# Patient Record
Sex: FEMALE | Race: WHITE | NOT HISPANIC OR LATINO | Employment: OTHER | URBAN - METROPOLITAN AREA
[De-identification: names, ages, dates, MRNs, and addresses within clinical notes are randomized per-mention and may not be internally consistent; named-entity substitution may affect disease eponyms.]

---

## 2017-05-18 ENCOUNTER — CONVERSION ENCOUNTER (OUTPATIENT)
Dept: MAMMOGRAPHY | Facility: CLINIC | Age: 72
End: 2017-05-18

## 2017-10-13 ENCOUNTER — TRANSCRIBE ORDERS (OUTPATIENT)
Dept: ADMINISTRATIVE | Facility: HOSPITAL | Age: 72
End: 2017-10-13

## 2017-10-13 ENCOUNTER — ALLSCRIPTS OFFICE VISIT (OUTPATIENT)
Dept: OTHER | Facility: OTHER | Age: 72
End: 2017-10-13

## 2017-10-13 DIAGNOSIS — N30.20 OTHER CHRONIC CYSTITIS WITHOUT HEMATURIA: ICD-10-CM

## 2017-10-13 LAB
BILIRUB UR QL STRIP: NORMAL
CLARITY UR: NORMAL
COLOR UR: YELLOW
GLUCOSE (HISTORICAL): NORMAL
HGB UR QL STRIP.AUTO: NORMAL
KETONES UR STRIP-MCNC: NORMAL MG/DL
LEUKOCYTE ESTERASE UR QL STRIP: NORMAL
NITRITE UR QL STRIP: NORMAL
PH UR STRIP.AUTO: 5.5 [PH]
PROT UR STRIP-MCNC: NORMAL MG/DL
SP GR UR STRIP.AUTO: 1.02
UROBILINOGEN UR QL STRIP.AUTO: 0.2

## 2017-10-25 ENCOUNTER — HOSPITAL ENCOUNTER (OUTPATIENT)
Dept: CT IMAGING | Facility: HOSPITAL | Age: 72
Discharge: HOME | End: 2017-10-25
Attending: UROLOGY
Payer: MEDICARE

## 2017-10-25 DIAGNOSIS — N30.20 OTHER CHRONIC CYSTITIS WITHOUT HEMATURIA: ICD-10-CM

## 2017-10-25 PROCEDURE — 74176 CT ABD & PELVIS W/O CONTRAST: CPT

## 2017-12-05 ENCOUNTER — ALLSCRIPTS OFFICE VISIT (OUTPATIENT)
Dept: OTHER | Facility: OTHER | Age: 72
End: 2017-12-05

## 2017-12-05 LAB
BILIRUB UR QL STRIP: NEGATIVE
CLARITY UR: NORMAL
COLOR UR: YELLOW
GLUCOSE (HISTORICAL): NEGATIVE
HGB UR QL STRIP.AUTO: NORMAL
KETONES UR STRIP-MCNC: NEGATIVE MG/DL
LEUKOCYTE ESTERASE UR QL STRIP: NORMAL
NITRITE UR QL STRIP: NEGATIVE
PH UR STRIP.AUTO: 5 [PH]
PROT UR STRIP-MCNC: NEGATIVE MG/DL
SP GR UR STRIP.AUTO: 1.02
UROBILINOGEN UR QL STRIP.AUTO: 0.2

## 2018-01-14 VITALS
SYSTOLIC BLOOD PRESSURE: 104 MMHG | BODY MASS INDEX: 41.95 KG/M2 | HEIGHT: 66 IN | WEIGHT: 261 LBS | DIASTOLIC BLOOD PRESSURE: 68 MMHG

## 2018-05-22 LAB
ABSOL LYMPHOCYTES (HISTORICAL): 1.2 K/UL (ref 0.5–4)
ALBUMIN SERPL BCP-MCNC: 3.7 G/DL (ref 3–5.2)
ALP SERPL-CCNC: 96 U/L (ref 43–122)
ALT SERPL W P-5'-P-CCNC: 37 U/L (ref 9–52)
AST SERPL W P-5'-P-CCNC: 38 U/L (ref 14–36)
BASOPHILS # BLD AUTO: 0 % (ref 0–1)
BASOPHILS # BLD AUTO: 0 K/UL (ref 0–0.1)
BILIRUB SERPL-MCNC: 0.3 MG/DL
BILIRUBIN DIRECT (HISTORICAL): 0.2 MG/DL
DEPRECATED RDW RBC AUTO: 13.8 %
EOSINOPHIL # BLD AUTO: 0.1 K/UL (ref 0–0.4)
EOSINOPHIL NFR BLD AUTO: 2 % (ref 0–6)
HCT VFR BLD AUTO: 38.7 % (ref 36–46)
HGB BLD-MCNC: 12.7 G/DL (ref 12–16)
LYMPHOCYTES NFR BLD AUTO: 14 % (ref 25–45)
MCH RBC QN AUTO: 29.3 PG (ref 26–34)
MCHC RBC AUTO-ENTMCNC: 32.7 % (ref 31–36)
MCV RBC AUTO: 90 FL (ref 80–100)
MONOCYTES # BLD AUTO: 0.5 K/UL (ref 0.2–0.9)
MONOCYTES NFR BLD AUTO: 6 % (ref 1–10)
NEUTROPHILS ABS COUNT (HISTORICAL): 6.4 K/UL (ref 1.8–7.8)
NEUTS SEG NFR BLD AUTO: 78 % (ref 45–65)
PLATELET # BLD AUTO: 180 K/MCL (ref 150–450)
RBC # BLD AUTO: 4.32 M/MCL (ref 4–5.2)
TOTAL PROTEIN (HISTORICAL): 6.2 G/DL (ref 5.9–8.4)
WBC # BLD AUTO: 8.2 K/MCL (ref 4.5–11)

## 2018-06-04 LAB
ABSOL LYMPHOCYTES (HISTORICAL): 1 K/UL (ref 0.5–4)
ALBUMIN SERPL BCP-MCNC: 3.5 G/DL (ref 3–5.2)
ALP SERPL-CCNC: 119 U/L (ref 43–122)
ALT SERPL W P-5'-P-CCNC: 40 U/L (ref 9–52)
ANION GAP SERPL CALCULATED.3IONS-SCNC: 8 MMOL/L (ref 5–14)
AST SERPL W P-5'-P-CCNC: 36 U/L (ref 14–36)
BASOPHILS # BLD AUTO: 0 % (ref 0–1)
BASOPHILS # BLD AUTO: 0 K/UL (ref 0–0.1)
BILIRUB SERPL-MCNC: 0.6 MG/DL
BUN SERPL-MCNC: 17 MG/DL (ref 5–25)
CALCIUM SERPL-MCNC: 8.9 MG/DL (ref 8.4–10.2)
CHLORIDE SERPL-SCNC: 105 MEQ/L (ref 97–108)
CHOLEST SERPL-MCNC: 136 MG/DL
CHOLEST/HDLC SERPL: 3.4 {RATIO}
CO2 SERPL-SCNC: 28 MMOL/L (ref 22–30)
CREATINE, SERUM (HISTORICAL): 1.01 MG/DL (ref 0.6–1.2)
CREATININE, RANDOM URINE (HISTORICAL): 107.1 MG/DL (ref 50–200)
DEPRECATED RDW RBC AUTO: 13.8 %
EGFR (HISTORICAL): 54 ML/MIN/1.73 M2
EOSINOPHIL # BLD AUTO: 0.1 K/UL (ref 0–0.4)
EOSINOPHIL NFR BLD AUTO: 2 % (ref 0–6)
EST. AVERAGE GLUCOSE BLD GHB EST-MCNC: 217 MG/DL
GLUCOSE SERPL-MCNC: 125 MG/DL (ref 70–99)
HBA1C MFR BLD HPLC: 9.2 %
HCT VFR BLD AUTO: 39.6 % (ref 36–46)
HDLC SERPL-MCNC: 40 MG/DL
HGB BLD-MCNC: 13.1 G/DL (ref 12–16)
LDL/HDL RATIO (HISTORICAL): 1.6
LDLC SERPL CALC-MCNC: 62 MG/DL
LYMPHOCYTES NFR BLD AUTO: 16 % (ref 25–45)
MCH RBC QN AUTO: 29.3 PG (ref 26–34)
MCHC RBC AUTO-ENTMCNC: 33 % (ref 31–36)
MCV RBC AUTO: 89 FL (ref 80–100)
MICROALBUM.,U,RANDOM (HISTORICAL): 2 MG/DL
MICROALBUMIN/CREATININE RATIO (HISTORICAL): 18.7
MONOCYTES # BLD AUTO: 0.4 K/UL (ref 0.2–0.9)
MONOCYTES NFR BLD AUTO: 6 % (ref 1–10)
NEUTROPHILS ABS COUNT (HISTORICAL): 4.7 K/UL (ref 1.8–7.8)
NEUTS SEG NFR BLD AUTO: 76 % (ref 45–65)
PLATELET # BLD AUTO: 152 K/MCL (ref 150–450)
POTASSIUM SERPL-SCNC: 4.2 MEQ/L (ref 3.6–5)
RBC # BLD AUTO: 4.46 M/MCL (ref 4–5.2)
SODIUM SERPL-SCNC: 141 MEQ/L (ref 137–147)
TOTAL PROTEIN (HISTORICAL): 6.2 G/DL (ref 5.9–8.4)
TRIGL SERPL-MCNC: 170 MG/DL
TSH SERPL DL<=0.05 MIU/L-ACNC: 2.9 UIU/ML (ref 0.47–4.68)
VLDLC SERPL CALC-MCNC: 34 MG/DL (ref 0–40)
WBC # BLD AUTO: 6.2 K/MCL (ref 4.5–11)

## 2018-06-04 RX ORDER — TOPIRAMATE 25 MG/1
CAPSULE, COATED PELLETS ORAL
COMMUNITY
End: 2018-07-19 | Stop reason: SDUPTHER

## 2018-06-04 RX ORDER — LORATADINE 10 MG/1
TABLET ORAL
COMMUNITY

## 2018-06-04 RX ORDER — ERGOCALCIFEROL (VITAMIN D2) 10 MCG
TABLET ORAL
COMMUNITY

## 2018-06-04 RX ORDER — SIMVASTATIN 20 MG
TABLET ORAL
COMMUNITY
End: 2020-06-29 | Stop reason: SDUPTHER

## 2018-06-04 RX ORDER — PNV NO.95/FERROUS FUM/FOLIC AC 28MG-0.8MG
TABLET ORAL
COMMUNITY

## 2018-06-04 RX ORDER — FENOFIBRATE 160 MG/1
TABLET ORAL
COMMUNITY
End: 2018-07-17 | Stop reason: SDUPTHER

## 2018-06-04 RX ORDER — LISINOPRIL 10 MG/1
TABLET ORAL
COMMUNITY
End: 2019-05-11 | Stop reason: SDUPTHER

## 2018-06-04 RX ORDER — ACETAMINOPHEN 325 MG/1
TABLET ORAL AS NEEDED
COMMUNITY

## 2018-06-04 RX ORDER — AMLODIPINE BESYLATE 5 MG/1
TABLET ORAL
COMMUNITY
End: 2019-05-11 | Stop reason: SDUPTHER

## 2018-06-04 RX ORDER — LANOLIN ALCOHOL/MO/W.PET/CERES
CREAM (GRAM) TOPICAL
COMMUNITY
End: 2021-04-26

## 2018-06-04 RX ORDER — PANTOPRAZOLE SODIUM 40 MG/1
TABLET, DELAYED RELEASE ORAL
COMMUNITY
End: 2019-05-11 | Stop reason: SDUPTHER

## 2018-06-04 RX ORDER — DICYCLOMINE HCL 20 MG
TABLET ORAL
COMMUNITY
End: 2019-05-11 | Stop reason: SDUPTHER

## 2018-06-04 RX ORDER — CLOTRIMAZOLE AND BETAMETHASONE DIPROPIONATE 10; .64 MG/G; MG/G
CREAM TOPICAL
COMMUNITY
End: 2018-11-26 | Stop reason: SDUPTHER

## 2018-06-04 RX ORDER — PREGABALIN 75 MG/1
CAPSULE ORAL
COMMUNITY
End: 2018-07-19 | Stop reason: SDUPTHER

## 2018-06-04 RX ORDER — B-COMPLEX WITH VITAMIN C
CAPSULE ORAL
COMMUNITY
End: 2021-08-12 | Stop reason: ALTCHOICE

## 2018-06-04 RX ORDER — OXYBUTYNIN CHLORIDE 5 MG/1
1 TABLET, EXTENDED RELEASE ORAL DAILY
COMMUNITY
Start: 2017-10-13 | End: 2018-06-05 | Stop reason: SDUPTHER

## 2018-06-05 ENCOUNTER — OFFICE VISIT (OUTPATIENT)
Dept: UROLOGY | Facility: MEDICAL CENTER | Age: 73
End: 2018-06-05
Payer: MEDICARE

## 2018-06-05 VITALS
WEIGHT: 264 LBS | SYSTOLIC BLOOD PRESSURE: 140 MMHG | DIASTOLIC BLOOD PRESSURE: 78 MMHG | HEIGHT: 66 IN | BODY MASS INDEX: 42.43 KG/M2

## 2018-06-05 DIAGNOSIS — N30.20 CHRONIC CYSTITIS: Primary | ICD-10-CM

## 2018-06-05 DIAGNOSIS — N39.41 URGE INCONTINENCE: ICD-10-CM

## 2018-06-05 LAB
SL AMB  POCT GLUCOSE, UA: NEGATIVE
SL AMB LEUKOCYTE ESTERASE,UA: ABNORMAL
SL AMB POCT BILIRUBIN,UA: NEGATIVE
SL AMB POCT BLOOD,UA: NEGATIVE
SL AMB POCT CLARITY,UA: CLEAR
SL AMB POCT COLOR,UA: YELLOW
SL AMB POCT KETONES,UA: NEGATIVE
SL AMB POCT NITRITE,UA: POSITIVE
SL AMB POCT PH,UA: 5.5
SL AMB POCT SPECIFIC GRAVITY,UA: 1.02
SL AMB POCT URINE PROTEIN: NEGATIVE
SL AMB POCT UROBILINOGEN: 0.2

## 2018-06-05 PROCEDURE — 99213 OFFICE O/P EST LOW 20 MIN: CPT | Performed by: UROLOGY

## 2018-06-05 PROCEDURE — 81003 URINALYSIS AUTO W/O SCOPE: CPT | Performed by: UROLOGY

## 2018-06-05 RX ORDER — OXYBUTYNIN CHLORIDE 5 MG/1
5 TABLET, EXTENDED RELEASE ORAL DAILY
Qty: 90 TABLET | Refills: 3 | Status: SHIPPED | OUTPATIENT
Start: 2018-06-05 | End: 2018-07-19 | Stop reason: SDUPTHER

## 2018-06-05 NOTE — LETTER
2018     [facility], [address] [place] [address]    Patient: Jn Martini   YOB: 1945   Date of Visit: 2018       Dear Dr Leda Alvarado: Thank you for referring Mingo Hubbard to me for evaluation  Below are my notes for this consultation  If you have questions, please do not hesitate to call me  I look forward to following your patient along with you  Sincerely,        Enrique Jefferson MD        CC: MD Enrique Jackman MD  2018  2:53 PM  Sign at close encounter  [address] [facility]  [address], [address] [place] [address]  [phone]  www  [facility]  org      NAME: Jn Martini  AGE: 67 y o  SEX: female  : 1945   MRN: [id number]    DATE: 2018  TIME: 2:42 PM    Assessment and Plan:  Urge incontinence-controlled with oxybutynin and timed voiding  Chronic cystitis:  Has had negative workup with cystoscopy and CT scan, had 1 minor infection in the last 6 months  Plan:  Stay on oxybutynin, treat infections as they arise  Follow-up 1 year  Chief Complaint     Chief Complaint   Patient presents with    chronic cystitis     Follow up       History of Present Illness     CC/HPI: I have chronic cystitis  Referred for recurrent UTI- currently on Levaquin  U/A clear today  Has CKD3   S/P KEVIN , still has ovaries actuallshe y only has 1/year, but this one is "lasting too long"- was on keflex, then zithromax, and now on levaquin  y be having them more frequently  Sx of UTI-dysuria, some chills  No fever  "I usually don't know if I have an infection until it's really bad  Had AVI years ago  Cysto last OV 6 months ago showed cobblestoning of trigone, no other abnormalities    CT scan shows a normal urinary tract  No urinary tract infections since I last saw her   She is having a flare-up of her irritable bowel syndrome and 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- is to see Gastroenterology soon  She believes this is the cause of her infections  Had one UTI treated by Dr Moises Isidro  Nothing since- doing well with this  CC/HPI: I leak when I have the urge to urinate  has to wear a diaper all of the time, for a year  Dry at night  She does leak with cough and sneeze as well  Sleeps 3-4hrs, then gets up to go to [place]- is dry because her bathroom is right there  Was started on oxybutynin last office visit- This has helped a great deal  It took her some getting used to it  She continues with this  The following portions of the patient's history were reviewed and updated as appropriate: allergies, current medications, past family history, past medical history, past social history, past surgical history and problem list     Review of Systems   Review of Systems   Genitourinary: Negative  Active Problem List   There is no problem list on file for this patient  Objective   /78   Ht 5' 6" (1 676 m)   Wt 120 kg (264 lb)   BMI 42 61 kg/m²      Physical Exam   Constitutional: She is oriented to person, place, and time  She appears well-developed and well-nourished  HENT:   Head: Normocephalic and atraumatic  Eyes: EOM are normal    Pulmonary/Chest: Effort normal    Musculoskeletal:   Uses walker   Neurological: She is alert and oriented to person, place, and time  Skin: Skin is warm and dry  Psychiatric: She has a normal mood and affect   Her behavior is normal  Judgment and thought content normal            Current Medications     Current Outpatient Prescriptions:     acetaminophen (TYLENOL) 325 mg tablet, Take by mouth, Disp: , Rfl:     amLODIPine (NORVASC) 5 mg tablet, Take by mouth, Disp: , Rfl:     B Complex-C CAPS, Take by mouth, Disp: , Rfl:     clotrimazole-betamethasone (LOTRISONE) 1-0 05 % cream, Apply topically, Disp: , Rfl:     dicyclomine (BENTYL) 20 mg tablet, Take by mouth, Disp: , Rfl:     fenofibrate (TRIGLIDE) 160 MG tablet, Take by mouth, Disp: , Rfl:     Ferrous Sulfate (IRON) 325 (65 Fe) MG TABS, Take by mouth, Disp: , Rfl:     insulin glargine (LANTUS SOLOSTAR) 100 units/mL injection pen, Inject under the skin, Disp: , Rfl:     insuln lispro (HUMALOG KWIKPEN) 200 units/mL CONCENTRATED U-200 injection pen, inject by subcutaneous route per prescriber&#39;s instructions  Insulin dosing requires individualization  , Disp: , Rfl:     lisinopril (ZESTRIL) 10 mg tablet, Take by mouth, Disp: , Rfl:     loratadine (CLARITIN) 10 mg tablet, Take by mouth, Disp: , Rfl:     magnesium Oxide (MAG-OX) 400 mg TABS, Take by mouth, Disp: , Rfl:     Multiple Vitamin (DAILY VALUE MULTIVITAMIN) TABS, Take by mouth, Disp: , Rfl:     oxybutynin (DITROPAN-XL) 5 mg 24 hr tablet, Take 1 tablet by mouth daily, Disp: , Rfl:     pantoprazole (PROTONIX) 40 mg tablet, Take by mouth, Disp: , Rfl:     pregabalin (LYRICA) 75 mg capsule, Take by mouth, Disp: , Rfl:     simvastatin (ZOCOR) 20 mg tablet, Take by mouth, Disp: , Rfl:     topiramate (TOPAMAX) 25 mg sprinkle capsule, Take by mouth, Disp: , Rfl:         Shiela Schroeder MD

## 2018-06-05 NOTE — PROGRESS NOTES
[address] [facility]  [facility]  [address] [facility] Dejesus Christopher  [place], [address] [facility]  [phone]  www  [facility]  org      NAME: Odalys Tran  AGE: 67 y o  SEX: female  : 1945   MRN: [id number]    DATE: 2018  TIME: 2:42 PM    Assessment and Plan:  Urge incontinence-controlled with oxybutynin and timed voiding  Chronic cystitis:  Has had negative workup with cystoscopy and CT scan, had 1 minor infection in the last 6 months  Plan:  Stay on oxybutynin, treat infections as they arise  Follow-up 1 year  Chief Complaint     Chief Complaint   Patient presents with    chronic cystitis     Follow up       History of Present Illness     CC/HPI: I have chronic cystitis  Referred for recurrent UTI- currently on Levaquin  U/A clear today  Has CKD3   S/P KEVIN , still has ovaries actuallshe y only has 1/year, but this one is "lasting too long"- was on keflex, then zithromax, and now on levaquin  y be having them more frequently  Sx of UTI-dysuria, some chills  No fever  "I usually don't know if I have an infection until it's really bad  Had AVI years ago  Cysto last OV 6 months ago showed cobblestoning of trigone, no other abnormalities    CT scan shows a normal urinary tract  No urinary tract infections since I last saw her  She is having a flare-up of her irritable bowel syndrome and 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- is to see Gastroenterology soon  She believes this is the cause of her infections  Had one UTI treated by Dr Adriana Infante  Nothing since- doing well with this  CC/HPI: I leak when I have the urge to urinate  has to wear a diaper all of the time, for a year  Dry at night  She does leak with cough and sneeze as well  Sleeps 3-4hrs, then gets up to go to [place]- is dry because her bathroom is right there  Was started on oxybutynin last office visit- This has helped a great deal  It took her some getting used to it  She continues with this       The following portions of the patient's history were reviewed and updated as appropriate: allergies, current medications, past family history, past medical history, past social history, past surgical history and problem list     Review of Systems   Review of Systems   Genitourinary: Negative  Active Problem List   There is no problem list on file for this patient  Objective   /78   Ht 5' 6" (1 676 m)   Wt 120 kg (264 lb)   BMI 42 61 kg/m²     Physical Exam   Constitutional: She is oriented to person, place, and time  She appears well-developed and well-nourished  HENT:   Head: Normocephalic and atraumatic  Eyes: EOM are normal    Pulmonary/Chest: Effort normal    Musculoskeletal:   Uses walker   Neurological: She is alert and oriented to person, place, and time  Skin: Skin is warm and dry  Psychiatric: She has a normal mood and affect  Her behavior is normal  Judgment and thought content normal            Current Medications     Current Outpatient Prescriptions:     acetaminophen (TYLENOL) 325 mg tablet, Take by mouth, Disp: , Rfl:     amLODIPine (NORVASC) 5 mg tablet, Take by mouth, Disp: , Rfl:     B Complex-C CAPS, Take by mouth, Disp: , Rfl:     clotrimazole-betamethasone (LOTRISONE) 1-0 05 % cream, Apply topically, Disp: , Rfl:     dicyclomine (BENTYL) 20 mg tablet, Take by mouth, Disp: , Rfl:     fenofibrate (TRIGLIDE) 160 MG tablet, Take by mouth, Disp: , Rfl:     Ferrous Sulfate (IRON) 325 (65 Fe) MG TABS, Take by mouth, Disp: , Rfl:     insulin glargine (LANTUS SOLOSTAR) 100 units/mL injection pen, Inject under the skin, Disp: , Rfl:     insuln lispro (HUMALOG KWIKPEN) 200 units/mL CONCENTRATED U-200 injection pen, inject by subcutaneous route per prescriber&#39;s instructions  Insulin dosing requires individualization  , Disp: , Rfl:     lisinopril (ZESTRIL) 10 mg tablet, Take by mouth, Disp: , Rfl:     loratadine (CLARITIN) 10 mg tablet, Take by mouth, Disp: , Rfl:     magnesium Oxide (MAG-OX) 400 mg TABS, Take by mouth, Disp: , Rfl:     Multiple Vitamin (DAILY VALUE MULTIVITAMIN) TABS, Take by mouth, Disp: , Rfl:     oxybutynin (DITROPAN-XL) 5 mg 24 hr tablet, Take 1 tablet by mouth daily, Disp: , Rfl:     pantoprazole (PROTONIX) 40 mg tablet, Take by mouth, Disp: , Rfl:     pregabalin (LYRICA) 75 mg capsule, Take by mouth, Disp: , Rfl:     simvastatin (ZOCOR) 20 mg tablet, Take by mouth, Disp: , Rfl:     topiramate (TOPAMAX) 25 mg sprinkle capsule, Take by mouth, Disp: , Rfl:         Aditya Bowser MD

## 2018-06-14 ENCOUNTER — TRANSCRIBE ORDERS (OUTPATIENT)
Dept: ADMINISTRATIVE | Facility: HOSPITAL | Age: 73
End: 2018-06-14

## 2018-06-14 DIAGNOSIS — I65.23 BILATERAL CAROTID ARTERY OCCLUSION: Primary | ICD-10-CM

## 2018-06-14 DIAGNOSIS — M25.561 RIGHT KNEE PAIN, UNSPECIFIED CHRONICITY: ICD-10-CM

## 2018-06-14 DIAGNOSIS — Z12.39 SCREENING BREAST EXAMINATION: ICD-10-CM

## 2018-06-14 DIAGNOSIS — R01.1 UNDIAGNOSED CARDIAC MURMURS: ICD-10-CM

## 2018-06-20 ENCOUNTER — TRANSCRIBE ORDERS (OUTPATIENT)
Dept: ADMINISTRATIVE | Facility: HOSPITAL | Age: 73
End: 2018-06-20

## 2018-06-20 DIAGNOSIS — K76.0 FATTY METAMORPHOSIS OF LIVER: ICD-10-CM

## 2018-06-20 DIAGNOSIS — K76.0 FATTY LIVER: Primary | ICD-10-CM

## 2018-06-25 ENCOUNTER — HOSPITAL ENCOUNTER (OUTPATIENT)
Dept: ULTRASOUND IMAGING | Facility: HOSPITAL | Age: 73
Discharge: HOME | End: 2018-06-25
Payer: MEDICARE

## 2018-06-25 DIAGNOSIS — K76.0 FATTY LIVER: ICD-10-CM

## 2018-06-25 PROCEDURE — 76705 ECHO EXAM OF ABDOMEN: CPT

## 2018-06-27 PROBLEM — K30 INDIGESTION: Status: ACTIVE | Noted: 2017-09-21

## 2018-06-27 PROBLEM — E83.42 HYPOMAGNESEMIA: Status: ACTIVE | Noted: 2017-09-21

## 2018-06-27 PROBLEM — Z79.4 LONG TERM CURRENT USE OF INSULIN (HCC): Status: ACTIVE | Noted: 2017-05-11

## 2018-06-27 PROBLEM — K76.0 NONALCOHOLIC FATTY LIVER DISEASE: Status: ACTIVE | Noted: 2018-03-08

## 2018-06-28 ENCOUNTER — OFFICE VISIT (OUTPATIENT)
Dept: FAMILY MEDICINE CLINIC | Facility: CLINIC | Age: 73
End: 2018-06-28
Payer: MEDICARE

## 2018-06-28 VITALS
TEMPERATURE: 99.4 F | SYSTOLIC BLOOD PRESSURE: 100 MMHG | WEIGHT: 260 LBS | DIASTOLIC BLOOD PRESSURE: 60 MMHG | HEIGHT: 64 IN | HEART RATE: 83 BPM | BODY MASS INDEX: 44.39 KG/M2

## 2018-06-28 DIAGNOSIS — M85.88 OSTEOPENIA OF SPINE: ICD-10-CM

## 2018-06-28 DIAGNOSIS — Z00.00 MEDICARE ANNUAL WELLNESS VISIT, SUBSEQUENT: Primary | ICD-10-CM

## 2018-06-28 DIAGNOSIS — Z13.820 SCREENING FOR OSTEOPOROSIS: ICD-10-CM

## 2018-06-28 DIAGNOSIS — Z23 NEED FOR SHINGLES VACCINE: ICD-10-CM

## 2018-06-28 DIAGNOSIS — Z11.59 NEED FOR HEPATITIS C SCREENING TEST: ICD-10-CM

## 2018-06-28 DIAGNOSIS — E11.8 TYPE 2 DIABETES MELLITUS WITH COMPLICATION, WITH LONG-TERM CURRENT USE OF INSULIN (HCC): ICD-10-CM

## 2018-06-28 DIAGNOSIS — Z79.4 TYPE 2 DIABETES MELLITUS WITH COMPLICATION, WITH LONG-TERM CURRENT USE OF INSULIN (HCC): ICD-10-CM

## 2018-06-28 DIAGNOSIS — Z11.4 ENCOUNTER FOR SCREENING FOR HIV: ICD-10-CM

## 2018-06-28 PROCEDURE — G0439 PPPS, SUBSEQ VISIT: HCPCS | Performed by: FAMILY MEDICINE

## 2018-06-28 RX ORDER — PREGABALIN 75 MG/1
CAPSULE ORAL 3 TIMES DAILY
COMMUNITY
Start: 2018-01-31 | End: 2019-01-25 | Stop reason: ALTCHOICE

## 2018-06-28 RX ORDER — CLOTRIMAZOLE AND BETAMETHASONE DIPROPIONATE 10; .64 MG/G; MG/G
CREAM TOPICAL
COMMUNITY
Start: 2018-05-29 | End: 2018-07-19 | Stop reason: SDUPTHER

## 2018-06-28 RX ORDER — CHOLESTYRAMINE 4 G/9G
POWDER, FOR SUSPENSION ORAL
Refills: 0 | COMMUNITY
Start: 2018-06-06 | End: 2022-06-08 | Stop reason: ALTCHOICE

## 2018-06-28 RX ORDER — SIMVASTATIN 20 MG
TABLET ORAL
COMMUNITY
Start: 2018-01-31 | End: 2018-07-19 | Stop reason: SDUPTHER

## 2018-06-28 RX ORDER — CHOLESTYRAMINE 4 G/9G
POWDER, FOR SUSPENSION ORAL
COMMUNITY
End: 2018-07-19 | Stop reason: SDUPTHER

## 2018-06-28 RX ORDER — INSULIN DEGLUDEC INJECTION 100 U/ML
INJECTION, SOLUTION SUBCUTANEOUS
COMMUNITY
Start: 2018-06-15 | End: 2018-07-19 | Stop reason: SDUPTHER

## 2018-06-28 RX ORDER — ACETAMINOPHEN 160 MG
TABLET,DISINTEGRATING ORAL
COMMUNITY
Start: 2013-05-06

## 2018-06-28 RX ORDER — OXYBUTYNIN CHLORIDE 5 MG/1
TABLET ORAL 3 TIMES DAILY
COMMUNITY
End: 2019-04-11 | Stop reason: DRUGHIGH

## 2018-06-28 RX ORDER — DICYCLOMINE HCL 20 MG
TABLET ORAL
COMMUNITY
Start: 2017-05-11 | End: 2018-07-19 | Stop reason: SDUPTHER

## 2018-06-28 RX ORDER — PANTOPRAZOLE SODIUM 40 MG/1
TABLET, DELAYED RELEASE ORAL EVERY 24 HOURS
COMMUNITY
Start: 2018-01-31 | End: 2018-07-19 | Stop reason: SDUPTHER

## 2018-06-28 RX ORDER — LANCETS 32 GAUGE
EACH MISCELLANEOUS
COMMUNITY
Start: 2017-04-04 | End: 2020-02-10 | Stop reason: SDUPTHER

## 2018-06-28 RX ORDER — HYDROCODONE BITARTRATE AND ACETAMINOPHEN 5; 300 MG/1; MG/1
TABLET ORAL EVERY 4 HOURS
COMMUNITY
End: 2018-10-25 | Stop reason: DRUGHIGH

## 2018-06-28 RX ORDER — FLUTICASONE PROPIONATE 50 MCG
SPRAY, SUSPENSION (ML) NASAL
COMMUNITY
Start: 2017-01-19 | End: 2019-01-25 | Stop reason: ALTCHOICE

## 2018-06-28 NOTE — PATIENT INSTRUCTIONS
Weight Management   WHAT YOU NEED TO KNOW:   Being overweight increases your risk of health conditions such as heart disease, high blood pressure, type 2 diabetes, and certain types of cancer  It can also increase your risk for osteoarthritis, sleep apnea, and other respiratory problems  Aim for a slow, steady weight loss  Even a small amount of weight loss can lower your risk of health problems  DISCHARGE INSTRUCTIONS:   How to lose weight safely:  A safe and healthy way to lose weight is to eat fewer calories and get regular exercise  You can lose up about 1 pound a week by decreasing the number of calories you eat by 500 calories each day  You can decrease calories by eating smaller portion sizes or by cutting out high-calorie foods  Read labels to find out how many calories are in the foods you eat  You can also burn calories with exercise such as walking, swimming, or biking  You will be more likely to keep weight off if you make these changes part of your lifestyle  Healthy meal plan for weight management:  A healthy meal plan includes a variety of foods, contains fewer calories, and helps you stay healthy  A healthy meal plan includes the following:  · Eat whole-grain foods more often  A healthy meal plan should contain fiber  Fiber is the part of grains, fruits, and vegetables that is not broken down by your body  Whole-grain foods are healthy and provide extra fiber in your diet  Some examples of whole-grain foods are whole-wheat breads and pastas, oatmeal, brown rice, and bulgur  · Eat a variety of vegetables every day  Include dark, leafy greens such as spinach, kale, selam greens, and mustard greens  Eat yellow and orange vegetables such as carrots, sweet potatoes, and winter squash  · Eat a variety of fruits every day  Choose fresh or canned fruit (canned in its own juice or light syrup) instead of juice  Fruit juice has very little or no fiber  · Eat low-fat dairy foods    Drink fat-free (skim) milk or 1% milk  Eat fat-free yogurt and low-fat cottage cheese  Try low-fat cheeses such as mozzarella and other reduced-fat cheeses  · Choose meat and other protein foods that are low in fat  Choose beans or other legumes such as split peas or lentils  Choose fish, skinless poultry (chicken or turkey), or lean cuts of red meat (beef or pork)  Before you cook meat or poultry, cut off any visible fat  · Use less fat and oil  Try baking foods instead of frying them  Add less fat, such as margarine, sour cream, regular salad dressing, and mayonnaise to foods  Eat fewer high-fat foods  Some examples of high-fat foods include french fries, doughnuts, ice cream, and cakes  · Eat fewer sweets  Limit foods and drinks that are high in sugar  This includes candy, cookies, regular soda, and sweetened drinks  Ways to decrease calories:   · Eat smaller portions  ¨ Use a small plate with smaller servings  ¨ Do not eat second helpings  ¨ When you eat at a restaurant, ask for a box and place half of your meal in the box before you eat  ¨ Share an entrée with someone else  · Replace high-calorie snacks with healthy, low-calorie snacks  ¨ Choose fresh fruit, vegetables, fat-free rice cakes, or air-popped popcorn instead of potato chips, nuts, or chocolate  ¨ Choose water or calorie-free drinks instead of soda or sweetened drinks  · Eat regular meals  Skipping meals can lead to overeating later in the day  Eat a healthy snack in place of a meal if you do not have time to eat a regular meal      · Do not shop for groceries when you are hungry  You may be more likely to make unhealthy food choices  Take a grocery list of healthy foods and shop after you have eaten  Exercise:  Exercise at least 30 minutes per day on most days of the week  Some examples of exercise include walking, biking, dancing, and swimming   You can also fit in more physical activity by taking the stairs instead of the elevator or parking farther away from stores  Ask your healthcare provider about the best exercise plan for you  Other things to consider as you try to lose weight:   · Be aware of situations that may give you the urge to overeat, such as eating while watching television  Find ways to avoid these situations  For example, read a book, go for a walk, or do crafts  · Meet with a weight loss support group or friends who are also trying to lose weight  This may help you stay motivated to continue working on your weight loss goals  © 2017 [facility] Information is for End User's use only and may not be sold, redistributed or otherwise used for commercial purposes  All illustrations and images included in CareNotes® are the copyrighted property of [employer]  or [address]  The above information is an  only  It is not intended as medical advice for individual conditions or treatments  Talk to your doctor, nurse or pharmacist before following any medical regimen to see if it is safe and effective for you

## 2018-06-29 ENCOUNTER — HOSPITAL ENCOUNTER (OUTPATIENT)
Dept: NON INVASIVE DIAGNOSTICS | Facility: HOSPITAL | Age: 73
Discharge: HOME | End: 2018-06-29
Payer: MEDICARE

## 2018-06-29 DIAGNOSIS — I65.23 BILATERAL CAROTID ARTERY OCCLUSION: ICD-10-CM

## 2018-06-29 DIAGNOSIS — R01.1 UNDIAGNOSED CARDIAC MURMURS: ICD-10-CM

## 2018-06-29 PROBLEM — M85.88 OSTEOPENIA OF SPINE: Status: ACTIVE | Noted: 2018-06-29

## 2018-06-29 PROCEDURE — 93880 EXTRACRANIAL BILAT STUDY: CPT | Performed by: SURGERY

## 2018-06-29 PROCEDURE — 93880 EXTRACRANIAL BILAT STUDY: CPT

## 2018-06-29 PROCEDURE — 93306 TTE W/DOPPLER COMPLETE: CPT

## 2018-06-29 NOTE — PROGRESS NOTES
Assessment and Plan:    Problem List Items Addressed This Visit     Type 2 diabetes mellitus with complication, with long-term current use of insulin (HCC)    Relevant Medications    TRESIBA FLEXTOUCH 100 units/mL injection pen    insuln lispro (HUMALOG KWIKPEN) 200 units/mL CONCENTRATED U-200 injection pen    Other Relevant Orders    Diabetic foot exam    Osteopenia of spine    Relevant Orders    DXA bone density spine hip and pelvis      Other Visit Diagnoses     Medicare annual wellness visit, subsequent    -  Primary    Need for shingles vaccine        Relevant Medications    Zoster Vac Recomb Adjuvanted ([address]) 50 MCG SUSR    Need for hepatitis C screening test        Relevant Orders    Hepatitis C antibody    Encounter for screening for HIV        Relevant Orders    HIV-1 RNA, quantitative, PCR    Screening for osteoporosis        Relevant Orders    DXA bone density spine hip and pelvis    BMI 40 0-44 9, adult [facility])            Health Maintenance Due   Topic Date Due    MAMMOGRAM  1945    PAP SMEAR  1945    DXA SCAN  1945    CRC Screening: Colonoscopy  1945    Diabetic Foot Exam  07/08/1954    OPHTHALMOLOGY EXAM  07/08/1954    GLAUCOMA SCREENING 67+ YR  07/08/2011         HPI:  Jovana Ewing is a 67 y o  female here for her Subsequent Wellness Visit      Patient Active Problem List   Diagnosis    Carotid artery disease ([address] )    Chronic renal insufficiency, stage III (moderate)    Essential hypertension    Headache    Hyperlipidemia    Hypomagnesemia    Indigestion    Irritable bowel syndrome    Long term current use of insulin (HCC)    Metabolic syndrome    Morbid obesity ([address] )    Nonalcoholic fatty liver disease    Obstructive sleep apnea syndrome    Peripheral vascular disease (HCC)    Type 2 diabetes mellitus with complication, with long-term current use of insulin (HCC)    Urge incontinence    Vitamin D deficiency    Osteopenia of spine     Past Medical History:   Diagnosis Date    Anemia     Fracture of wrist     RIGHT    History of non-insulin dependent diabetes mellitus     Hyperlipidemia     Hypertension     Hypertension     IBS (irritable bowel syndrome)     Metabolic syndrome     Obesity     RLS (restless legs syndrome)     Sleep apnea     Sleep apnea     ON CPAP    Urge incontinence     Urinary incontinence      Past Surgical History:   Procedure Laterality Date    CHOLECYSTECTOMY  2013    COLONOSCOPY  2010    CYSTOSCOPY      HERNIA REPAIR  2008    HYSTERECTOMY      PARTIAL (STILL HAS OVARIES)    KNEE SURGERY Left      Family History   Problem Relation Age of Onset    Suicidality Father     Breast cancer Mother     Melanoma Sister     Bipolar disorder Sister     Heart disease Brother     Rheum arthritis Daughter      History   Smoking Status    Never Smoker   Smokeless Tobacco    Never Used     History   Alcohol Use No     Comment: no caffeine use      History   Drug Use No       Current Outpatient Prescriptions   Medication Sig Dispense Refill    albuterol (PROAIR HFA) 90 mcg/act inhaler inhale 2 puff by inhalation route  every 4 - 6 hours as needed      Cholecalciferol (VITAMIN D3) 2000 units capsule one tab po daily      cholestyramine (QUESTRAN) 4 g packet take 1 packet by mouth every other day  0    cholestyramine (QUESTRAN) 4 g packet take 1 packet by oral route 3 times a week dissolved in 2 to 6 ounces of water or noncarbonated beverage before meals      clotrimazole-betamethasone (LOTRISONE) 1-0 05 % cream APPLY TO AFFECTED AREA(S)  TWO TIMES DAILY AS NEEDED      dicyclomine (BENTYL) 20 mg tablet Take 1 tablet by mouth  twice a day      EASY TOUCH LANCETS 32G/TWIST MISC sub dermal      fluticasone (FLONASE) 50 mcg/act nasal spray inhale 2 spray by intranasal route  every day in each nostril      HYDROcodone-acetaminophen (VICODIN) 5-300 MG per tablet every 4 (four) hours      Insulin Pen Needle 32G X 4 MM MISC use once daily      insuln lispro (HUMALOG KWIKPEN) 200 units/mL CONCENTRATED U-200 injection pen inject by subcutaneous route per prescriber&#39;s instructions  Insulin dosing requires individualization   oxybutynin (DITROPAN) 5 mg tablet 3 (three) times a day      pantoprazole (PROTONIX) 40 mg tablet every 24 hours      pregabalin (LYRICA) 75 mg capsule 3 (three) times a day      simvastatin (ZOCOR) 20 mg tablet TAKE 1 TABLET BY MOUTH  EVERY EVENING      TRESIBA FLEXTOUCH 100 units/mL injection pen       acetaminophen (TYLENOL) 325 mg tablet Take by mouth      amLODIPine (NORVASC) 5 mg tablet Take by mouth      B Complex-C CAPS Take by mouth      clotrimazole-betamethasone (LOTRISONE) 1-0 05 % cream Apply topically      dicyclomine (BENTYL) 20 mg tablet Take by mouth      fenofibrate (TRIGLIDE) 160 MG tablet Take by mouth      Ferrous Sulfate (IRON) 325 (65 Fe) MG TABS Take by mouth      insulin glargine (LANTUS SOLOSTAR) 100 units/mL injection pen Inject under the skin      insuln lispro (HUMALOG KWIKPEN) 200 units/mL CONCENTRATED U-200 injection pen inject by subcutaneous route per prescriber&#39;s instructions  Insulin dosing requires individualization   lisinopril (ZESTRIL) 10 mg tablet Take by mouth      loratadine (CLARITIN) 10 mg tablet Take by mouth      magnesium Oxide (MAG-OX) 400 mg TABS Take by mouth      Multiple Vitamin (DAILY VALUE MULTIVITAMIN) TABS Take by mouth      oxybutynin (DITROPAN-XL) 5 mg 24 hr tablet Take 1 tablet (5 mg total) by mouth daily 90 tablet 3    pantoprazole (PROTONIX) 40 mg tablet Take by mouth      pregabalin (LYRICA) 75 mg capsule Take by mouth      simvastatin (ZOCOR) 20 mg tablet Take by mouth      topiramate (TOPAMAX) 25 mg sprinkle capsule Take by mouth      Zoster Vac Recomb Adjuvanted (SHINGRIX) 50 MCG SUSR Inject 1 ampule into a muscle daily 1 each 0     No current facility-administered medications for this visit        Allergies Allergen Reactions    Actos [Pioglitazone]      "Feels like she is dying"    Ibuprofen     Levofloxacin Other (See Comments)    Nsaids      Immunization History   Administered Date(s) Administered    Influenza 09/29/2010, 12/03/2015, 10/13/2016, 09/21/2017    Influenza Split 10/17/2013    Influenza TIV (IM) 09/22/2011, 10/25/2012    Pneumococcal Conjugate 13-Valent 04/28/2015    Pneumococcal Polysaccharide PPV23 05/05/2016    Tdap 09/05/2017    Zoster 05/23/2017, 05/31/2017       Patient Care Team:  Gumaro Duval MD as PCP - Janes Herbert MD      Medicare Screening Tests and Risk Assessments:  AWV Clinical     ISAR:       Once in a Lifetime Medicare Screening:       Medicare Screening Tests and Risk Assessment:   AAA Risk Assessment    Osteoporosis Risk Assessment    HIV Risk Assessment        Drug and Alcohol Use:   Tobacco use    Cigarettes:  never smoker    Tobacco use duration    Tobacco Cessation Readiness    Alcohol use    Alcohol use:  never    Alcohol Treatment Readiness   Illicit Drug Use    Drug use:  never        Diet & Exercise:   Diet   What is your diet?:  Low Saturated Fat, No Added Salt, Frequent junk food   How many servings a day of the following:   Fruits and Vegetables:  1-2, 3-4 Meat:  1-2   Whole Grains:  1 Simple Carbs:  2   Dairy:  1 Soda:  1   Coffee:  1 Tea:  0   Exercise    Do you currently exercise?:  yes    Frequency:  rarely    Type of exercise:  walking       Cognitive Impairment Screening:   Depression screening preformed:  Yes Depression screen score:  0    PHQ-9 Depression scale score:  0   Depression screening results:  negative for symptoms   Cognitive Impairment Screening    Do you have difficulty learning or retaining new information?:  No Do you have difficulty handling new tasks?:  No   Do you have difficulty with reasoning?:  No Do you have difficulty with spatial ability and orientation?:  No   Do you have difficulty with language?:  No Do you have difficulty with behavior?:  No       Functional Ability/Level of Safety:   Hearing    Hearing difficulties:  No Bilateral:  normal   Left:  normal Right:  normal   Hearing Impairment Assessment    Current Activities    Help needed with the folllowing:    ADL    Fall Risk   Have you fallen in the last 12 months?:  No Are you unsteady on your feet?:  No    Are you taking any medications that may cause fatigue or dizziness?:  No    Do you rush to the bathroom potentially risking a fall?:  No   Injury History       Home Safety:   Home Safety Risk Factors   Unfamilar with surroundings:  No Uneven floors:  No   Stairs or handrail saftey risk:  Yes Loose rugs:  No   Household clutter:  No Poor household lighting:  No   No grab bars in bathroom:  Yes Further evaluation needed:  No       Advanced Directives:   Advanced Directives    Living Will:  No Durable POA for healthcare:   Yes   Advanced directive:  No    Patient's End of Life Decisions    Reviewed with patient:  Yes        Urinary Incontinence:   Do you have urinary incontinence?:  Yes Do you have incomplete emptying?:  No   Do you urinate frequently?:  Yes Do you have urinary urgency?:  No   Do you have urinary hesitancy?:  No Do you have dysuria (painful and/or difficult urination)?:  No   Do you have nocturia (waking up to urinate)?:  No Do you strain when urinating (have to push to urinate)?:  No   Do you have a weak stream when urinating?:  No Do you have intermittent streaming when urinating?:  No   Do you dribble urine after finishing?:  No    Do you have vaginal pressure?:  No        Glaucoma:            Provider Screening     Preventative Screening/Counseling:   Cardiovascular Screening/Counseling:   (Labs Q5 years, EKG optional one-time)    Counseling:  Healthy Diet, Improve Cholesterol          Diabetes Screening/Counseling:   (2 tests/year if Pre-Diabetes or 1 test/year if no Diabetes)   General:  Screening Current Counseling:  Healthy Diet, Healthy Weight Colorectal Cancer Screening/Counseling:   (FOBT Q1 yr; Flex Sig Q4 yrs or Q10 yrs after Screening Colonoscopy; Screening Colonoscpy Q2 yrs High Risk or Q10 yrs Low Risk; Barium Enema Q2 yrs High Risk or Q4 yrs Low Risk)   General:  Screening Current           Prostate Cancer Screening/Counseling:   (Annual)          Breast Cancer Screening/Counseling:   (Baseline Age 28 - 43; Annual Age 36+)   General:  Risks and Benefits Discussed, Screening Current          Cervical Cancer Screening/Counseling:   (Annual for High Risk or Childbearing Age with Abnormal Pap in Last 3 yrs; Every 2 all others)   General:  Screening Not Indicated           Osteoporosis Screening/Counseling:   (Every 2 Yrs if at risk or more if medically necessary)    Counseling:  Calcium and Vitamin D Intake Due for: Studies:  DXA Axial         AAA Screening/Counseling:   (Once per Lifetime with risk factors)    General:  Screening Current           Glaucoma Screening/Counseling:   (Annual)   General:  Screening Current          HIV Screening/Counseling:   (Voluntary; Once annually for high risk OR 3 times for Pregnancy at diagnosis of IUP; 3rd trimester; and at Labor   General:  Risks and Benefits Discussed  Due for: Labs:  Rapid Test         Hepatitis C Screening:    Hepatitis C Screening Accepted: Yes              Immunizations:   Influenza (annual): Influenza UTD This Year   Hepatitis B Series (low risk patients):  Prevention Counseling   Hepatitis B Series (medium to high risk patients scheduled series):  Vaccine Status Unknown   Zostavax (Medicare D Coverage, Pt >66 yo):  Zostavax Vaccine UTD   Tdap (Non-Medicare Wellness Visit required):   Tdap Vaccine UTD       Other Preventative Couseling (Non-Medicare Wellness Visit Required):   nutrition counseling performed, fall prevention education provided, weight reduction was discussed       Referrals (Non-Medicare Wellness Visit Required):       Medical Equipment/Suppliers: Patient's shoes and socks removed  Right Foot/Ankle   Right Foot Inspection  Skin Exam: skin not intact, no warmth and no pre-ulcer                          Toe Exam: no swelling and erythema  Sensory       Monofilament testing: intact  Vascular  Capillary refills: < 3 seconds  The right DP pulse is 2+  Left Foot/Ankle  Left Foot Inspection  Skin Exam: skin not intact, no warmth and no pre-ulcer                         Toe Exam: no swelling and no erythema                   Sensory       Monofilament: intact  Vascular  Capillary refills: < 3 seconds  The left DP pulse is 2+  Assign Risk Category:  No deformity present;  No loss of protective sensation;        Risk: 0

## 2018-06-29 NOTE — PROGRESS NOTES
Assessment/Plan:    No problem-specific Assessment & Plan notes found for this encounter  Diagnoses and all orders for this visit:    Medicare annual wellness visit, subsequent    Need for shingles vaccine  -     Discontinue: Zoster Vac Recomb Adjuvanted ([address]) 50 MCG SUSR; Inject 1 ampule into a muscle daily  -     Zoster Vac Recomb Adjuvanted (SHINGRIX) 50 MCG SUSR; Inject 1 ampule into a muscle daily    Need for hepatitis C screening test  -     Hepatitis C antibody; Future    Encounter for screening for HIV  -     HIV-1 RNA, quantitative, PCR; Future    Screening for osteoporosis  -     DXA bone density spine hip and pelvis; Future    Osteopenia of spine  -     DXA bone density spine hip and pelvis; Future    BMI 40 0-44 9, adult (HCC)    Other orders  -     cholestyramine (QUESTRAN) 4 g packet; take 1 packet by mouth every other day  -     EASY TOUCH LANCETS 32G/TWIST MISC; sub dermal  -     fluticasone (FLONASE) 50 mcg/act nasal spray; inhale 2 spray by intranasal route  every day in each nostril  -     TRESIBA FLEXTOUCH 100 units/mL injection pen;   -     Insulin Pen Needle 32G X 4 MM MISC; use once daily  -     albuterol (PROAIR HFA) 90 mcg/act inhaler; inhale 2 puff by inhalation route  every 4 - 6 hours as needed  -     cholestyramine (QUESTRAN) 4 g packet; take 1 packet by oral route 3 times a week dissolved in 2 to 6 ounces of water or noncarbonated beverage before meals  -     HYDROcodone-acetaminophen (VICODIN) 5-300 MG per tablet; every 4 (four) hours  -     Cholecalciferol (VITAMIN D3) 2000 units capsule; one tab po daily  -     dicyclomine (BENTYL) 20 mg tablet; Take 1 tablet by mouth  twice a day  -     insuln lispro (HUMALOG KWIKPEN) 200 units/mL CONCENTRATED U-200 injection pen; inject by subcutaneous route per prescriber&#39;s instructions  Insulin dosing requires individualization    -     clotrimazole-betamethasone (LOTRISONE) 1-0 05 % cream; APPLY TO AFFECTED AREA(S)  TWO TIMES DAILY AS NEEDED  -     pregabalin (LYRICA) 75 mg capsule; 3 (three) times a day  -     oxybutynin (DITROPAN) 5 mg tablet; 3 (three) times a day  -     pantoprazole (PROTONIX) 40 mg tablet; every 24 hours  -     simvastatin (ZOCOR) 20 mg tablet; TAKE 1 TABLET BY MOUTH  EVERY EVENING          Subjective:      Patient ID: Claudette Wolf is a 67 y o  female  HPI       The following portions of the patient's history were reviewed and updated as appropriate: allergies, current medications, past family history, past medical history, past social history, past surgical history and problem list     Review of Systems   Constitutional: Negative for chills, fatigue and fever  HENT: Negative for ear pain and sore throat  Respiratory: Negative for cough and shortness of breath  Cardiovascular: Negative for chest pain, palpitations and leg swelling  Gastrointestinal: Negative for abdominal pain, constipation, diarrhea, nausea and vomiting  Genitourinary: Negative for dysuria, frequency and hematuria  Musculoskeletal: Negative for back pain, gait problem and joint swelling  Neurological: Negative for dizziness  Objective:      /60 (BP Location: Left arm, Patient Position: Sitting, Cuff Size: Large)   Pulse 83   Temp 99 4 °F (37 4 °C) (Oral)   Ht 5' 4" (1 626 m)   Wt 118 kg (260 lb)   BMI 44 63 kg/m²          Physical Exam   Constitutional: She is oriented to person, place, and time  She appears well-developed and well-nourished  No distress  HENT:   Head: Normocephalic and atraumatic  Eyes: Right eye exhibits no discharge  Left eye exhibits discharge  Neck: No JVD present  Cardiovascular: Normal rate, regular rhythm and normal heart sounds  Exam reveals no gallop and no friction rub  No murmur heard  Pulmonary/Chest: Effort normal and breath sounds normal  No respiratory distress  She has no wheezes  She has no rales  Abdominal: Soft  Bowel sounds are normal  There is no tenderness  Musculoskeletal: She exhibits no edema  Neurological: She is oriented to person, place, and time

## 2018-07-02 ENCOUNTER — HOSPITAL ENCOUNTER (OUTPATIENT)
Dept: RADIOLOGY | Facility: IMAGING CENTER | Age: 73
Discharge: HOME | End: 2018-07-02
Payer: MEDICARE

## 2018-07-02 DIAGNOSIS — M25.561 RIGHT KNEE PAIN, UNSPECIFIED CHRONICITY: ICD-10-CM

## 2018-07-02 PROCEDURE — 73721 MRI JNT OF LWR EXTRE W/O DYE: CPT

## 2018-07-03 PROCEDURE — 93306 TTE W/DOPPLER COMPLETE: CPT | Performed by: INTERNAL MEDICINE

## 2018-07-05 ENCOUNTER — HOSPITAL ENCOUNTER (OUTPATIENT)
Dept: RADIOLOGY | Facility: IMAGING CENTER | Age: 73
Discharge: HOME | End: 2018-07-05
Payer: MEDICARE

## 2018-07-05 ENCOUNTER — TRANSCRIBE ORDERS (OUTPATIENT)
Dept: ADMINISTRATIVE | Facility: HOSPITAL | Age: 73
End: 2018-07-05

## 2018-07-05 ENCOUNTER — TELEPHONE (OUTPATIENT)
Dept: FAMILY MEDICINE CLINIC | Facility: CLINIC | Age: 73
End: 2018-07-05

## 2018-07-05 ENCOUNTER — APPOINTMENT (OUTPATIENT)
Dept: LAB | Facility: IMAGING CENTER | Age: 73
End: 2018-07-05
Payer: MEDICARE

## 2018-07-05 DIAGNOSIS — S83.241A TEAR OF MEDIAL MENISCUS OF RIGHT KNEE, UNSPECIFIED TEAR TYPE, UNSPECIFIED WHETHER OLD OR CURRENT TEAR, INITIAL ENCOUNTER: Primary | ICD-10-CM

## 2018-07-05 DIAGNOSIS — Z11.59 NEED FOR HEPATITIS C SCREENING TEST: ICD-10-CM

## 2018-07-05 DIAGNOSIS — Z13.820 SCREENING FOR OSTEOPOROSIS: ICD-10-CM

## 2018-07-05 DIAGNOSIS — M85.88 OSTEOPENIA OF SPINE: ICD-10-CM

## 2018-07-05 DIAGNOSIS — Z11.4 ENCOUNTER FOR SCREENING FOR HIV: ICD-10-CM

## 2018-07-05 PROCEDURE — 36415 COLL VENOUS BLD VENIPUNCTURE: CPT

## 2018-07-05 PROCEDURE — 87536 HIV-1 QUANT&REVRSE TRNSCRPJ: CPT

## 2018-07-05 PROCEDURE — 86803 HEPATITIS C AB TEST: CPT

## 2018-07-05 PROCEDURE — 77080 DXA BONE DENSITY AXIAL: CPT

## 2018-07-05 NOTE — TELEPHONE ENCOUNTER
----- Message from Analilia Thomas MD sent at 7/3/2018  7:57 AM EDT -----  MRI of the right knee shows she had a large tear in the medial meniscus and osteoarthritis refer the patient to orthopedic doctor

## 2018-07-06 LAB — HCV AB SER QL: NORMAL

## 2018-07-09 LAB
HIV1 RNA # SERPL NAA+PROBE: <20 COPIES/ML
HIV1 RNA SERPL NAA+PROBE-LOG#: NORMAL LOG10COPY/ML

## 2018-07-10 DIAGNOSIS — E11.9 TYPE 2 DIABETES MELLITUS WITHOUT COMPLICATION, WITH LONG-TERM CURRENT USE OF INSULIN (HCC): Primary | ICD-10-CM

## 2018-07-10 DIAGNOSIS — Z79.4 TYPE 2 DIABETES MELLITUS WITHOUT COMPLICATION, WITH LONG-TERM CURRENT USE OF INSULIN (HCC): Primary | ICD-10-CM

## 2018-07-12 ENCOUNTER — HOSPITAL ENCOUNTER (OUTPATIENT)
Dept: MAMMOGRAPHY | Facility: CLINIC | Age: 73
Discharge: HOME | End: 2018-07-12
Payer: MEDICARE

## 2018-07-12 DIAGNOSIS — Z12.39 SCREENING BREAST EXAMINATION: ICD-10-CM

## 2018-07-12 PROCEDURE — 77067 SCR MAMMO BI INCL CAD: CPT

## 2018-07-17 DIAGNOSIS — E78.1 HYPERTRIGLYCERIDEMIA, ESSENTIAL: ICD-10-CM

## 2018-07-17 DIAGNOSIS — R21 RASH: ICD-10-CM

## 2018-07-17 DIAGNOSIS — G89.29 CHRONIC LOW BACK PAIN WITH SCIATICA, SCIATICA LATERALITY UNSPECIFIED, UNSPECIFIED BACK PAIN LATERALITY: Primary | ICD-10-CM

## 2018-07-17 DIAGNOSIS — M54.40 CHRONIC LOW BACK PAIN WITH SCIATICA, SCIATICA LATERALITY UNSPECIFIED, UNSPECIFIED BACK PAIN LATERALITY: Primary | ICD-10-CM

## 2018-07-17 DIAGNOSIS — G43.009 MIGRAINE WITHOUT AURA AND WITHOUT STATUS MIGRAINOSUS, NOT INTRACTABLE: Primary | ICD-10-CM

## 2018-07-17 RX ORDER — FENOFIBRATE 160 MG/1
TABLET ORAL
Qty: 90 TABLET | Refills: 1 | Status: SHIPPED | OUTPATIENT
Start: 2018-07-17 | End: 2018-12-24 | Stop reason: SDUPTHER

## 2018-07-17 RX ORDER — TOPIRAMATE 25 MG/1
TABLET ORAL
Qty: 90 TABLET | Refills: 1 | Status: SHIPPED | OUTPATIENT
Start: 2018-07-17 | End: 2018-12-24 | Stop reason: SDUPTHER

## 2018-07-17 RX ORDER — CLOTRIMAZOLE AND BETAMETHASONE DIPROPIONATE 10; .64 MG/G; MG/G
CREAM TOPICAL
Qty: 90 G | Refills: 1 | Status: SHIPPED | OUTPATIENT
Start: 2018-07-17 | End: 2018-07-19 | Stop reason: SDUPTHER

## 2018-07-19 ENCOUNTER — OFFICE VISIT (OUTPATIENT)
Dept: FAMILY MEDICINE CLINIC | Facility: CLINIC | Age: 73
End: 2018-07-19
Payer: MEDICARE

## 2018-07-19 VITALS
HEART RATE: 78 BPM | WEIGHT: 263 LBS | TEMPERATURE: 96.8 F | RESPIRATION RATE: 16 BRPM | DIASTOLIC BLOOD PRESSURE: 70 MMHG | HEIGHT: 64 IN | BODY MASS INDEX: 44.9 KG/M2 | SYSTOLIC BLOOD PRESSURE: 116 MMHG | OXYGEN SATURATION: 98 %

## 2018-07-19 DIAGNOSIS — Z79.4 LONG TERM CURRENT USE OF INSULIN (HCC): ICD-10-CM

## 2018-07-19 DIAGNOSIS — I10 ESSENTIAL HYPERTENSION: ICD-10-CM

## 2018-07-19 DIAGNOSIS — E78.2 MIXED HYPERLIPIDEMIA: ICD-10-CM

## 2018-07-19 DIAGNOSIS — Z79.4 TYPE 2 DIABETES MELLITUS WITH COMPLICATION, WITH LONG-TERM CURRENT USE OF INSULIN (HCC): ICD-10-CM

## 2018-07-19 DIAGNOSIS — N18.30 CHRONIC RENAL INSUFFICIENCY, STAGE III (MODERATE) (HCC): ICD-10-CM

## 2018-07-19 DIAGNOSIS — E11.8 TYPE 2 DIABETES MELLITUS WITH COMPLICATION, WITH LONG-TERM CURRENT USE OF INSULIN (HCC): ICD-10-CM

## 2018-07-19 DIAGNOSIS — E55.9 VITAMIN D DEFICIENCY: Primary | ICD-10-CM

## 2018-07-19 PROCEDURE — 99214 OFFICE O/P EST MOD 30 MIN: CPT | Performed by: FAMILY MEDICINE

## 2018-07-19 RX ORDER — INSULIN DEGLUDEC INJECTION 100 U/ML
80 INJECTION, SOLUTION SUBCUTANEOUS
Qty: 5 PEN | Refills: 3 | Status: SHIPPED | OUTPATIENT
Start: 2018-07-19 | End: 2018-10-25 | Stop reason: SDUPTHER

## 2018-07-19 NOTE — PATIENT INSTRUCTIONS
10% - bad control"> 10% - bad control,Hemoglobin A1c (HbA1c) greater than 10% indicating poor diabetic control,Haemoglobin A1c greater than 10% indicating poor diabetic control">   Diabetes Mellitus Type 2 in Adults, Ambulatory Care   GENERAL INFORMATION:   Diabetes mellitus type 2  is a disease that affects how your body uses glucose (sugar)  Insulin helps move sugar out of the blood so it can be used for energy  Normally, when the blood sugar level increases, the pancreas makes more insulin  Type 2 diabetes develops because either the body cannot make enough insulin, or it cannot use the insulin correctly  After many years, your pancreas may stop making insulin  Common symptoms include the following:   · More hunger or thirst than usual     · Frequent urination     · Weight loss without trying     · Blurred vision  Seek immediate care for the following symptoms:   · Severe abdominal pain, or pain that spreads to your back  You may also be vomiting  · Trouble staying awake or focusing    · Shaking or sweating    · Blurred or double vision    · Breath has a fruity, sweet smell    · Breathing is deep and labored, or rapid and shallow    · Heartbeat is fast and weak  Treatment for diabetes mellitus type 2  includes keeping your blood sugar at a normal level  You must eat the right foods, and exercise regularly  You may also need medicine if you cannot control your blood sugar level with nutrition and exercise  Manage diabetes mellitus type 2:   · Check your blood sugar level  You will be taught how to check a small drop of blood in a glucose monitor  Ask your healthcare provider when and how often to check during the day  Ask your healthcare provider what your blood sugar levels should be when you check them  · Keep track of carbohydrates (sugar and starchy foods)  Your blood sugar level can get too high if you eat too many carbohydrates   Your dietitian will help you plan meals and snacks that have the right amount of carbohydrates  · Eat low-fat foods  Some examples are skinless chicken and low-fat milk  · Eat less sodium (salt)  Some examples of high-sodium foods to limit are soy sauce, potato chips, and soup  Do not add salt to food you cook  Limit your use of table salt  · Eat high-fiber foods  Foods that are a good source of fiber include vegetables, whole grain bread, and beans  · Limit alcohol  Alcohol affects your blood sugar level and can make it harder to manage your diabetes  Women should limit alcohol to 1 drink a day  Men should limit alcohol to 2 drinks a day  A drink of alcohol is 12 ounces of beer, 5 ounces of wine, or 1½ ounces of liquor  · Get regular exercise  Exercise can help keep your blood sugar level steady, decrease your risk of heart disease, and help you lose weight  Exercise for at least 30 minutes, 5 days a week  Include muscle strengthening activities 2 days each week  Work with your healthcare provider to create an exercise plan  · Check your feet each day  for injuries or open sores  Ask your healthcare provider for activities you can do if you have an open sore  · Quit smoking  If you smoke, it is never too late to quit  Smoking can worsen the problems that may occur with diabetes  Ask your healthcare provider for information about how to stop smoking if you are having trouble quitting  · Ask about your weight:  Ask healthcare providers if you need to lose weight, and how much to lose  Ask them to help you with a weight loss program  Even a 10 to 15 pound weight loss can help you manage your blood sugar level  · Carry medical alert identification  Wear medical alert jewelry or carry a card that says you have diabetes  Ask your healthcare provider where to get these items  · Ask about vaccines  Diabetes puts you at risk of serious illness if you get the flu, pneumonia, or hepatitis   Ask your healthcare provider if you should get a flu, pneumonia, or hepatitis B vaccine, and when to get the vaccine  Follow up with your healthcare provider as directed:  Write down your questions so you remember to ask them during your visits  CARE AGREEMENT:   You have the right to help plan your care  Learn about your health condition and how it may be treated  Discuss treatment options with your caregivers to decide what care you want to receive  You always have the right to refuse treatment  The above information is an  only  It is not intended as medical advice for individual conditions or treatments  Talk to your doctor, nurse or pharmacist before following any medical regimen to see if it is safe and effective for you  © 2014 [address] is for End User's use only and may not be sold, redistributed or otherwise used for commercial purposes  All illustrations and images included in CareNotes® are the copyrighted property of [employer]  or [employer]

## 2018-07-19 NOTE — PROGRESS NOTES
Assessment/Plan:    Type 2 diabetes mellitus with complication, with long-term current use of insulin (HCC)  Lab Results   Component Value Date    HGBA1C 9 2 (H) 06/04/2018     Uncontrolled patient on Tresiba 76 unit a day will increase it to 80 unit patient will continue to use her Humalog per insulin sliding scale at mealtime patient already on statin and lisinopril patient does follow by Ophthalmology and Podiatry for diabetic foot on eye care discussed with the patient important lose weight follow-up low carb diet          Essential hypertension  Chronic with controlled continue current medication low-salt diet less than 2 g a day ,   low caffeine intake   regular aerobic exercise 20 to totally minute a day diet and important lose weight discussed with the patient      Chronic renal insufficiency, stage III (moderate)  Stable GFR is improved Avoid non steroid  anti-inflammatory drugs  Keep will hydration  Avoid contrast dye    Hyperlipidemia  Chronic ,fair control on current medication  Advised to maintain a low-fat low-cholesterol diet consult regarding potential comorbidity including cardiovascular disease consult regarding important weight loss         Diagnoses and all orders for this visit:    Vitamin D deficiency  -     CBC and differential; Future  -     Comprehensive metabolic panel; Future  -     Lipid panel; Future  -     TSH baseline; Future  -     Hemoglobin A1C; Future  -     Microalbumin / creatinine urine ratio; Future    Long term current use of insulin (HCC)  -     CBC and differential; Future  -     Comprehensive metabolic panel; Future  -     Lipid panel; Future  -     TSH baseline; Future  -     Hemoglobin A1C; Future  -     Microalbumin / creatinine urine ratio; Future    Chronic renal insufficiency, stage III (moderate)  -     CBC and differential; Future  -     Comprehensive metabolic panel; Future  -     Lipid panel; Future  -     TSH baseline;  Future  -     Hemoglobin A1C; Future  - Microalbumin / creatinine urine ratio; Future    Essential hypertension  -     CBC and differential; Future  -     Comprehensive metabolic panel; Future  -     Lipid panel; Future  -     TSH baseline; Future  -     Hemoglobin A1C; Future  -     Microalbumin / creatinine urine ratio; Future    Type 2 diabetes mellitus with complication, with long-term current use of insulin (HCC)  -     TRESIBA FLEXTOUCH 100 units/mL injection pen; Inject 80 Units under the skin daily at bedtime  -     CBC and differential; Future  -     Comprehensive metabolic panel; Future  -     Lipid panel; Future  -     TSH baseline; Future  -     Hemoglobin A1C; Future  -     Microalbumin / creatinine urine ratio; Future    Mixed hyperlipidemia          Subjective:   Chief Complaint   Patient presents with    Follow-up     chronic conditions         Patient ID: Socorro Sharp is a 75 y o  female      Patient here to follow up with a chronic condition The patient has long history of hypertension the blood pressure today is in control patient tolerates medication well and no chest pain or short of breath no palpitation no headache  Also patient was history of insulin sliding scale hemoglobin A1c 9 2 uncontrolled patient on Tresiba 76 unit every day and she use an Humalog per insulin siding scale and mealtime patient asymptomatic no increased urinationno burning sensation when urination and no thirsty also patient was history hyperlipidemia he she is on statin tolerated well no side effect deny any chest pain short of breath or more palpitation history of chronic kidney disease GFR is improving patient she now to avoid NSAID  The blood work discussed with the patient also discussed with the patient the echocardiogram on her carotid duplex which showed less than 50% stenosis bilateral          The following portions of the patient's history were reviewed and updated as appropriate: allergies, current medications, past family history, past medical history, past social history, past surgical history and problem list     Review of Systems   Constitutional: Negative for fatigue and fever  HENT: Negative for ear pain, sinus pain, sinus pressure and sore throat  Eyes: Negative for pain and redness  Respiratory: Negative for cough, chest tightness and shortness of breath  Cardiovascular: Negative for chest pain, palpitations and leg swelling  Gastrointestinal: Negative for abdominal pain, blood in stool, constipation, diarrhea and nausea  Genitourinary: Negative for flank pain, frequency and hematuria  Musculoskeletal: Positive for arthralgias  Negative for back pain and joint swelling  Skin: Negative for rash  Neurological: Negative for dizziness, numbness and headaches  Hematological: Does not bruise/bleed easily  Objective:  Vitals:    07/19/18 0920   BP: 116/70   BP Location: Left arm   Patient Position: Sitting   Cuff Size: Large   Pulse: 78   Resp: 16   Temp: (!) 96 8 °F (36 °C)   TempSrc: Oral   SpO2: 98%   Weight: 119 kg (263 lb)   Height: 5' 4" (1 626 m)      Physical Exam   Constitutional: She is oriented to person, place, and time  She appears well-developed and well-nourished  HENT:   Head: Normocephalic  Right Ear: External ear normal    Left Ear: External ear normal    Eyes: Right eye exhibits no discharge  Left eye exhibits no discharge  Neck: No JVD present  Cardiovascular: Normal rate and regular rhythm  Exam reveals no gallop  Murmur heard  Pulmonary/Chest: Effort normal  No respiratory distress  She has no wheezes  She has no rales  She exhibits no tenderness  Abdominal: She exhibits no mass  There is no tenderness  There is no rebound  Musculoskeletal: She exhibits no edema or tenderness  Tenderness on her right knee on the medial aspect limited range of the motion no swelling no warm no erythema   Neurological: She is alert and oriented to person, place, and time

## 2018-07-20 ENCOUNTER — OFFICE VISIT (OUTPATIENT)
Dept: OBGYN CLINIC | Facility: MEDICAL CENTER | Age: 73
End: 2018-07-20
Payer: MEDICARE

## 2018-07-20 ENCOUNTER — APPOINTMENT (OUTPATIENT)
Dept: RADIOLOGY | Facility: CLINIC | Age: 73
End: 2018-07-20
Payer: MEDICARE

## 2018-07-20 VITALS
DIASTOLIC BLOOD PRESSURE: 65 MMHG | SYSTOLIC BLOOD PRESSURE: 108 MMHG | WEIGHT: 264 LBS | BODY MASS INDEX: 45.07 KG/M2 | HEIGHT: 64 IN | HEART RATE: 66 BPM

## 2018-07-20 DIAGNOSIS — M25.561 RIGHT KNEE PAIN, UNSPECIFIED CHRONICITY: ICD-10-CM

## 2018-07-20 DIAGNOSIS — Z01.89 ENCOUNTER FOR LOWER EXTREMITY COMPARISON IMAGING STUDY: Primary | ICD-10-CM

## 2018-07-20 DIAGNOSIS — S83.241A TEAR OF MEDIAL MENISCUS OF RIGHT KNEE, UNSPECIFIED TEAR TYPE, UNSPECIFIED WHETHER OLD OR CURRENT TEAR, INITIAL ENCOUNTER: ICD-10-CM

## 2018-07-20 DIAGNOSIS — M17.11 PRIMARY OSTEOARTHRITIS OF RIGHT KNEE: ICD-10-CM

## 2018-07-20 DIAGNOSIS — Z01.89 ENCOUNTER FOR LOWER EXTREMITY COMPARISON IMAGING STUDY: ICD-10-CM

## 2018-07-20 PROCEDURE — 73564 X-RAY EXAM KNEE 4 OR MORE: CPT

## 2018-07-20 PROCEDURE — 99204 OFFICE O/P NEW MOD 45 MIN: CPT | Performed by: ORTHOPAEDIC SURGERY

## 2018-07-20 PROCEDURE — 73560 X-RAY EXAM OF KNEE 1 OR 2: CPT

## 2018-07-20 NOTE — PROGRESS NOTES
Assessment/Plan     1  Encounter for lower extremity comparison imaging study    2  Tear of medial meniscus of right knee, unspecified tear type, unspecified whether old or current tear, initial encounter    3  Right knee pain, unspecified chronicity    4  Primary osteoarthritis of right knee      Orders Placed This Encounter   Procedures    XR knee 4+ vw right injury    XR knee 1 or 2 vw left    Ambulatory referral to Physical Therapy     Ms Jeanmarie Khan has severe R knee DJD  We discussed her treatment options today  Fasting blood glucose >200 this AM-unable to have steroid injection  PT  Declined hinged knee brace, will try over-the-counter brace  Diclofenac gel  Continue current pain regimen at Tylenol and hydrocodone  Once blood sugar well controlled patient knows to call for a steroid injection    Return for Patient will call  I answered all of the patient's questions during the visit and provided education of the patient's condition during the visit  The patient verbalized understanding of the information given and agrees with the plan  This note was dictated using Beautylish software  It may contain errors including improperly dictated words  Please contact physician directly for any questions  History of Present Illness   Chief complaint:   Chief Complaint   Patient presents with    Right Knee - Pain       HPI: Gerson Luo is a 75 y o  female that c/o right knee pain  She has had pain for quite some time but she has recently noticed a tearing sensation her knee that began about 6 months ago  This occurs intermittently  She denies any history of trauma  Most her pain is over the posterior aspect of her knee and she feels a lump posteriorly  Rest helps  The certain way she steps down causes is ripping sensation  It is worsening  She describes her pain as dull and achy  She admits instability  Walking and prolonged standing worsens her pain   She had viscosupplementation injections twice in the early 2000s which helped  She has a walker to ambulate  She takes hydrocodone and Tylenol as needed for pain control  She is unable to take NSAIDs due to an issue with her kidneys  She takes Tylenol as needed at bedtime for pain  She has not done any recent physical therapy  She had 1 steroid injection about a year ago which helped  She has not had surgery on her right knee  ROS:    See HPI for musculoskeletal review     All other systems reviewed are negative     Historical Information   Past Medical History:   Diagnosis Date    Anemia     Fracture of wrist     RIGHT    History of non-insulin dependent diabetes mellitus     Hyperlipidemia     Hypertension     Hypertension     IBS (irritable bowel syndrome)     Metabolic syndrome     Obesity     RLS (restless legs syndrome)     Sleep apnea     Sleep apnea     ON CPAP    Urge incontinence     Urinary incontinence      Past Surgical History:   Procedure Laterality Date    CHOLECYSTECTOMY  2013    COLONOSCOPY  2010    CYSTOSCOPY      HERNIA REPAIR  2008    HYSTERECTOMY      PARTIAL (STILL HAS OVARIES)    KNEE SURGERY Left      Social History   History   Alcohol Use No     Comment: no caffeine use     History   Drug Use No     History   Smoking Status    Never Smoker   Smokeless Tobacco    Never Used     Family History:   Family History   Problem Relation Age of Onset    Suicidality Father     Breast cancer Mother     Melanoma Sister     Bipolar disorder Sister     Heart disease Brother     Rheum arthritis Daughter        Meds/Allergies     (Not in a hospital admission)  Allergies   Allergen Reactions    Actos [Pioglitazone]      "Feels like she is dying"    Ibuprofen     Levofloxacin Other (See Comments)    Nsaids        Current Outpatient Prescriptions on File Prior to Visit   Medication Sig Dispense Refill    acetaminophen (TYLENOL) 325 mg tablet Take by mouth      albuterol (PROAIR HFA) 90 mcg/act inhaler inhale 2 puff by inhalation route  every 4 - 6 hours as needed      amLODIPine (NORVASC) 5 mg tablet Take by mouth      B Complex-C CAPS Take by mouth      Cholecalciferol (VITAMIN D3) 2000 units capsule one tab po daily      cholestyramine (QUESTRAN) 4 g packet take 1 packet by mouth every other day  0    clotrimazole-betamethasone (LOTRISONE) 1-0 05 % cream Apply topically      dicyclomine (BENTYL) 20 mg tablet Take by mouth      EASY TOUCH LANCETS 32G/TWIST MISC sub dermal      fenofibrate (TRIGLIDE) 160 MG tablet TAKE 1 TABLET BY MOUTH  EVERY DAY 90 tablet 1    Ferrous Sulfate (IRON) 325 (65 Fe) MG TABS Take by mouth      fluticasone (FLONASE) 50 mcg/act nasal spray inhale 2 spray by intranasal route  every day in each nostril      HYDROcodone-acetaminophen (VICODIN) 5-300 MG per tablet every 4 (four) hours      Insulin Pen Needle 32G X 4 MM MISC Inject as directed 3 (three) times a day 300 each 3    insuln lispro (HUMALOG KWIKPEN) 200 units/mL CONCENTRATED U-200 injection pen inject by subcutaneous route per prescriber&#39;s instructions  Insulin dosing requires individualization        lisinopril (ZESTRIL) 10 mg tablet Take by mouth      loratadine (CLARITIN) 10 mg tablet Take by mouth      LYRICA 75 MG capsule TAKE 1 CAPSULE BY MOUTH 3  TIMES DAILY 270 capsule 1    magnesium Oxide (MAG-OX) 400 mg TABS Take by mouth      Multiple Vitamin (DAILY VALUE MULTIVITAMIN) TABS Take by mouth      oxybutynin (DITROPAN) 5 mg tablet 3 (three) times a day      pantoprazole (PROTONIX) 40 mg tablet Take by mouth      pregabalin (LYRICA) 75 mg capsule 3 (three) times a day      simvastatin (ZOCOR) 20 mg tablet Take by mouth      topiramate (TOPAMAX) 25 mg tablet TAKE 1 TABLET BY MOUTH  DAILY 90 tablet 1    TRESIBA FLEXTOUCH 100 units/mL injection pen Inject 80 Units under the skin daily at bedtime 5 pen 3    Zoster Vac Recomb Adjuvanted (SHINGRIX) 50 MCG SUSR Inject 1 ampule into a muscle daily 1 each 0     No current facility-administered medications on file prior to visit  Objective   Vitals: Blood pressure 108/65, pulse 66, height 5' 4" (1 626 m), weight 120 kg (264 lb)  ,Body mass index is 45 32 kg/m²      PE:  AAOx 3  WDWN  Hearing intact, no drainage from eyes  Regular rate  no audible wheezing  no abdominal distension  LE compartments soft, skin intact  EHL/AT/GS intact, sensation grossly intact L4, L5, S1, palpable pedal pulse    rightknee:    Appearance:  no swelling   No bruising  no obvious joint deformity   No effusion  Palpation/Tenderness:  + TTP over medial joint line, no TTP over lateral joint line or over patella/patellar tendon  Active Range of Motion:  AROM: full  Special Tests:  Varus Stress Test:  negative   Valgus stress test:  Negative    No ipsilateral hip pain with ROM    Imaging Studies: I have personally reviewed pertinent films in PACS    X-ray right knee:  Severe DJD

## 2018-07-30 ENCOUNTER — EVALUATION (OUTPATIENT)
Dept: PHYSICAL THERAPY | Facility: REHABILITATION | Age: 73
End: 2018-07-30
Payer: MEDICARE

## 2018-07-30 DIAGNOSIS — G89.29 CHRONIC PAIN OF RIGHT KNEE: Primary | ICD-10-CM

## 2018-07-30 DIAGNOSIS — M17.11 PRIMARY OSTEOARTHRITIS OF RIGHT KNEE: ICD-10-CM

## 2018-07-30 DIAGNOSIS — M25.561 CHRONIC PAIN OF RIGHT KNEE: Primary | ICD-10-CM

## 2018-07-30 PROCEDURE — 97014 ELECTRIC STIMULATION THERAPY: CPT | Performed by: PHYSICAL THERAPIST

## 2018-07-30 PROCEDURE — G8991 OTHER PT/OT GOAL STATUS: HCPCS | Performed by: PHYSICAL THERAPIST

## 2018-07-30 PROCEDURE — G8990 OTHER PT/OT CURRENT STATUS: HCPCS | Performed by: PHYSICAL THERAPIST

## 2018-07-30 PROCEDURE — 97112 NEUROMUSCULAR REEDUCATION: CPT | Performed by: PHYSICAL THERAPIST

## 2018-07-30 PROCEDURE — 97162 PT EVAL MOD COMPLEX 30 MIN: CPT | Performed by: PHYSICAL THERAPIST

## 2018-07-30 NOTE — PROGRESS NOTES
PT Evaluation     Today's date: 2018  Patient name: Jn Martini  : 1945  MRN: [id number]  Referring provider: Anai Ricks DO  Dx:   Encounter Diagnosis     ICD-10-CM    1  Chronic pain of right knee M25 561     G89 29    2  Primary osteoarthritis of right knee M17 11 Ambulatory referral to Physical Therapy                  Assessment  Impairments: abnormal gait, abnormal or restricted ROM, activity intolerance, impaired physical strength, lacks appropriate home exercise program and pain with function    Assessment details: Pt is a 74 yo female experiencing significant left knee pain for the last few months  MRI reveals a significant tear of the posterior horn of the medial meniscus  She also has a history of low back pain with radicular symptoms down the right LE and has dural tension signs on the left along with significant LE weakness  Her back symptoms are treated with epidural injections  She does not wish to have surgery for the meniscus and has opted to try to treat it conservatively  She would benefit from therapeutic exercise to restore functional mobility and strength for the right LE modalities PRN to decrease pain and inflammation  Barriers to therapy: Pt is obese and has relied on a walker for 3 years  Understanding of Dx/Px/POC: excellent   Prognosis: good    Goals  STG: Left knee ROM = to right  Decrease pain to 3/10  LTG: Able to walk 200 ft without pain  Able to stand for 20 min without pain  Able to squat enough to pick something off of the floor       Plan  Patient would benefit from: skilled physical therapy  Planned modality interventions: unattended electrical stimulation  Planned therapy interventions: neuromuscular re-education, patient education, strengthening, therapeutic exercise, manual therapy and graded exercise  Frequency: 2x week  Duration in weeks: 12  Treatment plan discussed with: patient        Subjective Evaluation    History of Present Illness  Mechanism of injury: Knee has been bothering her about 6 months  She has a history of OA and had hyaluronic acid injection in this knee years ago  Pain  Current pain ratin  At best pain ratin  At worst pain ratin  Location: post knee left  Quality: pressure and pulling  Relieving factors: rest and medications  Aggravating factors: standing and walking  Progression: worsening    Social Support  Steps to enter house: no  Stairs in house: no   Lives in: apartment  Lives with: spouse    Patient Goals  Patient goals for therapy: increased strength  Patient goal: Be able to go on a bus trip, do her own cleaning        Objective     Tenderness     Additional Tenderness Details  Tender over the lateral greater than medial joint like    Active Range of Motion   Left Knee   Flexion: 110 degrees   Extension: -10 degrees     Right Knee   Flexion: 85 degrees with pain  Extension: -15 degrees with pain    Mobility   Patellar Mobility:     Right Knee   Hypomobile: medial, lateral, superior and inferior     Strength/Myotome Testing     Left Hip   Planes of Motion   Flexion: 4+  Extension: 4+  Abduction: 4+  Adduction: 4+    Right Hip   Planes of Motion   Flexion: 3+  Extension: 3+  Abduction: 3+  Adduction: 3+    Left Knee   Flexion: 5  Prone flexion: 5    Right Knee   Flexion: 5  Prone flexion: 4-  Quadriceps contraction: poor    Tests     Right Knee   Positive anterior drawer  Negative valgus stress test at 30 degrees and varus stress test at 0 degrees       Additional Tests Details  (+) SLR suggestive of dural tension on right    Swelling     Left Knee Girth Measurement (cm)   Joint line: 50 cm    Right Knee Girth Measurement (cm)   Joint line: 51 cm    Ambulation     Observational Gait   Gait: antalgic     Additional Observational Gait Details  Ambulates with a rolling walker due to lumbar stenosis with sciatica    General Comments     Knee Comments  Able to squat 25%          Precautions: HTN    Daily Treatment Diary     Manual  7/30                                                                                 Exercise Diary              Bike ROM             SAQ 2x10            Heel slides 10            SLR flex/abd 6/10                                                                                                                                                                                                                                Modalities              MH with IF 20min

## 2018-08-03 ENCOUNTER — OFFICE VISIT (OUTPATIENT)
Dept: PHYSICAL THERAPY | Facility: REHABILITATION | Age: 73
End: 2018-08-03
Payer: MEDICARE

## 2018-08-03 DIAGNOSIS — M17.11 PRIMARY OSTEOARTHRITIS OF RIGHT KNEE: Primary | ICD-10-CM

## 2018-08-03 DIAGNOSIS — M25.561 CHRONIC PAIN OF RIGHT KNEE: ICD-10-CM

## 2018-08-03 DIAGNOSIS — G89.29 CHRONIC PAIN OF RIGHT KNEE: ICD-10-CM

## 2018-08-03 PROCEDURE — 97110 THERAPEUTIC EXERCISES: CPT

## 2018-08-03 PROCEDURE — 97112 NEUROMUSCULAR REEDUCATION: CPT

## 2018-08-03 NOTE — PROGRESS NOTES
Daily Note     Today's date: 8/3/2018  Patient name: Otto Gomez  : 1945  MRN: [id number]  Referring provider: Meghan Bedoya DO  Dx:   Encounter Diagnosis     ICD-10-CM    1  Primary osteoarthritis of right knee M17 11    2  Chronic pain of right knee M25 561     G89 29                   Subjective:       Objective: See treatment diary below  Manual                                                                                   Exercise Diary              Bike ROM             SAQ 2x10            Heel slides 10            SLR flex/abd 6/10                                                                                                                                                                                                                                Modalities              MH with IF 20min                                              Assessment: Tolerated treatment {Tolerated treatment :6366476136}   Patient {assessment:3248654160}      Plan: {PLAN:[id number]}

## 2018-08-03 NOTE — PROGRESS NOTES
Daily Note     Today's date: 8/3/2018  Patient name: Yassine Arevalo  : 1945  MRN: [id number]  Referring provider: Brandon Valentine DO  Dx:   Encounter Diagnosis     ICD-10-CM    1  Primary osteoarthritis of right knee M17 11    2  Chronic pain of right knee M25 561     G89 29        Start Time: 1015  Stop Time: 1105  Total time in clinic (min): 50 minutes    Subjective: Patient reports having a "5/10" pain today prior to treatment  Patients' pain was a "3/10" after therapy  Objective: See treatment diary below  Patient requires a pillow under her R knee during IFC and MH  Patient requires seated rest breaks between standing exercises  Assessment: Tolerated treatment fair  Patient demonstrated fatigue post treatment, exhibited good technique with therapeutic exercises and would benefit from continued PT      Plan: Continue per plan of care         Precautions: HTN    Daily Treatment Diary     Manual  7/30 8/3                                                                                Exercise Diary   8/3           Bike ROM  5 min           SAQ 2x10 2x10           Heel slides 10 x20           SLR flex/abd 6/10            Side step at rail  x5           HR/TR  x20           Hamstring curls  x20                                                                                                                                                                                        Modalities   8/3           MH with IF 20min 20'

## 2018-08-07 ENCOUNTER — OFFICE VISIT (OUTPATIENT)
Dept: PHYSICAL THERAPY | Facility: REHABILITATION | Age: 73
End: 2018-08-07
Payer: MEDICARE

## 2018-08-07 DIAGNOSIS — M17.11 PRIMARY OSTEOARTHRITIS OF RIGHT KNEE: Primary | ICD-10-CM

## 2018-08-07 DIAGNOSIS — G89.29 CHRONIC PAIN OF RIGHT KNEE: ICD-10-CM

## 2018-08-07 DIAGNOSIS — M25.561 CHRONIC PAIN OF RIGHT KNEE: ICD-10-CM

## 2018-08-07 PROCEDURE — 97112 NEUROMUSCULAR REEDUCATION: CPT

## 2018-08-07 PROCEDURE — 97110 THERAPEUTIC EXERCISES: CPT

## 2018-08-07 PROCEDURE — 97014 ELECTRIC STIMULATION THERAPY: CPT

## 2018-08-07 NOTE — PROGRESS NOTES
Daily Note     Today's date: 2018  Patient name: Lory Solomon  : 1945  MRN: [id number]  Referring provider: Patricio Davila DO  Dx:   Encounter Diagnosis     ICD-10-CM    1  Primary osteoarthritis of right knee M17 11    2  Chronic pain of right knee M25 561     G89 29                   Subjective: Patient reports ex went well last time  Feeling looser allyson L knee    Objective: See treatment diary below      Assessment: Tolerated treatment well  Some fatigue, not too sore      Plan: Continue per plan of care         Precautions: HTN    Daily Treatment Diary     Manual  7/30 8/3 8-7                                                                               Exercise Diary   8/3 8-7          Bike ROM  5 min 10'          SAQ 2x10 2x10 x          Heel slides 10 x20 x          SLR flex/abd 6/10  x          Side step at rail  x5 3 laps          HR/TR  x20 x          Hamstring curls  x20 x          minisquat   20          clams   10x                                                                                                                                                             Modalities   8/3           MH with IF 20min 20'

## 2018-08-10 ENCOUNTER — OFFICE VISIT (OUTPATIENT)
Dept: PHYSICAL THERAPY | Facility: REHABILITATION | Age: 73
End: 2018-08-10
Payer: MEDICARE

## 2018-08-10 DIAGNOSIS — M17.11 PRIMARY OSTEOARTHRITIS OF RIGHT KNEE: Primary | ICD-10-CM

## 2018-08-10 DIAGNOSIS — G89.29 CHRONIC PAIN OF RIGHT KNEE: ICD-10-CM

## 2018-08-10 DIAGNOSIS — M25.561 CHRONIC PAIN OF RIGHT KNEE: ICD-10-CM

## 2018-08-10 PROCEDURE — 97112 NEUROMUSCULAR REEDUCATION: CPT | Performed by: PHYSICAL THERAPIST

## 2018-08-10 PROCEDURE — 97014 ELECTRIC STIMULATION THERAPY: CPT | Performed by: PHYSICAL THERAPIST

## 2018-08-10 PROCEDURE — 97110 THERAPEUTIC EXERCISES: CPT | Performed by: PHYSICAL THERAPIST

## 2018-08-10 NOTE — PROGRESS NOTES
Daily Note     Today's date: 8/10/2018  Patient name: Vale Pretty  : 1945  MRN: [id number]  Referring provider: Shana Garcai DO  Dx:   Encounter Diagnosis     ICD-10-CM    1  Primary osteoarthritis of right knee M17 11    2  Chronic pain of right knee M25 561     G89 29                   Subjective: Still has a little posterior right knee pain but overall better  Objective: See treatment diary below      Assessment: Performing exercises very well  Hip abduction is the most challenging but she was able to increase reps  Extremely tight in the right hamstrings with pain with stretching  Plan: Continue per plan of care         Precautions: HTN    Daily Treatment Diary     Manual  7/30 8/3 8-                                                                               Exercise Diary   8/3 8-7 8/10         Bike ROM  5 min 10' x         SAQ 2x10 2x10 x 2# 2x10         Heel slides 10 x20 x x         SLR flex/abd 6/10  x 2x10         Side step at rail  x5 3 laps 5 laps         HR/TR  x20 x x         Hamstring curls  x20 x x         minisquat   20 x         clams   10x 20         bridges    10         Seated hamstring stretch    20" 3x                                                                                                                                  Modalities   8/3  8/10         MH with IF 20min 20' x                                    X=same as last visit

## 2018-08-13 ENCOUNTER — OFFICE VISIT (OUTPATIENT)
Dept: PHYSICAL THERAPY | Facility: REHABILITATION | Age: 73
End: 2018-08-13
Payer: MEDICARE

## 2018-08-13 DIAGNOSIS — M17.11 PRIMARY OSTEOARTHRITIS OF RIGHT KNEE: Primary | ICD-10-CM

## 2018-08-13 DIAGNOSIS — M25.561 CHRONIC PAIN OF RIGHT KNEE: ICD-10-CM

## 2018-08-13 DIAGNOSIS — G89.29 CHRONIC PAIN OF RIGHT KNEE: ICD-10-CM

## 2018-08-13 PROCEDURE — 97112 NEUROMUSCULAR REEDUCATION: CPT | Performed by: PHYSICAL THERAPIST

## 2018-08-13 PROCEDURE — G8990 OTHER PT/OT CURRENT STATUS: HCPCS | Performed by: PHYSICAL THERAPIST

## 2018-08-13 PROCEDURE — 97110 THERAPEUTIC EXERCISES: CPT | Performed by: PHYSICAL THERAPIST

## 2018-08-13 PROCEDURE — G8991 OTHER PT/OT GOAL STATUS: HCPCS | Performed by: PHYSICAL THERAPIST

## 2018-08-13 NOTE — PROGRESS NOTES
Daily Note     Today's date: 2018  Patient name: Marycarmen Cross  : 1945  MRN: [id number]  Referring provider: Jermaine Witt DO  Dx:   Encounter Diagnosis     ICD-10-CM    1  Primary osteoarthritis of right knee M17 11    2  Chronic pain of right knee M25 561     G89 29                   Subjective: Pt reports that she was able to go with the cane and not the walker fairly well and her knee only felt like it wanted to give out once which is an improvement  Very achy today possibly due to the rainy weather  Objective: See treatment diary below      Assessment: Improved ability to perform exercises  Getting stronger  Plan: Continue per plan of care         Precautions: HTN    Daily Treatment Diary     Manual  7/30 8/3 8-7 8/10 8/13        STM post knee     5 min                                                                Exercise Diary   8/3 8-7 8/10 8/13        Bike ROM  5 min 10' x x        SAQ 2x10 2x10 x 2# 2x10 x        Heel slides 10 x20 x x x        SLR flex/abd 6/10  x 2x10 x        Side step at rail  x5 3 laps 5 laps x        HR/TR  x20 x x x        Hamstring curls  x20 x x 2# 2x10        minisquat   20 x x        clams   10x 20 x        bridges    10 x        Seated hamstring stretch    20" 3x x        Standing hip abd     2# 2x10        Standing hip abd      2# 2x10        Standing hip ext     2# 2x10                                                                                          Modalities   8/3  8/10         MH with IF 20min 20' x  x                                  X=same as last visit

## 2018-08-17 ENCOUNTER — OFFICE VISIT (OUTPATIENT)
Dept: PHYSICAL THERAPY | Facility: REHABILITATION | Age: 73
End: 2018-08-17
Payer: MEDICARE

## 2018-08-17 DIAGNOSIS — M25.561 CHRONIC PAIN OF RIGHT KNEE: ICD-10-CM

## 2018-08-17 DIAGNOSIS — M17.11 PRIMARY OSTEOARTHRITIS OF RIGHT KNEE: Primary | ICD-10-CM

## 2018-08-17 DIAGNOSIS — G89.29 CHRONIC PAIN OF RIGHT KNEE: ICD-10-CM

## 2018-08-17 PROCEDURE — 97112 NEUROMUSCULAR REEDUCATION: CPT | Performed by: PHYSICAL THERAPIST

## 2018-08-17 PROCEDURE — 97014 ELECTRIC STIMULATION THERAPY: CPT | Performed by: PHYSICAL THERAPIST

## 2018-08-17 PROCEDURE — 97110 THERAPEUTIC EXERCISES: CPT | Performed by: PHYSICAL THERAPIST

## 2018-08-17 NOTE — PROGRESS NOTES
Daily Note     Today's date: 2018  Patient name: Kiah Hwang  : 1945  MRN: [id number]  Referring provider: Janneth Liriano DO  Dx:   Encounter Diagnosis     ICD-10-CM    1  Primary osteoarthritis of right knee M17 11    2  Chronic pain of right knee M25 561     G89 29                   Subjective: Pt reports that she was able to go with the cane and not the walker fairly well and her knee only felt like it wanted to give out once which is an improvement  Very achy today possibly due to the rainy weather  Objective: See treatment diary below      Assessment: Pt came in using her cane instead of the walker  She was using it on the right and was corrected to use it on the left with significant improvement in gait pattern   over the posterior knee and had mild ant knee pain with step ups  Plan: Continue per plan of care         Precautions: HTN    Daily Treatment Diary     Manual  7/30 8/3 8-7 8/10 8/13 8/17       STM post knee     5 min x                                                               Exercise Diary   8/3 8-7 8/10 8/13 8/16       Bike ROM  5 min 10' x x x       SAQ 2x10 2x10 x 2# 2x10 x 3# 2x10       Heel slides 10 x20 x x x x       SLR flex/abd 6/10  x 2x10 x x       Side step at rail  x5 3 laps 5 laps x x       HR/TR  x20 x x x x       Hamstring curls  x20 x x 2# 2x10 x       minisquat   20 x x x       clams   10x 20 x x       bridges    10 x x       Seated hamstring stretch    20" 3x x x       Step up      4" 10 L 6 R       Standing hip abd      2# 2x10 x       Standing hip ext     2# 2x10 x                                                                                         Modalities   8/3  8/10         MH with IF 20min 20' x  x x                                 X=same as last visit

## 2018-08-21 ENCOUNTER — OFFICE VISIT (OUTPATIENT)
Dept: PHYSICAL THERAPY | Facility: REHABILITATION | Age: 73
End: 2018-08-21
Payer: MEDICARE

## 2018-08-21 DIAGNOSIS — G89.29 CHRONIC PAIN OF RIGHT KNEE: ICD-10-CM

## 2018-08-21 DIAGNOSIS — M25.561 CHRONIC PAIN OF RIGHT KNEE: ICD-10-CM

## 2018-08-21 DIAGNOSIS — M17.11 PRIMARY OSTEOARTHRITIS OF RIGHT KNEE: Primary | ICD-10-CM

## 2018-08-21 PROCEDURE — 97014 ELECTRIC STIMULATION THERAPY: CPT | Performed by: PHYSICAL THERAPIST

## 2018-08-21 PROCEDURE — 97110 THERAPEUTIC EXERCISES: CPT | Performed by: PHYSICAL THERAPIST

## 2018-08-21 PROCEDURE — 97112 NEUROMUSCULAR REEDUCATION: CPT | Performed by: PHYSICAL THERAPIST

## 2018-08-21 NOTE — PROGRESS NOTES
Daily Note     Today's date: 2018  Patient name: Alicia Lott  : 1945  MRN: [id number]  Referring provider: Wes Dsouza DO  Dx:   Encounter Diagnosis     ICD-10-CM    1  Primary osteoarthritis of right knee M17 11    2  Chronic pain of right knee M25 561     G89 29                   Subjective: Pt saw her family doctor who advised her to use her walker to protect her spine  Feeling SOB today  Pain 3/10 today  Objective: See treatment diary below  /72 pulse 72  Discussed transition to fitness program   Assessment: Pt was able to get a full revolution on the bike today  SOB may be due to increased humidity and weather  Plan: Continue per plan of care         Precautions: HTN    Daily Treatment Diary     Manual  7/30 8/3 8-7 8/10 8/13 8/17 8/21      STM post knee     5 min x x                                                              Exercise Diary   8/3 8-7 8/10 8/13 8/16       Bike ROM  5 min 10' x x x x      SAQ 2x10 2x10 x 2# 2x10 x 3# 2x10 4# 2x10      Heel slides 10 x20 x x x x x      SLR flex/abd 6/10  x 2x10 x x 1# 2x10      Side step at rail  x5 3 laps 5 laps x x Red TB      HR/TR  x20 x x x x x20      Hamstring curls  x20 x x 2# 2x10 x x      minisquat   20 x x x x      clams   10x 20 x x x      bridges    10 x x 2x10      Seated hamstring stretch    20" 3x x x x      Step up      4" 10 L 6 R 10 ea      Standing hip abd      2# 2x10 x x      Standing hip ext     2# 2x10 x x                                                                                        Modalities   8/3  8/10          with IF 20min 20' x  x x x                                X=same as last visit

## 2018-08-24 ENCOUNTER — OFFICE VISIT (OUTPATIENT)
Dept: PHYSICAL THERAPY | Facility: REHABILITATION | Age: 73
End: 2018-08-24
Payer: MEDICARE

## 2018-08-24 DIAGNOSIS — M17.11 PRIMARY OSTEOARTHRITIS OF RIGHT KNEE: Primary | ICD-10-CM

## 2018-08-24 DIAGNOSIS — M25.561 CHRONIC PAIN OF RIGHT KNEE: ICD-10-CM

## 2018-08-24 DIAGNOSIS — G89.29 CHRONIC PAIN OF RIGHT KNEE: ICD-10-CM

## 2018-08-24 PROCEDURE — 97112 NEUROMUSCULAR REEDUCATION: CPT

## 2018-08-24 PROCEDURE — 97110 THERAPEUTIC EXERCISES: CPT

## 2018-08-24 PROCEDURE — 97140 MANUAL THERAPY 1/> REGIONS: CPT

## 2018-08-24 NOTE — PROGRESS NOTES
Daily Note     Today's date: 2018  Patient name: Maykel Franks  : 1945  MRN: [id number]  Referring provider: Nayla Gross DO  Dx:   Encounter Diagnosis     ICD-10-CM    1  Primary osteoarthritis of right knee M17 11    2  Chronic pain of right knee M25 561     G89 29                   Subjective: Reports she is having minimal pain in knee    She feels she can D/C to HEP  Reports her breathing has been better with decrease in humidity    Objective: See treatment diary below    Discussed transition to fitness program   Assessment:    Doing well with ex    Plan: D/C to HEP      Precautions: HTN    Daily Treatment Diary     Manual  7/30 8/3 8-7 8/10 8/13 8/17 8/21 8-     STM post knee     5 min x x x                                                             Exercise Diary   8/3 8-7 8/10 8/13 8/16       Bike ROM  5 min 10' x x x x 10'     SAQ 2x10 2x10 x 2# 2x10 x 3# 2x10 4# 2x10 x     Heel slides 10 x20 x x x x x x     SLR flex/abd 6/10  x 2x10 x x 1# 2x10 x     Side step at rail  x5 3 laps 5 laps x x Red TB x     HR/TR  x20 x x x x x20 x     Hamstring curls  x20 x x 2# 2x10 x x x     minisquat   20 x x x x 25     clams   10x 20 x x x Red TB 20x     bridges    10 x x 2x10 x     Seated hamstring stretch    20" 3x x x x 5x     Step up      4" 10 L 6 R 10 ea x     Standing hip abd      2# 2x10 x x x     Standing hip ext     2# 2x10 x x x                                                                                       Modalities   8/3  8/10          with IF 20min 20' x  x x x                                X=same as last visit

## 2018-08-30 NOTE — PROGRESS NOTES
Bradly Perkins has been compliant with attending PT and home exercise program since initial eval   [facility]  has made improvements in objective data since initial evalulation and has achieved all goals  Patient reports having returned to their prior level or function  Patient provided with updated Home Exercise Program, all questions answered, verbalized understanding and agreement to plan of care   Thus it was mutually decided to discontinue this episode of care and transition to Home Exercise Program

## 2018-09-04 DIAGNOSIS — K21.9 GASTROESOPHAGEAL REFLUX DISEASE WITHOUT ESOPHAGITIS: Primary | ICD-10-CM

## 2018-09-04 RX ORDER — FAMOTIDINE 20 MG/1
TABLET, FILM COATED ORAL
Qty: 90 TABLET | Refills: 0 | Status: SHIPPED | OUTPATIENT
Start: 2018-09-04 | End: 2018-10-25 | Stop reason: ALTCHOICE

## 2018-09-07 ENCOUNTER — OFFICE VISIT (OUTPATIENT)
Dept: OBGYN CLINIC | Facility: MEDICAL CENTER | Age: 73
End: 2018-09-07
Payer: MEDICARE

## 2018-09-07 VITALS
SYSTOLIC BLOOD PRESSURE: 115 MMHG | WEIGHT: 263 LBS | DIASTOLIC BLOOD PRESSURE: 70 MMHG | HEART RATE: 67 BPM | HEIGHT: 64 IN | BODY MASS INDEX: 44.9 KG/M2

## 2018-09-07 DIAGNOSIS — M17.11 PRIMARY OSTEOARTHRITIS OF RIGHT KNEE: Primary | ICD-10-CM

## 2018-09-07 PROCEDURE — 99213 OFFICE O/P EST LOW 20 MIN: CPT | Performed by: ORTHOPAEDIC SURGERY

## 2018-09-07 NOTE — PROGRESS NOTES
Assessment/Plan:  1  Primary osteoarthritis of right knee      No orders of the defined types were placed in this encounter   -continue with physical therapy and transition to home exercises  -continue with Tylenol as needed for pain control      Return if symptoms worsen or fail to improve  Subjective   Chief Complaint:   Chief Complaint   Patient presents with    Right Knee - Follow-up       Lory Solomon is a 75 y o  female who presents for follow up for right knee pain today  Since her last visit she has started physical therapy  She states physical therapy has been helping her  She currently has minimal pain in her knee  She still uses a walker to ambulate  She takes Tylenol occasionally if needed for pain control  She states her blood sugars were more controlled  There now in the 150-1 60s  She is happy with her progress currently  Review of Systems  ROS:    See HPI for musculoskeletal review     All other systems reviewed are negative     History:  Past Medical History:   Diagnosis Date    Anemia     Fracture of wrist     RIGHT    History of non-insulin dependent diabetes mellitus     Hyperlipidemia     Hypertension     Hypertension     IBS (irritable bowel syndrome)     Irritable bowel     Metabolic syndrome     Obesity     RLS (restless legs syndrome)     Sleep apnea     Sleep apnea     ON CPAP    Urge incontinence     Urinary incontinence      Past Surgical History:   Procedure Laterality Date    CHOLECYSTECTOMY  2013    COLONOSCOPY  2010    CYSTOSCOPY      HERNIA REPAIR  2008    HYSTERECTOMY      PARTIAL (STILL HAS OVARIES)    KNEE SURGERY Left      Social History   History   Alcohol Use No     Comment: no caffeine use     History   Drug Use No     History   Smoking Status    Never Smoker   Smokeless Tobacco    Never Used     Family History:   Family History   Problem Relation Age of Onset    Suicidality Father     Breast cancer Mother     Melanoma Sister    Becca [place] Bipolar disorder Sister     Heart disease Brother     Rheum arthritis Daughter        Meds/Allergies     (Not in a hospital admission)  Allergies   Allergen Reactions    Actos [Pioglitazone]      "Feels like she is dying"    Ibuprofen     Levofloxacin Other (See Comments)    Nsaids           Objective     /70   Pulse 67   Ht 5' 4" (1 626 m)   Wt 119 kg (263 lb)   BMI 45 14 kg/m²      PE:  AAOx 3  WDWN  Hearing intact, no drainage from eyes  no audible wheezing  no abdominal distension  LE compartments soft, skin intact    Ortho Exam:  right Knee:   No erythema  no swelling  no effusion  no warmth  AROM: full  Stable to varus/valgus stress

## 2018-10-15 ENCOUNTER — TRANSCRIBE ORDERS (OUTPATIENT)
Dept: ADMINISTRATIVE | Facility: HOSPITAL | Age: 73
End: 2018-10-15

## 2018-10-15 ENCOUNTER — APPOINTMENT (OUTPATIENT)
Dept: LAB | Facility: IMAGING CENTER | Age: 73
End: 2018-10-15
Payer: MEDICARE

## 2018-10-15 DIAGNOSIS — Z79.4 LONG TERM CURRENT USE OF INSULIN (HCC): ICD-10-CM

## 2018-10-15 DIAGNOSIS — N18.30 CHRONIC RENAL INSUFFICIENCY, STAGE III (MODERATE) (HCC): ICD-10-CM

## 2018-10-15 DIAGNOSIS — E55.9 VITAMIN D DEFICIENCY: ICD-10-CM

## 2018-10-15 DIAGNOSIS — I10 ESSENTIAL HYPERTENSION: ICD-10-CM

## 2018-10-15 DIAGNOSIS — E11.8 TYPE 2 DIABETES MELLITUS WITH COMPLICATION, WITH LONG-TERM CURRENT USE OF INSULIN (HCC): ICD-10-CM

## 2018-10-15 DIAGNOSIS — Z79.4 TYPE 2 DIABETES MELLITUS WITH COMPLICATION, WITH LONG-TERM CURRENT USE OF INSULIN (HCC): ICD-10-CM

## 2018-10-15 LAB
ALBUMIN SERPL BCP-MCNC: 3.3 G/DL (ref 3.5–5)
ALP SERPL-CCNC: 104 U/L (ref 46–116)
ALT SERPL W P-5'-P-CCNC: 28 U/L (ref 12–78)
ANION GAP SERPL CALCULATED.3IONS-SCNC: 5 MMOL/L (ref 4–13)
AST SERPL W P-5'-P-CCNC: 26 U/L (ref 5–45)
BASOPHILS # BLD AUTO: 0.02 THOUSANDS/ΜL (ref 0–0.1)
BASOPHILS NFR BLD AUTO: 0 % (ref 0–1)
BILIRUB SERPL-MCNC: 0.32 MG/DL (ref 0.2–1)
BUN SERPL-MCNC: 24 MG/DL (ref 5–25)
CALCIUM SERPL-MCNC: 9.1 MG/DL (ref 8.3–10.1)
CHLORIDE SERPL-SCNC: 105 MMOL/L (ref 100–108)
CHOLEST SERPL-MCNC: 114 MG/DL (ref 50–200)
CO2 SERPL-SCNC: 27 MMOL/L (ref 21–32)
CREAT SERPL-MCNC: 1.43 MG/DL (ref 0.6–1.3)
CREAT UR-MCNC: 122 MG/DL
EOSINOPHIL # BLD AUTO: 0.1 THOUSAND/ΜL (ref 0–0.61)
EOSINOPHIL NFR BLD AUTO: 2 % (ref 0–6)
ERYTHROCYTE [DISTWIDTH] IN BLOOD BY AUTOMATED COUNT: 12.8 % (ref 11.6–15.1)
EST. AVERAGE GLUCOSE BLD GHB EST-MCNC: 212 MG/DL
GFR SERPL CREATININE-BSD FRML MDRD: 36 ML/MIN/1.73SQ M
GLUCOSE P FAST SERPL-MCNC: 179 MG/DL (ref 65–99)
HBA1C MFR BLD: 9 % (ref 4.2–6.3)
HCT VFR BLD AUTO: 41.6 % (ref 34.8–46.1)
HDLC SERPL-MCNC: 33 MG/DL (ref 40–60)
HGB BLD-MCNC: 13.1 G/DL (ref 11.5–15.4)
IMM GRANULOCYTES # BLD AUTO: 0.06 THOUSAND/UL (ref 0–0.2)
IMM GRANULOCYTES NFR BLD AUTO: 1 % (ref 0–2)
LDLC SERPL CALC-MCNC: 46 MG/DL (ref 0–100)
LYMPHOCYTES # BLD AUTO: 1.19 THOUSANDS/ΜL (ref 0.6–4.47)
LYMPHOCYTES NFR BLD AUTO: 18 % (ref 14–44)
MCH RBC QN AUTO: 29 PG (ref 26.8–34.3)
MCHC RBC AUTO-ENTMCNC: 31.5 G/DL (ref 31.4–37.4)
MCV RBC AUTO: 92 FL (ref 82–98)
MICROALBUMIN UR-MCNC: 13.2 MG/L (ref 0–20)
MICROALBUMIN/CREAT 24H UR: 11 MG/G CREATININE (ref 0–30)
MONOCYTES # BLD AUTO: 0.46 THOUSAND/ΜL (ref 0.17–1.22)
MONOCYTES NFR BLD AUTO: 7 % (ref 4–12)
NEUTROPHILS # BLD AUTO: 4.94 THOUSANDS/ΜL (ref 1.85–7.62)
NEUTS SEG NFR BLD AUTO: 72 % (ref 43–75)
NONHDLC SERPL-MCNC: 81 MG/DL
NRBC BLD AUTO-RTO: 0 /100 WBCS
PLATELET # BLD AUTO: 170 THOUSANDS/UL (ref 149–390)
PMV BLD AUTO: 12.9 FL (ref 8.9–12.7)
POTASSIUM SERPL-SCNC: 4.2 MMOL/L (ref 3.5–5.3)
PROT SERPL-MCNC: 6.9 G/DL (ref 6.4–8.2)
RBC # BLD AUTO: 4.52 MILLION/UL (ref 3.81–5.12)
SODIUM SERPL-SCNC: 137 MMOL/L (ref 136–145)
TRIGL SERPL-MCNC: 173 MG/DL
TSH SERPL DL<=0.05 MIU/L-ACNC: 2.07 UIU/ML
WBC # BLD AUTO: 6.77 THOUSAND/UL (ref 4.31–10.16)

## 2018-10-15 PROCEDURE — 85025 COMPLETE CBC W/AUTO DIFF WBC: CPT

## 2018-10-15 PROCEDURE — 84443 ASSAY THYROID STIM HORMONE: CPT

## 2018-10-15 PROCEDURE — 82570 ASSAY OF URINE CREATININE: CPT

## 2018-10-15 PROCEDURE — 36415 COLL VENOUS BLD VENIPUNCTURE: CPT

## 2018-10-15 PROCEDURE — 80061 LIPID PANEL: CPT

## 2018-10-15 PROCEDURE — 83036 HEMOGLOBIN GLYCOSYLATED A1C: CPT

## 2018-10-15 PROCEDURE — 80053 COMPREHEN METABOLIC PANEL: CPT

## 2018-10-15 PROCEDURE — 82043 UR ALBUMIN QUANTITATIVE: CPT

## 2018-10-25 ENCOUNTER — OFFICE VISIT (OUTPATIENT)
Dept: FAMILY MEDICINE CLINIC | Facility: CLINIC | Age: 73
End: 2018-10-25
Payer: MEDICARE

## 2018-10-25 VITALS
OXYGEN SATURATION: 95 % | DIASTOLIC BLOOD PRESSURE: 74 MMHG | SYSTOLIC BLOOD PRESSURE: 100 MMHG | RESPIRATION RATE: 16 BRPM | HEIGHT: 64 IN | WEIGHT: 262 LBS | HEART RATE: 72 BPM | BODY MASS INDEX: 44.73 KG/M2

## 2018-10-25 DIAGNOSIS — E11.8 TYPE 2 DIABETES MELLITUS WITH COMPLICATION, WITH LONG-TERM CURRENT USE OF INSULIN (HCC): ICD-10-CM

## 2018-10-25 DIAGNOSIS — I10 ESSENTIAL HYPERTENSION: ICD-10-CM

## 2018-10-25 DIAGNOSIS — Z79.4 TYPE 2 DIABETES MELLITUS WITH STAGE 3 CHRONIC KIDNEY DISEASE, WITH LONG-TERM CURRENT USE OF INSULIN (HCC): Primary | ICD-10-CM

## 2018-10-25 DIAGNOSIS — Z23 NEED FOR INFLUENZA VACCINATION: ICD-10-CM

## 2018-10-25 DIAGNOSIS — E11.22 TYPE 2 DIABETES MELLITUS WITH STAGE 3 CHRONIC KIDNEY DISEASE, WITH LONG-TERM CURRENT USE OF INSULIN (HCC): Primary | ICD-10-CM

## 2018-10-25 DIAGNOSIS — K30 INDIGESTION: ICD-10-CM

## 2018-10-25 DIAGNOSIS — Z79.4 TYPE 2 DIABETES MELLITUS WITH COMPLICATION, WITH LONG-TERM CURRENT USE OF INSULIN (HCC): ICD-10-CM

## 2018-10-25 DIAGNOSIS — E78.2 MIXED HYPERLIPIDEMIA: ICD-10-CM

## 2018-10-25 DIAGNOSIS — Z79.4 LONG TERM CURRENT USE OF INSULIN (HCC): ICD-10-CM

## 2018-10-25 DIAGNOSIS — N18.30 CHRONIC RENAL INSUFFICIENCY, STAGE III (MODERATE) (HCC): ICD-10-CM

## 2018-10-25 DIAGNOSIS — N18.30 TYPE 2 DIABETES MELLITUS WITH STAGE 3 CHRONIC KIDNEY DISEASE, WITH LONG-TERM CURRENT USE OF INSULIN (HCC): Primary | ICD-10-CM

## 2018-10-25 PROCEDURE — 99214 OFFICE O/P EST MOD 30 MIN: CPT | Performed by: FAMILY MEDICINE

## 2018-10-25 PROCEDURE — 90662 IIV NO PRSV INCREASED AG IM: CPT

## 2018-10-25 PROCEDURE — G0008 ADMIN INFLUENZA VIRUS VAC: HCPCS

## 2018-10-25 RX ORDER — HYDROCODONE BITARTRATE AND ACETAMINOPHEN 5; 325 MG/1; MG/1
TABLET ORAL 3 TIMES DAILY PRN
Refills: 0 | COMMUNITY
Start: 2018-10-24 | End: 2020-10-30 | Stop reason: ALTCHOICE

## 2018-10-25 RX ORDER — BLOOD SUGAR DIAGNOSTIC
STRIP MISCELLANEOUS
Refills: 0 | COMMUNITY
Start: 2018-10-10 | End: 2020-02-10 | Stop reason: SDUPTHER

## 2018-10-25 RX ORDER — INSULIN DEGLUDEC INJECTION 100 U/ML
84 INJECTION, SOLUTION SUBCUTANEOUS
Qty: 5 PEN | Refills: 2 | Status: SHIPPED | OUTPATIENT
Start: 2018-10-25 | End: 2018-10-25 | Stop reason: SDUPTHER

## 2018-10-25 RX ORDER — INSULIN DEGLUDEC INJECTION 100 U/ML
84 INJECTION, SOLUTION SUBCUTANEOUS
Qty: 5 PEN | Refills: 3 | Status: SHIPPED | OUTPATIENT
Start: 2018-10-25 | End: 2019-01-25 | Stop reason: SDUPTHER

## 2018-10-25 NOTE — PROGRESS NOTES
Subjective:   Chief Complaint   Patient presents with    Follow-up     chronic conditions         Patient ID: Arianne Blanton is a 75 y o  female  Patient and office follow-up with a chronic condition  -insulin-dependent diabetic patient on Tresiba 70 U qhs and Humalog per insulin sliding scale at mealtime patient is asymptomatic but her sugar number uncontrolled by looking at her log book her number still running in the 200 and and 300 her hemoglobin A1c 9 0 patient asymptomatic deny any increased frequency urination no increased thirsty and she is not compliant with a low carb diet and she has not been losing weight  -history of hyperlipidemia on statin tolerated well without any side effect no chest pain or short of breath no palpitation no headache no blurred vision no weakness or lateralized of the symptom  -The patient has long history of hypertension the blood pressure today is in control patient tolerates medication well and no chest pain or short of breath no palpitation no headache  -patient's history of indigestion on pantoprazole 40 mg once a day tolerated well without any side effect and patient is asymptomatic no abdomen pain no nausea vomiting or diarrhea no heartburn no weight loss  -headache patient was history of chronic kidney disease stage 3 can be multifactorial secondary to hypertension and diabetic and no microalbuminuria patient already on Ace inhibitor patient asymptomatic no edema no swelling in the face and no urinary problem patient does followed by Nephrology  Recent blood work discussed with the patient          The following portions of the patient's history were reviewed and updated as appropriate: allergies, current medications, past family history, past medical history, past social history, past surgical history and problem list     Review of Systems   Constitutional: Negative for fatigue and fever  HENT: Negative for ear pain, sinus pain, sinus pressure and sore throat  Eyes: Negative for pain and redness  Respiratory: Negative for cough, chest tightness and shortness of breath  Cardiovascular: Negative for chest pain, palpitations and leg swelling  Gastrointestinal: Negative for abdominal pain, blood in stool, constipation, diarrhea and nausea  Genitourinary: Negative for flank pain, frequency and hematuria  Musculoskeletal: Negative for back pain and joint swelling  Skin: Negative for rash  Neurological: Negative for dizziness, numbness and headaches  Hematological: Does not bruise/bleed easily  Psychiatric/Behavioral: Negative for agitation and behavioral problems  Objective:  Vitals:    10/25/18 1332   BP: 100/74   BP Location: Left arm   Patient Position: Sitting   Cuff Size: Large   Pulse: 72   Resp: 16   SpO2: 95%   Weight: 119 kg (262 lb)   Height: 5' 4" (1 626 m)      Physical Exam   Constitutional: She is oriented to person, place, and time  She appears well-developed and well-nourished  HENT:   Head: Normocephalic  Right Ear: External ear normal    Left Ear: External ear normal    Eyes: Conjunctivae and EOM are normal  Right eye exhibits no discharge  Left eye exhibits no discharge  Neck: No JVD present  Cardiovascular: Normal rate and regular rhythm  Exam reveals no gallop  Murmur heard  Pulmonary/Chest: Effort normal  No respiratory distress  She has no wheezes  She has no rales  She exhibits no tenderness  Abdominal: She exhibits no mass  There is no tenderness  There is no rebound  Musculoskeletal: She exhibits no edema or tenderness  Neurological: She is alert and oriented to person, place, and time  Skin: No rash noted  No erythema           Assessment/Plan:    Type 2 diabetes mellitus with kidney complication, with long-term current use of insulin ([facility])  Lab Results   Component Value Date    HGBA1C 9 0 (H) 10/15/2018     Uncontrolled increase [place] a to 84 unit q h s  and continue with the Humalog at mealtime patient already on Ace inhibitor and she is already on statin and there is no microalbuminuria patient does followed by Ophthalmology and podiatric for diabetic eye care  I discussed with the patient refer to need to diabetic it chin program and patient decline she states she knows what she should do but she is not doing that she has to put her mind into it    Essential hypertension  Chronic well controlled continue current medication low-salt diet less than 2 g a day ,   low caffeine intake   regular aerobic exercise 20 to totally minute a day diet and important lose weight discussed with the patient      Chronic renal insufficiency, stage III (moderate)  Chronic stable asymptomatic no edema no blood in the urine no protein in the urine  Patient does followed by Nephrology  Avoid non steroid  anti-inflammatory drugs  Keep will hydration  Avoid contrast dye    Hyperlipidemia  Chronic ,fair control on current medication  Advised to maintain a low-fat low-cholesterol diet consult regarding potential comorbidity including cardiovascular disease consult regarding important weight loss      Indigestion  Chronic ,well controlled by Pantoprazole  Discuss with pt important lose weight   Multiple small meal ,avoid eat and lying down ,avoid spicy food and avoid provoked food           Diagnoses and all orders for this visit:    Type 2 diabetes mellitus with stage 3 chronic kidney disease, with long-term current use of insulin (HCC)    Essential hypertension  -     CBC and differential; Future  -     Comprehensive metabolic panel; Future  -     Hemoglobin A1C; Future  -     Lipid panel; Future  -     TSH, 3rd generation with Free T4 reflex; Future    Mixed hyperlipidemia  -     CBC and differential; Future  -     Comprehensive metabolic panel; Future  -     Hemoglobin A1C; Future  -     Lipid panel; Future  -     TSH, 3rd generation with Free T4 reflex;  Future    Need for influenza vaccination  -     influenza vaccine, [id number], high-dose, PF 0 5 mL, for patients 65 yr+ (FLUZONE HIGH-DOSE)    Chronic renal insufficiency, stage III (moderate) ([facility])  -     CBC and differential; Future  -     Comprehensive metabolic panel; Future  -     Hemoglobin A1C; Future  -     Lipid panel; Future  -     TSH, 3rd generation with Free T4 reflex; Future    Indigestion  -     CBC and differential; Future  -     Comprehensive metabolic panel; Future  -     Hemoglobin A1C; Future  -     Lipid panel; Future  -     TSH, 3rd generation with Free T4 reflex; Future    Long term current use of insulin ([facility])  -     CBC and differential; Future  -     Comprehensive metabolic panel; Future  -     Hemoglobin A1C; Future  -     Lipid panel; Future  -     TSH, 3rd generation with Free T4 reflex; Future    Type 2 diabetes mellitus with complication, with long-term current use of insulin ([facility])  -     TRESIBA FLEXTOUCH 100 units/mL injection pen; Inject 84 Units under the skin daily at bedtime  -     CBC and differential; Future  -     Comprehensive metabolic panel; Future  -     Hemoglobin A1C; Future  -     Lipid panel; Future  -     TSH, 3rd generation with Free T4 reflex; Future    Other orders  -     CONTOUR NEXT TEST test strip;   -     HYDROcodone-acetaminophen (NORCO) 5-325 mg per tablet;  Take 1 tablet by mouth 3 (three) times a day as needed

## 2018-10-25 NOTE — ASSESSMENT & PLAN NOTE
Lab Results   Component Value Date    HGBA1C 9 0 (H) 10/15/2018     Uncontrolled increase Ary dickens to 84 unit q h s  and continue with the Humalog at mealtime patient already on Ace inhibitor and she is already on statin and there is no microalbuminuria patient does followed by Ophthalmology and podiatric for diabetic eye care  I discussed with the patient refer to need to diabetic it chin program and patient decline she states she knows what she should do but she is not doing that she has to put her mind into it

## 2018-10-25 NOTE — PATIENT INSTRUCTIONS
10% - bad control"> 10% - bad control,Hemoglobin A1c (HbA1c) greater than 10% indicating poor diabetic control,Haemoglobin A1c greater than 10% indicating poor diabetic control">   Diabetes Mellitus Type 2 in Adults, Ambulatory Care   GENERAL INFORMATION:   Diabetes mellitus type 2  is a disease that affects how your body uses glucose (sugar)  Insulin helps move sugar out of the blood so it can be used for energy  Normally, when the blood sugar level increases, the pancreas makes more insulin  Type 2 diabetes develops because either the body cannot make enough insulin, or it cannot use the insulin correctly  After many years, your pancreas may stop making insulin  Common symptoms include the following:   · More hunger or thirst than usual     · Frequent urination     · Weight loss without trying     · Blurred vision  Seek immediate care for the following symptoms:   · Severe abdominal pain, or pain that spreads to your back  You may also be vomiting  · Trouble staying awake or focusing    · Shaking or sweating    · Blurred or double vision    · Breath has a fruity, sweet smell    · Breathing is deep and labored, or rapid and shallow    · Heartbeat is fast and weak  Treatment for diabetes mellitus type 2  includes keeping your blood sugar at a normal level  You must eat the right foods, and exercise regularly  You may also need medicine if you cannot control your blood sugar level with nutrition and exercise  Manage diabetes mellitus type 2:   · Check your blood sugar level  You will be taught how to check a small drop of blood in a glucose monitor  Ask your healthcare provider when and how often to check during the day  Ask your healthcare provider what your blood sugar levels should be when you check them  · Keep track of carbohydrates (sugar and starchy foods)  Your blood sugar level can get too high if you eat too many carbohydrates   Your dietitian will help you plan meals and snacks that have the right amount of carbohydrates  · Eat low-fat foods  Some examples are skinless chicken and low-fat milk  · Eat less sodium (salt)  Some examples of high-sodium foods to limit are soy sauce, potato chips, and soup  Do not add salt to food you cook  Limit your use of table salt  · Eat high-fiber foods  Foods that are a good source of fiber include vegetables, whole grain bread, and beans  · Limit alcohol  Alcohol affects your blood sugar level and can make it harder to manage your diabetes  Women should limit alcohol to 1 drink a day  Men should limit alcohol to 2 drinks a day  A drink of alcohol is 12 ounces of beer, 5 ounces of wine, or 1½ ounces of liquor  · Get regular exercise  Exercise can help keep your blood sugar level steady, decrease your risk of heart disease, and help you lose weight  Exercise for at least 30 minutes, 5 days a week  Include muscle strengthening activities 2 days each week  Work with your healthcare provider to create an exercise plan  · Check your feet each day  for injuries or open sores  Ask your healthcare provider for activities you can do if you have an open sore  · Quit smoking  If you smoke, it is never too late to quit  Smoking can worsen the problems that may occur with diabetes  Ask your healthcare provider for information about how to stop smoking if you are having trouble quitting  · Ask about your weight:  Ask healthcare providers if you need to lose weight, and how much to lose  Ask them to help you with a weight loss program  Even a 10 to 15 pound weight loss can help you manage your blood sugar level  · Carry medical alert identification  Wear medical alert jewelry or carry a card that says you have diabetes  Ask your healthcare provider where to get these items  · Ask about vaccines  Diabetes puts you at risk of serious illness if you get the flu, pneumonia, or hepatitis   Ask your healthcare provider if you should get a flu, pneumonia, or hepatitis B vaccine, and when to get the vaccine  Follow up with your healthcare provider as directed:  Write down your questions so you remember to ask them during your visits  CARE AGREEMENT:   You have the right to help plan your care  Learn about your health condition and how it may be treated  Discuss treatment options with your caregivers to decide what care you want to receive  You always have the right to refuse treatment  The above information is an  only  It is not intended as medical advice for individual conditions or treatments  Talk to your doctor, nurse or pharmacist before following any medical regimen to see if it is safe and effective for you  © 2014 [address] is for End User's use only and may not be sold, redistributed or otherwise used for commercial purposes  All illustrations and images included in CareNotes® are the copyrighted property of [employer]  or [employer]

## 2018-10-25 NOTE — ASSESSMENT & PLAN NOTE
Chronic stable asymptomatic no edema no blood in the urine no protein in the urine  Patient does followed by Nephrology  Avoid non steroid  anti-inflammatory drugs  Keep will hydration  Avoid contrast dye

## 2018-11-26 DIAGNOSIS — B35.4 TINEA CORPORIS: Primary | ICD-10-CM

## 2018-11-26 RX ORDER — CLOTRIMAZOLE AND BETAMETHASONE DIPROPIONATE 10; .64 MG/G; MG/G
CREAM TOPICAL
Qty: 90 G | Refills: 0 | Status: SHIPPED | OUTPATIENT
Start: 2018-11-26 | End: 2019-04-26 | Stop reason: SDUPTHER

## 2018-12-05 ENCOUNTER — TRANSCRIBE ORDERS (OUTPATIENT)
Dept: ADMINISTRATIVE | Facility: HOSPITAL | Age: 73
End: 2018-12-05

## 2018-12-06 ENCOUNTER — HOSPITAL ENCOUNTER (OUTPATIENT)
Dept: ULTRASOUND IMAGING | Facility: HOSPITAL | Age: 73
Discharge: HOME | End: 2018-12-06
Payer: MEDICARE

## 2018-12-06 DIAGNOSIS — K76.0 FATTY METAMORPHOSIS OF LIVER: ICD-10-CM

## 2018-12-06 PROCEDURE — 76705 ECHO EXAM OF ABDOMEN: CPT

## 2018-12-10 ENCOUNTER — TRANSCRIBE ORDERS (OUTPATIENT)
Dept: ADMINISTRATIVE | Facility: HOSPITAL | Age: 73
End: 2018-12-10

## 2018-12-10 DIAGNOSIS — K76.0 FATTY METAMORPHOSIS OF LIVER: Primary | ICD-10-CM

## 2018-12-24 DIAGNOSIS — G43.009 MIGRAINE WITHOUT AURA AND WITHOUT STATUS MIGRAINOSUS, NOT INTRACTABLE: ICD-10-CM

## 2018-12-24 DIAGNOSIS — E78.1 HYPERTRIGLYCERIDEMIA, ESSENTIAL: ICD-10-CM

## 2018-12-25 RX ORDER — FENOFIBRATE 160 MG/1
TABLET ORAL
Qty: 90 TABLET | Refills: 0 | Status: SHIPPED | OUTPATIENT
Start: 2018-12-25 | End: 2019-03-31 | Stop reason: SDUPTHER

## 2018-12-25 RX ORDER — TOPIRAMATE 25 MG/1
TABLET ORAL
Qty: 90 TABLET | Refills: 0 | Status: SHIPPED | OUTPATIENT
Start: 2018-12-25 | End: 2019-03-31 | Stop reason: SDUPTHER

## 2019-01-14 ENCOUNTER — TRANSCRIBE ORDERS (OUTPATIENT)
Dept: ADMINISTRATIVE | Facility: HOSPITAL | Age: 74
End: 2019-01-14

## 2019-01-14 ENCOUNTER — APPOINTMENT (OUTPATIENT)
Dept: LAB | Facility: IMAGING CENTER | Age: 74
End: 2019-01-14
Payer: MEDICARE

## 2019-01-14 DIAGNOSIS — K30 INDIGESTION: ICD-10-CM

## 2019-01-14 DIAGNOSIS — Z79.4 TYPE 2 DIABETES MELLITUS WITH COMPLICATION, WITH LONG-TERM CURRENT USE OF INSULIN (HCC): ICD-10-CM

## 2019-01-14 DIAGNOSIS — N18.30 CHRONIC RENAL INSUFFICIENCY, STAGE III (MODERATE) (HCC): ICD-10-CM

## 2019-01-14 DIAGNOSIS — I10 ESSENTIAL HYPERTENSION: ICD-10-CM

## 2019-01-14 DIAGNOSIS — E11.8 TYPE 2 DIABETES MELLITUS WITH COMPLICATION, WITH LONG-TERM CURRENT USE OF INSULIN (HCC): ICD-10-CM

## 2019-01-14 DIAGNOSIS — E78.2 MIXED HYPERLIPIDEMIA: ICD-10-CM

## 2019-01-14 DIAGNOSIS — Z79.4 LONG TERM CURRENT USE OF INSULIN (HCC): ICD-10-CM

## 2019-01-14 LAB
ALBUMIN SERPL BCP-MCNC: 3.6 G/DL (ref 3.5–5)
ALP SERPL-CCNC: 113 U/L (ref 46–116)
ALT SERPL W P-5'-P-CCNC: 26 U/L (ref 12–78)
ANION GAP SERPL CALCULATED.3IONS-SCNC: 9 MMOL/L (ref 4–13)
AST SERPL W P-5'-P-CCNC: 24 U/L (ref 5–45)
BASOPHILS # BLD AUTO: 0.04 THOUSANDS/ΜL (ref 0–0.1)
BASOPHILS NFR BLD AUTO: 1 % (ref 0–1)
BILIRUB SERPL-MCNC: 0.27 MG/DL (ref 0.2–1)
BUN SERPL-MCNC: 21 MG/DL (ref 5–25)
CALCIUM SERPL-MCNC: 9.2 MG/DL (ref 8.3–10.1)
CHLORIDE SERPL-SCNC: 106 MMOL/L (ref 100–108)
CHOLEST SERPL-MCNC: 113 MG/DL (ref 50–200)
CO2 SERPL-SCNC: 25 MMOL/L (ref 21–32)
CREAT SERPL-MCNC: 1.28 MG/DL (ref 0.6–1.3)
EOSINOPHIL # BLD AUTO: 0.11 THOUSAND/ΜL (ref 0–0.61)
EOSINOPHIL NFR BLD AUTO: 2 % (ref 0–6)
ERYTHROCYTE [DISTWIDTH] IN BLOOD BY AUTOMATED COUNT: 13.1 % (ref 11.6–15.1)
EST. AVERAGE GLUCOSE BLD GHB EST-MCNC: 214 MG/DL
GFR SERPL CREATININE-BSD FRML MDRD: 41 ML/MIN/1.73SQ M
GLUCOSE P FAST SERPL-MCNC: 164 MG/DL (ref 65–99)
HBA1C MFR BLD: 9.1 % (ref 4.2–6.3)
HCT VFR BLD AUTO: 40.1 % (ref 34.8–46.1)
HDLC SERPL-MCNC: 35 MG/DL (ref 40–60)
HGB BLD-MCNC: 12.4 G/DL (ref 11.5–15.4)
IMM GRANULOCYTES # BLD AUTO: 0.05 THOUSAND/UL (ref 0–0.2)
IMM GRANULOCYTES NFR BLD AUTO: 1 % (ref 0–2)
LDLC SERPL CALC-MCNC: 45 MG/DL (ref 0–100)
LYMPHOCYTES # BLD AUTO: 1.11 THOUSANDS/ΜL (ref 0.6–4.47)
LYMPHOCYTES NFR BLD AUTO: 18 % (ref 14–44)
MCH RBC QN AUTO: 28.8 PG (ref 26.8–34.3)
MCHC RBC AUTO-ENTMCNC: 30.9 G/DL (ref 31.4–37.4)
MCV RBC AUTO: 93 FL (ref 82–98)
MONOCYTES # BLD AUTO: 0.36 THOUSAND/ΜL (ref 0.17–1.22)
MONOCYTES NFR BLD AUTO: 6 % (ref 4–12)
NEUTROPHILS # BLD AUTO: 4.59 THOUSANDS/ΜL (ref 1.85–7.62)
NEUTS SEG NFR BLD AUTO: 72 % (ref 43–75)
NONHDLC SERPL-MCNC: 78 MG/DL
NRBC BLD AUTO-RTO: 0 /100 WBCS
PLATELET # BLD AUTO: 162 THOUSANDS/UL (ref 149–390)
PMV BLD AUTO: 12.5 FL (ref 8.9–12.7)
POTASSIUM SERPL-SCNC: 3.8 MMOL/L (ref 3.5–5.3)
PROT SERPL-MCNC: 7.1 G/DL (ref 6.4–8.2)
RBC # BLD AUTO: 4.31 MILLION/UL (ref 3.81–5.12)
SODIUM SERPL-SCNC: 140 MMOL/L (ref 136–145)
TRIGL SERPL-MCNC: 166 MG/DL
TSH SERPL DL<=0.05 MIU/L-ACNC: 2.19 UIU/ML (ref 0.36–3.74)
WBC # BLD AUTO: 6.26 THOUSAND/UL (ref 4.31–10.16)

## 2019-01-14 PROCEDURE — 80053 COMPREHEN METABOLIC PANEL: CPT

## 2019-01-14 PROCEDURE — 36415 COLL VENOUS BLD VENIPUNCTURE: CPT

## 2019-01-14 PROCEDURE — 85025 COMPLETE CBC W/AUTO DIFF WBC: CPT

## 2019-01-14 PROCEDURE — 83036 HEMOGLOBIN GLYCOSYLATED A1C: CPT

## 2019-01-14 PROCEDURE — 84443 ASSAY THYROID STIM HORMONE: CPT

## 2019-01-14 PROCEDURE — 80061 LIPID PANEL: CPT

## 2019-01-25 ENCOUNTER — OFFICE VISIT (OUTPATIENT)
Dept: FAMILY MEDICINE CLINIC | Facility: CLINIC | Age: 74
End: 2019-01-25
Payer: MEDICARE

## 2019-01-25 VITALS
DIASTOLIC BLOOD PRESSURE: 60 MMHG | TEMPERATURE: 96.8 F | WEIGHT: 260 LBS | OXYGEN SATURATION: 98 % | HEART RATE: 77 BPM | HEIGHT: 64 IN | SYSTOLIC BLOOD PRESSURE: 110 MMHG | BODY MASS INDEX: 44.39 KG/M2

## 2019-01-25 DIAGNOSIS — Z79.4 LONG TERM CURRENT USE OF INSULIN (HCC): ICD-10-CM

## 2019-01-25 DIAGNOSIS — K30 INDIGESTION: ICD-10-CM

## 2019-01-25 DIAGNOSIS — G47.33 OBSTRUCTIVE SLEEP APNEA SYNDROME: ICD-10-CM

## 2019-01-25 DIAGNOSIS — E55.9 VITAMIN D DEFICIENCY: ICD-10-CM

## 2019-01-25 DIAGNOSIS — I10 ESSENTIAL HYPERTENSION: ICD-10-CM

## 2019-01-25 DIAGNOSIS — N18.30 TYPE 2 DIABETES MELLITUS WITH STAGE 3 CHRONIC KIDNEY DISEASE, WITH LONG-TERM CURRENT USE OF INSULIN (HCC): Primary | ICD-10-CM

## 2019-01-25 DIAGNOSIS — E11.22 TYPE 2 DIABETES MELLITUS WITH STAGE 3 CHRONIC KIDNEY DISEASE, WITH LONG-TERM CURRENT USE OF INSULIN (HCC): Primary | ICD-10-CM

## 2019-01-25 DIAGNOSIS — N18.30 CHRONIC RENAL INSUFFICIENCY, STAGE III (MODERATE) (HCC): ICD-10-CM

## 2019-01-25 DIAGNOSIS — Z79.4 TYPE 2 DIABETES MELLITUS WITH STAGE 3 CHRONIC KIDNEY DISEASE, WITH LONG-TERM CURRENT USE OF INSULIN (HCC): Primary | ICD-10-CM

## 2019-01-25 DIAGNOSIS — E11.8 TYPE 2 DIABETES MELLITUS WITH COMPLICATION, WITH LONG-TERM CURRENT USE OF INSULIN (HCC): ICD-10-CM

## 2019-01-25 DIAGNOSIS — I73.9 PERIPHERAL VASCULAR DISEASE (HCC): ICD-10-CM

## 2019-01-25 DIAGNOSIS — E78.2 MIXED HYPERLIPIDEMIA: ICD-10-CM

## 2019-01-25 DIAGNOSIS — Z79.4 TYPE 2 DIABETES MELLITUS WITH COMPLICATION, WITH LONG-TERM CURRENT USE OF INSULIN (HCC): ICD-10-CM

## 2019-01-25 PROCEDURE — 99214 OFFICE O/P EST MOD 30 MIN: CPT | Performed by: FAMILY MEDICINE

## 2019-01-25 RX ORDER — INSULIN DEGLUDEC INJECTION 100 U/ML
86 INJECTION, SOLUTION SUBCUTANEOUS
Qty: 5 PEN | Refills: 3 | Status: SHIPPED | OUTPATIENT
Start: 2019-01-25 | End: 2019-06-28 | Stop reason: DRUGHIGH

## 2019-01-25 NOTE — PROGRESS NOTES
Subjective:   Chief Complaint   Patient presents with    Follow-up     chronic conditions        Patient ID: Makayla Jensen is a 75 y o  female  Patient office follow up with a chronic condition  Patient's history of insulin-dependent diabetic she is onTresiba 84 units a day asymptomatic no increased thirsty no increased frequency urination no dizziness no light headache blood sugar number is uncontrolled patient is not strict with her low carb diet and she is on Ace inhibitor and she is on statin patient does followed by Ophthalmology and podiatric  The patient's history of hyperlipidemia on pravastatin tolerated well no rash no muscle pain deny any chest pain short of breath no palpitation no headache no blurred vision no weakness or lateralized of the symptom patient's history of indigestion on pantoprazole 40 mg once a day tolerated well no heartburn no abdomen pain nausea vomiting or diarrhea patient's history of chronic kidney disease stage 3 and no abdomen pain no flank pain no edema no blood in the urine does followed by Nephrology  Patient's history of sleep apnea on CPAP machine and she did not have a sleep study for a long time patient recently she does feel tired during the day and she does not feel she get enough sleep an despite using her CPAP machine she wondered if there is a problem with the mask patient was history of peripheral vascular disease most probably secondary to her multiple risk including high cholesterol and diabetic patient asymptomatic no claudication  Recent blood work discussed with the patient        The following portions of the patient's history were reviewed and updated as appropriate: allergies, current medications, past family history, past medical history, past social history, past surgical history and problem list     Review of Systems   Constitutional: Negative for fatigue and fever  HENT: Negative for ear pain, sinus pain, sinus pressure and sore throat      Eyes: Negative for pain and redness  Respiratory: Negative for cough, chest tightness and shortness of breath  Cardiovascular: Negative for chest pain, palpitations and leg swelling  Gastrointestinal: Negative for abdominal pain, blood in stool, constipation, diarrhea and nausea  Genitourinary: Negative for flank pain, frequency and hematuria  Musculoskeletal: Negative for back pain and joint swelling  Skin: Negative for rash  Neurological: Negative for dizziness, numbness and headaches  Hematological: Does not bruise/bleed easily  Objective:  Vitals:    01/25/19 1343   BP: 110/60   Pulse: 77   Temp: (!) 96 8 °F (36 °C)   TempSrc: Oral   SpO2: 98%   Weight: 118 kg (260 lb)   Height: 5' 4" (1 626 m)      Physical Exam   Constitutional: She is oriented to person, place, and time  She appears well-developed and well-nourished  HENT:   Head: Normocephalic  Right Ear: External ear normal    Left Ear: External ear normal    Eyes: Conjunctivae and EOM are normal  Right eye exhibits no discharge  Left eye exhibits no discharge  Neck: No JVD present  Cardiovascular: Normal rate, regular rhythm and normal heart sounds  Exam reveals no gallop  No murmur heard  Pulmonary/Chest: Effort normal  No respiratory distress  She has no wheezes  She has no rales  She exhibits no tenderness  Abdominal: She exhibits no mass  There is no tenderness  There is no rebound  Musculoskeletal: She exhibits no edema or tenderness  Neurological: She is alert and oriented to person, place, and time  Skin: No rash noted  No erythema  Diabetic Foot Exam  Assessment/Plan:    Obstructive sleep apnea syndrome  Chronic on CPAP machine symptomatic with fatigue plan to refer the patient to have a sleep study did have one for many years  And discussed with the patient important lose weight and sleep position discussed with the patient    Hyperlipidemia  Chronic ,fair control on current medication    Simvastatin 20 mg once a day  Advised to maintain a low-fat low-cholesterol diet consult regarding potential comorbidity including cardiovascular disease consult regarding important weight loss      Indigestion  Chronic ,well controlled by pantoprazole 40 mg once a day  Discuss with pt important lose weight   Multiple small meal ,avoid eat and lying down ,avoid spicy food and avoid provoked food        Chronic renal insufficiency, stage III (moderate)  Chronic asymptomatic no edema  Avoid non steroid  anti-inflammatory drugs  Keep will hydration  Avoid contrast dye    Peripheral vascular disease (HCC)  Chronic asymptomatic patient already on statin and she is on aspirin we discussed with the patient important control with the risk factor including a blood pressure hyperlipidemia and controlled her diabetic also discussed with the patient important lose weight    Type 2 diabetes mellitus with kidney complication, with long-term current use of insulin ([facility])  Lab Results   Component Value Date    HGBA1C 9 1 (H) 01/14/2019     Chronic and uncontrolled asymptomatic will increase Tresiba to 86 U/day the patient already on a statin and she is on Ace inhibitor I discussed with the patient proper titrate her insulin every 3 days up to unit the recommend to the patient to continue log her sugar number also we discussed with her low carb diet important lose weight patient and does followed by podiatric and by Ophthalmology for diabetic foot and eye care         Diagnoses and all orders for this visit:    Type 2 diabetes mellitus with stage 3 chronic kidney disease, with long-term current use of insulin ([facility] [address] )  -     CBC and differential; Future  -     Comprehensive metabolic panel; Future  -     Microalbumin / creatinine urine ratio  -     Hemoglobin A1C; Future  -     TSH, 3rd generation with Free T4 reflex; Future  -     Cancel: DXA bone density spine hip and pelvis;  Future    Obstructive sleep apnea syndrome  -     CBC and differential; Future  -     Comprehensive metabolic panel; Future  -     Microalbumin / creatinine urine ratio  -     Hemoglobin A1C; Future  -     TSH, 3rd generation with Free T4 reflex; Future    Essential hypertension  -     CBC and differential; Future  -     Comprehensive metabolic panel; Future  -     Microalbumin / creatinine urine ratio  -     Hemoglobin A1C; Future  -     TSH, 3rd generation with Free T4 reflex; Future    Peripheral vascular disease (HCC)  -     CBC and differential; Future  -     Comprehensive metabolic panel; Future  -     Microalbumin / creatinine urine ratio  -     Hemoglobin A1C; Future  -     TSH, 3rd generation with Free T4 reflex; Future    Long term current use of insulin ([facility] [address] )  -     Cancel: DXA bone density spine hip and pelvis; Future    Mixed hyperlipidemia  -     CBC and differential; Future  -     Comprehensive metabolic panel; Future  -     Microalbumin / creatinine urine ratio  -     Hemoglobin A1C; Future  -     TSH, 3rd generation with Free T4 reflex; Future    Vitamin D deficiency  -     CBC and differential; Future  -     Comprehensive metabolic panel; Future  -     Microalbumin / creatinine urine ratio  -     Hemoglobin A1C; Future  -     TSH, 3rd generation with Free T4 reflex; Future  -     Cancel: DXA bone density spine hip and pelvis; Future    Type 2 diabetes mellitus with complication, with long-term current use of insulin (HCC)  -     TRESIBA FLEXTOUCH 100 units/mL injection pen; Inject 86 Units under the skin daily at bedtime  -     CBC and differential; Future  -     Comprehensive metabolic panel; Future  -     Microalbumin / creatinine urine ratio  -     Hemoglobin A1C; Future  -     TSH, 3rd generation with Free T4 reflex; Future  -     Cancel: DXA bone density spine hip and pelvis; Future    Indigestion    Chronic renal insufficiency, stage III (moderate) ([facility])    Other orders  -     Cancel: Ambulatory referral to Ophthalmology;  Future

## 2019-01-27 NOTE — ASSESSMENT & PLAN NOTE
Chronic asymptomatic no edema  Avoid non steroid  anti-inflammatory drugs  Keep will hydration  Avoid contrast dye

## 2019-01-27 NOTE — ASSESSMENT & PLAN NOTE
Chronic on CPAP machine symptomatic with fatigue plan to refer the patient to have a sleep study did have one for many years  And discussed with the patient important lose weight and sleep position discussed with the patient

## 2019-01-27 NOTE — ASSESSMENT & PLAN NOTE
Chronic ,well controlled by pantoprazole 40 mg once a day  Discuss with pt important lose weight   Multiple small meal ,avoid eat and lying down ,avoid spicy food and avoid provoked food

## 2019-01-28 NOTE — ASSESSMENT & PLAN NOTE
Lab Results   Component Value Date    HGBA1C 9 1 (H) 01/14/2019     Chronic and uncontrolled asymptomatic will increase Tresiba to 86 U/day the patient already on a statin and she is on Ace inhibitor I discussed with the patient proper titrate her insulin every 3 days up to unit the recommend to the patient to continue log her sugar number also we discussed with her low carb diet important lose weight patient and does followed by podiatric and by Ophthalmology for diabetic foot and eye care

## 2019-01-28 NOTE — ASSESSMENT & PLAN NOTE
Chronic asymptomatic patient already on statin and she is on aspirin we discussed with the patient important control with the risk factor including a blood pressure hyperlipidemia and controlled her diabetic also discussed with the patient important lose weight

## 2019-02-18 ENCOUNTER — TELEPHONE (OUTPATIENT)
Dept: FAMILY MEDICINE CLINIC | Facility: CLINIC | Age: 74
End: 2019-02-18

## 2019-02-25 DIAGNOSIS — M54.40 CHRONIC LOW BACK PAIN WITH SCIATICA, SCIATICA LATERALITY UNSPECIFIED, UNSPECIFIED BACK PAIN LATERALITY: ICD-10-CM

## 2019-02-25 DIAGNOSIS — G89.29 CHRONIC LOW BACK PAIN WITH SCIATICA, SCIATICA LATERALITY UNSPECIFIED, UNSPECIFIED BACK PAIN LATERALITY: ICD-10-CM

## 2019-02-25 NOTE — TELEPHONE ENCOUNTER
Pt says you refilled it for her the last time because she used to get it from her sleep doctor but they are no longer at [place] and her appointment with her new Dr  Is not until next month

## 2019-03-04 DIAGNOSIS — Z79.4 TYPE 2 DIABETES MELLITUS WITHOUT COMPLICATION, WITH LONG-TERM CURRENT USE OF INSULIN (HCC): ICD-10-CM

## 2019-03-04 DIAGNOSIS — E11.9 TYPE 2 DIABETES MELLITUS WITHOUT COMPLICATION, WITH LONG-TERM CURRENT USE OF INSULIN (HCC): ICD-10-CM

## 2019-03-04 RX ORDER — PEN NEEDLE, DIABETIC 32GX 5/32"
NEEDLE, DISPOSABLE MISCELLANEOUS
Qty: 270 EACH | Refills: 1 | Status: SHIPPED | OUTPATIENT
Start: 2019-03-04 | End: 2020-01-24

## 2019-03-12 DIAGNOSIS — G89.29 CHRONIC LOW BACK PAIN WITH SCIATICA, SCIATICA LATERALITY UNSPECIFIED, UNSPECIFIED BACK PAIN LATERALITY: ICD-10-CM

## 2019-03-12 DIAGNOSIS — M54.40 CHRONIC LOW BACK PAIN WITH SCIATICA, SCIATICA LATERALITY UNSPECIFIED, UNSPECIFIED BACK PAIN LATERALITY: ICD-10-CM

## 2019-03-12 RX ORDER — PREGABALIN 75 MG/1
CAPSULE ORAL
Qty: 10 CAPSULE | Refills: 0 | Status: SHIPPED | OUTPATIENT
Start: 2019-03-12 | End: 2019-06-10 | Stop reason: SDUPTHER

## 2019-03-12 NOTE — TELEPHONE ENCOUNTER
Requesting 10 day supply flora her lyrica be called into [facility] until mail order arrives [facility] phone [phone]

## 2019-03-26 ENCOUNTER — TRANSCRIBE ORDERS (OUTPATIENT)
Dept: ADMINISTRATIVE | Facility: HOSPITAL | Age: 74
End: 2019-03-26

## 2019-03-26 ENCOUNTER — APPOINTMENT (OUTPATIENT)
Dept: LAB | Facility: IMAGING CENTER | Age: 74
End: 2019-03-26
Payer: MEDICARE

## 2019-03-26 DIAGNOSIS — I10 ESSENTIAL HYPERTENSION: ICD-10-CM

## 2019-03-26 DIAGNOSIS — I12.9 PARENCHYMAL RENAL HYPERTENSION, STAGE 1 THROUGH STAGE 4 OR UNSPECIFIED CHRONIC KIDNEY DISEASE: ICD-10-CM

## 2019-03-26 DIAGNOSIS — N18.30 TYPE 2 DIABETES MELLITUS WITH STAGE 3 CHRONIC KIDNEY DISEASE, WITH LONG-TERM CURRENT USE OF INSULIN (HCC): ICD-10-CM

## 2019-03-26 DIAGNOSIS — N18.30 CHRONIC KIDNEY DISEASE, STAGE III (MODERATE) (HCC): ICD-10-CM

## 2019-03-26 DIAGNOSIS — Z79.4 TYPE 2 DIABETES MELLITUS WITH COMPLICATION, WITH LONG-TERM CURRENT USE OF INSULIN (HCC): ICD-10-CM

## 2019-03-26 DIAGNOSIS — E11.21 DIABETIC GLOMERULOPATHY (HCC): ICD-10-CM

## 2019-03-26 DIAGNOSIS — D50.9 IRON DEFICIENCY ANEMIA, UNSPECIFIED IRON DEFICIENCY ANEMIA TYPE: ICD-10-CM

## 2019-03-26 DIAGNOSIS — G47.33 OBSTRUCTIVE SLEEP APNEA (ADULT) (PEDIATRIC): ICD-10-CM

## 2019-03-26 DIAGNOSIS — E83.42 HYPOMAGNESEMIA: ICD-10-CM

## 2019-03-26 DIAGNOSIS — E78.2 MIXED HYPERLIPIDEMIA: ICD-10-CM

## 2019-03-26 DIAGNOSIS — E11.8 TYPE 2 DIABETES MELLITUS WITH COMPLICATION, WITH LONG-TERM CURRENT USE OF INSULIN (HCC): ICD-10-CM

## 2019-03-26 DIAGNOSIS — D64.9 ANEMIA, UNSPECIFIED TYPE: ICD-10-CM

## 2019-03-26 DIAGNOSIS — E55.9 VITAMIN D DEFICIENCY, UNSPECIFIED: ICD-10-CM

## 2019-03-26 DIAGNOSIS — R80.9 PROTEINURIA, UNSPECIFIED TYPE: ICD-10-CM

## 2019-03-26 DIAGNOSIS — E55.9 VITAMIN D DEFICIENCY: ICD-10-CM

## 2019-03-26 DIAGNOSIS — N18.30 CHRONIC KIDNEY DISEASE, STAGE III (MODERATE) (HCC): Primary | ICD-10-CM

## 2019-03-26 DIAGNOSIS — Z79.4 TYPE 2 DIABETES MELLITUS WITH STAGE 3 CHRONIC KIDNEY DISEASE, WITH LONG-TERM CURRENT USE OF INSULIN (HCC): ICD-10-CM

## 2019-03-26 DIAGNOSIS — I73.9 PERIPHERAL VASCULAR DISEASE (HCC): ICD-10-CM

## 2019-03-26 DIAGNOSIS — G47.33 OBSTRUCTIVE SLEEP APNEA SYNDROME: ICD-10-CM

## 2019-03-26 DIAGNOSIS — E11.22 TYPE 2 DIABETES MELLITUS WITH STAGE 3 CHRONIC KIDNEY DISEASE, WITH LONG-TERM CURRENT USE OF INSULIN (HCC): ICD-10-CM

## 2019-03-26 LAB
25(OH)D3 SERPL-MCNC: 44.7 NG/ML (ref 30–100)
ALBUMIN SERPL BCP-MCNC: 3.6 G/DL (ref 3.5–5)
ALP SERPL-CCNC: 104 U/L (ref 46–116)
ALT SERPL W P-5'-P-CCNC: 30 U/L (ref 12–78)
ANION GAP SERPL CALCULATED.3IONS-SCNC: 4 MMOL/L (ref 4–13)
AST SERPL W P-5'-P-CCNC: 33 U/L (ref 5–45)
BACTERIA UR QL AUTO: ABNORMAL /HPF
BASOPHILS # BLD AUTO: 0.02 THOUSANDS/ΜL (ref 0–0.1)
BASOPHILS NFR BLD AUTO: 0 % (ref 0–1)
BILIRUB SERPL-MCNC: 0.44 MG/DL (ref 0.2–1)
BILIRUB UR QL STRIP: NEGATIVE
BUN SERPL-MCNC: 20 MG/DL (ref 5–25)
CALCIUM SERPL-MCNC: 9.1 MG/DL (ref 8.3–10.1)
CHLORIDE SERPL-SCNC: 108 MMOL/L (ref 100–108)
CLARITY UR: ABNORMAL
CO2 SERPL-SCNC: 27 MMOL/L (ref 21–32)
COLOR UR: YELLOW
CREAT SERPL-MCNC: 1.37 MG/DL (ref 0.6–1.3)
CREAT UR-MCNC: 93.4 MG/DL
CREAT UR-MCNC: 95.6 MG/DL
EOSINOPHIL # BLD AUTO: 0.09 THOUSAND/ΜL (ref 0–0.61)
EOSINOPHIL NFR BLD AUTO: 1 % (ref 0–6)
ERYTHROCYTE [DISTWIDTH] IN BLOOD BY AUTOMATED COUNT: 13.2 % (ref 11.6–15.1)
EST. AVERAGE GLUCOSE BLD GHB EST-MCNC: 186 MG/DL
GFR SERPL CREATININE-BSD FRML MDRD: 38 ML/MIN/1.73SQ M
GLUCOSE P FAST SERPL-MCNC: 77 MG/DL (ref 65–99)
GLUCOSE UR STRIP-MCNC: NEGATIVE MG/DL
HBA1C MFR BLD: 8.1 % (ref 4.2–6.3)
HCT VFR BLD AUTO: 40.2 % (ref 34.8–46.1)
HGB BLD-MCNC: 12.9 G/DL (ref 11.5–15.4)
HGB UR QL STRIP.AUTO: NEGATIVE
HYALINE CASTS #/AREA URNS LPF: ABNORMAL /LPF
IMM GRANULOCYTES # BLD AUTO: 0.02 THOUSAND/UL (ref 0–0.2)
IMM GRANULOCYTES NFR BLD AUTO: 0 % (ref 0–2)
KETONES UR STRIP-MCNC: NEGATIVE MG/DL
LEUKOCYTE ESTERASE UR QL STRIP: ABNORMAL
LYMPHOCYTES # BLD AUTO: 1.06 THOUSANDS/ΜL (ref 0.6–4.47)
LYMPHOCYTES NFR BLD AUTO: 16 % (ref 14–44)
MAGNESIUM SERPL-MCNC: 1.6 MG/DL (ref 1.6–2.6)
MCH RBC QN AUTO: 29.7 PG (ref 26.8–34.3)
MCHC RBC AUTO-ENTMCNC: 32.1 G/DL (ref 31.4–37.4)
MCV RBC AUTO: 93 FL (ref 82–98)
MICROALBUMIN UR-MCNC: 32.1 MG/L (ref 0–20)
MICROALBUMIN/CREAT 24H UR: 34 MG/G CREATININE (ref 0–30)
MONOCYTES # BLD AUTO: 0.37 THOUSAND/ΜL (ref 0.17–1.22)
MONOCYTES NFR BLD AUTO: 6 % (ref 4–12)
NEUTROPHILS # BLD AUTO: 5.01 THOUSANDS/ΜL (ref 1.85–7.62)
NEUTS SEG NFR BLD AUTO: 77 % (ref 43–75)
NITRITE UR QL STRIP: POSITIVE
NON-SQ EPI CELLS URNS QL MICRO: ABNORMAL /HPF
NRBC BLD AUTO-RTO: 0 /100 WBCS
PH UR STRIP.AUTO: 6 [PH]
PHOSPHATE SERPL-MCNC: 2.9 MG/DL (ref 2.3–4.1)
PLATELET # BLD AUTO: 152 THOUSANDS/UL (ref 149–390)
PMV BLD AUTO: 12.7 FL (ref 8.9–12.7)
POTASSIUM SERPL-SCNC: 3.7 MMOL/L (ref 3.5–5.3)
PROT SERPL-MCNC: 6.9 G/DL (ref 6.4–8.2)
PROT UR STRIP-MCNC: NEGATIVE MG/DL
PROT UR-MCNC: 13 MG/DL
PROT/CREAT UR: 0.14 MG/G{CREAT} (ref 0–0.1)
PTH-INTACT SERPL-MCNC: 48.4 PG/ML (ref 18.4–80.1)
RBC # BLD AUTO: 4.34 MILLION/UL (ref 3.81–5.12)
RBC #/AREA URNS AUTO: ABNORMAL /HPF
SODIUM SERPL-SCNC: 139 MMOL/L (ref 136–145)
SP GR UR STRIP.AUTO: 1.02 (ref 1–1.03)
TSH SERPL DL<=0.05 MIU/L-ACNC: 1.5 UIU/ML (ref 0.36–3.74)
URATE SERPL-MCNC: 5.8 MG/DL (ref 2–6.8)
UROBILINOGEN UR QL STRIP.AUTO: 0.2 E.U./DL
WBC # BLD AUTO: 6.57 THOUSAND/UL (ref 4.31–10.16)
WBC #/AREA URNS AUTO: ABNORMAL /HPF

## 2019-03-26 PROCEDURE — 84443 ASSAY THYROID STIM HORMONE: CPT

## 2019-03-26 PROCEDURE — 85025 COMPLETE CBC W/AUTO DIFF WBC: CPT

## 2019-03-26 PROCEDURE — 82570 ASSAY OF URINE CREATININE: CPT | Performed by: INTERNAL MEDICINE

## 2019-03-26 PROCEDURE — 83036 HEMOGLOBIN GLYCOSYLATED A1C: CPT

## 2019-03-26 PROCEDURE — 82570 ASSAY OF URINE CREATININE: CPT | Performed by: FAMILY MEDICINE

## 2019-03-26 PROCEDURE — 36415 COLL VENOUS BLD VENIPUNCTURE: CPT

## 2019-03-26 PROCEDURE — 84550 ASSAY OF BLOOD/URIC ACID: CPT

## 2019-03-26 PROCEDURE — 82306 VITAMIN D 25 HYDROXY: CPT

## 2019-03-26 PROCEDURE — 84100 ASSAY OF PHOSPHORUS: CPT

## 2019-03-26 PROCEDURE — 81001 URINALYSIS AUTO W/SCOPE: CPT | Performed by: INTERNAL MEDICINE

## 2019-03-26 PROCEDURE — 84156 ASSAY OF PROTEIN URINE: CPT | Performed by: INTERNAL MEDICINE

## 2019-03-26 PROCEDURE — 82043 UR ALBUMIN QUANTITATIVE: CPT | Performed by: FAMILY MEDICINE

## 2019-03-26 PROCEDURE — 80053 COMPREHEN METABOLIC PANEL: CPT

## 2019-03-26 PROCEDURE — 83735 ASSAY OF MAGNESIUM: CPT

## 2019-03-26 PROCEDURE — 83970 ASSAY OF PARATHORMONE: CPT

## 2019-03-31 DIAGNOSIS — E78.1 HYPERTRIGLYCERIDEMIA, ESSENTIAL: ICD-10-CM

## 2019-03-31 DIAGNOSIS — G43.009 MIGRAINE WITHOUT AURA AND WITHOUT STATUS MIGRAINOSUS, NOT INTRACTABLE: ICD-10-CM

## 2019-04-01 ENCOUNTER — TELEPHONE (OUTPATIENT)
Dept: FAMILY MEDICINE CLINIC | Facility: CLINIC | Age: 74
End: 2019-04-01

## 2019-04-01 RX ORDER — FENOFIBRATE 160 MG/1
TABLET ORAL
Qty: 90 TABLET | Refills: 0 | Status: SHIPPED | OUTPATIENT
Start: 2019-04-01 | End: 2019-06-04 | Stop reason: SDUPTHER

## 2019-04-01 RX ORDER — FAMOTIDINE 20 MG/1
TABLET, FILM COATED ORAL
Qty: 90 TABLET | OUTPATIENT
Start: 2019-04-01

## 2019-04-01 RX ORDER — TOPIRAMATE 25 MG/1
TABLET ORAL
Qty: 90 TABLET | Refills: 0 | Status: SHIPPED | OUTPATIENT
Start: 2019-04-01 | End: 2019-06-04 | Stop reason: SDUPTHER

## 2019-04-11 DIAGNOSIS — N30.20 CHRONIC CYSTITIS: Primary | ICD-10-CM

## 2019-04-11 DIAGNOSIS — N39.41 URGE INCONTINENCE: ICD-10-CM

## 2019-04-11 RX ORDER — OXYBUTYNIN CHLORIDE 5 MG/1
5 TABLET, EXTENDED RELEASE ORAL DAILY
Qty: 90 TABLET | Refills: 0 | Status: SHIPPED | OUTPATIENT
Start: 2019-04-11 | End: 2019-06-11 | Stop reason: SDUPTHER

## 2019-04-18 ENCOUNTER — OFFICE VISIT (OUTPATIENT)
Dept: SLEEP CENTER | Facility: CLINIC | Age: 74
End: 2019-04-18
Payer: MEDICARE

## 2019-04-18 VITALS
DIASTOLIC BLOOD PRESSURE: 60 MMHG | WEIGHT: 259 LBS | HEIGHT: 64 IN | BODY MASS INDEX: 44.22 KG/M2 | HEART RATE: 76 BPM | SYSTOLIC BLOOD PRESSURE: 104 MMHG

## 2019-04-18 DIAGNOSIS — G25.81 RESTLESS LEG SYNDROME: ICD-10-CM

## 2019-04-18 DIAGNOSIS — E11.22 TYPE 2 DIABETES MELLITUS WITH STAGE 3 CHRONIC KIDNEY DISEASE, WITH LONG-TERM CURRENT USE OF INSULIN (HCC): ICD-10-CM

## 2019-04-18 DIAGNOSIS — N18.30 CHRONIC RENAL INSUFFICIENCY, STAGE III (MODERATE) (HCC): ICD-10-CM

## 2019-04-18 DIAGNOSIS — N18.30 TYPE 2 DIABETES MELLITUS WITH STAGE 3 CHRONIC KIDNEY DISEASE, WITH LONG-TERM CURRENT USE OF INSULIN (HCC): ICD-10-CM

## 2019-04-18 DIAGNOSIS — G47.33 OBSTRUCTIVE SLEEP APNEA SYNDROME: Primary | ICD-10-CM

## 2019-04-18 DIAGNOSIS — Z79.4 TYPE 2 DIABETES MELLITUS WITH STAGE 3 CHRONIC KIDNEY DISEASE, WITH LONG-TERM CURRENT USE OF INSULIN (HCC): ICD-10-CM

## 2019-04-18 PROCEDURE — 99205 OFFICE O/P NEW HI 60 MIN: CPT | Performed by: PSYCHIATRY & NEUROLOGY

## 2019-04-18 RX ORDER — B-COMPLEX WITH VITAMIN C
250 TABLET ORAL DAILY
COMMUNITY
End: 2022-07-11 | Stop reason: ALTCHOICE

## 2019-04-18 RX ORDER — SODIUM FLUORIDE 0.1 MG/ML
RINSE ORAL 2 TIMES DAILY
COMMUNITY
End: 2021-08-11

## 2019-04-26 ENCOUNTER — OFFICE VISIT (OUTPATIENT)
Dept: FAMILY MEDICINE CLINIC | Facility: CLINIC | Age: 74
End: 2019-04-26
Payer: MEDICARE

## 2019-04-26 VITALS
HEIGHT: 64 IN | BODY MASS INDEX: 44.05 KG/M2 | OXYGEN SATURATION: 96 % | TEMPERATURE: 97.4 F | DIASTOLIC BLOOD PRESSURE: 78 MMHG | WEIGHT: 258 LBS | SYSTOLIC BLOOD PRESSURE: 124 MMHG | HEART RATE: 70 BPM | RESPIRATION RATE: 16 BRPM

## 2019-04-26 DIAGNOSIS — I10 ESSENTIAL HYPERTENSION: ICD-10-CM

## 2019-04-26 DIAGNOSIS — M25.511 CHRONIC RIGHT SHOULDER PAIN: ICD-10-CM

## 2019-04-26 DIAGNOSIS — E66.01 MORBID OBESITY WITH BMI OF 40.0-44.9, ADULT (HCC): ICD-10-CM

## 2019-04-26 DIAGNOSIS — I65.23 BILATERAL CAROTID ARTERY STENOSIS: ICD-10-CM

## 2019-04-26 DIAGNOSIS — E78.2 MIXED HYPERLIPIDEMIA: ICD-10-CM

## 2019-04-26 DIAGNOSIS — E11.22 TYPE 2 DIABETES MELLITUS WITH STAGE 3 CHRONIC KIDNEY DISEASE, WITH LONG-TERM CURRENT USE OF INSULIN (HCC): Primary | ICD-10-CM

## 2019-04-26 DIAGNOSIS — B35.4 TINEA CORPORIS: ICD-10-CM

## 2019-04-26 DIAGNOSIS — Z79.4 TYPE 2 DIABETES MELLITUS WITH STAGE 3 CHRONIC KIDNEY DISEASE, WITH LONG-TERM CURRENT USE OF INSULIN (HCC): Primary | ICD-10-CM

## 2019-04-26 DIAGNOSIS — Z79.4 LONG TERM CURRENT USE OF INSULIN (HCC): ICD-10-CM

## 2019-04-26 DIAGNOSIS — G89.29 CHRONIC RIGHT SHOULDER PAIN: ICD-10-CM

## 2019-04-26 DIAGNOSIS — N18.30 CHRONIC RENAL INSUFFICIENCY, STAGE III (MODERATE) (HCC): ICD-10-CM

## 2019-04-26 DIAGNOSIS — N18.30 TYPE 2 DIABETES MELLITUS WITH STAGE 3 CHRONIC KIDNEY DISEASE, WITH LONG-TERM CURRENT USE OF INSULIN (HCC): Primary | ICD-10-CM

## 2019-04-26 PROCEDURE — 20610 DRAIN/INJ JOINT/BURSA W/O US: CPT | Performed by: FAMILY MEDICINE

## 2019-04-26 PROCEDURE — 99214 OFFICE O/P EST MOD 30 MIN: CPT | Performed by: FAMILY MEDICINE

## 2019-04-26 RX ORDER — CLOTRIMAZOLE AND BETAMETHASONE DIPROPIONATE 10; .64 MG/G; MG/G
CREAM TOPICAL 2 TIMES DAILY
Qty: 45 G | Refills: 2 | Status: SHIPPED | OUTPATIENT
Start: 2019-04-26 | End: 2019-05-11 | Stop reason: SDUPTHER

## 2019-04-26 RX ADMIN — TRIAMCINOLONE ACETONIDE 40 MG: 40 INJECTION, SUSPENSION INTRA-ARTICULAR; INTRAMUSCULAR at 15:16

## 2019-04-28 PROBLEM — G89.29 CHRONIC RIGHT SHOULDER PAIN: Status: ACTIVE | Noted: 2019-04-28

## 2019-04-28 PROBLEM — M25.511 CHRONIC RIGHT SHOULDER PAIN: Status: ACTIVE | Noted: 2019-04-28

## 2019-04-28 PROBLEM — E66.01 MORBID OBESITY WITH BMI OF 40.0-44.9, ADULT (HCC): Status: ACTIVE | Noted: 2019-04-28

## 2019-04-28 RX ORDER — TRIAMCINOLONE ACETONIDE 40 MG/ML
40 INJECTION, SUSPENSION INTRA-ARTICULAR; INTRAMUSCULAR
Status: COMPLETED | OUTPATIENT
Start: 2019-04-26 | End: 2019-04-26

## 2019-05-11 DIAGNOSIS — B35.4 TINEA CORPORIS: ICD-10-CM

## 2019-05-11 DIAGNOSIS — K30 INDIGESTION: ICD-10-CM

## 2019-05-11 DIAGNOSIS — I10 ESSENTIAL HYPERTENSION: Primary | ICD-10-CM

## 2019-05-11 DIAGNOSIS — K58.8 OTHER IRRITABLE BOWEL SYNDROME: ICD-10-CM

## 2019-05-12 RX ORDER — PANTOPRAZOLE SODIUM 40 MG/1
TABLET, DELAYED RELEASE ORAL
Qty: 90 TABLET | Refills: 0 | Status: SHIPPED | OUTPATIENT
Start: 2019-05-12 | End: 2019-06-04 | Stop reason: SDUPTHER

## 2019-05-12 RX ORDER — CLOTRIMAZOLE AND BETAMETHASONE DIPROPIONATE 10; .64 MG/G; MG/G
CREAM TOPICAL
Qty: 90 G | Refills: 0 | Status: SHIPPED | OUTPATIENT
Start: 2019-05-12 | End: 2019-06-28 | Stop reason: SDUPTHER

## 2019-05-12 RX ORDER — FAMOTIDINE 20 MG/1
TABLET, FILM COATED ORAL
Qty: 90 TABLET | Refills: 0 | Status: SHIPPED | OUTPATIENT
Start: 2019-05-12 | End: 2019-06-11 | Stop reason: ALTCHOICE

## 2019-05-12 RX ORDER — AMLODIPINE BESYLATE 5 MG/1
TABLET ORAL
Qty: 90 TABLET | Refills: 0 | Status: SHIPPED | OUTPATIENT
Start: 2019-05-12 | End: 2019-06-11 | Stop reason: ALTCHOICE

## 2019-05-12 RX ORDER — DICYCLOMINE HCL 20 MG
TABLET ORAL
Qty: 180 TABLET | Refills: 0 | Status: SHIPPED | OUTPATIENT
Start: 2019-05-12 | End: 2019-09-04 | Stop reason: SDUPTHER

## 2019-05-12 RX ORDER — LISINOPRIL 10 MG/1
TABLET ORAL
Qty: 90 TABLET | Refills: 0 | Status: SHIPPED | OUTPATIENT
Start: 2019-05-12 | End: 2019-06-04 | Stop reason: SDUPTHER

## 2019-06-02 ENCOUNTER — TRANSCRIBE ORDERS (OUTPATIENT)
Dept: ADMINISTRATIVE | Facility: HOSPITAL | Age: 74
End: 2019-06-02

## 2019-06-03 ENCOUNTER — APPOINTMENT (OUTPATIENT)
Dept: LAB | Facility: HOSPITAL | Age: 74
End: 2019-06-03
Payer: MEDICARE

## 2019-06-03 ENCOUNTER — TRANSCRIBE ORDERS (OUTPATIENT)
Dept: ADMINISTRATIVE | Facility: HOSPITAL | Age: 74
End: 2019-06-03

## 2019-06-03 ENCOUNTER — HOSPITAL ENCOUNTER (OUTPATIENT)
Dept: ULTRASOUND IMAGING | Facility: HOSPITAL | Age: 74
Discharge: HOME | End: 2019-06-03
Payer: MEDICARE

## 2019-06-03 DIAGNOSIS — K76.0 FATTY METAMORPHOSIS OF LIVER: ICD-10-CM

## 2019-06-03 DIAGNOSIS — K76.0 FATTY LIVER: ICD-10-CM

## 2019-06-03 DIAGNOSIS — K76.0 FATTY LIVER: Primary | ICD-10-CM

## 2019-06-03 LAB
ALBUMIN SERPL BCP-MCNC: 3.9 G/DL (ref 3–5.2)
ALP SERPL-CCNC: 79 U/L (ref 43–122)
ALT SERPL W P-5'-P-CCNC: 28 U/L (ref 9–52)
ANION GAP SERPL CALCULATED.3IONS-SCNC: 8 MMOL/L (ref 5–14)
AST SERPL W P-5'-P-CCNC: 29 U/L (ref 14–36)
BASOPHILS # BLD AUTO: 0 THOUSANDS/ΜL (ref 0–0.1)
BASOPHILS NFR BLD AUTO: 0 % (ref 0–1)
BILIRUB SERPL-MCNC: 0.4 MG/DL
BUN SERPL-MCNC: 32 MG/DL (ref 5–25)
CALCIUM SERPL-MCNC: 9.2 MG/DL (ref 8.4–10.2)
CHLORIDE SERPL-SCNC: 104 MMOL/L (ref 97–108)
CO2 SERPL-SCNC: 30 MMOL/L (ref 22–30)
CREAT SERPL-MCNC: 1.58 MG/DL (ref 0.6–1.2)
EOSINOPHIL # BLD AUTO: 0.1 THOUSAND/ΜL (ref 0–0.4)
EOSINOPHIL NFR BLD AUTO: 2 % (ref 0–6)
ERYTHROCYTE [DISTWIDTH] IN BLOOD BY AUTOMATED COUNT: 13.9 %
GFR SERPL CREATININE-BSD FRML MDRD: 32 ML/MIN/1.73SQ M
GLUCOSE P FAST SERPL-MCNC: 74 MG/DL (ref 70–99)
HCT VFR BLD AUTO: 36.6 % (ref 36–46)
HGB BLD-MCNC: 12 G/DL (ref 12–16)
LYMPHOCYTES # BLD AUTO: 1.1 THOUSANDS/ΜL (ref 0.5–4)
LYMPHOCYTES NFR BLD AUTO: 17 % (ref 25–45)
MCH RBC QN AUTO: 29.2 PG (ref 26–34)
MCHC RBC AUTO-ENTMCNC: 32.8 G/DL (ref 31–36)
MCV RBC AUTO: 89 FL (ref 80–100)
MONOCYTES # BLD AUTO: 0.4 THOUSAND/ΜL (ref 0.2–0.9)
MONOCYTES NFR BLD AUTO: 6 % (ref 1–10)
NEUTROPHILS # BLD AUTO: 5 THOUSANDS/ΜL (ref 1.8–7.8)
NEUTS SEG NFR BLD AUTO: 74 % (ref 45–65)
PLATELET # BLD AUTO: 166 THOUSANDS/UL (ref 150–450)
PMV BLD AUTO: 10.3 FL (ref 8.9–12.7)
POTASSIUM SERPL-SCNC: 3.8 MMOL/L (ref 3.6–5)
PROT SERPL-MCNC: 6.8 G/DL (ref 5.9–8.4)
RBC # BLD AUTO: 4.11 MILLION/UL (ref 4–5.2)
SODIUM SERPL-SCNC: 142 MMOL/L (ref 137–147)
WBC # BLD AUTO: 6.7 THOUSAND/UL (ref 4.5–11)

## 2019-06-03 PROCEDURE — 80053 COMPREHEN METABOLIC PANEL: CPT

## 2019-06-03 PROCEDURE — 36415 COLL VENOUS BLD VENIPUNCTURE: CPT

## 2019-06-03 PROCEDURE — 76705 ECHO EXAM OF ABDOMEN: CPT

## 2019-06-03 PROCEDURE — 85025 COMPLETE CBC W/AUTO DIFF WBC: CPT

## 2019-06-04 DIAGNOSIS — G43.009 MIGRAINE WITHOUT AURA AND WITHOUT STATUS MIGRAINOSUS, NOT INTRACTABLE: ICD-10-CM

## 2019-06-04 DIAGNOSIS — K30 INDIGESTION: ICD-10-CM

## 2019-06-04 DIAGNOSIS — I10 ESSENTIAL HYPERTENSION: ICD-10-CM

## 2019-06-04 DIAGNOSIS — M54.40 CHRONIC LOW BACK PAIN WITH SCIATICA, SCIATICA LATERALITY UNSPECIFIED, UNSPECIFIED BACK PAIN LATERALITY: ICD-10-CM

## 2019-06-04 DIAGNOSIS — G89.29 CHRONIC LOW BACK PAIN WITH SCIATICA, SCIATICA LATERALITY UNSPECIFIED, UNSPECIFIED BACK PAIN LATERALITY: ICD-10-CM

## 2019-06-04 DIAGNOSIS — E78.1 HYPERTRIGLYCERIDEMIA, ESSENTIAL: ICD-10-CM

## 2019-06-04 RX ORDER — LISINOPRIL 10 MG/1
TABLET ORAL
Qty: 90 TABLET | Refills: 1 | Status: SHIPPED | OUTPATIENT
Start: 2019-06-04 | End: 2020-02-10 | Stop reason: SDUPTHER

## 2019-06-04 RX ORDER — PANTOPRAZOLE SODIUM 40 MG/1
TABLET, DELAYED RELEASE ORAL
Qty: 90 TABLET | Refills: 1 | Status: SHIPPED | OUTPATIENT
Start: 2019-06-04 | End: 2020-05-18

## 2019-06-04 RX ORDER — PREGABALIN 75 MG/1
CAPSULE ORAL
Qty: 270 CAPSULE | Refills: 1 | Status: CANCELLED | OUTPATIENT
Start: 2019-06-04

## 2019-06-04 RX ORDER — FENOFIBRATE 160 MG/1
TABLET ORAL
Qty: 90 TABLET | Refills: 1 | Status: SHIPPED | OUTPATIENT
Start: 2019-06-04 | End: 2020-01-24

## 2019-06-04 RX ORDER — TOPIRAMATE 25 MG/1
TABLET ORAL
Qty: 90 TABLET | Refills: 1 | Status: SHIPPED | OUTPATIENT
Start: 2019-06-04 | End: 2019-12-09 | Stop reason: SDUPTHER

## 2019-06-05 LAB
LEFT EYE DIABETIC RETINOPATHY: NORMAL
RIGHT EYE DIABETIC RETINOPATHY: NORMAL

## 2019-06-10 DIAGNOSIS — G89.29 CHRONIC LOW BACK PAIN WITH SCIATICA, SCIATICA LATERALITY UNSPECIFIED, UNSPECIFIED BACK PAIN LATERALITY: ICD-10-CM

## 2019-06-10 DIAGNOSIS — M54.40 CHRONIC LOW BACK PAIN WITH SCIATICA, SCIATICA LATERALITY UNSPECIFIED, UNSPECIFIED BACK PAIN LATERALITY: ICD-10-CM

## 2019-06-10 RX ORDER — PREGABALIN 75 MG/1
CAPSULE ORAL
Qty: 30 CAPSULE | Refills: 0 | Status: SHIPPED | OUTPATIENT
Start: 2019-06-10 | End: 2019-06-28 | Stop reason: SDUPTHER

## 2019-06-10 RX ORDER — PREGABALIN 75 MG/1
CAPSULE ORAL
Qty: 270 CAPSULE | Refills: 0 | Status: SHIPPED | OUTPATIENT
Start: 2019-06-10 | End: 2019-06-10 | Stop reason: SDUPTHER

## 2019-06-11 ENCOUNTER — OFFICE VISIT (OUTPATIENT)
Dept: UROLOGY | Facility: MEDICAL CENTER | Age: 74
End: 2019-06-11
Payer: MEDICARE

## 2019-06-11 VITALS
DIASTOLIC BLOOD PRESSURE: 70 MMHG | BODY MASS INDEX: 44.39 KG/M2 | SYSTOLIC BLOOD PRESSURE: 120 MMHG | HEIGHT: 64 IN | HEART RATE: 78 BPM | WEIGHT: 260 LBS

## 2019-06-11 DIAGNOSIS — N39.41 URGE INCONTINENCE: ICD-10-CM

## 2019-06-11 DIAGNOSIS — N30.20 CHRONIC CYSTITIS: Primary | ICD-10-CM

## 2019-06-11 LAB
SL AMB  POCT GLUCOSE, UA: ABNORMAL
SL AMB LEUKOCYTE ESTERASE,UA: ABNORMAL
SL AMB POCT BILIRUBIN,UA: ABNORMAL
SL AMB POCT BLOOD,UA: ABNORMAL
SL AMB POCT CLARITY,UA: CLEAR
SL AMB POCT COLOR,UA: YELLOW
SL AMB POCT KETONES,UA: ABNORMAL
SL AMB POCT NITRITE,UA: POSITIVE
SL AMB POCT PH,UA: 5
SL AMB POCT SPECIFIC GRAVITY,UA: 1.02
SL AMB POCT URINE PROTEIN: ABNORMAL
SL AMB POCT UROBILINOGEN: 0.2

## 2019-06-11 PROCEDURE — 81003 URINALYSIS AUTO W/O SCOPE: CPT | Performed by: UROLOGY

## 2019-06-11 PROCEDURE — 99214 OFFICE O/P EST MOD 30 MIN: CPT | Performed by: UROLOGY

## 2019-06-11 RX ORDER — OXYBUTYNIN CHLORIDE 5 MG/1
5 TABLET, EXTENDED RELEASE ORAL DAILY
Qty: 90 TABLET | Refills: 3 | Status: SHIPPED | OUTPATIENT
Start: 2019-06-11 | End: 2019-11-07 | Stop reason: SDUPTHER

## 2019-06-17 ENCOUNTER — TRANSCRIBE ORDERS (OUTPATIENT)
Dept: ADMINISTRATIVE | Facility: HOSPITAL | Age: 74
End: 2019-06-17

## 2019-06-17 DIAGNOSIS — K76.0 FATTY LIVER: Primary | ICD-10-CM

## 2019-06-28 ENCOUNTER — OFFICE VISIT (OUTPATIENT)
Dept: FAMILY MEDICINE CLINIC | Facility: CLINIC | Age: 74
End: 2019-06-28
Payer: MEDICARE

## 2019-06-28 VITALS
TEMPERATURE: 97.8 F | OXYGEN SATURATION: 96 % | HEART RATE: 67 BPM | BODY MASS INDEX: 43.71 KG/M2 | DIASTOLIC BLOOD PRESSURE: 80 MMHG | WEIGHT: 256 LBS | HEIGHT: 64 IN | SYSTOLIC BLOOD PRESSURE: 120 MMHG

## 2019-06-28 DIAGNOSIS — M54.40 CHRONIC LOW BACK PAIN WITH SCIATICA, SCIATICA LATERALITY UNSPECIFIED, UNSPECIFIED BACK PAIN LATERALITY: ICD-10-CM

## 2019-06-28 DIAGNOSIS — Z00.00 ROUTINE MEDICAL EXAM: Primary | ICD-10-CM

## 2019-06-28 DIAGNOSIS — Z12.31 SCREENING MAMMOGRAM, ENCOUNTER FOR: ICD-10-CM

## 2019-06-28 DIAGNOSIS — G89.29 CHRONIC LOW BACK PAIN WITH SCIATICA, SCIATICA LATERALITY UNSPECIFIED, UNSPECIFIED BACK PAIN LATERALITY: ICD-10-CM

## 2019-06-28 DIAGNOSIS — B35.4 TINEA CORPORIS: ICD-10-CM

## 2019-06-28 PROCEDURE — G0439 PPPS, SUBSEQ VISIT: HCPCS | Performed by: FAMILY MEDICINE

## 2019-06-28 RX ORDER — CLOTRIMAZOLE AND BETAMETHASONE DIPROPIONATE 10; .64 MG/G; MG/G
CREAM TOPICAL 2 TIMES DAILY
Qty: 90 G | Refills: 2 | Status: SHIPPED | OUTPATIENT
Start: 2019-06-28 | End: 2020-02-10 | Stop reason: SDUPTHER

## 2019-06-28 RX ORDER — PREGABALIN 75 MG/1
CAPSULE ORAL
Qty: 270 CAPSULE | Refills: 1 | Status: SHIPPED | OUTPATIENT
Start: 2019-06-28 | End: 2020-01-24

## 2019-07-15 ENCOUNTER — HOSPITAL ENCOUNTER (OUTPATIENT)
Dept: MAMMOGRAPHY | Facility: CLINIC | Age: 74
Discharge: HOME | End: 2019-07-15
Payer: MEDICARE

## 2019-07-15 ENCOUNTER — APPOINTMENT (OUTPATIENT)
Dept: LAB | Facility: IMAGING CENTER | Age: 74
End: 2019-07-15
Payer: MEDICARE

## 2019-07-15 VITALS — BODY MASS INDEX: 43.71 KG/M2 | HEIGHT: 64 IN | WEIGHT: 256 LBS

## 2019-07-15 DIAGNOSIS — N18.30 CHRONIC RENAL INSUFFICIENCY, STAGE III (MODERATE) (HCC): ICD-10-CM

## 2019-07-15 DIAGNOSIS — Z79.4 TYPE 2 DIABETES MELLITUS WITH STAGE 3 CHRONIC KIDNEY DISEASE, WITH LONG-TERM CURRENT USE OF INSULIN (HCC): ICD-10-CM

## 2019-07-15 DIAGNOSIS — E78.2 MIXED HYPERLIPIDEMIA: ICD-10-CM

## 2019-07-15 DIAGNOSIS — G25.81 RESTLESS LEG SYNDROME: ICD-10-CM

## 2019-07-15 DIAGNOSIS — B35.4 TINEA CORPORIS: ICD-10-CM

## 2019-07-15 DIAGNOSIS — N18.30 TYPE 2 DIABETES MELLITUS WITH STAGE 3 CHRONIC KIDNEY DISEASE, WITH LONG-TERM CURRENT USE OF INSULIN (HCC): ICD-10-CM

## 2019-07-15 DIAGNOSIS — E66.01 MORBID OBESITY WITH BMI OF 40.0-44.9, ADULT (HCC): ICD-10-CM

## 2019-07-15 DIAGNOSIS — E11.22 TYPE 2 DIABETES MELLITUS WITH STAGE 3 CHRONIC KIDNEY DISEASE, WITH LONG-TERM CURRENT USE OF INSULIN (HCC): ICD-10-CM

## 2019-07-15 DIAGNOSIS — Z12.31 SCREENING MAMMOGRAM, ENCOUNTER FOR: ICD-10-CM

## 2019-07-15 LAB
ALBUMIN SERPL BCP-MCNC: 3.4 G/DL (ref 3.5–5)
ALP SERPL-CCNC: 115 U/L (ref 46–116)
ALT SERPL W P-5'-P-CCNC: 33 U/L (ref 12–78)
ANION GAP SERPL CALCULATED.3IONS-SCNC: 4 MMOL/L (ref 4–13)
AST SERPL W P-5'-P-CCNC: 29 U/L (ref 5–45)
BASOPHILS # BLD AUTO: 0.03 THOUSANDS/ΜL (ref 0–0.1)
BASOPHILS NFR BLD AUTO: 1 % (ref 0–1)
BILIRUB SERPL-MCNC: 0.27 MG/DL (ref 0.2–1)
BUN SERPL-MCNC: 20 MG/DL (ref 5–25)
CALCIUM SERPL-MCNC: 9 MG/DL (ref 8.3–10.1)
CHLORIDE SERPL-SCNC: 107 MMOL/L (ref 100–108)
CHOLEST SERPL-MCNC: 104 MG/DL (ref 50–200)
CO2 SERPL-SCNC: 27 MMOL/L (ref 21–32)
CREAT SERPL-MCNC: 1.36 MG/DL (ref 0.6–1.3)
EOSINOPHIL # BLD AUTO: 0.1 THOUSAND/ΜL (ref 0–0.61)
EOSINOPHIL NFR BLD AUTO: 2 % (ref 0–6)
ERYTHROCYTE [DISTWIDTH] IN BLOOD BY AUTOMATED COUNT: 13.7 % (ref 11.6–15.1)
EST. AVERAGE GLUCOSE BLD GHB EST-MCNC: 171 MG/DL
GFR SERPL CREATININE-BSD FRML MDRD: 38 ML/MIN/1.73SQ M
GLUCOSE P FAST SERPL-MCNC: 191 MG/DL (ref 65–99)
HBA1C MFR BLD: 7.6 % (ref 4.2–6.3)
HCT VFR BLD AUTO: 39.1 % (ref 34.8–46.1)
HDLC SERPL-MCNC: 33 MG/DL (ref 40–60)
HGB BLD-MCNC: 12 G/DL (ref 11.5–15.4)
IMM GRANULOCYTES # BLD AUTO: 0.08 THOUSAND/UL (ref 0–0.2)
IMM GRANULOCYTES NFR BLD AUTO: 1 % (ref 0–2)
LDLC SERPL CALC-MCNC: 41 MG/DL (ref 0–100)
LYMPHOCYTES # BLD AUTO: 1.08 THOUSANDS/ΜL (ref 0.6–4.47)
LYMPHOCYTES NFR BLD AUTO: 17 % (ref 14–44)
MCH RBC QN AUTO: 29.2 PG (ref 26.8–34.3)
MCHC RBC AUTO-ENTMCNC: 30.7 G/DL (ref 31.4–37.4)
MCV RBC AUTO: 95 FL (ref 82–98)
MONOCYTES # BLD AUTO: 0.39 THOUSAND/ΜL (ref 0.17–1.22)
MONOCYTES NFR BLD AUTO: 6 % (ref 4–12)
NEUTROPHILS # BLD AUTO: 4.8 THOUSANDS/ΜL (ref 1.85–7.62)
NEUTS SEG NFR BLD AUTO: 73 % (ref 43–75)
NRBC BLD AUTO-RTO: 0 /100 WBCS
PLATELET # BLD AUTO: 163 THOUSANDS/UL (ref 149–390)
PMV BLD AUTO: 12.5 FL (ref 8.9–12.7)
POTASSIUM SERPL-SCNC: 4 MMOL/L (ref 3.5–5.3)
PROT SERPL-MCNC: 6.9 G/DL (ref 6.4–8.2)
RBC # BLD AUTO: 4.11 MILLION/UL (ref 3.81–5.12)
SODIUM SERPL-SCNC: 138 MMOL/L (ref 136–145)
TRIGL SERPL-MCNC: 150 MG/DL
TSH SERPL DL<=0.05 MIU/L-ACNC: 2.76 UIU/ML (ref 0.36–3.74)
WBC # BLD AUTO: 6.48 THOUSAND/UL (ref 4.31–10.16)

## 2019-07-15 PROCEDURE — 80061 LIPID PANEL: CPT

## 2019-07-15 PROCEDURE — 84443 ASSAY THYROID STIM HORMONE: CPT

## 2019-07-15 PROCEDURE — 36415 COLL VENOUS BLD VENIPUNCTURE: CPT

## 2019-07-15 PROCEDURE — 80053 COMPREHEN METABOLIC PANEL: CPT

## 2019-07-15 PROCEDURE — 85025 COMPLETE CBC W/AUTO DIFF WBC: CPT

## 2019-07-15 PROCEDURE — 80201 ASSAY OF TOPIRAMATE: CPT

## 2019-07-15 PROCEDURE — 83036 HEMOGLOBIN GLYCOSYLATED A1C: CPT

## 2019-07-15 PROCEDURE — 77067 SCR MAMMO BI INCL CAD: CPT

## 2019-07-16 ENCOUNTER — TELEPHONE (OUTPATIENT)
Dept: FAMILY MEDICINE CLINIC | Facility: CLINIC | Age: 74
End: 2019-07-16

## 2019-07-16 DIAGNOSIS — R92.8 MAMMOGRAM ABNORMAL: Primary | ICD-10-CM

## 2019-07-16 LAB — TOPIRAMATE SERPL-MCNC: 1.8 UG/ML (ref 2–25)

## 2019-07-16 NOTE — TELEPHONE ENCOUNTER
left message for patient to return our call regarding mammogram needs additional view of the left breast

## 2019-07-16 NOTE — TELEPHONE ENCOUNTER
----- Message from Yong Gaytan MD sent at 7/15/2019  3:55 PM EDT -----  Abnormal mammogram of left breast ,to order diagnostic mammogram and US of left breast

## 2019-07-26 ENCOUNTER — OFFICE VISIT (OUTPATIENT)
Dept: FAMILY MEDICINE CLINIC | Facility: CLINIC | Age: 74
End: 2019-07-26
Payer: MEDICARE

## 2019-07-26 VITALS
WEIGHT: 260 LBS | BODY MASS INDEX: 44.39 KG/M2 | HEART RATE: 76 BPM | TEMPERATURE: 99.1 F | RESPIRATION RATE: 16 BRPM | DIASTOLIC BLOOD PRESSURE: 70 MMHG | SYSTOLIC BLOOD PRESSURE: 108 MMHG | OXYGEN SATURATION: 96 % | HEIGHT: 64 IN

## 2019-07-26 DIAGNOSIS — N18.30 CHRONIC RENAL INSUFFICIENCY, STAGE III (MODERATE) (HCC): ICD-10-CM

## 2019-07-26 DIAGNOSIS — E78.2 MIXED HYPERLIPIDEMIA: ICD-10-CM

## 2019-07-26 DIAGNOSIS — I10 ESSENTIAL HYPERTENSION: ICD-10-CM

## 2019-07-26 DIAGNOSIS — E11.22 TYPE 2 DIABETES MELLITUS WITH STAGE 3 CHRONIC KIDNEY DISEASE, WITH LONG-TERM CURRENT USE OF INSULIN (HCC): Primary | ICD-10-CM

## 2019-07-26 DIAGNOSIS — N18.30 TYPE 2 DIABETES MELLITUS WITH STAGE 3 CHRONIC KIDNEY DISEASE, WITH LONG-TERM CURRENT USE OF INSULIN (HCC): Primary | ICD-10-CM

## 2019-07-26 DIAGNOSIS — Z79.4 TYPE 2 DIABETES MELLITUS WITH STAGE 3 CHRONIC KIDNEY DISEASE, WITH LONG-TERM CURRENT USE OF INSULIN (HCC): Primary | ICD-10-CM

## 2019-07-26 DIAGNOSIS — E55.9 VITAMIN D DEFICIENCY: ICD-10-CM

## 2019-07-26 DIAGNOSIS — Z79.4 LONG TERM CURRENT USE OF INSULIN (HCC): ICD-10-CM

## 2019-07-26 PROCEDURE — 99214 OFFICE O/P EST MOD 30 MIN: CPT | Performed by: FAMILY MEDICINE

## 2019-07-26 NOTE — PROGRESS NOTES
Subjective:   Chief Complaint   Patient presents with    Follow-up     chronic conditions        Patient ID: Sharri Isaacs is a 75 y o  female  Patient here follow-up with a chronic condition patient history of insulin-dependent diabetic she is on Humalog per sliding scale and she is onTresiba [de-identified] u the patient has not been taking it as 80 units the she has decrease down she trying to or take hyper cider vinegar pills apple cider vineger decrease her blood sugar and she does not need a that much insulin and her hemoglobin A1c improved but still uncontrolled and no sign of symptom of hypoglycemia she has try to lose weight but have difficult time to do so a deny increased thirsty increased frequency urination patient's history of hypertension fair control on current management deny any and chest pain short of breath no palpitation no dyspnea on exertion no lower extremity edema patient's history of chronic kidney disease stage 3 deny any abdomen pain no flank pain no edema no blood in the urine she does follow up with the Nephrology periodically patient history of vitamin-D deficiency on vitamin-D supplement no bone pain no joint  Pain the recent blood work discussed with the patient      The following portions of the patient's history were reviewed and updated as appropriate: allergies, current medications, past family history, past medical history, past social history, past surgical history and problem list     Review of Systems   Constitutional: Negative for fatigue and fever  HENT: Negative for ear pain, sinus pressure, sinus pain and sore throat  Eyes: Negative for pain and redness  Respiratory: Negative for cough, chest tightness and shortness of breath  Cardiovascular: Negative for chest pain, palpitations and leg swelling  Gastrointestinal: Negative for abdominal pain, blood in stool, constipation, diarrhea and nausea  Genitourinary: Negative for flank pain, frequency and hematuria  Musculoskeletal: Negative for back pain and joint swelling  Skin: Negative for rash  Neurological: Negative for dizziness, numbness and headaches  Hematological: Does not bruise/bleed easily  Objective:  Vitals:    07/26/19 1322   BP: 108/70   BP Location: Left arm   Patient Position: Sitting   Cuff Size: Large   Pulse: 76   Resp: 16   Temp: 99 1 °F (37 3 °C)   TempSrc: Tympanic   SpO2: 96%   Weight: 118 kg (260 lb)   Height: 5' 4" (1 626 m)      Physical Exam   Constitutional: She is oriented to person, place, and time  She appears well-developed and well-nourished  HENT:   Head: Normocephalic  Right Ear: External ear normal    Left Ear: External ear normal    Eyes: Conjunctivae and EOM are normal  Right eye exhibits no discharge  Left eye exhibits no discharge  Neck: No JVD present  Cardiovascular: Normal rate and regular rhythm  Exam reveals no gallop  Murmur heard  Pulmonary/Chest: Effort normal  No respiratory distress  She has no wheezes  She has no rales  She exhibits no tenderness  Abdominal: She exhibits no mass  There is no tenderness  There is no rebound  Musculoskeletal: She exhibits no edema or tenderness  Neurological: She is alert and oriented to person, place, and time  Skin: No rash noted  No erythema           Assessment/Plan:    Type 2 diabetes mellitus with kidney complication, with long-term current use of insulin ([facility])  Lab Results   Component Value Date    HGBA1C 7 6 (H) 07/15/2019       A chronic asymptomatic improve in her hemoglobin A1c we encouraged patient patient has not been taking herTersiba as 80 U daily recommend taking medication as prescribed a sign of symptom of hypoglycemia discussed with the patient encouraged patient to lose weight patient will continue with the Humalog per insulin sliding scale patient already on statin patient positive for microalbuminuria she already on lisinopril        Essential hypertension  Chronic asymptomatic fair control continue current management encouraged patient to lose weight low-salt diet and increased physical activity    Hyperlipidemia  Chronic asymptomatic fair control continue simvastatin 20 mg once a day encouraged patient to lose weight and low fat diet    Vitamin D deficiency  Chronic asymptomatic fair control continue vitamin-D supplement vitamin-D rich diet discussed with the patient    Chronic renal insufficiency, stage III (moderate)  Chronic asymptomatic fair control improve in her GFR and creatinine  Avoid non steroid  anti-inflammatory drugs  Keep will hydration  Avoid contrast dye       Diagnoses and all orders for this visit:    Type 2 diabetes mellitus with stage 3 chronic kidney disease, with long-term current use of insulin ([facility])  -     insulin degludec (TRESIBA FLEXTOUCH) 100 units/mL injection pen; Inject 80 Units under the skin daily  -     Hemoglobin A1C; Future    Essential hypertension  -     Basic metabolic panel; Future    Mixed hyperlipidemia  -     Lipid panel;  Future    Long term current use of insulin ([facility])  -     CBC and differential; Future    Vitamin D deficiency  -     CBC and differential; Future    Chronic renal insufficiency, stage III (moderate) ([facility])

## 2019-07-28 NOTE — ASSESSMENT & PLAN NOTE
Lab Results   Component Value Date    HGBA1C 7 6 (H) 07/15/2019       A chronic asymptomatic improve in her hemoglobin A1c we encouraged patient patient has not been taking herTersiba as 80 U daily recommend taking medication as prescribed a sign of symptom of hypoglycemia discussed with the patient encouraged patient to lose weight patient will continue with the Humalog per insulin sliding scale patient already on statin patient positive for microalbuminuria she already on lisinopril

## 2019-07-28 NOTE — ASSESSMENT & PLAN NOTE
Chronic asymptomatic fair control improve in her GFR and creatinine  Avoid non steroid  anti-inflammatory drugs  Keep will hydration  Avoid contrast dye

## 2019-07-28 NOTE — ASSESSMENT & PLAN NOTE
Chronic asymptomatic fair control continue current management encouraged patient to lose weight low-salt diet and increased physical activity

## 2019-07-28 NOTE — ASSESSMENT & PLAN NOTE
Chronic asymptomatic fair control continue vitamin-D supplement vitamin-D rich diet discussed with the patient

## 2019-07-28 NOTE — ASSESSMENT & PLAN NOTE
Chronic asymptomatic fair control continue simvastatin 20 mg once a day encouraged patient to lose weight and low fat diet

## 2019-07-30 ENCOUNTER — HOSPITAL ENCOUNTER (OUTPATIENT)
Dept: ULTRASOUND IMAGING | Facility: HOSPITAL | Age: 74
Discharge: HOME | End: 2019-07-30
Payer: MEDICARE

## 2019-07-30 ENCOUNTER — HOSPITAL ENCOUNTER (OUTPATIENT)
Dept: MAMMOGRAPHY | Facility: HOSPITAL | Age: 74
Discharge: HOME | End: 2019-07-30
Payer: MEDICARE

## 2019-07-30 DIAGNOSIS — R92.8 MAMMOGRAM ABNORMAL: ICD-10-CM

## 2019-07-30 PROCEDURE — 77065 DX MAMMO INCL CAD UNI: CPT

## 2019-07-30 PROCEDURE — 76642 ULTRASOUND BREAST LIMITED: CPT

## 2019-07-30 PROCEDURE — G0279 TOMOSYNTHESIS, MAMMO: HCPCS

## 2019-08-01 ENCOUNTER — TELEPHONE (OUTPATIENT)
Dept: FAMILY MEDICINE CLINIC | Facility: CLINIC | Age: 74
End: 2019-08-01

## 2019-08-01 DIAGNOSIS — R92.8 MAMMOGRAM ABNORMAL: Primary | ICD-10-CM

## 2019-08-01 NOTE — TELEPHONE ENCOUNTER
----- Message from Iris Flores MD sent at 7/30/2019 12:07 PM EDT -----  To order diagnostic mammogram and US of Left breast in 6 month

## 2019-08-29 ENCOUNTER — OFFICE VISIT (OUTPATIENT)
Dept: SLEEP CENTER | Facility: CLINIC | Age: 74
End: 2019-08-29
Payer: MEDICARE

## 2019-08-29 VITALS
DIASTOLIC BLOOD PRESSURE: 68 MMHG | BODY MASS INDEX: 44.9 KG/M2 | HEIGHT: 64 IN | WEIGHT: 263 LBS | HEART RATE: 74 BPM | SYSTOLIC BLOOD PRESSURE: 108 MMHG

## 2019-08-29 DIAGNOSIS — N18.30 TYPE 2 DIABETES MELLITUS WITH STAGE 3 CHRONIC KIDNEY DISEASE, WITH LONG-TERM CURRENT USE OF INSULIN (HCC): ICD-10-CM

## 2019-08-29 DIAGNOSIS — Z79.4 TYPE 2 DIABETES MELLITUS WITH STAGE 3 CHRONIC KIDNEY DISEASE, WITH LONG-TERM CURRENT USE OF INSULIN (HCC): ICD-10-CM

## 2019-08-29 DIAGNOSIS — E11.22 TYPE 2 DIABETES MELLITUS WITH STAGE 3 CHRONIC KIDNEY DISEASE, WITH LONG-TERM CURRENT USE OF INSULIN (HCC): ICD-10-CM

## 2019-08-29 DIAGNOSIS — G47.33 OBSTRUCTIVE SLEEP APNEA SYNDROME: Primary | ICD-10-CM

## 2019-08-29 DIAGNOSIS — E66.01 MORBID OBESITY WITH BMI OF 40.0-44.9, ADULT (HCC): ICD-10-CM

## 2019-08-29 PROCEDURE — 99215 OFFICE O/P EST HI 40 MIN: CPT | Performed by: PSYCHIATRY & NEUROLOGY

## 2019-08-29 NOTE — PROGRESS NOTES
Review of Systems      Genitourinary none   Cardiology none   Gastrointestinal none   Neurology none   Constitutional fatigue   Integumentary none   Psychiatry none   Musculoskeletal none   Pulmonary none   ENT none   Endocrine none   Hematological none

## 2019-08-29 NOTE — PROGRESS NOTES
Progress Note - [address] 75 y o  female   ATI:6/0/0209, MRN: [id number]  8/29/2019          Follow Up Evaluation / Problem:     Obstructive Sleep Apnea  Restless Legs Syndrome  Obesity      Thank you for the opportunity of participating in the evaluation and care of this patient in the [facility]  HPI: Mckayla Hernández is a 75y o  year old female  She has a long history of obstructive sleep apnea and restless legs syndrome  She is currently being treated with nasal CPAP at 9 cm of water and takes both pre abdomen, Topamax and ferrous sulfate for control restless legs syndrome          Current Outpatient Medications:     acetaminophen (TYLENOL) 325 mg tablet, Take by mouth as needed , Disp: , Rfl:     albuterol (PROAIR HFA) 90 mcg/act inhaler, inhale 2 puff by inhalation route  every 4 - 6 hours as needed, Disp: , Rfl:     B Complex-C CAPS, Take by mouth, Disp: , Rfl:     BD PEN NEEDLE JAE U/F 32G X 4 MM MISC, INJECT AS DIRECTED 3  (THREE) TIMES A DAY, Disp: 270 each, Rfl: 1    Cholecalciferol (VITAMIN D3) 2000 units capsule, half, Disp: , Rfl:     cholestyramine (QUESTRAN) 4 g packet, take 1 packet by mouth every other day, Disp: , Rfl: 0    clotrimazole-betamethasone (LOTRISONE) 1-0 05 % cream, Apply topically 2 (two) times a day, Disp: 90 g, Rfl: 2    CONTOUR NEXT TEST test strip, , Disp: , Rfl: 0    dicyclomine (BENTYL) 20 mg tablet, TAKE 1 TABLET BY MOUTH  TWICE A DAY, Disp: 180 tablet, Rfl: 0    EASY TOUCH LANCETS 32G/TWIST MISC, sub dermal, Disp: , Rfl:     fenofibrate (TRIGLIDE) 160 MG tablet, TAKE 1 TABLET BY MOUTH  EVERY DAY, Disp: 90 tablet, Rfl: 1    Ferrous Sulfate (IRON) 325 (65 Fe) MG TABS, Take by mouth, Disp: , Rfl:     HYDROcodone-acetaminophen (NORCO) 5-325 mg per tablet, Take 1 tablet by mouth 3 (three) times a day as needed, Disp: , Rfl: 0    insulin degludec (TRESIBA FLEXTOUCH) 100 units/mL injection pen, Inject 80 Units under the skin daily, Disp: 5 pen, Rfl: 2    insuln lispro (HUMALOG KWIKPEN) 200 units/mL CONCENTRATED U-200 injection pen, inject by subcutaneous route per prescriber&#39;s instructions  Insulin dosing requires individualization  , Disp: , Rfl:     lisinopril (ZESTRIL) 10 mg tablet, TAKE 1 TABLET BY MOUTH  EVERY DAY, Disp: 90 tablet, Rfl: 1    loratadine (CLARITIN) 10 mg tablet, Take by mouth, Disp: , Rfl:     magnesium Oxide (MAG-OX) 400 mg TABS, Take by mouth, Disp: , Rfl:     Multiple Vitamin (DAILY VALUE MULTIVITAMIN) TABS, Take by mouth, Disp: , Rfl:     oxybutynin (DITROPAN-XL) 5 mg 24 hr tablet, Take 1 tablet (5 mg total) by mouth daily, Disp: 90 tablet, Rfl: 3    pantoprazole (PROTONIX) 40 mg tablet, TAKE 1 TABLET BY MOUTH  EVERY DAY, Disp: 90 tablet, Rfl: 1    pregabalin (LYRICA) 75 mg capsule, Take one cap tid, Disp: 270 capsule, Rfl: 1    Probiotic Product (ALIGN) CHEW, Chew 2 (two) times a day, Disp: , Rfl:     simvastatin (ZOCOR) 20 mg tablet, Take by mouth, Disp: , Rfl:     Thiamine HCl (VITAMIN B-1) 250 MG tablet, Take 250 mg by mouth daily, Disp: , Rfl:     topiramate (TOPAMAX) 25 mg tablet, TAKE 1 TABLET BY MOUTH  DAILY, Disp: 90 tablet, Rfl: 1    [place] Sleepiness Scale  Sitting and reading: Moderate chance of dozing  Watching TV: Moderate chance of dozing  Sitting, inactive in a public place (e g  a theatre or a meeting):  Would never doze  As a passenger in a car for an hour without a break: Would never doze  Lying down to rest in the afternoon when circumstances permit: Slight chance of dozing  Sitting and talking to someone: Would never doze  Sitting quietly after a lunch without alcohol: Slight chance of dozing  In a car, while stopped for a few minutes in traffic: Would never doze  Total score: 6              Vitals:    08/29/19 0800   BP: 108/68   Pulse: 74   Weight: 119 kg (263 lb)   Height: 5' 4" (1 626 m) Body mass index is 45 14 kg/m²  Neck Circumference: 17       EPWORTH SLEEPINESS SCORE  Total score: 6      Past History Since Last Sleep Center Visit:     Since her last visit she reports that she has been more sleepy than uual all  Average sleep hour on her most recent data is approximately 10-,1/2 hours  Maximum usage is 12 and 0 5 hours  She reports that despite getting excessive amounts of sleep she still tired during the day she also notes occasional sleepiness which may result in unintentional sleep onset during the daytime during sedentary activity  Review of compliance data shows her AHI is 1  Current CPAP level is at 9 cm water  Average sleep time is 7 minutes and 56 seconds  She is using her equipment 100% of the time and is achieving more than 4 hours 100% of days used  The review of systems and following portions of the patient's history were reviewed and updated as appropriate: allergies, current medications, past family history, past medical history, past social history, past surgical history, and problem list     Review of Systems        Genitourinary none   Cardiology none   Gastrointestinal none   Neurology none   Constitutional fatigue   Integumentary none   Psychiatry none   Musculoskeletal none   Pulmonary none   ENT none   Endocrine none   Hematological none          OBJECTIVE    PAP Pressure: Nasal CPAP set to deliver 9 cm of water pressure  DME Provider: [employer]    Physical Exam:     General Appearance:   Alert, cooperative, no distress, appears stated age, morbidly obese     Head:   Normocephalic, without obvious abnormality, atraumatic     Eyes:   PERRL, conjunctiva/corneas clear, EOM's intact          Nose:  Nares normal, septum midline, no drainage or sinus tenderness     Neck:  Supple, symmetrical, trachea midline, no adenopathy;  Thyroid: No enlargement, tenderness or nodules; no carotid bruit or JVD       Extremities:  Extremities normal, atraumatic, no cyanosis or edema     Pulses:  2+ and symmetric all extremities     Skin:  Skin color, texture, turgor normal, no rashes or lesions       Neurologic:  CNII-XII intact  Normal strength, sensation throughout       ASSESSMENT / PLAN    1  Obstructive sleep apnea syndrome     2  Type 2 diabetes mellitus with stage 3 chronic kidney disease, with long-term current use of insulin ([facility] )     3  Morbid obesity with BMI of 40 0-44 9, adult [facility])             Counseling / Coordination of Care  Total clinic time spent today 40 minutes  Greater than 50% of total time was spent with the patient and / or family counseling and / or coordination of care  A description of the counseling / coordination of care:     Impressions, Diagnostic results, Prognosis, Instructions for management, Risks and benefits of treatment, Patient and family education, Risk factor reductions and Importance of compliance with treatment    The following instructions have been given to the patient today:    Patient Instructions   1  Perform split night polysomnogram to evaluate current effect of nasal CPAP  2  Make adjustments as needed based on split night study  3  Consider further treatment if CPAP is working correctly  4  Will contact patient with results of study and any possible changes therapy  5  Provide a prescription for new supplies of next year  6  Return in 1 year for routine re-evaluation       Nursing Support:  When: Monday through Friday 7A-5PM except holidays  Where: Our direct line is [phone]  If you are having a true emergency please call 911  In the event that the line is busy or it is after hours please leave a voice message and we will return your call  Please speak clearly, leaving your full name, birth date, best number to reach you and the reason for your call  Medication refills:  We will need the name of the medication, the dosage, the ordering provider, whether you get a 30 or 90 day refill, and the pharmacy name and address  Medications will be ordered by the provider only  Nurses cannot call in prescriptions  Please allow 7 days for medication refills  Physician requested updates: If your provider requested that you call with an update after starting medication, please be ready to provide us the medication and dosage, what time you take your medication, the time you attempt to fall asleep, time you fall asleep, when you wake up, and what time you get out of bed  Sleep Study Results: We will contact you with sleep study results and/or next steps after the physician has reviewed your testing            Keith Lozada

## 2019-08-29 NOTE — PATIENT INSTRUCTIONS
1  Perform split night polysomnogram to evaluate current effect of nasal CPAP  2  Make adjustments as needed based on split night study  3  Consider further treatment if CPAP is working correctly  4  Will contact patient with results of study and any possible changes therapy  5  Provide a prescription for new supplies of next year  6  Return in 1 year for routine re-evaluation       Nursing Support:  When: Monday through Friday 7A-5PM except holidays  Where: Our direct line is [phone]  If you are having a true emergency please call 911  In the event that the line is busy or it is after hours please leave a voice message and we will return your call  Please speak clearly, leaving your full name, birth date, best number to reach you and the reason for your call  Medication refills: We will need the name of the medication, the dosage, the ordering provider, whether you get a 30 or 90 day refill, and the pharmacy name and address  Medications will be ordered by the provider only  Nurses cannot call in prescriptions  Please allow 7 days for medication refills  Physician requested updates: If your provider requested that you call with an update after starting medication, please be ready to provide us the medication and dosage, what time you take your medication, the time you attempt to fall asleep, time you fall asleep, when you wake up, and what time you get out of bed  Sleep Study Results: We will contact you with sleep study results and/or next steps after the physician has reviewed your testing

## 2019-09-04 DIAGNOSIS — Z79.4 TYPE 2 DIABETES MELLITUS WITH STAGE 3 CHRONIC KIDNEY DISEASE, WITH LONG-TERM CURRENT USE OF INSULIN (HCC): Primary | ICD-10-CM

## 2019-09-04 DIAGNOSIS — K58.8 OTHER IRRITABLE BOWEL SYNDROME: ICD-10-CM

## 2019-09-04 DIAGNOSIS — E11.22 TYPE 2 DIABETES MELLITUS WITH STAGE 3 CHRONIC KIDNEY DISEASE, WITH LONG-TERM CURRENT USE OF INSULIN (HCC): Primary | ICD-10-CM

## 2019-09-04 DIAGNOSIS — N18.30 TYPE 2 DIABETES MELLITUS WITH STAGE 3 CHRONIC KIDNEY DISEASE, WITH LONG-TERM CURRENT USE OF INSULIN (HCC): Primary | ICD-10-CM

## 2019-09-04 RX ORDER — DICYCLOMINE HCL 20 MG
TABLET ORAL
Qty: 180 TABLET | Refills: 0 | Status: SHIPPED | OUTPATIENT
Start: 2019-09-04 | End: 2019-10-21 | Stop reason: SDUPTHER

## 2019-09-04 RX ORDER — INSULIN LISPRO 200 [IU]/ML
INJECTION, SOLUTION SUBCUTANEOUS
Qty: 12 ML | Refills: 1 | Status: SHIPPED | OUTPATIENT
Start: 2019-09-04 | End: 2020-04-02

## 2019-09-09 ENCOUNTER — TELEPHONE (OUTPATIENT)
Dept: SLEEP CENTER | Facility: CLINIC | Age: 74
End: 2019-09-09

## 2019-09-15 ENCOUNTER — HOSPITAL ENCOUNTER (OUTPATIENT)
Dept: SLEEP CENTER | Facility: CLINIC | Age: 74
Discharge: HOME | End: 2019-09-15
Payer: MEDICARE

## 2019-09-15 DIAGNOSIS — G47.33 OBSTRUCTIVE SLEEP APNEA SYNDROME: ICD-10-CM

## 2019-09-15 PROCEDURE — 95810 POLYSOM 6/> YRS 4/> PARAM: CPT | Performed by: PSYCHIATRY & NEUROLOGY

## 2019-09-15 PROCEDURE — 95810 POLYSOM 6/> YRS 4/> PARAM: CPT

## 2019-09-16 NOTE — PROGRESS NOTES
Sleep Study Documentation    Pre-Sleep Study       Sleep testing procedure explained to patient:YES    Patient napped prior to study:NO    Caffeine:Dayshift worker after 12PM   Caffeine use:YES- soda  12 to 26 ounces    Alcohol:Dayshift workers after 5PM: Alcohol use:NO    Typical day for patient:NO       Study Documentation    Sleep Study Indications:  Snoring, impaired concentration/memory, BMI>30, witnessed apneas, unrefreshing sleep, neck circumference > 17 inches  Sleep Study: Diagnostic   Snore:Mild  Supplemental O2: no    O2 flow rate (L/min) range 0  O2 flow rate (L/min) final 0  Minimum SaO2  89 %  Baseline SaO2  93 4 %            EKG abnormalities: yes: Occasional PAC     EEG abnormalities: yes:  ECG artifact throughout study  Averaged Ms  Sleep Study Recorded < 2 hours: N/A    Sleep Study Recorded > 2 hours but incomplete study: N/A    Sleep Study Recorded 6 hours but no sleep obtained: NO          Post-Sleep Study    Medication used at bedtime or during sleep study:YES other prescription medications    Patient reports time it took to fall asleep:20 to 30 minutes    Patient reports waking up during study:3 or more times  Patient reports returning to sleep in 10 to 30 minutes  Patient reports sleeping 4 to 6 hours without dreaming  Patient reports sleep during study:typical    Patient rated sleepiness: Very sleepy or tired    PAP treatment:no

## 2019-09-20 DIAGNOSIS — G47.33 OBSTRUCTIVE SLEEP APNEA SYNDROME: Primary | ICD-10-CM

## 2019-09-23 ENCOUNTER — TELEPHONE (OUTPATIENT)
Dept: SLEEP CENTER | Facility: CLINIC | Age: 74
End: 2019-09-23

## 2019-09-23 NOTE — TELEPHONE ENCOUNTER
Left message for patient to call office  Sleep study did not show CHRIS, unable to split  Ordered APAP  I did speak with Wen from [place] [id number] , patients machine is APAP compatible    Will need compliance follow up,

## 2019-09-23 NOTE — TELEPHONE ENCOUNTER
Received an RX for a pressure change  Changed accordingly  PT's now set to an APAP of 4-20cm   Flora informed the PT

## 2019-09-23 NOTE — TELEPHONE ENCOUNTER
I spoke with patient, advised sleep study did not reveal CHRIS or PLM, Dr Yane Carter is recommending auto PAP  Advised will give script to [facility], they will make the change, but will notice difference tomorrow night  Patient also requesting new supplies, states they are requesting script  Advised I will send script also  Compliance follow up scheduled for 12/9/19 at 0915 with Edgardo Ruiz  Advised to call with any questions or concerns prior to appointment

## 2019-09-30 ENCOUNTER — TRANSCRIBE ORDERS (OUTPATIENT)
Dept: ADMINISTRATIVE | Facility: HOSPITAL | Age: 74
End: 2019-09-30

## 2019-09-30 DIAGNOSIS — Z12.39 BREAST SCREENING, UNSPECIFIED: Primary | ICD-10-CM

## 2019-10-21 DIAGNOSIS — K58.8 OTHER IRRITABLE BOWEL SYNDROME: ICD-10-CM

## 2019-10-21 RX ORDER — DICYCLOMINE HCL 20 MG
TABLET ORAL
Qty: 180 TABLET | Refills: 0 | Status: SHIPPED | OUTPATIENT
Start: 2019-10-21 | End: 2020-03-13 | Stop reason: SDUPTHER

## 2019-10-22 DIAGNOSIS — E11.22 TYPE 2 DIABETES MELLITUS WITH STAGE 3 CHRONIC KIDNEY DISEASE, WITH LONG-TERM CURRENT USE OF INSULIN (HCC): ICD-10-CM

## 2019-10-22 DIAGNOSIS — Z79.4 TYPE 2 DIABETES MELLITUS WITH STAGE 3 CHRONIC KIDNEY DISEASE, WITH LONG-TERM CURRENT USE OF INSULIN (HCC): ICD-10-CM

## 2019-10-22 DIAGNOSIS — N18.30 TYPE 2 DIABETES MELLITUS WITH STAGE 3 CHRONIC KIDNEY DISEASE, WITH LONG-TERM CURRENT USE OF INSULIN (HCC): ICD-10-CM

## 2019-10-28 ENCOUNTER — APPOINTMENT (OUTPATIENT)
Dept: LAB | Facility: IMAGING CENTER | Age: 74
End: 2019-10-28
Payer: MEDICARE

## 2019-10-28 DIAGNOSIS — I10 ESSENTIAL HYPERTENSION: ICD-10-CM

## 2019-10-28 DIAGNOSIS — Z79.4 LONG TERM CURRENT USE OF INSULIN (HCC): ICD-10-CM

## 2019-10-28 DIAGNOSIS — E55.9 VITAMIN D DEFICIENCY: ICD-10-CM

## 2019-10-28 DIAGNOSIS — Z79.4 TYPE 2 DIABETES MELLITUS WITH STAGE 3 CHRONIC KIDNEY DISEASE, WITH LONG-TERM CURRENT USE OF INSULIN (HCC): ICD-10-CM

## 2019-10-28 DIAGNOSIS — E11.22 TYPE 2 DIABETES MELLITUS WITH STAGE 3 CHRONIC KIDNEY DISEASE, WITH LONG-TERM CURRENT USE OF INSULIN (HCC): ICD-10-CM

## 2019-10-28 DIAGNOSIS — N18.30 TYPE 2 DIABETES MELLITUS WITH STAGE 3 CHRONIC KIDNEY DISEASE, WITH LONG-TERM CURRENT USE OF INSULIN (HCC): ICD-10-CM

## 2019-10-28 DIAGNOSIS — E78.2 MIXED HYPERLIPIDEMIA: ICD-10-CM

## 2019-10-28 LAB
ANION GAP SERPL CALCULATED.3IONS-SCNC: 6 MMOL/L (ref 4–13)
BASOPHILS # BLD AUTO: 0.02 THOUSANDS/ΜL (ref 0–0.1)
BASOPHILS NFR BLD AUTO: 0 % (ref 0–1)
BUN SERPL-MCNC: 24 MG/DL (ref 5–25)
CALCIUM SERPL-MCNC: 9.4 MG/DL (ref 8.3–10.1)
CHLORIDE SERPL-SCNC: 108 MMOL/L (ref 100–108)
CHOLEST SERPL-MCNC: 104 MG/DL (ref 50–200)
CO2 SERPL-SCNC: 25 MMOL/L (ref 21–32)
CREAT SERPL-MCNC: 1.28 MG/DL (ref 0.6–1.3)
EOSINOPHIL # BLD AUTO: 0.1 THOUSAND/ΜL (ref 0–0.61)
EOSINOPHIL NFR BLD AUTO: 2 % (ref 0–6)
ERYTHROCYTE [DISTWIDTH] IN BLOOD BY AUTOMATED COUNT: 12.9 % (ref 11.6–15.1)
EST. AVERAGE GLUCOSE BLD GHB EST-MCNC: 203 MG/DL
GFR SERPL CREATININE-BSD FRML MDRD: 41 ML/MIN/1.73SQ M
GLUCOSE P FAST SERPL-MCNC: 112 MG/DL (ref 65–99)
HBA1C MFR BLD: 8.7 % (ref 4.2–6.3)
HCT VFR BLD AUTO: 40.6 % (ref 34.8–46.1)
HDLC SERPL-MCNC: 35 MG/DL
HGB BLD-MCNC: 12.4 G/DL (ref 11.5–15.4)
IMM GRANULOCYTES # BLD AUTO: 0.02 THOUSAND/UL (ref 0–0.2)
IMM GRANULOCYTES NFR BLD AUTO: 0 % (ref 0–2)
LDLC SERPL CALC-MCNC: 37 MG/DL (ref 0–100)
LYMPHOCYTES # BLD AUTO: 1.17 THOUSANDS/ΜL (ref 0.6–4.47)
LYMPHOCYTES NFR BLD AUTO: 18 % (ref 14–44)
MCH RBC QN AUTO: 28.5 PG (ref 26.8–34.3)
MCHC RBC AUTO-ENTMCNC: 30.5 G/DL (ref 31.4–37.4)
MCV RBC AUTO: 93 FL (ref 82–98)
MONOCYTES # BLD AUTO: 0.48 THOUSAND/ΜL (ref 0.17–1.22)
MONOCYTES NFR BLD AUTO: 7 % (ref 4–12)
NEUTROPHILS # BLD AUTO: 4.71 THOUSANDS/ΜL (ref 1.85–7.62)
NEUTS SEG NFR BLD AUTO: 73 % (ref 43–75)
NONHDLC SERPL-MCNC: 69 MG/DL
NRBC BLD AUTO-RTO: 0 /100 WBCS
PLATELET # BLD AUTO: 160 THOUSANDS/UL (ref 149–390)
PMV BLD AUTO: 12.5 FL (ref 8.9–12.7)
POTASSIUM SERPL-SCNC: 4 MMOL/L (ref 3.5–5.3)
RBC # BLD AUTO: 4.35 MILLION/UL (ref 3.81–5.12)
SODIUM SERPL-SCNC: 139 MMOL/L (ref 136–145)
TRIGL SERPL-MCNC: 158 MG/DL
WBC # BLD AUTO: 6.5 THOUSAND/UL (ref 4.31–10.16)

## 2019-10-28 PROCEDURE — 80061 LIPID PANEL: CPT

## 2019-10-28 PROCEDURE — 36415 COLL VENOUS BLD VENIPUNCTURE: CPT

## 2019-10-28 PROCEDURE — 80048 BASIC METABOLIC PNL TOTAL CA: CPT

## 2019-10-28 PROCEDURE — 85025 COMPLETE CBC W/AUTO DIFF WBC: CPT

## 2019-10-28 PROCEDURE — 83036 HEMOGLOBIN GLYCOSYLATED A1C: CPT

## 2019-11-04 ENCOUNTER — TELEPHONE (OUTPATIENT)
Dept: SLEEP CENTER | Facility: CLINIC | Age: 74
End: 2019-11-04

## 2019-11-04 NOTE — TELEPHONE ENCOUNTER
Patient called, states she is on APAP 4-20, patient feels that the 4 is too low at the beginning of the night  States she feels that she is gasping for air, asking if the lower pressure can be raised to 6 or 7? DR Rin Talbot, I will have [facility]'s print compliance report  Please advise

## 2019-11-05 ENCOUNTER — TELEPHONE (OUTPATIENT)
Dept: SLEEP CENTER | Facility: CLINIC | Age: 74
End: 2019-11-05

## 2019-11-05 DIAGNOSIS — G47.33 OBSTRUCTIVE SLEEP APNEA SYNDROME: Primary | ICD-10-CM

## 2019-11-05 NOTE — TELEPHONE ENCOUNTER
Pressure change completed for this patient    Auto 7-15 cm h20 Patient made aware of the change  Dexter Smalls

## 2019-11-07 ENCOUNTER — OFFICE VISIT (OUTPATIENT)
Dept: FAMILY MEDICINE CLINIC | Facility: CLINIC | Age: 74
End: 2019-11-07
Payer: MEDICARE

## 2019-11-07 VITALS
OXYGEN SATURATION: 97 % | DIASTOLIC BLOOD PRESSURE: 76 MMHG | HEART RATE: 63 BPM | BODY MASS INDEX: 43.71 KG/M2 | SYSTOLIC BLOOD PRESSURE: 134 MMHG | RESPIRATION RATE: 16 BRPM | TEMPERATURE: 97.9 F | WEIGHT: 256 LBS | HEIGHT: 64 IN

## 2019-11-07 DIAGNOSIS — N30.20 CHRONIC CYSTITIS: ICD-10-CM

## 2019-11-07 DIAGNOSIS — N39.41 URGE INCONTINENCE: ICD-10-CM

## 2019-11-07 DIAGNOSIS — N18.30 TYPE 2 DIABETES MELLITUS WITH STAGE 3 CHRONIC KIDNEY DISEASE, WITH LONG-TERM CURRENT USE OF INSULIN (HCC): ICD-10-CM

## 2019-11-07 DIAGNOSIS — I10 ESSENTIAL HYPERTENSION: ICD-10-CM

## 2019-11-07 DIAGNOSIS — E78.2 MIXED HYPERLIPIDEMIA: ICD-10-CM

## 2019-11-07 DIAGNOSIS — Z23 NEED FOR INFLUENZA VACCINATION: Primary | ICD-10-CM

## 2019-11-07 DIAGNOSIS — Z79.4 TYPE 2 DIABETES MELLITUS WITH STAGE 3 CHRONIC KIDNEY DISEASE, WITH LONG-TERM CURRENT USE OF INSULIN (HCC): ICD-10-CM

## 2019-11-07 DIAGNOSIS — Z79.4 LONG TERM CURRENT USE OF INSULIN (HCC): ICD-10-CM

## 2019-11-07 DIAGNOSIS — E11.22 TYPE 2 DIABETES MELLITUS WITH STAGE 3 CHRONIC KIDNEY DISEASE, WITH LONG-TERM CURRENT USE OF INSULIN (HCC): ICD-10-CM

## 2019-11-07 PROCEDURE — 90662 IIV NO PRSV INCREASED AG IM: CPT | Performed by: FAMILY MEDICINE

## 2019-11-07 PROCEDURE — 99214 OFFICE O/P EST MOD 30 MIN: CPT | Performed by: FAMILY MEDICINE

## 2019-11-07 PROCEDURE — G0008 ADMIN INFLUENZA VIRUS VAC: HCPCS | Performed by: FAMILY MEDICINE

## 2019-11-07 RX ORDER — OXYBUTYNIN CHLORIDE 5 MG/1
5 TABLET, EXTENDED RELEASE ORAL DAILY
Qty: 90 TABLET | Refills: 3 | Status: SHIPPED | OUTPATIENT
Start: 2019-11-07 | End: 2020-07-29 | Stop reason: SDUPTHER

## 2019-11-07 NOTE — ASSESSMENT & PLAN NOTE
A chronic asymptomatic fair control continue pantoprazole encouraged patient to lose weight avoid provoke food do not eat and lie down

## 2019-11-07 NOTE — ASSESSMENT & PLAN NOTE
Chronic asymptomatic fair control continue current management encouraged patient to lose weight and low salt diet discussed with the patient

## 2019-11-07 NOTE — ASSESSMENT & PLAN NOTE
Lab Results   Component Value Date    HGBA1C 8 7 (H) 10/28/2019    A chronic asymptomatic and control patient is compliant with her medication but she is not compliant with the low carb diet the we encouraged to continue the current dose discussed the patient important follow-up with the low carb diet proper monitor blood sugar discussed the patient patient already on statin  Patient positive for microalbuminuria she is already on Ace inhibitor

## 2019-11-07 NOTE — PROGRESS NOTES
Subjective:   Chief Complaint   Patient presents with    Follow-up     chronic conditions        Patient ID: Cloyde Najjar is a 75 y o  female  Patient and office follow-up with a chronic condition patient history of insulin-dependent diabetic and she been compliant with her medication her hemoglobin A1c elevated from 7 8-8 7 patient is not compliant with the low carb diet she is asymptomatic deny increased thirsty increased frequency urination no dizziness no headache and no abdomen pain patient history of hypertension the blood pressure fair control on current medication tolerated well deny any chest pain short of breath no palpitation and no dyspnea on exertion no lower extremity edema patient history of hyperlipidemia deny any chest pain short of breath no palpitation no TIA symptom patient history of indigestion she is currently on the the pantoprazole tolerated well without any side effect deny any abdomen pain no heartburn nausea vomiting or diarrhea no weight loss  Recent blood work discussed with the patient      The following portions of the patient's history were reviewed and updated as appropriate: allergies, current medications, past family history, past medical history, past social history, past surgical history and problem list     Review of Systems   Constitutional: Negative for fatigue and fever  HENT: Negative for ear pain, sinus pressure, sinus pain and sore throat  Eyes: Negative for pain and redness  Respiratory: Negative for cough, chest tightness and shortness of breath  Cardiovascular: Negative for chest pain, palpitations and leg swelling  Gastrointestinal: Negative for abdominal pain, blood in stool, constipation, diarrhea and nausea  Genitourinary: Negative for flank pain, frequency and hematuria  Musculoskeletal: Negative for back pain and joint swelling  Skin: Negative for rash  Neurological: Negative for dizziness, numbness and headaches     Hematological: Does not bruise/bleed easily  Objective:  Vitals:    11/07/19 1258   BP: 134/76   BP Location: Left arm   Patient Position: Sitting   Cuff Size: Large   Pulse: 63   Resp: 16   Temp: 97 9 °F (36 6 °C)   TempSrc: Tympanic   SpO2: 97%   Weight: 116 kg (256 lb)   Height: 5' 4" (1 626 m)      Physical Exam   Constitutional: She is oriented to person, place, and time  She appears well-developed and well-nourished  HENT:   Head: Normocephalic  Right Ear: External ear normal    Left Ear: External ear normal    Eyes: Conjunctivae and EOM are normal  Right eye exhibits no discharge  Left eye exhibits no discharge  Neck: No JVD present  Cardiovascular: Normal rate, regular rhythm and normal heart sounds  Exam reveals no gallop  Pulses are no weak pulses  No murmur heard  Pulses:       Dorsalis pedis pulses are 2+ on the right side, and 2+ on the left side  Pulmonary/Chest: Effort normal  No respiratory distress  She has no wheezes  She has no rales  She exhibits no tenderness  Abdominal: She exhibits no mass  There is no tenderness  There is no rebound  Musculoskeletal: She exhibits no edema or tenderness  Feet:   Right Foot:   Skin Integrity: Negative for warmth  Left Foot:   Skin Integrity: Negative for warmth  Neurological: She is alert and oriented to person, place, and time  Skin: No rash noted  No erythema  Patient's shoes and socks removed  Right Foot/Ankle   Right Foot Inspection  Skin Exam: skin intact no warmth and no pre-ulcer                          Toe Exam: no swelling and erythema  Sensory       Monofilament testing: diminished  Vascular  Capillary refills: < 3 seconds  The right DP pulse is 2+  Left Foot/Ankle  Left Foot Inspection  Skin Exam: skin intactno warmth and no pre-ulcer                         Toe Exam: no swelling and no erythema                   Sensory       Monofilament: diminished  Vascular  Capillary refills: < 3 seconds  The left DP pulse is 2+  Assign Risk Category:  No deformity present; No loss of protective sensation; No weak pulses       Risk: 0    Assessment/Plan:    Type 2 diabetes mellitus with kidney complication, with long-term current use of insulin ([facility])    Lab Results   Component Value Date    HGBA1C 8 7 (H) 10/28/2019    A chronic asymptomatic and control patient is compliant with her medication but she is not compliant with the low carb diet the we encouraged to continue the current dose discussed the patient important follow-up with the low carb diet proper monitor blood sugar discussed the patient patient already on statin  Patient positive for microalbuminuria she is already on Ace inhibitor      Essential hypertension  Chronic asymptomatic fair control continue current management encouraged patient to lose weight and low salt diet discussed with the patient    Hyperlipidemia  A chronic asymptomatic fair control LDL therapeutic in diabetic patient continue current dose of statin encouraged patient follow-up with the low-salt diet and increased physical activity    Indigestion  A chronic asymptomatic fair control continue pantoprazole encouraged patient to lose weight avoid provoke food do not eat and lie down       Diagnoses and all orders for this visit:    Need for influenza vaccination  -     influenza vaccine, 4360-1657, high-dose, PF 0 5 mL (FLUZONE HIGH-DOSE)    Chronic cystitis  -     oxybutynin (DITROPAN-XL) 5 mg 24 hr tablet; Take 1 tablet (5 mg total) by mouth daily    Urge incontinence  -     oxybutynin (DITROPAN-XL) 5 mg 24 hr tablet; Take 1 tablet (5 mg total) by mouth daily    Type 2 diabetes mellitus with stage 3 chronic kidney disease, with long-term current use of insulin ([facility])  -     CBC and differential; Future  -     TSH, 3rd generation with Free T4 reflex; Future  -     Hemoglobin A1C; Future    Essential hypertension  -     Basic metabolic panel; Future  -     TSH, 3rd generation with Free T4 reflex;  Future    Mixed hyperlipidemia  -     Lipid panel;  Future    [facility]

## 2019-12-09 ENCOUNTER — OFFICE VISIT (OUTPATIENT)
Dept: SLEEP CENTER | Facility: CLINIC | Age: 74
End: 2019-12-09
Payer: MEDICARE

## 2019-12-09 VITALS
WEIGHT: 258 LBS | HEIGHT: 64 IN | DIASTOLIC BLOOD PRESSURE: 64 MMHG | SYSTOLIC BLOOD PRESSURE: 120 MMHG | HEART RATE: 72 BPM | BODY MASS INDEX: 44.05 KG/M2

## 2019-12-09 DIAGNOSIS — G47.33 OBSTRUCTIVE SLEEP APNEA TREATED WITH CONTINUOUS POSITIVE AIRWAY PRESSURE (CPAP): Primary | ICD-10-CM

## 2019-12-09 DIAGNOSIS — E66.01 MORBID OBESITY WITH BMI OF 40.0-44.9, ADULT (HCC): ICD-10-CM

## 2019-12-09 DIAGNOSIS — G43.009 MIGRAINE WITHOUT AURA AND WITHOUT STATUS MIGRAINOSUS, NOT INTRACTABLE: ICD-10-CM

## 2019-12-09 DIAGNOSIS — G25.81 RESTLESS LEGS SYNDROME (RLS): ICD-10-CM

## 2019-12-09 DIAGNOSIS — Z99.89 OBSTRUCTIVE SLEEP APNEA TREATED WITH CONTINUOUS POSITIVE AIRWAY PRESSURE (CPAP): Primary | ICD-10-CM

## 2019-12-09 PROCEDURE — 99214 OFFICE O/P EST MOD 30 MIN: CPT | Performed by: NURSE PRACTITIONER

## 2019-12-09 RX ORDER — TOPIRAMATE 25 MG/1
TABLET ORAL
Qty: 90 TABLET | Refills: 1 | Status: SHIPPED | OUTPATIENT
Start: 2019-12-09 | End: 2020-06-03

## 2019-12-09 NOTE — PATIENT INSTRUCTIONS
1   Continue use of CPAP equipment nightly  2  Continue to clean your equipment, as discussed  3  Contact the Sleep [address] [place] [address] with any questions or concerns prior to your next visit, as needed  4  Schedule visit for follow-up in 1 year  5  Continue to stay active, walking and socializing  6   Continue to follow up with your family doctor regarding restless legs, as it is currently well controlled with current medications      Nursing Support:  When: Monday through Friday 7A-5PM except holidays  Where: Our direct line is [phone]  If you are having a true emergency please call 911  In the event that the line is busy or it is after hours please leave a voice message and we will return your call  Please speak clearly, leaving your full name, birth date, best number to reach you and the reason for your call  Medication refills: We will need the name of the medication, the dosage, the ordering provider, whether you get a 30 or 90 day refill, and the pharmacy name and address  Medications will be ordered by the provider only  Nurses cannot call in prescriptions  Please allow 7 days for medication refills  Physician requested updates: If your provider requested that you call with an update after starting medication, please be ready to provide us the medication and dosage, what time you take your medication, the time you attempt to fall asleep, time you fall asleep, when you wake up, and what time you get out of bed  Sleep Study Results: We will contact you with sleep study results and/or next steps after the physician has reviewed your testing

## 2019-12-09 NOTE — PROGRESS NOTES
Progress Note - [address] 75 y o  female   SSK:[birth year], MRN: [id number]  12/9/2019          Follow Up Evaluation / Problem:     Obstructive Sleep Apnea  Restless legs syndrome  Fibromyalgia  Obesity      Thank you for the opportunity of participating in the evaluation and care of this patient in the Sleep Clinic at [facility]  HPI: Yvrose Pfeiffer is a 75y o  year old female  The patient presents for follow up of obstructive sleep apnea  She has a long history of CHRIS with treatment using CPAP at 9cm  She recently completed a split night sleep study  She is here to review the results of the study and to review compliance and effectiveness of treatment using CPAP  She has taken topiramate at bedtime to treat restless legs for many years and has added pregabalin for fibromyalgia, which when taken without topiramate does not help her restless leg symptoms      Review of Systems      Genitourinary none   Cardiology none   Gastrointestinal none   Neurology none   Constitutional none   Integumentary none   Psychiatry none   Musculoskeletal none   Pulmonary difficulty breathing when lying flat    ENT ringing in ears   Endocrine frequent urination   Hematological none       Current Outpatient Medications:     acetaminophen (TYLENOL) 325 mg tablet, Take by mouth as needed , Disp: , Rfl:     albuterol (PROAIR HFA) 90 mcg/act inhaler, inhale 2 puff by inhalation route  every 4 - 6 hours as needed, Disp: , Rfl:     B Complex-C CAPS, Take by mouth, Disp: , Rfl:     BD PEN NEEDLE JAE U/F 32G X 4 MM MISC, INJECT AS DIRECTED 3  (THREE) TIMES A DAY, Disp: 270 each, Rfl: 1    Cholecalciferol (VITAMIN D3) 2000 units capsule, half, Disp: , Rfl:     cholestyramine (QUESTRAN) 4 g packet, take 1 packet by mouth every other day, Disp: , Rfl: 0    clotrimazole-betamethasone (LOTRISONE) 1-0 05 % cream, Apply topically 2 (two) times a day, Disp: 90 g, Rfl: 2    CONTOUR NEXT TEST test strip, , Disp: , Rfl: 0    dicyclomine (BENTYL) 20 mg tablet, TAKE 1 TABLET BY MOUTH  TWICE A DAY, Disp: 180 tablet, Rfl: 0    EASY TOUCH LANCETS 32G/TWIST MISC, sub dermal, Disp: , Rfl:     fenofibrate (TRIGLIDE) 160 MG tablet, TAKE 1 TABLET BY MOUTH  EVERY DAY, Disp: 90 tablet, Rfl: 1    Ferrous Sulfate (IRON) 325 (65 Fe) MG TABS, Take by mouth, Disp: , Rfl:     HUMALOG KWIKPEN 200 units/mL CONCENTRATED U-200 injection pen, INJECT SUBCUTANEOUSLY AS  DIRECTED PER PRESCRIBER&apos;S  INSTRUCTIONS   INSULIN  DOSING REQUIRES  INDIVIDUALIZATION , Disp: 12 mL, Rfl: 1    HYDROcodone-acetaminophen (NORCO) 5-325 mg per tablet, Take 1 tablet by mouth 3 (three) times a day as needed, Disp: , Rfl: 0    insulin degludec (TRESIBA FLEXTOUCH) 100 units/mL injection pen, Inject 80 Units under the skin daily, Disp: 5 pen, Rfl: 2    lisinopril (ZESTRIL) 10 mg tablet, TAKE 1 TABLET BY MOUTH  EVERY DAY, Disp: 90 tablet, Rfl: 1    loratadine (CLARITIN) 10 mg tablet, Take by mouth, Disp: , Rfl:     magnesium Oxide (MAG-OX) 400 mg TABS, Take by mouth, Disp: , Rfl:     Multiple Vitamin (DAILY VALUE MULTIVITAMIN) TABS, Take by mouth, Disp: , Rfl:     oxybutynin (DITROPAN-XL) 5 mg 24 hr tablet, Take 1 tablet (5 mg total) by mouth daily, Disp: 90 tablet, Rfl: 3    pantoprazole (PROTONIX) 40 mg tablet, TAKE 1 TABLET BY MOUTH  EVERY DAY, Disp: 90 tablet, Rfl: 1    pregabalin (LYRICA) 75 mg capsule, Take one cap tid, Disp: 270 capsule, Rfl: 1    Probiotic Product (ALIGN) CHEW, Chew 2 (two) times a day, Disp: , Rfl:     simvastatin (ZOCOR) 20 mg tablet, Take by mouth, Disp: , Rfl:     Thiamine HCl (VITAMIN B-1) 250 MG tablet, Take 250 mg by mouth daily, Disp: , Rfl:     topiramate (TOPAMAX) 25 mg tablet, TAKE 1 TABLET BY MOUTH  DAILY, Disp: 90 tablet, Rfl: 1    [place] Sleepiness Scale  Sitting and reading: Would never doze  Watching TV: Moderate chance of dozing  Sitting, inactive in a public place (e g  a theatre or a meeting): Would never doze  As a passenger in a car for an hour without a break: Would never doze  Lying down to rest in the afternoon when circumstances permit: Moderate chance of dozing  Sitting and talking to someone: Would never doze  Sitting quietly after a lunch without alcohol: Would never doze  In a car, while stopped for a few minutes in traffic: Would never doze  Total score: 4              Vitals:    12/09/19 0900   BP: 120/64   Pulse: 72   Weight: 117 kg (258 lb)   Height: 5' 4" (1 626 m)       Body mass index is 44 29 kg/m²  Neck Circumference: 17       EPWORTH SLEEPINESS SCORE  Total score: 4      Past History Since Last Sleep Center Visit:   She denies any changes to her health since her last visit  She has been using the new CPAP equipment and feels comfortable at the current setting  She was originally set up in APAP from 4cm to 20cm, however, she did not feel like she was getting enough pressure  Equipment was reset to 7cm to 15cm  She has slept much better with the new pressure range  The patient reports that she cleans the equipment appropriately and changes supplies on a regular basis  Results of the Split night sleep study, completed on   Sleep latency was 34 minutes and 59 seconds  REM sleep was not identified  Stage N4 sleep was also absent  Sleep efficiency was 50 2%  Only 4 abnormal breathing events were identified  The AHI was 1 3 events per hour  Lowest measured oxygen saturation was 89%  No periodic limb movements were seen  The arousal index is 18 4  events per hour  Sleep architecture was severely fragmented throughout  There was no evidence of Stage N3 or  Stage REM Sleep long periods of wakefulness were identified especially during the mid-portion and terminal  portions of the study  This study was suboptimal in quality   Nasal CPAP could not be started since AHI never  reached parameters set for use of treatment  A trial of AutoPAP followed by review of compliance data might prove  beneficial in determining the patient's pressure requirement  Clinical correlation is strongly recommended in further  interpretation of this result  The review of systems and following portions of the patient's history were reviewed and updated as appropriate: allergies, current medications, past family history, past medical history, past social history, past surgical history, and problem list         OBJECTIVE    PAP Pressure: Nasal AutoPAP using a lower limit of 7 cm and an upper limit of 15 cm of water pressure  DME Provider: [facility]a    Physical Exam:     General Appearance:   Alert, cooperative, no distress, appears stated age, morbidly obese     Head:   Normocephalic, without obvious abnormality, atraumatic     Eyes:   PERRL, conjunctiva/corneas clear, EOM's intact          Nose:  Nares normal, septum midline, no drainage or sinus tenderness           Throat:  Lips, teeth and gums normal; tongue normal size and  shape and midline mucosa moist and redundant bilaterally, uvula small, tonsils not visualized, Mallampati class 3-4       Neck:  Supple, symmetrical, trachea midline, no adenopathy; Thyroid: No enlargement, tenderness or nodules; no carotid bruit or JVD     Lungs:      Clear to auscultation bilaterally, respirations unlabored     Heart:   Regular rate and rhythm, S1 and S2 normal, no murmur, rub or gallop       Extremities:  Extremities normal, atraumatic, no cyanosis or edema       Skin:  Skin color, texture, turgor normal, no rashes or lesions       Neurologic:  No focal deficits noted  Normal strength, sensation throughout  Ambulates with a rollator walker  ASSESSMENT / PLAN    1  Obstructive sleep apnea treated with continuous positive airway pressure (CPAP)  PAP DME Resupply/Reorder   2  Morbid obesity with BMI of 40 0-44 9, adult ([facility] [address] )     3   Restless legs syndrome (RLS) Counseling / Coordination of Care  Total clinic time spent today 30 minutes  Greater than 50% of total time was spent with the patient and / or family counseling and / or coordination of care  A description of the counseling / coordination of care:     Impressions, Diagnostic results, Prognosis, Instructions for management, Risks and benefits of treatment, Patient and family education, Risk factor reductions and Importance of compliance with treatment    Today I reviewed the results of the sleep study  She did not have enough evidence of CHRIS during the study to begin the use of CPAP  Sleep efficiency and quality of sleep was very poor  She has been doing well with APAP set from 7cm to 15cm  I reviewed the patient's compliance data  she has been able to use the equipment 100% of all days recorded  Average usage was 4 or more hours 100% of all days recorded  The estimated AHI is 2 0 abnormal breathing events per hour  Response to treatment has been very good  Supplies have been ordered for the next year  She was encouraged to increase her activity level, since she has been feeling more alert throughout the day  She will continue using this equipment at the settings noted above for the next 6 months  At that timeshe will then return for a routine follow-up evaluation  I have asked the patient to contact the [address] if she encounters any difficulties prior to that time  She will continue to follow up with her PCP regarding restless legs, as it is currently well-controlled with current medication regimen  The following instructions have been given to the patient today:    Patient Instructions   1  Continue use of CPAP equipment nightly  2  Continue to clean your equipment, as discussed  3  Contact the [address] with any questions or concerns prior to your next visit, as needed  4  Schedule visit for follow-up in 1 year  5    Continue to stay active, walking and socializing  6   Continue to follow up with your family doctor regarding restless legs, as it is currently well controlled with current medications      Nursing Support:  When: Monday through Friday 7A-5PM except holidays  Where: Our direct line is [phone]  If you are having a true emergency please call 911  In the event that the line is busy or it is after hours please leave a voice message and we will return your call  Please speak clearly, leaving your full name, birth date, best number to reach you and the reason for your call  Medication refills: We will need the name of the medication, the dosage, the ordering provider, whether you get a 30 or 90 day refill, and the pharmacy name and address  Medications will be ordered by the provider only  Nurses cannot call in prescriptions  Please allow 7 days for medication refills  Physician requested updates: If your provider requested that you call with an update after starting medication, please be ready to provide us the medication and dosage, what time you take your medication, the time you attempt to fall asleep, time you fall asleep, when you wake up, and what time you get out of bed  Sleep Study Results: We will contact you with sleep study results and/or next steps after the physician has reviewed your testing          Juan Johnson, [facility]

## 2019-12-10 ENCOUNTER — TELEPHONE (OUTPATIENT)
Dept: SLEEP CENTER | Facility: CLINIC | Age: 74
End: 2019-12-10

## 2019-12-12 ENCOUNTER — HOSPITAL ENCOUNTER (OUTPATIENT)
Dept: ULTRASOUND IMAGING | Facility: HOSPITAL | Age: 74
Discharge: HOME | End: 2019-12-12
Payer: MEDICARE

## 2019-12-12 ENCOUNTER — TRANSCRIBE ORDERS (OUTPATIENT)
Dept: ADMINISTRATIVE | Facility: HOSPITAL | Age: 74
End: 2019-12-12

## 2019-12-12 DIAGNOSIS — K76.0 FATTY LIVER: ICD-10-CM

## 2019-12-12 PROCEDURE — 76705 ECHO EXAM OF ABDOMEN: CPT

## 2019-12-16 ENCOUNTER — TRANSCRIBE ORDERS (OUTPATIENT)
Dept: ADMINISTRATIVE | Facility: HOSPITAL | Age: 74
End: 2019-12-16

## 2019-12-16 DIAGNOSIS — K76.0 FATTY LIVER: Primary | ICD-10-CM

## 2019-12-30 ENCOUNTER — OFFICE VISIT (OUTPATIENT)
Dept: FAMILY MEDICINE CLINIC | Facility: CLINIC | Age: 74
End: 2019-12-30
Payer: MEDICARE

## 2019-12-30 VITALS
RESPIRATION RATE: 16 BRPM | HEART RATE: 70 BPM | SYSTOLIC BLOOD PRESSURE: 122 MMHG | TEMPERATURE: 98.5 F | OXYGEN SATURATION: 97 % | WEIGHT: 258 LBS | HEIGHT: 64 IN | DIASTOLIC BLOOD PRESSURE: 80 MMHG | BODY MASS INDEX: 44.05 KG/M2

## 2019-12-30 DIAGNOSIS — N93.9 VAGINAL BLEEDING: Primary | ICD-10-CM

## 2019-12-30 PROCEDURE — 99214 OFFICE O/P EST MOD 30 MIN: CPT | Performed by: FAMILY MEDICINE

## 2019-12-31 NOTE — PROGRESS NOTES
Subjective:   Chief Complaint   Patient presents with    Vaginal Bleeding     ongoing 1 week stopped 2 days ago states had a hysterectomy in 1984 she still has her ovaries but has not ever bled until last week  states she wears a diaper and noticed blood on her diaper and when she wiped  Patient ID: Mingo Hernández is a 75 y o  female  Patient and office concerned about vaginal bleeding she had it for 1 week and it is fresh blood and sometimes is doc with clot patient is the 1st time she had it since she had the hysterectomy and she did removed the uterus and the cervix secondary to uterus fibroid but she still have her ovary a deny any abdomen cramp no flank pain no weight change no recent sexual activity and no pain with bleeding      The following portions of the patient's history were reviewed and updated as appropriate: allergies, current medications, past family history, past medical history, past social history, past surgical history and problem list     Review of Systems   Constitutional: Negative for fatigue and fever  HENT: Negative for ear pain, sinus pressure, sinus pain and sore throat  Eyes: Negative for pain and redness  Respiratory: Negative for cough, chest tightness and shortness of breath  Cardiovascular: Negative for chest pain, palpitations and leg swelling  Gastrointestinal: Negative for abdominal pain, blood in stool, constipation, diarrhea and nausea  Genitourinary: Positive for vaginal bleeding  Negative for flank pain, frequency, hematuria, vaginal discharge and vaginal pain  Musculoskeletal: Negative for back pain and joint swelling  Skin: Negative for rash  Neurological: Negative for dizziness, numbness and headaches  Hematological: Does not bruise/bleed easily               Objective:  Vitals:    12/30/19 1320   BP: 122/80   BP Location: Left arm   Patient Position: Sitting   Cuff Size: Large   Pulse: 70   Resp: 16   Temp: 98 5 °F (36 9 °C) TempSrc: Tympanic   SpO2: 97%   Weight: 117 kg (258 lb)   Height: 5' 4" (1 626 m)      Physical Exam   Constitutional: She is oriented to person, place, and time  She appears well-developed and well-nourished  HENT:   Head: Normocephalic  Right Ear: External ear normal    Left Ear: External ear normal    Eyes: Conjunctivae and EOM are normal  Right eye exhibits no discharge  Left eye exhibits no discharge  Neck: No JVD present  Cardiovascular: Normal rate, regular rhythm and normal heart sounds  Exam reveals no gallop  No murmur heard  Pulmonary/Chest: Effort normal  No respiratory distress  She has no wheezes  She has no rales  She exhibits no tenderness  Abdominal: She exhibits no mass  There is no tenderness  There is no rebound  Genitourinary: Vagina normal  No vaginal discharge found  Genitourinary Comments: The vaginal wall no bleeding no laceration thin   Musculoskeletal: She exhibits no edema or tenderness  Neurological: She is alert and oriented to person, place, and time  Skin: No rash noted  No erythema           Assessment/Plan:    Vaginal bleeding  A new diagnosis symptomatic in the patient post menopause the she had hysterectomy the removed the uterus and the cervix was still have her ovary plan to do transvaginal ultrasound to rule out ovarian cyst also give her Premarin cream to apply it the 2 to 3 times a week proper use and possible side effect discussed with the patient       Diagnoses and all orders for this visit:    Vaginal bleeding  -     conjugated estrogens (PREMARIN) vaginal cream; Insert 1 g into the vagina daily Apply vaginally x2/w  -     US pelvis complete w transvaginal; Future

## 2019-12-31 NOTE — ASSESSMENT & PLAN NOTE
A new diagnosis symptomatic in the patient post menopause the she had hysterectomy the removed the uterus and the cervix was still have her ovary plan to do transvaginal ultrasound to rule out ovarian cyst also give her Premarin cream to apply it the 2 to 3 times a week proper use and possible side effect discussed with the patient

## 2020-01-01 NOTE — ASSESSMENT & PLAN NOTE
Chronic ,fair control on current medication    Advised to maintain a low-fat low-cholesterol diet consult regarding potential comorbidity including cardiovascular disease consult regarding important weight loss
Chronic with controlled continue current medication low-salt diet less than 2 g a day ,   low caffeine intake   regular aerobic exercise 20 to totally minute a day diet and important lose weight discussed with the patient
Lab Results   Component Value Date    HGBA1C 9 2 (H) 06/04/2018     Uncontrolled patient on Tresiba 76 unit a day will increase it to 80 unit patient will continue to use her Humalog per insulin sliding scale at mealtime patient already on statin and lisinopril patient does follow by Ophthalmology and Podiatry for diabetic foot on eye care discussed with the patient important lose weight follow-up low carb diet
Stable GFR is improved Avoid non steroid  anti-inflammatory drugs  Keep will hydration  Avoid contrast dye
Parent refused

## 2020-01-06 ENCOUNTER — HOSPITAL ENCOUNTER (OUTPATIENT)
Dept: ULTRASOUND IMAGING | Facility: HOSPITAL | Age: 75
Discharge: HOME | End: 2020-01-06
Payer: MEDICARE

## 2020-01-06 DIAGNOSIS — N93.9 VAGINAL BLEEDING: ICD-10-CM

## 2020-01-06 PROCEDURE — 76830 TRANSVAGINAL US NON-OB: CPT

## 2020-01-06 PROCEDURE — 76856 US EXAM PELVIC COMPLETE: CPT

## 2020-01-16 ENCOUNTER — TELEPHONE (OUTPATIENT)
Dept: FAMILY MEDICINE CLINIC | Facility: CLINIC | Age: 75
End: 2020-01-16

## 2020-01-24 DIAGNOSIS — Z79.4 TYPE 2 DIABETES MELLITUS WITHOUT COMPLICATION, WITH LONG-TERM CURRENT USE OF INSULIN (HCC): ICD-10-CM

## 2020-01-24 DIAGNOSIS — G89.29 CHRONIC LOW BACK PAIN WITH SCIATICA, SCIATICA LATERALITY UNSPECIFIED, UNSPECIFIED BACK PAIN LATERALITY: ICD-10-CM

## 2020-01-24 DIAGNOSIS — N18.30 TYPE 2 DIABETES MELLITUS WITH STAGE 3 CHRONIC KIDNEY DISEASE, WITH LONG-TERM CURRENT USE OF INSULIN (HCC): ICD-10-CM

## 2020-01-24 DIAGNOSIS — E11.9 TYPE 2 DIABETES MELLITUS WITHOUT COMPLICATION, WITH LONG-TERM CURRENT USE OF INSULIN (HCC): ICD-10-CM

## 2020-01-24 DIAGNOSIS — M54.40 CHRONIC LOW BACK PAIN WITH SCIATICA, SCIATICA LATERALITY UNSPECIFIED, UNSPECIFIED BACK PAIN LATERALITY: ICD-10-CM

## 2020-01-24 DIAGNOSIS — E11.22 TYPE 2 DIABETES MELLITUS WITH STAGE 3 CHRONIC KIDNEY DISEASE, WITH LONG-TERM CURRENT USE OF INSULIN (HCC): ICD-10-CM

## 2020-01-24 DIAGNOSIS — E78.1 HYPERTRIGLYCERIDEMIA, ESSENTIAL: ICD-10-CM

## 2020-01-24 DIAGNOSIS — Z79.4 TYPE 2 DIABETES MELLITUS WITH STAGE 3 CHRONIC KIDNEY DISEASE, WITH LONG-TERM CURRENT USE OF INSULIN (HCC): ICD-10-CM

## 2020-01-24 RX ORDER — FENOFIBRATE 160 MG/1
TABLET ORAL
Qty: 90 TABLET | Refills: 0 | Status: SHIPPED | OUTPATIENT
Start: 2020-01-24 | End: 2020-03-17 | Stop reason: SDUPTHER

## 2020-01-24 RX ORDER — INSULIN DEGLUDEC INJECTION 100 U/ML
INJECTION, SOLUTION SUBCUTANEOUS
Qty: 45 ML | Refills: 0 | Status: SHIPPED | OUTPATIENT
Start: 2020-01-24 | End: 2020-02-10 | Stop reason: SDUPTHER

## 2020-01-24 RX ORDER — PREGABALIN 75 MG/1
CAPSULE ORAL
Qty: 270 CAPSULE | Refills: 0 | Status: SHIPPED | OUTPATIENT
Start: 2020-01-24 | End: 2020-06-19 | Stop reason: SDUPTHER

## 2020-01-24 RX ORDER — PEN NEEDLE, DIABETIC 32GX 5/32"
NEEDLE, DISPOSABLE MISCELLANEOUS
Qty: 270 EACH | Refills: 0 | Status: SHIPPED | OUTPATIENT
Start: 2020-01-24 | End: 2020-02-10 | Stop reason: SDUPTHER

## 2020-01-24 NOTE — TELEPHONE ENCOUNTER
Fill Date ID   Written Drug Qty Days Prescriber Rx # Pharmacy Refill   Daily Dose* Pymt Type      01/10/2020  2   01/10/2020  Hydrocodone-Acetamin 5-325 MG  90 00 30 Me Gas   [id number]   Yoav ([id number])   0  15 00 MME  [place]   12/12/2019  1   06/28/2019  Pregabalin 75 MG Capsule  270 00 90 Ma Alc   [id number]   Opt ([id number])   0   [place]   10/11/2019  2   10/11/2019  Hydrocodone-Acetamin 5-325 MG  90 00 30 Me Gas   [id number]   Yoav ([id number])   0  15 00 MME  [place]   09/04/2019  1   06/28/2019  Pregabalin 75 MG Capsule  270 00 90 Ma Alc   [id number]   Opt ([id number])   [place]   07/12/2019  2   07/12/2019  Hydrocodone-Acetamin 5-325 MG  90 00 30 Me Gas   [id number]   Yoav ([id number])   0  15 00 MME  [place]   06/17/2019  1   06/10/2019  Lyrica 75 MG Capsule  270 00 90 Ma Alc   [id number]   Opt ([id number])   0   [place]   06/10/2019  2   06/10/2019  Lyrica 75 MG Capsule  30 00 10 Ma Alc   [id number]   Yoav ([id number])   0   [place]   04/19/2019  2   04/19/2019  Hydrocodone-Acetamin 5-325 MG  90 00 30 Me Gas   [id number]   Yoav ([id number])   0  15 00 MME  [place]   03/04/2019  1   02/25/2019  Lyrica 75 MG Capsule  270 00 90 Ma Alc   [id number]   Talita ([id number])   0   [place]   01/24/2019  2   01/24/2019  Hydrocodone-Acetamin 5-325 MG  90 00 30 Me Gas   [id number]   Yoav ([id number])   0  15 00 MME  [place]

## 2020-02-03 ENCOUNTER — TRANSCRIBE ORDERS (OUTPATIENT)
Dept: ADMINISTRATIVE | Facility: HOSPITAL | Age: 75
End: 2020-02-03

## 2020-02-03 ENCOUNTER — APPOINTMENT (OUTPATIENT)
Dept: LAB | Facility: IMAGING CENTER | Age: 75
End: 2020-02-03
Payer: MEDICARE

## 2020-02-03 DIAGNOSIS — I10 ESSENTIAL HYPERTENSION: ICD-10-CM

## 2020-02-03 DIAGNOSIS — N18.30 CHRONIC KIDNEY DISEASE, STAGE III (MODERATE) (HCC): Primary | ICD-10-CM

## 2020-02-03 DIAGNOSIS — E11.22 TYPE 2 DIABETES MELLITUS WITH STAGE 3 CHRONIC KIDNEY DISEASE, WITH LONG-TERM CURRENT USE OF INSULIN (HCC): ICD-10-CM

## 2020-02-03 DIAGNOSIS — R80.9 PROTEINURIA, UNSPECIFIED TYPE: ICD-10-CM

## 2020-02-03 DIAGNOSIS — Z79.4 TYPE 2 DIABETES MELLITUS WITH STAGE 3 CHRONIC KIDNEY DISEASE, WITH LONG-TERM CURRENT USE OF INSULIN (HCC): ICD-10-CM

## 2020-02-03 DIAGNOSIS — I12.9 PARENCHYMAL RENAL HYPERTENSION, STAGE 1 THROUGH STAGE 4 OR UNSPECIFIED CHRONIC KIDNEY DISEASE: ICD-10-CM

## 2020-02-03 DIAGNOSIS — E11.21 DIABETIC GLOMERULOPATHY (HCC): ICD-10-CM

## 2020-02-03 DIAGNOSIS — G47.33 OBSTRUCTIVE SLEEP APNEA (ADULT) (PEDIATRIC): ICD-10-CM

## 2020-02-03 DIAGNOSIS — E11.65 UNCONTROLLED TYPE 2 DIABETES MELLITUS WITH HYPERGLYCEMIA (HCC): ICD-10-CM

## 2020-02-03 DIAGNOSIS — N18.30 TYPE 2 DIABETES MELLITUS WITH STAGE 3 CHRONIC KIDNEY DISEASE, WITH LONG-TERM CURRENT USE OF INSULIN (HCC): ICD-10-CM

## 2020-02-03 DIAGNOSIS — D64.9 ANEMIA, UNSPECIFIED TYPE: ICD-10-CM

## 2020-02-03 DIAGNOSIS — N18.30 CHRONIC KIDNEY DISEASE, STAGE III (MODERATE) (HCC): ICD-10-CM

## 2020-02-03 DIAGNOSIS — E83.42 HYPOMAGNESEMIA: ICD-10-CM

## 2020-02-03 DIAGNOSIS — E78.2 MIXED HYPERLIPIDEMIA: ICD-10-CM

## 2020-02-03 LAB
ALBUMIN SERPL BCP-MCNC: 3.5 G/DL (ref 3.5–5)
ANION GAP SERPL CALCULATED.3IONS-SCNC: 3 MMOL/L (ref 4–13)
BASOPHILS # BLD AUTO: 0.02 THOUSANDS/ΜL (ref 0–0.1)
BASOPHILS NFR BLD AUTO: 0 % (ref 0–1)
BUN SERPL-MCNC: 18 MG/DL (ref 5–25)
CALCIUM SERPL-MCNC: 9.2 MG/DL (ref 8.3–10.1)
CHLORIDE SERPL-SCNC: 109 MMOL/L (ref 100–108)
CHOLEST SERPL-MCNC: 110 MG/DL (ref 50–200)
CO2 SERPL-SCNC: 28 MMOL/L (ref 21–32)
CREAT SERPL-MCNC: 1.27 MG/DL (ref 0.6–1.3)
CREAT UR-MCNC: 116 MG/DL
EOSINOPHIL # BLD AUTO: 0.11 THOUSAND/ΜL (ref 0–0.61)
EOSINOPHIL NFR BLD AUTO: 2 % (ref 0–6)
ERYTHROCYTE [DISTWIDTH] IN BLOOD BY AUTOMATED COUNT: 13.2 % (ref 11.6–15.1)
EST. AVERAGE GLUCOSE BLD GHB EST-MCNC: 174 MG/DL
GFR SERPL CREATININE-BSD FRML MDRD: 41 ML/MIN/1.73SQ M
GLUCOSE P FAST SERPL-MCNC: 106 MG/DL (ref 65–99)
HBA1C MFR BLD: 7.7 % (ref 4.2–6.3)
HCT VFR BLD AUTO: 41.2 % (ref 34.8–46.1)
HDLC SERPL-MCNC: 37 MG/DL
HGB BLD-MCNC: 12.7 G/DL (ref 11.5–15.4)
IMM GRANULOCYTES # BLD AUTO: 0.05 THOUSAND/UL (ref 0–0.2)
IMM GRANULOCYTES NFR BLD AUTO: 1 % (ref 0–2)
LDLC SERPL CALC-MCNC: 43 MG/DL (ref 0–100)
LYMPHOCYTES # BLD AUTO: 1.2 THOUSANDS/ΜL (ref 0.6–4.47)
LYMPHOCYTES NFR BLD AUTO: 18 % (ref 14–44)
MAGNESIUM SERPL-MCNC: 1.7 MG/DL (ref 1.6–2.6)
MCH RBC QN AUTO: 29.1 PG (ref 26.8–34.3)
MCHC RBC AUTO-ENTMCNC: 30.8 G/DL (ref 31.4–37.4)
MCV RBC AUTO: 95 FL (ref 82–98)
MONOCYTES # BLD AUTO: 0.39 THOUSAND/ΜL (ref 0.17–1.22)
MONOCYTES NFR BLD AUTO: 6 % (ref 4–12)
NEUTROPHILS # BLD AUTO: 5.04 THOUSANDS/ΜL (ref 1.85–7.62)
NEUTS SEG NFR BLD AUTO: 73 % (ref 43–75)
NONHDLC SERPL-MCNC: 73 MG/DL
NRBC BLD AUTO-RTO: 0 /100 WBCS
PHOSPHATE SERPL-MCNC: 2.5 MG/DL (ref 2.3–4.1)
PLATELET # BLD AUTO: 163 THOUSANDS/UL (ref 149–390)
PMV BLD AUTO: 12.4 FL (ref 8.9–12.7)
POTASSIUM SERPL-SCNC: 4 MMOL/L (ref 3.5–5.3)
PROT UR-MCNC: 16 MG/DL
PROT/CREAT UR: 0.14 MG/G{CREAT} (ref 0–0.1)
RBC # BLD AUTO: 4.36 MILLION/UL (ref 3.81–5.12)
SODIUM SERPL-SCNC: 140 MMOL/L (ref 136–145)
TRIGL SERPL-MCNC: 150 MG/DL
TSH SERPL DL<=0.05 MIU/L-ACNC: 3.05 UIU/ML (ref 0.36–3.74)
WBC # BLD AUTO: 6.81 THOUSAND/UL (ref 4.31–10.16)

## 2020-02-03 PROCEDURE — 80069 RENAL FUNCTION PANEL: CPT

## 2020-02-03 PROCEDURE — 83735 ASSAY OF MAGNESIUM: CPT

## 2020-02-03 PROCEDURE — 80061 LIPID PANEL: CPT

## 2020-02-03 PROCEDURE — 84156 ASSAY OF PROTEIN URINE: CPT | Performed by: INTERNAL MEDICINE

## 2020-02-03 PROCEDURE — 36415 COLL VENOUS BLD VENIPUNCTURE: CPT

## 2020-02-03 PROCEDURE — 85025 COMPLETE CBC W/AUTO DIFF WBC: CPT

## 2020-02-03 PROCEDURE — 83036 HEMOGLOBIN GLYCOSYLATED A1C: CPT

## 2020-02-03 PROCEDURE — 82570 ASSAY OF URINE CREATININE: CPT | Performed by: INTERNAL MEDICINE

## 2020-02-03 PROCEDURE — 84443 ASSAY THYROID STIM HORMONE: CPT

## 2020-02-10 ENCOUNTER — OFFICE VISIT (OUTPATIENT)
Dept: FAMILY MEDICINE CLINIC | Facility: CLINIC | Age: 75
End: 2020-02-10
Payer: MEDICARE

## 2020-02-10 VITALS
RESPIRATION RATE: 16 BRPM | DIASTOLIC BLOOD PRESSURE: 74 MMHG | HEIGHT: 64 IN | HEART RATE: 72 BPM | TEMPERATURE: 97.8 F | SYSTOLIC BLOOD PRESSURE: 102 MMHG | WEIGHT: 253 LBS | OXYGEN SATURATION: 97 % | BODY MASS INDEX: 43.19 KG/M2

## 2020-02-10 DIAGNOSIS — M25.511 CHRONIC RIGHT SHOULDER PAIN: Primary | ICD-10-CM

## 2020-02-10 DIAGNOSIS — E11.22 TYPE 2 DIABETES MELLITUS WITH STAGE 3 CHRONIC KIDNEY DISEASE, WITH LONG-TERM CURRENT USE OF INSULIN (HCC): ICD-10-CM

## 2020-02-10 DIAGNOSIS — N18.30 CHRONIC RENAL INSUFFICIENCY, STAGE III (MODERATE) (HCC): ICD-10-CM

## 2020-02-10 DIAGNOSIS — I73.9 PERIPHERAL VASCULAR DISEASE (HCC): ICD-10-CM

## 2020-02-10 DIAGNOSIS — B35.4 TINEA CORPORIS: ICD-10-CM

## 2020-02-10 DIAGNOSIS — I10 ESSENTIAL HYPERTENSION: ICD-10-CM

## 2020-02-10 DIAGNOSIS — Z79.4 TYPE 2 DIABETES MELLITUS WITH STAGE 3 CHRONIC KIDNEY DISEASE, WITH LONG-TERM CURRENT USE OF INSULIN (HCC): ICD-10-CM

## 2020-02-10 DIAGNOSIS — N18.30 TYPE 2 DIABETES MELLITUS WITH STAGE 3 CHRONIC KIDNEY DISEASE, WITH LONG-TERM CURRENT USE OF INSULIN (HCC): ICD-10-CM

## 2020-02-10 DIAGNOSIS — G89.29 CHRONIC RIGHT SHOULDER PAIN: Primary | ICD-10-CM

## 2020-02-10 DIAGNOSIS — Z79.4 LONG TERM CURRENT USE OF INSULIN (HCC): ICD-10-CM

## 2020-02-10 DIAGNOSIS — E66.01 MORBID OBESITY WITH BMI OF 40.0-44.9, ADULT (HCC): ICD-10-CM

## 2020-02-10 PROCEDURE — 1160F RVW MEDS BY RX/DR IN RCRD: CPT | Performed by: FAMILY MEDICINE

## 2020-02-10 PROCEDURE — 2022F DILAT RTA XM EVC RTNOPTHY: CPT | Performed by: FAMILY MEDICINE

## 2020-02-10 PROCEDURE — 4040F PNEUMOC VAC/ADMIN/RCVD: CPT | Performed by: FAMILY MEDICINE

## 2020-02-10 PROCEDURE — 99214 OFFICE O/P EST MOD 30 MIN: CPT | Performed by: FAMILY MEDICINE

## 2020-02-10 PROCEDURE — 1036F TOBACCO NON-USER: CPT | Performed by: FAMILY MEDICINE

## 2020-02-10 PROCEDURE — 3066F NEPHROPATHY DOC TX: CPT | Performed by: FAMILY MEDICINE

## 2020-02-10 PROCEDURE — 4010F ACE/ARB THERAPY RXD/TAKEN: CPT | Performed by: FAMILY MEDICINE

## 2020-02-10 PROCEDURE — 3074F SYST BP LT 130 MM HG: CPT | Performed by: FAMILY MEDICINE

## 2020-02-10 PROCEDURE — 3078F DIAST BP <80 MM HG: CPT | Performed by: FAMILY MEDICINE

## 2020-02-10 PROCEDURE — 20610 DRAIN/INJ JOINT/BURSA W/O US: CPT | Performed by: FAMILY MEDICINE

## 2020-02-10 PROCEDURE — 3008F BODY MASS INDEX DOCD: CPT | Performed by: FAMILY MEDICINE

## 2020-02-10 RX ORDER — CLOTRIMAZOLE AND BETAMETHASONE DIPROPIONATE 10; .64 MG/G; MG/G
CREAM TOPICAL 2 TIMES DAILY
Qty: 90 G | Refills: 2 | Status: SHIPPED | OUTPATIENT
Start: 2020-02-10 | End: 2021-02-08 | Stop reason: SDUPTHER

## 2020-02-10 RX ORDER — BLOOD SUGAR DIAGNOSTIC
STRIP MISCELLANEOUS
Qty: 100 EACH | Refills: 2 | Status: SHIPPED | OUTPATIENT
Start: 2020-02-10 | End: 2022-04-04 | Stop reason: SDUPTHER

## 2020-02-10 RX ORDER — TRIAMCINOLONE ACETONIDE 40 MG/ML
40 INJECTION, SUSPENSION INTRA-ARTICULAR; INTRAMUSCULAR
Status: COMPLETED | OUTPATIENT
Start: 2020-02-10 | End: 2020-02-10

## 2020-02-10 RX ORDER — LISINOPRIL 10 MG/1
10 TABLET ORAL DAILY
Qty: 90 TABLET | Refills: 0 | Status: SHIPPED | OUTPATIENT
Start: 2020-02-10 | End: 2020-10-30 | Stop reason: SDUPTHER

## 2020-02-10 RX ORDER — LANCETS 32 GAUGE
EACH MISCELLANEOUS
Qty: 100 EACH | Refills: 3 | Status: SHIPPED | OUTPATIENT
Start: 2020-02-10

## 2020-02-10 RX ADMIN — TRIAMCINOLONE ACETONIDE 40 MG: 40 INJECTION, SUSPENSION INTRA-ARTICULAR; INTRAMUSCULAR at 14:58

## 2020-02-10 NOTE — PROGRESS NOTES
BMI Counseling: Body mass index is 43 43 kg/m²  The BMI is above normal  Nutrition recommendations include decreasing portion sizes, encouraging healthy choices of fruits and vegetables and limiting drinks that contain sugar  Exercise recommendations include exercising 3-5 times per week  Subjective:   Chief Complaint   Patient presents with    Follow-up     chronic conditions        Patient ID: Juanito Kruger is a 75 y o  female  Patient here follow-up with a chronic condition patient history of insulin-dependent diabetic her hemoglobin A1c dropped from 8 7 to7 7 patient deny any sign of symptom of hypoglycemia patient did lose 5 lb from previously she is compliant with the medication but still not compliant with the low carb diet deny any increased thirsty increased frequency urination no dizziness no headache and no abdomen pain  The patient history of chronic kidney disease and the as a complication of diabetic obesity and the patient deny any abdomen pain no flank pain no lower extremity edema no blood in the urine patient history of peripheral vascular disease deny any claudication no change in the color of the skin and she is already on statin and aspirin  Patient concerned about the shoulder pain right shoulder achy 8/10 localized in the shoulder radiated to the right upper extremity no fall no trauma no numbness no muscle weakness no swelling no redness there is a positive limited range of the motion no other joint pain or swelling patient is worse with certain range of the motion not getting any better on a Tylenol   recent blood work discussed with the patient      The following portions of the patient's history were reviewed and updated as appropriate: allergies, current medications, past family history, past medical history, past social history, past surgical history and problem list     Review of Systems   Constitutional: Negative for fatigue and fever     HENT: Negative for ear pain, sinus pressure, sinus pain and sore throat  Eyes: Negative for pain and redness  Respiratory: Negative for cough, chest tightness and shortness of breath  Cardiovascular: Negative for chest pain, palpitations and leg swelling  Gastrointestinal: Negative for abdominal pain, blood in stool, constipation, diarrhea and nausea  Genitourinary: Negative for flank pain, frequency and hematuria  Musculoskeletal: Positive for arthralgias  Negative for back pain and joint swelling  Right shoulder pain   Skin: Negative for rash  Neurological: Negative for dizziness, numbness and headaches  Hematological: Does not bruise/bleed easily  Objective:  Vitals:    02/10/20 1310   BP: 102/74   BP Location: Left arm   Patient Position: Sitting   Cuff Size: Large   Pulse: 72   Resp: 16   Temp: 97 8 °F (36 6 °C)   TempSrc: Tympanic   SpO2: 97%   Weight: 115 kg (253 lb)   Height: 5' 4" (1 626 m)      Physical Exam   Constitutional: She is oriented to person, place, and time  She appears well-developed and well-nourished  HENT:   Head: Normocephalic  Right Ear: External ear normal    Left Ear: External ear normal    Eyes: Conjunctivae and EOM are normal  Right eye exhibits no discharge  Left eye exhibits no discharge  Neck: No JVD present  Cardiovascular: Normal rate, regular rhythm and normal heart sounds  Exam reveals no gallop  No murmur heard  Pulmonary/Chest: Effort normal  No respiratory distress  She has no wheezes  She has no rales  She exhibits no tenderness  Abdominal: She exhibits no mass  There is no tenderness  There is no rebound  Musculoskeletal: She exhibits tenderness  She exhibits no edema  Positive tenderness on the right shoulder no erythema no swelling there is limited range of extended the arm above the level of the head and internal rotation  Drop arm test is positive ,aso Neer test is positive   Neurological: She is alert and oriented to person, place, and time  Skin: No rash noted  No erythema           Assessment/Plan:    Chronic right shoulder pain  Symptomatic not responding to the conservative treatment on Tylenol patient can't take nonsteroidal anti-inflammatory drugs secondary to chronic kidney disease , discussed injection with the patient and she is agree risk and benefit  of the injection has been discussed with the patient patient tolerated well  With a positive drop-arm test in year test a plan to do x-ray to rule out the rotator cuff tear proper exercise discussed with the patient    Type 2 diabetes mellitus with kidney complication, with long-term current use of insulin ([facility])    Lab Results   Component Value Date    HGBA1C 7 7 (H) 02/03/2020   Chronic asymptomatic improved the an hemoglobin A1c we discussed the patient to continue current management recommend the important lose weight low carb diet increase physical activity sign of symptom of hypoglycemia discussed with the patient she is already on a statin and the shin already on Ace inhibitor    Peripheral vascular disease ([facility] [address] )  Chronic asymptomatic fair control patient on statin and she is on aspirin discussed the patient important lose weight important control risk factor including diabetic obesity hyperlipidemia and high blood pressure    Chronic renal insufficiency, stage III (moderate)  A chronic asymptomatic GFR stable the patient does follow with the Nephrology a discussed the patient avoid nonsteroidal anti-inflammatory drugs and the increase the fluid intake    Morbid obesity with BMI of 40 0-44 9, adult ([facility] [address] )  Chronic the patient did lose 5 lb encouraged patient to continue recommend portion control low carb diet increase physical activity    Large joint arthrocentesis: R subacromial bursa  Date/Time: 2/10/2020 2:58 PM  Consent given by: patient  Site marked: site marked  Timeout: Immediately prior to procedure a time out was called to verify the correct patient, procedure, equipment, support staff and site/side marked as required   Supporting Documentation  Indications: pain   Procedure Details  Location: shoulder - R subacromial bursa  Preparation: Patient was prepped and draped in the usual sterile fashion  Needle size: 25 G  Ultrasound guidance: no  Approach: posterior  Medications administered: 40 mg triamcinolone acetonide 40 mg/mL    Dressing:  Sterile dressing applied         Diagnoses and all orders for this visit:    Chronic right shoulder pain  -     XR shoulder 2+ vw right; Future  -     Large joint arthrocentesis: R subacromial bursa  -     triamcinolone acetonide (KENALOG-40) 40 mg/mL injection 40 mg    Type 2 diabetes mellitus with stage 3 chronic kidney disease, with long-term current use of insulin ([facility])  -     CONTOUR NEXT TEST test strip; Test blood sugar 3 times daily  -     EASY TOUCH LANCETS 32G/TWIST MISC; Test blood sugars 3 times daily  -     insulin degludec (TRESIBA FLEXTOUCH) 100 units/mL injection pen; Inject 80 units under the skin daily  -     Insulin Pen Needle (BD PEN NEEDLE JAE U/F) 32G X 4 MM MISC; Inject as directed 3 times daily    Peripheral vascular disease ([address] )    Morbid obesity with BMI of 40 0-44 9, adult ([facility])    Chronic renal insufficiency, stage III (moderate) ([facility])    Essential hypertension  -     lisinopril (ZESTRIL) 10 mg tablet;  Take 1 tablet (10 mg total) by mouth daily    Tinea corporis  -     clotrimazole-betamethasone (LOTRISONE) 1-0 05 % cream; Apply topically 2 (two) times a day    Long term current use of insulin ([facility])

## 2020-02-10 NOTE — ASSESSMENT & PLAN NOTE
Chronic the patient did lose 5 lb encouraged patient to continue recommend portion control low carb diet increase physical activity

## 2020-02-10 NOTE — ASSESSMENT & PLAN NOTE
Symptomatic not responding to the conservative treatment on Tylenol patient can't take nonsteroidal anti-inflammatory drugs secondary to chronic kidney disease , discussed injection with the patient and she is agree risk and benefit  of the injection has been discussed with the patient patient tolerated well  With a positive drop-arm test in year test a plan to do x-ray to rule out the rotator cuff tear proper exercise discussed with the patient

## 2020-02-10 NOTE — ASSESSMENT & PLAN NOTE
A chronic asymptomatic GFR stable the patient does follow with the Nephrology a discussed the patient avoid nonsteroidal anti-inflammatory drugs and the increase the fluid intake

## 2020-02-10 NOTE — ASSESSMENT & PLAN NOTE
Lab Results   Component Value Date    HGBA1C 7 7 (H) 02/03/2020   Chronic asymptomatic improved the an hemoglobin A1c we discussed the patient to continue current management recommend the important lose weight low carb diet increase physical activity sign of symptom of hypoglycemia discussed with the patient she is already on a statin and the shin already on Ace inhibitor

## 2020-02-17 ENCOUNTER — HOSPITAL ENCOUNTER (OUTPATIENT)
Dept: RADIOLOGY | Facility: IMAGING CENTER | Age: 75
Discharge: HOME | End: 2020-02-17
Payer: MEDICARE

## 2020-02-17 DIAGNOSIS — M25.511 CHRONIC RIGHT SHOULDER PAIN: ICD-10-CM

## 2020-02-17 DIAGNOSIS — G89.29 CHRONIC RIGHT SHOULDER PAIN: ICD-10-CM

## 2020-02-17 PROCEDURE — 73030 X-RAY EXAM OF SHOULDER: CPT

## 2020-02-21 ENCOUNTER — HOSPITAL ENCOUNTER (OUTPATIENT)
Dept: MAMMOGRAPHY | Facility: HOSPITAL | Age: 75
Discharge: HOME | End: 2020-02-21
Payer: MEDICARE

## 2020-02-21 ENCOUNTER — TELEPHONE (OUTPATIENT)
Dept: FAMILY MEDICINE CLINIC | Facility: CLINIC | Age: 75
End: 2020-02-21

## 2020-02-21 ENCOUNTER — HOSPITAL ENCOUNTER (OUTPATIENT)
Dept: ULTRASOUND IMAGING | Facility: HOSPITAL | Age: 75
Discharge: HOME | End: 2020-02-21
Payer: MEDICARE

## 2020-02-21 VITALS — BODY MASS INDEX: 42.15 KG/M2 | HEIGHT: 65 IN | WEIGHT: 253 LBS

## 2020-02-21 DIAGNOSIS — R92.8 MAMMOGRAM ABNORMAL: ICD-10-CM

## 2020-02-21 PROCEDURE — 76642 ULTRASOUND BREAST LIMITED: CPT

## 2020-02-21 PROCEDURE — 77065 DX MAMMO INCL CAD UNI: CPT

## 2020-02-21 NOTE — TELEPHONE ENCOUNTER
----- Message from Pooja Bueno MD sent at 2/21/2020  1:02 PM EST -----  Probable cyst in the left breast    - Diagnostic mammogram in 6 months for both breasts     - Ultrasound in 6 months for the left breast

## 2020-03-13 ENCOUNTER — OFFICE VISIT (OUTPATIENT)
Dept: FAMILY MEDICINE CLINIC | Facility: CLINIC | Age: 75
End: 2020-03-13
Payer: MEDICARE

## 2020-03-13 VITALS
DIASTOLIC BLOOD PRESSURE: 62 MMHG | HEART RATE: 75 BPM | WEIGHT: 253 LBS | TEMPERATURE: 97.8 F | OXYGEN SATURATION: 97 % | SYSTOLIC BLOOD PRESSURE: 112 MMHG | BODY MASS INDEX: 42.15 KG/M2 | HEIGHT: 65 IN | RESPIRATION RATE: 16 BRPM

## 2020-03-13 DIAGNOSIS — M54.2 NECK PAIN: Primary | ICD-10-CM

## 2020-03-13 DIAGNOSIS — N18.30 TYPE 2 DIABETES MELLITUS WITH STAGE 3 CHRONIC KIDNEY DISEASE, WITH LONG-TERM CURRENT USE OF INSULIN (HCC): ICD-10-CM

## 2020-03-13 DIAGNOSIS — E78.2 MIXED HYPERLIPIDEMIA: ICD-10-CM

## 2020-03-13 DIAGNOSIS — E11.22 TYPE 2 DIABETES MELLITUS WITH STAGE 3 CHRONIC KIDNEY DISEASE, WITH LONG-TERM CURRENT USE OF INSULIN (HCC): ICD-10-CM

## 2020-03-13 DIAGNOSIS — K58.8 OTHER IRRITABLE BOWEL SYNDROME: ICD-10-CM

## 2020-03-13 DIAGNOSIS — M25.511 CHRONIC RIGHT SHOULDER PAIN: ICD-10-CM

## 2020-03-13 DIAGNOSIS — G89.29 CHRONIC RIGHT SHOULDER PAIN: ICD-10-CM

## 2020-03-13 DIAGNOSIS — Z79.4 TYPE 2 DIABETES MELLITUS WITH STAGE 3 CHRONIC KIDNEY DISEASE, WITH LONG-TERM CURRENT USE OF INSULIN (HCC): ICD-10-CM

## 2020-03-13 DIAGNOSIS — N63.0 BREAST LUMP IN UPPER OUTER QUADRANT: ICD-10-CM

## 2020-03-13 DIAGNOSIS — E83.42 HYPOMAGNESEMIA: ICD-10-CM

## 2020-03-13 DIAGNOSIS — I10 ESSENTIAL HYPERTENSION: ICD-10-CM

## 2020-03-13 DIAGNOSIS — R92.8 ABNORMAL MAMMOGRAM: ICD-10-CM

## 2020-03-13 PROCEDURE — 1160F RVW MEDS BY RX/DR IN RCRD: CPT | Performed by: FAMILY MEDICINE

## 2020-03-13 PROCEDURE — 1036F TOBACCO NON-USER: CPT | Performed by: FAMILY MEDICINE

## 2020-03-13 PROCEDURE — 99214 OFFICE O/P EST MOD 30 MIN: CPT | Performed by: FAMILY MEDICINE

## 2020-03-13 PROCEDURE — 3051F HG A1C>EQUAL 7.0%<8.0%: CPT | Performed by: FAMILY MEDICINE

## 2020-03-13 PROCEDURE — 3074F SYST BP LT 130 MM HG: CPT | Performed by: FAMILY MEDICINE

## 2020-03-13 PROCEDURE — 3078F DIAST BP <80 MM HG: CPT | Performed by: FAMILY MEDICINE

## 2020-03-13 PROCEDURE — 3066F NEPHROPATHY DOC TX: CPT | Performed by: FAMILY MEDICINE

## 2020-03-13 PROCEDURE — 3008F BODY MASS INDEX DOCD: CPT | Performed by: FAMILY MEDICINE

## 2020-03-13 PROCEDURE — 2022F DILAT RTA XM EVC RTNOPTHY: CPT | Performed by: FAMILY MEDICINE

## 2020-03-13 PROCEDURE — 4040F PNEUMOC VAC/ADMIN/RCVD: CPT | Performed by: FAMILY MEDICINE

## 2020-03-13 RX ORDER — DICYCLOMINE HCL 20 MG
20 TABLET ORAL 2 TIMES DAILY
Qty: 180 TABLET | Refills: 1 | Status: SHIPPED | OUTPATIENT
Start: 2020-03-13 | End: 2020-06-29 | Stop reason: SDUPTHER

## 2020-03-13 NOTE — PROGRESS NOTES
Subjective:   Chief Complaint   Patient presents with    Follow-up     here today to review her xray of her right shoulder         Patient ID: Sara Burch is a 75 y o  female      Patient here follow-up with a chronic condition and she is concerned about the neck pain she described it as achy 9/03 certain range of motion of the head aggravated the pain associated with the numbness in the right upper arm no muscle weakness does not increase with the cough or sneezing a also patient been complained about the right shoulder pain she described it as achy 6/10 and pain radiate to the right lower extremity no muscle weakness no rash and no limited range of internal rotation and buccal up her bra or carb her hair recently had a x-ray it show arthritis of the joint   Also patient concerned about the lump on her right breast she just notice it couple weeks ago and some discoloration of the skin above it she does not recall any fall or trauma and the and the it is painful to touch no nipple discharge or nipple bleeding and no weight change and she did have history of abnormal mammogram of the left breast and she did have diagnostic mammogram of the left breast and show just cyst no personal history of breast cancer  Patient history of hypertension blood pressure fair control on current medication tolerated well without side effect deny any and chest pain short of breath no palpitation no TIA symptom no dyspnea on exertion patient's history of hyperlipidemia on statin tolerated well deny any chest pain short of breath no palpitation no TIA symptom recent blood work discussed with the patient result of the x-ray of the shoulder discussed with the patient      The following portions of the patient's history were reviewed and updated as appropriate: allergies, current medications, past family history, past medical history, past social history, past surgical history and problem list     Review of Systems   Constitutional: Negative for fatigue and fever  HENT: Negative for ear pain, sinus pressure, sinus pain and sore throat  Eyes: Negative for pain and redness  Respiratory: Negative for cough, chest tightness and shortness of breath  Cardiovascular: Negative for chest pain, palpitations and leg swelling  Gastrointestinal: Negative for abdominal pain, blood in stool, constipation, diarrhea and nausea  Genitourinary: Negative for flank pain, frequency and hematuria  Musculoskeletal: Positive for arthralgias and neck pain  Negative for back pain and joint swelling  Right shoulder pain   Skin: Negative for rash  Neurological: Negative for dizziness, numbness and headaches  Hematological: Does not bruise/bleed easily  Objective:  Vitals:    03/13/20 1308   BP: 112/62   BP Location: Left arm   Patient Position: Sitting   Cuff Size: Large   Pulse: 75   Resp: 16   Temp: 97 8 °F (36 6 °C)   TempSrc: Tympanic   SpO2: 97%   Weight: 115 kg (253 lb)   Height: 5' 5" (1 651 m)      Physical Exam   Constitutional: She is oriented to person, place, and time  She appears well-developed and well-nourished  HENT:   Head: Normocephalic  Right Ear: External ear normal    Left Ear: External ear normal    Eyes: Conjunctivae and EOM are normal  Right eye exhibits no discharge  Left eye exhibits no discharge  Neck: No JVD present  Cardiovascular: Normal rate, regular rhythm and normal heart sounds  Exam reveals no gallop  No murmur heard  Pulmonary/Chest: Effort normal  No respiratory distress  She has no wheezes  She has no rales  She exhibits no tenderness  Abdominal: She exhibits no mass  There is no tenderness  There is no rebound  Musculoskeletal: She exhibits tenderness  She exhibits no edema     Positive tenderness in the cervical spine at the level C for worse in the right side Spurling test is positive to the right side positive tenderness on the anterior of the right shoulder no swelling no erythema there is positive limited internal rotation extended the arm above the level of the head   Neurological: She is alert and oriented to person, place, and time  Skin: No rash noted  No erythema  Assessment/Plan:    Neck pain  A new diagnosis symptomatic patient already on Lyrica and she is on pain medication for the pain management proper postural discussed with the patient plan for x-ray of the cervical spine also refer the patient to physical therapy    Breast lump in upper outer quadrant  A new diagnosis the palpable lump on the right breast and at the a 10:00 a m  Lump almost 1 cm fix not movable not tender there is a ecchymosis in the skin above the the lump no history of trauma plan for diagnostic mammogram of the right breast    Chronic right shoulder pain  A chronic symptomatic x-ray osteoarthritis physical exam there is limitation range of the motion plan to order MRI to rule out the rotator cuff tear plan for physical therapy    Hyperlipidemia  Chronic asymptomatic fair control continue current management low-fat diet discussed with the patient    Essential hypertension  Chronic asymptomatic fair control continue current management low-salt diet and important lose weight discussed with the patient       Diagnoses and all orders for this visit:    Neck pain  -     XR neck soft tissue; Future    Chronic right shoulder pain  -     Ambulatory referral to Physical Therapy; Future  -     MRI shoulder right wo contrast; Future    Breast lump in upper outer quadrant  -     Mammo diagnostic right w cad; Future    Other irritable bowel syndrome  -     dicyclomine (BENTYL) 20 mg tablet; Take 1 tablet (20 mg total) by mouth 2 (two) times a day    Abnormal mammogram    Type 2 diabetes mellitus with stage 3 chronic kidney disease, with long-term current use of insulin (HCC)  -     CBC and differential; Future  -     Basic metabolic panel; Future  -     Lipid Panel with Direct LDL reflex;  Future  - Hemoglobin A1C; Future    Essential hypertension  -     CBC and differential; Future  -     Basic metabolic panel; Future  -     Lipid Panel with Direct LDL reflex; Future  -     Hemoglobin A1C; Future    Mixed hyperlipidemia  -     CBC and differential; Future  -     Basic metabolic panel; Future  -     Lipid Panel with Direct LDL reflex; Future  -     Hemoglobin A1C; Future    Hypomagnesemia  -     CBC and differential; Future  -     Basic metabolic panel; Future  -     Lipid Panel with Direct LDL reflex;  Future  -     Hemoglobin A1C; Future

## 2020-03-13 NOTE — ASSESSMENT & PLAN NOTE
A new diagnosis the palpable lump on the right breast and at the a 10:00 a m   Lump almost 1 cm fix not movable not tender there is a ecchymosis in the skin above the the lump no history of trauma plan for diagnostic mammogram of the right breast 7

## 2020-03-13 NOTE — ASSESSMENT & PLAN NOTE
A new diagnosis symptomatic patient already on Lyrica and she is on pain medication for the pain management proper postural discussed with the patient plan for x-ray of the cervical spine also refer the patient to physical therapy

## 2020-03-13 NOTE — ASSESSMENT & PLAN NOTE
Chronic asymptomatic fair control continue current management low-fat diet discussed with the patient

## 2020-03-13 NOTE — ASSESSMENT & PLAN NOTE
Chronic asymptomatic fair control continue current management low-salt diet and important lose weight discussed with the patient

## 2020-03-13 NOTE — ASSESSMENT & PLAN NOTE
A chronic symptomatic x-ray osteoarthritis physical exam there is limitation range of the motion plan to order MRI to rule out the rotator cuff tear plan for physical therapy

## 2020-03-16 ENCOUNTER — HOSPITAL ENCOUNTER (OUTPATIENT)
Dept: RADIOLOGY | Facility: HOSPITAL | Age: 75
Discharge: HOME | End: 2020-03-16
Payer: MEDICARE

## 2020-03-16 ENCOUNTER — HOSPITAL ENCOUNTER (OUTPATIENT)
Dept: MRI IMAGING | Facility: HOSPITAL | Age: 75
Discharge: HOME | End: 2020-03-16
Payer: MEDICARE

## 2020-03-16 DIAGNOSIS — M25.511 CHRONIC RIGHT SHOULDER PAIN: ICD-10-CM

## 2020-03-16 DIAGNOSIS — M54.2 NECK PAIN: ICD-10-CM

## 2020-03-16 DIAGNOSIS — G89.29 CHRONIC RIGHT SHOULDER PAIN: ICD-10-CM

## 2020-03-16 PROCEDURE — 73221 MRI JOINT UPR EXTREM W/O DYE: CPT

## 2020-03-16 PROCEDURE — 70360 X-RAY EXAM OF NECK: CPT

## 2020-03-17 DIAGNOSIS — E78.1 HYPERTRIGLYCERIDEMIA, ESSENTIAL: ICD-10-CM

## 2020-03-17 RX ORDER — FENOFIBRATE 160 MG/1
TABLET ORAL
Qty: 90 TABLET | Refills: 0 | Status: SHIPPED | OUTPATIENT
Start: 2020-03-17 | End: 2020-06-17

## 2020-03-19 ENCOUNTER — EVALUATION (OUTPATIENT)
Dept: PHYSICAL THERAPY | Facility: CLINIC | Age: 75
End: 2020-03-19
Payer: MEDICARE

## 2020-03-19 DIAGNOSIS — M25.511 CHRONIC RIGHT SHOULDER PAIN: Primary | ICD-10-CM

## 2020-03-19 DIAGNOSIS — G89.29 CHRONIC RIGHT SHOULDER PAIN: Primary | ICD-10-CM

## 2020-03-19 PROCEDURE — 97162 PT EVAL MOD COMPLEX 30 MIN: CPT

## 2020-03-19 PROCEDURE — 97140 MANUAL THERAPY 1/> REGIONS: CPT

## 2020-03-19 PROCEDURE — 97110 THERAPEUTIC EXERCISES: CPT

## 2020-03-19 NOTE — PROGRESS NOTES
PT Evaluation     Today's date: 3/19/2020  Patient name: Isa Jackson  : 1945  MRN: [id number]  Referring provider: Janine Hernandez MD  Dx:   Encounter Diagnosis     ICD-10-CM    1  Chronic right shoulder pain M25 511     G89 29                   Assessment  Assessment details: Isa Jackson is a 75 y o  female presenting to outpatient physical therapy with diagnosis of Chronic right shoulder pain  (primary encounter diagnosis)  Patient's current impairments include R  shld pain, impaired soft tissue mobility, reduced R shldr  range of motion, reduced R shld  strength, reduced postural awareness, and reduced activity tolerance  Patient's present functional limitations include difficulty with ADLs with increased need for assistance, reliance on medication and/or modalities for pain relief, poor tolerance for functional mobility and activity, and difficulty completing [place]  responsibilities  Patient to benefit from skilled outpatient physical therapy 2x/week for 4-6 weeks in order to reduce pain, maximize pain free range of motion, increase strength and stability, and improve functional mobility/functional activity in order to maximize return to prior level of function with reduced limitations  Thank you for your referral     Impairments: abnormal or restricted ROM, abnormal movement, activity intolerance, impaired physical strength, lacks appropriate home exercise program, pain with function and poor posture     Goals  STGs to be achieved in 4 weeks:  1  Pt to demonstrate reduced subjective pain rating "at worst" by at least 2-3 points from Initial Eval in order to allow for reduced pain with ADLs and improved functional activity tolerance  2  Pt to demonstrate increased AROM of R shld by at least 5-10 degrees in order to allow for greater ease and independence with ADLs and functional mobility     3  Pt to demonstrate ability to dress self indep in order to maximize joint mobility and function and allow for progression of exercise program and achievement of goals  4  Pt to demonstrate increased MMT of R shld by at least 1/2-1 grade in order to improve safety and stability with ADLs and functional mobility  LTGs to be achieved in 6-8 weeks:  1  Pt will be I with HEP in order to continue to improve quality of life and independence and reduce risk for re-injury  2  Pt to demonstrate return to full function R shld without limitations or restrictions  3  Pt to demonstrate improved function as noted by achieving or exceeding predicted score on FOTO outcomes assessment tool  Plan  Patient would benefit from: skilled physical therapy  Planned modality interventions: thermotherapy: hydrocollator packs and cryotherapy  Planned therapy interventions: manual therapy, strengthening, stretching, therapeutic exercise, home exercise program, massage and functional ROM exercises  Frequency: 2x week  Duration in weeks: 4  Plan of Care beginning date: 3/19/2020  Plan of Care expiration date: 2020  Treatment plan discussed with: patient        Subjective Evaluation    History of Present Illness  Mechanism of injury: Pt began with R shld pain approx 2 months ago  Had a cortisone inj 5 weeks ago which helped the shoulder but did not help the scapular pain or R cerv pain  Also has pain in R clavicle and pec  which wraps around deltoid area  Cannot reach into cupboard, can't lift heavy pots and pans, diff cooking/slicing/stirring, diff twisting ice cube tray  Has been using ice on shld  Cannot don bra (even front closure), can't curl hair or carry groceries  Occ pain down R arm into thumb and index finger  Having diff bathing and dressing            Not a recurrent problem   Quality of life: poor    Pain  Current pain ratin  At best pain ratin  At worst pain ratin  Quality: sharp, dull ache, throbbing and radiating  Relieving factors: ice, medications and change in position  Aggravating factors: overhead activity and lifting (carrying, reaching,laying down)  Progression: worsening    Social Support  Steps to enter house: no  Stairs in house: no   Lives in: apartment  Lives with: spouse    Employment status: not working  Hand dominance: right      Diagnostic Tests  Abnormal x-ray: results unknown  Abnormal MRI: results unknown  Treatments  Previous treatment: medication and injection treatment  Current treatment: medication and physical therapy  Patient Goals  Patient goals for therapy: decreased pain, increased motion, increased strength and independence with ADLs/IADLs  Patient goal: reduce pain so she can sleep        Objective     Postural Observations  Seated posture: fair  Standing posture: poor    Additional Postural Observation Details  Depressed R shldr and rotated posterior, signif fwd head, sl forward trunk lean, thoracic spine rotated R    Palpation     Right   No palpable tenderness to the anterior deltoid, lower trapezius, middle deltoid, posterior deltoid, thoracic paraspinals and triceps  Tenderness of the biceps, infraspinatus, levator scapulae, middle trapezius, pectoralis major, pectoralis minor, rhomboids, serratus anterior, subscapularis, supraspinatus and upper trapezius  Right Shoulder Comments  Right rhomboids: minimal      Cervical/Thoracic Screen   Thoracic range of motion within normal limits with the following exceptions: cerv rot R 50*, L 70*  Cerv SB R30*, L 25*    Active Range of Motion   Left Shoulder   Normal active range of motion    Right Shoulder   Flexion: 79 degrees   Extension: 43 degrees   Abduction: 60 degrees   External rotation BTH: Active external rotation behind the head: unable  Internal rotation BTB: Active internal rotation behind the back: unable       Passive Range of Motion     Right Shoulder   Flexion: 77 degrees   Abduction: 80 degrees     Strength/Myotome Testing     Left Shoulder     Planes of Motion   Flexion: 4+   Extension: 5   Abduction: 4+ Adduction: 5   External rotation at 0°: 5   Internal rotation at 0°: 5     Isolated Muscles   Upper trapezius: 5     Right Shoulder     Planes of Motion   Flexion: 2+   Extension: 3+   Abduction: 3-   Adduction: 4   External rotation at 0°: 4-   Internal rotation at 0°: 4     Isolated Muscles   Biceps: 4   Infraspinatus: 3-   Supraspinatus: 3-   Upper trapezius: 5     Tests     Right Shoulder   Positive drop arm, empty can and full can  Negative Keith's sign and sulcus sign  Additional Tests Details  cerv compression and distraction negative             Precautions: OA (mild) GH jt, obesity, IDDM, sleep apnea      Manual  3/19       Massage to R scap/UT? cerv area  10 min                                           Date 3/19       VISIT 1       Aerobic         Sleeper stretch side lying         Posterior capsule stretch        UT stretch        Levator scap stretch        Upper thoracic extension         Pec stretch (45/90/145)        Scap retraction 10x5"       Rhythmic stabilization        AAROM -->AROM shoulder (wand, slides, table)        Bicep curls        Tricep extension        Wall Push up plus--> standard         MTP/LTP        Tband IR/ER with towel roll         SL ER with towel roll         SL shoulder flexion         Prone rows to neutral         Prone horizontal abd in ER         Prone extension         Prone shoulder ER at 90* abd         Shoulder raises flexion/scaption         PNFs          Wall horizontal Abduction 90/90        cerv SB and rot w/ERS 5x5"       shldr rolls  Fwd/rev 10 ea               Scapular wall wipers         3/19/20 Pt instructed in above ex for HEP and demonstrated good understanding of same          Modalities

## 2020-03-23 ENCOUNTER — TELEPHONE (OUTPATIENT)
Dept: FAMILY MEDICINE CLINIC | Facility: CLINIC | Age: 75
End: 2020-03-23

## 2020-03-23 ENCOUNTER — OFFICE VISIT (OUTPATIENT)
Dept: PHYSICAL THERAPY | Facility: CLINIC | Age: 75
End: 2020-03-23
Payer: MEDICARE

## 2020-03-23 DIAGNOSIS — G89.29 CHRONIC RIGHT SHOULDER PAIN: Primary | ICD-10-CM

## 2020-03-23 DIAGNOSIS — M25.511 CHRONIC RIGHT SHOULDER PAIN: Primary | ICD-10-CM

## 2020-03-23 PROCEDURE — 97110 THERAPEUTIC EXERCISES: CPT

## 2020-03-23 PROCEDURE — 97140 MANUAL THERAPY 1/> REGIONS: CPT

## 2020-03-23 NOTE — TELEPHONE ENCOUNTER
Spoke with patient regarding results of her neck wants to know what she should do regarding her shoulder states she started the therapy  ,

## 2020-03-23 NOTE — PROGRESS NOTES
Daily Note     Today's date: 3/23/2020  Patient name: Juanito Kruger  : 1945  MRN: [id number]  Referring provider: Ju Olson MD  Dx:   Encounter Diagnosis     ICD-10-CM    1  Chronic right shoulder pain M25 511     G89 29                   Subjective: Pt  states she has sx @ R sub scap, R Bicep, and R Tricep regions  Rates the pain level to ="6-7"/10  Objective: See treatment diary below      Assessment: Tolerated treatment Fair to Fair+ with performance of ther exer  , and tolerance to stretches  Patient tolerated massage Fair+ to Fairly Well overall  Plan: Continue per plan of care  Progress treatment as tolerated  Precautions: OA (mild) GH jt, obesity, IDDM, sleep apnea      Manual  3/19 3 23 20      Massage to R scap/UT? cerv area   10 min R scap/UT  R Bicep/Tricep  20 min total                                          Date 3/19 3 23 20      VISIT 1 2        Aerobic           Sleeper stretch side lying         Posterior capsule stretch  NV      UT stretch  **R 3x/15"  Reviewed for Home performance as well        Levator scap stretch  NV      Upper thoracic extension   **10x/5          Pec stretch (45/90/145)        Scap retraction 10x5" 10x5"      Rhythmic stabilization        AAROM -->AROM shoulder (wand, slides, table)  **Table Slides   Fwd , Side-Side, CW/CCW  10x ea AA      Bicep curls   **R 5x/5"        Tricep extension  **R 5x/5"      Wall Push up plus--> standard   **1x10      MTP/LTP          Tband IR/ER with towel roll         SL ER with towel roll         SL shoulder flexion         Prone rows to neutral         Prone horizontal abd in ER         Prone extension         Prone shoulder ER at 90* abd         Shoulder raises flexion/scaption         PNFs            Wall horizontal Abduction 90/90        cerv SB and rot w/ERS 5x5" 5x5"      shldr rolls  Fwd/rev 10 ea 10x ea                Scapular wall wipers         3/19/20 Pt instructed in above ex for HEP and demonstrated good understanding of same          Modalities   3 23 20        n/a

## 2020-03-26 ENCOUNTER — OFFICE VISIT (OUTPATIENT)
Dept: PHYSICAL THERAPY | Facility: CLINIC | Age: 75
End: 2020-03-26
Payer: MEDICARE

## 2020-03-26 DIAGNOSIS — G89.29 CHRONIC RIGHT SHOULDER PAIN: Primary | ICD-10-CM

## 2020-03-26 DIAGNOSIS — M25.511 CHRONIC RIGHT SHOULDER PAIN: Primary | ICD-10-CM

## 2020-03-26 PROCEDURE — 97110 THERAPEUTIC EXERCISES: CPT

## 2020-03-26 PROCEDURE — 97140 MANUAL THERAPY 1/> REGIONS: CPT

## 2020-03-26 NOTE — PROGRESS NOTES
Daily Note     Today's date: 3/26/2020  Patient name: Nikki Cavazos  : 1945  MRN: [id number]  Referring provider: Ankush Nunez MD  Dx:   Encounter Diagnosis     ICD-10-CM    1  Chronic right shoulder pain M25 511     G89 29                   Subjective: Pt notes her neck is feeling better but still has pain in upper trap and shld   Which wakes her up at night, although sleeping is more comfortable than it was  Objective: See treatment diary below      Assessment: Tolerated treatment well  Patient demonstrated fatigue post treatment, exhibited good technique with therapeutic exercises and would benefit from continued PT  Pt given written HEP for continuity of care at home  Plan: Continue per plan of care  Progress ex as pt tolerates  Precautions: OA (mild) GH jt, obesity, IDDM, sleep apnea      Manual  3/19 3 23 20 3/26     Massage to R scap/UT? cerv area   10 min R scap/UT  R Bicep/Tricep  20 min total R scap/UT  15'                                         Date 3/19 3 23 20 3/26     VISIT 1 2   3     Aerobic      /50  5' alt     Sleeper stretch side lying         Posterior capsule stretch  NV 5x10'     UT stretch  **R 3x/15"  Reviewed for Home performance as well        Levator scap stretch  NV 5x10"     Upper thoracic extension   **10x/5     10x5"     Pec stretch (45/90/145)        Scap retraction 10x5" 10x5" 10x5"     Rhythmic stabilization        AAROM -->AROM shoulder (wand, slides, table)  **Table Slides   Fwd , Side-Side, CW/CCW  10x ea AA Table Slides   Fwd , Side-Side, CW/CCW  15x ea AA     Bicep curls   **R 5x/5"   R 2#  30x     Tricep extension  **R 5x/5" R 15x     Wall Push up plus--> standard   **1x10 15x     MTP/LTP          Tband IR/ER with towel roll         SL ER with towel roll         SL shoulder flexion         Prone rows to neutral         Prone horizontal abd in ER         Prone extension         Prone shoulder ER at 90* abd         Shoulder raises flexion/scaption PNFs            Wall horizontal Abduction 90/90        cerv SB and rot w/ERS 5x5" 5x5" 5x5" ea     shldr rolls  Fwd/rev 10 ea 10x ea 20 ea               Scapular wall wipers         3/19/20 Pt instructed in above ex for HEP and demonstrated good understanding of same          Modalities   3 23 20        n/a

## 2020-03-30 ENCOUNTER — OFFICE VISIT (OUTPATIENT)
Dept: PHYSICAL THERAPY | Facility: CLINIC | Age: 75
End: 2020-03-30
Payer: MEDICARE

## 2020-03-30 DIAGNOSIS — M25.511 CHRONIC RIGHT SHOULDER PAIN: Primary | ICD-10-CM

## 2020-03-30 DIAGNOSIS — G89.29 CHRONIC RIGHT SHOULDER PAIN: Primary | ICD-10-CM

## 2020-03-30 PROCEDURE — 97110 THERAPEUTIC EXERCISES: CPT

## 2020-03-30 PROCEDURE — 97140 MANUAL THERAPY 1/> REGIONS: CPT

## 2020-03-30 NOTE — PROGRESS NOTES
Daily Note     Today's date: 3/30/2020  Patient name: Juanito Kruger  : 1945  MRN: [id number]  Referring provider: Ju Olson MD  Dx:   Encounter Diagnosis     ICD-10-CM    1  Chronic right shoulder pain M25 511     G89 29        Start Time: 1315  Stop Time: 1410  Total time in clinic (min): 55 minutes    Subjective:  Pt  states she feels the exercises were "too much" at last treatment session, especially the UBE, which she says hurt her R shoulder afterwards  Objective: See treatment diary below  *Ther exer modified to pt's tolerance      Assessment: Tolerated treatment Fair+ to Fairly Well overall with performance and tolerance to ther exer  ,  and Well with massage application  Plan: Continue per plan of care  Progress treatment as tolerated  Precautions: OA (mild) GH jt, obesity, IDDM, sleep apnea      Manual  3/19 3 23 20 3/26 3 30 20    Massage to R scap/UT? cerv area   10 min R scap/UT  R Bicep/Tricep  20 min total R scap/UT  15' R scap/UT  12'  Bicep/Tricep  5 min        17 min total                                Date 3/19 3 23 20 3/26 3 30 20    VISIT 1 2   3 4    Aerobic      /50  5' alt UBE   *120/20     4' alt    Sleeper stretch side lying         Posterior capsule stretch  NV 5x10' *4x10''    UT stretch  **R 3x/15"  Reviewed for Home performance as well    R 3x/15"    Levator scap stretch  NV 5x10" 5x10"    Upper thoracic extension   **10x/5     10x5" *5x10"    Pec stretch (45/90/145)        Scap retraction 10x5" 10x5" 10x5" 10x5"    Rhythmic stabilization        AAROM -->AROM shoulder (wand, slides, table)  **Table Slides   Fwd , Side-Side, CW/CCW  10x ea AA Table Slides   Fwd , Side-Side, CW/CCW  15x ea AA Table Slides   Fwd , Side-Side, CW/CCW  20x ea AA    Bicep curls   **R 5x/5"   R 2#  30x R 20x  *0#    Tricep extension  **R 5x/5" R 15x R 15x    Wall Push up plus--> standard   **1x10 15x 15x    MTP/LTP      NV    Tband IR/ER with towel roll         SL ER with towel roll         SL shoulder flexion         Prone rows to neutral         Prone horizontal abd in ER         Prone extension         Prone shoulder ER at 90* abd         Shoulder raises flexion/scaption         PNFs        NV    Wall horizontal Abduction 90/90        cerv SB and rot w/ERS 5x5" 5x5" 5x5" ea 5x5" ea    shldr rolls  Fwd/rev 10 ea 10x ea 20 ea 20x ea              Scapular wall wipers         3/19/20 Pt instructed in above ex for HEP and demonstrated good understanding of same          Modalities   3 23 20  3 30 20      n/a  n/a

## 2020-04-01 ENCOUNTER — OFFICE VISIT (OUTPATIENT)
Dept: PHYSICAL THERAPY | Facility: CLINIC | Age: 75
End: 2020-04-01
Payer: MEDICARE

## 2020-04-01 DIAGNOSIS — M25.511 CHRONIC RIGHT SHOULDER PAIN: Primary | ICD-10-CM

## 2020-04-01 DIAGNOSIS — G89.29 CHRONIC RIGHT SHOULDER PAIN: Primary | ICD-10-CM

## 2020-04-01 PROCEDURE — 97110 THERAPEUTIC EXERCISES: CPT

## 2020-04-02 DIAGNOSIS — N18.30 TYPE 2 DIABETES MELLITUS WITH STAGE 3 CHRONIC KIDNEY DISEASE, WITH LONG-TERM CURRENT USE OF INSULIN (HCC): ICD-10-CM

## 2020-04-02 DIAGNOSIS — K30 INDIGESTION: Primary | ICD-10-CM

## 2020-04-02 DIAGNOSIS — I10 ESSENTIAL HYPERTENSION: ICD-10-CM

## 2020-04-02 DIAGNOSIS — Z79.4 TYPE 2 DIABETES MELLITUS WITH STAGE 3 CHRONIC KIDNEY DISEASE, WITH LONG-TERM CURRENT USE OF INSULIN (HCC): ICD-10-CM

## 2020-04-02 DIAGNOSIS — E11.22 TYPE 2 DIABETES MELLITUS WITH STAGE 3 CHRONIC KIDNEY DISEASE, WITH LONG-TERM CURRENT USE OF INSULIN (HCC): ICD-10-CM

## 2020-04-02 RX ORDER — INSULIN LISPRO 200 [IU]/ML
INJECTION, SOLUTION SUBCUTANEOUS
Qty: 12 ML | Refills: 1 | Status: SHIPPED | OUTPATIENT
Start: 2020-04-02 | End: 2022-06-08 | Stop reason: SDUPTHER

## 2020-04-02 RX ORDER — AMLODIPINE BESYLATE 5 MG/1
TABLET ORAL
Qty: 90 TABLET | Refills: 0 | Status: SHIPPED | OUTPATIENT
Start: 2020-04-02 | End: 2020-06-29 | Stop reason: SDUPTHER

## 2020-04-02 RX ORDER — FAMOTIDINE 20 MG/1
TABLET, FILM COATED ORAL
Qty: 90 TABLET | Refills: 0 | Status: SHIPPED | OUTPATIENT
Start: 2020-04-02 | End: 2020-06-29 | Stop reason: SDUPTHER

## 2020-04-03 ENCOUNTER — HOSPITAL ENCOUNTER (OUTPATIENT)
Dept: ULTRASOUND IMAGING | Facility: HOSPITAL | Age: 75
Discharge: HOME | End: 2020-04-03
Payer: MEDICARE

## 2020-04-03 ENCOUNTER — TELEPHONE (OUTPATIENT)
Dept: FAMILY MEDICINE CLINIC | Facility: CLINIC | Age: 75
End: 2020-04-03

## 2020-04-03 ENCOUNTER — HOSPITAL ENCOUNTER (OUTPATIENT)
Dept: MAMMOGRAPHY | Facility: HOSPITAL | Age: 75
Discharge: HOME | End: 2020-04-03
Payer: MEDICARE

## 2020-04-03 VITALS — HEIGHT: 65 IN | WEIGHT: 253 LBS | BODY MASS INDEX: 42.15 KG/M2

## 2020-04-03 DIAGNOSIS — N63.10 BREAST MASS, RIGHT: ICD-10-CM

## 2020-04-03 DIAGNOSIS — R92.8 ABNORMAL MAMMOGRAM OF RIGHT BREAST: Primary | ICD-10-CM

## 2020-04-03 DIAGNOSIS — N63.0 BREAST LUMP IN UPPER OUTER QUADRANT: ICD-10-CM

## 2020-04-03 PROCEDURE — 76642 ULTRASOUND BREAST LIMITED: CPT

## 2020-04-03 PROCEDURE — 77065 DX MAMMO INCL CAD UNI: CPT

## 2020-04-03 PROCEDURE — G0279 TOMOSYNTHESIS, MAMMO: HCPCS

## 2020-04-06 ENCOUNTER — OFFICE VISIT (OUTPATIENT)
Dept: PHYSICAL THERAPY | Facility: CLINIC | Age: 75
End: 2020-04-06
Payer: MEDICARE

## 2020-04-06 DIAGNOSIS — M25.511 CHRONIC RIGHT SHOULDER PAIN: Primary | ICD-10-CM

## 2020-04-06 DIAGNOSIS — G89.29 CHRONIC RIGHT SHOULDER PAIN: Primary | ICD-10-CM

## 2020-04-06 PROCEDURE — 97110 THERAPEUTIC EXERCISES: CPT

## 2020-04-06 PROCEDURE — 97140 MANUAL THERAPY 1/> REGIONS: CPT

## 2020-04-08 ENCOUNTER — OFFICE VISIT (OUTPATIENT)
Dept: PHYSICAL THERAPY | Facility: CLINIC | Age: 75
End: 2020-04-08
Payer: MEDICARE

## 2020-04-08 DIAGNOSIS — G89.29 CHRONIC RIGHT SHOULDER PAIN: Primary | ICD-10-CM

## 2020-04-08 DIAGNOSIS — M25.511 CHRONIC RIGHT SHOULDER PAIN: Primary | ICD-10-CM

## 2020-04-08 PROCEDURE — 97110 THERAPEUTIC EXERCISES: CPT

## 2020-04-08 PROCEDURE — 97140 MANUAL THERAPY 1/> REGIONS: CPT

## 2020-04-13 ENCOUNTER — EVALUATION (OUTPATIENT)
Dept: PHYSICAL THERAPY | Facility: CLINIC | Age: 75
End: 2020-04-13
Payer: MEDICARE

## 2020-04-13 DIAGNOSIS — G89.29 CHRONIC RIGHT SHOULDER PAIN: Primary | ICD-10-CM

## 2020-04-13 DIAGNOSIS — M25.511 CHRONIC RIGHT SHOULDER PAIN: Primary | ICD-10-CM

## 2020-04-13 PROCEDURE — 97140 MANUAL THERAPY 1/> REGIONS: CPT | Performed by: PHYSICAL THERAPIST

## 2020-04-13 PROCEDURE — 97110 THERAPEUTIC EXERCISES: CPT | Performed by: PHYSICAL THERAPIST

## 2020-04-15 ENCOUNTER — OFFICE VISIT (OUTPATIENT)
Dept: PHYSICAL THERAPY | Facility: CLINIC | Age: 75
End: 2020-04-15
Payer: MEDICARE

## 2020-04-15 DIAGNOSIS — G89.29 CHRONIC RIGHT SHOULDER PAIN: Primary | ICD-10-CM

## 2020-04-15 DIAGNOSIS — M25.511 CHRONIC RIGHT SHOULDER PAIN: Primary | ICD-10-CM

## 2020-04-15 PROCEDURE — 97110 THERAPEUTIC EXERCISES: CPT | Performed by: PHYSICAL THERAPIST

## 2020-04-15 PROCEDURE — 97140 MANUAL THERAPY 1/> REGIONS: CPT | Performed by: PHYSICAL THERAPIST

## 2020-04-20 ENCOUNTER — OFFICE VISIT (OUTPATIENT)
Dept: PHYSICAL THERAPY | Facility: CLINIC | Age: 75
End: 2020-04-20
Payer: MEDICARE

## 2020-04-20 DIAGNOSIS — G89.29 CHRONIC RIGHT SHOULDER PAIN: Primary | ICD-10-CM

## 2020-04-20 DIAGNOSIS — M25.511 CHRONIC RIGHT SHOULDER PAIN: Primary | ICD-10-CM

## 2020-04-20 PROCEDURE — 97140 MANUAL THERAPY 1/> REGIONS: CPT

## 2020-04-20 PROCEDURE — 97110 THERAPEUTIC EXERCISES: CPT

## 2020-04-22 ENCOUNTER — OFFICE VISIT (OUTPATIENT)
Dept: PHYSICAL THERAPY | Facility: CLINIC | Age: 75
End: 2020-04-22
Payer: MEDICARE

## 2020-04-22 DIAGNOSIS — M25.511 CHRONIC RIGHT SHOULDER PAIN: Primary | ICD-10-CM

## 2020-04-22 DIAGNOSIS — G89.29 CHRONIC RIGHT SHOULDER PAIN: Primary | ICD-10-CM

## 2020-04-22 PROCEDURE — 97110 THERAPEUTIC EXERCISES: CPT | Performed by: PHYSICAL THERAPIST

## 2020-04-22 PROCEDURE — 97140 MANUAL THERAPY 1/> REGIONS: CPT | Performed by: PHYSICAL THERAPIST

## 2020-04-27 ENCOUNTER — OFFICE VISIT (OUTPATIENT)
Dept: PHYSICAL THERAPY | Facility: CLINIC | Age: 75
End: 2020-04-27
Payer: MEDICARE

## 2020-04-27 DIAGNOSIS — G89.29 CHRONIC RIGHT SHOULDER PAIN: Primary | ICD-10-CM

## 2020-04-27 DIAGNOSIS — M25.511 CHRONIC RIGHT SHOULDER PAIN: Primary | ICD-10-CM

## 2020-04-27 PROCEDURE — 97140 MANUAL THERAPY 1/> REGIONS: CPT

## 2020-04-27 PROCEDURE — 97110 THERAPEUTIC EXERCISES: CPT

## 2020-04-29 ENCOUNTER — OFFICE VISIT (OUTPATIENT)
Dept: PHYSICAL THERAPY | Facility: CLINIC | Age: 75
End: 2020-04-29
Payer: MEDICARE

## 2020-04-29 DIAGNOSIS — M25.511 CHRONIC RIGHT SHOULDER PAIN: Primary | ICD-10-CM

## 2020-04-29 DIAGNOSIS — G89.29 CHRONIC RIGHT SHOULDER PAIN: Primary | ICD-10-CM

## 2020-04-29 PROCEDURE — 97110 THERAPEUTIC EXERCISES: CPT

## 2020-04-29 PROCEDURE — 97140 MANUAL THERAPY 1/> REGIONS: CPT

## 2020-05-04 ENCOUNTER — OFFICE VISIT (OUTPATIENT)
Dept: PHYSICAL THERAPY | Facility: CLINIC | Age: 75
End: 2020-05-04
Payer: MEDICARE

## 2020-05-04 DIAGNOSIS — G89.29 CHRONIC RIGHT SHOULDER PAIN: Primary | ICD-10-CM

## 2020-05-04 DIAGNOSIS — M25.511 CHRONIC RIGHT SHOULDER PAIN: Primary | ICD-10-CM

## 2020-05-04 PROCEDURE — 97140 MANUAL THERAPY 1/> REGIONS: CPT

## 2020-05-04 PROCEDURE — 97110 THERAPEUTIC EXERCISES: CPT

## 2020-05-06 ENCOUNTER — OFFICE VISIT (OUTPATIENT)
Dept: PHYSICAL THERAPY | Facility: CLINIC | Age: 75
End: 2020-05-06
Payer: MEDICARE

## 2020-05-06 DIAGNOSIS — G89.29 CHRONIC RIGHT SHOULDER PAIN: Primary | ICD-10-CM

## 2020-05-06 DIAGNOSIS — M25.511 CHRONIC RIGHT SHOULDER PAIN: Primary | ICD-10-CM

## 2020-05-06 PROCEDURE — 97110 THERAPEUTIC EXERCISES: CPT

## 2020-05-06 PROCEDURE — 97140 MANUAL THERAPY 1/> REGIONS: CPT

## 2020-05-11 ENCOUNTER — EVALUATION (OUTPATIENT)
Dept: PHYSICAL THERAPY | Facility: CLINIC | Age: 75
End: 2020-05-11
Payer: MEDICARE

## 2020-05-11 DIAGNOSIS — M25.511 CHRONIC RIGHT SHOULDER PAIN: Primary | ICD-10-CM

## 2020-05-11 DIAGNOSIS — G89.29 CHRONIC RIGHT SHOULDER PAIN: Primary | ICD-10-CM

## 2020-05-11 PROCEDURE — 97140 MANUAL THERAPY 1/> REGIONS: CPT | Performed by: PHYSICAL THERAPIST

## 2020-05-11 PROCEDURE — 97110 THERAPEUTIC EXERCISES: CPT | Performed by: PHYSICAL THERAPIST

## 2020-05-14 ENCOUNTER — APPOINTMENT (OUTPATIENT)
Dept: PHYSICAL THERAPY | Facility: CLINIC | Age: 75
End: 2020-05-14
Payer: MEDICARE

## 2020-05-18 ENCOUNTER — APPOINTMENT (OUTPATIENT)
Dept: PHYSICAL THERAPY | Facility: CLINIC | Age: 75
End: 2020-05-18
Payer: MEDICARE

## 2020-05-18 DIAGNOSIS — K30 INDIGESTION: ICD-10-CM

## 2020-05-18 RX ORDER — PANTOPRAZOLE SODIUM 40 MG/1
TABLET, DELAYED RELEASE ORAL
Qty: 90 TABLET | Refills: 0 | Status: SHIPPED | OUTPATIENT
Start: 2020-05-18 | End: 2020-06-29 | Stop reason: SDUPTHER

## 2020-05-20 ENCOUNTER — TELEPHONE (OUTPATIENT)
Dept: UROLOGY | Facility: MEDICAL CENTER | Age: 75
End: 2020-05-20

## 2020-06-03 DIAGNOSIS — G43.009 MIGRAINE WITHOUT AURA AND WITHOUT STATUS MIGRAINOSUS, NOT INTRACTABLE: ICD-10-CM

## 2020-06-03 RX ORDER — TOPIRAMATE 25 MG/1
TABLET ORAL
Qty: 90 TABLET | Refills: 0 | Status: SHIPPED | OUTPATIENT
Start: 2020-06-03 | End: 2020-06-29 | Stop reason: SDUPTHER

## 2020-06-12 ENCOUNTER — TRANSCRIBE ORDERS (OUTPATIENT)
Dept: LAB | Facility: HOSPITAL | Age: 75
End: 2020-06-12

## 2020-06-12 ENCOUNTER — HOSPITAL ENCOUNTER (OUTPATIENT)
Dept: ULTRASOUND IMAGING | Facility: HOSPITAL | Age: 75
Discharge: HOME | End: 2020-06-12
Payer: MEDICARE

## 2020-06-12 DIAGNOSIS — K76.0 FATTY LIVER: Primary | ICD-10-CM

## 2020-06-12 DIAGNOSIS — K76.0 FATTY LIVER: ICD-10-CM

## 2020-06-12 PROCEDURE — 76705 ECHO EXAM OF ABDOMEN: CPT

## 2020-06-17 DIAGNOSIS — E78.1 HYPERTRIGLYCERIDEMIA, ESSENTIAL: ICD-10-CM

## 2020-06-17 RX ORDER — FENOFIBRATE 160 MG/1
TABLET ORAL
Qty: 90 TABLET | Refills: 0 | Status: SHIPPED | OUTPATIENT
Start: 2020-06-17 | End: 2020-10-27

## 2020-06-19 DIAGNOSIS — M54.40 CHRONIC LOW BACK PAIN WITH SCIATICA, SCIATICA LATERALITY UNSPECIFIED, UNSPECIFIED BACK PAIN LATERALITY: ICD-10-CM

## 2020-06-19 DIAGNOSIS — G89.29 CHRONIC LOW BACK PAIN WITH SCIATICA, SCIATICA LATERALITY UNSPECIFIED, UNSPECIFIED BACK PAIN LATERALITY: ICD-10-CM

## 2020-06-19 RX ORDER — PREGABALIN 75 MG/1
CAPSULE ORAL
Qty: 270 CAPSULE | Refills: 0 | Status: SHIPPED | OUTPATIENT
Start: 2020-06-19 | End: 2020-07-29 | Stop reason: SDUPTHER

## 2020-06-22 ENCOUNTER — APPOINTMENT (OUTPATIENT)
Dept: LAB | Facility: IMAGING CENTER | Age: 75
End: 2020-06-22
Payer: MEDICARE

## 2020-06-22 DIAGNOSIS — I10 ESSENTIAL HYPERTENSION: ICD-10-CM

## 2020-06-22 DIAGNOSIS — Z79.4 TYPE 2 DIABETES MELLITUS WITH STAGE 3 CHRONIC KIDNEY DISEASE, WITH LONG-TERM CURRENT USE OF INSULIN (HCC): ICD-10-CM

## 2020-06-22 DIAGNOSIS — E78.2 MIXED HYPERLIPIDEMIA: ICD-10-CM

## 2020-06-22 DIAGNOSIS — N18.30 TYPE 2 DIABETES MELLITUS WITH STAGE 3 CHRONIC KIDNEY DISEASE, WITH LONG-TERM CURRENT USE OF INSULIN (HCC): ICD-10-CM

## 2020-06-22 DIAGNOSIS — E11.22 TYPE 2 DIABETES MELLITUS WITH STAGE 3 CHRONIC KIDNEY DISEASE, WITH LONG-TERM CURRENT USE OF INSULIN (HCC): ICD-10-CM

## 2020-06-22 DIAGNOSIS — E83.42 HYPOMAGNESEMIA: ICD-10-CM

## 2020-06-22 LAB
ANION GAP SERPL CALCULATED.3IONS-SCNC: 7 MMOL/L (ref 4–13)
BASOPHILS # BLD AUTO: 0.02 THOUSANDS/ΜL (ref 0–0.1)
BASOPHILS NFR BLD AUTO: 0 % (ref 0–1)
BUN SERPL-MCNC: 26 MG/DL (ref 5–25)
CALCIUM SERPL-MCNC: 9.4 MG/DL (ref 8.3–10.1)
CHLORIDE SERPL-SCNC: 110 MMOL/L (ref 100–108)
CHOLEST SERPL-MCNC: 131 MG/DL (ref 50–200)
CO2 SERPL-SCNC: 25 MMOL/L (ref 21–32)
CREAT SERPL-MCNC: 1.31 MG/DL (ref 0.6–1.3)
EOSINOPHIL # BLD AUTO: 0.13 THOUSAND/ΜL (ref 0–0.61)
EOSINOPHIL NFR BLD AUTO: 2 % (ref 0–6)
ERYTHROCYTE [DISTWIDTH] IN BLOOD BY AUTOMATED COUNT: 12.8 % (ref 11.6–15.1)
EST. AVERAGE GLUCOSE BLD GHB EST-MCNC: 163 MG/DL
GFR SERPL CREATININE-BSD FRML MDRD: 40 ML/MIN/1.73SQ M
GLUCOSE P FAST SERPL-MCNC: 93 MG/DL (ref 65–99)
HBA1C MFR BLD: 7.3 %
HCT VFR BLD AUTO: 42.4 % (ref 34.8–46.1)
HDLC SERPL-MCNC: 39 MG/DL
HGB BLD-MCNC: 12.7 G/DL (ref 11.5–15.4)
IMM GRANULOCYTES # BLD AUTO: 0.04 THOUSAND/UL (ref 0–0.2)
IMM GRANULOCYTES NFR BLD AUTO: 1 % (ref 0–2)
LDLC SERPL CALC-MCNC: 63 MG/DL (ref 0–100)
LYMPHOCYTES # BLD AUTO: 1.14 THOUSANDS/ΜL (ref 0.6–4.47)
LYMPHOCYTES NFR BLD AUTO: 21 % (ref 14–44)
MCH RBC QN AUTO: 29.1 PG (ref 26.8–34.3)
MCHC RBC AUTO-ENTMCNC: 30 G/DL (ref 31.4–37.4)
MCV RBC AUTO: 97 FL (ref 82–98)
MONOCYTES # BLD AUTO: 0.35 THOUSAND/ΜL (ref 0.17–1.22)
MONOCYTES NFR BLD AUTO: 6 % (ref 4–12)
NEUTROPHILS # BLD AUTO: 3.83 THOUSANDS/ΜL (ref 1.85–7.62)
NEUTS SEG NFR BLD AUTO: 70 % (ref 43–75)
NRBC BLD AUTO-RTO: 0 /100 WBCS
PLATELET # BLD AUTO: 171 THOUSANDS/UL (ref 149–390)
PMV BLD AUTO: 12.5 FL (ref 8.9–12.7)
POTASSIUM SERPL-SCNC: 4.4 MMOL/L (ref 3.5–5.3)
RBC # BLD AUTO: 4.36 MILLION/UL (ref 3.81–5.12)
SODIUM SERPL-SCNC: 142 MMOL/L (ref 136–145)
TRIGL SERPL-MCNC: 144 MG/DL
WBC # BLD AUTO: 5.51 THOUSAND/UL (ref 4.31–10.16)

## 2020-06-22 PROCEDURE — 85025 COMPLETE CBC W/AUTO DIFF WBC: CPT

## 2020-06-22 PROCEDURE — 83036 HEMOGLOBIN GLYCOSYLATED A1C: CPT

## 2020-06-22 PROCEDURE — 36415 COLL VENOUS BLD VENIPUNCTURE: CPT

## 2020-06-22 PROCEDURE — 80061 LIPID PANEL: CPT

## 2020-06-22 PROCEDURE — 80048 BASIC METABOLIC PNL TOTAL CA: CPT

## 2020-06-29 ENCOUNTER — OFFICE VISIT (OUTPATIENT)
Dept: FAMILY MEDICINE CLINIC | Facility: CLINIC | Age: 75
End: 2020-06-29
Payer: MEDICARE

## 2020-06-29 VITALS
HEART RATE: 72 BPM | HEIGHT: 65 IN | BODY MASS INDEX: 41.32 KG/M2 | TEMPERATURE: 98.8 F | OXYGEN SATURATION: 97 % | SYSTOLIC BLOOD PRESSURE: 110 MMHG | DIASTOLIC BLOOD PRESSURE: 70 MMHG | WEIGHT: 248 LBS

## 2020-06-29 DIAGNOSIS — K58.8 OTHER IRRITABLE BOWEL SYNDROME: ICD-10-CM

## 2020-06-29 DIAGNOSIS — Z12.31 SCREENING MAMMOGRAM, ENCOUNTER FOR: ICD-10-CM

## 2020-06-29 DIAGNOSIS — E66.01 MORBID OBESITY WITH BMI OF 40.0-44.9, ADULT (HCC): ICD-10-CM

## 2020-06-29 DIAGNOSIS — K30 INDIGESTION: ICD-10-CM

## 2020-06-29 DIAGNOSIS — E78.2 MIXED HYPERLIPIDEMIA: ICD-10-CM

## 2020-06-29 DIAGNOSIS — R01.1 HEART MURMUR: ICD-10-CM

## 2020-06-29 DIAGNOSIS — Z79.4 TYPE 2 DIABETES MELLITUS WITH STAGE 3 CHRONIC KIDNEY DISEASE, WITH LONG-TERM CURRENT USE OF INSULIN (HCC): ICD-10-CM

## 2020-06-29 DIAGNOSIS — I65.23 BILATERAL CAROTID ARTERY STENOSIS: ICD-10-CM

## 2020-06-29 DIAGNOSIS — E11.22 TYPE 2 DIABETES MELLITUS WITH STAGE 3 CHRONIC KIDNEY DISEASE, WITH LONG-TERM CURRENT USE OF INSULIN (HCC): ICD-10-CM

## 2020-06-29 DIAGNOSIS — Z79.4 LONG TERM CURRENT USE OF INSULIN (HCC): ICD-10-CM

## 2020-06-29 DIAGNOSIS — Z00.00 MEDICARE ANNUAL WELLNESS VISIT, SUBSEQUENT: Primary | ICD-10-CM

## 2020-06-29 DIAGNOSIS — G43.009 MIGRAINE WITHOUT AURA AND WITHOUT STATUS MIGRAINOSUS, NOT INTRACTABLE: ICD-10-CM

## 2020-06-29 DIAGNOSIS — M85.88 OSTEOPENIA OF SPINE: ICD-10-CM

## 2020-06-29 DIAGNOSIS — N18.30 TYPE 2 DIABETES MELLITUS WITH STAGE 3 CHRONIC KIDNEY DISEASE, WITH LONG-TERM CURRENT USE OF INSULIN (HCC): ICD-10-CM

## 2020-06-29 PROBLEM — R80.9 PROTEINURIA: Status: ACTIVE | Noted: 2020-03-31

## 2020-06-29 PROBLEM — I12.9 BENIGN HYPERTENSIVE RENAL DISEASE: Status: ACTIVE | Noted: 2020-03-31

## 2020-06-29 PROCEDURE — 4040F PNEUMOC VAC/ADMIN/RCVD: CPT | Performed by: FAMILY MEDICINE

## 2020-06-29 PROCEDURE — 1036F TOBACCO NON-USER: CPT | Performed by: FAMILY MEDICINE

## 2020-06-29 PROCEDURE — 99214 OFFICE O/P EST MOD 30 MIN: CPT | Performed by: FAMILY MEDICINE

## 2020-06-29 PROCEDURE — 3074F SYST BP LT 130 MM HG: CPT | Performed by: FAMILY MEDICINE

## 2020-06-29 PROCEDURE — 2022F DILAT RTA XM EVC RTNOPTHY: CPT | Performed by: FAMILY MEDICINE

## 2020-06-29 PROCEDURE — 3066F NEPHROPATHY DOC TX: CPT | Performed by: FAMILY MEDICINE

## 2020-06-29 PROCEDURE — 3051F HG A1C>EQUAL 7.0%<8.0%: CPT | Performed by: FAMILY MEDICINE

## 2020-06-29 PROCEDURE — 1125F AMNT PAIN NOTED PAIN PRSNT: CPT | Performed by: FAMILY MEDICINE

## 2020-06-29 PROCEDURE — 1170F FXNL STATUS ASSESSED: CPT | Performed by: FAMILY MEDICINE

## 2020-06-29 PROCEDURE — 1160F RVW MEDS BY RX/DR IN RCRD: CPT | Performed by: FAMILY MEDICINE

## 2020-06-29 PROCEDURE — G0439 PPPS, SUBSEQ VISIT: HCPCS | Performed by: FAMILY MEDICINE

## 2020-06-29 PROCEDURE — 3008F BODY MASS INDEX DOCD: CPT | Performed by: FAMILY MEDICINE

## 2020-06-29 PROCEDURE — 3078F DIAST BP <80 MM HG: CPT | Performed by: FAMILY MEDICINE

## 2020-06-29 RX ORDER — DICYCLOMINE HCL 20 MG
20 TABLET ORAL 2 TIMES DAILY
Qty: 180 TABLET | Refills: 1 | Status: SHIPPED | OUTPATIENT
Start: 2020-06-29 | End: 2021-02-08 | Stop reason: SDUPTHER

## 2020-06-29 RX ORDER — SIMVASTATIN 20 MG
20 TABLET ORAL DAILY
Qty: 90 TABLET | Refills: 1 | Status: SHIPPED | OUTPATIENT
Start: 2020-06-29 | End: 2020-10-30 | Stop reason: DRUGHIGH

## 2020-06-29 RX ORDER — PANTOPRAZOLE SODIUM 40 MG/1
40 TABLET, DELAYED RELEASE ORAL DAILY
Qty: 90 TABLET | Refills: 1 | Status: SHIPPED | OUTPATIENT
Start: 2020-06-29 | End: 2021-02-08 | Stop reason: SDUPTHER

## 2020-06-29 RX ORDER — TOPIRAMATE 25 MG/1
25 TABLET ORAL DAILY
Qty: 90 TABLET | Refills: 1 | Status: SHIPPED | OUTPATIENT
Start: 2020-06-29 | End: 2020-07-29 | Stop reason: SDUPTHER

## 2020-06-30 ENCOUNTER — TRANSCRIBE ORDERS (OUTPATIENT)
Dept: ADMINISTRATIVE | Facility: HOSPITAL | Age: 75
End: 2020-06-30

## 2020-06-30 DIAGNOSIS — K76.0 FATTY LIVER: Primary | ICD-10-CM

## 2020-06-30 PROBLEM — R01.1 HEART MURMUR: Status: ACTIVE | Noted: 2020-06-30

## 2020-06-30 PROBLEM — Z00.00 MEDICARE ANNUAL WELLNESS VISIT, SUBSEQUENT: Status: ACTIVE | Noted: 2020-06-30

## 2020-07-02 ENCOUNTER — TELEPHONE (OUTPATIENT)
Dept: FAMILY MEDICINE CLINIC | Facility: CLINIC | Age: 75
End: 2020-07-02

## 2020-07-02 NOTE — TELEPHONE ENCOUNTER
Sydnee Traylor from [facility] called this patient is scheduled for screening mammo 7/9/2020 however after review patient needs a diagnostic bilateral mammo please advise     [phone]

## 2020-07-06 ENCOUNTER — TRANSCRIBE ORDERS (OUTPATIENT)
Dept: ADMINISTRATIVE | Facility: HOSPITAL | Age: 75
End: 2020-07-06

## 2020-07-06 DIAGNOSIS — Z87.898 HISTORY OF ABNORMAL MAMMOGRAM: Primary | ICD-10-CM

## 2020-07-06 DIAGNOSIS — Z09 FOLLOW UP: Primary | ICD-10-CM

## 2020-07-06 NOTE — TELEPHONE ENCOUNTER
lmom to call office or central scheduling to r/s mammogram patient needs diagnostic and the facility she is scheduled at only does routines

## 2020-07-10 ENCOUNTER — HOSPITAL ENCOUNTER (OUTPATIENT)
Dept: ULTRASOUND IMAGING | Facility: HOSPITAL | Age: 75
Discharge: HOME | End: 2020-07-10
Payer: MEDICARE

## 2020-07-10 DIAGNOSIS — N63.10 BREAST MASS, RIGHT: ICD-10-CM

## 2020-07-10 DIAGNOSIS — Z09 FOLLOW UP: ICD-10-CM

## 2020-07-10 PROCEDURE — 76642 ULTRASOUND BREAST LIMITED: CPT

## 2020-07-13 ENCOUNTER — TELEPHONE (OUTPATIENT)
Dept: FAMILY MEDICINE CLINIC | Facility: CLINIC | Age: 75
End: 2020-07-13

## 2020-07-13 NOTE — TELEPHONE ENCOUNTER
----- Message from Alex Gomez MD sent at 7/10/2020 11:43 AM EDT -----  Probably Benign  Repeat US of right breast in 6 month to be schedule

## 2020-07-14 ENCOUNTER — HOSPITAL ENCOUNTER (OUTPATIENT)
Dept: RADIOLOGY | Facility: IMAGING CENTER | Age: 75
Discharge: HOME | End: 2020-07-14
Payer: MEDICARE

## 2020-07-14 DIAGNOSIS — M85.88 OSTEOPENIA OF SPINE: ICD-10-CM

## 2020-07-14 PROCEDURE — 77080 DXA BONE DENSITY AXIAL: CPT

## 2020-07-15 ENCOUNTER — HOSPITAL ENCOUNTER (OUTPATIENT)
Dept: NON INVASIVE DIAGNOSTICS | Facility: HOSPITAL | Age: 75
Discharge: HOME | End: 2020-07-15
Payer: MEDICARE

## 2020-07-15 DIAGNOSIS — R01.1 HEART MURMUR: ICD-10-CM

## 2020-07-15 PROCEDURE — 93306 TTE W/DOPPLER COMPLETE: CPT | Performed by: INTERNAL MEDICINE

## 2020-07-15 PROCEDURE — C8929 TTE W OR WO FOL WCON,DOPPLER: HCPCS

## 2020-07-15 RX ADMIN — PERFLUTREN 0.4 ML/MIN: 6.52 INJECTION, SUSPENSION INTRAVENOUS at 15:14

## 2020-07-20 ENCOUNTER — HOSPITAL ENCOUNTER (OUTPATIENT)
Dept: NON INVASIVE DIAGNOSTICS | Facility: HOSPITAL | Age: 75
Discharge: HOME | End: 2020-07-20
Payer: MEDICARE

## 2020-07-20 DIAGNOSIS — I65.23 BILATERAL CAROTID ARTERY STENOSIS: ICD-10-CM

## 2020-07-20 PROCEDURE — 93880 EXTRACRANIAL BILAT STUDY: CPT | Performed by: SURGERY

## 2020-07-20 PROCEDURE — 93880 EXTRACRANIAL BILAT STUDY: CPT

## 2020-07-28 ENCOUNTER — OFFICE VISIT (OUTPATIENT)
Dept: UROLOGY | Facility: MEDICAL CENTER | Age: 75
End: 2020-07-28
Payer: MEDICARE

## 2020-07-28 ENCOUNTER — TELEPHONE (OUTPATIENT)
Dept: ADMINISTRATIVE | Facility: OTHER | Age: 75
End: 2020-07-28

## 2020-07-28 VITALS
HEIGHT: 65 IN | BODY MASS INDEX: 41.32 KG/M2 | WEIGHT: 248 LBS | DIASTOLIC BLOOD PRESSURE: 70 MMHG | SYSTOLIC BLOOD PRESSURE: 108 MMHG

## 2020-07-28 DIAGNOSIS — N39.41 URGE INCONTINENCE: ICD-10-CM

## 2020-07-28 DIAGNOSIS — N30.20 CHRONIC CYSTITIS: Primary | ICD-10-CM

## 2020-07-28 LAB
SL AMB  POCT GLUCOSE, UA: NORMAL
SL AMB LEUKOCYTE ESTERASE,UA: NORMAL
SL AMB POCT BILIRUBIN,UA: NORMAL
SL AMB POCT BLOOD,UA: NORMAL
SL AMB POCT CLARITY,UA: CLEAR
SL AMB POCT COLOR,UA: YELLOW
SL AMB POCT KETONES,UA: NORMAL
SL AMB POCT NITRITE,UA: POSITIVE
SL AMB POCT PH,UA: 6
SL AMB POCT SPECIFIC GRAVITY,UA: 1.02
SL AMB POCT URINE PROTEIN: NORMAL
SL AMB POCT UROBILINOGEN: 0.2

## 2020-07-28 PROCEDURE — 81003 URINALYSIS AUTO W/O SCOPE: CPT | Performed by: UROLOGY

## 2020-07-28 PROCEDURE — 4040F PNEUMOC VAC/ADMIN/RCVD: CPT | Performed by: UROLOGY

## 2020-07-28 PROCEDURE — 3051F HG A1C>EQUAL 7.0%<8.0%: CPT | Performed by: UROLOGY

## 2020-07-28 PROCEDURE — 99213 OFFICE O/P EST LOW 20 MIN: CPT | Performed by: UROLOGY

## 2020-07-28 PROCEDURE — 1160F RVW MEDS BY RX/DR IN RCRD: CPT | Performed by: UROLOGY

## 2020-07-28 PROCEDURE — 3008F BODY MASS INDEX DOCD: CPT | Performed by: UROLOGY

## 2020-07-28 PROCEDURE — 3066F NEPHROPATHY DOC TX: CPT | Performed by: UROLOGY

## 2020-07-28 PROCEDURE — 1170F FXNL STATUS ASSESSED: CPT | Performed by: UROLOGY

## 2020-07-28 PROCEDURE — 2022F DILAT RTA XM EVC RTNOPTHY: CPT | Performed by: UROLOGY

## 2020-07-28 PROCEDURE — 1036F TOBACCO NON-USER: CPT | Performed by: UROLOGY

## 2020-07-28 PROCEDURE — 3074F SYST BP LT 130 MM HG: CPT | Performed by: UROLOGY

## 2020-07-28 PROCEDURE — 3078F DIAST BP <80 MM HG: CPT | Performed by: UROLOGY

## 2020-07-28 NOTE — TELEPHONE ENCOUNTER
----- Message from Zabrina Jackson sent at 7/28/2020  9:26 AM EDT -----  Regarding: diabetic foot exam   Contact: [phone]  07/28/20 9:26 AM    Hello, our patient Gage Song has had Diabetic Foot Exam completed/performed  Please assist in updating the patient chart by pulling the document from the Media Tab  The date of service is 12/05/2019       Thank you,  [phone] [phone]

## 2020-07-28 NOTE — TELEPHONE ENCOUNTER
Upon review of the In Basket request we were able to locate, review, and update the patient chart as requested for Diabetic Foot Exam     Any additional questions or concerns should be emailed to the Practice Liaisons via [employer] [email or web address] email, please do not reply via In Basket      Thank you  Paige Lombard, MA

## 2020-07-28 NOTE — PROGRESS NOTES
[address] [facility]  [facility]  [address] [facility]  [address] [facility], [address] [place] [address]  [phone]  www  [facility]  org      NAME: Elian Rubio  AGE: 67 y o  SEX: female  : 1945   MRN: [id number]    DATE: 2020  TIME: 3:45 PM    Assessment and Plan:    Chronic cystitis:  No recent UTIs  Doing well in this regard  Urge incontinence:  Continue with the oxybutynin  I will see her back p r n  Magaly Gorman Chief Complaint   No chief complaint on file  History of Present Illness   Yearly follow-up of urge incontinence controlled with oxybutynin and timed voiding  She has good times and bad times  There are times when she gets up in simply pours out  If she has watermelon she notices that she is more of a problem  Urinalysis shows no blood, just some white blood cells and nitrites  Chronic cystitis:  Has had negative cystoscopy and CT scan in the past, and we treat the infections as they arise  No recent UTIs  Only uses the Premarin cream if the atrophic vaginitis become severe and she has cracking and bleeding from the dryness        The following portions of the patient's history were reviewed and updated as appropriate: allergies, current medications, past family history, past medical history, past social history, past surgical history and problem list   Past Medical History:   Diagnosis Date    Anemia     Chronic kidney disease     Diabetes mellitus ([address] )     Fracture of wrist     RIGHT    Heart murmur 2020    History of non-insulin dependent diabetes mellitus     Hyperlipidemia     Hypertension     Hypertension     IBS (irritable bowel syndrome)     Irritable bowel     Metabolic syndrome     Obesity     RLS (restless legs syndrome)     Sleep apnea     Sleep apnea     ON CPAP    Urge incontinence     Urinary incontinence      Past Surgical History:   Procedure Laterality Date    CHOLECYSTECTOMY      COLONOSCOPY    CYSTOSCOPY      HERNIA REPAIR  2008    HYSTERECTOMY      PARTIAL (STILL HAS OVARIES)    KNEE SURGERY Left      shoulder  Review of Systems   Review of Systems   Constitutional: Negative for fever  Respiratory: Negative  Cardiovascular: Negative  Gastrointestinal: Positive for diarrhea  Ayad Eason has been helping her  Along with Bentyl   Genitourinary:        As per HPI       Active Problem List     Patient Active Problem List   Diagnosis    [facility] artery disease ([facility]    Chronic renal insufficiency, stage III (moderate) ([facility])    Essential hypertension    Hyperlipidemia    Hypomagnesemia    Indigestion    Irritable bowel syndrome    Long term current use of insulin ([facility] )    Metabolic syndrome    Morbid obesity ([facility] )    Nonalcoholic fatty liver disease    Obstructive sleep apnea treated with continuous positive airway pressure (CPAP)    Peripheral vascular disease ([facility])    Type 2 diabetes mellitus with kidney complication, with long-term current use of insulin ([facility])    Urge incontinence    Vitamin D deficiency    Osteopenia of spine    Anemia    Multiple and bilateral precerebral arterial occlusion    Restless legs syndrome (RLS)    Tinnitus    Migraine without aura, not intractable    Hypertriglyceridemia, essential    Uncontrolled type 2 diabetes mellitus ([facility]    Morbid obesity with BMI of 40 0-44 9, adult ([facility])    Chronic right shoulder pain    Routine medical exam    Vaginal bleeding    Breast lump in upper outer quadrant    Neck pain    Abnormal mammogram    Benign hypertensive renal disease    Proteinuria    Medicare annual wellness visit, subsequent    Heart murmur       Objective   /70   Ht 5' 5" (1 651 m)   Wt 112 kg (248 lb)   BMI 41 27 kg/m²     Physical Exam   Constitutional: She is oriented to person, place, and time  She appears well-developed and well-nourished  HENT:   Head: Normocephalic and atraumatic     Eyes: EOM are normal  Neck: Normal range of motion  Pulmonary/Chest: Effort normal    Musculoskeletal: Normal range of motion  Neurological: She is alert and oriented to person, place, and time  Psychiatric: She has a normal mood and affect  Her behavior is normal  Judgment and thought content normal            Current Medications     Current Outpatient Medications:     acetaminophen (TYLENOL) 325 mg tablet, Take by mouth as needed , Disp: , Rfl:     albuterol (PROAIR HFA) 90 mcg/act inhaler, inhale 2 puff by inhalation route  every 4 - 6 hours as needed, Disp: , Rfl:     B Complex-C CAPS, Take by mouth, Disp: , Rfl:     Cholecalciferol (VITAMIN D3) 2000 units capsule, half, Disp: , Rfl:     cholestyramine (QUESTRAN) 4 g packet, take 1 packet by mouth every other day, Disp: , Rfl: 0    clotrimazole-betamethasone (LOTRISONE) 1-0 05 % cream, Apply topically 2 (two) times a day, Disp: 90 g, Rfl: 2    conjugated estrogens (PREMARIN) vaginal cream, Insert 1 g into the vagina daily Apply vaginally x2/w (Patient taking differently: Insert 1 g into the vagina as needed Apply vaginally x2/w), Disp: 30 g, Rfl: 1    CONTOUR NEXT TEST test strip, Test blood sugar 3 times daily, Disp: 100 each, Rfl: 2    dicyclomine (BENTYL) 20 mg tablet, Take 1 tablet (20 mg total) by mouth 2 (two) times a day, Disp: 180 tablet, Rfl: 1    EASY TOUCH LANCETS 32G/TWIST MISC, Test blood sugars 3 times daily, Disp: 100 each, Rfl: 3    fenofibrate (TRIGLIDE) 160 MG tablet, TAKE 1 TABLET BY MOUTH  DAILY, Disp: 90 tablet, Rfl: 0    Ferrous Sulfate (IRON) 325 (65 Fe) MG TABS, Take by mouth, Disp: , Rfl:     HUMALOG KWIKPEN 200 units/mL CONCENTRATED U-200 injection pen, INJECT SUBCUTANEOUSLY AS  DIRECTED PER PRESCRIBER&apos;S  INSTRUCTIONS   INSULIN  DOSING REQUIRES  INDIVIDUALIZATION , Disp: 12 mL, Rfl: 1    HYDROcodone-acetaminophen (NORCO) 5-325 mg per tablet, Take by mouth 3 (three) times a day as needed , Disp: , Rfl: 0    insulin degludec (TRESIBA FLEXTOUCH) 100 units/mL injection pen, Inject 80 units under the skin daily (Patient taking differently: 66 Units Inject 80 units under the skin daily), Disp: 45 mL, Rfl: 3    Insulin Pen Needle (BD PEN NEEDLE JAE U/F) 32G X 4 MM MISC, Inject as directed 3 times daily, Disp: 270 each, Rfl: 0    lisinopril (ZESTRIL) 10 mg tablet, Take 1 tablet (10 mg total) by mouth daily, Disp: 90 tablet, Rfl: 0    loratadine (CLARITIN) 10 mg tablet, Take by mouth, Disp: , Rfl:     magnesium Oxide (MAG-OX) 400 mg TABS, Take by mouth, Disp: , Rfl:     Multiple Vitamin (DAILY VALUE MULTIVITAMIN) TABS, Take by mouth, Disp: , Rfl:     oxybutynin (DITROPAN-XL) 5 mg 24 hr tablet, Take 1 tablet (5 mg total) by mouth daily, Disp: 90 tablet, Rfl: 3    pantoprazole (PROTONIX) 40 mg tablet, Take 1 tablet (40 mg total) by mouth daily, Disp: 90 tablet, Rfl: 1    pregabalin (LYRICA) 75 mg capsule, TAKE 1 CAPSULE BY MOUTH 3  TIMES DAILY, Disp: 270 capsule, Rfl: 0    Probiotic Product (ALIGN) CHEW, Chew 2 (two) times a day, Disp: , Rfl:     simvastatin (ZOCOR) 20 mg tablet, Take 1 tablet (20 mg total) by mouth daily, Disp: 90 tablet, Rfl: 1    Thiamine HCl (VITAMIN B-1) 250 MG tablet, Take 250 mg by mouth daily, Disp: , Rfl:     topiramate (TOPAMAX) 25 mg tablet, Take 1 tablet (25 mg total) by mouth daily, Disp: 90 tablet, Rfl: 1        Herbert Ndiaye MD

## 2020-07-29 ENCOUNTER — OFFICE VISIT (OUTPATIENT)
Dept: FAMILY MEDICINE CLINIC | Facility: CLINIC | Age: 75
End: 2020-07-29
Payer: MEDICARE

## 2020-07-29 VITALS
WEIGHT: 256 LBS | DIASTOLIC BLOOD PRESSURE: 70 MMHG | HEIGHT: 65 IN | TEMPERATURE: 99 F | OXYGEN SATURATION: 97 % | BODY MASS INDEX: 42.65 KG/M2 | HEART RATE: 74 BPM | SYSTOLIC BLOOD PRESSURE: 110 MMHG

## 2020-07-29 DIAGNOSIS — I10 ESSENTIAL HYPERTENSION: Primary | ICD-10-CM

## 2020-07-29 DIAGNOSIS — E78.1 HYPERTRIGLYCERIDEMIA, ESSENTIAL: ICD-10-CM

## 2020-07-29 DIAGNOSIS — G43.009 MIGRAINE WITHOUT AURA AND WITHOUT STATUS MIGRAINOSUS, NOT INTRACTABLE: ICD-10-CM

## 2020-07-29 DIAGNOSIS — M54.40 CHRONIC LOW BACK PAIN WITH SCIATICA, SCIATICA LATERALITY UNSPECIFIED, UNSPECIFIED BACK PAIN LATERALITY: ICD-10-CM

## 2020-07-29 DIAGNOSIS — Z99.89 OBSTRUCTIVE SLEEP APNEA TREATED WITH CONTINUOUS POSITIVE AIRWAY PRESSURE (CPAP): ICD-10-CM

## 2020-07-29 DIAGNOSIS — G47.33 OBSTRUCTIVE SLEEP APNEA TREATED WITH CONTINUOUS POSITIVE AIRWAY PRESSURE (CPAP): ICD-10-CM

## 2020-07-29 DIAGNOSIS — N30.20 CHRONIC CYSTITIS: ICD-10-CM

## 2020-07-29 DIAGNOSIS — Z79.4 TYPE 2 DIABETES MELLITUS WITH STAGE 3 CHRONIC KIDNEY DISEASE, WITH LONG-TERM CURRENT USE OF INSULIN (HCC): ICD-10-CM

## 2020-07-29 DIAGNOSIS — G89.29 CHRONIC LOW BACK PAIN WITH SCIATICA, SCIATICA LATERALITY UNSPECIFIED, UNSPECIFIED BACK PAIN LATERALITY: ICD-10-CM

## 2020-07-29 DIAGNOSIS — E55.9 VITAMIN D DEFICIENCY: ICD-10-CM

## 2020-07-29 DIAGNOSIS — K30 INDIGESTION: ICD-10-CM

## 2020-07-29 DIAGNOSIS — N18.30 TYPE 2 DIABETES MELLITUS WITH STAGE 3 CHRONIC KIDNEY DISEASE, WITH LONG-TERM CURRENT USE OF INSULIN (HCC): ICD-10-CM

## 2020-07-29 DIAGNOSIS — N39.41 URGE INCONTINENCE: ICD-10-CM

## 2020-07-29 DIAGNOSIS — E78.2 MIXED HYPERLIPIDEMIA: ICD-10-CM

## 2020-07-29 DIAGNOSIS — E11.22 TYPE 2 DIABETES MELLITUS WITH STAGE 3 CHRONIC KIDNEY DISEASE, WITH LONG-TERM CURRENT USE OF INSULIN (HCC): ICD-10-CM

## 2020-07-29 PROCEDURE — 3078F DIAST BP <80 MM HG: CPT | Performed by: FAMILY MEDICINE

## 2020-07-29 PROCEDURE — 1036F TOBACCO NON-USER: CPT | Performed by: FAMILY MEDICINE

## 2020-07-29 PROCEDURE — 3066F NEPHROPATHY DOC TX: CPT | Performed by: FAMILY MEDICINE

## 2020-07-29 PROCEDURE — 2022F DILAT RTA XM EVC RTNOPTHY: CPT | Performed by: FAMILY MEDICINE

## 2020-07-29 PROCEDURE — 3074F SYST BP LT 130 MM HG: CPT | Performed by: FAMILY MEDICINE

## 2020-07-29 PROCEDURE — 3051F HG A1C>EQUAL 7.0%<8.0%: CPT | Performed by: FAMILY MEDICINE

## 2020-07-29 PROCEDURE — 99214 OFFICE O/P EST MOD 30 MIN: CPT | Performed by: FAMILY MEDICINE

## 2020-07-29 PROCEDURE — 4040F PNEUMOC VAC/ADMIN/RCVD: CPT | Performed by: FAMILY MEDICINE

## 2020-07-29 PROCEDURE — 3008F BODY MASS INDEX DOCD: CPT | Performed by: FAMILY MEDICINE

## 2020-07-29 PROCEDURE — 1160F RVW MEDS BY RX/DR IN RCRD: CPT | Performed by: FAMILY MEDICINE

## 2020-07-29 RX ORDER — TOPIRAMATE 25 MG/1
25 TABLET ORAL DAILY
Qty: 90 TABLET | Refills: 1 | Status: SHIPPED | OUTPATIENT
Start: 2020-07-29 | End: 2021-02-05

## 2020-07-29 RX ORDER — PREGABALIN 75 MG/1
75 CAPSULE ORAL 3 TIMES DAILY
Qty: 270 CAPSULE | Refills: 0 | Status: SHIPPED | OUTPATIENT
Start: 2020-07-29 | End: 2020-10-30 | Stop reason: SDUPTHER

## 2020-07-29 RX ORDER — OXYBUTYNIN CHLORIDE 5 MG/1
5 TABLET, EXTENDED RELEASE ORAL DAILY
Qty: 90 TABLET | Refills: 1 | Status: SHIPPED | OUTPATIENT
Start: 2020-07-29 | End: 2021-02-05

## 2020-07-29 NOTE — ASSESSMENT & PLAN NOTE
Chronic continue vitamin-D supplement asymptomatic patient is due for vitamin-D level before next appointment

## 2020-07-29 NOTE — ASSESSMENT & PLAN NOTE
Chronic asymptomatic fair control continue current management including lisinopril 10 mg once a day low-salt diet increase physical activity and important lose weight discussed with the patient

## 2020-07-29 NOTE — PROGRESS NOTES
Subjective:   Chief Complaint   Patient presents with    Follow-up     1 month f/u        Patient ID: Mingo Biggs is a 67 y o  female  Patient here follow-up with a chronic condition patient was history of the hypertension blood pressure fair control on current medication tolerated well without any side effect deny any chest pain short of breath no palpitation no dyspnea on exertion and no lower extremity edema patient's history of obstructive sleep apnea on CPAP machine and deny snoring and since she using the CPAP the daily she feel  better the patient does with the sleep specialist periodically patient's history of urine incontinence and sick oxybutynin tolerated well without side effect the deny dryness in the mouth and and deny any burning in urination no increased frequency urination and no lose control of the urine  Echocardiogram discussed with the patient and the carotid duplex also discussed the patient      The following portions of the patient's history were reviewed and updated as appropriate: allergies, current medications, past family history, past medical history, past social history, past surgical history and problem list     Review of Systems   Constitutional: Negative for activity change, appetite change, fatigue and fever  HENT: Negative for congestion, ear pain, sinus pressure, sinus pain and sore throat  Eyes: Negative for pain, discharge, redness and itching  Respiratory: Negative for cough, chest tightness, shortness of breath and stridor  Cardiovascular: Negative for chest pain, palpitations and leg swelling  Gastrointestinal: Negative for abdominal pain, blood in stool, constipation, diarrhea and nausea  Genitourinary: Negative for dysuria, flank pain, frequency and hematuria  Musculoskeletal: Negative for back pain, joint swelling and neck pain  Skin: Negative for pallor and rash     Neurological: Negative for dizziness, tremors, weakness, numbness and headaches  Hematological: Does not bruise/bleed easily  Objective:  Vitals:    07/29/20 1044   BP: 110/70   Pulse: 74   Temp: 99 °F (37 2 °C)   TempSrc: Tympanic   SpO2: 97%   Weight: 116 kg (256 lb)   Height: 5' 5" (1 651 m)      Physical Exam   Constitutional: She is oriented to person, place, and time  She appears well-developed and well-nourished  No distress  HENT:   Head: Normocephalic  Right Ear: External ear normal    Left Ear: External ear normal    Eyes: Conjunctivae and EOM are normal  Right eye exhibits no discharge  Left eye exhibits no discharge  Neck: Normal range of motion  Neck supple  No JVD present  Cardiovascular: Normal rate, regular rhythm and normal heart sounds  Exam reveals no gallop  Pulmonary/Chest: Effort normal and breath sounds normal  No stridor  No respiratory distress  She has no wheezes  She has no rales  She exhibits no tenderness  Abdominal: Soft  She exhibits no distension and no mass  There is no tenderness  There is no rebound  Musculoskeletal: She exhibits no edema or tenderness  Lymphadenopathy:     She has no cervical adenopathy  Neurological: She is alert and oriented to person, place, and time  Skin: Skin is warm  No rash noted  She is not diaphoretic  No erythema           Assessment/Plan:    Essential hypertension  Chronic asymptomatic fair control continue current management including lisinopril 10 mg once a day low-salt diet increase physical activity and important lose weight discussed with the patient    Indigestion  Chronic asymptomatic fair control continue pantoprazole 40 mg once a day discussed the patient avoid provoke food do not eat and lie down    Urge incontinence  Chronic fair control continue oxybutynin patient tolerated well without any side effect    Vitamin D deficiency  Chronic continue vitamin-D supplement asymptomatic patient is due for vitamin-D level before next appointment    Obstructive sleep apnea treated with continuous positive airway pressure (CPAP)  Chronic stable patient on CPAP machine patient does follow-up with the sleep specialist periodically discussed the patient important lose weight and sleeping position also discussed with the patient       Diagnoses and all orders for this visit:    Essential hypertension  -     CBC and differential; Future  -     Basic metabolic panel; Future  -     Lipid Panel with Direct LDL reflex; Future  -     TSH, 3rd generation with Free T4 reflex; Future  -     Hemoglobin A1C; Future    Indigestion    Urge incontinence  -     oxybutynin (DITROPAN-XL) 5 mg 24 hr tablet; Take 1 tablet (5 mg total) by mouth daily    Obstructive sleep apnea treated with continuous positive airway pressure (CPAP)    Vitamin D deficiency    Migraine without aura and without status migrainosus, not intractable  -     topiramate (TOPAMAX) 25 mg tablet; Take 1 tablet (25 mg total) by mouth daily    Chronic cystitis  -     oxybutynin (DITROPAN-XL) 5 mg 24 hr tablet; Take 1 tablet (5 mg total) by mouth daily    Chronic low back pain with sciatica, sciatica laterality unspecified, unspecified back pain laterality  -     pregabalin (LYRICA) 75 mg capsule; Take 1 capsule (75 mg total) by mouth 3 (three) times a day    Mixed hyperlipidemia  -     CBC and differential; Future  -     Basic metabolic panel; Future  -     Lipid Panel with Direct LDL reflex; Future  -     TSH, 3rd generation with Free T4 reflex; Future  -     Hemoglobin A1C; Future    Type 2 diabetes mellitus with stage 3 chronic kidney disease, with long-term current use of insulin (HCC)  -     CBC and differential; Future  -     Basic metabolic panel; Future  -     Lipid Panel with Direct LDL reflex; Future  -     TSH, 3rd generation with Free T4 reflex; Future  -     Hemoglobin A1C; Future    Hypertriglyceridemia, essential  -     CBC and differential; Future  -     Basic metabolic panel; Future  -     Lipid Panel with Direct LDL reflex;  Future  - TSH, 3rd generation with Free T4 reflex;  Future  -     Hemoglobin A1C; Future

## 2020-07-29 NOTE — ASSESSMENT & PLAN NOTE
Chronic asymptomatic fair control continue pantoprazole 40 mg once a day discussed the patient avoid provoke food do not eat and lie down

## 2020-08-19 LAB
LEFT EYE DIABETIC RETINOPATHY: NORMAL
RIGHT EYE DIABETIC RETINOPATHY: NORMAL

## 2020-08-28 ENCOUNTER — HOSPITAL ENCOUNTER (OUTPATIENT)
Dept: ULTRASOUND IMAGING | Facility: HOSPITAL | Age: 75
Discharge: HOME | End: 2020-08-28
Payer: MEDICARE

## 2020-08-28 ENCOUNTER — TELEPHONE (OUTPATIENT)
Dept: FAMILY MEDICINE CLINIC | Facility: CLINIC | Age: 75
End: 2020-08-28

## 2020-08-28 ENCOUNTER — HOSPITAL ENCOUNTER (OUTPATIENT)
Dept: MAMMOGRAPHY | Facility: HOSPITAL | Age: 75
Discharge: HOME | End: 2020-08-28
Payer: MEDICARE

## 2020-08-28 VITALS — BODY MASS INDEX: 41.99 KG/M2 | HEIGHT: 65 IN | WEIGHT: 252 LBS

## 2020-08-28 DIAGNOSIS — Z09 FOLLOW UP: ICD-10-CM

## 2020-08-28 PROCEDURE — G0279 TOMOSYNTHESIS, MAMMO: HCPCS

## 2020-08-28 PROCEDURE — 77065 DX MAMMO INCL CAD UNI: CPT

## 2020-08-28 PROCEDURE — 76642 ULTRASOUND BREAST LIMITED: CPT

## 2020-08-28 NOTE — TELEPHONE ENCOUNTER
----- Message from Toribio Ponce MD sent at 8/28/2020  2:31 PM EDT -----  Ultrasound in 6 months for the left breast

## 2020-10-19 ENCOUNTER — LAB (OUTPATIENT)
Dept: LAB | Facility: IMAGING CENTER | Age: 75
End: 2020-10-19
Payer: MEDICARE

## 2020-10-19 DIAGNOSIS — I10 ESSENTIAL HYPERTENSION: ICD-10-CM

## 2020-10-19 DIAGNOSIS — E78.2 MIXED HYPERLIPIDEMIA: ICD-10-CM

## 2020-10-19 DIAGNOSIS — N18.30 TYPE 2 DIABETES MELLITUS WITH STAGE 3 CHRONIC KIDNEY DISEASE, WITH LONG-TERM CURRENT USE OF INSULIN (HCC): ICD-10-CM

## 2020-10-19 DIAGNOSIS — Z79.4 TYPE 2 DIABETES MELLITUS WITH STAGE 3 CHRONIC KIDNEY DISEASE, WITH LONG-TERM CURRENT USE OF INSULIN (HCC): ICD-10-CM

## 2020-10-19 DIAGNOSIS — E78.1 HYPERTRIGLYCERIDEMIA, ESSENTIAL: ICD-10-CM

## 2020-10-19 DIAGNOSIS — E11.22 TYPE 2 DIABETES MELLITUS WITH STAGE 3 CHRONIC KIDNEY DISEASE, WITH LONG-TERM CURRENT USE OF INSULIN (HCC): ICD-10-CM

## 2020-10-19 LAB
ANION GAP SERPL CALCULATED.3IONS-SCNC: 3 MMOL/L (ref 4–13)
BASOPHILS # BLD AUTO: 0.03 THOUSANDS/ΜL (ref 0–0.1)
BASOPHILS NFR BLD AUTO: 1 % (ref 0–1)
BUN SERPL-MCNC: 29 MG/DL (ref 5–25)
CALCIUM SERPL-MCNC: 9.3 MG/DL (ref 8.3–10.1)
CHLORIDE SERPL-SCNC: 110 MMOL/L (ref 100–108)
CHOLEST SERPL-MCNC: 110 MG/DL (ref 50–200)
CO2 SERPL-SCNC: 29 MMOL/L (ref 21–32)
CREAT SERPL-MCNC: 1.4 MG/DL (ref 0.6–1.3)
EOSINOPHIL # BLD AUTO: 0.1 THOUSAND/ΜL (ref 0–0.61)
EOSINOPHIL NFR BLD AUTO: 2 % (ref 0–6)
ERYTHROCYTE [DISTWIDTH] IN BLOOD BY AUTOMATED COUNT: 13.3 % (ref 11.6–15.1)
EST. AVERAGE GLUCOSE BLD GHB EST-MCNC: 160 MG/DL
GFR SERPL CREATININE-BSD FRML MDRD: 37 ML/MIN/1.73SQ M
GLUCOSE P FAST SERPL-MCNC: 110 MG/DL (ref 65–99)
HBA1C MFR BLD: 7.2 %
HCT VFR BLD AUTO: 39.7 % (ref 34.8–46.1)
HDLC SERPL-MCNC: 43 MG/DL
HGB BLD-MCNC: 12.2 G/DL (ref 11.5–15.4)
IMM GRANULOCYTES # BLD AUTO: 0.03 THOUSAND/UL (ref 0–0.2)
IMM GRANULOCYTES NFR BLD AUTO: 1 % (ref 0–2)
LDLC SERPL CALC-MCNC: 40 MG/DL (ref 0–100)
LYMPHOCYTES # BLD AUTO: 1.28 THOUSANDS/ΜL (ref 0.6–4.47)
LYMPHOCYTES NFR BLD AUTO: 20 % (ref 14–44)
MCH RBC QN AUTO: 29.5 PG (ref 26.8–34.3)
MCHC RBC AUTO-ENTMCNC: 30.7 G/DL (ref 31.4–37.4)
MCV RBC AUTO: 96 FL (ref 82–98)
MONOCYTES # BLD AUTO: 0.43 THOUSAND/ΜL (ref 0.17–1.22)
MONOCYTES NFR BLD AUTO: 7 % (ref 4–12)
NEUTROPHILS # BLD AUTO: 4.6 THOUSANDS/ΜL (ref 1.85–7.62)
NEUTS SEG NFR BLD AUTO: 69 % (ref 43–75)
NRBC BLD AUTO-RTO: 0 /100 WBCS
PLATELET # BLD AUTO: 153 THOUSANDS/UL (ref 149–390)
PMV BLD AUTO: 12.2 FL (ref 8.9–12.7)
POTASSIUM SERPL-SCNC: 4.2 MMOL/L (ref 3.5–5.3)
RBC # BLD AUTO: 4.14 MILLION/UL (ref 3.81–5.12)
SODIUM SERPL-SCNC: 142 MMOL/L (ref 136–145)
TRIGL SERPL-MCNC: 136 MG/DL
TSH SERPL DL<=0.05 MIU/L-ACNC: 2.13 UIU/ML (ref 0.36–3.74)
WBC # BLD AUTO: 6.47 THOUSAND/UL (ref 4.31–10.16)

## 2020-10-19 PROCEDURE — 83036 HEMOGLOBIN GLYCOSYLATED A1C: CPT

## 2020-10-19 PROCEDURE — 80048 BASIC METABOLIC PNL TOTAL CA: CPT

## 2020-10-19 PROCEDURE — 80061 LIPID PANEL: CPT

## 2020-10-19 PROCEDURE — 36415 COLL VENOUS BLD VENIPUNCTURE: CPT

## 2020-10-19 PROCEDURE — 85025 COMPLETE CBC W/AUTO DIFF WBC: CPT

## 2020-10-19 PROCEDURE — 84443 ASSAY THYROID STIM HORMONE: CPT

## 2020-10-27 DIAGNOSIS — Z79.4 TYPE 2 DIABETES MELLITUS WITH STAGE 3 CHRONIC KIDNEY DISEASE, WITH LONG-TERM CURRENT USE OF INSULIN (HCC): ICD-10-CM

## 2020-10-27 DIAGNOSIS — E78.1 HYPERTRIGLYCERIDEMIA, ESSENTIAL: ICD-10-CM

## 2020-10-27 DIAGNOSIS — N18.30 TYPE 2 DIABETES MELLITUS WITH STAGE 3 CHRONIC KIDNEY DISEASE, WITH LONG-TERM CURRENT USE OF INSULIN (HCC): ICD-10-CM

## 2020-10-27 DIAGNOSIS — E11.22 TYPE 2 DIABETES MELLITUS WITH STAGE 3 CHRONIC KIDNEY DISEASE, WITH LONG-TERM CURRENT USE OF INSULIN (HCC): ICD-10-CM

## 2020-10-27 RX ORDER — INSULIN DEGLUDEC INJECTION 100 U/ML
INJECTION, SOLUTION SUBCUTANEOUS
Qty: 75 ML | Refills: 0 | Status: SHIPPED | OUTPATIENT
Start: 2020-10-27 | End: 2020-12-14

## 2020-10-27 RX ORDER — FENOFIBRATE 160 MG/1
TABLET ORAL
Qty: 90 TABLET | Refills: 0 | Status: SHIPPED | OUTPATIENT
Start: 2020-10-27 | End: 2022-08-04 | Stop reason: SDUPTHER

## 2020-10-30 ENCOUNTER — OFFICE VISIT (OUTPATIENT)
Dept: FAMILY MEDICINE CLINIC | Facility: CLINIC | Age: 75
End: 2020-10-30
Payer: MEDICARE

## 2020-10-30 VITALS
WEIGHT: 253 LBS | SYSTOLIC BLOOD PRESSURE: 120 MMHG | HEIGHT: 63 IN | TEMPERATURE: 96.4 F | DIASTOLIC BLOOD PRESSURE: 64 MMHG | BODY MASS INDEX: 44.83 KG/M2 | HEART RATE: 74 BPM | OXYGEN SATURATION: 96 %

## 2020-10-30 DIAGNOSIS — Z23 NEED FOR INFLUENZA VACCINATION: Primary | ICD-10-CM

## 2020-10-30 DIAGNOSIS — M54.42 CHRONIC BILATERAL LOW BACK PAIN WITH BILATERAL SCIATICA: ICD-10-CM

## 2020-10-30 DIAGNOSIS — R92.8 ABNORMAL MAMMOGRAM: ICD-10-CM

## 2020-10-30 DIAGNOSIS — E55.9 VITAMIN D DEFICIENCY: ICD-10-CM

## 2020-10-30 DIAGNOSIS — N18.32 CHRONIC RENAL IMPAIRMENT, STAGE 3B (HCC): ICD-10-CM

## 2020-10-30 DIAGNOSIS — N18.32 TYPE 2 DIABETES MELLITUS WITH STAGE 3B CHRONIC KIDNEY DISEASE, WITH LONG-TERM CURRENT USE OF INSULIN (HCC): ICD-10-CM

## 2020-10-30 DIAGNOSIS — E11.22 TYPE 2 DIABETES MELLITUS WITH STAGE 3B CHRONIC KIDNEY DISEASE, WITH LONG-TERM CURRENT USE OF INSULIN (HCC): ICD-10-CM

## 2020-10-30 DIAGNOSIS — G89.29 CHRONIC BILATERAL LOW BACK PAIN WITH BILATERAL SCIATICA: ICD-10-CM

## 2020-10-30 DIAGNOSIS — Z79.4 TYPE 2 DIABETES MELLITUS WITH STAGE 3B CHRONIC KIDNEY DISEASE, WITH LONG-TERM CURRENT USE OF INSULIN (HCC): ICD-10-CM

## 2020-10-30 DIAGNOSIS — I10 ESSENTIAL HYPERTENSION: ICD-10-CM

## 2020-10-30 DIAGNOSIS — Z79.4 LONG TERM CURRENT USE OF INSULIN (HCC): ICD-10-CM

## 2020-10-30 DIAGNOSIS — E78.2 MIXED HYPERLIPIDEMIA: ICD-10-CM

## 2020-10-30 DIAGNOSIS — M54.41 CHRONIC BILATERAL LOW BACK PAIN WITH BILATERAL SCIATICA: ICD-10-CM

## 2020-10-30 PROBLEM — M19.90 ARTHRITIS: Status: ACTIVE | Noted: 2020-10-30

## 2020-10-30 PROBLEM — L60.3 NAIL DYSTROPHY: Status: ACTIVE | Noted: 2020-06-04

## 2020-10-30 PROBLEM — L84 CALLUS: Status: ACTIVE | Noted: 2020-10-30

## 2020-10-30 PROBLEM — R01.1 HEART MURMUR: Status: RESOLVED | Noted: 2020-06-30 | Resolved: 2020-10-30

## 2020-10-30 PROBLEM — Z00.00 ROUTINE MEDICAL EXAM: Status: RESOLVED | Noted: 2019-06-28 | Resolved: 2020-10-30

## 2020-10-30 PROCEDURE — 99214 OFFICE O/P EST MOD 30 MIN: CPT | Performed by: FAMILY MEDICINE

## 2020-10-30 PROCEDURE — G0008 ADMIN INFLUENZA VIRUS VAC: HCPCS | Performed by: FAMILY MEDICINE

## 2020-10-30 PROCEDURE — 90662 IIV NO PRSV INCREASED AG IM: CPT | Performed by: FAMILY MEDICINE

## 2020-10-30 RX ORDER — SIMVASTATIN 10 MG
10 TABLET ORAL
Qty: 90 TABLET | Refills: 0 | Status: SHIPPED | OUTPATIENT
Start: 2020-10-30 | End: 2020-12-18

## 2020-10-30 RX ORDER — LISINOPRIL 10 MG/1
10 TABLET ORAL DAILY
Qty: 90 TABLET | Refills: 0 | Status: SHIPPED | OUTPATIENT
Start: 2020-10-30 | End: 2020-12-18

## 2020-10-30 RX ORDER — PREGABALIN 75 MG/1
75 CAPSULE ORAL 3 TIMES DAILY
Qty: 270 CAPSULE | Refills: 0 | Status: SHIPPED | OUTPATIENT
Start: 2020-10-30 | End: 2021-03-02 | Stop reason: SDUPTHER

## 2020-12-09 ENCOUNTER — OFFICE VISIT (OUTPATIENT)
Dept: SLEEP CENTER | Facility: CLINIC | Age: 75
End: 2020-12-09
Payer: MEDICARE

## 2020-12-09 VITALS
HEART RATE: 69 BPM | WEIGHT: 253.4 LBS | DIASTOLIC BLOOD PRESSURE: 68 MMHG | BODY MASS INDEX: 44.9 KG/M2 | SYSTOLIC BLOOD PRESSURE: 110 MMHG | HEIGHT: 63 IN

## 2020-12-09 DIAGNOSIS — E66.01 MORBID OBESITY WITH BMI OF 40.0-44.9, ADULT (HCC): ICD-10-CM

## 2020-12-09 DIAGNOSIS — G25.81 RESTLESS LEGS SYNDROME (RLS): ICD-10-CM

## 2020-12-09 DIAGNOSIS — Z99.89 OBSTRUCTIVE SLEEP APNEA TREATED WITH CONTINUOUS POSITIVE AIRWAY PRESSURE (CPAP): Primary | ICD-10-CM

## 2020-12-09 DIAGNOSIS — G47.33 OBSTRUCTIVE SLEEP APNEA TREATED WITH CONTINUOUS POSITIVE AIRWAY PRESSURE (CPAP): Primary | ICD-10-CM

## 2020-12-09 PROCEDURE — 99214 OFFICE O/P EST MOD 30 MIN: CPT | Performed by: NURSE PRACTITIONER

## 2020-12-10 ENCOUNTER — TELEPHONE (OUTPATIENT)
Dept: SLEEP CENTER | Facility: CLINIC | Age: 75
End: 2020-12-10

## 2020-12-13 DIAGNOSIS — E11.22 TYPE 2 DIABETES MELLITUS WITH STAGE 3 CHRONIC KIDNEY DISEASE, WITH LONG-TERM CURRENT USE OF INSULIN (HCC): ICD-10-CM

## 2020-12-13 DIAGNOSIS — N18.30 TYPE 2 DIABETES MELLITUS WITH STAGE 3 CHRONIC KIDNEY DISEASE, WITH LONG-TERM CURRENT USE OF INSULIN (HCC): ICD-10-CM

## 2020-12-13 DIAGNOSIS — Z79.4 TYPE 2 DIABETES MELLITUS WITH STAGE 3 CHRONIC KIDNEY DISEASE, WITH LONG-TERM CURRENT USE OF INSULIN (HCC): ICD-10-CM

## 2020-12-14 RX ORDER — INSULIN DEGLUDEC INJECTION 100 U/ML
INJECTION, SOLUTION SUBCUTANEOUS
Qty: 75 ML | Refills: 3 | Status: SHIPPED | OUTPATIENT
Start: 2020-12-14 | End: 2021-08-11 | Stop reason: SDUPTHER

## 2020-12-18 DIAGNOSIS — I10 ESSENTIAL HYPERTENSION: ICD-10-CM

## 2020-12-18 DIAGNOSIS — E78.2 MIXED HYPERLIPIDEMIA: ICD-10-CM

## 2020-12-18 RX ORDER — SIMVASTATIN 10 MG
TABLET ORAL
Qty: 90 TABLET | Refills: 3 | Status: SHIPPED | OUTPATIENT
Start: 2020-12-18 | End: 2021-05-26 | Stop reason: SDUPTHER

## 2020-12-18 RX ORDER — LISINOPRIL 10 MG/1
TABLET ORAL
Qty: 90 TABLET | Refills: 3 | Status: SHIPPED | OUTPATIENT
Start: 2020-12-18 | End: 2021-05-13 | Stop reason: SDUPTHER

## 2021-01-25 ENCOUNTER — LAB (OUTPATIENT)
Dept: LAB | Facility: IMAGING CENTER | Age: 76
End: 2021-01-25
Payer: MEDICARE

## 2021-01-25 DIAGNOSIS — M54.42 CHRONIC BILATERAL LOW BACK PAIN WITH BILATERAL SCIATICA: ICD-10-CM

## 2021-01-25 DIAGNOSIS — Z79.4 TYPE 2 DIABETES MELLITUS WITH STAGE 3B CHRONIC KIDNEY DISEASE, WITH LONG-TERM CURRENT USE OF INSULIN (HCC): ICD-10-CM

## 2021-01-25 DIAGNOSIS — I10 ESSENTIAL HYPERTENSION: ICD-10-CM

## 2021-01-25 DIAGNOSIS — E55.9 VITAMIN D DEFICIENCY: ICD-10-CM

## 2021-01-25 DIAGNOSIS — G89.29 CHRONIC BILATERAL LOW BACK PAIN WITH BILATERAL SCIATICA: ICD-10-CM

## 2021-01-25 DIAGNOSIS — E11.22 TYPE 2 DIABETES MELLITUS WITH STAGE 3B CHRONIC KIDNEY DISEASE, WITH LONG-TERM CURRENT USE OF INSULIN (HCC): ICD-10-CM

## 2021-01-25 DIAGNOSIS — M54.41 CHRONIC BILATERAL LOW BACK PAIN WITH BILATERAL SCIATICA: ICD-10-CM

## 2021-01-25 DIAGNOSIS — N18.32 TYPE 2 DIABETES MELLITUS WITH STAGE 3B CHRONIC KIDNEY DISEASE, WITH LONG-TERM CURRENT USE OF INSULIN (HCC): ICD-10-CM

## 2021-01-25 DIAGNOSIS — E78.2 MIXED HYPERLIPIDEMIA: ICD-10-CM

## 2021-01-25 LAB
ALBUMIN SERPL BCP-MCNC: 3.6 G/DL (ref 3.5–5)
ALP SERPL-CCNC: 136 U/L (ref 46–116)
ALT SERPL W P-5'-P-CCNC: 49 U/L (ref 12–78)
ANION GAP SERPL CALCULATED.3IONS-SCNC: 4 MMOL/L (ref 4–13)
AST SERPL W P-5'-P-CCNC: 37 U/L (ref 5–45)
BASOPHILS # BLD AUTO: 0.02 THOUSANDS/ΜL (ref 0–0.1)
BASOPHILS NFR BLD AUTO: 0 % (ref 0–1)
BILIRUB SERPL-MCNC: 0.36 MG/DL (ref 0.2–1)
BUN SERPL-MCNC: 21 MG/DL (ref 5–25)
CALCIUM SERPL-MCNC: 10 MG/DL (ref 8.3–10.1)
CHLORIDE SERPL-SCNC: 110 MMOL/L (ref 100–108)
CHOLEST SERPL-MCNC: 152 MG/DL (ref 50–200)
CO2 SERPL-SCNC: 26 MMOL/L (ref 21–32)
CREAT SERPL-MCNC: 1.2 MG/DL (ref 0.6–1.3)
EOSINOPHIL # BLD AUTO: 0.12 THOUSAND/ΜL (ref 0–0.61)
EOSINOPHIL NFR BLD AUTO: 2 % (ref 0–6)
ERYTHROCYTE [DISTWIDTH] IN BLOOD BY AUTOMATED COUNT: 13.4 % (ref 11.6–15.1)
EST. AVERAGE GLUCOSE BLD GHB EST-MCNC: 157 MG/DL
GFR SERPL CREATININE-BSD FRML MDRD: 44 ML/MIN/1.73SQ M
GLUCOSE P FAST SERPL-MCNC: 95 MG/DL (ref 65–99)
HBA1C MFR BLD: 7.1 %
HCT VFR BLD AUTO: 43.7 % (ref 34.8–46.1)
HDLC SERPL-MCNC: 43 MG/DL
HGB BLD-MCNC: 13 G/DL (ref 11.5–15.4)
IMM GRANULOCYTES # BLD AUTO: 0.06 THOUSAND/UL (ref 0–0.2)
IMM GRANULOCYTES NFR BLD AUTO: 1 % (ref 0–2)
LDLC SERPL CALC-MCNC: 74 MG/DL (ref 0–100)
LYMPHOCYTES # BLD AUTO: 1.44 THOUSANDS/ΜL (ref 0.6–4.47)
LYMPHOCYTES NFR BLD AUTO: 18 % (ref 14–44)
MCH RBC QN AUTO: 28.8 PG (ref 26.8–34.3)
MCHC RBC AUTO-ENTMCNC: 29.7 G/DL (ref 31.4–37.4)
MCV RBC AUTO: 97 FL (ref 82–98)
MONOCYTES # BLD AUTO: 0.55 THOUSAND/ΜL (ref 0.17–1.22)
MONOCYTES NFR BLD AUTO: 7 % (ref 4–12)
NEUTROPHILS # BLD AUTO: 5.97 THOUSANDS/ΜL (ref 1.85–7.62)
NEUTS SEG NFR BLD AUTO: 72 % (ref 43–75)
NRBC BLD AUTO-RTO: 0 /100 WBCS
PLATELET # BLD AUTO: 173 THOUSANDS/UL (ref 149–390)
PMV BLD AUTO: 12.4 FL (ref 8.9–12.7)
POTASSIUM SERPL-SCNC: 4.6 MMOL/L (ref 3.5–5.3)
PROT SERPL-MCNC: 7.4 G/DL (ref 6.4–8.2)
RBC # BLD AUTO: 4.51 MILLION/UL (ref 3.81–5.12)
SODIUM SERPL-SCNC: 140 MMOL/L (ref 136–145)
TRIGL SERPL-MCNC: 174 MG/DL
TSH SERPL DL<=0.05 MIU/L-ACNC: 2.7 UIU/ML (ref 0.36–3.74)
WBC # BLD AUTO: 8.16 THOUSAND/UL (ref 4.31–10.16)

## 2021-01-25 PROCEDURE — 80053 COMPREHEN METABOLIC PANEL: CPT

## 2021-01-25 PROCEDURE — 80061 LIPID PANEL: CPT

## 2021-01-25 PROCEDURE — 83036 HEMOGLOBIN GLYCOSYLATED A1C: CPT

## 2021-01-25 PROCEDURE — 84443 ASSAY THYROID STIM HORMONE: CPT

## 2021-01-25 PROCEDURE — 36415 COLL VENOUS BLD VENIPUNCTURE: CPT

## 2021-01-25 PROCEDURE — 85025 COMPLETE CBC W/AUTO DIFF WBC: CPT

## 2021-02-05 DIAGNOSIS — N30.20 CHRONIC CYSTITIS: ICD-10-CM

## 2021-02-05 DIAGNOSIS — G43.009 MIGRAINE WITHOUT AURA AND WITHOUT STATUS MIGRAINOSUS, NOT INTRACTABLE: ICD-10-CM

## 2021-02-05 DIAGNOSIS — N39.41 URGE INCONTINENCE: ICD-10-CM

## 2021-02-05 RX ORDER — TOPIRAMATE 25 MG/1
TABLET ORAL
Qty: 90 TABLET | Refills: 3 | Status: SHIPPED | OUTPATIENT
Start: 2021-02-05 | End: 2021-02-08 | Stop reason: SDUPTHER

## 2021-02-05 RX ORDER — OXYBUTYNIN CHLORIDE 5 MG/1
TABLET, EXTENDED RELEASE ORAL
Qty: 90 TABLET | Refills: 3 | Status: SHIPPED | OUTPATIENT
Start: 2021-02-05 | End: 2021-03-02 | Stop reason: SDUPTHER

## 2021-02-08 ENCOUNTER — OFFICE VISIT (OUTPATIENT)
Dept: FAMILY MEDICINE CLINIC | Facility: CLINIC | Age: 76
End: 2021-02-08
Payer: MEDICARE

## 2021-02-08 VITALS
HEIGHT: 63 IN | OXYGEN SATURATION: 95 % | SYSTOLIC BLOOD PRESSURE: 124 MMHG | BODY MASS INDEX: 44.65 KG/M2 | DIASTOLIC BLOOD PRESSURE: 82 MMHG | HEART RATE: 67 BPM | RESPIRATION RATE: 16 BRPM | WEIGHT: 252 LBS | TEMPERATURE: 97 F

## 2021-02-08 DIAGNOSIS — G43.009 MIGRAINE WITHOUT AURA AND WITHOUT STATUS MIGRAINOSUS, NOT INTRACTABLE: ICD-10-CM

## 2021-02-08 DIAGNOSIS — E78.2 MIXED HYPERLIPIDEMIA: ICD-10-CM

## 2021-02-08 DIAGNOSIS — L98.9 SKIN LESION: Primary | ICD-10-CM

## 2021-02-08 DIAGNOSIS — N18.32 TYPE 2 DIABETES MELLITUS WITH STAGE 3B CHRONIC KIDNEY DISEASE, WITH LONG-TERM CURRENT USE OF INSULIN (HCC): ICD-10-CM

## 2021-02-08 DIAGNOSIS — E66.01 MORBID OBESITY WITH BMI OF 40.0-44.9, ADULT (HCC): ICD-10-CM

## 2021-02-08 DIAGNOSIS — K30 INDIGESTION: ICD-10-CM

## 2021-02-08 DIAGNOSIS — E11.22 TYPE 2 DIABETES MELLITUS WITH STAGE 3B CHRONIC KIDNEY DISEASE, WITH LONG-TERM CURRENT USE OF INSULIN (HCC): ICD-10-CM

## 2021-02-08 DIAGNOSIS — N18.32 CHRONIC RENAL IMPAIRMENT, STAGE 3B (HCC): ICD-10-CM

## 2021-02-08 DIAGNOSIS — K76.0 NONALCOHOLIC FATTY LIVER DISEASE: ICD-10-CM

## 2021-02-08 DIAGNOSIS — Z79.4 TYPE 2 DIABETES MELLITUS WITH STAGE 3B CHRONIC KIDNEY DISEASE, WITH LONG-TERM CURRENT USE OF INSULIN (HCC): ICD-10-CM

## 2021-02-08 DIAGNOSIS — K58.8 OTHER IRRITABLE BOWEL SYNDROME: ICD-10-CM

## 2021-02-08 DIAGNOSIS — I73.9 PERIPHERAL VASCULAR DISEASE (HCC): ICD-10-CM

## 2021-02-08 DIAGNOSIS — Z79.4 LONG TERM CURRENT USE OF INSULIN (HCC): ICD-10-CM

## 2021-02-08 DIAGNOSIS — B35.4 TINEA CORPORIS: ICD-10-CM

## 2021-02-08 PROCEDURE — 99215 OFFICE O/P EST HI 40 MIN: CPT | Performed by: FAMILY MEDICINE

## 2021-02-08 RX ORDER — PANTOPRAZOLE SODIUM 40 MG/1
40 TABLET, DELAYED RELEASE ORAL DAILY
Qty: 90 TABLET | Refills: 1 | Status: SHIPPED | OUTPATIENT
Start: 2021-02-08 | End: 2021-05-13 | Stop reason: SDUPTHER

## 2021-02-08 RX ORDER — DICYCLOMINE HCL 20 MG
20 TABLET ORAL 2 TIMES DAILY
Qty: 180 TABLET | Refills: 1 | Status: SHIPPED | OUTPATIENT
Start: 2021-02-08 | End: 2021-05-12 | Stop reason: SDUPTHER

## 2021-02-08 RX ORDER — CLOTRIMAZOLE AND BETAMETHASONE DIPROPIONATE 10; .64 MG/G; MG/G
CREAM TOPICAL 2 TIMES DAILY
Qty: 90 G | Refills: 2 | Status: SHIPPED | OUTPATIENT
Start: 2021-02-08 | End: 2021-05-12 | Stop reason: ALTCHOICE

## 2021-02-08 RX ORDER — TOPIRAMATE 25 MG/1
25 TABLET ORAL DAILY
Qty: 90 TABLET | Refills: 3 | Status: SHIPPED | OUTPATIENT
Start: 2021-02-08 | End: 2021-05-26 | Stop reason: SDUPTHER

## 2021-02-08 NOTE — ASSESSMENT & PLAN NOTE
Chronic asymptomatic patient already on statin and aspirin discussed important control risk factor including weight high blood pressure high cholesterol and diabetic

## 2021-02-08 NOTE — ASSESSMENT & PLAN NOTE
A chronic liver enzyme within normal range patient the had the multiple risk factor including diabetic and obesity discussed important lose 10% of her weight and the discussed the important control her blood sugar

## 2021-02-08 NOTE — ASSESSMENT & PLAN NOTE
Lab Results   Component Value Date    HGBA1C 7 1 (H) 01/25/2021      chronic asymptomatic improve in the hemoglobin A1c the patient will continue with the current regimen she is on Tresiba 80 unit she is also on Humalog per insulin sliding scale but she has not been using it recently and patient on statin and she is on Ace inhibitor patient does follow up with the Ophthalmology for diabetic eye care and podiatric for diabetic foot care a discussed the patient sinus symptom of hypoglycemia low carb diet important lose weight discussed with the patient

## 2021-02-08 NOTE — ASSESSMENT & PLAN NOTE
New diagnosis skin lesion on the right side of  trunk at bra line and plan to refer the patient to Dermatology for possible room

## 2021-02-08 NOTE — ASSESSMENT & PLAN NOTE
Chronic LDL sub therapeutic in diabetic patient will continue with the simvastatin recommend low-fat diet and important lose weight

## 2021-02-08 NOTE — PROGRESS NOTES
BMI Counseling: Body mass index is 44 64 kg/m²  The BMI is above normal  Nutrition recommendations include decreasing portion sizes, consuming healthier snacks and limiting drinks that contain sugar  Exercise recommendations include exercising 3-5 times per week  No pharmacotherapy was ordered  Patient referred to PCP due to patient being overweight  Subjective:   Chief Complaint   Patient presents with    Follow-up     chronic conditions        Patient ID: Isa Jackson is a 67 y o  female       Patient here follow-up with a chronic condition she is concerned about skin lesion she had the it on her the right side of the trunk at the bra line and she notices increase in the size color brown no itchy no pain and sometimes get the irritated with the bra line and bleed little bit patient does the had a family history of the skin cancer   patient history of insulin-dependent diabetic she is on Tresiba 80 unit and she is on Humalog per insulin sliding scale but she has not been using it the her blood sugar has been will control no sinus symptom of hypoglycemia patient does have complication of diabetic with the nephropathy patient deny increased thirsty increased frequency urination no dizziness no headache and no abdomen pain   patient history of hyperlipidemia on simvastatin tolerated well no rash and no muscle pain patient history of peripheral vascular disease deny any claudication and and no pain on the lower extremity with activity and she is already on statin and aspirin patient was history of chronic kidney disease it can be multifactorial including obesity diabetic and high blood pressure patient no lower extremity edema no abdomen pain no flank pain no blood in the urine patient history of vertebral bowel syndrome and the she been following up by  GI and she is on dicyclomine the she still get bloated and gassy and the feel did not the help her she has disappointment from her GI handling the problem and she would like to get 2nd opinion   recent blood work review with the patient      The following portions of the patient's history were reviewed and updated as appropriate: allergies, current medications, past family history, past medical history, past social history, past surgical history and problem list     Review of Systems   Constitutional: Negative for activity change, appetite change, fatigue and fever  HENT: Negative for congestion, ear pain, sinus pressure, sinus pain and sore throat  Eyes: Negative for pain, discharge, redness and itching  Respiratory: Negative for cough, chest tightness, shortness of breath and stridor  Cardiovascular: Negative for chest pain, palpitations and leg swelling  Gastrointestinal: Negative for abdominal pain, blood in stool, constipation, diarrhea and nausea  Genitourinary: Negative for dysuria, flank pain, frequency and hematuria  Musculoskeletal: Negative for back pain, joint swelling and neck pain  Skin: Negative for pallor and rash  Skin lesion   Neurological: Negative for dizziness, tremors, weakness, numbness and headaches  Hematological: Does not bruise/bleed easily  Objective:  Vitals:    02/08/21 1105   BP: 124/82   BP Location: Left arm   Patient Position: Sitting   Cuff Size: Large   Pulse: 67   Resp: 16   Temp: (!) 97 °F (36 1 °C)   TempSrc: Tympanic   SpO2: 95%   Weight: 114 kg (252 lb)   Height: 5' 3" (1 6 m)      Physical Exam  Vitals signs and nursing note reviewed  Constitutional:       General: She is not in acute distress  Appearance: She is well-developed  She is obese  She is not ill-appearing, toxic-appearing or diaphoretic  HENT:      Head: Normocephalic  Right Ear: Tympanic membrane, ear canal and external ear normal       Left Ear: Tympanic membrane, ear canal and external ear normal       Nose: Nose normal  No congestion or rhinorrhea        Mouth/Throat:      Mouth: Mucous membranes are moist  Pharynx: Oropharynx is clear  No oropharyngeal exudate or posterior oropharyngeal erythema  Eyes:      General:         Right eye: No discharge  Left eye: No discharge  Conjunctiva/sclera: Conjunctivae normal       Pupils: Pupils are equal, round, and reactive to light  Neck:      Musculoskeletal: Normal range of motion and neck supple  Vascular: No JVD  Cardiovascular:      Rate and Rhythm: Normal rate and regular rhythm  Heart sounds: Normal heart sounds  No murmur  No gallop  Pulmonary:      Effort: Pulmonary effort is normal  No respiratory distress  Breath sounds: Normal breath sounds  No stridor  No wheezing or rales  Chest:      Chest wall: No tenderness  Abdominal:      General: There is no distension  Palpations: Abdomen is soft  There is no mass  Tenderness: There is no abdominal tenderness  There is no rebound  Musculoskeletal:         General: No tenderness  Lymphadenopathy:      Cervical: No cervical adenopathy  Skin:     General: Skin is warm  Findings: No erythema or rash  Neurological:      Mental Status: She is alert and oriented to person, place, and time  Motor: No weakness        Gait: Gait normal            Assessment/Plan:    Type 2 diabetes mellitus with kidney complication, with long-term current use of insulin ([facility])    Lab Results   Component Value Date    HGBA1C 7 1 (H) 01/25/2021      chronic asymptomatic improve in the hemoglobin A1c the patient will continue with the current regimen she is on Tresiba 80 unit she is also on Humalog per insulin sliding scale but she has not been using it recently and patient on statin and she is on Ace inhibitor patient does follow up with the Ophthalmology for diabetic eye care and podiatric for diabetic foot care a discussed the patient sinus symptom of hypoglycemia low carb diet important lose weight discussed with the patient    Irritable bowel syndrome   A chronic uncontrolled patient on Bentyl patient still not  happy with the result she feel bloated and gassy patient seen by GI currently she would like to take 2nd opinion    Nonalcoholic fatty liver disease   A chronic liver enzyme within normal range patient the had the multiple risk factor including diabetic and obesity discussed important lose 10% of her weight and the discussed the important control her blood sugar    Peripheral vascular disease ([facility] )   Chronic asymptomatic patient already on statin and aspirin discussed important control risk factor including weight high blood pressure high cholesterol and diabetic    Skin lesion   New diagnosis skin lesion on the right side of  trunk at bra line and plan to refer the patient to Dermatology for possible room    Morbid obesity with BMI of 40 0-44 9, adult ([facility] [address] )   Chronic uncontrolled encouraged patient to watch for the portion low carb low-fat diet and increased physical activity    Mixed hyperlipidemia   Chronic LDL sub therapeutic in diabetic patient will continue with the simvastatin recommend low-fat diet and important lose weight    Chronic renal insufficiency, stage III (moderate)  Lab Results   Component Value Date    EGFR 44 01/25/2021    EGFR 37 10/19/2020    EGFR 40 06/22/2020    CREATININE 1 20 01/25/2021    CREATININE 1 40 (H) 10/19/2020    CREATININE 1 31 (H) 06/22/2020    chronic asymptomatic improve in the GFR improve in her creatinine recommend patient to continue with the will hydration avoid nonsteroidal anti-inflammatory drugs the patient does follow up with the Nephrology periodically       Diagnoses and all orders for this visit:    Skin lesion    Type 2 diabetes mellitus with stage 3b chronic kidney disease, with long-term current use of insulin (HCC)  -     CBC and differential; Future  -     Basic metabolic panel; Future  -     Lipid panel; Future  -     TSH, 3rd generation with Free T4 reflex;  Future  -     Hemoglobin A1C; Future    Other irritable bowel syndrome  -     dicyclomine (BENTYL) 20 mg tablet; Take 1 tablet (20 mg total) by mouth 2 (two) times a day  -     Ambulatory referral to Gastroenterology; Future    Nonalcoholic fatty liver disease  -     Ambulatory referral to Gastroenterology; Future  -     CBC and differential; Future  -     Basic metabolic panel; Future  -     Lipid panel; Future  -     TSH, 3rd generation with Free T4 reflex; Future  -     Hemoglobin A1C; Future    Peripheral vascular disease (HCC)    Morbid obesity with BMI of 40 0-44 9, adult (HCC)  -     CBC and differential; Future  -     Basic metabolic panel; Future  -     Lipid panel; Future  -     TSH, 3rd generation with Free T4 reflex; Future  -     Hemoglobin A1C; Future    Mixed hyperlipidemia    Chronic renal impairment, stage 3b    Long term current use of insulin ([facility])    Indigestion  -     pantoprazole (PROTONIX) 40 mg tablet; Take 1 tablet (40 mg total) by mouth daily    Migraine without aura and without status migrainosus, not intractable  -     topiramate (TOPAMAX) 25 mg tablet;  Take 1 tablet (25 mg total) by mouth daily    Tinea corporis  -     clotrimazole-betamethasone (LOTRISONE) 1-0 05 % cream; Apply topically 2 (two) times a day

## 2021-02-08 NOTE — ASSESSMENT & PLAN NOTE
Lab Results   Component Value Date    EGFR 44 01/25/2021    EGFR 37 10/19/2020    EGFR 40 06/22/2020    CREATININE 1 20 01/25/2021    CREATININE 1 40 (H) 10/19/2020    CREATININE 1 31 (H) 06/22/2020    chronic asymptomatic improve in the GFR improve in her creatinine recommend patient to continue with the will hydration avoid nonsteroidal anti-inflammatory drugs the patient does follow up with the Nephrology periodically

## 2021-02-08 NOTE — ASSESSMENT & PLAN NOTE
A chronic uncontrolled patient on Bentyl patient still not  happy with the result she feel bloated and gassy patient seen by GI currently she would like to take 2nd opinion

## 2021-02-09 ENCOUNTER — IMMUNIZATIONS (OUTPATIENT)
Dept: FAMILY MEDICINE CLINIC | Facility: HOSPITAL | Age: 76
End: 2021-02-09

## 2021-02-09 DIAGNOSIS — Z23 ENCOUNTER FOR IMMUNIZATION: Primary | ICD-10-CM

## 2021-02-09 PROCEDURE — 0011A SARS-COV-2 / COVID-19 MRNA VACCINE (MODERNA) 100 MCG: CPT

## 2021-02-09 PROCEDURE — 91301 SARS-COV-2 / COVID-19 MRNA VACCINE (MODERNA) 100 MCG: CPT

## 2021-03-02 DIAGNOSIS — N18.30 TYPE 2 DIABETES MELLITUS WITH STAGE 3 CHRONIC KIDNEY DISEASE, WITH LONG-TERM CURRENT USE OF INSULIN (HCC): ICD-10-CM

## 2021-03-02 DIAGNOSIS — N30.20 CHRONIC CYSTITIS: ICD-10-CM

## 2021-03-02 DIAGNOSIS — E11.22 TYPE 2 DIABETES MELLITUS WITH STAGE 3 CHRONIC KIDNEY DISEASE, WITH LONG-TERM CURRENT USE OF INSULIN (HCC): ICD-10-CM

## 2021-03-02 DIAGNOSIS — M54.41 CHRONIC BILATERAL LOW BACK PAIN WITH BILATERAL SCIATICA: ICD-10-CM

## 2021-03-02 DIAGNOSIS — Z79.4 TYPE 2 DIABETES MELLITUS WITH STAGE 3 CHRONIC KIDNEY DISEASE, WITH LONG-TERM CURRENT USE OF INSULIN (HCC): ICD-10-CM

## 2021-03-02 DIAGNOSIS — M54.42 CHRONIC BILATERAL LOW BACK PAIN WITH BILATERAL SCIATICA: ICD-10-CM

## 2021-03-02 DIAGNOSIS — N39.41 URGE INCONTINENCE: ICD-10-CM

## 2021-03-02 DIAGNOSIS — G89.29 CHRONIC BILATERAL LOW BACK PAIN WITH BILATERAL SCIATICA: ICD-10-CM

## 2021-03-03 RX ORDER — OXYBUTYNIN CHLORIDE 5 MG/1
5 TABLET, EXTENDED RELEASE ORAL DAILY
Qty: 90 TABLET | Refills: 0 | Status: SHIPPED | OUTPATIENT
Start: 2021-03-03 | End: 2021-05-04

## 2021-03-03 RX ORDER — PREGABALIN 75 MG/1
75 CAPSULE ORAL 3 TIMES DAILY
Qty: 270 CAPSULE | Refills: 0 | Status: SHIPPED | OUTPATIENT
Start: 2021-03-03 | End: 2021-06-01 | Stop reason: SDUPTHER

## 2021-03-03 RX ORDER — PEN NEEDLE, DIABETIC 32GX 5/32"
NEEDLE, DISPOSABLE MISCELLANEOUS
Qty: 270 EACH | Refills: 0 | Status: SHIPPED | OUTPATIENT
Start: 2021-03-03 | End: 2022-08-04 | Stop reason: SDUPTHER

## 2021-03-09 ENCOUNTER — IMMUNIZATIONS (OUTPATIENT)
Dept: FAMILY MEDICINE CLINIC | Facility: HOSPITAL | Age: 76
End: 2021-03-09

## 2021-03-09 DIAGNOSIS — Z23 ENCOUNTER FOR IMMUNIZATION: Primary | ICD-10-CM

## 2021-03-09 PROCEDURE — 0012A SARS-COV-2 / COVID-19 MRNA VACCINE (MODERNA) 100 MCG: CPT

## 2021-03-09 PROCEDURE — 91301 SARS-COV-2 / COVID-19 MRNA VACCINE (MODERNA) 100 MCG: CPT

## 2021-03-23 ENCOUNTER — OFFICE VISIT (OUTPATIENT)
Dept: GASTROENTEROLOGY | Facility: MEDICAL CENTER | Age: 76
End: 2021-03-23
Payer: MEDICARE

## 2021-03-23 VITALS — DIASTOLIC BLOOD PRESSURE: 72 MMHG | TEMPERATURE: 98.2 F | HEART RATE: 62 BPM | SYSTOLIC BLOOD PRESSURE: 118 MMHG

## 2021-03-23 DIAGNOSIS — K58.8 OTHER IRRITABLE BOWEL SYNDROME: ICD-10-CM

## 2021-03-23 DIAGNOSIS — K76.0 NONALCOHOLIC FATTY LIVER DISEASE: ICD-10-CM

## 2021-03-23 DIAGNOSIS — K52.9 CHRONIC DIARRHEA: Primary | ICD-10-CM

## 2021-03-23 DIAGNOSIS — R10.13 EPIGASTRIC PAIN: ICD-10-CM

## 2021-03-23 PROCEDURE — 99204 OFFICE O/P NEW MOD 45 MIN: CPT | Performed by: INTERNAL MEDICINE

## 2021-03-23 NOTE — PATIENT INSTRUCTIONS
(1) Stop magnesium oxide pills  (2) Strict lactose avoidance  (3) Blood test for celiac disease  (4) Stool tests  (5) We will get biopsy report from 2017  (6) In the future we may try "Low FODMAT" diet  (7) We will schedule colonoscopy and EGD  (8) You can increase cholestyramine to 3 times daily    The patient is scheduled at [facility] for a Colon/egd with Dr Haley Singh on 4/26/2021  Miralax/dulcolax prep instructions have been gone over in the office, with the patient, by the MA  The patient is aware that they will receive a call with the arrival time the day prior to procedure and that they will need a  the day of the procedure   I have asked the patient to call with any questions that they might have prior to procedure   Am diabetic

## 2021-03-23 NOTE — PROGRESS NOTES
[facility] - Outpatient Consultation  Cas Xiao 67 y o  female MRN: [id number]  Encounter: [id number]    HPI:    Cas Xiao is a 67 y o  female who presents with complaint of chronic diarrhea, worsening over the past month  She has had diarrhea for the past 3 years, getting worse over the past month  She has 3-5 watery to loose BMs per day  She has 10-15 min urgency with frequent incontinence during the day  No BMs at night  She is lactose intolerant and avoids milk but does indulge in ice cream approximately once per week  She also eats sour cream and hard cheeses occasionally  These dairy foods, as well as chili, are known triggers of her diarrhea  In addition, she takes Mg oxide pills which she was told help with digestion  Occasional cramping and abdominal pain with dairy products  She sometimes feels boated and gassy  No nausea, vomiting, or dysphagia  She has not been tested for celiac disease  She had colonoscopy in 2017 that found polyps and also took random biopsies and investigated the TI  Path is not available  No weight loss  She tried low dose cholestyramine 1 packet every other but it did not help  She also takes dicyclomine bid which may help a little with diarrhea  Align helped with bloating  No new medications over the past month  No recent antibiotics  She in on Mg oxide  Normal thyroid labs  No anemia  Of note, she has known fatty liver with normal LFTs  REVIEW OF SYSTEMS:  CONSTITUTIONAL: Denies any fever, chills, rigors, and weight loss  HEENT: No earache or tinnitus  Denies hearing loss or visual disturbances  CARDIOVASCULAR: No chest pain or palpitations  RESPIRATORY: Denies any cough, hemoptysis, shortness of breath or dyspnea on exertion  GASTROINTESTINAL: As noted in the History of Present Illness  GENITOURINARY: No problems with urination  Denies any hematuria or dysuria    NEUROLOGIC: No dizziness or vertigo, denies headaches  MUSCULOSKELETAL: Denies any muscle or joint pain  SKIN: Denies skin rashes or itching  ENDOCRINE: Denies excessive thirst  Denies intolerance to heat or cold  PSYCHOSOCIAL: Denies depression or anxiety  Denies any recent memory loss       Historical Information   Past Medical History:   Diagnosis Date    Anemia     Arthritis 10/30/2020    Chronic kidney disease     Diabetes mellitus ([address] )     Fracture of wrist     RIGHT    Heart murmur 6/30/2020    History of non-insulin dependent diabetes mellitus     Hyperlipidemia     Hypertension     Hypertension     IBS (irritable bowel syndrome)     Irritable bowel     Metabolic syndrome     Obesity     RLS (restless legs syndrome)     Routine medical exam 6/28/2019    Sleep apnea     Sleep apnea     ON CPAP    Urge incontinence     Urinary incontinence      Past Surgical History:   Procedure Laterality Date    CHOLECYSTECTOMY  2013    COLONOSCOPY  2010    CYSTOSCOPY      HERNIA REPAIR  2008    HYSTERECTOMY      PARTIAL (STILL HAS OVARIES)    KNEE SURGERY Left      Social History   Social History     Substance and Sexual Activity   Alcohol Use No    Frequency: Never    Binge frequency: Never    Comment: no caffeine use     Social History     Substance and Sexual Activity   Drug Use No     Social History     Tobacco Use   Smoking Status Never Smoker   Smokeless Tobacco Never Used     Family History   Problem Relation Age of Onset    Suicidality Father     Breast cancer Mother 58    Melanoma Sister     Bipolar disorder Sister     Heart disease Brother     Rheum arthritis Daughter     No Known Problems Maternal Grandmother     No Known Problems Paternal Grandmother     No Known Problems Sister     No Known Problems Daughter     No Known Problems Maternal Aunt     No Known Problems Maternal Aunt        Meds/Allergies       Current Outpatient Medications:     acetaminophen (TYLENOL) 325 mg tablet   albuterol (PROAIR HFA) 90 mcg/act inhaler    B Complex-C CAPS    Cholecalciferol (VITAMIN D3) 2000 units capsule    cholestyramine (QUESTRAN) 4 g packet    clotrimazole-betamethasone (LOTRISONE) 1-0 05 % cream    conjugated estrogens (PREMARIN) vaginal cream    CONTOUR NEXT TEST test strip    Diapers & Supplies MISC    dicyclomine (BENTYL) 20 mg tablet    EASY TOUCH LANCETS 32G/TWIST MISC    fenofibrate (TRIGLIDE) 160 MG tablet    Ferrous Sulfate (IRON) 325 (65 Fe) MG TABS    HUMALOG KWIKPEN 200 units/mL CONCENTRATED U-200 injection pen    Insulin Pen Needle (BD Pen Needle [place] U/F) 32G X 4 MM MISC    lisinopril (ZESTRIL) 10 mg tablet    loratadine (CLARITIN) 10 mg tablet    magnesium Oxide (MAG-OX) 400 mg TABS    Multiple Vitamin (DAILY VALUE MULTIVITAMIN) TABS    oxybutynin (DITROPAN-XL) 5 mg 24 hr tablet    pantoprazole (PROTONIX) 40 mg tablet    pregabalin (LYRICA) 75 mg capsule    Probiotic Product (ALIGN) CHEW    simvastatin (ZOCOR) 10 mg tablet    Thiamine HCl (VITAMIN B-1) 250 MG tablet    topiramate (TOPAMAX) 25 mg tablet    [place] FlexTouch 100 units/mL injection pen    Allergies   Allergen Reactions    Pioglitazone      "Feels like she is dying"    Ibuprofen     Levofloxacin Other (See Comments)    Nsaids        Objective   Blood pressure 118/72, pulse 62, temperature 98 2 °F (36 8 °C), not currently breastfeeding  There is no height or weight on file to calculate BMI  PHYSICAL EXAM:    General Appearance:   Alert, cooperative, no distress   HEENT:   Normocephalic, atraumatic, anicteric  Neck:  Supple, symmetrical, trachea midline   Lungs:   Clear to auscultation bilaterally; no rales, rhonchi or wheezing; respirations unlabored    Heart[de-identified]   Regular rate and rhythm; no murmur, rub, or gallop     Abdomen:   Soft, epigastric ttp, non-distended; normal bowel sounds; no masses, no organomegaly    Genitalia:   Deferred    Rectal:   Deferred    Extremities:  No cyanosis, clubbing or edema    Pulses:  2+ and symmetric    Skin:  No jaundice, rashes, or lesions    Lymph nodes:  No palpable cervical lymphadenopathy        Lab Results:   No visits with results within 1 Day(s) from this visit     Latest known visit with results is:   Lab on 01/25/2021   Component Date Value    WBC 01/25/2021 8 16     RBC 01/25/2021 4 51     Hemoglobin 01/25/2021 13 0     Hematocrit 01/25/2021 43 7     MCV 01/25/2021 97     MCH 01/25/2021 28 8     MCHC 01/25/2021 29 7*    RDW 01/25/2021 13 4     MPV 01/25/2021 12 4     Platelets 37/55/3253 173     nRBC 01/25/2021 0     Neutrophils Relative 01/25/2021 72     Immat GRANS % 01/25/2021 1     Lymphocytes Relative 01/25/2021 18     Monocytes Relative 01/25/2021 7     Eosinophils Relative 01/25/2021 2     Basophils Relative 01/25/2021 0     Neutrophils Absolute 01/25/2021 5 97     Immature Grans Absolute 01/25/2021 0 06     Lymphocytes Absolute 01/25/2021 1 44     Monocytes Absolute 01/25/2021 0 55     Eosinophils Absolute 01/25/2021 0 12     Basophils Absolute 01/25/2021 0 02     Cholesterol 01/25/2021 152     Triglycerides 01/25/2021 174*    HDL, Direct 01/25/2021 43     LDL Calculated 01/25/2021 74     TSH 3RD GENERATON 01/25/2021 2 700     Sodium 01/25/2021 140     Potassium 01/25/2021 4 6     Chloride 01/25/2021 110*    CO2 01/25/2021 26     ANION GAP 01/25/2021 4     BUN 01/25/2021 21     Creatinine 01/25/2021 1 20     Glucose, Fasting 01/25/2021 95     Calcium 01/25/2021 10 0     AST 01/25/2021 37     ALT 01/25/2021 49     Alkaline Phosphatase 01/25/2021 136*    Total Protein 01/25/2021 7 4     Albumin 01/25/2021 3 6     Total Bilirubin 01/25/2021 0 36     eGFR 01/25/2021 44     Hemoglobin A1C 01/25/2021 7 1*    EAG 01/25/2021 157        Lab Results   Component Value Date    WBC 8 16 01/25/2021    HGB 13 0 01/25/2021    HCT 43 7 01/25/2021    MCV 97 01/25/2021     01/25/2021       Lab Results   Component Value Date     06/04/2018    SODIUM 140 01/25/2021    K 4 6 01/25/2021     (H) 01/25/2021    CO2 26 01/25/2021    ANIONGAP 8 06/04/2018    AGAP 4 01/25/2021    BUN 21 01/25/2021    CREATININE 1 20 01/25/2021    GLUF 95 01/25/2021    CALCIUM 10 0 01/25/2021    AST 37 01/25/2021    ALT 49 01/25/2021    ALKPHOS 136 (H) 01/25/2021    PROT 6 2 06/04/2018    TP 7 4 01/25/2021    BILITOT 0 6 06/04/2018    TBILI 0 36 01/25/2021    EGFR 44 01/25/2021       No results found for: CRP    Lab Results   Component Value Date    DKP0RDSCJOMA 2 700 01/25/2021       No results found for: IRON, TIBC, FERRITIN    Radiology Results:   No results found  ______________________________________________________________________  ASSESSMENT AND PLAN:     Reba Newman is a 67 y o  female who presents with complaint of chronic diarrhea  She also had epigastric pain on exam   She has known lactose intolerance and is not very good at avoiding dairy products  She also takes Mg oxide pills  She carries a diagnosis of IBS and has had colonoscopy with TI intubation and random colon biopsies (presumably normal)  She has normal thyroid function  We will do the following   - She will stop taking magnesium oxide supplements  - She will start strict lactose avoidance  - We will check TTG and IgA  - We will check stool for C diff, other bacteria, and o&p  - She is due for colonoscopy now because she had 4 polyps 3 years ago; will schedule this as well as EGD given epigastric pain  We can take biopsies for microscopic colitis and celiac   - She can increase cholestyramine to 3 packets daily if stopping Mg oxide and dairy products does not help her sufficiently  - In the future, we can also try low FODMAP diet    I discussed this plan with her in detail, including risks of bleeding, infection, and perforation with endoscopies  1  Chronic diarrhea    2  Other irritable bowel syndrome    3  Nonalcoholic fatty liver disease    4  Epigastric pain        Orders Placed This Encounter   Procedures    Clostridium difficile toxin by PCR    Ova and parasite examination    Stool culture    Fecal fat, qualitative    t-Transglutaminase (tTG) IgG    IgA    Colonoscopy    EGD

## 2021-03-29 ENCOUNTER — APPOINTMENT (OUTPATIENT)
Dept: LAB | Facility: IMAGING CENTER | Age: 76
End: 2021-03-29
Payer: MEDICARE

## 2021-03-29 DIAGNOSIS — K52.9 CHRONIC DIARRHEA: ICD-10-CM

## 2021-03-29 LAB — IGA SERPL-MCNC: 148 MG/DL (ref 70–400)

## 2021-03-29 PROCEDURE — 83516 IMMUNOASSAY NONANTIBODY: CPT

## 2021-03-29 PROCEDURE — 36415 COLL VENOUS BLD VENIPUNCTURE: CPT

## 2021-03-29 PROCEDURE — 82784 ASSAY IGA/IGD/IGG/IGM EACH: CPT

## 2021-03-30 ENCOUNTER — APPOINTMENT (OUTPATIENT)
Dept: LAB | Facility: IMAGING CENTER | Age: 76
End: 2021-03-30
Payer: MEDICARE

## 2021-03-30 DIAGNOSIS — K52.9 CHRONIC DIARRHEA: ICD-10-CM

## 2021-03-30 LAB — TTG IGG SER-ACNC: <2 U/ML (ref 0–5)

## 2021-03-30 PROCEDURE — 87177 OVA AND PARASITES SMEARS: CPT

## 2021-03-30 PROCEDURE — 87505 NFCT AGENT DETECTION GI: CPT

## 2021-03-30 PROCEDURE — 82705 FATS/LIPIDS FECES QUAL: CPT

## 2021-03-30 PROCEDURE — 87209 SMEAR COMPLEX STAIN: CPT

## 2021-03-31 LAB
C DIFF TOX B TCDB STL QL NAA+PROBE: NEGATIVE
CAMPYLOBACTER DNA SPEC NAA+PROBE: NORMAL
O+P STL CONC: NORMAL
SALMONELLA DNA SPEC QL NAA+PROBE: NORMAL
SHIGA TOXIN STX GENE SPEC NAA+PROBE: NORMAL
SHIGELLA DNA SPEC QL NAA+PROBE: NORMAL

## 2021-04-03 LAB
FAT STL QL: NORMAL
NEUTRAL FAT STL QL: NORMAL

## 2021-04-22 ENCOUNTER — APPOINTMENT (OUTPATIENT)
Dept: LAB | Facility: IMAGING CENTER | Age: 76
End: 2021-04-22
Payer: MEDICARE

## 2021-04-22 DIAGNOSIS — N18.32 TYPE 2 DIABETES MELLITUS WITH STAGE 3B CHRONIC KIDNEY DISEASE, WITH LONG-TERM CURRENT USE OF INSULIN (HCC): ICD-10-CM

## 2021-04-22 DIAGNOSIS — E11.22 TYPE 2 DIABETES MELLITUS WITH STAGE 3B CHRONIC KIDNEY DISEASE, WITH LONG-TERM CURRENT USE OF INSULIN (HCC): ICD-10-CM

## 2021-04-22 DIAGNOSIS — E66.01 MORBID OBESITY WITH BMI OF 40.0-44.9, ADULT (HCC): ICD-10-CM

## 2021-04-22 DIAGNOSIS — Z79.4 TYPE 2 DIABETES MELLITUS WITH STAGE 3B CHRONIC KIDNEY DISEASE, WITH LONG-TERM CURRENT USE OF INSULIN (HCC): ICD-10-CM

## 2021-04-22 DIAGNOSIS — K76.0 NONALCOHOLIC FATTY LIVER DISEASE: ICD-10-CM

## 2021-04-22 LAB
ANION GAP SERPL CALCULATED.3IONS-SCNC: 4 MMOL/L (ref 4–13)
BASOPHILS # BLD AUTO: 0.02 THOUSANDS/ΜL (ref 0–0.1)
BASOPHILS NFR BLD AUTO: 0 % (ref 0–1)
BUN SERPL-MCNC: 20 MG/DL (ref 5–25)
CALCIUM SERPL-MCNC: 9.3 MG/DL (ref 8.3–10.1)
CHLORIDE SERPL-SCNC: 110 MMOL/L (ref 100–108)
CHOLEST SERPL-MCNC: 149 MG/DL (ref 50–200)
CO2 SERPL-SCNC: 27 MMOL/L (ref 21–32)
CREAT SERPL-MCNC: 1.3 MG/DL (ref 0.6–1.3)
EOSINOPHIL # BLD AUTO: 0.11 THOUSAND/ΜL (ref 0–0.61)
EOSINOPHIL NFR BLD AUTO: 2 % (ref 0–6)
ERYTHROCYTE [DISTWIDTH] IN BLOOD BY AUTOMATED COUNT: 13.4 % (ref 11.6–15.1)
EST. AVERAGE GLUCOSE BLD GHB EST-MCNC: 157 MG/DL
GFR SERPL CREATININE-BSD FRML MDRD: 40 ML/MIN/1.73SQ M
GLUCOSE P FAST SERPL-MCNC: 106 MG/DL (ref 65–99)
HBA1C MFR BLD: 7.1 %
HCT VFR BLD AUTO: 42.7 % (ref 34.8–46.1)
HDLC SERPL-MCNC: 42 MG/DL
HGB BLD-MCNC: 13.2 G/DL (ref 11.5–15.4)
IMM GRANULOCYTES # BLD AUTO: 0.04 THOUSAND/UL (ref 0–0.2)
IMM GRANULOCYTES NFR BLD AUTO: 1 % (ref 0–2)
LDLC SERPL CALC-MCNC: 73 MG/DL (ref 0–100)
LYMPHOCYTES # BLD AUTO: 1.21 THOUSANDS/ΜL (ref 0.6–4.47)
LYMPHOCYTES NFR BLD AUTO: 17 % (ref 14–44)
MCH RBC QN AUTO: 29.2 PG (ref 26.8–34.3)
MCHC RBC AUTO-ENTMCNC: 30.9 G/DL (ref 31.4–37.4)
MCV RBC AUTO: 95 FL (ref 82–98)
MONOCYTES # BLD AUTO: 0.4 THOUSAND/ΜL (ref 0.17–1.22)
MONOCYTES NFR BLD AUTO: 6 % (ref 4–12)
NEUTROPHILS # BLD AUTO: 5.29 THOUSANDS/ΜL (ref 1.85–7.62)
NEUTS SEG NFR BLD AUTO: 74 % (ref 43–75)
NONHDLC SERPL-MCNC: 107 MG/DL
NRBC BLD AUTO-RTO: 0 /100 WBCS
PLATELET # BLD AUTO: 161 THOUSANDS/UL (ref 149–390)
PMV BLD AUTO: 12.7 FL (ref 8.9–12.7)
POTASSIUM SERPL-SCNC: 4.3 MMOL/L (ref 3.5–5.3)
RBC # BLD AUTO: 4.52 MILLION/UL (ref 3.81–5.12)
SODIUM SERPL-SCNC: 141 MMOL/L (ref 136–145)
TRIGL SERPL-MCNC: 169 MG/DL
TSH SERPL DL<=0.05 MIU/L-ACNC: 1.62 UIU/ML (ref 0.36–3.74)
WBC # BLD AUTO: 7.07 THOUSAND/UL (ref 4.31–10.16)

## 2021-04-22 PROCEDURE — 36415 COLL VENOUS BLD VENIPUNCTURE: CPT

## 2021-04-22 PROCEDURE — 84443 ASSAY THYROID STIM HORMONE: CPT

## 2021-04-22 PROCEDURE — 80061 LIPID PANEL: CPT

## 2021-04-22 PROCEDURE — 80048 BASIC METABOLIC PNL TOTAL CA: CPT

## 2021-04-22 PROCEDURE — 85025 COMPLETE CBC W/AUTO DIFF WBC: CPT

## 2021-04-22 PROCEDURE — 83036 HEMOGLOBIN GLYCOSYLATED A1C: CPT

## 2021-04-23 ENCOUNTER — HOSPITAL ENCOUNTER (OUTPATIENT)
Dept: ULTRASOUND IMAGING | Facility: HOSPITAL | Age: 76
Discharge: HOME | End: 2021-04-23
Payer: MEDICARE

## 2021-04-23 ENCOUNTER — HOSPITAL ENCOUNTER (OUTPATIENT)
Dept: MAMMOGRAPHY | Facility: HOSPITAL | Age: 76
Discharge: HOME | End: 2021-04-23
Payer: MEDICARE

## 2021-04-23 VITALS — BODY MASS INDEX: 44.3 KG/M2 | HEIGHT: 63 IN | WEIGHT: 250 LBS

## 2021-04-23 DIAGNOSIS — R92.8 ABNORMAL MAMMOGRAM: ICD-10-CM

## 2021-04-23 DIAGNOSIS — Z12.31 ENCOUNTER FOR SCREENING MAMMOGRAM FOR MALIGNANT NEOPLASM OF BREAST: Primary | ICD-10-CM

## 2021-04-23 PROCEDURE — 76642 ULTRASOUND BREAST LIMITED: CPT

## 2021-04-23 PROCEDURE — G0279 TOMOSYNTHESIS, MAMMO: HCPCS

## 2021-04-23 PROCEDURE — 77066 DX MAMMO INCL CAD BI: CPT

## 2021-04-25 ENCOUNTER — ANESTHESIA EVENT (OUTPATIENT)
Dept: GASTROENTEROLOGY | Facility: HOSPITAL | Age: 76
End: 2021-04-25

## 2021-04-26 ENCOUNTER — TELEPHONE (OUTPATIENT)
Dept: ADMINISTRATIVE | Facility: OTHER | Age: 76
End: 2021-04-26

## 2021-04-26 ENCOUNTER — TELEPHONE (OUTPATIENT)
Dept: FAMILY MEDICINE CLINIC | Facility: CLINIC | Age: 76
End: 2021-04-26

## 2021-04-26 ENCOUNTER — HOSPITAL ENCOUNTER (OUTPATIENT)
Dept: GASTROENTEROLOGY | Facility: HOSPITAL | Age: 76
Setting detail: OUTPATIENT SURGERY
Discharge: HOME | End: 2021-04-26
Attending: INTERNAL MEDICINE | Admitting: INTERNAL MEDICINE
Payer: MEDICARE

## 2021-04-26 ENCOUNTER — ANESTHESIA (OUTPATIENT)
Dept: GASTROENTEROLOGY | Facility: HOSPITAL | Age: 76
End: 2021-04-26

## 2021-04-26 VITALS
BODY MASS INDEX: 41.65 KG/M2 | WEIGHT: 250 LBS | HEART RATE: 57 BPM | OXYGEN SATURATION: 94 % | SYSTOLIC BLOOD PRESSURE: 127 MMHG | DIASTOLIC BLOOD PRESSURE: 59 MMHG | TEMPERATURE: 97.2 F | HEIGHT: 65 IN | RESPIRATION RATE: 18 BRPM

## 2021-04-26 DIAGNOSIS — R10.13 EPIGASTRIC PAIN: ICD-10-CM

## 2021-04-26 DIAGNOSIS — K52.9 CHRONIC DIARRHEA: ICD-10-CM

## 2021-04-26 LAB — GLUCOSE SERPL-MCNC: 80 MG/DL (ref 65–140)

## 2021-04-26 PROCEDURE — 88342 IMHCHEM/IMCYTCHM 1ST ANTB: CPT | Performed by: PATHOLOGY

## 2021-04-26 PROCEDURE — 88305 TISSUE EXAM BY PATHOLOGIST: CPT | Performed by: PATHOLOGY

## 2021-04-26 PROCEDURE — 45380 COLONOSCOPY AND BIOPSY: CPT | Performed by: INTERNAL MEDICINE

## 2021-04-26 PROCEDURE — 82948 REAGENT STRIP/BLOOD GLUCOSE: CPT

## 2021-04-26 PROCEDURE — 43239 EGD BIOPSY SINGLE/MULTIPLE: CPT | Performed by: INTERNAL MEDICINE

## 2021-04-26 PROCEDURE — 88313 SPECIAL STAINS GROUP 2: CPT | Performed by: PATHOLOGY

## 2021-04-26 RX ORDER — LIDOCAINE HYDROCHLORIDE 10 MG/ML
INJECTION, SOLUTION EPIDURAL; INFILTRATION; INTRACAUDAL; PERINEURAL AS NEEDED
Status: DISCONTINUED | OUTPATIENT
Start: 2021-04-26 | End: 2021-04-26

## 2021-04-26 RX ORDER — PROPOFOL 10 MG/ML
INJECTION, EMULSION INTRAVENOUS AS NEEDED
Status: DISCONTINUED | OUTPATIENT
Start: 2021-04-26 | End: 2021-04-26

## 2021-04-26 RX ORDER — SODIUM CHLORIDE 9 MG/ML
50 INJECTION, SOLUTION INTRAVENOUS CONTINUOUS
Status: DISCONTINUED | OUTPATIENT
Start: 2021-04-26 | End: 2021-04-30 | Stop reason: HOSPADM

## 2021-04-26 RX ADMIN — LIDOCAINE HYDROCHLORIDE 100 MG: 10 INJECTION, SOLUTION EPIDURAL; INFILTRATION; INTRACAUDAL; PERINEURAL at 09:04

## 2021-04-26 RX ADMIN — PROPOFOL 50 MG: 10 INJECTION, EMULSION INTRAVENOUS at 09:22

## 2021-04-26 RX ADMIN — PROPOFOL 100 MG: 10 INJECTION, EMULSION INTRAVENOUS at 09:12

## 2021-04-26 RX ADMIN — PROPOFOL 50 MG: 10 INJECTION, EMULSION INTRAVENOUS at 09:17

## 2021-04-26 RX ADMIN — PROPOFOL 50 MG: 10 INJECTION, EMULSION INTRAVENOUS at 09:30

## 2021-04-26 RX ADMIN — PROPOFOL 50 MG: 10 INJECTION, EMULSION INTRAVENOUS at 09:20

## 2021-04-26 RX ADMIN — SODIUM CHLORIDE 50 ML/HR: 0.9 INJECTION, SOLUTION INTRAVENOUS at 08:00

## 2021-04-26 NOTE — TELEPHONE ENCOUNTER
----- Message from Janet De La Torre MD sent at 4/23/2021  3:29 PM EDT -----  recomened mammogram in 1 y

## 2021-04-26 NOTE — TELEPHONE ENCOUNTER
notified patient mammo negative scheduled for [facility] 4/25/2022 @ 11am [place] [address] [place] [address]

## 2021-04-26 NOTE — ANESTHESIA PREPROCEDURE EVALUATION
Procedure:  COLONOSCOPY  EGD    Relevant Problems   CARDIO   (+) Carotid artery disease (HCC)   (+) Essential hypertension   (+) Hypertriglyceridemia, essential   (+) Mixed hyperlipidemia   (+) Multiple and bilateral precerebral arterial occlusion   (+) Peripheral vascular disease (HCC)      ENDO   (+) Type 2 diabetes mellitus with kidney complication, with long-term current use of insulin (HCC)      GI/HEPATIC   (+) Nonalcoholic fatty liver disease      /RENAL   (+) Benign hypertensive renal disease   (+) Chronic renal insufficiency, stage III (moderate) (HCC)      HEMATOLOGY   (+) Anemia      MUSCULOSKELETAL   (+) Arthritis   (+) Bilateral low back pain with bilateral sciatica      NEURO/PSYCH   (+) Chronic right shoulder pain      PULMONARY   (+) Obstructive sleep apnea treated with continuous positive airway pressure (CPAP)      Other   (+) Long term current use of insulin (HCC)   (+) Metabolic syndrome   (+) Morbid obesity with BMI of 40 0-44 9, adult ([facility])   (+) Uncontrolled type 2 diabetes mellitus (HCC)        Physical Exam    Airway    Mallampati score: II  TM Distance: >3 FB  Neck ROM: full     Dental       Cardiovascular  Cardiovascular exam normal    Pulmonary  Pulmonary exam normal     Other Findings        Anesthesia Plan  ASA Score- 3     Anesthesia Type- IV sedation with anesthesia with ASA Monitors  Additional Monitors:   Airway Plan:           Plan Factors-Exercise tolerance (METS): <4 METS  Chart reviewed  Existing labs reviewed  Patient is not a current smoker  Patient not instructed to abstain from smoking on day of procedure  Patient did not smoke on day of surgery  Induction-     Postoperative Plan-     Informed Consent- Anesthetic plan and risks discussed with patient  I personally reviewed this patient with the CRNA  Discussed and agreed on the Anesthesia Plan with the CRNA             Lab Results   Component Value Date    HGBA1C 7 1 (H) 04/22/2021       Lab Results Component Value Date     06/04/2018    K 4 3 04/22/2021     (H) 04/22/2021    CO2 27 04/22/2021    ANIONGAP 8 06/04/2018    BUN 20 04/22/2021    CREATININE 1 30 04/22/2021    GLUCOSE 125 (H) 06/04/2018    GLUF 106 (H) 04/22/2021    CALCIUM 9 3 04/22/2021    AST 37 01/25/2021    ALT 49 01/25/2021    ALKPHOS 136 (H) 01/25/2021    PROT 6 2 06/04/2018    BILITOT 0 6 06/04/2018    EGFR 40 04/22/2021       Lab Results   Component Value Date    WBC 7 07 04/22/2021    HGB 13 2 04/22/2021    HCT 42 7 04/22/2021    MCV 95 04/22/2021     04/22/2021    echo 2020  LEFT VENTRICLE:  Systolic function was normal  Ejection fraction was estimated to be 55 %  Although no diagnostic regional wall motion abnormality was identified, this possibility cannot be completely excluded on the basis of this study  There was mild concentric hypertrophy      VENTRICULAR SEPTUM:  There was dyssynergic motion  There was sigmoid septal appearance  These changes are consistent with a conduction abnormality or paced rhythm      TRICUSPID VALVE:  There was trace regurgitation    Pulmonary artery systolic pressure was within the normal range

## 2021-04-26 NOTE — DISCHARGE INSTRUCTIONS
Upper Endoscopy   WHAT YOU NEED TO KNOW:   An upper endoscopy is also called an upper gastrointestinal (GI) endoscopy, or an esophagogastroduodenoscopy (EGD)  You may feel bloated, gassy, or have some abdominal discomfort after your procedure  Your throat may be sore for 24 to 36 hours  You may burp or pass gas from air that is still inside your body  DISCHARGE INSTRUCTIONS:   Call 911 if:   · You have sudden chest pain or trouble breathing  Seek care immediately if:   · You feel dizzy or faint  · You have trouble swallowing  · You have severe throat pain  · Your bowel movements are very dark or black  · Your abdomen is hard and firm and you have severe pain  · You vomit blood  Contact your healthcare provider if:   · You feel full or bloated and cannot burp or pass gas  · You have not had a bowel movement for 3 days after your procedure  · You have neck pain  · You have a fever or chills  · You have nausea or are vomiting  · You have a rash or hives  · You have questions or concerns about your endoscopy  Relieve a sore throat:  Suck on throat lozenges or crushed ice  Gargle with a small amount of warm salt water  Mix 1 teaspoon of salt and 1 cup of warm water to make salt water  Relieve gas and discomfort from bloating:  Lie on your right side with a heating pad on your abdomen  Take short walks to help pass gas  Eat small meals until bloating is relieved  Rest after your procedure:  Do not drive or make important decisions until the day after your procedure  Return to your normal activity as directed  You can usually return to work the day after your procedure  Follow up with your healthcare provider as directed:  Write down your questions so you remember to ask them during your visits  © Copyright [address] Information is for End User's use only and may not be sold, redistributed or otherwise used for commercial purposes   All illustrations and images included in CareNotes® are the copyrighted property of A D A M , Inc  or [facility] Mika Gold   The above information is an  only  It is not intended as medical advice for individual conditions or treatments  Talk to your doctor, nurse or pharmacist before following any medical regimen to see if it is safe and effective for you  Colonoscopy   WHAT YOU NEED TO KNOW:   A colonoscopy is a procedure to examine the inside of your colon (intestine) with a scope  Polyps or tissue growths may have been removed during your colonoscopy  It is normal to feel bloated and to have some abdominal discomfort  You should be passing gas  If you have hemorrhoids or you had polyps removed, you may have a small amount of bleeding  DISCHARGE INSTRUCTIONS:   Call your doctor if:   · You have a large amount of bright red blood in your bowel movements  · Your abdomen is hard and firm and you have severe pain  · You have sudden trouble breathing  · You develop a rash or hives  · You have a fever within 24 hours of your procedure  · You have not had a bowel movement for 3 days after your procedure  · You have questions or concerns about your condition or care  After your colonoscopy:   · Do not lift, strain, or run  for 3 days  · Rest as much as possible  You have been given medicine to relax you  Do not  drive or make important decisions for at least 24 hours  Return to your normal activity as directed  · Relieve gas and discomfort from bloating  by lying on your left side with a heating pad on your abdomen  You may need to take short walks to help the gas move out  Eat small meals until bloating is relieved  If you had polyps removed: For 7 days after your procedure:  · Do not  take aspirin  · Do not  go on long car rides  Help prevent constipation:   · Eat a variety of healthy foods    Healthy foods include fruit, vegetables, whole-grain breads, low-fat dairy products, beans, lean meat, and fish  Ask if you need to be on a special diet  Your healthcare provider may recommend that you eat high-fiber foods such as cooked beans  Fiber helps you have regular bowel movements  · Drink liquids as directed  Adults should drink between 9 and 13 eight-ounce cups of liquid every day  Ask what amount is best for you  For most people, good liquids to drink are water, juice, and milk  · Exercise as directed  Talk to your healthcare provider about the best exercise plan for you  Exercise can help prevent constipation, decrease your blood pressure and improve your health  Follow up with your healthcare provider as directed:  Write down your questions so you remember to ask them during your visits  © Copyright [address] Information is for End User's use only and may not be sold, redistributed or otherwise used for commercial purposes  All illustrations and images included in CareNotes® are the copyrighted property of A D A M , Inc  or [facility] Mika Gold   The above information is an  only  It is not intended as medical advice for individual conditions or treatments  Talk to your doctor, nurse or pharmacist before following any medical regimen to see if it is safe and effective for you

## 2021-04-26 NOTE — TELEPHONE ENCOUNTER
----- Message from James Jacobsen sent at 4/23/2021  2:04 PM EDT -----  Regarding: Arely Nesbitt request  04/23/21 2:04 PM    Hello, our patient Melissa Alan has had Diabetic Foot Exam completed/performed  Please assist in updating the patient chart by pulling the document from the Media Tab  The date of service is 4/22/2021       Thank you,  [address] [place] [address]

## 2021-04-26 NOTE — H&P
History and Physical -  [facility]  Jesus Delgado 67 y o  female MRN: [id number]                  HPI: Jesus Delgado is a 67y o  year old female who presents for diarrhea, epigastric pain, h/o polyps  REVIEW OF SYSTEMS: Per the HPI, and otherwise unremarkable      Historical Information   Past Medical History:   Diagnosis Date    Anemia     Arthritis 10/30/2020    Chronic kidney disease     Diabetes mellitus ([address] )     Fracture of wrist     RIGHT    Heart murmur 6/30/2020    History of non-insulin dependent diabetes mellitus     Hyperlipidemia     Hypertension     Hypertension     IBS (irritable bowel syndrome)     Irritable bowel     Metabolic syndrome     Obesity     RLS (restless legs syndrome)     Routine medical exam 6/28/2019    Sleep apnea     Sleep apnea     ON CPAP    Urge incontinence     Urinary incontinence      Past Surgical History:   Procedure Laterality Date    CHOLECYSTECTOMY  2013    COLONOSCOPY  2010    CYSTOSCOPY      HERNIA REPAIR  2008    HYSTERECTOMY      PARTIAL (STILL HAS OVARIES)    KNEE SURGERY Left      Social History   Social History     Substance and Sexual Activity   Alcohol Use No    Frequency: Never    Binge frequency: Never    Comment: no caffeine use     Social History     Substance and Sexual Activity   Drug Use No     Social History     Tobacco Use   Smoking Status Never Smoker   Smokeless Tobacco Never Used     Family History   Problem Relation Age of Onset    Suicidality Father     Breast cancer Mother 58    Melanoma Sister     Bipolar disorder Sister     Heart disease Brother     Rheum arthritis Daughter     No Known Problems Maternal Grandmother     No Known Problems Paternal Grandmother     No Known Problems Sister     No Known Problems Daughter     No Known Problems Maternal Aunt     No Known Problems Maternal Aunt        Meds/Allergies     (Not in a hospital admission)      Allergies   Allergen Reactions  Pioglitazone      "Feels like she is dying"    Ibuprofen     Levofloxacin Other (See Comments)    Nsaids        Objective     not currently breastfeeding  PHYSICAL EXAMINATION:    General Appearance:   Alert, cooperative, no distress   HEENT:  Normocephalic, atraumatic, anicteric  Neck supple, symmetrical, trachea midline  Lungs:   Equal chest rise and unlabored breathing, normal effort, no coughing  Cardiovascular:   No visualized JVD  Abdomen:   No abdominal distension  Skin:   No jaundice, rashes, or lesions  Musculoskeletal:   Normal range of motion visualized  Psych:  Normal affect and normal insight  Neuro:  Alert and appropriate  ASSESSMENT/PLAN:  This is a 67y o  year old female here for EGD and colonoscopy, and she is stable and optimized for her procedure

## 2021-04-27 NOTE — TELEPHONE ENCOUNTER
Upon review of the In Basket request we were able to locate, review, and update the patient chart as requested for Diabetic Foot Exam     Any additional questions or concerns should be emailed to the Practice Liaisons via [email or web address] email, please do not reply via In Basket      Thank you  Elina Simon

## 2021-05-01 ENCOUNTER — TELEPHONE (OUTPATIENT)
Dept: OTHER | Facility: OTHER | Age: 76
End: 2021-05-01

## 2021-05-01 NOTE — TELEPHONE ENCOUNTER
PT called in to cancel up coming appointment, due to car breaking down  Will call back to reschedule

## 2021-05-04 DIAGNOSIS — N30.20 CHRONIC CYSTITIS: ICD-10-CM

## 2021-05-04 DIAGNOSIS — N39.41 URGE INCONTINENCE: ICD-10-CM

## 2021-05-04 RX ORDER — OXYBUTYNIN CHLORIDE 5 MG/1
TABLET, EXTENDED RELEASE ORAL
Qty: 90 TABLET | Refills: 3 | Status: SHIPPED | OUTPATIENT
Start: 2021-05-04 | End: 2021-05-26 | Stop reason: SDUPTHER

## 2021-05-11 ENCOUNTER — TELEPHONE (OUTPATIENT)
Dept: GASTROENTEROLOGY | Facility: MEDICAL CENTER | Age: 76
End: 2021-05-11

## 2021-05-12 ENCOUNTER — OFFICE VISIT (OUTPATIENT)
Dept: FAMILY MEDICINE CLINIC | Facility: CLINIC | Age: 76
End: 2021-05-12
Payer: MEDICARE

## 2021-05-12 VITALS
DIASTOLIC BLOOD PRESSURE: 70 MMHG | WEIGHT: 249 LBS | HEART RATE: 73 BPM | OXYGEN SATURATION: 97 % | BODY MASS INDEX: 44.12 KG/M2 | HEIGHT: 63 IN | SYSTOLIC BLOOD PRESSURE: 104 MMHG | TEMPERATURE: 98.3 F

## 2021-05-12 DIAGNOSIS — R26.9 GAIT DISTURBANCE: ICD-10-CM

## 2021-05-12 DIAGNOSIS — Z79.4 TYPE 2 DIABETES MELLITUS WITH STAGE 3B CHRONIC KIDNEY DISEASE, WITH LONG-TERM CURRENT USE OF INSULIN (HCC): Primary | ICD-10-CM

## 2021-05-12 DIAGNOSIS — K58.8 OTHER IRRITABLE BOWEL SYNDROME: ICD-10-CM

## 2021-05-12 DIAGNOSIS — E78.2 MIXED HYPERLIPIDEMIA: ICD-10-CM

## 2021-05-12 DIAGNOSIS — I10 ESSENTIAL HYPERTENSION: ICD-10-CM

## 2021-05-12 DIAGNOSIS — I65.23 BILATERAL CAROTID ARTERY STENOSIS: ICD-10-CM

## 2021-05-12 DIAGNOSIS — N18.32 TYPE 2 DIABETES MELLITUS WITH STAGE 3B CHRONIC KIDNEY DISEASE, WITH LONG-TERM CURRENT USE OF INSULIN (HCC): Primary | ICD-10-CM

## 2021-05-12 DIAGNOSIS — E11.22 TYPE 2 DIABETES MELLITUS WITH STAGE 3B CHRONIC KIDNEY DISEASE, WITH LONG-TERM CURRENT USE OF INSULIN (HCC): Primary | ICD-10-CM

## 2021-05-12 PROCEDURE — 99214 OFFICE O/P EST MOD 30 MIN: CPT | Performed by: FAMILY MEDICINE

## 2021-05-12 RX ORDER — CLOTRIMAZOLE 1 %
CREAM (GRAM) TOPICAL
COMMUNITY
End: 2021-05-27 | Stop reason: SDUPTHER

## 2021-05-12 RX ORDER — ASCORBIC ACID 125 MG
TABLET,CHEWABLE ORAL
COMMUNITY
End: 2021-08-11

## 2021-05-12 RX ORDER — OCTISALATE, AVOBENZONE, HOMOSALATE, AND OCTOCRYLENE 29.4; 29.4; 49; 25.48 MG/ML; MG/ML; MG/ML; MG/ML
LOTION TOPICAL
COMMUNITY

## 2021-05-12 RX ORDER — DICYCLOMINE HCL 20 MG
20 TABLET ORAL 2 TIMES DAILY
Qty: 180 TABLET | Refills: 1 | Status: SHIPPED | OUTPATIENT
Start: 2021-05-12 | End: 2021-12-06 | Stop reason: SDUPTHER

## 2021-05-12 NOTE — ASSESSMENT & PLAN NOTE
A chronic patient was a chronic back pain and obesity and arthritis discussed the patient fall precaution home safety environment she does use a walker to support her gait requesting new 1 secondary to deteriotion

## 2021-05-12 NOTE — ASSESSMENT & PLAN NOTE
Chronic asymptomatic fair control continue with the lisinopril 10 mg once a day low-salt diet and important lose weight discussed with the patient

## 2021-05-12 NOTE — ASSESSMENT & PLAN NOTE
Chronic asymptomatic fair control LDL close to therapeutic in diabetic patient level 73 continue current dose of the simvastatin 10 mg once a day low fat diet important lose weight review with the patient

## 2021-05-12 NOTE — ASSESSMENT & PLAN NOTE
Lab Results   Component Value Date    HGBA1C 7 1 (H) 04/22/2021    chronic asymptomatic hemoglobin A1c stable same as before 7 1 continue current management   including Tresiba 75 unit daily and patient taking Humalog per insulin sliding scale patient already on statin and she is on Ace inhibitor discussed with the patient sinus symptom of hypoglycemia continue monitor blood sugar and discussed important lose weight she does follow up with the podiatric for diabetic eye care

## 2021-05-12 NOTE — ASSESSMENT & PLAN NOTE
Chronic asymptomatic patient already on statin and aspirin discussed important control her risk factor including diabetic hypertension hyperlipidemia and losing weight

## 2021-05-12 NOTE — PROGRESS NOTES
Subjective:   Chief Complaint   Patient presents with    Follow-up     chronic conditions        Patient ID: Zamzam Grajeda is a 67 y o  female  Patient here follow-up with a chronic condition patient history of insulin-dependent diabetic she compliant with her medication and she try to cut down in the low carb diet she having hard time to lose weight a deny increased thirsty increased frequency urination no dizziness no headache and no abdomen pain patient history of hyperlipidemia on statin no rash or muscle pain and she does have history of hypertension blood pressure fair control on current medication tolerated well deny any chest pain short of breath no palpitation no lower extremity edema patient history of carotid artery stenosis a carotid duplex in 2020 no change compared with before she already on statin and aspirin she deny any headache dizziness the and numbness or tingling recent blood work review with the patient      The following portions of the patient's history were reviewed and updated as appropriate: allergies, current medications, past family history, past medical history, past social history, past surgical history and problem list     Review of Systems   Constitutional: Negative for activity change, appetite change, fatigue and fever  HENT: Negative for congestion, ear pain, sinus pressure, sinus pain and sore throat  Eyes: Negative for pain, discharge, redness and itching  Respiratory: Negative for cough, chest tightness, shortness of breath and stridor  Cardiovascular: Negative for chest pain, palpitations and leg swelling  Gastrointestinal: Negative for abdominal pain, blood in stool, constipation, diarrhea and nausea  Genitourinary: Negative for dysuria, flank pain, frequency and hematuria  Musculoskeletal: Negative for back pain, joint swelling and neck pain  Skin: Negative for pallor and rash     Neurological: Negative for dizziness, tremors, weakness, numbness and headaches  Hematological: Does not bruise/bleed easily  Objective:  Vitals:    05/12/21 1345   BP: 104/70   Pulse: 73   Temp: 98 3 °F (36 8 °C)   TempSrc: Tympanic   SpO2: 97%   Weight: 113 kg (249 lb)   Height: 5' 2 5" (1 588 m)      Physical Exam  Vitals signs and nursing note reviewed  Constitutional:       General: She is not in acute distress  Appearance: She is well-developed  She is not diaphoretic  HENT:      Head: Normocephalic  Right Ear: Tympanic membrane, ear canal and external ear normal       Left Ear: Tympanic membrane, ear canal and external ear normal       Nose: Nose normal  No congestion or rhinorrhea  Mouth/Throat:      Mouth: Mucous membranes are moist       Pharynx: Oropharynx is clear  No oropharyngeal exudate or posterior oropharyngeal erythema  Eyes:      General:         Right eye: No discharge  Left eye: No discharge  Conjunctiva/sclera: Conjunctivae normal       Pupils: Pupils are equal, round, and reactive to light  Neck:      Musculoskeletal: Normal range of motion and neck supple  Vascular: No JVD  Cardiovascular:      Rate and Rhythm: Normal rate and regular rhythm  Heart sounds: Normal heart sounds  No murmur  No gallop  Pulmonary:      Effort: Pulmonary effort is normal  No respiratory distress  Breath sounds: Normal breath sounds  No stridor  No wheezing or rales  Chest:      Chest wall: No tenderness  Abdominal:      General: There is no distension  Palpations: Abdomen is soft  There is no mass  Tenderness: There is no abdominal tenderness  There is no rebound  Musculoskeletal:         General: No tenderness  Lymphadenopathy:      Cervical: No cervical adenopathy  Skin:     General: Skin is warm  Findings: No erythema or rash  Neurological:      Mental Status: She is alert and oriented to person, place, and time  Motor: No weakness        Gait: Gait normal  Assessment/Plan:    Type 2 diabetes mellitus with kidney complication, with long-term current use of insulin (HCC)    Lab Results   Component Value Date    HGBA1C 7 1 (H) 04/22/2021    chronic asymptomatic hemoglobin A1c stable same as before 7 1 continue current management   including Tresiba 75 unit daily and patient taking Humalog per insulin sliding scale patient already on statin and she is on Ace inhibitor discussed with the patient sinus symptom of hypoglycemia continue monitor blood sugar and discussed important lose weight she does follow up with the podiatric for diabetic eye care    Essential hypertension   Chronic asymptomatic fair control continue with the lisinopril 10 mg once a day low-salt diet and important lose weight discussed with the patient    Carotid artery disease ([address] )   Chronic asymptomatic patient already on statin and aspirin discussed important control her risk factor including diabetic hypertension hyperlipidemia and losing weight    Mixed hyperlipidemia   Chronic asymptomatic fair control LDL close to therapeutic in diabetic patient level 73 continue current dose of the simvastatin 10 mg once a day low fat diet important lose weight review with the patient    Gait disturbance   A chronic patient was a chronic back pain and obesity and arthritis discussed the patient fall precaution home safety environment she does use a walker to support her gait requesting new 1 secondary to deteriotion       Diagnoses and all orders for this visit:    Type 2 diabetes mellitus with stage 3b chronic kidney disease, with long-term current use of insulin (HCC)    Other irritable bowel syndrome  -     dicyclomine (BENTYL) 20 mg tablet;  Take 1 tablet (20 mg total) by mouth 2 (two) times a day    Essential hypertension    Mixed hyperlipidemia    Gait disturbance  -     Walker    Bilateral carotid artery stenosis    Other orders  -     clotrimazole (LOTRIMIN) 1 % cream  -     Probiotic Product (Align) 4 MG CAPS; Take by mouth  -     Cholecalciferol (Vitamin D3 Adult Gummies) 25 MCG (1000 UT) CHEW; Chew  -     Multiple Vitamins-Minerals (MULTI ADULT GUMMIES PO); Take by mouth  -     B Complex Vitamins (B COMPLEX 1 PO);  Take by mouth

## 2021-05-13 DIAGNOSIS — K30 INDIGESTION: ICD-10-CM

## 2021-05-13 DIAGNOSIS — M54.42 CHRONIC BILATERAL LOW BACK PAIN WITH BILATERAL SCIATICA: Primary | ICD-10-CM

## 2021-05-13 DIAGNOSIS — M54.41 CHRONIC BILATERAL LOW BACK PAIN WITH BILATERAL SCIATICA: Primary | ICD-10-CM

## 2021-05-13 DIAGNOSIS — R26.9 GAIT DISTURBANCE: ICD-10-CM

## 2021-05-13 DIAGNOSIS — G89.29 CHRONIC BILATERAL LOW BACK PAIN WITH BILATERAL SCIATICA: Primary | ICD-10-CM

## 2021-05-13 DIAGNOSIS — E66.01 MORBID OBESITY WITH BMI OF 40.0-44.9, ADULT (HCC): ICD-10-CM

## 2021-05-13 DIAGNOSIS — I10 ESSENTIAL HYPERTENSION: ICD-10-CM

## 2021-05-13 DIAGNOSIS — M85.88 OSTEOPENIA OF SPINE: ICD-10-CM

## 2021-05-14 RX ORDER — LISINOPRIL 10 MG/1
10 TABLET ORAL DAILY
Qty: 90 TABLET | Refills: 0 | Status: SHIPPED | OUTPATIENT
Start: 2021-05-14 | End: 2021-07-15

## 2021-05-14 RX ORDER — PANTOPRAZOLE SODIUM 40 MG/1
40 TABLET, DELAYED RELEASE ORAL DAILY
Qty: 90 TABLET | Refills: 0 | Status: SHIPPED | OUTPATIENT
Start: 2021-05-14 | End: 2021-07-15

## 2021-05-26 DIAGNOSIS — N30.20 CHRONIC CYSTITIS: ICD-10-CM

## 2021-05-26 DIAGNOSIS — N39.41 URGE INCONTINENCE: ICD-10-CM

## 2021-05-26 DIAGNOSIS — E78.2 MIXED HYPERLIPIDEMIA: ICD-10-CM

## 2021-05-26 DIAGNOSIS — G43.009 MIGRAINE WITHOUT AURA AND WITHOUT STATUS MIGRAINOSUS, NOT INTRACTABLE: ICD-10-CM

## 2021-05-27 ENCOUNTER — TELEPHONE (OUTPATIENT)
Dept: FAMILY MEDICINE CLINIC | Facility: CLINIC | Age: 76
End: 2021-05-27

## 2021-05-27 DIAGNOSIS — L98.9 SKIN LESION: Primary | ICD-10-CM

## 2021-05-27 RX ORDER — TOPIRAMATE 25 MG/1
25 TABLET ORAL DAILY
Qty: 90 TABLET | Refills: 0 | Status: SHIPPED | OUTPATIENT
Start: 2021-05-27 | End: 2021-07-28

## 2021-05-27 RX ORDER — SIMVASTATIN 10 MG
10 TABLET ORAL
Qty: 90 TABLET | Refills: 0 | Status: SHIPPED | OUTPATIENT
Start: 2021-05-27 | End: 2021-07-28

## 2021-05-27 RX ORDER — CLOTRIMAZOLE AND BETAMETHASONE DIPROPIONATE 10; .64 MG/G; MG/G
CREAM TOPICAL 2 TIMES DAILY
Qty: 45 G | Refills: 2 | Status: CANCELLED | OUTPATIENT
Start: 2021-05-27

## 2021-05-27 RX ORDER — CLOTRIMAZOLE 1 %
CREAM (GRAM) TOPICAL 2 TIMES DAILY
Qty: 113 G | Refills: 2 | Status: SHIPPED | OUTPATIENT
Start: 2021-05-27 | End: 2022-02-25 | Stop reason: SDUPTHER

## 2021-05-27 RX ORDER — OXYBUTYNIN CHLORIDE 5 MG/1
5 TABLET, EXTENDED RELEASE ORAL DAILY
Qty: 90 TABLET | Refills: 0 | Status: SHIPPED | OUTPATIENT
Start: 2021-05-27 | End: 2021-07-28

## 2021-05-27 NOTE — TELEPHONE ENCOUNTER
patient was trying to refill her clotrimazole cream  looks like it was stopped on 5-12-21 visit   please advise

## 2021-05-28 DIAGNOSIS — M54.42 CHRONIC BILATERAL LOW BACK PAIN WITH BILATERAL SCIATICA: ICD-10-CM

## 2021-05-28 DIAGNOSIS — G89.29 CHRONIC BILATERAL LOW BACK PAIN WITH BILATERAL SCIATICA: ICD-10-CM

## 2021-05-28 DIAGNOSIS — M54.41 CHRONIC BILATERAL LOW BACK PAIN WITH BILATERAL SCIATICA: ICD-10-CM

## 2021-05-28 RX ORDER — PREGABALIN 75 MG/1
75 CAPSULE ORAL 3 TIMES DAILY
Qty: 270 CAPSULE | Refills: 0 | Status: CANCELLED | OUTPATIENT
Start: 2021-05-28

## 2021-06-01 DIAGNOSIS — M54.42 CHRONIC BILATERAL LOW BACK PAIN WITH BILATERAL SCIATICA: ICD-10-CM

## 2021-06-01 DIAGNOSIS — G89.29 CHRONIC BILATERAL LOW BACK PAIN WITH BILATERAL SCIATICA: ICD-10-CM

## 2021-06-01 DIAGNOSIS — M54.41 CHRONIC BILATERAL LOW BACK PAIN WITH BILATERAL SCIATICA: ICD-10-CM

## 2021-06-01 RX ORDER — PREGABALIN 75 MG/1
75 CAPSULE ORAL 3 TIMES DAILY
Qty: 270 CAPSULE | Refills: 0 | Status: SHIPPED | OUTPATIENT
Start: 2021-06-01 | End: 2021-09-10 | Stop reason: SDUPTHER

## 2021-06-01 NOTE — TELEPHONE ENCOUNTER
Fill Date ID   Written Drug Qty Days Prescriber Rx # Pharmacy Refill   Daily Dose* Pymt Type      03/04/2021  2   03/03/2021  Pregabalin 75 MG Capsule  270 00  90 Ma Alc   [id number]   [place] (2770)   [place]   11/30/2020  2   10/30/2020  Pregabalin 75 MG Capsule  270 00  90 Ma Alc   [id number]   Opt (8940)   [place]   09/08/2020  2   07/29/2020  Pregabalin 75 MG Capsule  270 00  90 Ma Alc   [id number]   Opt (2993)   0   [place]   08/10/2020  1   08/10/2020  Hydrocodone-Acetamin 5-325 MG  15 00  15 Me Gas   [id number]   Yoav (9929)   0  5 00 MME  [place]   06/23/2020  2   06/19/2020  Pregabalin 75 MG Capsule  270 00  90 Ma Alc   [id number]   Opt (0177)   0   [place]   06/20/2020  1   06/11/2020  Hydrocodone-Acetamin 5-325 MG  30 00  30 Me Gas   [id number]   Yoav (9929)   0  5 00 MME  Medicare

## 2021-07-01 NOTE — TELEPHONE ENCOUNTER
Final Anesthesia Post-op Assessment    Patient: Fortino Martinez  Procedure(s) Performed: BILATERAL MYRINGOTOMY WITH PE TUBE INSERTION, ADENOIDECTOMY - BILATERAL  Anesthesia type: General    Vitals Value Taken Time   Temp 36.4 °C (97.5 °F) 07/01/21 0915   Pulse 122 07/01/21 0915   Resp 18 07/01/21 0915   SpO2 98 % 07/01/21 0915   BP 96/45 07/01/21 0915         Patient Location: PACU Phase 1  Post-op Vital Signs:stable  Level of Consciousness: awake and alert  Respiratory Status: spontaneous ventilation  Cardiovascular stable  Hydration: euvolemic  Pain Management: adequately controlled  Handoff: Handoff to receiving nurse was performed and questions were answered  Vomiting: none  Nausea: None  Airway Patency:patent  Post-op Assessment: no complications and patient tolerated procedure well with no complications      No complications documented.    I think the prescribing doctor for Lyrica her pain management to check with the patient

## 2021-07-07 ENCOUNTER — OFFICE VISIT (OUTPATIENT)
Dept: FAMILY MEDICINE CLINIC | Facility: CLINIC | Age: 76
End: 2021-07-07
Payer: MEDICARE

## 2021-07-07 VITALS
OXYGEN SATURATION: 96 % | HEIGHT: 63 IN | TEMPERATURE: 98 F | BODY MASS INDEX: 44.65 KG/M2 | RESPIRATION RATE: 16 BRPM | DIASTOLIC BLOOD PRESSURE: 74 MMHG | WEIGHT: 252 LBS | HEART RATE: 76 BPM | SYSTOLIC BLOOD PRESSURE: 122 MMHG

## 2021-07-07 DIAGNOSIS — Z00.00 MEDICARE ANNUAL WELLNESS VISIT, SUBSEQUENT: Primary | ICD-10-CM

## 2021-07-07 DIAGNOSIS — Z79.4 TYPE 2 DIABETES MELLITUS WITH STAGE 3B CHRONIC KIDNEY DISEASE, WITH LONG-TERM CURRENT USE OF INSULIN (HCC): ICD-10-CM

## 2021-07-07 DIAGNOSIS — E11.22 TYPE 2 DIABETES MELLITUS WITH STAGE 3B CHRONIC KIDNEY DISEASE, WITH LONG-TERM CURRENT USE OF INSULIN (HCC): ICD-10-CM

## 2021-07-07 DIAGNOSIS — N18.32 TYPE 2 DIABETES MELLITUS WITH STAGE 3B CHRONIC KIDNEY DISEASE, WITH LONG-TERM CURRENT USE OF INSULIN (HCC): ICD-10-CM

## 2021-07-07 DIAGNOSIS — E78.2 MIXED HYPERLIPIDEMIA: ICD-10-CM

## 2021-07-07 DIAGNOSIS — Z66 DNR (DO NOT RESUSCITATE): ICD-10-CM

## 2021-07-07 PROCEDURE — G0439 PPPS, SUBSEQ VISIT: HCPCS | Performed by: FAMILY MEDICINE

## 2021-07-07 PROCEDURE — 1123F ACP DISCUSS/DSCN MKR DOCD: CPT | Performed by: FAMILY MEDICINE

## 2021-07-07 NOTE — PATIENT INSTRUCTIONS
Medicare Preventive Visit Patient Instructions  Thank you for completing your Welcome to Medicare Visit or Medicare Annual Wellness Visit today  Your next wellness visit will be due in one year (7/8/2022)  The screening/preventive services that you may require over the next 5-10 years are detailed below  Some tests may not apply to you based off risk factors and/or age  Screening tests ordered at today's visit but not completed yet may show as past due  Also, please note that scanned in results may not display below  Preventive Screenings:  Service Recommendations Previous Testing/Comments   Colorectal Cancer Screening  * Colonoscopy    * Fecal Occult Blood Test (FOBT)/Fecal Immunochemical Test (FIT)  * Fecal DNA/Cologuard Test  * Flexible Sigmoidoscopy Age: 54-65 years old   Colonoscopy: every 10 years (may be performed more frequently if at higher risk)  OR  FOBT/FIT: every 1 year  OR  Cologuard: every 3 years  OR  Sigmoidoscopy: every 5 years  Screening may be recommended earlier than age 48 if at higher risk for colorectal cancer  Also, an individualized decision between you and your healthcare provider will decide whether screening between the ages of 74-80 would be appropriate  Colonoscopy: 04/26/2021  FOBT/FIT: Not on file  Cologuard: Not on file  Sigmoidoscopy: Not on file          Breast Cancer Screening Age: 36 years old  Frequency: every 1-2 years  Not required if history of left and right mastectomy Mammogram: 04/23/2021    Screening Current   Cervical Cancer Screening Between the ages of 21-29, pap smear recommended once every 3 years  Between the ages of 33-67, can perform pap smear with HPV co-testing every 5 years     Recommendations may differ for women with a history of total hysterectomy, cervical cancer, or abnormal pap smears in past  Pap Smear: Not on file    Screening Not Indicated   Hepatitis C Screening Once for adults born between 1945 and 1965  More frequently in patients at high risk for Hepatitis C Hep C Antibody: 07/05/2018    Screening Current   Diabetes Screening 1-2 times per year if you're at risk for diabetes or have pre-diabetes Fasting glucose: 106 mg/dL   A1C: 7 1 %    Screening Not Indicated  History Diabetes   Cholesterol Screening Once every 5 years if you don't have a lipid disorder  May order more often based on risk factors  Lipid panel: 04/22/2021    Screening Not Indicated  History Lipid Disorder     Other Preventive Screenings Covered by Medicare:  1  Abdominal Aortic Aneurysm (AAA) Screening: covered once if your at risk  You're considered to be at risk if you have a family history of AAA  2  Lung Cancer Screening: covers low dose CT scan once per year if you meet all of the following conditions: (1) Age 50-69; (2) No signs or symptoms of lung cancer; (3) Current smoker or have quit smoking within the last 15 years; (4) You have a tobacco smoking history of at least 30 pack years (packs per day multiplied by number of years you smoked); (5) You get a written order from a healthcare provider  3  Glaucoma Screening: covered annually if you're considered high risk: (1) You have diabetes OR (2) Family history of glaucoma OR (3)  aged 48 and older OR (3)  American aged 72 and older  3  Osteoporosis Screening: covered every 2 years if you meet one of the following conditions: (1) You're estrogen deficient and at risk for osteoporosis based off medical history and other findings; (2) Have a vertebral abnormality; (3) On glucocorticoid therapy for more than 3 months; (4) Have primary hyperparathyroidism; (5) On osteoporosis medications and need to assess response to drug therapy  · Last bone density test (DXA Scan): 07/14/2020   5  HIV Screening: covered annually if you're between the age of 15-65  Also covered annually if you are younger than 13 and older than 72 with risk factors for HIV infection   For pregnant patients, it is covered up to 3 times per pregnancy  Immunizations:  Immunization Recommendations   Influenza Vaccine Annual influenza vaccination during flu season is recommended for all persons aged >= 6 months who do not have contraindications   Pneumococcal Vaccine (Prevnar and Pneumovax)  * Prevnar = PCV13  * Pneumovax = PPSV23   Adults 25-60 years old: 1-3 doses may be recommended based on certain risk factors  Adults 72 years old: Prevnar (PCV13) vaccine recommended followed by Pneumovax (PPSV23) vaccine  If already received PPSV23 since turning 65, then PCV13 recommended at least one year after PPSV23 dose  Hepatitis B Vaccine 3 dose series if at intermediate or high risk (ex: diabetes, end stage renal disease, liver disease)   Tetanus (Td) Vaccine - COST NOT COVERED BY MEDICARE PART B Following completion of primary series, a booster dose should be given every 10 years to maintain immunity against tetanus  Td may also be given as tetanus wound prophylaxis  Tdap Vaccine - COST NOT COVERED BY MEDICARE PART B Recommended at least once for all adults  For pregnant patients, recommended with each pregnancy  Shingles Vaccine (Shingrix) - COST NOT COVERED BY MEDICARE PART B  2 shot series recommended in those aged 48 and above     Health Maintenance Due:      Topic Date Due    DXA SCAN  07/14/2022    Hepatitis C Screening  Completed     Immunizations Due:      Topic Date Due    Influenza Vaccine (1) 09/01/2021     Advance Directives   What are advance directives? Advance directives are legal documents that state your wishes and plans for medical care  These plans are made ahead of time in case you lose your ability to make decisions for yourself  Advance directives can apply to any medical decision, such as the treatments you want, and if you want to donate organs  What are the types of advance directives? There are many types of advance directives, and each state has rules about how to use them   You may choose a combination of any of the following:  · Living will: This is a written record of the treatment you want  You can also choose which treatments you do not want, which to limit, and which to stop at a certain time  This includes surgery, medicine, IV fluid, and tube feedings  · Durable power of  for healthcare [facility]): This is a written record that states who you want to make healthcare choices for you when you are unable to make them for yourself  This person, called a proxy, is usually a family member or a friend  You may choose more than 1 proxy  · Do not resuscitate (DNR) order:  A DNR order is used in case your heart stops beating or you stop breathing  It is a request not to have certain forms of treatment, such as CPR  A DNR order may be included in other types of advance directives  · Medical directive: This covers the care that you want if you are in a coma, near death, or unable to make decisions for yourself  You can list the treatments you want for each condition  Treatment may include pain medicine, surgery, blood transfusions, dialysis, IV or tube feedings, and a ventilator (breathing machine)  · Values history: This document has questions about your views, beliefs, and how you feel and think about life  This information can help others choose the care that you would choose  Why are advance directives important? An advance directive helps you control your care  Although spoken wishes may be used, it is better to have your wishes written down  Spoken wishes can be misunderstood, or not followed  Treatments may be given even if you do not want them  An advance directive may make it easier for your family to make difficult choices about your care  Weight Management   Why it is important to manage your weight:  Being overweight increases your risk of health conditions such as heart disease, high blood pressure, type 2 diabetes, and certain types of cancer   It can also increase your risk for osteoarthritis, sleep apnea, and other respiratory problems  Aim for a slow, steady weight loss  Even a small amount of weight loss can lower your risk of health problems  How to lose weight safely:  A safe and healthy way to lose weight is to eat fewer calories and get regular exercise  You can lose up about 1 pound a week by decreasing the number of calories you eat by 500 calories each day  Healthy meal plan for weight management:  A healthy meal plan includes a variety of foods, contains fewer calories, and helps you stay healthy  A healthy meal plan includes the following:  · Eat whole-grain foods more often  A healthy meal plan should contain fiber  Fiber is the part of grains, fruits, and vegetables that is not broken down by your body  Whole-grain foods are healthy and provide extra fiber in your diet  Some examples of whole-grain foods are whole-wheat breads and pastas, oatmeal, brown rice, and bulgur  · Eat a variety of vegetables every day  Include dark, leafy greens such as spinach, kale, selam greens, and mustard greens  Eat yellow and orange vegetables such as carrots, sweet potatoes, and winter squash  · Eat a variety of fruits every day  Choose fresh or canned fruit (canned in its own juice or light syrup) instead of juice  Fruit juice has very little or no fiber  · Eat low-fat dairy foods  Drink fat-free (skim) milk or 1% milk  Eat fat-free yogurt and low-fat cottage cheese  Try low-fat cheeses such as mozzarella and other reduced-fat cheeses  · Choose meat and other protein foods that are low in fat  Choose beans or other legumes such as split peas or lentils  Choose fish, skinless poultry (chicken or turkey), or lean cuts of red meat (beef or pork)  Before you cook meat or poultry, cut off any visible fat  · Use less fat and oil  Try baking foods instead of frying them  Add less fat, such as margarine, sour cream, regular salad dressing and mayonnaise to foods  Eat fewer high-fat foods   Some examples of high-fat foods include french fries, doughnuts, ice cream, and cakes  · Eat fewer sweets  Limit foods and drinks that are high in sugar  This includes candy, cookies, regular soda, and sweetened drinks  Exercise:  Exercise at least 30 minutes per day on most days of the week  Some examples of exercise include walking, biking, dancing, and swimming  You can also fit in more physical activity by taking the stairs instead of the elevator or parking farther away from stores  Ask your healthcare provider about the best exercise plan for you  © Copyright [employer] 2018 Information is for End User's use only and may not be sold, redistributed or otherwise used for commercial purposes   All illustrations and images included in CareNotes® are the copyrighted property of A D A M [employer]  or [address] [place], [place] [address]wali

## 2021-07-07 NOTE — PROGRESS NOTES
Assessment and Plan:     Problem List Items Addressed This Visit        Endocrine    Type 2 diabetes mellitus with kidney complication, with long-term current use of insulin ([facility])    Relevant Orders    Hemoglobin A1C    CBC and differential    Comprehensive metabolic panel    Lipid Panel with Direct LDL reflex    TSH, 3rd generation with Free T4 reflex       Other    Mixed hyperlipidemia    Relevant Orders    Hemoglobin A1C    CBC and differential    Comprehensive metabolic panel    Lipid Panel with Direct LDL reflex    TSH, 3rd generation with Free T4 reflex    BMI 45 0-49 9, adult ([facility])      Chronic uncontrolled discussed the patient portion control low carb low-fat diet and increased physical activity         Medicare annual wellness visit, subsequent - Primary      Advice and education were given regarding nutrition, aerobic exercises, weight bearing exercises, cardiovascular risk reduction, fall risk reduction, and age appropriate supplements  The patient was counseled regarding instructions for management, risk factor reductions, prognosis, risks and benefits of treatment options, patient and family education, and importance of compliance with treatment  DNR (do not resuscitate)      Patient is DNR and her  aware of it             BMI Counseling: Body mass index is 45 36 kg/m²  The BMI is above normal  Nutrition recommendations include decreasing portion sizes, decreasing fast food intake and limiting drinks that contain sugar  Exercise recommendations include exercising 3-5 times per week  No pharmacotherapy was ordered  Preventive health issues were discussed with patient, and age appropriate screening tests were ordered as noted in patient's After Visit Summary  Personalized health advice and appropriate referrals for health education or preventive services given if needed, as noted in patient's After Visit Summary       History of Present Illness:     Patient presents for Medicare Annual Wellness visit    Patient Care Team:  Swapnil Valencia MD as PCP - General  Perla Montejo DPM (Podiatry)  Surendra Rodriguez MD (Nephrology)  David Low MD (Urology)  Sidra Mosqueda MD (Gastroenterology)     Problem List:     Patient Active Problem List   Diagnosis    Carotid artery disease ([place] [address] )    Chronic renal insufficiency, stage III (moderate) ([place] [address] )    Essential hypertension    Mixed hyperlipidemia    Hypomagnesemia    Indigestion    Irritable bowel syndrome    Long term current use of insulin ([place] [address] )    Metabolic syndrome    Nonalcoholic fatty liver disease    Obstructive sleep apnea treated with continuous positive airway pressure (CPAP)    Peripheral vascular disease ([place] [address] )    Type 2 diabetes mellitus with kidney complication, with long-term current use of insulin ([facility])    Urge incontinence    Vitamin D deficiency    Osteopenia of spine    Anemia    Multiple and bilateral precerebral arterial occlusion    Restless legs syndrome (RLS)    Tinnitus    Migraine without aura, not intractable    Hypertriglyceridemia, essential    Uncontrolled type 2 diabetes mellitus ([place] [address] )    BMI 45 0-49 9, adult ([facility])    Chronic right shoulder pain    Vaginal bleeding    Breast lump in upper outer quadrant    Neck pain    Abnormal mammogram    Benign hypertensive renal disease    Proteinuria    Medicare annual wellness visit, subsequent    Callus    Hammer toe    Arthritis    Onychomycosis    Plantar fascial fibromatosis    Nail dystrophy    Bilateral low back pain with bilateral sciatica    Skin lesion    Gait disturbance    DNR (do not resuscitate)      Past Medical and Surgical History:     Past Medical History:   Diagnosis Date    Anemia     Arthritis 10/30/2020    Chronic kidney disease     Diabetes mellitus ([facility] )     Fibromyalgia, primary     Fracture of wrist     RIGHT    Heart murmur 6/30/2020    History of non-insulin dependent diabetes mellitus     Hyperlipidemia  Hypertension     Hypertension     IBS (irritable bowel syndrome)     Irritable bowel     Metabolic syndrome     Obesity     RLS (restless legs syndrome)     Routine medical exam 6/28/2019    Sleep apnea     Sleep apnea     ON CPAP    Urge incontinence     Urinary incontinence      Past Surgical History:   Procedure Laterality Date    CHOLECYSTECTOMY  2013    COLONOSCOPY  2010    CYSTOSCOPY      HERNIA REPAIR  2008    HYSTERECTOMY      PARTIAL (STILL HAS OVARIES)    KNEE SURGERY Left       Family History:     Family History   Problem Relation Age of Onset    Suicidality Father     Breast cancer Mother 58    Melanoma Sister     Bipolar disorder Sister     Heart disease Brother     Rheum arthritis Daughter     No Known Problems Maternal Grandmother     No Known Problems Paternal Grandmother     No Known Problems Sister     No Known Problems Daughter     No Known Problems Maternal Aunt     No Known Problems Maternal Aunt       Social History:     Social History     Socioeconomic History    Marital status: /Civil Union     Spouse name: None    Number of children: None    Years of education: None    Highest education level: None   Occupational History    None   Tobacco Use    Smoking status: Never Smoker    Smokeless tobacco: Never Used   Vaping Use    Vaping Use: Never used   Substance and Sexual Activity    Alcohol use: No     Comment: no caffeine use    Drug use: No    Sexual activity: Yes     Partners: Male     Birth control/protection: Post-menopausal   Other Topics Concern    None   Social History Narrative    None     Social Determinants of Health     Financial Resource Strain: Low Risk     Difficulty of Paying Living Expenses: Not hard at all   Food Insecurity: No Food Insecurity    Worried About Running Out of Food in the Last Year: Never true    Layla of Food in the Last Year: Never true   Transportation Needs: No Transportation Needs    Lack of Transportation (Medical): No    Lack of Transportation (Non-Medical): No   Physical Activity: Insufficiently Active    Days of Exercise per Week: 2 days    Minutes of Exercise per Session: 60 min   Stress: No Stress Concern Present    Feeling of Stress : Not at all   Social Connections:  Moderately Integrated    Frequency of Communication with Friends and Family: More than three times a week    Frequency of Social Gatherings with Friends and Family: More than three times a week    Attends Mosque Services: More than 4 times per year    Active Member of [employer] or Organizations: No    Attends Club or Organization Meetings: Never    Marital Status:    Intimate Partner Violence: Not At Risk    Fear of Current or Ex-Partner: No    Emotionally Abused: No    Physically Abused: No    Sexually Abused: No      Medications and Allergies:     Current Outpatient Medications   Medication Sig Dispense Refill    acetaminophen (TYLENOL) 325 mg tablet Take by mouth as needed       albuterol (PROAIR HFA) 90 mcg/act inhaler as needed       B Complex Vitamins (B COMPLEX 1 PO) Take by mouth      B Complex-C CAPS Take by mouth      Cholecalciferol (Vitamin D3 Adult Gummies) 25 MCG (1000 UT) CHEW Chew      Cholecalciferol (VITAMIN D3) 2000 units capsule half      cholestyramine (QUESTRAN) 4 g packet take 1 packet by mouth every other day  0    clotrimazole (LOTRIMIN) 1 % cream Apply topically 2 (two) times a day 113 g 2    conjugated estrogens (PREMARIN) vaginal cream Insert 1 g into the vagina daily Apply vaginally x2/w (Patient taking differently: Insert 1 g into the vagina as needed Apply vaginally x2/w) 30 g 1    CONTOUR NEXT TEST test strip Test blood sugar 3 times daily 100 each 2    Diapers & Supplies MISC by Does not apply route 3 (three) times a day as needed (Incontinence) 28 each 11    dicyclomine (BENTYL) 20 mg tablet Take 1 tablet (20 mg total) by mouth 2 (two) times a day 180 tablet 1    EASY TOUCH LANCETS 32G/TWIST MISC Test blood sugars 3 times daily 100 each 3    fenofibrate (TRIGLIDE) 160 MG tablet TAKE 1 TABLET BY MOUTH  DAILY 90 tablet 0    Ferrous Sulfate (IRON) 325 (65 Fe) MG TABS Take by mouth      HUMALOG KWIKPEN 200 units/mL CONCENTRATED U-200 injection pen INJECT SUBCUTANEOUSLY AS  DIRECTED PER PRESCRIBER&apos;S  INSTRUCTIONS  INSULIN  DOSING REQUIRES  INDIVIDUALIZATION  12 mL 1    Insulin Pen Needle (BD Pen Needle [place] U/F) 32G X 4 MM MISC Inject as directed 3 times daily 270 each 0    lisinopril (ZESTRIL) 10 mg tablet Take 1 tablet (10 mg total) by mouth daily 90 tablet 0    loratadine (CLARITIN) 10 mg tablet Take by mouth      Multiple Vitamin (DAILY VALUE MULTIVITAMIN) TABS Take by mouth      Multiple Vitamins-Minerals (MULTI ADULT GUMMIES PO) Take by mouth      oxybutynin (DITROPAN-XL) 5 mg 24 hr tablet Take 1 tablet (5 mg total) by mouth daily 90 tablet 0    pantoprazole (PROTONIX) 40 mg tablet Take 1 tablet (40 mg total) by mouth daily 90 tablet 0    pregabalin (LYRICA) 75 mg capsule Take 1 capsule (75 mg total) by mouth 3 (three) times a day 270 capsule 0    Probiotic Product (Align) 4 MG CAPS Take by mouth      Probiotic Product (ALIGN) CHEW Chew 2 (two) times a day      simvastatin (ZOCOR) 10 mg tablet Take 1 tablet (10 mg total) by mouth daily at bedtime 90 tablet 0    Thiamine HCl (VITAMIN B-1) 250 MG tablet Take 250 mg by mouth daily      topiramate (TOPAMAX) 25 mg tablet Take 1 tablet (25 mg total) by mouth daily 90 tablet 0    Tresiba FlexTouch 100 units/mL injection pen INJECT 80 UNITS  SUBCUTANEOUSLY DAILY (Patient taking differently: 75 Units INJECT 80 UNITS  SUBCUTANEOUSLY DAILY) 75 mL 3     No current facility-administered medications for this visit       Allergies   Allergen Reactions    Pioglitazone Other (See Comments)     "Feels like she is dying"    Ibuprofen     Levofloxacin Other (See Comments)     Other reaction(s): rash    Nsaids Immunizations:     Immunization History   Administered Date(s) Administered    INFLUENZA 12/01/2008, 09/29/2010, 12/03/2015, 10/13/2016, 09/21/2017    Influenza Split 10/17/2013    Influenza, high dose seasonal 0 7 mL 10/25/2018, 11/07/2019, 10/30/2020    Influenza, seasonal, injectable 09/22/2011, 10/25/2012, 10/02/2017    Pneumococcal Conjugate 13-Valent 04/28/2015    Pneumococcal Polysaccharide PPV23 01/01/2006, 05/05/2016    SARS-CoV-2 / COVID-19 mRNA IM (Moderna) 02/09/2021, 03/09/2021    Tdap 09/05/2017    Zoster 05/23/2017, 05/31/2017    Zoster Vaccine Recombinant 05/01/2019, 07/02/2019      Health Maintenance:         Topic Date Due    DXA SCAN  07/14/2022    Hepatitis C Screening  Completed         Topic Date Due    Influenza Vaccine (1) 09/01/2021      Medicare Health Risk Assessment:     /74 (BP Location: Left arm, Patient Position: Sitting, Cuff Size: Large)   Pulse 76   Temp 98 °F (36 7 °C) (Tympanic)   Resp 16   Ht 5' 2 5" (1 588 m)   Wt 114 kg (252 lb)   SpO2 96%   BMI 45 36 kg/m²      [facility] for [facility] Subsequent Wellness visit  Last Medicare Wellness visit information reviewed, patient interviewed and updates made to the record today  Health Risk Assessment:   Patient rates overall health as good  Patient feels that their physical health rating is slightly better  Patient is satisfied with their life  Eyesight was rated as same  Hearing was rated as same  Patient feels that their emotional and mental health rating is slightly better  Patients states they are never, rarely angry  Patient states they are often unusually tired/fatigued  Pain experienced in the last 7 days has been some  Patient's pain rating has been 7/10  Patient states that she has experienced no weight loss or gain in last 6 months  Fibromyalgia pain    Depression Screening:   PHQ-2 Score: 0      Fall Risk Screening:    In the past year, patient has experienced: no history of falling in past year Urinary Incontinence Screening:   Patient has not leaked urine accidently in the last six months  Home Safety:  Patient has trouble with stairs inside or outside of their home  Patient has working smoke alarms and has working carbon monoxide detector  Home safety hazards include: none  Nutrition:   Current diet is Other (please comment)  Non Dairy     Medications:   Patient is currently taking over-the-counter supplements  OTC medications include: see medication list  Patient is able to manage medications  Activities of Daily Living (ADLs)/Instrumental Activities of Daily Living (IADLs):   Walk and transfer into and out of bed and chair?: Yes  Dress and groom yourself?: Yes    Bathe or shower yourself?: Yes    Feed yourself? Yes  Do your laundry/housekeeping?: Yes  Manage your money, pay your bills and track your expenses?: Yes  Make your own meals?: Yes    Do your own shopping?: Yes    Previous Hospitalizations:   Any hospitalizations or ED visits within the last 12 months?: No      Advance Care Planning:   Living will: Yes    Durable POA for healthcare:  Yes    Advanced directive: Yes    Five wishes given: Yes      Cognitive Screening:   Provider or family/friend/caregiver concerned regarding cognition?: No    PREVENTIVE SCREENINGS      Cardiovascular Screening:    General: Screening Not Indicated and History Lipid Disorder      Diabetes Screening:     General: Screening Not Indicated and History Diabetes      Colorectal Cancer Screening:     General: Screening Current      Breast Cancer Screening:     General: Screening Current      Cervical Cancer Screening:    General: Screening Not Indicated      Osteoporosis Screening:    General: Screening Current      Abdominal Aortic Aneurysm (AAA) Screening:        General: Screening Current      Lung Cancer Screening:     General: Screening Not Indicated      Hepatitis C Screening:    General: Screening Current    Screening, Brief Intervention, and Referral to Treatment (SBIRT)    Screening  Typical number of drinks in a day: 0  Typical number of drinks in a week: 0  Interpretation: Low risk drinking behavior  AUDIT-C Screenin) How often did you have a drink containing alcohol in the past year? never  2) How many drinks did you have on a typical day when you were drinking in the past year? 0  3) How often did you have 6 or more drinks on one occasion in the past year? never    AUDIT-C Score: 0  Interpretation: Score 0-2 (female): Negative screen for alcohol misuse    Single Item Drug Screening:  How often have you used an illegal drug (including marijuana) or a prescription medication for non-medical reasons in the past year? never    Single Item Drug Screen Score: 0  Interpretation: Negative screen for possible drug use disorder    Brief Intervention  Alcohol & drug use screenings were reviewed  No concerns regarding substance use disorder identified  Other Counseling Topics:   Calcium and vitamin D intake and regular weightbearing exercise         Matthew Thompson MD

## 2021-07-08 PROBLEM — Z66 DNR (DO NOT RESUSCITATE): Status: ACTIVE | Noted: 2021-07-08

## 2021-07-15 DIAGNOSIS — K30 INDIGESTION: ICD-10-CM

## 2021-07-15 DIAGNOSIS — I10 ESSENTIAL HYPERTENSION: ICD-10-CM

## 2021-07-15 RX ORDER — PANTOPRAZOLE SODIUM 40 MG/1
TABLET, DELAYED RELEASE ORAL
Qty: 90 TABLET | Refills: 3 | Status: SHIPPED | OUTPATIENT
Start: 2021-07-15 | End: 2022-07-11

## 2021-07-15 RX ORDER — LISINOPRIL 10 MG/1
TABLET ORAL
Qty: 90 TABLET | Refills: 3 | Status: SHIPPED | OUTPATIENT
Start: 2021-07-15 | End: 2022-02-25 | Stop reason: SDUPTHER

## 2021-07-16 ENCOUNTER — OFFICE VISIT (OUTPATIENT)
Dept: GASTROENTEROLOGY | Facility: MEDICAL CENTER | Age: 76
End: 2021-07-16
Payer: MEDICARE

## 2021-07-16 VITALS
WEIGHT: 254 LBS | SYSTOLIC BLOOD PRESSURE: 116 MMHG | DIASTOLIC BLOOD PRESSURE: 60 MMHG | HEART RATE: 67 BPM | TEMPERATURE: 98.3 F | HEIGHT: 62 IN | BODY MASS INDEX: 46.74 KG/M2

## 2021-07-16 DIAGNOSIS — K52.9 CHRONIC DIARRHEA: Primary | ICD-10-CM

## 2021-07-16 PROCEDURE — 99214 OFFICE O/P EST MOD 30 MIN: CPT | Performed by: INTERNAL MEDICINE

## 2021-07-16 RX ORDER — BISMUTH SUBSALICYLATE 262 MG/1
524 TABLET, CHEWABLE ORAL 2 TIMES DAILY PRN
Qty: 30 TABLET | Refills: 0 | Status: SHIPPED | OUTPATIENT
Start: 2021-07-16

## 2021-07-16 NOTE — PROGRESS NOTES
[facility] - Outpatient Follow-up Note  Bradly Perkins 67 y o  female MRN: [id number]  Encounter: [id number]          ASSESSMENT AND PLAN:    Bradly Perkins is a 67 y o  female who had chronic diarrhea due to lactose intolerance  She is much improved on a dairy-free diet  She is also avoiding coffee and Mg oxide supplement which has further helped  She will remain lactose-free  She can also try Pepto bismol prn for residual symptoms  She will return as needed  She can consider repeat screening colonoscopy in 5 years depending on overall health status  1  Chronic diarrhea        No orders of the defined types were placed in this encounter  ------------  Bradly Perkins is a 67 y o  female who presents with complaint of chronic diarrhea  She also had epigastric pain on exam   She has known lactose intolerance and is not very good at avoiding dairy products  She also takes Mg oxide pills  She carries a diagnosis of IBS and has had colonoscopy with TI intubation and random colon biopsies (presumably normal)  She has normal thyroid function  We will do the following   - She will stop taking magnesium oxide supplements  - She will start strict lactose avoidance  - We will check TTG and IgA  - We will check stool for C diff, other bacteria, and o&p  - She is due for colonoscopy now because she had 4 polyps 3 years ago; will schedule this as well as EGD given epigastric pain  We can take biopsies for microscopic colitis and celiac   - She can increase cholestyramine to 3 packets daily if stopping Mg oxide and dairy products does not help her sufficiently  - In the future, we can also try low FODMAP diet        ______________________________________________________________________    SUBJECTIVE:    Bradly Perkins is a 67 y o  female who presents for follow up of chronic diarrhea    Since last visit:  - We did EGD and colonoscopy which showed unremarkable duodenum, ileum, and colon;   - She had stool and blood tests that ruled out infection, celiac, and thyroid disease;  - She stopped taking magnesium oxide;  - She went on a lactose-free diet  She says that she is 70% better  She has also stopped drinking coffee, which has helped further  When she has diarrhea now, it is usually because of dairy consumption  She now has 2 BMs per day, one os formed, the other a little loose  If she follows the diet, she has no incontinence  Also feels less bloated  She is very pleased with her progress  REVIEW OF SYSTEMS IS OTHERWISE NEGATIVE        Historical Information   Past Medical History:   Diagnosis Date    Anemia     Arthritis 10/30/2020    Chronic kidney disease     Diabetes mellitus ([facility] [place] [address] )     Fibromyalgia, primary     Fracture of wrist     RIGHT    Heart murmur 6/30/2020    History of non-insulin dependent diabetes mellitus     Hyperlipidemia     Hypertension     Hypertension     IBS (irritable bowel syndrome)     Irritable bowel     Metabolic syndrome     Obesity     RLS (restless legs syndrome)     Routine medical exam 6/28/2019    Sleep apnea     Sleep apnea     ON CPAP    Urge incontinence     Urinary incontinence      Past Surgical History:   Procedure Laterality Date    CHOLECYSTECTOMY  2013    COLONOSCOPY  2010    CYSTOSCOPY      HERNIA REPAIR  2008    HYSTERECTOMY      PARTIAL (STILL HAS OVARIES)    KNEE SURGERY Left      Social History   Social History     Substance and Sexual Activity   Alcohol Use No    Comment: no caffeine use     Social History     Substance and Sexual Activity   Drug Use No     Social History     Tobacco Use   Smoking Status Never Smoker   Smokeless Tobacco Never Used     Family History   Problem Relation Age of Onset    Suicidality Father     Breast cancer Mother 58    Melanoma Sister     Bipolar disorder Sister     Heart disease Brother     Rheum arthritis Daughter     No Known Problems Maternal Grandmother  No Known Problems Paternal Grandmother     No Known Problems Sister     No Known Problems Daughter     No Known Problems Maternal Aunt     No Known Problems Maternal Aunt        Meds/Allergies       Current Outpatient Medications:     acetaminophen (TYLENOL) 325 mg tablet    albuterol (PROAIR HFA) 90 mcg/act inhaler    B Complex Vitamins (B COMPLEX 1 PO)    B Complex-C CAPS    Cholecalciferol (VITAMIN D3) 2000 units capsule    cholestyramine (QUESTRAN) 4 g packet    clotrimazole (LOTRIMIN) 1 % cream    conjugated estrogens (PREMARIN) vaginal cream    CONTOUR NEXT TEST test strip    Diapers & Supplies MISC    dicyclomine (BENTYL) 20 mg tablet    EASY TOUCH LANCETS 32G/TWIST MISC    fenofibrate (TRIGLIDE) 160 MG tablet    Ferrous Sulfate (IRON) 325 (65 Fe) MG TABS    HUMALOG KWIKPEN 200 units/mL CONCENTRATED U-200 injection pen    Insulin Pen Needle (BD Pen Needle [place] U/F) 32G X 4 MM MISC    lisinopril (ZESTRIL) 10 mg tablet    loratadine (CLARITIN) 10 mg tablet    Multiple Vitamin (DAILY VALUE MULTIVITAMIN) TABS    Multiple Vitamins-Minerals (MULTI ADULT GUMMIES PO)    oxybutynin (DITROPAN-XL) 5 mg 24 hr tablet    pantoprazole (PROTONIX) 40 mg tablet    pregabalin (LYRICA) 75 mg capsule    Probiotic Product (Align) 4 MG CAPS    simvastatin (ZOCOR) 10 mg tablet    Thiamine HCl (VITAMIN B-1) 250 MG tablet    topiramate (TOPAMAX) 25 mg tablet    Jenetta Sabal FlexTouch 100 units/mL injection pen    bismuth subsalicylate (PEPTO BISMOL) 262 MG chewable tablet    Cholecalciferol (Vitamin D3 Adult Gummies) 25 MCG (1000 UT) CHEW    Probiotic Product (ALIGN) CHEW    Allergies   Allergen Reactions    Pioglitazone Other (See Comments)     "Feels like she is dying"    Ibuprofen     Levofloxacin Other (See Comments)     Other reaction(s): rash    Nsaids            Objective     Blood pressure 116/60, pulse 67, temperature 98 3 °F (36 8 °C), temperature source Tympanic, height 5' 2" (1 575 m), weight 115 kg (254 lb), not currently breastfeeding  Body mass index is 46 46 kg/m²  PHYSICAL EXAM:      General Appearance:   Alert, cooperative, no distress   HEENT:   Normocephalic, atraumatic, anicteric  Neck:  Supple, symmetrical, trachea midline   Lungs:   Clear to auscultation bilaterally; no rales, rhonchi or wheezing; respirations unlabored    Heart[de-identified]   Regular rate and rhythm; no murmur, rub, or gallop  Abdomen:   Soft, non-tender, non-distended; normal bowel sounds; no masses, no organomegaly    Genitalia:   Deferred    Rectal:   Deferred    Extremities:  No cyanosis, clubbing or edema    Pulses:  2+ and symmetric    Skin:  No jaundice, rashes, or lesions    Lymph nodes:  No palpable cervical lymphadenopathy        Lab Results:   No visits with results within 1 Day(s) from this visit     Latest known visit with results is:   Hospital Outpatient Visit on 04/26/2021   Component Date Value    POC Glucose 04/26/2021 80     Case Report 04/26/2021                      Value:Surgical Pathology Report                         Case: [id number]                                   Authorizing Provider:  Mady Monet MD         Collected:           04/26/2021 0915              Ordering Location:     [facility] Received:            04/26/2021 1521                                     Heart Endoscopy                                                              Pathologist:           Maria De Jesus Gant MD                                                               Specimens:   A) - Duodenum, duodenal bx, r/o celiac                                                              B) - Stomach, gastric bx, r/o h pylori                                                              C) - Stomach, gastric polyp bx                                                                      D) - Terminal Ileum, terminal ileum bx, r/o crohn's                                                 E) - Colon, random colon bxs, r/o microscopic colitis                                      Addendum 04/26/2021                      Value: This result contains rich text formatting which cannot be displayed here   Final Diagnosis 04/26/2021                      Value: This result contains rich text formatting which cannot be displayed here   Additional Information 04/26/2021                      Value: This result contains rich text formatting which cannot be displayed here  Kamini Perla Description 04/26/2021                      Value: This result contains rich text formatting which cannot be displayed here   Clinical Information 04/26/2021                      Value:FINDINGS:  The esophagus appeared normal   Eight sessile polyps measuring smaller than 5 mm in the fundus of the   stomach; performed cold forceps biopsy  The stomach appeared normal  Performed random biopsy  The duodenum appeared normal  Performed random biopsy  Lab Results   Component Value Date    WBC 7 07 04/22/2021    HGB 13 2 04/22/2021    HCT 42 7 04/22/2021    MCV 95 04/22/2021     04/22/2021       Lab Results   Component Value Date     06/04/2018    SODIUM 141 04/22/2021    K 4 3 04/22/2021     (H) 04/22/2021    CO2 27 04/22/2021    ANIONGAP 8 06/04/2018    AGAP 4 04/22/2021    BUN 20 04/22/2021    CREATININE 1 30 04/22/2021    GLUF 106 (H) 04/22/2021    CALCIUM 9 3 04/22/2021    AST 37 01/25/2021    ALT 49 01/25/2021    ALKPHOS 136 (H) 01/25/2021    PROT 6 2 06/04/2018    TP 7 4 01/25/2021    BILITOT 0 6 06/04/2018    TBILI 0 36 01/25/2021    EGFR 40 04/22/2021       No results found for: CRP    Lab Results   Component Value Date    YFJ4QEEHQTPJ 1 620 04/22/2021       No results found for: IRON, TIBC, FERRITIN    Radiology Results:   No results found

## 2021-08-02 ENCOUNTER — APPOINTMENT (OUTPATIENT)
Dept: LAB | Facility: IMAGING CENTER | Age: 76
End: 2021-08-02
Payer: MEDICARE

## 2021-08-02 DIAGNOSIS — N18.32 TYPE 2 DIABETES MELLITUS WITH STAGE 3B CHRONIC KIDNEY DISEASE, WITH LONG-TERM CURRENT USE OF INSULIN (HCC): ICD-10-CM

## 2021-08-02 DIAGNOSIS — E78.2 MIXED HYPERLIPIDEMIA: ICD-10-CM

## 2021-08-02 DIAGNOSIS — E11.22 TYPE 2 DIABETES MELLITUS WITH STAGE 3B CHRONIC KIDNEY DISEASE, WITH LONG-TERM CURRENT USE OF INSULIN (HCC): ICD-10-CM

## 2021-08-02 DIAGNOSIS — Z79.4 TYPE 2 DIABETES MELLITUS WITH STAGE 3B CHRONIC KIDNEY DISEASE, WITH LONG-TERM CURRENT USE OF INSULIN (HCC): ICD-10-CM

## 2021-08-02 LAB
ALBUMIN SERPL BCP-MCNC: 3.3 G/DL (ref 3.5–5)
ALP SERPL-CCNC: 125 U/L (ref 46–116)
ALT SERPL W P-5'-P-CCNC: 37 U/L (ref 12–78)
ANION GAP SERPL CALCULATED.3IONS-SCNC: 6 MMOL/L (ref 4–13)
AST SERPL W P-5'-P-CCNC: 37 U/L (ref 5–45)
BASOPHILS # BLD AUTO: 0.04 THOUSANDS/ΜL (ref 0–0.1)
BASOPHILS NFR BLD AUTO: 1 % (ref 0–1)
BILIRUB SERPL-MCNC: 0.4 MG/DL (ref 0.2–1)
BUN SERPL-MCNC: 15 MG/DL (ref 5–25)
CALCIUM ALBUM COR SERPL-MCNC: 9.6 MG/DL (ref 8.3–10.1)
CALCIUM SERPL-MCNC: 9 MG/DL (ref 8.3–10.1)
CHLORIDE SERPL-SCNC: 106 MMOL/L (ref 100–108)
CHOLEST SERPL-MCNC: 168 MG/DL (ref 50–200)
CO2 SERPL-SCNC: 26 MMOL/L (ref 21–32)
CREAT SERPL-MCNC: 0.93 MG/DL (ref 0.6–1.3)
EOSINOPHIL # BLD AUTO: 0.14 THOUSAND/ΜL (ref 0–0.61)
EOSINOPHIL NFR BLD AUTO: 2 % (ref 0–6)
ERYTHROCYTE [DISTWIDTH] IN BLOOD BY AUTOMATED COUNT: 14 % (ref 11.6–15.1)
EST. AVERAGE GLUCOSE BLD GHB EST-MCNC: 146 MG/DL
GFR SERPL CREATININE-BSD FRML MDRD: 59 ML/MIN/1.73SQ M
GLUCOSE P FAST SERPL-MCNC: 85 MG/DL (ref 65–99)
HBA1C MFR BLD: 6.7 %
HCT VFR BLD AUTO: 43.3 % (ref 34.8–46.1)
HDLC SERPL-MCNC: 36 MG/DL
HGB BLD-MCNC: 13.4 G/DL (ref 11.5–15.4)
IMM GRANULOCYTES # BLD AUTO: 0.05 THOUSAND/UL (ref 0–0.2)
IMM GRANULOCYTES NFR BLD AUTO: 1 % (ref 0–2)
LDLC SERPL CALC-MCNC: 84 MG/DL (ref 0–100)
LYMPHOCYTES # BLD AUTO: 1.24 THOUSANDS/ΜL (ref 0.6–4.47)
LYMPHOCYTES NFR BLD AUTO: 18 % (ref 14–44)
MCH RBC QN AUTO: 29.3 PG (ref 26.8–34.3)
MCHC RBC AUTO-ENTMCNC: 30.9 G/DL (ref 31.4–37.4)
MCV RBC AUTO: 95 FL (ref 82–98)
MONOCYTES # BLD AUTO: 0.45 THOUSAND/ΜL (ref 0.17–1.22)
MONOCYTES NFR BLD AUTO: 6 % (ref 4–12)
NEUTROPHILS # BLD AUTO: 5.15 THOUSANDS/ΜL (ref 1.85–7.62)
NEUTS SEG NFR BLD AUTO: 72 % (ref 43–75)
NRBC BLD AUTO-RTO: 0 /100 WBCS
PLATELET # BLD AUTO: 166 THOUSANDS/UL (ref 149–390)
PMV BLD AUTO: 12.2 FL (ref 8.9–12.7)
POTASSIUM SERPL-SCNC: 3.9 MMOL/L (ref 3.5–5.3)
PROT SERPL-MCNC: 7.1 G/DL (ref 6.4–8.2)
RBC # BLD AUTO: 4.58 MILLION/UL (ref 3.81–5.12)
SODIUM SERPL-SCNC: 138 MMOL/L (ref 136–145)
TRIGL SERPL-MCNC: 238 MG/DL
TSH SERPL DL<=0.05 MIU/L-ACNC: 1.68 UIU/ML (ref 0.36–3.74)
WBC # BLD AUTO: 7.07 THOUSAND/UL (ref 4.31–10.16)

## 2021-08-02 PROCEDURE — 80053 COMPREHEN METABOLIC PANEL: CPT

## 2021-08-02 PROCEDURE — 36415 COLL VENOUS BLD VENIPUNCTURE: CPT

## 2021-08-02 PROCEDURE — 83036 HEMOGLOBIN GLYCOSYLATED A1C: CPT

## 2021-08-02 PROCEDURE — 85025 COMPLETE CBC W/AUTO DIFF WBC: CPT

## 2021-08-02 PROCEDURE — 84443 ASSAY THYROID STIM HORMONE: CPT

## 2021-08-02 PROCEDURE — 80061 LIPID PANEL: CPT

## 2021-08-11 ENCOUNTER — OFFICE VISIT (OUTPATIENT)
Dept: FAMILY MEDICINE CLINIC | Facility: CLINIC | Age: 76
End: 2021-08-11
Payer: MEDICARE

## 2021-08-11 VITALS
HEIGHT: 62 IN | SYSTOLIC BLOOD PRESSURE: 110 MMHG | OXYGEN SATURATION: 97 % | HEART RATE: 86 BPM | BODY MASS INDEX: 46.01 KG/M2 | DIASTOLIC BLOOD PRESSURE: 76 MMHG | WEIGHT: 250 LBS | TEMPERATURE: 99 F

## 2021-08-11 DIAGNOSIS — Z79.4 TYPE 2 DIABETES MELLITUS WITH STAGE 3 CHRONIC KIDNEY DISEASE, WITH LONG-TERM CURRENT USE OF INSULIN (HCC): ICD-10-CM

## 2021-08-11 DIAGNOSIS — I10 ESSENTIAL HYPERTENSION: Primary | ICD-10-CM

## 2021-08-11 DIAGNOSIS — N18.31 CHRONIC RENAL IMPAIRMENT, STAGE 3A (HCC): ICD-10-CM

## 2021-08-11 DIAGNOSIS — E78.2 MIXED HYPERLIPIDEMIA: ICD-10-CM

## 2021-08-11 DIAGNOSIS — N18.30 TYPE 2 DIABETES MELLITUS WITH STAGE 3 CHRONIC KIDNEY DISEASE, WITH LONG-TERM CURRENT USE OF INSULIN (HCC): ICD-10-CM

## 2021-08-11 DIAGNOSIS — E78.1 HYPERTRIGLYCERIDEMIA, ESSENTIAL: ICD-10-CM

## 2021-08-11 DIAGNOSIS — E11.22 TYPE 2 DIABETES MELLITUS WITH STAGE 3 CHRONIC KIDNEY DISEASE, WITH LONG-TERM CURRENT USE OF INSULIN (HCC): ICD-10-CM

## 2021-08-11 PROCEDURE — 99214 OFFICE O/P EST MOD 30 MIN: CPT | Performed by: FAMILY MEDICINE

## 2021-08-11 RX ORDER — INSULIN DEGLUDEC INJECTION 100 U/ML
80 INJECTION, SOLUTION SUBCUTANEOUS
Qty: 15 ML | Refills: 3 | Status: SHIPPED | OUTPATIENT
Start: 2021-08-11 | End: 2021-10-05 | Stop reason: SDUPTHER

## 2021-08-12 NOTE — ASSESSMENT & PLAN NOTE
Lab Results   Component Value Date    HGBA1C 6 7 (H) 08/02/2021      a chronic asymptomatic hemoglobin A1c improved compared with before encouraged patient to continue with current regimen  Including Tresiba 80 u daily the Humalog at mealtime per insulin sliding scale    the encouraged patient to continue with the low carb diet encouraged patient to lose weight sign of symptom of hypoglycemia discussed the patient she is up-to-date with diabetic eye exam patient and already on statin and she is already on Ace inhibitor

## 2021-08-12 NOTE — PROGRESS NOTES
Subjective:   Chief Complaint   Patient presents with    Follow-up     1 month follow up    Results     Labs 08/02/2021        Patient ID: Chris Gould is a 67 y o  female  Patient here follow-up with a chronic condition patient history of insulin-dependent diabetic she been control with her medication and try to be control with her low carb diet her hemoglobin A1c improved compared with before she deny any sign of symptom of hypoglycemia patient had a complication of diabetic including neuropathy and nephropathy patient's chronic kidney disease a deny any lower extremity edema no abdomen pain no flank pain and no blood in the urine she been following up with the Nephrology periodically she had history of hypertension blood pressure fair control on current medication tolerated well deny any chest pain short of breath no palpitation and no lower extremity edema had history of hyperlipidemia on statin tolerated well without any rash or muscle pain recent blood work review with the patient      The following portions of the patient's history were reviewed and updated as appropriate: allergies, current medications, past family history, past medical history, past social history, past surgical history and problem list     Review of Systems   Constitutional: Negative for activity change, appetite change, fatigue and fever  HENT: Negative for congestion, ear pain, sinus pressure, sinus pain and sore throat  Eyes: Negative for pain, discharge, redness and itching  Respiratory: Negative for cough, chest tightness, shortness of breath and stridor  Cardiovascular: Negative for chest pain, palpitations and leg swelling  Gastrointestinal: Negative for abdominal pain, blood in stool, constipation, diarrhea and nausea  Genitourinary: Negative for dysuria, flank pain, frequency and hematuria  Musculoskeletal: Negative for back pain, joint swelling and neck pain  Skin: Negative for pallor and rash  Neurological: Negative for dizziness, tremors, weakness, numbness and headaches  Hematological: Does not bruise/bleed easily  Objective:  Vitals:    08/11/21 1333   BP: 110/76   BP Location: Left arm   Patient Position: Sitting   Cuff Size: Standard   Pulse: 86   Temp: 99 °F (37 2 °C)   TempSrc: Tympanic   SpO2: 97%   Weight: 113 kg (250 lb)   Height: 5' 2" (1 575 m)      Physical Exam  Vitals and nursing note reviewed  Constitutional:       General: She is not in acute distress  Appearance: She is well-developed  She is not diaphoretic  HENT:      Head: Normocephalic  Right Ear: Tympanic membrane, ear canal and external ear normal       Left Ear: Tympanic membrane, ear canal and external ear normal       Nose: Nose normal  No congestion or rhinorrhea  Mouth/Throat:      Mouth: Mucous membranes are moist       Pharynx: Oropharynx is clear  No oropharyngeal exudate or posterior oropharyngeal erythema  Eyes:      General:         Right eye: No discharge  Left eye: No discharge  Conjunctiva/sclera: Conjunctivae normal       Pupils: Pupils are equal, round, and reactive to light  Neck:      Vascular: No JVD  Cardiovascular:      Rate and Rhythm: Normal rate and regular rhythm  Heart sounds: Normal heart sounds  No murmur heard  No gallop  Pulmonary:      Effort: Pulmonary effort is normal  No respiratory distress  Breath sounds: Normal breath sounds  No stridor  No wheezing or rales  Chest:      Chest wall: No tenderness  Abdominal:      General: There is no distension  Palpations: Abdomen is soft  There is no mass  Tenderness: There is no abdominal tenderness  There is no rebound  Musculoskeletal:         General: No tenderness  Cervical back: Normal range of motion and neck supple  Lymphadenopathy:      Cervical: No cervical adenopathy  Skin:     General: Skin is warm  Findings: No erythema or rash     Neurological: Mental Status: She is alert and oriented to person, place, and time  Motor: No weakness        Gait: Gait normal            Assessment/Plan:    Type 2 diabetes mellitus with kidney complication, with long-term current use of insulin ([facility])    Lab Results   Component Value Date    HGBA1C 6 7 (H) 08/02/2021      a chronic asymptomatic hemoglobin A1c improved compared with before encouraged patient to continue with current regimen  Including Tresiba 80 u daily the Humalog at mealtime per insulin sliding scale    the encouraged patient to continue with the low carb diet encouraged patient to lose weight sign of symptom of hypoglycemia discussed the patient she is up-to-date with diabetic eye exam patient and already on statin and she is already on Ace inhibitor    Essential hypertension   Chronic asymptomatic fair control continue  The blood pressure goal in patient with diabetic and chronic kidney disease below 130/80 and patient at the goal current management including lisinopril 10 mg once a day low-salt diet and important lose weight discussed with the patient    Chronic renal insufficiency, stage III (moderate)  Lab Results   Component Value Date    EGFR 59 08/02/2021    EGFR 40 04/22/2021    EGFR 44 01/25/2021    CREATININE 0 93 08/02/2021    CREATININE 1 30 04/22/2021    CREATININE 1 20 01/25/2021   chronic  Multifactorial including diabetic hypertension obesityasymptomatic   Improve in the GFR and creatinine compared with before   discussed with the patient important control her risk factor also discussed the keep will hydration avoid nonsteroidal anti-inflammatory drugs patient does follow up with the Nephrology periodically    Mixed hyperlipidemia   Chronic asymptomatic current blood work show LDL 84 in diabetic patient call to be below 70 a discussed with the patient adjust her statin a she 1 put that on hold she would like to do the low-fat diet the will continue current management to re-evaluate if persistent the subtherapeutic to adjust med discussed the patient       Diagnoses and all orders for this visit:    Essential hypertension  -     CBC and differential; Future  -     Basic metabolic panel; Future  -     Lipid Panel with Direct LDL reflex; Future  -     Hemoglobin A1C; Future    Type 2 diabetes mellitus with stage 3 chronic kidney disease, with long-term current use of insulin (HCC)  -     insulin degludec [place] FlexTouch) 100 units/mL injection pen; Inject 80 Units under the skin daily at bedtime INJECT 80 UNITS  SUBCUTANEOUSLY DAILY  -     CBC and differential; Future  -     Basic metabolic panel; Future  -     Lipid Panel with Direct LDL reflex; Future  -     Hemoglobin A1C; Future    Hypertriglyceridemia, essential  -     CBC and differential; Future  -     Basic metabolic panel; Future  -     Lipid Panel with Direct LDL reflex; Future  -     Hemoglobin A1C; Future    Chronic renal impairment, stage 3a (HCC)  -     CBC and differential; Future  -     Basic metabolic panel; Future  -     Lipid Panel with Direct LDL reflex;  Future  -     Hemoglobin A1C; Future    Mixed hyperlipidemia

## 2021-08-12 NOTE — ASSESSMENT & PLAN NOTE
Chronic asymptomatic fair control continue  The blood pressure goal in patient with diabetic and chronic kidney disease below 130/80 and patient at the goal current management including lisinopril 10 mg once a day low-salt diet and important lose weight discussed with the patient

## 2021-08-12 NOTE — ASSESSMENT & PLAN NOTE
Chronic asymptomatic current blood work show LDL 84 in diabetic patient call to be below 70 a discussed with the patient adjust her statin a she 1 put that on hold she would like to do the low-fat diet the will continue current management to re-evaluate if persistent the subtherapeutic to adjust med discussed the patient

## 2021-08-12 NOTE — ASSESSMENT & PLAN NOTE
Lab Results   Component Value Date    EGFR 59 08/02/2021    EGFR 40 04/22/2021    EGFR 44 01/25/2021    CREATININE 0 93 08/02/2021    CREATININE 1 30 04/22/2021    CREATININE 1 20 01/25/2021   chronic  Multifactorial including diabetic hypertension obesityasymptomatic   Improve in the GFR and creatinine compared with before   discussed with the patient important control her risk factor also discussed the keep will hydration avoid nonsteroidal anti-inflammatory drugs patient does follow up with the Nephrology periodically

## 2021-09-10 DIAGNOSIS — M54.42 CHRONIC BILATERAL LOW BACK PAIN WITH BILATERAL SCIATICA: ICD-10-CM

## 2021-09-10 DIAGNOSIS — G89.29 CHRONIC BILATERAL LOW BACK PAIN WITH BILATERAL SCIATICA: ICD-10-CM

## 2021-09-10 DIAGNOSIS — M54.41 CHRONIC BILATERAL LOW BACK PAIN WITH BILATERAL SCIATICA: ICD-10-CM

## 2021-09-10 RX ORDER — PREGABALIN 75 MG/1
75 CAPSULE ORAL 3 TIMES DAILY
Qty: 270 CAPSULE | Refills: 0 | Status: SHIPPED | OUTPATIENT
Start: 2021-09-10 | End: 2021-12-06 | Stop reason: SDUPTHER

## 2021-09-10 NOTE — TELEPHONE ENCOUNTER
Fill Date ID   Written Drug Qty Days Prescriber Rx # Pharmacy Refill   Daily Dose* Pymt Type      06/01/2021  1   06/01/2021  Pregabalin 75 MG Capsule  270 00  90 Ma Alc   [id number]   [place] ([id number])   [place]   03/04/2021  2   03/03/2021  Pregabalin 75 MG Capsule  270 00  90 Ma Alc   [id number]   [place] ([id number])   0   [place]   11/30/2020  2   10/30/2020  Pregabalin 75 MG Capsule  270 00  90 Ma Alc   [id number]   Opt ([id number])   0   [place]   09/08/2020  2   07/29/2020  Pregabalin 75 MG Capsule  270 00  90 Ma Alc   [id number]   Opt ([id number])   0   Medicare

## 2021-09-28 ENCOUNTER — TELEPHONE (OUTPATIENT)
Dept: FAMILY MEDICINE CLINIC | Facility: CLINIC | Age: 76
End: 2021-09-28

## 2021-09-28 NOTE — TELEPHONE ENCOUNTER
patient requesting letter that it is medically necessary for her to purchase diapers due to her incontinence is this ok please advise

## 2021-10-05 DIAGNOSIS — Z79.4 TYPE 2 DIABETES MELLITUS WITH STAGE 3 CHRONIC KIDNEY DISEASE, WITH LONG-TERM CURRENT USE OF INSULIN (HCC): ICD-10-CM

## 2021-10-05 DIAGNOSIS — N18.30 TYPE 2 DIABETES MELLITUS WITH STAGE 3 CHRONIC KIDNEY DISEASE, WITH LONG-TERM CURRENT USE OF INSULIN (HCC): ICD-10-CM

## 2021-10-05 DIAGNOSIS — E11.22 TYPE 2 DIABETES MELLITUS WITH STAGE 3 CHRONIC KIDNEY DISEASE, WITH LONG-TERM CURRENT USE OF INSULIN (HCC): ICD-10-CM

## 2021-10-05 RX ORDER — INSULIN DEGLUDEC INJECTION 100 U/ML
80 INJECTION, SOLUTION SUBCUTANEOUS
Qty: 20 ML | Refills: 3 | Status: SHIPPED | OUTPATIENT
Start: 2021-10-05 | End: 2021-11-29

## 2021-11-01 ENCOUNTER — APPOINTMENT (OUTPATIENT)
Dept: LAB | Facility: IMAGING CENTER | Age: 76
End: 2021-11-01
Payer: MEDICARE

## 2021-11-01 DIAGNOSIS — E78.1 HYPERTRIGLYCERIDEMIA, ESSENTIAL: ICD-10-CM

## 2021-11-01 DIAGNOSIS — N18.31 CHRONIC RENAL IMPAIRMENT, STAGE 3A (HCC): ICD-10-CM

## 2021-11-01 DIAGNOSIS — N18.30 TYPE 2 DIABETES MELLITUS WITH STAGE 3 CHRONIC KIDNEY DISEASE, WITH LONG-TERM CURRENT USE OF INSULIN (HCC): ICD-10-CM

## 2021-11-01 DIAGNOSIS — Z79.4 TYPE 2 DIABETES MELLITUS WITH STAGE 3 CHRONIC KIDNEY DISEASE, WITH LONG-TERM CURRENT USE OF INSULIN (HCC): ICD-10-CM

## 2021-11-01 DIAGNOSIS — I10 ESSENTIAL HYPERTENSION: ICD-10-CM

## 2021-11-01 DIAGNOSIS — E11.22 TYPE 2 DIABETES MELLITUS WITH STAGE 3 CHRONIC KIDNEY DISEASE, WITH LONG-TERM CURRENT USE OF INSULIN (HCC): ICD-10-CM

## 2021-11-01 LAB
ANION GAP SERPL CALCULATED.3IONS-SCNC: 3 MMOL/L (ref 4–13)
BASOPHILS # BLD AUTO: 0.03 THOUSANDS/ΜL (ref 0–0.1)
BASOPHILS NFR BLD AUTO: 0 % (ref 0–1)
BUN SERPL-MCNC: 15 MG/DL (ref 5–25)
CALCIUM SERPL-MCNC: 9.2 MG/DL (ref 8.3–10.1)
CHLORIDE SERPL-SCNC: 110 MMOL/L (ref 100–108)
CHOLEST SERPL-MCNC: 136 MG/DL (ref 50–200)
CO2 SERPL-SCNC: 28 MMOL/L (ref 21–32)
CREAT SERPL-MCNC: 1.17 MG/DL (ref 0.6–1.3)
EOSINOPHIL # BLD AUTO: 0.12 THOUSAND/ΜL (ref 0–0.61)
EOSINOPHIL NFR BLD AUTO: 2 % (ref 0–6)
ERYTHROCYTE [DISTWIDTH] IN BLOOD BY AUTOMATED COUNT: 13.4 % (ref 11.6–15.1)
EST. AVERAGE GLUCOSE BLD GHB EST-MCNC: 157 MG/DL
GFR SERPL CREATININE-BSD FRML MDRD: 45 ML/MIN/1.73SQ M
GLUCOSE P FAST SERPL-MCNC: 92 MG/DL (ref 65–99)
HBA1C MFR BLD: 7.1 %
HCT VFR BLD AUTO: 42.8 % (ref 34.8–46.1)
HDLC SERPL-MCNC: 45 MG/DL
HGB BLD-MCNC: 13.1 G/DL (ref 11.5–15.4)
IMM GRANULOCYTES # BLD AUTO: 0.05 THOUSAND/UL (ref 0–0.2)
IMM GRANULOCYTES NFR BLD AUTO: 1 % (ref 0–2)
LDLC SERPL CALC-MCNC: 65 MG/DL (ref 0–100)
LYMPHOCYTES # BLD AUTO: 1.37 THOUSANDS/ΜL (ref 0.6–4.47)
LYMPHOCYTES NFR BLD AUTO: 20 % (ref 14–44)
MCH RBC QN AUTO: 29.1 PG (ref 26.8–34.3)
MCHC RBC AUTO-ENTMCNC: 30.6 G/DL (ref 31.4–37.4)
MCV RBC AUTO: 95 FL (ref 82–98)
MONOCYTES # BLD AUTO: 0.41 THOUSAND/ΜL (ref 0.17–1.22)
MONOCYTES NFR BLD AUTO: 6 % (ref 4–12)
NEUTROPHILS # BLD AUTO: 4.93 THOUSANDS/ΜL (ref 1.85–7.62)
NEUTS SEG NFR BLD AUTO: 71 % (ref 43–75)
NRBC BLD AUTO-RTO: 0 /100 WBCS
PLATELET # BLD AUTO: 163 THOUSANDS/UL (ref 149–390)
PMV BLD AUTO: 12.5 FL (ref 8.9–12.7)
POTASSIUM SERPL-SCNC: 4.2 MMOL/L (ref 3.5–5.3)
RBC # BLD AUTO: 4.5 MILLION/UL (ref 3.81–5.12)
SODIUM SERPL-SCNC: 141 MMOL/L (ref 136–145)
TRIGL SERPL-MCNC: 131 MG/DL
WBC # BLD AUTO: 6.91 THOUSAND/UL (ref 4.31–10.16)

## 2021-11-01 PROCEDURE — 80048 BASIC METABOLIC PNL TOTAL CA: CPT

## 2021-11-01 PROCEDURE — 83036 HEMOGLOBIN GLYCOSYLATED A1C: CPT

## 2021-11-01 PROCEDURE — 80061 LIPID PANEL: CPT

## 2021-11-01 PROCEDURE — 36415 COLL VENOUS BLD VENIPUNCTURE: CPT

## 2021-11-01 PROCEDURE — 85025 COMPLETE CBC W/AUTO DIFF WBC: CPT

## 2021-11-05 ENCOUNTER — RA CDI HCC (OUTPATIENT)
Dept: OTHER | Facility: HOSPITAL | Age: 76
End: 2021-11-05

## 2021-11-12 ENCOUNTER — OFFICE VISIT (OUTPATIENT)
Dept: FAMILY MEDICINE CLINIC | Facility: CLINIC | Age: 76
End: 2021-11-12
Payer: MEDICARE

## 2021-11-12 VITALS
DIASTOLIC BLOOD PRESSURE: 70 MMHG | OXYGEN SATURATION: 94 % | SYSTOLIC BLOOD PRESSURE: 114 MMHG | HEART RATE: 71 BPM | HEIGHT: 62 IN | WEIGHT: 247 LBS | BODY MASS INDEX: 45.45 KG/M2 | TEMPERATURE: 97.8 F | RESPIRATION RATE: 20 BRPM

## 2021-11-12 DIAGNOSIS — E11.22 TYPE 2 DIABETES MELLITUS WITH STAGE 3B CHRONIC KIDNEY DISEASE, WITH LONG-TERM CURRENT USE OF INSULIN (HCC): ICD-10-CM

## 2021-11-12 DIAGNOSIS — Z23 ENCOUNTER FOR IMMUNIZATION: Primary | ICD-10-CM

## 2021-11-12 DIAGNOSIS — E55.9 VITAMIN D DEFICIENCY: ICD-10-CM

## 2021-11-12 DIAGNOSIS — I10 ESSENTIAL HYPERTENSION: ICD-10-CM

## 2021-11-12 DIAGNOSIS — Z79.4 TYPE 2 DIABETES MELLITUS WITH STAGE 3B CHRONIC KIDNEY DISEASE, WITH LONG-TERM CURRENT USE OF INSULIN (HCC): ICD-10-CM

## 2021-11-12 DIAGNOSIS — Z79.4 LONG TERM CURRENT USE OF INSULIN (HCC): ICD-10-CM

## 2021-11-12 DIAGNOSIS — N18.32 TYPE 2 DIABETES MELLITUS WITH STAGE 3B CHRONIC KIDNEY DISEASE, WITH LONG-TERM CURRENT USE OF INSULIN (HCC): ICD-10-CM

## 2021-11-12 DIAGNOSIS — E78.2 MIXED HYPERLIPIDEMIA: ICD-10-CM

## 2021-11-12 PROCEDURE — 99214 OFFICE O/P EST MOD 30 MIN: CPT | Performed by: FAMILY MEDICINE

## 2021-11-12 PROCEDURE — 90662 IIV NO PRSV INCREASED AG IM: CPT

## 2021-11-12 PROCEDURE — G0008 ADMIN INFLUENZA VIRUS VAC: HCPCS

## 2021-11-28 DIAGNOSIS — Z79.4 TYPE 2 DIABETES MELLITUS WITH STAGE 3 CHRONIC KIDNEY DISEASE, WITH LONG-TERM CURRENT USE OF INSULIN (HCC): ICD-10-CM

## 2021-11-28 DIAGNOSIS — N18.30 TYPE 2 DIABETES MELLITUS WITH STAGE 3 CHRONIC KIDNEY DISEASE, WITH LONG-TERM CURRENT USE OF INSULIN (HCC): ICD-10-CM

## 2021-11-28 DIAGNOSIS — E11.22 TYPE 2 DIABETES MELLITUS WITH STAGE 3 CHRONIC KIDNEY DISEASE, WITH LONG-TERM CURRENT USE OF INSULIN (HCC): ICD-10-CM

## 2021-11-29 RX ORDER — INSULIN DEGLUDEC INJECTION 100 U/ML
INJECTION, SOLUTION SUBCUTANEOUS
Qty: 75 ML | Refills: 3 | Status: SHIPPED | OUTPATIENT
Start: 2021-11-29

## 2021-12-06 ENCOUNTER — OFFICE VISIT (OUTPATIENT)
Dept: SLEEP CENTER | Facility: CLINIC | Age: 76
End: 2021-12-06
Payer: MEDICARE

## 2021-12-06 VITALS
BODY MASS INDEX: 46.19 KG/M2 | HEIGHT: 62 IN | OXYGEN SATURATION: 98 % | HEART RATE: 80 BPM | SYSTOLIC BLOOD PRESSURE: 120 MMHG | WEIGHT: 251 LBS | DIASTOLIC BLOOD PRESSURE: 80 MMHG

## 2021-12-06 DIAGNOSIS — G47.33 OBSTRUCTIVE SLEEP APNEA TREATED WITH CONTINUOUS POSITIVE AIRWAY PRESSURE (CPAP): Primary | ICD-10-CM

## 2021-12-06 DIAGNOSIS — K58.8 OTHER IRRITABLE BOWEL SYNDROME: ICD-10-CM

## 2021-12-06 DIAGNOSIS — G25.81 RESTLESS LEGS SYNDROME (RLS): ICD-10-CM

## 2021-12-06 DIAGNOSIS — M54.41 CHRONIC BILATERAL LOW BACK PAIN WITH BILATERAL SCIATICA: ICD-10-CM

## 2021-12-06 DIAGNOSIS — G89.29 CHRONIC BILATERAL LOW BACK PAIN WITH BILATERAL SCIATICA: ICD-10-CM

## 2021-12-06 DIAGNOSIS — M54.42 CHRONIC BILATERAL LOW BACK PAIN WITH BILATERAL SCIATICA: ICD-10-CM

## 2021-12-06 DIAGNOSIS — Z99.89 OBSTRUCTIVE SLEEP APNEA TREATED WITH CONTINUOUS POSITIVE AIRWAY PRESSURE (CPAP): Primary | ICD-10-CM

## 2021-12-06 PROCEDURE — 99214 OFFICE O/P EST MOD 30 MIN: CPT | Performed by: NURSE PRACTITIONER

## 2021-12-07 ENCOUNTER — TELEPHONE (OUTPATIENT)
Dept: SLEEP CENTER | Facility: CLINIC | Age: 76
End: 2021-12-07

## 2021-12-07 RX ORDER — DICYCLOMINE HCL 20 MG
20 TABLET ORAL 2 TIMES DAILY
Qty: 180 TABLET | Refills: 0 | Status: SHIPPED | OUTPATIENT
Start: 2021-12-07 | End: 2022-02-25 | Stop reason: SDUPTHER

## 2021-12-07 RX ORDER — PREGABALIN 75 MG/1
75 CAPSULE ORAL 3 TIMES DAILY
Qty: 270 CAPSULE | Refills: 0 | Status: SHIPPED | OUTPATIENT
Start: 2021-12-07 | End: 2022-02-25 | Stop reason: SDUPTHER

## 2021-12-20 DIAGNOSIS — G43.009 MIGRAINE WITHOUT AURA AND WITHOUT STATUS MIGRAINOSUS, NOT INTRACTABLE: ICD-10-CM

## 2021-12-20 DIAGNOSIS — E78.2 MIXED HYPERLIPIDEMIA: ICD-10-CM

## 2021-12-20 DIAGNOSIS — N39.41 URGE INCONTINENCE: ICD-10-CM

## 2021-12-20 DIAGNOSIS — N30.20 CHRONIC CYSTITIS: ICD-10-CM

## 2021-12-20 RX ORDER — SIMVASTATIN 10 MG
TABLET ORAL
Qty: 90 TABLET | Refills: 3 | Status: SHIPPED | OUTPATIENT
Start: 2021-12-20

## 2021-12-20 RX ORDER — TOPIRAMATE 25 MG/1
TABLET ORAL
Qty: 90 TABLET | Refills: 3 | Status: SHIPPED | OUTPATIENT
Start: 2021-12-20

## 2021-12-20 RX ORDER — OXYBUTYNIN CHLORIDE 5 MG/1
TABLET, EXTENDED RELEASE ORAL
Qty: 90 TABLET | Refills: 3 | Status: SHIPPED | OUTPATIENT
Start: 2021-12-20 | End: 2022-02-25 | Stop reason: SDUPTHER

## 2022-01-19 ENCOUNTER — TELEMEDICINE (OUTPATIENT)
Dept: FAMILY MEDICINE CLINIC | Facility: CLINIC | Age: 77
End: 2022-01-19
Payer: MEDICARE

## 2022-01-19 VITALS — TEMPERATURE: 96.7 F

## 2022-01-19 DIAGNOSIS — J06.9 UPPER RESPIRATORY TRACT INFECTION, UNSPECIFIED TYPE: Primary | ICD-10-CM

## 2022-01-19 DIAGNOSIS — Z92.29 COVID-19 VACCINE SERIES COMPLETED: ICD-10-CM

## 2022-01-19 PROCEDURE — U0003 INFECTIOUS AGENT DETECTION BY NUCLEIC ACID (DNA OR RNA); SEVERE ACUTE RESPIRATORY SYNDROME CORONAVIRUS 2 (SARS-COV-2) (CORONAVIRUS DISEASE [COVID-19]), AMPLIFIED PROBE TECHNIQUE, MAKING USE OF HIGH THROUGHPUT TECHNOLOGIES AS DESCRIBED BY CMS-2020-01-R: HCPCS | Performed by: NURSE PRACTITIONER

## 2022-01-19 PROCEDURE — 99442 PR PHYS/QHP TELEPHONE EVALUATION 11-20 MIN: CPT | Performed by: NURSE PRACTITIONER

## 2022-01-19 PROCEDURE — U0005 INFEC AGEN DETEC AMPLI PROBE: HCPCS | Performed by: NURSE PRACTITIONER

## 2022-01-19 NOTE — PROGRESS NOTES
COVID-19 Outpatient Progress Note    Assessment/Plan:    Problem List Items Addressed This Visit        Respiratory    Upper respiratory tract infection - Primary     Acute symptomatic patient with new onset sore throat rhinorrhea cough and nasal congestion starting 01/19/2022  Patient has had COVID vaccine series completed but has not had booster  Will recommend increase fluids COVID vitamins and COVID testing, orders placed         Relevant Orders    COVID Only- Collected at [place] [id number] or [facility]       Other    COVID-19 vaccine series completed     Patient has had Moderna vaccine series completed last injection provided 3/9/2021  Patient has not had booster  Patient is currently symptomatic and will recommend COVID testing              Disposition:     Referred patient to centralized site to test for COVID-19  I have spent 15 minutes directly with the patient  Greater than 50% of this time was spent in counseling/coordination of care regarding: instructions for management and patient and family education  Encounter provider HERMANN Watson    Provider located at [address]  [address] [address] [address] [phone]    Recent Visits  No visits were found meeting these conditions  Showing recent visits within past 7 days and meeting all other requirements  Today's Visits  Date Type Provider Dept   01/19/22 Telemedicine [place] [address] [facility], [address] [facility] today's visits and meeting all other requirements  Future Appointments  No visits were found meeting these conditions  Showing future appointments within next 150 days and meeting all other requirements     This virtual check-in was done via telephone and she agrees to proceed      Patient agrees to participate in a virtual check in via telephone or video visit instead of presenting to the office to address urgent/immediate medical needs  Patient is aware this is a billable service  After connecting through Telephone, the patient was identified by name and date of birth  Liborio Chaney was informed that this was a telemedicine visit and that the exam was being conducted confidentially over secure lines  My office door was closed  No one else was in the room  Liborio Chaney acknowledged consent and understanding of privacy and security of the telemedicine visit  I informed the patient that I have reviewed her record in Epic and presented the opportunity for her to ask any questions regarding the visit today  The patient agreed to participate  It was my intent to perform this visit via video technology but the patient was not able to do a video connection so the visit was completed via audio telephone only  Verification of patient location:  Patient is located in the following state in which I hold an active license: [place]    Subjective:   Liborio Chaney is a 67 y o  female who is concerned about COVID-19  Patient's symptoms include nasal congestion, rhinorrhea, sore throat and cough  Patient denies fever, chills, fatigue, malaise, anosmia, loss of taste, shortness of breath, chest tightness, abdominal pain, nausea, vomiting, diarrhea, myalgias and headaches       - Date of symptom onset: 1/19/2022      COVID-19 vaccination status: Fully vaccinated (primary series)    Exposure:   Contact with a person who is under investigation (PUI) for or who is positive for COVID-19 within the last 14 days?: No    Hospitalized recently for fever and/or lower respiratory symptoms?: No      Currently a healthcare worker that is involved in direct patient care?: No      Works in a special setting where the risk of COVID-19 transmission may be high? (this may include long-term care, correctional and assisted facilities; homeless shelters; assisted-living facilities and group homes ): No      Resident in a special setting where the risk of COVID-19 transmission may be high? (this may include long-term care, correctional and jail facilities; homeless shelters; assisted-living facilities and group homes ): No      No results found for: Mitulken Inman, LIZANDROCOKAREEN, CORONAVIRUSR, [address]  Past Medical History:   Diagnosis Date    Anemia     Arthritis 10/30/2020    Chronic kidney disease     Diabetes mellitus ([address] )     Fibromyalgia, primary     Fracture of wrist     RIGHT    Heart murmur 6/30/2020    History of non-insulin dependent diabetes mellitus     Hyperlipidemia     Hypertension     Hypertension     IBS (irritable bowel syndrome)     Irritable bowel     Metabolic syndrome     Obesity     RLS (restless legs syndrome)     Routine medical exam 6/28/2019    Sleep apnea     Sleep apnea     ON CPAP    Urge incontinence     Urinary incontinence      Past Surgical History:   Procedure Laterality Date    CHOLECYSTECTOMY  2013    COLONOSCOPY  2010    CYSTOSCOPY      HERNIA REPAIR  2008    HYSTERECTOMY      PARTIAL (STILL HAS OVARIES)    KNEE SURGERY Left      Current Outpatient Medications   Medication Sig Dispense Refill    acetaminophen (TYLENOL) 325 mg tablet Take by mouth as needed       albuterol (PROAIR HFA) 90 mcg/act inhaler as needed       B Complex Vitamins (B COMPLEX 1 PO) Take by mouth      bismuth subsalicylate (PEPTO BISMOL) 262 MG chewable tablet Chew 2 tablets (524 mg total) 2 (two) times a day as needed for diarrhea Has tolerated in the past  30 tablet 0    Cholecalciferol (VITAMIN D3) 2000 units capsule half      cholestyramine (QUESTRAN) 4 g packet take 1 packet by mouth every other day  0    clotrimazole (LOTRIMIN) 1 % cream Apply topically 2 (two) times a day 113 g 2    conjugated estrogens (PREMARIN) vaginal cream Insert 1 g into the vagina daily Apply vaginally x2/w (Patient taking differently: Insert 1 g into the vagina as needed Apply vaginally x2/w) 30 g 1    CONTOUR NEXT TEST test strip Test blood sugar 3 times daily 100 each 2    Diapers & Supplies MISC by Does not apply route 3 (three) times a day as needed (Incontinence) 28 each 11    dicyclomine (BENTYL) 20 mg tablet Take 1 tablet (20 mg total) by mouth 2 (two) times a day 180 tablet 0    EASY TOUCH LANCETS 32G/TWIST MISC Test blood sugars 3 times daily 100 each 3    fenofibrate (TRIGLIDE) 160 MG tablet TAKE 1 TABLET BY MOUTH  DAILY 90 tablet 0    Ferrous Sulfate (IRON) 325 (65 Fe) MG TABS Take by mouth      HUMALOG KWIKPEN 200 units/mL CONCENTRATED U-200 injection pen INJECT SUBCUTANEOUSLY AS  DIRECTED PER PRESCRIBER&apos;S  INSTRUCTIONS  INSULIN  DOSING REQUIRES  INDIVIDUALIZATION  12 mL 1    Insulin Pen Needle (BD Pen Needle [place] U/F) 32G X 4 MM MISC Inject as directed 3 times daily 270 each 0    lisinopril (ZESTRIL) 10 mg tablet TAKE 1 TABLET BY MOUTH  DAILY 90 tablet 3    loratadine (CLARITIN) 10 mg tablet Take by mouth      Multiple Vitamin (DAILY VALUE MULTIVITAMIN) TABS Take by mouth      Multiple Vitamins-Minerals (MULTI ADULT GUMMIES PO) Take by mouth      oxybutynin (DITROPAN-XL) 5 mg 24 hr tablet TAKE 1 TABLET BY MOUTH  DAILY 90 tablet 3    pantoprazole (PROTONIX) 40 mg tablet TAKE 1 TABLET BY MOUTH  DAILY 90 tablet 3    pregabalin (LYRICA) 75 mg capsule Take 1 capsule (75 mg total) by mouth 3 (three) times a day 270 capsule 0    Probiotic Product (Align) 4 MG CAPS Take by mouth      simvastatin (ZOCOR) 10 mg tablet TAKE 1 TABLET BY MOUTH  DAILY AT BEDTIME 90 tablet 3    Thiamine HCl (VITAMIN B-1) 250 MG tablet Take 250 mg by mouth daily      topiramate (TOPAMAX) 25 mg tablet TAKE 1 TABLET BY MOUTH  DAILY 90 tablet 3    Tresiba FlexTouch 100 units/mL injection pen INJECT 80 UNITS  SUBCUTANEOUSLY DAILY AT  BEDTIME 75 mL 3     No current facility-administered medications for this visit       Allergies   Allergen Reactions    Pioglitazone Other (See Comments)     "Feels like she is dying"  Ibuprofen     Levofloxacin Other (See Comments)     Other reaction(s): rash    Nsaids        Review of Systems   Constitutional: Negative for activity change, chills, fatigue and fever  HENT: Positive for congestion, rhinorrhea and sore throat  Negative for sneezing  Respiratory: Positive for cough  Negative for chest tightness and shortness of breath  Gastrointestinal: Negative for abdominal pain, diarrhea, nausea and vomiting  Musculoskeletal: Negative for myalgias  Neurological: Negative for headaches  Objective:    Vitals:    01/19/22 1550   Temp: (!) 96 7 °F (35 9 °C)       Physical Exam    VIRTUAL VISIT DISCLAIMER    Zamzam Grajeda verbally agrees to participate in [employer]  Pt is aware that [employer] could be limited without vital signs or the ability to perform a full hands-on physical exam  Alberta Montesinos understands she or the provider may request at any time to terminate the video visit and request the patient to seek care or treatment in person

## 2022-01-19 NOTE — ASSESSMENT & PLAN NOTE
Acute symptomatic patient with new onset sore throat rhinorrhea cough and nasal congestion starting 01/19/2022  Patient has had COVID vaccine series completed but has not had booster    Will recommend increase fluids COVID vitamins and COVID testing, orders placed

## 2022-01-19 NOTE — ASSESSMENT & PLAN NOTE
Patient has had Moderna vaccine series completed last injection provided 3/9/2021  Patient has not had booster    Patient is currently symptomatic and will recommend COVID testing

## 2022-01-20 ENCOUNTER — TELEMEDICINE (OUTPATIENT)
Dept: FAMILY MEDICINE CLINIC | Facility: CLINIC | Age: 77
End: 2022-01-20
Payer: MEDICARE

## 2022-01-20 VITALS — TEMPERATURE: 98.6 F

## 2022-01-20 DIAGNOSIS — U07.1 COVID-19 VIRUS INFECTION: Primary | ICD-10-CM

## 2022-01-20 DIAGNOSIS — R05.9 COUGH: ICD-10-CM

## 2022-01-20 PROCEDURE — 99442 PR PHYS/QHP TELEPHONE EVALUATION 11-20 MIN: CPT | Performed by: NURSE PRACTITIONER

## 2022-01-20 RX ORDER — BENZONATATE 100 MG/1
200 CAPSULE ORAL 3 TIMES DAILY PRN
Qty: 20 CAPSULE | Refills: 0 | Status: SHIPPED | OUTPATIENT
Start: 2022-01-20 | End: 2022-02-18 | Stop reason: ALTCHOICE

## 2022-01-20 NOTE — PROGRESS NOTES
COVID-19 Outpatient Progress Note    Assessment/Plan:    Problem List Items Addressed This Visit        Other    COVID-19 virus infection - Primary     New diagnoses acute symptomatic patient positive for COVID 01/19/2022  Patient has had COVID vaccine series completed but has not had booster injection  Patient began with symptoms 01/18/2022 and continues with cough sore throat postnasal drainage nasal congestion body aches fatigue  Will recommend increase fluids COVID vitamins and scheduled for re-evaluation 01/24/2022  Patient call with any worsening symptoms or report to ED after hours         Relevant Orders    XR chest pa & lateral    Cough     Acute symptomatic most likely secondary to COVID infection 01/19/2022  Reports occasional shortness of breath on exertion, no ability to check oxygen saturation  Will recommend breathing exercises, Tessalon Perles t i d  p r n  for cough, and chest x-ray  Patient call with any worsening symptoms         Relevant Medications    benzonatate (TESSALON PERLES) 100 mg capsule    Other Relevant Orders    XR chest pa & lateral         Disposition:     I have spent 15 minutes directly with the patient  Greater than 50% of this time was spent in counseling/coordination of care regarding: instructions for management and patient and family education  Encounter provider HERMANN Deluna    Provider located at [facility] AT [address] [address] [phone]    Recent Visits  Date Type Provider Dept   01/20/22 Telemedicine [place] [address] [facility] [place]   01/19/22 Telemedicine [place] [address], [address] recent visits within past 7 days and meeting all other requirements  Future Appointments  No visits were found meeting these conditions    Showing future appointments within next 150 days and meeting all other requirements     This virtual check-in was done via telephone and she agrees to proceed  Patient agrees to participate in a virtual check in via telephone or video visit instead of presenting to the office to address urgent/immediate medical needs  Patient is aware this is a billable service  After connecting through Telephone, the patient was identified by name and date of birth  Luan Yang was informed that this was a telemedicine visit and that the exam was being conducted confidentially over secure lines  My office door was closed  No one else was in the room  Luan Yang acknowledged consent and understanding of privacy and security of the telemedicine visit  I informed the patient that I have reviewed her record in Epic and presented the opportunity for her to ask any questions regarding the visit today  The patient agreed to participate  It was my intent to perform this visit via video technology but the patient was not able to do a video connection so the visit was completed via audio telephone only  Verification of patient location:  Patient is located in the following state in which I hold an active license: [place]    Subjective:   Luan Yang is a 67 y o  female who has been screened for COVID-19  Patient's symptoms include fatigue, nasal congestion, sore throat, cough and myalgias  Patient denies fever, chills, malaise, rhinorrhea, anosmia, loss of taste, shortness of breath, chest tightness, abdominal pain, nausea, vomiting, diarrhea and headaches  - Date of symptom onset: 1/18/2022  - Date of positive COVID-19 test: 1/19/2022  Type of test: PCR  COVID-19 vaccination status: Fully vaccinated (primary series)    Yoselyn Abraham has been staying home and has isolated themselves in her home  She is taking care to not share personal items and is cleaning all surfaces that are touched often, like counters, tabletops, and doorknobs using household cleaning sprays or wipes   She is wearing a mask when she leaves her room       Lab Results   Component Value Date    SARSCOV2 Positive (A) 01/19/2022     Past Medical History:   Diagnosis Date    Anemia     Arthritis 10/30/2020    Chronic kidney disease     Diabetes mellitus ([address] )     Fibromyalgia, primary     Fracture of wrist     RIGHT    Heart murmur 6/30/2020    History of non-insulin dependent diabetes mellitus     Hyperlipidemia     Hypertension     Hypertension     IBS (irritable bowel syndrome)     Irritable bowel     Metabolic syndrome     Obesity     RLS (restless legs syndrome)     Routine medical exam 6/28/2019    Sleep apnea     Sleep apnea     ON CPAP    Urge incontinence     Urinary incontinence      Past Surgical History:   Procedure Laterality Date    CHOLECYSTECTOMY  2013    COLONOSCOPY  2010    CYSTOSCOPY      HERNIA REPAIR  2008    HYSTERECTOMY      PARTIAL (STILL HAS OVARIES)    KNEE SURGERY Left      Current Outpatient Medications   Medication Sig Dispense Refill    acetaminophen (TYLENOL) 325 mg tablet Take by mouth as needed       albuterol (PROAIR HFA) 90 mcg/act inhaler as needed       B Complex Vitamins (B COMPLEX 1 PO) Take by mouth      bismuth subsalicylate (PEPTO BISMOL) 262 MG chewable tablet Chew 2 tablets (524 mg total) 2 (two) times a day as needed for diarrhea Has tolerated in the past  30 tablet 0    Cholecalciferol (VITAMIN D3) 2000 units capsule half      cholestyramine (QUESTRAN) 4 g packet take 1 packet by mouth every other day  0    clotrimazole (LOTRIMIN) 1 % cream Apply topically 2 (two) times a day 113 g 2    conjugated estrogens (PREMARIN) vaginal cream Insert 1 g into the vagina daily Apply vaginally x2/w (Patient taking differently: Insert 1 g into the vagina as needed Apply vaginally x2/w) 30 g 1    CONTOUR NEXT TEST test strip Test blood sugar 3 times daily 100 each 2    Diapers & Supplies MISC by Does not apply route 3 (three) times a day as needed (Incontinence) 28 each 11    dicyclomine (BENTYL) 20 mg tablet Take 1 tablet (20 mg total) by mouth 2 (two) times a day 180 tablet 0    EASY TOUCH LANCETS 32G/TWIST MISC Test blood sugars 3 times daily 100 each 3    fenofibrate (TRIGLIDE) 160 MG tablet TAKE 1 TABLET BY MOUTH  DAILY 90 tablet 0    Ferrous Sulfate (IRON) 325 (65 Fe) MG TABS Take by mouth      HUMALOG KWIKPEN 200 units/mL CONCENTRATED U-200 injection pen INJECT SUBCUTANEOUSLY AS  DIRECTED PER PRESCRIBER&apos;S  INSTRUCTIONS  INSULIN  DOSING REQUIRES  INDIVIDUALIZATION  12 mL 1    Insulin Pen Needle (BD Pen Needle [place] U/F) 32G X 4 MM MISC Inject as directed 3 times daily 270 each 0    lisinopril (ZESTRIL) 10 mg tablet TAKE 1 TABLET BY MOUTH  DAILY 90 tablet 3    loratadine (CLARITIN) 10 mg tablet Take by mouth      Multiple Vitamin (DAILY VALUE MULTIVITAMIN) TABS Take by mouth      Multiple Vitamins-Minerals (MULTI ADULT GUMMIES PO) Take by mouth      oxybutynin (DITROPAN-XL) 5 mg 24 hr tablet TAKE 1 TABLET BY MOUTH  DAILY 90 tablet 3    pantoprazole (PROTONIX) 40 mg tablet TAKE 1 TABLET BY MOUTH  DAILY 90 tablet 3    pregabalin (LYRICA) 75 mg capsule Take 1 capsule (75 mg total) by mouth 3 (three) times a day 270 capsule 0    Probiotic Product (Align) 4 MG CAPS Take by mouth      simvastatin (ZOCOR) 10 mg tablet TAKE 1 TABLET BY MOUTH  DAILY AT BEDTIME 90 tablet 3    Thiamine HCl (VITAMIN B-1) 250 MG tablet Take 250 mg by mouth daily      topiramate (TOPAMAX) 25 mg tablet TAKE 1 TABLET BY MOUTH  DAILY 90 tablet 3    Tresiba FlexTouch 100 units/mL injection pen INJECT 80 UNITS  SUBCUTANEOUSLY DAILY AT  BEDTIME 75 mL 3    benzonatate (TESSALON PERLES) 100 mg capsule Take 2 capsules (200 mg total) by mouth 3 (three) times a day as needed for cough 20 capsule 0     No current facility-administered medications for this visit       Allergies   Allergen Reactions    Pioglitazone Other (See Comments)     "Feels like she is dying"    Ibuprofen     Levofloxacin Other (See Comments)     Other reaction(s): rash    Nsaids        Review of Systems   Constitutional: Positive for fatigue  Negative for activity change, chills and fever  HENT: Positive for congestion, postnasal drip and sore throat  Negative for rhinorrhea and sneezing  Respiratory: Positive for cough  Negative for chest tightness and shortness of breath  Gastrointestinal: Negative for abdominal pain, diarrhea, nausea and vomiting  Musculoskeletal: Positive for myalgias  Neurological: Negative for headaches  Objective:    Vitals:    01/20/22 1343   Temp: 98 6 °F (37 °C)       Physical Exam    VIRTUAL VISIT DISCLAIMER    Gia Sánchez verbally agrees to participate in [employer]  Pt is aware that [employer] could be limited without vital signs or the ability to perform a full hands-on physical exam  Alberta Castellon understands she or the provider may request at any time to terminate the video visit and request the patient to seek care or treatment in person

## 2022-01-21 PROBLEM — U07.1 COVID-19 VIRUS INFECTION: Status: ACTIVE | Noted: 2022-01-21

## 2022-01-21 PROBLEM — R05.9 COUGH: Status: ACTIVE | Noted: 2022-01-21

## 2022-01-21 NOTE — ASSESSMENT & PLAN NOTE
New diagnoses acute symptomatic patient positive for COVID 01/19/2022  Patient has had COVID vaccine series completed but has not had booster injection  Patient began with symptoms 01/18/2022 and continues with cough sore throat postnasal drainage nasal congestion body aches fatigue  Will recommend increase fluids COVID vitamins and scheduled for re-evaluation 01/24/2022    Patient call with any worsening symptoms or report to ED after hours

## 2022-01-21 NOTE — ASSESSMENT & PLAN NOTE
Acute symptomatic most likely secondary to COVID infection 01/19/2022  Reports occasional shortness of breath on exertion, no ability to check oxygen saturation  Will recommend breathing exercises, Tessalon Perles t i d  p r n  for cough, and chest x-ray    Patient call with any worsening symptoms

## 2022-01-24 ENCOUNTER — TELEMEDICINE (OUTPATIENT)
Dept: FAMILY MEDICINE CLINIC | Facility: CLINIC | Age: 77
End: 2022-01-24
Payer: MEDICARE

## 2022-01-24 DIAGNOSIS — U07.1 COVID-19 VIRUS INFECTION: Primary | ICD-10-CM

## 2022-01-24 PROCEDURE — 99213 OFFICE O/P EST LOW 20 MIN: CPT | Performed by: NURSE PRACTITIONER

## 2022-01-24 NOTE — PROGRESS NOTES
COVID-19 Outpatient Progress Note    Assessment/Plan:    Problem List Items Addressed This Visit        Other    COVID-19 virus infection - Primary     acute symptomatic patient positive for COVID 01/19/2022  Patient has had COVID vaccine series completed but has not had booster injection  Patient began with symptoms 01/18/2022 and continues with cough postnasal drainage fatigue  Marisa reports some improvement in symptoms, decreasing chest congestion and increasing appetite  Hydration status good  Will recommend increase fluids COVID vitamins and scheduled for re-evaluation 01/26/2022  Patient call with any worsening symptoms or report to ED after hours              Disposition:     I have spent 15 minutes directly with the patient  Greater than 50% of this time was spent in counseling/coordination of care regarding: instructions for management and patient and family education  Encounter provider HERMANN Carranza    Provider located at [facility] AT [address] [place] [address] [place] [id number] [phone]    Recent Visits  Date Type Provider Dept   01/24/22 Telemedicine HERMANN Carranza [facility]   01/20/22 Telemedicine Mary Beth [address]  Primary Care [facility]   01/19/22 Telemedicine Mary Beth [address] [facility], [facility]   Showing recent visits within past 7 days and meeting all other requirements  Future Appointments  No visits were found meeting these conditions  Showing future appointments within next 150 days and meeting all other requirements     This virtual check-in was done via [employer] and patient was informed that this is a secure, HIPAA-compliant platform  She agrees to proceed  Patient agrees to participate in a virtual check in via telephone or video visit instead of presenting to the office to address urgent/immediate medical needs   Patient is aware this is a billable service  After connecting through [facility], the patient was identified by name and date of birth  Claudette Al was informed that this was a telemedicine visit and that the exam was being conducted confidentially over secure lines  My office door was closed  No one else was in the room  Claudette Al acknowledged consent and understanding of privacy and security of the telemedicine visit  I informed the patient that I have reviewed her record in Epic and presented the opportunity for her to ask any questions regarding the visit today  The patient agreed to participate  Verification of patient location:  Patient is located in the following state in which I hold an active license: [place]    Subjective:   Claudette Al is a 67 y o  female who has been screened for COVID-19  Symptom change since last report: improving  Patient's symptoms include fatigue and cough  Patient denies fever, chills, malaise, congestion, rhinorrhea, sore throat, anosmia, loss of taste, shortness of breath, chest tightness, abdominal pain, nausea, vomiting, diarrhea, myalgias and headaches  - Date of symptom onset: 1/18/2022  - Date of positive COVID-19 test: 1/19/2022  Type of test: PCR  COVID-19 vaccination status: Fully vaccinated (primary series)    Pillo Moyer has been staying home and has isolated themselves in her home  She is taking care to not share personal items and is cleaning all surfaces that are touched often, like counters, tabletops, and doorknobs using household cleaning sprays or wipes  She is wearing a mask when she leaves her room       Lab Results   Component Value Date    SARSCOV2 Positive (A) 01/19/2022     Past Medical History:   Diagnosis Date    Anemia     Arthritis 10/30/2020    Chronic kidney disease     Diabetes mellitus ([facility] [address] )     Fibromyalgia, primary     Fracture of wrist     RIGHT    Heart murmur 6/30/2020    History of non-insulin dependent diabetes mellitus     Hyperlipidemia     Hypertension     Hypertension     IBS (irritable bowel syndrome)     Irritable bowel     Metabolic syndrome     Obesity     RLS (restless legs syndrome)     Routine medical exam 6/28/2019    Sleep apnea     Sleep apnea     ON CPAP    Urge incontinence     Urinary incontinence      Past Surgical History:   Procedure Laterality Date    CHOLECYSTECTOMY  2013    COLONOSCOPY  2010    CYSTOSCOPY      HERNIA REPAIR  2008    HYSTERECTOMY      PARTIAL (STILL HAS OVARIES)    KNEE SURGERY Left      Current Outpatient Medications   Medication Sig Dispense Refill    acetaminophen (TYLENOL) 325 mg tablet Take by mouth as needed       albuterol (PROAIR HFA) 90 mcg/act inhaler as needed       B Complex Vitamins (B COMPLEX 1 PO) Take by mouth      benzonatate (TESSALON PERLES) 100 mg capsule Take 2 capsules (200 mg total) by mouth 3 (three) times a day as needed for cough 20 capsule 0    bismuth subsalicylate (PEPTO BISMOL) 262 MG chewable tablet Chew 2 tablets (524 mg total) 2 (two) times a day as needed for diarrhea Has tolerated in the past  30 tablet 0    Cholecalciferol (VITAMIN D3) 2000 units capsule half      cholestyramine (QUESTRAN) 4 g packet take 1 packet by mouth every other day  0    clotrimazole (LOTRIMIN) 1 % cream Apply topically 2 (two) times a day 113 g 2    conjugated estrogens (PREMARIN) vaginal cream Insert 1 g into the vagina daily Apply vaginally x2/w (Patient taking differently: Insert 1 g into the vagina as needed Apply vaginally x2/w) 30 g 1    CONTOUR NEXT TEST test strip Test blood sugar 3 times daily 100 each 2    Diapers & Supplies MISC by Does not apply route 3 (three) times a day as needed (Incontinence) 28 each 11    dicyclomine (BENTYL) 20 mg tablet Take 1 tablet (20 mg total) by mouth 2 (two) times a day 180 tablet 0    EASY TOUCH LANCETS 32G/TWIST MISC Test blood sugars 3 times daily 100 each 3    fenofibrate (TRIGLIDE) 160 MG tablet TAKE 1 TABLET BY MOUTH  DAILY 90 tablet 0    Ferrous Sulfate (IRON) 325 (65 Fe) MG TABS Take by mouth      HUMALOG KWIKPEN 200 units/mL CONCENTRATED U-200 injection pen INJECT SUBCUTANEOUSLY AS  DIRECTED PER PRESCRIBER&apos;S  INSTRUCTIONS  INSULIN  DOSING REQUIRES  INDIVIDUALIZATION  12 mL 1    Insulin Pen Needle (BD Pen Needle [place] U/F) 32G X 4 MM MISC Inject as directed 3 times daily 270 each 0    lisinopril (ZESTRIL) 10 mg tablet TAKE 1 TABLET BY MOUTH  DAILY 90 tablet 3    loratadine (CLARITIN) 10 mg tablet Take by mouth      Multiple Vitamin (DAILY VALUE MULTIVITAMIN) TABS Take by mouth      Multiple Vitamins-Minerals (MULTI ADULT GUMMIES PO) Take by mouth      oxybutynin (DITROPAN-XL) 5 mg 24 hr tablet TAKE 1 TABLET BY MOUTH  DAILY 90 tablet 3    pantoprazole (PROTONIX) 40 mg tablet TAKE 1 TABLET BY MOUTH  DAILY 90 tablet 3    pregabalin (LYRICA) 75 mg capsule Take 1 capsule (75 mg total) by mouth 3 (three) times a day 270 capsule 0    Probiotic Product (Align) 4 MG CAPS Take by mouth      simvastatin (ZOCOR) 10 mg tablet TAKE 1 TABLET BY MOUTH  DAILY AT BEDTIME 90 tablet 3    Thiamine HCl (VITAMIN B-1) 250 MG tablet Take 250 mg by mouth daily      topiramate (TOPAMAX) 25 mg tablet TAKE 1 TABLET BY MOUTH  DAILY 90 tablet 3    Tresiba FlexTouch 100 units/mL injection pen INJECT 80 UNITS  SUBCUTANEOUSLY DAILY AT  BEDTIME 75 mL 3     No current facility-administered medications for this visit  Allergies   Allergen Reactions    Pioglitazone Other (See Comments)     "Feels like she is dying"    Ibuprofen     Levofloxacin Other (See Comments)     Other reaction(s): rash    Nsaids        Review of Systems   Constitutional: Positive for appetite change (improving) and fatigue  Negative for chills and fever  HENT: Positive for postnasal drip  Negative for congestion, rhinorrhea and sore throat  Respiratory: Positive for cough  Negative for chest tightness and shortness of breath      Gastrointestinal: Negative for abdominal pain, diarrhea, nausea and vomiting  Musculoskeletal: Negative for myalgias  Neurological: Negative for headaches  Objective: There were no vitals filed for this visit  Physical Exam    VIRTUAL VISIT DISCLAIMER    Yazan Hall verbally agrees to participate in [employer]  Pt is aware that [employer] could be limited without vital signs or the ability to perform a full hands-on physical exam  Alberta Campuzano understands she or the provider may request at any time to terminate the video visit and request the patient to seek care or treatment in person

## 2022-01-25 NOTE — ASSESSMENT & PLAN NOTE
acute symptomatic patient positive for COVID 01/19/2022  Patient has had COVID vaccine series completed but has not had booster injection  Patient began with symptoms 01/18/2022 and continues with cough postnasal drainage fatigue  Marisa reports some improvement in symptoms, decreasing chest congestion and increasing appetite  Hydration status good  Will recommend increase fluids COVID vitamins and scheduled for re-evaluation 01/26/2022    Patient call with any worsening symptoms or report to ED after hours

## 2022-01-26 ENCOUNTER — TELEMEDICINE (OUTPATIENT)
Dept: FAMILY MEDICINE CLINIC | Facility: CLINIC | Age: 77
End: 2022-01-26
Payer: MEDICARE

## 2022-01-26 DIAGNOSIS — U07.1 COVID-19 VIRUS INFECTION: Primary | ICD-10-CM

## 2022-01-26 PROCEDURE — 99442 PR PHYS/QHP TELEPHONE EVALUATION 11-20 MIN: CPT | Performed by: NURSE PRACTITIONER

## 2022-01-26 NOTE — PROGRESS NOTES
COVID-19 Outpatient Progress Note    Assessment/Plan:    Problem List Items Addressed This Visit        Other    COVID-19 virus infection - Primary     Acute symptomatic patient positive for COVID 01/19/2022  Patient has had COVID vaccine series completed but not booster injection  Patient began with symptoms 01/18/2022 and reports much improvement in symptoms and only remains with slight nasal congestion and fatigue  Will recommend patient continue to increase fluids COVID vitamins and can and the strict masking 1/28/2022  Patient call with any worsening symptoms              Disposition:     Patient has COVID-19 infection  Based off CDC guidelines, they were recommended to isolate for 5 days from the date of the positive test  If they remain asymptomatic, isolation may be ended followed by 5 days of wearing a mask when around othes to minimize risk of infecting others  If they have a fever, continue to stay home until fever resolves for at least 24 hours  I have spent 15 minutes directly with the patient  Greater than 50% of this time was spent in counseling/coordination of care regarding: instructions for management and patient and family education        Encounter provider HERMANN Benitez    Provider located at [facility] AT [address] [id number] [phone]    Recent Visits  Date Type Provider Dept   01/24/22 Telemedicine HERMANN Benitez [facility] Primary Care [facility]   01/20/22 Telemedicine Mary Beth [address] [facility]   01/19/22 Telemedicine Mary Beth [address], [address] [facility] Primary Care [place]   Showing recent visits within past 7 days and meeting all other requirements  Today's Visits  Date Type Provider Dept   01/26/22 Telemedicine Mary Beth [address], [address] [facility]   Showing today's visits and meeting all other requirements  Future Appointments  No visits were found meeting these conditions  Showing future appointments within next 150 days and meeting all other requirements     This virtual check-in was done via telephone and she agrees to proceed  Patient agrees to participate in a virtual check in via telephone or video visit instead of presenting to the office to address urgent/immediate medical needs  Patient is aware this is a billable service  After connecting through Telephone, the patient was identified by name and date of birth  Luan Garrett was informed that this was a telemedicine visit and that the exam was being conducted confidentially over secure lines  My office door was closed  No one else was in the room  Luan Garrett acknowledged consent and understanding of privacy and security of the telemedicine visit  I informed the patient that I have reviewed her record in Epic and presented the opportunity for her to ask any questions regarding the visit today  The patient agreed to participate  It was my intent to perform this visit via video technology but the patient was not able to do a video connection so the visit was completed via audio telephone only  Verification of patient location:  Patient is located in the following state in which I hold an active license: [place]    Subjective:   Luan Garrett is a 67 y o  female who has been screened for COVID-19  Symptom change since last report: resolving  Patient's symptoms include fatigue and nasal congestion  Patient denies fever, chills, malaise, rhinorrhea, sore throat, anosmia, loss of taste, cough, shortness of breath, chest tightness, abdominal pain, nausea, vomiting, diarrhea, myalgias and headaches  - Date of symptom onset: 1/18/2022  - Date of positive COVID-19 test: 1/19/2022  Type of test: PCR  COVID-19 vaccination status: Fully vaccinated (primary series)    Lisa Regalado has been staying home and has isolated themselves in her home   She is taking care to not share personal items and is cleaning all surfaces that are touched often, like counters, tabletops, and doorknobs using household cleaning sprays or wipes  She is wearing a mask when she leaves her room       Lab Results   Component Value Date    SARSCOV2 Positive (A) 01/19/2022     Past Medical History:   Diagnosis Date    Anemia     Arthritis 10/30/2020    Chronic kidney disease     Diabetes mellitus ([address] )     Fibromyalgia, primary     Fracture of wrist     RIGHT    Heart murmur 6/30/2020    History of non-insulin dependent diabetes mellitus     Hyperlipidemia     Hypertension     Hypertension     IBS (irritable bowel syndrome)     Irritable bowel     Metabolic syndrome     Obesity     RLS (restless legs syndrome)     Routine medical exam 6/28/2019    Sleep apnea     Sleep apnea     ON CPAP    Urge incontinence     Urinary incontinence      Past Surgical History:   Procedure Laterality Date    CHOLECYSTECTOMY  2013    COLONOSCOPY  2010    CYSTOSCOPY      HERNIA REPAIR  2008    HYSTERECTOMY      PARTIAL (STILL HAS OVARIES)    KNEE SURGERY Left      Current Outpatient Medications   Medication Sig Dispense Refill    acetaminophen (TYLENOL) 325 mg tablet Take by mouth as needed       albuterol (PROAIR HFA) 90 mcg/act inhaler as needed       B Complex Vitamins (B COMPLEX 1 PO) Take by mouth      benzonatate (TESSALON PERLES) 100 mg capsule Take 2 capsules (200 mg total) by mouth 3 (three) times a day as needed for cough 20 capsule 0    bismuth subsalicylate (PEPTO BISMOL) 262 MG chewable tablet Chew 2 tablets (524 mg total) 2 (two) times a day as needed for diarrhea Has tolerated in the past  30 tablet 0    Cholecalciferol (VITAMIN D3) 2000 units capsule half      cholestyramine (QUESTRAN) 4 g packet take 1 packet by mouth every other day  0    clotrimazole (LOTRIMIN) 1 % cream Apply topically 2 (two) times a day 113 g 2    conjugated estrogens (PREMARIN) vaginal cream Insert 1 g into the vagina daily Apply vaginally x2/w (Patient taking differently: Insert 1 g into the vagina as needed Apply vaginally x2/w) 30 g 1    CONTOUR NEXT TEST test strip Test blood sugar 3 times daily 100 each 2    Diapers & Supplies MISC by Does not apply route 3 (three) times a day as needed (Incontinence) 28 each 11    dicyclomine (BENTYL) 20 mg tablet Take 1 tablet (20 mg total) by mouth 2 (two) times a day 180 tablet 0    EASY TOUCH LANCETS 32G/TWIST MISC Test blood sugars 3 times daily 100 each 3    fenofibrate (TRIGLIDE) 160 MG tablet TAKE 1 TABLET BY MOUTH  DAILY 90 tablet 0    Ferrous Sulfate (IRON) 325 (65 Fe) MG TABS Take by mouth      HUMALOG KWIKPEN 200 units/mL CONCENTRATED U-200 injection pen INJECT SUBCUTANEOUSLY AS  DIRECTED PER PRESCRIBER&apos;S  INSTRUCTIONS  INSULIN  DOSING REQUIRES  INDIVIDUALIZATION   12 mL 1    Insulin Pen Needle (BD Pen Needle [place] U/F) 32G X 4 MM MISC Inject as directed 3 times daily 270 each 0    lisinopril (ZESTRIL) 10 mg tablet TAKE 1 TABLET BY MOUTH  DAILY 90 tablet 3    loratadine (CLARITIN) 10 mg tablet Take by mouth      Multiple Vitamin (DAILY VALUE MULTIVITAMIN) TABS Take by mouth      Multiple Vitamins-Minerals (MULTI ADULT GUMMIES PO) Take by mouth      oxybutynin (DITROPAN-XL) 5 mg 24 hr tablet TAKE 1 TABLET BY MOUTH  DAILY 90 tablet 3    pantoprazole (PROTONIX) 40 mg tablet TAKE 1 TABLET BY MOUTH  DAILY 90 tablet 3    pregabalin (LYRICA) 75 mg capsule Take 1 capsule (75 mg total) by mouth 3 (three) times a day 270 capsule 0    Probiotic Product (Align) 4 MG CAPS Take by mouth      simvastatin (ZOCOR) 10 mg tablet TAKE 1 TABLET BY MOUTH  DAILY AT BEDTIME 90 tablet 3    Thiamine HCl (VITAMIN B-1) 250 MG tablet Take 250 mg by mouth daily      topiramate (TOPAMAX) 25 mg tablet TAKE 1 TABLET BY MOUTH  DAILY 90 tablet 3    Tresiba FlexTouch 100 units/mL injection pen INJECT 80 UNITS  SUBCUTANEOUSLY DAILY AT  BEDTIME 75 mL 3     No current facility-administered medications for this visit  Allergies   Allergen Reactions    Pioglitazone Other (See Comments)     "Feels like she is dying"    Ibuprofen     Levofloxacin Other (See Comments)     Other reaction(s): rash    Nsaids        Review of Systems   Constitutional: Positive for fatigue  Negative for activity change, chills and fever  HENT: Positive for congestion  Negative for rhinorrhea, sneezing and sore throat  Respiratory: Negative for cough, chest tightness and shortness of breath  Gastrointestinal: Negative for abdominal pain, diarrhea, nausea and vomiting  Musculoskeletal: Negative for myalgias  Neurological: Negative for headaches  Objective: There were no vitals filed for this visit  Physical Exam    VIRTUAL VISIT DISCLAIMER    Jesus Delgado verbally agrees to participate in [employer]  Pt is aware that [employer] could be limited without vital signs or the ability to perform a full hands-on physical exam  Alberta Lay understands she or the provider may request at any time to terminate the video visit and request the patient to seek care or treatment in person

## 2022-01-26 NOTE — ASSESSMENT & PLAN NOTE
Acute symptomatic patient positive for COVID 01/19/2022  Patient has had COVID vaccine series completed but not booster injection  Patient began with symptoms 01/18/2022 and reports much improvement in symptoms and only remains with slight nasal congestion and fatigue  Will recommend patient continue to increase fluids COVID vitamins and can and the strict masking 1/28/2022    Patient call with any worsening symptoms Discharge instructions given  verbalized understanding pt is ambulatory out       Pal Forbes RN  07/04/21 [id number]

## 2022-02-11 ENCOUNTER — RA CDI HCC (OUTPATIENT)
Dept: OTHER | Facility: HOSPITAL | Age: 77
End: 2022-02-11

## 2022-02-11 ENCOUNTER — APPOINTMENT (OUTPATIENT)
Dept: LAB | Facility: IMAGING CENTER | Age: 77
End: 2022-02-11
Payer: MEDICARE

## 2022-02-11 DIAGNOSIS — E78.2 MIXED HYPERLIPIDEMIA: ICD-10-CM

## 2022-02-11 DIAGNOSIS — E11.22 TYPE 2 DIABETES MELLITUS WITH STAGE 3B CHRONIC KIDNEY DISEASE, WITH LONG-TERM CURRENT USE OF INSULIN (HCC): ICD-10-CM

## 2022-02-11 DIAGNOSIS — Z79.4 TYPE 2 DIABETES MELLITUS WITH STAGE 3B CHRONIC KIDNEY DISEASE, WITH LONG-TERM CURRENT USE OF INSULIN (HCC): ICD-10-CM

## 2022-02-11 DIAGNOSIS — N18.32 TYPE 2 DIABETES MELLITUS WITH STAGE 3B CHRONIC KIDNEY DISEASE, WITH LONG-TERM CURRENT USE OF INSULIN (HCC): ICD-10-CM

## 2022-02-11 DIAGNOSIS — E55.9 VITAMIN D DEFICIENCY: ICD-10-CM

## 2022-02-11 DIAGNOSIS — I10 ESSENTIAL HYPERTENSION: ICD-10-CM

## 2022-02-11 LAB
ALBUMIN SERPL BCP-MCNC: 3.3 G/DL (ref 3.5–5)
ALP SERPL-CCNC: 107 U/L (ref 46–116)
ALT SERPL W P-5'-P-CCNC: 22 U/L (ref 12–78)
ANION GAP SERPL CALCULATED.3IONS-SCNC: 4 MMOL/L (ref 4–13)
AST SERPL W P-5'-P-CCNC: 23 U/L (ref 5–45)
BASOPHILS # BLD AUTO: 0.02 THOUSANDS/ΜL (ref 0–0.1)
BASOPHILS NFR BLD AUTO: 0 % (ref 0–1)
BILIRUB SERPL-MCNC: 0.35 MG/DL (ref 0.2–1)
BUN SERPL-MCNC: 16 MG/DL (ref 5–25)
CALCIUM ALBUM COR SERPL-MCNC: 9.9 MG/DL (ref 8.3–10.1)
CALCIUM SERPL-MCNC: 9.3 MG/DL (ref 8.3–10.1)
CHLORIDE SERPL-SCNC: 111 MMOL/L (ref 100–108)
CHOLEST SERPL-MCNC: 138 MG/DL
CO2 SERPL-SCNC: 26 MMOL/L (ref 21–32)
CREAT SERPL-MCNC: 1.1 MG/DL (ref 0.6–1.3)
EOSINOPHIL # BLD AUTO: 0.12 THOUSAND/ΜL (ref 0–0.61)
EOSINOPHIL NFR BLD AUTO: 2 % (ref 0–6)
ERYTHROCYTE [DISTWIDTH] IN BLOOD BY AUTOMATED COUNT: 13.2 % (ref 11.6–15.1)
EST. AVERAGE GLUCOSE BLD GHB EST-MCNC: 154 MG/DL
GFR SERPL CREATININE-BSD FRML MDRD: 48 ML/MIN/1.73SQ M
GLUCOSE P FAST SERPL-MCNC: 113 MG/DL (ref 65–99)
HBA1C MFR BLD: 7 %
HCT VFR BLD AUTO: 40.7 % (ref 34.8–46.1)
HDLC SERPL-MCNC: 38 MG/DL
HGB BLD-MCNC: 12.4 G/DL (ref 11.5–15.4)
IMM GRANULOCYTES # BLD AUTO: 0.04 THOUSAND/UL (ref 0–0.2)
IMM GRANULOCYTES NFR BLD AUTO: 1 % (ref 0–2)
LDLC SERPL CALC-MCNC: 73 MG/DL (ref 0–100)
LYMPHOCYTES # BLD AUTO: 1.18 THOUSANDS/ΜL (ref 0.6–4.47)
LYMPHOCYTES NFR BLD AUTO: 20 % (ref 14–44)
MCH RBC QN AUTO: 28.8 PG (ref 26.8–34.3)
MCHC RBC AUTO-ENTMCNC: 30.5 G/DL (ref 31.4–37.4)
MCV RBC AUTO: 95 FL (ref 82–98)
MONOCYTES # BLD AUTO: 0.41 THOUSAND/ΜL (ref 0.17–1.22)
MONOCYTES NFR BLD AUTO: 7 % (ref 4–12)
NEUTROPHILS # BLD AUTO: 4.19 THOUSANDS/ΜL (ref 1.85–7.62)
NEUTS SEG NFR BLD AUTO: 70 % (ref 43–75)
NRBC BLD AUTO-RTO: 0 /100 WBCS
PLATELET # BLD AUTO: 145 THOUSANDS/UL (ref 149–390)
PMV BLD AUTO: 12.3 FL (ref 8.9–12.7)
POTASSIUM SERPL-SCNC: 3.8 MMOL/L (ref 3.5–5.3)
PROT SERPL-MCNC: 6.9 G/DL (ref 6.4–8.2)
RBC # BLD AUTO: 4.3 MILLION/UL (ref 3.81–5.12)
SODIUM SERPL-SCNC: 141 MMOL/L (ref 136–145)
TRIGL SERPL-MCNC: 134 MG/DL
TSH SERPL DL<=0.05 MIU/L-ACNC: 1.95 UIU/ML (ref 0.36–3.74)
WBC # BLD AUTO: 5.96 THOUSAND/UL (ref 4.31–10.16)

## 2022-02-11 PROCEDURE — 83036 HEMOGLOBIN GLYCOSYLATED A1C: CPT

## 2022-02-11 PROCEDURE — 80053 COMPREHEN METABOLIC PANEL: CPT

## 2022-02-11 PROCEDURE — 85025 COMPLETE CBC W/AUTO DIFF WBC: CPT

## 2022-02-11 PROCEDURE — 80061 LIPID PANEL: CPT

## 2022-02-11 PROCEDURE — 84443 ASSAY THYROID STIM HORMONE: CPT

## 2022-02-11 PROCEDURE — 36415 COLL VENOUS BLD VENIPUNCTURE: CPT

## 2022-02-11 NOTE — PROGRESS NOTES
[facility]  coding opportunities             Chart Reviewed * (Number of) Inbasket suggestions sent to Provider: 2      E11 [id number]  [id number]    Appt:2/18/2022            Patients insurance company: [employer]

## 2022-02-18 ENCOUNTER — OFFICE VISIT (OUTPATIENT)
Dept: FAMILY MEDICINE CLINIC | Facility: CLINIC | Age: 77
End: 2022-02-18
Payer: MEDICARE

## 2022-02-18 VITALS
WEIGHT: 248 LBS | DIASTOLIC BLOOD PRESSURE: 64 MMHG | OXYGEN SATURATION: 97 % | BODY MASS INDEX: 45.64 KG/M2 | TEMPERATURE: 98.6 F | SYSTOLIC BLOOD PRESSURE: 112 MMHG | HEIGHT: 62 IN | HEART RATE: 80 BPM

## 2022-02-18 DIAGNOSIS — D69.6 PLATELETS DECREASED (HCC): ICD-10-CM

## 2022-02-18 DIAGNOSIS — E11.22 TYPE 2 DIABETES MELLITUS WITH STAGE 3B CHRONIC KIDNEY DISEASE, WITH LONG-TERM CURRENT USE OF INSULIN (HCC): Primary | ICD-10-CM

## 2022-02-18 DIAGNOSIS — Z79.4 LONG TERM CURRENT USE OF INSULIN (HCC): ICD-10-CM

## 2022-02-18 DIAGNOSIS — N18.31 CHRONIC RENAL IMPAIRMENT, STAGE 3A (HCC): ICD-10-CM

## 2022-02-18 DIAGNOSIS — N18.32 TYPE 2 DIABETES MELLITUS WITH STAGE 3B CHRONIC KIDNEY DISEASE, WITH LONG-TERM CURRENT USE OF INSULIN (HCC): Primary | ICD-10-CM

## 2022-02-18 DIAGNOSIS — I10 ESSENTIAL HYPERTENSION: ICD-10-CM

## 2022-02-18 DIAGNOSIS — Z79.4 TYPE 2 DIABETES MELLITUS WITH STAGE 3B CHRONIC KIDNEY DISEASE, WITH LONG-TERM CURRENT USE OF INSULIN (HCC): Primary | ICD-10-CM

## 2022-02-18 DIAGNOSIS — I73.9 PERIPHERAL VASCULAR DISEASE (HCC): ICD-10-CM

## 2022-02-18 DIAGNOSIS — E66.01 MORBID OBESITY (HCC): ICD-10-CM

## 2022-02-18 PROCEDURE — 99214 OFFICE O/P EST MOD 30 MIN: CPT | Performed by: FAMILY MEDICINE

## 2022-02-18 NOTE — PROGRESS NOTES
BMI Counseling: Body mass index is 45 36 kg/m²  The BMI is above normal  Nutrition recommendations include decreasing portion sizes, decreasing fast food intake and limiting drinks that contain sugar  Exercise recommendations include exercising 3-5 times per week  No pharmacotherapy was ordered  Rationale for BMI follow-up plan is due to patient being overweight or obese  Depression Screening and Follow-up Plan: Patient was screened for depression during today's encounter  They screened negative with a PHQ-2 score of 0  Subjective:   Chief Complaint   Patient presents with    Follow-up     Chronic conditions        Patient ID: Alden Juan is a 67 y o  female  Patient is here F/up with chronic condition including insulin-dependent diabetic hypertension chronic kidney disease patient tolerated medication well compliant with the medication not compliant with diet the having hard time to lose weight the recent blood work review with the patient      The following portions of the patient's history were reviewed and updated as appropriate: allergies, current medications, past family history, past medical history, past social history, past surgical history and problem list     Review of Systems   Constitutional: Negative for activity change, appetite change, fatigue and fever  HENT: Negative for congestion, ear pain, sinus pressure, sinus pain and sore throat  Eyes: Negative for pain, discharge, redness and itching  Respiratory: Negative for cough, chest tightness, shortness of breath and stridor  Cardiovascular: Negative for chest pain, palpitations and leg swelling  Gastrointestinal: Negative for abdominal pain, blood in stool, constipation, diarrhea and nausea  Genitourinary: Negative for dysuria, flank pain, frequency and hematuria  Musculoskeletal: Negative for back pain, joint swelling and neck pain  Skin: Negative for pallor and rash     Neurological: Negative for dizziness, tremors, weakness, numbness and headaches  Hematological: Does not bruise/bleed easily  Objective:  Vitals:    02/18/22 1304   BP: 112/64   BP Location: Left arm   Patient Position: Sitting   Cuff Size: Large   Pulse: 80   Temp: 98 6 °F (37 °C)   SpO2: 97%   Weight: 112 kg (248 lb)   Height: 5' 2" (1 575 m)      Physical Exam  Vitals and nursing note reviewed  Constitutional:       General: She is not in acute distress  Appearance: She is well-developed  She is not diaphoretic  HENT:      Head: Normocephalic  Right Ear: Tympanic membrane, ear canal and external ear normal       Left Ear: Tympanic membrane, ear canal and external ear normal       Nose: Nose normal  No congestion or rhinorrhea  Mouth/Throat:      Mouth: Mucous membranes are moist       Pharynx: Oropharynx is clear  No oropharyngeal exudate or posterior oropharyngeal erythema  Eyes:      General:         Right eye: No discharge  Left eye: No discharge  Conjunctiva/sclera: Conjunctivae normal       Pupils: Pupils are equal, round, and reactive to light  Neck:      Vascular: No JVD  Cardiovascular:      Rate and Rhythm: Normal rate and regular rhythm  Heart sounds: Normal heart sounds  No murmur heard  No gallop  Pulmonary:      Effort: Pulmonary effort is normal  No respiratory distress  Breath sounds: Normal breath sounds  No stridor  No wheezing or rales  Chest:      Chest wall: No tenderness  Abdominal:      General: There is no distension  Palpations: Abdomen is soft  There is no mass  Tenderness: There is no abdominal tenderness  There is no rebound  Musculoskeletal:         General: No tenderness  Cervical back: Normal range of motion and neck supple  Lymphadenopathy:      Cervical: No cervical adenopathy  Skin:     General: Skin is warm  Findings: No erythema or rash     Neurological:      Mental Status: She is alert and oriented to person, place, and time       Motor: No weakness  Gait: Gait normal            Assessment/Plan:    Type 2 diabetes mellitus with kidney complication, with long-term current use of insulin ([facility])    Lab Results   Component Value Date    HGBA1C 7 0 (H) 02/11/2022   A chronic asymptomatic fair control patient on Tresiba [address]   Humalog per insulin sliding scale low carb diet important lose weight the discussed with the patient sign of symptom of hypoglycemia review patient already on Ace inhibitor and statin    Peripheral vascular disease ([facility] [address] )  Chronic asymptomatic patient had a carotid duplex in July 2020 the stenosis and 50% patient already on statin and aspirin discussed important    Essential hypertension  Chronic asymptomatic fair control patient at the goal for blood pressure for the patient was history of  diabetic with chronic kidney disease continue current management low-salt diet and important lose weight increased physical activity discussed with the patient    Morbid obesity ([facility] [address] )  The BMI is above average  BMI counseling and education was provided to the patient  Nutrition recommendations include reducing portion sizes, decreasing overall calorie intake, 3-5 servings of fruits/vegetables daily, reducing fast food intake, consuming healthier snacks, decreasing soda and/or juice intake, moderation in carbohydrate intake and reducing intake of saturated fat and trans fat  Exercise recommendations include moderate aerobic physical activity for 150 minutes/week, exercising 3-5 times per week and joining a gym      Platelets decreased ([facility] [address] )  New finding on recent blood work patient asymptomatic aware of diagnosis will monitor if persistent the a to do workup to repeat CBC    Chronic renal insufficiency, stage III (moderate)  Lab Results   Component Value Date    EGFR 48 02/11/2022    EGFR 45 11/01/2021    EGFR 59 08/02/2021    CREATININE 1 10 02/11/2022    CREATININE 1 17 11/01/2021    CREATININE 0 93 08/02/2021   Chronic asymptomatic patient and had the multifactorial including obesity hypertension diabetic patient at the goal for blood pressure in patient chronic kidney disease but the she still not at the goal for the hemoglobin A1c recommend patient to bring hemoglobin A1c below 7 discussed will hydration increase the fluid intake patient on Ace inhibitor    Long term current use of insulin (HCC)  A sign of symptom of hypoglycemia discussed the patient       Diagnoses and all orders for this visit:    Type 2 diabetes mellitus with stage 3b chronic kidney disease, with long-term current use of insulin (HCC)  -     CBC and differential; Future  -     Basic metabolic panel; Future  -     Lipid Panel with Direct LDL reflex; Future  -     Hemoglobin A1C; Future    Platelets decreased (HCC)  -     CBC and differential; Future  -     Basic metabolic panel; Future  -     Lipid Panel with Direct LDL reflex; Future  -     Hemoglobin A1C; Future    Peripheral vascular disease (HCC)  -     CBC and differential; Future  -     Basic metabolic panel; Future  -     Lipid Panel with Direct LDL reflex; Future  -     Hemoglobin A1C; Future    BMI 45 0-49 9, adult (HCC)  -     CBC and differential; Future  -     Basic metabolic panel; Future  -     Lipid Panel with Direct LDL reflex; Future  -     Hemoglobin A1C; Future    Essential hypertension  -     CBC and differential; Future  -     Basic metabolic panel; Future  -     Lipid Panel with Direct LDL reflex;  Future  -     Hemoglobin A1C; Future    Morbid obesity (HCC)    Chronic renal impairment, stage 3a ([facility] [address] )    Long term current use of insulin ([facility] [address] )

## 2022-02-19 NOTE — ASSESSMENT & PLAN NOTE
Chronic asymptomatic patient had a carotid duplex in July 2020 the stenosis and 50% patient already on statin and aspirin discussed important

## 2022-02-19 NOTE — ASSESSMENT & PLAN NOTE
Lab Results   Component Value Date    HGBA1C 7 0 (H) 02/11/2022   A chronic asymptomatic fair control patient on Tresiba 80   Humalog per insulin sliding scale low carb diet important lose weight the discussed with the patient sign of symptom of hypoglycemia review patient already on Ace inhibitor and statin

## 2022-02-19 NOTE — ASSESSMENT & PLAN NOTE
Lab Results   Component Value Date    EGFR 48 02/11/2022    EGFR 45 11/01/2021    EGFR 59 08/02/2021    CREATININE 1 10 02/11/2022    CREATININE 1 17 11/01/2021    CREATININE 0 93 08/02/2021   Chronic asymptomatic patient and had the multifactorial including obesity hypertension diabetic patient at the goal for blood pressure in patient chronic kidney disease but the she still not at the goal for the hemoglobin A1c recommend patient to bring hemoglobin A1c below 7 discussed will hydration increase the fluid intake patient on Ace inhibitor

## 2022-02-19 NOTE — ASSESSMENT & PLAN NOTE
Chronic asymptomatic fair control patient at the goal for blood pressure for the patient was history of  diabetic with chronic kidney disease continue current management low-salt diet and important lose weight increased physical activity discussed with the patient

## 2022-02-25 DIAGNOSIS — M54.42 CHRONIC BILATERAL LOW BACK PAIN WITH BILATERAL SCIATICA: ICD-10-CM

## 2022-02-25 DIAGNOSIS — K58.8 OTHER IRRITABLE BOWEL SYNDROME: ICD-10-CM

## 2022-02-25 DIAGNOSIS — I10 ESSENTIAL HYPERTENSION: ICD-10-CM

## 2022-02-25 DIAGNOSIS — M54.41 CHRONIC BILATERAL LOW BACK PAIN WITH BILATERAL SCIATICA: ICD-10-CM

## 2022-02-25 DIAGNOSIS — N30.20 CHRONIC CYSTITIS: ICD-10-CM

## 2022-02-25 DIAGNOSIS — G89.29 CHRONIC BILATERAL LOW BACK PAIN WITH BILATERAL SCIATICA: ICD-10-CM

## 2022-02-25 DIAGNOSIS — L98.9 SKIN LESION: ICD-10-CM

## 2022-02-25 DIAGNOSIS — N39.41 URGE INCONTINENCE: ICD-10-CM

## 2022-02-25 RX ORDER — OXYBUTYNIN CHLORIDE 5 MG/1
5 TABLET, EXTENDED RELEASE ORAL DAILY
Qty: 90 TABLET | Refills: 0 | Status: SHIPPED | OUTPATIENT
Start: 2022-02-25 | End: 2022-04-28

## 2022-02-25 RX ORDER — LISINOPRIL 10 MG/1
10 TABLET ORAL DAILY
Qty: 90 TABLET | Refills: 0 | Status: SHIPPED | OUTPATIENT
Start: 2022-02-25 | End: 2022-06-08

## 2022-02-25 RX ORDER — PREGABALIN 75 MG/1
75 CAPSULE ORAL 3 TIMES DAILY
Qty: 270 CAPSULE | Refills: 0 | Status: SHIPPED | OUTPATIENT
Start: 2022-02-25 | End: 2022-05-31 | Stop reason: SDUPTHER

## 2022-02-25 RX ORDER — DICYCLOMINE HCL 20 MG
20 TABLET ORAL 2 TIMES DAILY
Qty: 180 TABLET | Refills: 0 | Status: SHIPPED | OUTPATIENT
Start: 2022-02-25 | End: 2022-05-31 | Stop reason: SDUPTHER

## 2022-02-25 RX ORDER — CLOTRIMAZOLE 1 %
CREAM (GRAM) TOPICAL 2 TIMES DAILY
Qty: 113 G | Refills: 0 | Status: SHIPPED | OUTPATIENT
Start: 2022-02-25 | End: 2022-06-14 | Stop reason: SDUPTHER

## 2022-04-04 DIAGNOSIS — Z79.4 TYPE 2 DIABETES MELLITUS WITH STAGE 3 CHRONIC KIDNEY DISEASE, WITH LONG-TERM CURRENT USE OF INSULIN (HCC): ICD-10-CM

## 2022-04-04 DIAGNOSIS — N18.30 TYPE 2 DIABETES MELLITUS WITH STAGE 3 CHRONIC KIDNEY DISEASE, WITH LONG-TERM CURRENT USE OF INSULIN (HCC): ICD-10-CM

## 2022-04-04 DIAGNOSIS — E11.22 TYPE 2 DIABETES MELLITUS WITH STAGE 3 CHRONIC KIDNEY DISEASE, WITH LONG-TERM CURRENT USE OF INSULIN (HCC): ICD-10-CM

## 2022-04-04 RX ORDER — BLOOD SUGAR DIAGNOSTIC
STRIP MISCELLANEOUS
Qty: 100 EACH | Refills: 2 | Status: SHIPPED | OUTPATIENT
Start: 2022-04-04

## 2022-04-25 ENCOUNTER — HOSPITAL ENCOUNTER (OUTPATIENT)
Dept: MAMMOGRAPHY | Facility: HOSPITAL | Age: 77
Discharge: HOME | End: 2022-04-25
Payer: MEDICARE

## 2022-04-25 VITALS — WEIGHT: 250 LBS | BODY MASS INDEX: 46.01 KG/M2 | HEIGHT: 62 IN

## 2022-04-25 DIAGNOSIS — Z12.31 ENCOUNTER FOR SCREENING MAMMOGRAM FOR MALIGNANT NEOPLASM OF BREAST: ICD-10-CM

## 2022-04-25 PROCEDURE — 77067 SCR MAMMO BI INCL CAD: CPT

## 2022-04-25 PROCEDURE — 77063 BREAST TOMOSYNTHESIS BI: CPT

## 2022-04-28 ENCOUNTER — TELEPHONE (OUTPATIENT)
Dept: FAMILY MEDICINE CLINIC | Facility: CLINIC | Age: 77
End: 2022-04-28

## 2022-04-28 DIAGNOSIS — N39.41 URGE INCONTINENCE: ICD-10-CM

## 2022-04-28 DIAGNOSIS — N30.20 CHRONIC CYSTITIS: ICD-10-CM

## 2022-04-28 RX ORDER — OXYBUTYNIN CHLORIDE 5 MG/1
TABLET, EXTENDED RELEASE ORAL
Qty: 90 TABLET | Refills: 3 | Status: SHIPPED | OUTPATIENT
Start: 2022-04-28

## 2022-04-28 NOTE — TELEPHONE ENCOUNTER
patient notified of results and scheduled for next annual screening on Wed 4/26/2023 @ 11:15am [address]

## 2022-04-28 NOTE — TELEPHONE ENCOUNTER
----- Message from Shereen Matthews MD sent at 4/26/2022 12:59 PM EDT -----  Negative mammogram repeat in one year

## 2022-05-09 ENCOUNTER — APPOINTMENT (OUTPATIENT)
Dept: LAB | Facility: IMAGING CENTER | Age: 77
End: 2022-05-09
Payer: MEDICARE

## 2022-05-09 DIAGNOSIS — I10 ESSENTIAL HYPERTENSION: ICD-10-CM

## 2022-05-09 DIAGNOSIS — Z79.4 TYPE 2 DIABETES MELLITUS WITH STAGE 3B CHRONIC KIDNEY DISEASE, WITH LONG-TERM CURRENT USE OF INSULIN (HCC): ICD-10-CM

## 2022-05-09 DIAGNOSIS — D69.6 PLATELETS DECREASED (HCC): ICD-10-CM

## 2022-05-09 DIAGNOSIS — I73.9 PERIPHERAL VASCULAR DISEASE (HCC): ICD-10-CM

## 2022-05-09 DIAGNOSIS — E11.22 TYPE 2 DIABETES MELLITUS WITH STAGE 3B CHRONIC KIDNEY DISEASE, WITH LONG-TERM CURRENT USE OF INSULIN (HCC): ICD-10-CM

## 2022-05-09 DIAGNOSIS — N18.32 TYPE 2 DIABETES MELLITUS WITH STAGE 3B CHRONIC KIDNEY DISEASE, WITH LONG-TERM CURRENT USE OF INSULIN (HCC): ICD-10-CM

## 2022-05-09 LAB
ANION GAP SERPL CALCULATED.3IONS-SCNC: 4 MMOL/L (ref 4–13)
BASOPHILS # BLD AUTO: 0.02 THOUSANDS/ΜL (ref 0–0.1)
BASOPHILS NFR BLD AUTO: 0 % (ref 0–1)
BUN SERPL-MCNC: 17 MG/DL (ref 5–25)
CALCIUM SERPL-MCNC: 8.9 MG/DL (ref 8.3–10.1)
CHLORIDE SERPL-SCNC: 110 MMOL/L (ref 100–108)
CHOLEST SERPL-MCNC: 146 MG/DL
CO2 SERPL-SCNC: 27 MMOL/L (ref 21–32)
CREAT SERPL-MCNC: 1.2 MG/DL (ref 0.6–1.3)
EOSINOPHIL # BLD AUTO: 0.21 THOUSAND/ΜL (ref 0–0.61)
EOSINOPHIL NFR BLD AUTO: 3 % (ref 0–6)
ERYTHROCYTE [DISTWIDTH] IN BLOOD BY AUTOMATED COUNT: 13.3 % (ref 11.6–15.1)
EST. AVERAGE GLUCOSE BLD GHB EST-MCNC: 166 MG/DL
GFR SERPL CREATININE-BSD FRML MDRD: 43 ML/MIN/1.73SQ M
GLUCOSE P FAST SERPL-MCNC: 166 MG/DL (ref 65–99)
HBA1C MFR BLD: 7.4 %
HCT VFR BLD AUTO: 40.6 % (ref 34.8–46.1)
HDLC SERPL-MCNC: 39 MG/DL
HGB BLD-MCNC: 12.7 G/DL (ref 11.5–15.4)
IMM GRANULOCYTES # BLD AUTO: 0.06 THOUSAND/UL (ref 0–0.2)
IMM GRANULOCYTES NFR BLD AUTO: 1 % (ref 0–2)
LDLC SERPL CALC-MCNC: 76 MG/DL (ref 0–100)
LYMPHOCYTES # BLD AUTO: 1.39 THOUSANDS/ΜL (ref 0.6–4.47)
LYMPHOCYTES NFR BLD AUTO: 19 % (ref 14–44)
MCH RBC QN AUTO: 29.3 PG (ref 26.8–34.3)
MCHC RBC AUTO-ENTMCNC: 31.3 G/DL (ref 31.4–37.4)
MCV RBC AUTO: 94 FL (ref 82–98)
MONOCYTES # BLD AUTO: 0.46 THOUSAND/ΜL (ref 0.17–1.22)
MONOCYTES NFR BLD AUTO: 6 % (ref 4–12)
NEUTROPHILS # BLD AUTO: 5.34 THOUSANDS/ΜL (ref 1.85–7.62)
NEUTS SEG NFR BLD AUTO: 71 % (ref 43–75)
NRBC BLD AUTO-RTO: 0 /100 WBCS
PLATELET # BLD AUTO: 143 THOUSANDS/UL (ref 149–390)
PMV BLD AUTO: 12 FL (ref 8.9–12.7)
POTASSIUM SERPL-SCNC: 4 MMOL/L (ref 3.5–5.3)
RBC # BLD AUTO: 4.34 MILLION/UL (ref 3.81–5.12)
SODIUM SERPL-SCNC: 141 MMOL/L (ref 136–145)
TRIGL SERPL-MCNC: 157 MG/DL
WBC # BLD AUTO: 7.48 THOUSAND/UL (ref 4.31–10.16)

## 2022-05-09 PROCEDURE — 83036 HEMOGLOBIN GLYCOSYLATED A1C: CPT

## 2022-05-09 PROCEDURE — 80048 BASIC METABOLIC PNL TOTAL CA: CPT

## 2022-05-09 PROCEDURE — 85025 COMPLETE CBC W/AUTO DIFF WBC: CPT

## 2022-05-09 PROCEDURE — 36415 COLL VENOUS BLD VENIPUNCTURE: CPT

## 2022-05-09 PROCEDURE — 80061 LIPID PANEL: CPT

## 2022-05-31 DIAGNOSIS — M54.42 CHRONIC BILATERAL LOW BACK PAIN WITH BILATERAL SCIATICA: ICD-10-CM

## 2022-05-31 DIAGNOSIS — K58.8 OTHER IRRITABLE BOWEL SYNDROME: ICD-10-CM

## 2022-05-31 DIAGNOSIS — M54.41 CHRONIC BILATERAL LOW BACK PAIN WITH BILATERAL SCIATICA: ICD-10-CM

## 2022-05-31 DIAGNOSIS — G89.29 CHRONIC BILATERAL LOW BACK PAIN WITH BILATERAL SCIATICA: ICD-10-CM

## 2022-06-01 RX ORDER — DICYCLOMINE HCL 20 MG
20 TABLET ORAL 2 TIMES DAILY
Qty: 180 TABLET | Refills: 0 | Status: SHIPPED | OUTPATIENT
Start: 2022-06-01

## 2022-06-01 RX ORDER — PREGABALIN 75 MG/1
75 CAPSULE ORAL 3 TIMES DAILY
Qty: 270 CAPSULE | Refills: 0 | Status: SHIPPED | OUTPATIENT
Start: 2022-06-01

## 2022-06-06 ENCOUNTER — RA CDI HCC (OUTPATIENT)
Dept: OTHER | Facility: HOSPITAL | Age: 77
End: 2022-06-06

## 2022-06-06 NOTE — PROGRESS NOTES
[facility]  coding opportunities          Chart Reviewed number of suggestions sent to Provider: 1   E11 [id number]    Patients Insurance     Medicare Insurance: [employer]

## 2022-06-07 ENCOUNTER — TELEPHONE (OUTPATIENT)
Dept: ADMINISTRATIVE | Facility: OTHER | Age: 77
End: 2022-06-07

## 2022-06-07 NOTE — TELEPHONE ENCOUNTER
----- Message from Alfredo Jauregui sent at 6/7/2022  9:03 AM EDT -----  Regarding: Rachel Gonzalez request  06/07/22 9:03 AM    Hello, our patient Lexus Gruber has had Diabetic Foot Exam completed/performed  Please assist in updating the patient chart by pulling the document from the Media Tab  The date of service is 12/30/2021       Thank you,  [id number] [phone]

## 2022-06-08 ENCOUNTER — OFFICE VISIT (OUTPATIENT)
Dept: FAMILY MEDICINE CLINIC | Facility: CLINIC | Age: 77
End: 2022-06-08
Payer: MEDICARE

## 2022-06-08 VITALS
RESPIRATION RATE: 16 BRPM | BODY MASS INDEX: 45.82 KG/M2 | WEIGHT: 249 LBS | SYSTOLIC BLOOD PRESSURE: 110 MMHG | HEART RATE: 80 BPM | TEMPERATURE: 97.8 F | OXYGEN SATURATION: 97 % | HEIGHT: 62 IN | DIASTOLIC BLOOD PRESSURE: 70 MMHG

## 2022-06-08 DIAGNOSIS — N18.32 TYPE 2 DIABETES MELLITUS WITH STAGE 3B CHRONIC KIDNEY DISEASE, WITH LONG-TERM CURRENT USE OF INSULIN (HCC): ICD-10-CM

## 2022-06-08 DIAGNOSIS — E11.22 TYPE 2 DIABETES MELLITUS WITH STAGE 3B CHRONIC KIDNEY DISEASE, WITH LONG-TERM CURRENT USE OF INSULIN (HCC): ICD-10-CM

## 2022-06-08 DIAGNOSIS — N93.9 VAGINAL BLEEDING: ICD-10-CM

## 2022-06-08 DIAGNOSIS — N93.9 VAGINAL BLEEDING: Primary | ICD-10-CM

## 2022-06-08 DIAGNOSIS — E78.2 MIXED HYPERLIPIDEMIA: ICD-10-CM

## 2022-06-08 DIAGNOSIS — Z79.4 TYPE 2 DIABETES MELLITUS WITH STAGE 3B CHRONIC KIDNEY DISEASE, WITH LONG-TERM CURRENT USE OF INSULIN (HCC): ICD-10-CM

## 2022-06-08 DIAGNOSIS — I10 ESSENTIAL HYPERTENSION: ICD-10-CM

## 2022-06-08 DIAGNOSIS — N18.31 CHRONIC RENAL IMPAIRMENT, STAGE 3A (HCC): ICD-10-CM

## 2022-06-08 PROCEDURE — 99214 OFFICE O/P EST MOD 30 MIN: CPT | Performed by: FAMILY MEDICINE

## 2022-06-08 RX ORDER — INSULIN LISPRO 200 [IU]/ML
INJECTION, SOLUTION SUBCUTANEOUS
Qty: 12 ML | Refills: 1 | Status: SHIPPED | OUTPATIENT
Start: 2022-06-08

## 2022-06-08 RX ORDER — LISINOPRIL 10 MG/1
TABLET ORAL
Qty: 90 TABLET | Refills: 0 | Status: SHIPPED | OUTPATIENT
Start: 2022-06-08 | End: 2022-08-04 | Stop reason: SDUPTHER

## 2022-06-08 RX ORDER — INSULIN LISPRO 200 [IU]/ML
INJECTION, SOLUTION SUBCUTANEOUS
Qty: 12 ML | Refills: 1 | Status: SHIPPED | OUTPATIENT
Start: 2022-06-08 | End: 2022-06-08 | Stop reason: SDUPTHER

## 2022-06-08 NOTE — ASSESSMENT & PLAN NOTE
Lab Results   Component Value Date    HGBA1C 7 4 (H) 05/09/2022   Chronic asymptomatic increase in the hemoglobin A1c from 7-7 4 a discussed result with the patient she admit has not been compliant with the diet recommend the a to continue current management and low carb diet important lose weight discussed with the patient patient already on statin and ACE-inhibitor

## 2022-06-08 NOTE — ASSESSMENT & PLAN NOTE
Lab Results   Component Value Date    EGFR 43 05/09/2022    EGFR 48 02/11/2022    EGFR 45 11/01/2021    CREATININE 1 20 05/09/2022    CREATININE 1 10 02/11/2022    CREATININE 1 17 11/01/2021   Chronic decline in the kidney function compared with before discussed the patient she is not at the goal for the hemoglobin A1c she is at the goal for the blood pressure recommend will hydration avoid nonsteroidal anti-inflammatory drugs the patient did not see Nephrology for 2 years recommend to call and set appointment

## 2022-06-08 NOTE — ASSESSMENT & PLAN NOTE
Chronic asymptomatic fair control continue current management including lisinopril 10 mg once a day low-salt diet important lose weight review

## 2022-06-08 NOTE — PROGRESS NOTES
Subjective:   Chief Complaint   Patient presents with    Follow-up     Chronic conditions        Patient ID: Cas Xiao is a 67 y o  female  Patient here follow-up with a chronic condition including insulin-dependent diabetic hypertension hyperlipidemia patient compliant with the medication but she is not compliant with diet having hard time to lose weight recent blood work review with the patient      The following portions of the patient's history were reviewed and updated as appropriate: allergies, current medications, past family history, past medical history, past social history, past surgical history and problem list     Review of Systems   Constitutional: Negative for activity change, appetite change, fatigue and fever  HENT: Negative for congestion, ear pain, sinus pressure, sinus pain and sore throat  Eyes: Negative for pain, discharge, redness and itching  Respiratory: Negative for cough, chest tightness, shortness of breath and stridor  Cardiovascular: Negative for chest pain and palpitations  Gastrointestinal: Negative for abdominal pain, blood in stool, constipation, diarrhea and nausea  Genitourinary: Negative for dysuria, flank pain, frequency and hematuria  Musculoskeletal: Negative for back pain, joint swelling and neck pain  Skin: Negative for pallor and rash  Neurological: Negative for dizziness, tremors, weakness, numbness and headaches  Hematological: Does not bruise/bleed easily  Objective:  Vitals:    06/08/22 1327 06/08/22 1337   BP: 140/80 110/70   BP Location: Left arm Left arm   Patient Position: Sitting Sitting   Cuff Size: Large Large   Pulse: 80    Resp: 16    Temp: 97 8 °F (36 6 °C)    TempSrc: Tympanic    SpO2: 97%    Weight: 113 kg (249 lb)    Height: 5' 2" (1 575 m)       Physical Exam  Vitals and nursing note reviewed  Constitutional:       General: She is not in acute distress  Appearance: She is well-developed     HENT: Head: Normocephalic and atraumatic  Nose: Nose normal       Mouth/Throat:      Pharynx: No oropharyngeal exudate or posterior oropharyngeal erythema  Eyes:      General:         Right eye: No discharge  Left eye: No discharge  Neck:      Vascular: No JVD  Cardiovascular:      Rate and Rhythm: Normal rate and regular rhythm  Heart sounds: Normal heart sounds  No murmur heard  No friction rub  No gallop  Pulmonary:      Effort: Pulmonary effort is normal       Breath sounds: Normal breath sounds  Abdominal:      General: Bowel sounds are normal       Palpations: Abdomen is soft  Tenderness: There is no abdominal tenderness  Neurological:      Mental Status: She is oriented to person, place, and time             Assessment/Plan:    Type 2 diabetes mellitus with kidney complication, with long-term current use of insulin ([facility])    Lab Results   Component Value Date    HGBA1C 7 4 (H) 05/09/2022   Chronic asymptomatic increase in the hemoglobin A1c from 7-7 4 a discussed result with the patient she admit has not been compliant with the diet recommend the a to continue current management and low carb diet important lose weight discussed with the patient patient already on statin and ACE-inhibitor    Essential hypertension  Chronic asymptomatic fair control continue current management including lisinopril 10 mg once a day low-salt diet important lose weight review    Mixed hyperlipidemia  Chronic asymptomatic LDL close to therapeutic in diabetic patient continue current management encouraged patient to lose weight and low fat diet    Chronic renal insufficiency, stage III (moderate)  Lab Results   Component Value Date    EGFR 43 05/09/2022    EGFR 48 02/11/2022    EGFR 45 11/01/2021    CREATININE 1 20 05/09/2022    CREATININE 1 10 02/11/2022    CREATININE 1 17 11/01/2021   Chronic decline in the kidney function compared with before discussed the patient she is not at the goal for the hemoglobin A1c she is at the goal for the blood pressure recommend will hydration avoid nonsteroidal anti-inflammatory drugs the patient did not see Nephrology for 2 years recommend to call and set appointment       Diagnoses and all orders for this visit:    Vaginal bleeding  -     Discontinue: conjugated estrogens (PREMARIN) vaginal cream; Insert 1 g into the vagina daily Apply vaginally x2/w    Type 2 diabetes mellitus with stage 3b chronic kidney disease, with long-term current use of insulin (HCC)  -     Discontinue: insuln lispro (HumaLOG KwikPen) 200 units/mL CONCENTRATED U-200 injection pen; Inject subcutaneously as directed per presciber Instructions per sliding scale  -     CBC and differential; Future  -     Basic metabolic panel; Future  -     Lipid panel; Future  -     Hemoglobin A1C; Future    Chronic renal impairment, stage 3a (HCC)  -     CBC and differential; Future  -     Basic metabolic panel; Future  -     Lipid panel; Future  -     Hemoglobin A1C; Future    Mixed hyperlipidemia  -     CBC and differential; Future  -     Basic metabolic panel; Future  -     Lipid panel;  Future  -     Hemoglobin A1C; Future    Essential hypertension

## 2022-06-08 NOTE — TELEPHONE ENCOUNTER
Upon review of the In Basket request we were able to locate, review, and update the patient chart as requested for Diabetic Foot Exam     Any additional questions or concerns should be emailed to the Practice Liaisons via [email or web address] email, please do not reply via In Basket      Thank you  Sudarshan Temple

## 2022-06-08 NOTE — ASSESSMENT & PLAN NOTE
Chronic asymptomatic LDL close to therapeutic in diabetic patient continue current management encouraged patient to lose weight and low fat diet

## 2022-06-17 ENCOUNTER — TELEPHONE (OUTPATIENT)
Dept: ADMINISTRATIVE | Facility: OTHER | Age: 77
End: 2022-06-17

## 2022-06-17 NOTE — TELEPHONE ENCOUNTER
----- Message from Apple Grubbs sent at 6/15/2022  3:42 PM EDT -----  Regarding: Sherida Oppenheim request  06/15/22 3:42 PM    Hello, our patient Ok Kendrick has had Diabetic Foot Exam completed/performed  Please assist in updating the patient chart by pulling the document from the Media Tab  The date of service is 3/14/2022       Thank you,  [address] [phone]
Upon review of the In Basket request we have found/obtained the documentation  After careful review of the document we are unable to complete this request for Diabetic Foot Exam because this report is already linked in   DOS of report is 12/2021    Any additional questions or concerns should be emailed to the Practice Liaisons via [email or web address] email, please do not reply via In Basket      Thank you  Jelena Kat MA
no

## 2022-07-05 ENCOUNTER — RA CDI HCC (OUTPATIENT)
Dept: OTHER | Facility: HOSPITAL | Age: 77
End: 2022-07-05

## 2022-07-05 NOTE — PROGRESS NOTES
[facility] [address]  coding opportunities          Chart Reviewed number of suggestions sent to Provider: 3   E11 51  E11 40  E11 65    Patients Insurance     Medicare Insurance: [employer]

## 2022-07-10 DIAGNOSIS — K30 INDIGESTION: ICD-10-CM

## 2022-07-11 ENCOUNTER — OFFICE VISIT (OUTPATIENT)
Dept: FAMILY MEDICINE CLINIC | Facility: CLINIC | Age: 77
End: 2022-07-11
Payer: MEDICARE

## 2022-07-11 VITALS
SYSTOLIC BLOOD PRESSURE: 122 MMHG | HEIGHT: 62 IN | TEMPERATURE: 98.6 F | DIASTOLIC BLOOD PRESSURE: 76 MMHG | HEART RATE: 78 BPM | WEIGHT: 246 LBS | OXYGEN SATURATION: 97 % | BODY MASS INDEX: 45.27 KG/M2

## 2022-07-11 DIAGNOSIS — E11.22 TYPE 2 DIABETES MELLITUS WITH STAGE 3B CHRONIC KIDNEY DISEASE, WITH LONG-TERM CURRENT USE OF INSULIN (HCC): ICD-10-CM

## 2022-07-11 DIAGNOSIS — E66.01 MORBID OBESITY (HCC): ICD-10-CM

## 2022-07-11 DIAGNOSIS — Z79.4 TYPE 2 DIABETES MELLITUS WITH STAGE 3B CHRONIC KIDNEY DISEASE, WITH LONG-TERM CURRENT USE OF INSULIN (HCC): ICD-10-CM

## 2022-07-11 DIAGNOSIS — N18.32 TYPE 2 DIABETES MELLITUS WITH STAGE 3B CHRONIC KIDNEY DISEASE, WITH LONG-TERM CURRENT USE OF INSULIN (HCC): ICD-10-CM

## 2022-07-11 DIAGNOSIS — K30 INDIGESTION: ICD-10-CM

## 2022-07-11 DIAGNOSIS — M54.41 CHRONIC BILATERAL LOW BACK PAIN WITH BILATERAL SCIATICA: ICD-10-CM

## 2022-07-11 DIAGNOSIS — Z78.0 POST-MENOPAUSAL: ICD-10-CM

## 2022-07-11 DIAGNOSIS — G89.29 CHRONIC BILATERAL LOW BACK PAIN WITH BILATERAL SCIATICA: ICD-10-CM

## 2022-07-11 DIAGNOSIS — R26.9 GAIT DISTURBANCE: ICD-10-CM

## 2022-07-11 DIAGNOSIS — E78.1 HYPERTRIGLYCERIDEMIA, ESSENTIAL: ICD-10-CM

## 2022-07-11 DIAGNOSIS — Z00.00 MEDICARE ANNUAL WELLNESS VISIT, SUBSEQUENT: Primary | ICD-10-CM

## 2022-07-11 DIAGNOSIS — M54.42 CHRONIC BILATERAL LOW BACK PAIN WITH BILATERAL SCIATICA: ICD-10-CM

## 2022-07-11 DIAGNOSIS — Z13.820 SCREENING FOR OSTEOPOROSIS: ICD-10-CM

## 2022-07-11 DIAGNOSIS — I51.7 LVH (LEFT VENTRICULAR HYPERTROPHY): ICD-10-CM

## 2022-07-11 DIAGNOSIS — I10 ESSENTIAL HYPERTENSION: ICD-10-CM

## 2022-07-11 DIAGNOSIS — I65.23 BILATERAL CAROTID ARTERY STENOSIS: ICD-10-CM

## 2022-07-11 PROBLEM — J06.9 UPPER RESPIRATORY TRACT INFECTION: Status: RESOLVED | Noted: 2022-01-19 | Resolved: 2022-07-11

## 2022-07-11 PROCEDURE — 99214 OFFICE O/P EST MOD 30 MIN: CPT | Performed by: FAMILY MEDICINE

## 2022-07-11 PROCEDURE — G0439 PPPS, SUBSEQ VISIT: HCPCS | Performed by: FAMILY MEDICINE

## 2022-07-11 RX ORDER — PANTOPRAZOLE SODIUM 40 MG/1
TABLET, DELAYED RELEASE ORAL
Qty: 90 TABLET | Refills: 3 | Status: SHIPPED | OUTPATIENT
Start: 2022-07-11 | End: 2022-08-04 | Stop reason: SDUPTHER

## 2022-07-11 NOTE — PROGRESS NOTES
Assessment and Plan:     Problem List Items Addressed This Visit        Endocrine    Type 2 diabetes mellitus with kidney complication, with long-term current use of insulin ([facility] [address] )    Relevant Orders    CBC and differential    Basic metabolic panel    Lipid Panel with Direct LDL reflex    Hemoglobin A1C       Cardiovascular and Mediastinum    Carotid artery disease ([facility] [address] )     A chronic asymptomatic patient already on statin and aspirin she is due for carotid duplex ordered today           Relevant Orders    CBC and differential    Basic metabolic panel    Lipid Panel with Direct LDL reflex    Hemoglobin A1C    Echo complete w/ contrast if indicated    VAS carotid complete study    Essential hypertension    Relevant Orders    CBC and differential    Basic metabolic panel    Lipid Panel with Direct LDL reflex    Hemoglobin A1C    LVH (left ventricular hypertrophy)     A finding on echocardiogram on 2020 patient asymptomatic and she is already on Ace inhibitor statin aspirin a plan to repeat echocardiogram           Relevant Orders    CBC and differential    Basic metabolic panel    Lipid Panel with Direct LDL reflex    Hemoglobin A1C    Echo complete w/ contrast if indicated    VAS carotid complete study       Nervous and Auditory    Bilateral low back pain with bilateral sciatica     A chronic stable no recent exacerbation patient using recommend she tolerated well without side effect and happy with the result discussed proper postural and important lose weight              Other    Indigestion     A chronic asymptomatic patient on pantoprazole tolerated well without side effect the discussed important lose weight avoid provoke food do not eat and lie down           Relevant Orders    CBC and differential    Basic metabolic panel    Lipid Panel with Direct LDL reflex    Hemoglobin A1C    Hypertriglyceridemia, essential    Relevant Orders    CBC and differential    Basic metabolic panel    Lipid Panel with Direct LDL reflex    Hemoglobin A1C    Morbid obesity (HCC)     BMI 44 99 a discussed with the patient low carb diet low-fat diet increase physical activity and portion control           Relevant Orders    CBC and differential    Basic metabolic panel    Lipid Panel with Direct LDL reflex    Hemoglobin A1C    Medicare annual wellness visit, subsequent - Primary     Advice and education were given regarding nutrition, aerobic exercises, weight bearing exercises, cardiovascular risk reduction, fall risk reduction, and age appropriate supplements  The patient was counseled regarding instructions for management, risk factor reductions, prognosis, risks and benefits of treatment options, patient and family education, and importance of compliance with treatment  Gait disturbance     Chronic no recent fall patient use walker all the time fall precaution home safety environment discussed with the patient             Other Visit Diagnoses     Post-menopausal        Relevant Orders    DXA bone density spine hip and pelvis    Screening for osteoporosis        Relevant Orders    DXA bone density spine hip and pelvis        BMI Counseling: Body mass index is 44 99 kg/m²  The BMI is above normal  Nutrition recommendations include decreasing portion sizes, decreasing fast food intake and limiting drinks that contain sugar  Exercise recommendations include exercising 3-5 times per week  No pharmacotherapy was ordered  Rationale for BMI follow-up plan is due to patient being overweight or obese  Depression Screening and Follow-up Plan: Patient was screened for depression during today's encounter  They screened negative with a PHQ-2 score of 0  Preventive health issues were discussed with patient, and age appropriate screening tests were ordered as noted in patient's After Visit Summary    Personalized health advice and appropriate referrals for health education or preventive services given if needed, as noted in patient's After Visit Summary  History of Present Illness:     Patient presents for a Medicare Wellness Visit    Patient here for Medicare exam and follow-up with a chronic condition patient compliant with the medication tolerated well without side effect no recent fall     Patient Care Team:  Isela Delaney MD as PCP - [address] DPM (Podiatry)  Orlando Raymundo MD (Nephrology)  Kurt Hernandez MD (Urology)  Zeinab Weiss MD (Gastroenterology)     Review of Systems:     Review of Systems   Constitutional: Negative for activity change, appetite change, fatigue and fever  HENT: Negative for congestion, ear pain, sinus pressure, sinus pain and sore throat  Eyes: Negative for pain, discharge, redness and itching  Respiratory: Negative for cough, chest tightness, shortness of breath and stridor  Cardiovascular: Negative for chest pain, palpitations and leg swelling  Gastrointestinal: Negative for abdominal pain, blood in stool, constipation, diarrhea and nausea  Genitourinary: Negative for dysuria, flank pain, frequency and hematuria  Skin: Negative for pallor and rash  Neurological: Negative for dizziness, tremors, weakness, numbness and headaches  Hematological: Does not bruise/bleed easily          Problem List:     Patient Active Problem List   Diagnosis    Carotid artery disease ([facility] [address] )    Chronic renal insufficiency, stage III (moderate) [facility]    Essential hypertension    Mixed hyperlipidemia    Hypomagnesemia    Indigestion    Irritable bowel syndrome    Long term current use of insulin ([address] )    Metabolic syndrome    Nonalcoholic fatty liver disease    Obstructive sleep apnea treated with continuous positive airway pressure (CPAP)    Peripheral vascular disease (HCC)    Type 2 diabetes mellitus with kidney complication, with long-term current use of insulin ([facility])    Urge incontinence    Vitamin D deficiency    Osteopenia of spine    Anemia    Multiple and bilateral precerebral arterial occlusion    Restless legs syndrome (RLS)    Tinnitus    Migraine without aura, not intractable    Hypertriglyceridemia, essential    Morbid obesity (HCC)    Chronic right shoulder pain    Vaginal bleeding    Breast lump in upper outer quadrant    Neck pain    Abnormal mammogram    Benign hypertensive renal disease    Proteinuria    Medicare annual wellness visit, subsequent    Callus    Hammer toe    Arthritis    Onychomycosis    Plantar fascial fibromatosis    Nail dystrophy    Bilateral low back pain with bilateral sciatica    Skin lesion    Gait disturbance    DNR (do not resuscitate)    COVID-19 vaccine series completed    COVID-19 virus infection    Cough    Platelets decreased ([facility] [address] )    LVH (left ventricular hypertrophy)      Past Medical and Surgical History:     Past Medical History:   Diagnosis Date    Anemia     Arthritis 10/30/2020    Chronic kidney disease     Diabetes mellitus ([facility] [address] )     Fibromyalgia, primary     Fracture of wrist     RIGHT    Heart murmur 6/30/2020    History of non-insulin dependent diabetes mellitus     Hyperlipidemia     Hypertension     Hypertension     IBS (irritable bowel syndrome)     Irritable bowel     Metabolic syndrome     Obesity     RLS (restless legs syndrome)     Routine medical exam 6/28/2019    Sleep apnea     Sleep apnea     ON CPAP    Upper respiratory tract infection 1/19/2022    Urge incontinence     Urinary incontinence      Past Surgical History:   Procedure Laterality Date    CHOLECYSTECTOMY  2013    COLONOSCOPY  2010    CYSTOSCOPY      HERNIA REPAIR  2008    HYSTERECTOMY      PARTIAL (STILL HAS OVARIES)    KNEE SURGERY Left       Family History:     Family History   Problem Relation Age of Onset    Suicidality Father     Breast cancer Mother 58    Melanoma Sister     Bipolar disorder Sister     Heart disease Brother     Rheum arthritis Daughter     No Known Problems Maternal Grandmother     No Known Problems Paternal Grandmother     No Known Problems Sister     No Known Problems Daughter     No Known Problems Maternal Aunt     No Known Problems Maternal Aunt       Social History:     Social History     Socioeconomic History    Marital status: /Civil Union     Spouse name: None    Number of children: None    Years of education: None    Highest education level: None   Occupational History    None   Tobacco Use    Smoking status: Never Smoker    Smokeless tobacco: Never Used   Vaping Use    Vaping Use: Never used   Substance and Sexual Activity    Alcohol use: No     Comment: no caffeine use    Drug use: No    Sexual activity: Yes     Partners: Male     Birth control/protection: Post-menopausal     Comment:  61 years   Other Topics Concern    None   Social History Narrative    None     Social Determinants of Health     Financial Resource Strain: Not on file   Food Insecurity: Not on file   Transportation Needs: Not on file   Physical Activity: Not on file   Stress: Not on file   Social Connections: Not on file   Intimate Partner Violence: Not on file   Housing Stability: Unknown    Unable to Pay for Housing in the Last Year: No    Number of [place] in the Last Year: Not on file    Unstable Housing in the Last Year: No      Medications and Allergies:     Current Outpatient Medications   Medication Sig Dispense Refill    acetaminophen (TYLENOL) 325 mg tablet Take by mouth as needed       albuterol (PROAIR HFA) 90 mcg/act inhaler as needed      B Complex Vitamins (B COMPLEX 1 PO) Take by mouth      bismuth subsalicylate (PEPTO BISMOL) 262 MG chewable tablet Chew 2 tablets (524 mg total) 2 (two) times a day as needed for diarrhea Has tolerated in the past  30 tablet 0    Cholecalciferol (VITAMIN D3) 2000 units capsule half      clotrimazole (LOTRIMIN) 1 % cream Apply topically 2 (two) times a day 113 g 2    conjugated estrogens (Premarin) vaginal cream INSERT 1 APPLICATORFUL (1g) VAGINALLY TWO TIMES A WEEK 30 g 1    Contour Next Test test strip Test blood sugar 3 times daily 100 each 2    Diapers & Supplies MISC by Does not apply route 3 (three) times a day as needed (Incontinence) 28 each 11    dicyclomine (BENTYL) 20 mg tablet Take 1 tablet (20 mg total) by mouth 2 (two) times a day 180 tablet 0    EASY TOUCH LANCETS 32G/TWIST MISC Test blood sugars 3 times daily 100 each 3    fenofibrate (TRIGLIDE) 160 MG tablet TAKE 1 TABLET BY MOUTH  DAILY 90 tablet 0    Ferrous Sulfate (IRON) 325 (65 Fe) MG TABS Take by mouth      Insulin Pen Needle (BD Pen Needle [place] U/F) 32G X 4 MM MISC Inject as directed 3 times daily 270 each 0    insuln lispro (HumaLOG KwikPen) 200 units/mL CONCENTRATED U-200 injection pen Inject subcutaneously as directed per presciber Instructions per sliding scale-PATIENT IS TO TAKE 5 UNITS BID 12 mL 1    lisinopril (ZESTRIL) 10 mg tablet TAKE 1 TABLET BY MOUTH  DAILY 90 tablet 0    loratadine (CLARITIN) 10 mg tablet Take by mouth      Multiple Vitamin (DAILY VALUE MULTIVITAMIN) TABS Take by mouth      Multiple Vitamins-Minerals (MULTI ADULT GUMMIES PO) Take by mouth      oxybutynin (DITROPAN-XL) 5 mg 24 hr tablet TAKE 1 TABLET BY MOUTH  DAILY 90 tablet 3    pantoprazole (PROTONIX) 40 mg tablet TAKE 1 TABLET BY MOUTH  DAILY 90 tablet 3    pregabalin (LYRICA) 75 mg capsule Take 1 capsule (75 mg total) by mouth 3 (three) times a day 270 capsule 0    Probiotic Product (Align) 4 MG CAPS Take by mouth      simvastatin (ZOCOR) 10 mg tablet TAKE 1 TABLET BY MOUTH  DAILY AT BEDTIME 90 tablet 3    topiramate (TOPAMAX) 25 mg tablet TAKE 1 TABLET BY MOUTH  DAILY 90 tablet 3    Tresiba FlexTouch 100 units/mL injection pen INJECT 80 UNITS  SUBCUTANEOUSLY DAILY AT  BEDTIME 75 mL 3     No current facility-administered medications for this visit       Allergies   Allergen Reactions    Pioglitazone Other (See Comments)     "Feels like she is dying"    Ibuprofen     Levofloxacin Other (See Comments)     Other reaction(s): rash    Nsaids       Immunizations:     Immunization History   Administered Date(s) Administered    COVID-19 MODERNA VACC 0 5 ML IM 02/09/2021, 03/09/2021    INFLUENZA 12/01/2008, 09/29/2010, 12/03/2015, 10/13/2016, 09/21/2017    Influenza Split 10/17/2013    Influenza, high dose seasonal 0 7 mL 10/25/2018, 11/07/2019, 10/30/2020, 11/12/2021    Influenza, seasonal, injectable 09/22/2011, 10/25/2012, 10/02/2017    Pneumococcal Conjugate 13-Valent 04/28/2015    Pneumococcal Polysaccharide PPV23 01/01/2006, 05/05/2016    Tdap 09/05/2017    Zoster 05/23/2017, 05/31/2017    Zoster Vaccine Recombinant 05/01/2019, 07/02/2019      Health Maintenance:         Topic Date Due    DXA SCAN  07/14/2022    Breast Cancer Screening: Mammogram  04/25/2023    Hepatitis C Screening  Completed         Topic Date Due    COVID-19 Vaccine (3 - Booster for Clau Sida series) 08/09/2021    Influenza Vaccine (1) 09/01/2022      Medicare Screening Tests and Risk Assessments:     Yamilet Rivero is here for her Subsequent Wellness visit  Health Risk Assessment:   Patient rates overall health as good  Patient feels that their physical health rating is same  Eyesight was rated as slightly worse  Hearing was rated as same  Patient feels that their emotional and mental health rating is same  Patients states they are sometimes angry  Patient states they are sometimes unusually tired/fatigued  Pain experienced in the last 7 days has been some  Patient's pain rating has been 3/10  Patient states that she has experienced no weight loss or gain in last 6 months  Chronic low back pain     Depression Screening:   PHQ-2 Score: 0      Fall Risk Screening: In the past year, patient has experienced: no history of falling in past year      Urinary Incontinence Screening:   Patient has leaked urine accidently in the last six months       Home Safety:  Patient has trouble with stairs inside or outside of their home  Patient has working smoke alarms and has working carbon monoxide detector  Home safety hazards include: none  Nutrition:   Current diet is Diabetic and No Added Salt  Medications:   Patient is currently taking over-the-counter supplements  OTC medications include: see medication list  Patient is able to manage medications  Activities of Daily Living (ADLs)/Instrumental Activities of Daily Living (IADLs):   Walk and transfer into and out of bed and chair?: Yes  Dress and groom yourself?: Yes    Bathe or shower yourself?: Yes    Feed yourself? Yes  Do your laundry/housekeeping?: Yes  Manage your money, pay your bills and track your expenses?: Yes  Make your own meals?: Yes    Do your own shopping?: Yes    Previous Hospitalizations:   Any hospitalizations or ED visits within the last 12 months?: No      Advance Care Planning:   Living will: Yes    Durable POA for healthcare:  Yes    Advanced directive: Yes      Cognitive Screening:   Provider or family/friend/caregiver concerned regarding cognition?: No    PREVENTIVE SCREENINGS      Cardiovascular Screening:    General: Screening Not Indicated and History Lipid Disorder      Diabetes Screening:     General: Screening Not Indicated and History Diabetes      Colorectal Cancer Screening:     General: Screening Not Indicated      Breast Cancer Screening:     General: Screening Current      Cervical Cancer Screening:    General: Screening Not Indicated      Osteoporosis Screening:    General: Screening Current    Due for: DXA Appendicular      Abdominal Aortic Aneurysm (AAA) Screening:        General: Screening Not Indicated      Lung Cancer Screening:     General: Screening Not Indicated      Hepatitis C Screening:    General: Screening Current    Screening, Brief Intervention, and Referral to Treatment (SBIRT)    Screening  Typical number of drinks in a day: 0  Typical number of drinks in a week: 0  Interpretation: Low risk drinking behavior  AUDIT-C Screenin) How often did you have a drink containing alcohol in the past year? never  2) How many drinks did you have on a typical day when you were drinking in the past year? 0  3) How often did you have 6 or more drinks on one occasion in the past year? never    AUDIT-C Score: 0  Interpretation: Score 0-2 (female): Negative screen for alcohol misuse    Single Item Drug Screening:  How often have you used an illegal drug (including marijuana) or a prescription medication for non-medical reasons in the past year? never    Single Item Drug Screen Score: 0  Interpretation: Negative screen for possible drug use disorder    Brief Intervention  Alcohol & drug use screenings were reviewed  No concerns regarding substance use disorder identified  No exam data present     Physical Exam:     /76 (BP Location: Left arm, Patient Position: Sitting, Cuff Size: Adult)   Pulse 78   Temp 98 6 °F (37 °C)   Ht 5' 2" (1 575 m)   Wt 112 kg (246 lb)   SpO2 97%   BMI 44 99 kg/m²     Physical Exam  Vitals and nursing note reviewed  Constitutional:       General: She is not in acute distress  Appearance: She is well-developed  HENT:      Head: Normocephalic and atraumatic  Nose: No congestion  Mouth/Throat:      Pharynx: No oropharyngeal exudate  Eyes:      Conjunctiva/sclera: Conjunctivae normal    Cardiovascular:      Rate and Rhythm: Normal rate and regular rhythm  Heart sounds: No murmur heard  Pulmonary:      Effort: Pulmonary effort is normal  No respiratory distress  Breath sounds: Normal breath sounds  Abdominal:      Palpations: Abdomen is soft  Tenderness: There is no abdominal tenderness  Musculoskeletal:      Cervical back: Neck supple  Skin:     General: Skin is warm and dry  Findings: No erythema or rash  Neurological:      Mental Status: She is alert        Gait: Gait abnormal           Esthela Lerma MD

## 2022-07-12 NOTE — ASSESSMENT & PLAN NOTE
BMI 44 99 a discussed with the patient low carb diet low-fat diet increase physical activity and portion control

## 2022-07-12 NOTE — ASSESSMENT & PLAN NOTE
A chronic asymptomatic patient already on statin and aspirin she is due for carotid duplex ordered today

## 2022-07-12 NOTE — ASSESSMENT & PLAN NOTE
A chronic stable no recent exacerbation patient using recommend she tolerated well without side effect and happy with the result discussed proper postural and important lose weight

## 2022-07-12 NOTE — ASSESSMENT & PLAN NOTE
A finding on echocardiogram on 2020 patient asymptomatic and she is already on Ace inhibitor statin aspirin a plan to repeat echocardiogram

## 2022-07-12 NOTE — ASSESSMENT & PLAN NOTE
Chronic no recent fall patient use walker all the time fall precaution home safety environment discussed with the patient

## 2022-07-12 NOTE — ASSESSMENT & PLAN NOTE
A chronic asymptomatic patient on pantoprazole tolerated well without side effect the discussed important lose weight avoid provoke food do not eat and lie down

## 2022-07-26 ENCOUNTER — APPOINTMENT (OUTPATIENT)
Dept: LAB | Facility: IMAGING CENTER | Age: 77
End: 2022-07-26
Payer: MEDICARE

## 2022-07-26 DIAGNOSIS — I12.9 PARENCHYMAL RENAL HYPERTENSION, STAGE 1 THROUGH STAGE 4 OR UNSPECIFIED CHRONIC KIDNEY DISEASE: ICD-10-CM

## 2022-07-26 DIAGNOSIS — E83.42 HYPOMAGNESEMIA: ICD-10-CM

## 2022-07-26 DIAGNOSIS — R80.9 PROTEINURIA, UNSPECIFIED TYPE: ICD-10-CM

## 2022-07-26 DIAGNOSIS — N18.9 CHRONIC KIDNEY DISEASE, UNSPECIFIED CKD STAGE: ICD-10-CM

## 2022-07-26 LAB
ALBUMIN SERPL BCP-MCNC: 3.3 G/DL (ref 3.5–5)
ANION GAP SERPL CALCULATED.3IONS-SCNC: 4 MMOL/L (ref 4–13)
BUN SERPL-MCNC: 20 MG/DL (ref 5–25)
CALCIUM ALBUM COR SERPL-MCNC: 9.6 MG/DL (ref 8.3–10.1)
CALCIUM SERPL-MCNC: 9 MG/DL (ref 8.3–10.1)
CHLORIDE SERPL-SCNC: 107 MMOL/L (ref 96–108)
CO2 SERPL-SCNC: 28 MMOL/L (ref 21–32)
CREAT SERPL-MCNC: 1.18 MG/DL (ref 0.6–1.3)
GFR SERPL CREATININE-BSD FRML MDRD: 44 ML/MIN/1.73SQ M
GLUCOSE P FAST SERPL-MCNC: 117 MG/DL (ref 65–99)
HCT VFR BLD AUTO: 42.4 % (ref 34.8–46.1)
HGB BLD-MCNC: 13.2 G/DL (ref 11.5–15.4)
MAGNESIUM SERPL-MCNC: 1.7 MG/DL (ref 1.6–2.6)
PHOSPHATE SERPL-MCNC: 3.1 MG/DL (ref 2.3–4.1)
POTASSIUM SERPL-SCNC: 4.3 MMOL/L (ref 3.5–5.3)
SODIUM SERPL-SCNC: 139 MMOL/L (ref 135–147)

## 2022-07-26 PROCEDURE — 85018 HEMOGLOBIN: CPT

## 2022-07-26 PROCEDURE — 80069 RENAL FUNCTION PANEL: CPT

## 2022-07-26 PROCEDURE — 83735 ASSAY OF MAGNESIUM: CPT

## 2022-07-26 PROCEDURE — 36415 COLL VENOUS BLD VENIPUNCTURE: CPT

## 2022-07-26 PROCEDURE — 85014 HEMATOCRIT: CPT

## 2022-08-03 ENCOUNTER — HOSPITAL ENCOUNTER (OUTPATIENT)
Dept: BONE DENSITY | Facility: CLINIC | Age: 77
Discharge: HOME | End: 2022-08-03
Payer: MEDICARE

## 2022-08-03 DIAGNOSIS — Z78.0 POST-MENOPAUSAL: ICD-10-CM

## 2022-08-03 DIAGNOSIS — Z13.820 SCREENING FOR OSTEOPOROSIS: ICD-10-CM

## 2022-08-03 PROCEDURE — 77080 DXA BONE DENSITY AXIAL: CPT

## 2022-08-04 DIAGNOSIS — Z79.4 TYPE 2 DIABETES MELLITUS WITH STAGE 3 CHRONIC KIDNEY DISEASE, WITH LONG-TERM CURRENT USE OF INSULIN (HCC): ICD-10-CM

## 2022-08-04 DIAGNOSIS — N18.30 TYPE 2 DIABETES MELLITUS WITH STAGE 3 CHRONIC KIDNEY DISEASE, WITH LONG-TERM CURRENT USE OF INSULIN (HCC): ICD-10-CM

## 2022-08-04 DIAGNOSIS — E11.22 TYPE 2 DIABETES MELLITUS WITH STAGE 3 CHRONIC KIDNEY DISEASE, WITH LONG-TERM CURRENT USE OF INSULIN (HCC): ICD-10-CM

## 2022-08-04 DIAGNOSIS — K30 INDIGESTION: ICD-10-CM

## 2022-08-04 DIAGNOSIS — I10 ESSENTIAL HYPERTENSION: ICD-10-CM

## 2022-08-04 DIAGNOSIS — E78.1 HYPERTRIGLYCERIDEMIA, ESSENTIAL: ICD-10-CM

## 2022-08-04 RX ORDER — PANTOPRAZOLE SODIUM 40 MG/1
40 TABLET, DELAYED RELEASE ORAL DAILY
Qty: 90 TABLET | Refills: 0 | Status: SHIPPED | OUTPATIENT
Start: 2022-08-04 | End: 2022-10-05

## 2022-08-04 RX ORDER — LISINOPRIL 10 MG/1
10 TABLET ORAL DAILY
Qty: 90 TABLET | Refills: 0 | Status: SHIPPED | OUTPATIENT
Start: 2022-08-04 | End: 2022-10-05

## 2022-08-04 RX ORDER — PEN NEEDLE, DIABETIC 32GX 5/32"
NEEDLE, DISPOSABLE MISCELLANEOUS
Qty: 270 EACH | Refills: 0 | Status: SHIPPED | OUTPATIENT
Start: 2022-08-04

## 2022-08-04 RX ORDER — FENOFIBRATE 160 MG/1
160 TABLET ORAL DAILY
Qty: 90 TABLET | Refills: 0 | Status: SHIPPED | OUTPATIENT
Start: 2022-08-04 | End: 2022-10-05

## 2022-08-12 ENCOUNTER — HOSPITAL ENCOUNTER (OUTPATIENT)
Dept: NON INVASIVE DIAGNOSTICS | Facility: HOSPITAL | Age: 77
Discharge: HOME | End: 2022-08-12
Payer: MEDICARE

## 2022-08-12 VITALS
DIASTOLIC BLOOD PRESSURE: 78 MMHG | HEIGHT: 62 IN | SYSTOLIC BLOOD PRESSURE: 122 MMHG | WEIGHT: 246 LBS | BODY MASS INDEX: 45.27 KG/M2 | HEART RATE: 73 BPM

## 2022-08-12 DIAGNOSIS — I65.23 BILATERAL CAROTID ARTERY STENOSIS: ICD-10-CM

## 2022-08-12 DIAGNOSIS — I51.7 LVH (LEFT VENTRICULAR HYPERTROPHY): ICD-10-CM

## 2022-08-12 LAB
AORTIC ROOT: 3.3 CM
APICAL FOUR CHAMBER EJECTION FRACTION: 54 %
ASCENDING AORTA: 3.2 CM
AV LVOT MEAN GRADIENT: 3 MMHG
AV LVOT PEAK GRADIENT: 5 MMHG
DOP CALC LVOT PEAK VEL VTI: 27.72 CM
DOP CALC LVOT PEAK VEL: 1.16 M/S
DOP CALC RVOT PEAK VEL: 0.91 M/S
DOP CALC RVOT VTI: 22.56 CM
E WAVE DECELERATION TIME: 215 MS
FRACTIONAL SHORTENING: 34 (ref 28–44)
INTERVENTRICULAR SEPTUM IN DIASTOLE (PARASTERNAL SHORT AXIS VIEW): 1.1 CM
INTERVENTRICULAR SEPTUM: 1.1 CM (ref 0.6–1.1)
LAAS-AP4: 17.2 CM2
LEFT ATRIUM SIZE: 4.4 CM
LEFT INTERNAL DIMENSION IN SYSTOLE: 3.3 CM (ref 2.1–4)
LEFT VENTRICULAR INTERNAL DIMENSION IN DIASTOLE: 5 CM (ref 3.5–6)
LEFT VENTRICULAR POSTERIOR WALL IN END DIASTOLE: 1.1 CM
LEFT VENTRICULAR STROKE VOLUME: 73 ML
LVSV (TEICH): 73 ML
MV E'TISSUE VEL-SEP: 8 CM/S
MV PEAK A VEL: 0.87 M/S
MV PEAK E VEL: 70 CM/S
MV STENOSIS PRESSURE HALF TIME: 62 MS
MV VALVE AREA P 1/2 METHOD: 3.55
PV MEAN GRADIENT: 2 MMHG
PV MEAN VELOCITY: 67 CM/S
PV PEAK GRADIENT: 4 MMHG
PV PEAK VELOCITY: 97 CM/S
PV VTI: 23.53 CM
RIGHT VENTRICLE ID DIMENSION: 3 CM
SL CV LV EF: 60
SL CV PED ECHO LEFT VENTRICLE DIASTOLIC VOLUME (MOD BIPLANE) 2D: 116 ML
SL CV PED ECHO LEFT VENTRICLE SYSTOLIC VOLUME (MOD BIPLANE) 2D: 44 ML
SL CV RVOT MAX GRADIENT: 3 MMHG
SL CV RVOT MEAN GRADIENT: 2 MMHG
SL CV RVOT VMEAN: 0.59 M/S
TR MAX PG: 21 MMHG
TR PEAK VELOCITY: 2.3 M/S
TRICUSPID VALVE PEAK REGURGITATION VELOCITY: 2.31 M/S

## 2022-08-12 PROCEDURE — 93880 EXTRACRANIAL BILAT STUDY: CPT

## 2022-08-12 PROCEDURE — 93306 TTE W/DOPPLER COMPLETE: CPT | Performed by: INTERNAL MEDICINE

## 2022-08-12 PROCEDURE — 93306 TTE W/DOPPLER COMPLETE: CPT

## 2022-08-12 PROCEDURE — 93880 EXTRACRANIAL BILAT STUDY: CPT | Performed by: SURGERY

## 2022-08-15 ENCOUNTER — APPOINTMENT (OUTPATIENT)
Dept: LAB | Facility: IMAGING CENTER | Age: 77
End: 2022-08-15
Payer: MEDICARE

## 2022-08-15 ENCOUNTER — TELEPHONE (OUTPATIENT)
Dept: FAMILY MEDICINE CLINIC | Facility: CLINIC | Age: 77
End: 2022-08-15

## 2022-08-15 DIAGNOSIS — N18.32 TYPE 2 DIABETES MELLITUS WITH STAGE 3B CHRONIC KIDNEY DISEASE, WITH LONG-TERM CURRENT USE OF INSULIN (HCC): ICD-10-CM

## 2022-08-15 DIAGNOSIS — I65.23 BILATERAL CAROTID ARTERY STENOSIS: ICD-10-CM

## 2022-08-15 DIAGNOSIS — E78.1 HYPERTRIGLYCERIDEMIA, ESSENTIAL: ICD-10-CM

## 2022-08-15 DIAGNOSIS — E66.01 MORBID OBESITY (HCC): ICD-10-CM

## 2022-08-15 DIAGNOSIS — I51.7 LVH (LEFT VENTRICULAR HYPERTROPHY): ICD-10-CM

## 2022-08-15 DIAGNOSIS — E11.22 TYPE 2 DIABETES MELLITUS WITH STAGE 3B CHRONIC KIDNEY DISEASE, WITH LONG-TERM CURRENT USE OF INSULIN (HCC): ICD-10-CM

## 2022-08-15 DIAGNOSIS — I10 ESSENTIAL HYPERTENSION: ICD-10-CM

## 2022-08-15 DIAGNOSIS — K30 INDIGESTION: ICD-10-CM

## 2022-08-15 DIAGNOSIS — Z79.4 TYPE 2 DIABETES MELLITUS WITH STAGE 3B CHRONIC KIDNEY DISEASE, WITH LONG-TERM CURRENT USE OF INSULIN (HCC): ICD-10-CM

## 2022-08-15 LAB
ANION GAP SERPL CALCULATED.3IONS-SCNC: 4 MMOL/L (ref 4–13)
BASOPHILS # BLD AUTO: 0.02 THOUSANDS/ΜL (ref 0–0.1)
BASOPHILS NFR BLD AUTO: 0 % (ref 0–1)
BUN SERPL-MCNC: 21 MG/DL (ref 5–25)
CALCIUM SERPL-MCNC: 9.4 MG/DL (ref 8.3–10.1)
CHLORIDE SERPL-SCNC: 108 MMOL/L (ref 96–108)
CHOLEST SERPL-MCNC: 160 MG/DL
CO2 SERPL-SCNC: 28 MMOL/L (ref 21–32)
CREAT SERPL-MCNC: 1.18 MG/DL (ref 0.6–1.3)
EOSINOPHIL # BLD AUTO: 0.11 THOUSAND/ΜL (ref 0–0.61)
EOSINOPHIL NFR BLD AUTO: 2 % (ref 0–6)
ERYTHROCYTE [DISTWIDTH] IN BLOOD BY AUTOMATED COUNT: 13.2 % (ref 11.6–15.1)
GFR SERPL CREATININE-BSD FRML MDRD: 44 ML/MIN/1.73SQ M
GLUCOSE P FAST SERPL-MCNC: 103 MG/DL (ref 65–99)
HCT VFR BLD AUTO: 42.6 % (ref 34.8–46.1)
HDLC SERPL-MCNC: 41 MG/DL
HGB BLD-MCNC: 13 G/DL (ref 11.5–15.4)
IMM GRANULOCYTES # BLD AUTO: 0.04 THOUSAND/UL (ref 0–0.2)
IMM GRANULOCYTES NFR BLD AUTO: 1 % (ref 0–2)
LDLC SERPL CALC-MCNC: 83 MG/DL (ref 0–100)
LYMPHOCYTES # BLD AUTO: 1.34 THOUSANDS/ΜL (ref 0.6–4.47)
LYMPHOCYTES NFR BLD AUTO: 18 % (ref 14–44)
MCH RBC QN AUTO: 28.9 PG (ref 26.8–34.3)
MCHC RBC AUTO-ENTMCNC: 30.5 G/DL (ref 31.4–37.4)
MCV RBC AUTO: 95 FL (ref 82–98)
MONOCYTES # BLD AUTO: 0.48 THOUSAND/ΜL (ref 0.17–1.22)
MONOCYTES NFR BLD AUTO: 6 % (ref 4–12)
NEUTROPHILS # BLD AUTO: 5.53 THOUSANDS/ΜL (ref 1.85–7.62)
NEUTS SEG NFR BLD AUTO: 73 % (ref 43–75)
NRBC BLD AUTO-RTO: 0 /100 WBCS
PLATELET # BLD AUTO: 154 THOUSANDS/UL (ref 149–390)
PMV BLD AUTO: 12.3 FL (ref 8.9–12.7)
POTASSIUM SERPL-SCNC: 4.3 MMOL/L (ref 3.5–5.3)
RBC # BLD AUTO: 4.5 MILLION/UL (ref 3.81–5.12)
SODIUM SERPL-SCNC: 140 MMOL/L (ref 135–147)
TRIGL SERPL-MCNC: 178 MG/DL
WBC # BLD AUTO: 7.52 THOUSAND/UL (ref 4.31–10.16)

## 2022-08-15 PROCEDURE — 85025 COMPLETE CBC W/AUTO DIFF WBC: CPT

## 2022-08-15 PROCEDURE — 36415 COLL VENOUS BLD VENIPUNCTURE: CPT

## 2022-08-15 PROCEDURE — 80048 BASIC METABOLIC PNL TOTAL CA: CPT

## 2022-08-15 PROCEDURE — 80061 LIPID PANEL: CPT

## 2022-08-15 PROCEDURE — 83036 HEMOGLOBIN GLYCOSYLATED A1C: CPT

## 2022-08-15 NOTE — TELEPHONE ENCOUNTER
----- Message from Ericksendy Gerardo, [address] sent at 8/12/2022  2:30 PM EDT -----  Carotid study showed no interval change from previous study in 2020

## 2022-08-16 LAB
EST. AVERAGE GLUCOSE BLD GHB EST-MCNC: 163 MG/DL
HBA1C MFR BLD: 7.3 %

## 2022-08-23 ENCOUNTER — OFFICE VISIT (OUTPATIENT)
Dept: FAMILY MEDICINE CLINIC | Facility: CLINIC | Age: 77
End: 2022-08-23
Payer: MEDICARE

## 2022-08-23 VITALS
HEIGHT: 62 IN | DIASTOLIC BLOOD PRESSURE: 60 MMHG | RESPIRATION RATE: 16 BRPM | TEMPERATURE: 98.4 F | OXYGEN SATURATION: 97 % | BODY MASS INDEX: 45.45 KG/M2 | SYSTOLIC BLOOD PRESSURE: 102 MMHG | WEIGHT: 247 LBS | HEART RATE: 78 BPM

## 2022-08-23 DIAGNOSIS — Z99.89 OBSTRUCTIVE SLEEP APNEA TREATED WITH CONTINUOUS POSITIVE AIRWAY PRESSURE (CPAP): ICD-10-CM

## 2022-08-23 DIAGNOSIS — N18.31 TYPE 2 DIABETES MELLITUS WITH STAGE 3A CHRONIC KIDNEY DISEASE, WITH LONG-TERM CURRENT USE OF INSULIN (HCC): ICD-10-CM

## 2022-08-23 DIAGNOSIS — M85.88 OSTEOPENIA OF SPINE: Primary | ICD-10-CM

## 2022-08-23 DIAGNOSIS — I51.7 LVH (LEFT VENTRICULAR HYPERTROPHY): ICD-10-CM

## 2022-08-23 DIAGNOSIS — G47.33 OBSTRUCTIVE SLEEP APNEA TREATED WITH CONTINUOUS POSITIVE AIRWAY PRESSURE (CPAP): ICD-10-CM

## 2022-08-23 DIAGNOSIS — G43.009 MIGRAINE WITHOUT AURA AND WITHOUT STATUS MIGRAINOSUS, NOT INTRACTABLE: ICD-10-CM

## 2022-08-23 DIAGNOSIS — E11.22 TYPE 2 DIABETES MELLITUS WITH STAGE 3A CHRONIC KIDNEY DISEASE, WITH LONG-TERM CURRENT USE OF INSULIN (HCC): ICD-10-CM

## 2022-08-23 DIAGNOSIS — Z79.4 TYPE 2 DIABETES MELLITUS WITH STAGE 3A CHRONIC KIDNEY DISEASE, WITH LONG-TERM CURRENT USE OF INSULIN (HCC): ICD-10-CM

## 2022-08-23 DIAGNOSIS — E55.9 VITAMIN D DEFICIENCY: ICD-10-CM

## 2022-08-23 PROCEDURE — 99214 OFFICE O/P EST MOD 30 MIN: CPT | Performed by: FAMILY MEDICINE

## 2022-08-23 NOTE — PROGRESS NOTES
Subjective:   Chief Complaint   Patient presents with    Follow-up     Chronic conditions        Patient ID: Nikolai Nuñez is a 65 y o  female  Patient here follow-up with a chronic condition patient compliant with the medication tolerated well without side effect no new concern patient try to watch for her diet having hard time to lose weight but it is improving slowly recent DEXA scan review with the patient also carotid artery ultrasounds result review with the patient      The following portions of the patient's history were reviewed and updated as appropriate: allergies, current medications, past family history, past medical history, past social history, past surgical history and problem list     Review of Systems   Constitutional: Negative for chills and fever  HENT: Negative for ear pain and sore throat  Eyes: Negative for pain and visual disturbance  Respiratory: Negative for cough and shortness of breath  Cardiovascular: Negative for chest pain and palpitations  Gastrointestinal: Negative for abdominal pain and vomiting  Genitourinary: Negative for dysuria and hematuria  Musculoskeletal: Negative for arthralgias and back pain  Skin: Negative for color change and rash  Neurological: Negative for seizures and syncope  All other systems reviewed and are negative  Objective:  Vitals:    08/23/22 1321   BP: 102/60   BP Location: Left arm   Patient Position: Sitting   Cuff Size: Large   Pulse: 78   Resp: 16   Temp: 98 4 °F (36 9 °C)   TempSrc: Tympanic   SpO2: 97%   Weight: 112 kg (247 lb)   Height: 5' 2" (1 575 m)      Physical Exam  Constitutional:       General: She is not in acute distress  Appearance: She is well-developed  HENT:      Head: Normocephalic and atraumatic  Mouth/Throat:      Pharynx: Oropharynx is clear  Eyes:      Conjunctiva/sclera: Conjunctivae normal    Neck:      Vascular: No JVD     Cardiovascular:      Rate and Rhythm: Normal rate and regular rhythm  Heart sounds: Normal heart sounds  No murmur heard  No friction rub  No gallop  Pulmonary:      Effort: Pulmonary effort is normal       Breath sounds: Normal breath sounds  Abdominal:      General: Bowel sounds are normal       Palpations: Abdomen is soft  Tenderness: There is no abdominal tenderness  Skin:     Findings: No erythema or rash  Neurological:      Mental Status: She is oriented to person, place, and time  Assessment/Plan:    Migraine without aura, not intractable  chronic well controlled asymptomatic patient on Topamax tolerated well without side effect the rarely get headache less than twice the months  - Maintain good sleep hygiene  Going to bed and waking up at consistent times, avoiding excessive daytime naps, avoiding caffeinated beverages in the evening, avoid excessive stimulation in the evening and generally using bed primarily for sleeping  One hour before bedtime would recommend turning lights down lower, decreasing your activity (may read quietly, listen to music at a low volume)  When you get into bed, should eliminate all technology (no texting, emailing, playing with your phone, iPad or tablet in bed)  - Maintain good hydration  Drink  2L of fluid a day (4 typical small water bottles)  - Maintain good nutrition  In particular don't skip meals and eat balanced meals regularly      Obstructive sleep apnea treated with continuous positive airway pressure (CPAP)  Chronic on CPAP machine discussed important the weight loss and sleep position    Osteopenia of spine  Chronic no history of fracture she had DEXA scan recently review with the patient recommend to continue calcium and vitamin-D supplement    Vitamin D deficiency  Chronic asymptomatic continue vitamin-D supplement vitamin-D rich diet discussed the patient       Diagnoses and all orders for this visit:    Osteopenia of spine  -     CBC and differential; Future  -     Basic metabolic panel; Future  -     Lipid panel; Future  -     TSH, 3rd generation with Free T4 reflex; Future  -     Hemoglobin A1C; Future    Migraine without aura and without status migrainosus, not intractable    Obstructive sleep apnea treated with continuous positive airway pressure (CPAP)    Vitamin D deficiency    LVH (left ventricular hypertrophy)  -     CBC and differential; Future  -     Basic metabolic panel; Future  -     Lipid panel; Future  -     TSH, 3rd generation with Free T4 reflex; Future  -     Hemoglobin A1C; Future    Type 2 diabetes mellitus with stage 3a chronic kidney disease, with long-term current use of insulin (HCC)  -     CBC and differential; Future  -     Basic metabolic panel; Future  -     Lipid panel; Future  -     TSH, 3rd generation with Free T4 reflex;  Future  -     Hemoglobin A1C; Future

## 2022-08-25 NOTE — ASSESSMENT & PLAN NOTE
chronic well controlled asymptomatic patient on Topamax tolerated well without side effect the rarely get headache less than twice the months  - Maintain good sleep hygiene  Going to bed and waking up at consistent times, avoiding excessive daytime naps, avoiding caffeinated beverages in the evening, avoid excessive stimulation in the evening and generally using bed primarily for sleeping  One hour before bedtime would recommend turning lights down lower, decreasing your activity (may read quietly, listen to music at a low volume)  When you get into bed, should eliminate all technology (no texting, emailing, playing with your phone, iPad or tablet in bed)  - Maintain good hydration  Drink  2L of fluid a day (4 typical small water bottles)  - Maintain good nutrition  In particular don't skip meals and eat balanced meals regularly

## 2022-08-25 NOTE — ASSESSMENT & PLAN NOTE
Chronic no history of fracture she had DEXA scan recently review with the patient recommend to continue calcium and vitamin-D supplement

## 2022-09-01 DIAGNOSIS — K58.8 OTHER IRRITABLE BOWEL SYNDROME: ICD-10-CM

## 2022-09-01 DIAGNOSIS — M54.42 CHRONIC BILATERAL LOW BACK PAIN WITH BILATERAL SCIATICA: ICD-10-CM

## 2022-09-01 DIAGNOSIS — G89.29 CHRONIC BILATERAL LOW BACK PAIN WITH BILATERAL SCIATICA: ICD-10-CM

## 2022-09-01 DIAGNOSIS — M54.41 CHRONIC BILATERAL LOW BACK PAIN WITH BILATERAL SCIATICA: ICD-10-CM

## 2022-09-02 RX ORDER — PREGABALIN 75 MG/1
75 CAPSULE ORAL 3 TIMES DAILY
Qty: 270 CAPSULE | Refills: 0 | Status: SHIPPED | OUTPATIENT
Start: 2022-09-02

## 2022-09-02 RX ORDER — DICYCLOMINE HCL 20 MG
20 TABLET ORAL 2 TIMES DAILY
Qty: 180 TABLET | Refills: 0 | Status: SHIPPED | OUTPATIENT
Start: 2022-09-02

## 2022-09-23 ENCOUNTER — TELEPHONE (OUTPATIENT)
Dept: FAMILY MEDICINE CLINIC | Facility: CLINIC | Age: 77
End: 2022-09-23

## 2022-09-23 NOTE — TELEPHONE ENCOUNTER
Patient would like to speak with you regarding incontinence supplies and HUD that she needs annually please contact patient

## 2022-09-23 NOTE — LETTER
Re:Alberta Martinez  : 1945  [facility], [address] [place] [address]                To whom It may concern,    Stephie Ayoub is a current patient of our practice   She was seen 2022  Patient suffers from Urine Incontinence which requiresher to purchase incontinence supplies adult briefs and bed protectors  If you have any questions please feel free to contact my office           Sincerely,    Alex Gomez MD [facility] Director

## 2022-10-12 PROBLEM — R05.9 COUGH: Status: RESOLVED | Noted: 2022-01-21 | Resolved: 2022-10-12

## 2022-10-14 DIAGNOSIS — E11.22 TYPE 2 DIABETES MELLITUS WITH STAGE 3 CHRONIC KIDNEY DISEASE, WITH LONG-TERM CURRENT USE OF INSULIN (HCC): ICD-10-CM

## 2022-10-14 DIAGNOSIS — Z79.4 TYPE 2 DIABETES MELLITUS WITH STAGE 3 CHRONIC KIDNEY DISEASE, WITH LONG-TERM CURRENT USE OF INSULIN (HCC): ICD-10-CM

## 2022-10-14 DIAGNOSIS — N18.30 TYPE 2 DIABETES MELLITUS WITH STAGE 3 CHRONIC KIDNEY DISEASE, WITH LONG-TERM CURRENT USE OF INSULIN (HCC): ICD-10-CM

## 2022-10-17 RX ORDER — INSULIN DEGLUDEC INJECTION 100 U/ML
INJECTION, SOLUTION SUBCUTANEOUS
Qty: 75 ML | Refills: 3 | Status: SHIPPED | OUTPATIENT
Start: 2022-10-17

## 2022-11-02 DIAGNOSIS — G89.29 CHRONIC BILATERAL LOW BACK PAIN WITH BILATERAL SCIATICA: ICD-10-CM

## 2022-11-02 DIAGNOSIS — M54.42 CHRONIC BILATERAL LOW BACK PAIN WITH BILATERAL SCIATICA: ICD-10-CM

## 2022-11-02 DIAGNOSIS — M54.41 CHRONIC BILATERAL LOW BACK PAIN WITH BILATERAL SCIATICA: ICD-10-CM

## 2022-11-02 DIAGNOSIS — K30 INDIGESTION: ICD-10-CM

## 2022-11-02 RX ORDER — PANTOPRAZOLE SODIUM 40 MG/1
40 TABLET, DELAYED RELEASE ORAL DAILY
Qty: 90 TABLET | Refills: 0 | Status: SHIPPED | OUTPATIENT
Start: 2022-11-02

## 2022-11-02 RX ORDER — PREGABALIN 75 MG/1
75 CAPSULE ORAL 3 TIMES DAILY
Qty: 270 CAPSULE | Refills: 0 | Status: SHIPPED | OUTPATIENT
Start: 2022-11-02

## 2022-11-21 ENCOUNTER — APPOINTMENT (OUTPATIENT)
Dept: LAB | Facility: IMAGING CENTER | Age: 77
End: 2022-11-21

## 2022-11-21 DIAGNOSIS — I51.7 LVH (LEFT VENTRICULAR HYPERTROPHY): ICD-10-CM

## 2022-11-21 DIAGNOSIS — Z79.4 TYPE 2 DIABETES MELLITUS WITH STAGE 3A CHRONIC KIDNEY DISEASE, WITH LONG-TERM CURRENT USE OF INSULIN (HCC): ICD-10-CM

## 2022-11-21 DIAGNOSIS — E11.22 TYPE 2 DIABETES MELLITUS WITH STAGE 3A CHRONIC KIDNEY DISEASE, WITH LONG-TERM CURRENT USE OF INSULIN (HCC): ICD-10-CM

## 2022-11-21 DIAGNOSIS — N18.31 TYPE 2 DIABETES MELLITUS WITH STAGE 3A CHRONIC KIDNEY DISEASE, WITH LONG-TERM CURRENT USE OF INSULIN (HCC): ICD-10-CM

## 2022-11-21 DIAGNOSIS — M85.88 OSTEOPENIA OF SPINE: ICD-10-CM

## 2022-11-21 LAB
BASOPHILS # BLD AUTO: 0.03 THOUSANDS/ÂΜL (ref 0–0.1)
BASOPHILS NFR BLD AUTO: 1 % (ref 0–1)
EOSINOPHIL # BLD AUTO: 0.09 THOUSAND/ÂΜL (ref 0–0.61)
EOSINOPHIL NFR BLD AUTO: 2 % (ref 0–6)
ERYTHROCYTE [DISTWIDTH] IN BLOOD BY AUTOMATED COUNT: 13.3 % (ref 11.6–15.1)
EST. AVERAGE GLUCOSE BLD GHB EST-MCNC: 148 MG/DL
HBA1C MFR BLD: 6.8 %
HCT VFR BLD AUTO: 40.7 % (ref 34.8–46.1)
HGB BLD-MCNC: 12.2 G/DL (ref 11.5–15.4)
IMM GRANULOCYTES # BLD AUTO: 0.04 THOUSAND/UL (ref 0–0.2)
IMM GRANULOCYTES NFR BLD AUTO: 1 % (ref 0–2)
LYMPHOCYTES # BLD AUTO: 1.16 THOUSANDS/ÂΜL (ref 0.6–4.47)
LYMPHOCYTES NFR BLD AUTO: 19 % (ref 14–44)
MCH RBC QN AUTO: 28.2 PG (ref 26.8–34.3)
MCHC RBC AUTO-ENTMCNC: 30 G/DL (ref 31.4–37.4)
MCV RBC AUTO: 94 FL (ref 82–98)
MONOCYTES # BLD AUTO: 0.36 THOUSAND/ÂΜL (ref 0.17–1.22)
MONOCYTES NFR BLD AUTO: 6 % (ref 4–12)
NEUTROPHILS # BLD AUTO: 4.36 THOUSANDS/ÂΜL (ref 1.85–7.62)
NEUTS SEG NFR BLD AUTO: 71 % (ref 43–75)
NRBC BLD AUTO-RTO: 0 /100 WBCS
PLATELET # BLD AUTO: 166 THOUSANDS/UL (ref 149–390)
PMV BLD AUTO: 12.4 FL (ref 8.9–12.7)
RBC # BLD AUTO: 4.33 MILLION/UL (ref 3.81–5.12)
WBC # BLD AUTO: 6.04 THOUSAND/UL (ref 4.31–10.16)

## 2022-11-22 ENCOUNTER — RA CDI HCC (OUTPATIENT)
Dept: OTHER | Facility: HOSPITAL | Age: 77
End: 2022-11-22

## 2022-11-22 LAB
ANION GAP SERPL CALCULATED.3IONS-SCNC: 6 MMOL/L (ref 4–13)
BUN SERPL-MCNC: 21 MG/DL (ref 5–25)
CALCIUM SERPL-MCNC: 9.5 MG/DL (ref 8.3–10.1)
CHLORIDE SERPL-SCNC: 110 MMOL/L (ref 96–108)
CHOLEST SERPL-MCNC: 123 MG/DL
CO2 SERPL-SCNC: 25 MMOL/L (ref 21–32)
CREAT SERPL-MCNC: 1.2 MG/DL (ref 0.6–1.3)
GFR SERPL CREATININE-BSD FRML MDRD: 43 ML/MIN/1.73SQ M
GLUCOSE P FAST SERPL-MCNC: 112 MG/DL (ref 65–99)
HDLC SERPL-MCNC: 41 MG/DL
LDLC SERPL CALC-MCNC: 55 MG/DL (ref 0–100)
NONHDLC SERPL-MCNC: 82 MG/DL
POTASSIUM SERPL-SCNC: 4.1 MMOL/L (ref 3.5–5.3)
SODIUM SERPL-SCNC: 141 MMOL/L (ref 135–147)
TRIGL SERPL-MCNC: 133 MG/DL
TSH SERPL DL<=0.05 MIU/L-ACNC: 1.63 UIU/ML (ref 0.45–4.5)

## 2022-11-30 ENCOUNTER — OFFICE VISIT (OUTPATIENT)
Dept: FAMILY MEDICINE CLINIC | Facility: CLINIC | Age: 77
End: 2022-11-30

## 2022-11-30 VITALS
OXYGEN SATURATION: 96 % | BODY MASS INDEX: 41.83 KG/M2 | TEMPERATURE: 97.7 F | WEIGHT: 245 LBS | DIASTOLIC BLOOD PRESSURE: 70 MMHG | SYSTOLIC BLOOD PRESSURE: 120 MMHG | HEIGHT: 64 IN | HEART RATE: 82 BPM

## 2022-11-30 DIAGNOSIS — N18.31 CHRONIC RENAL IMPAIRMENT, STAGE 3A (HCC): ICD-10-CM

## 2022-11-30 DIAGNOSIS — I10 ESSENTIAL HYPERTENSION: ICD-10-CM

## 2022-11-30 DIAGNOSIS — N18.32 TYPE 2 DIABETES MELLITUS WITH STAGE 3B CHRONIC KIDNEY DISEASE, WITH LONG-TERM CURRENT USE OF INSULIN (HCC): Primary | ICD-10-CM

## 2022-11-30 DIAGNOSIS — Z79.4 TYPE 2 DIABETES MELLITUS WITH STAGE 3B CHRONIC KIDNEY DISEASE, WITH LONG-TERM CURRENT USE OF INSULIN (HCC): Primary | ICD-10-CM

## 2022-11-30 DIAGNOSIS — E11.22 TYPE 2 DIABETES MELLITUS WITH STAGE 3B CHRONIC KIDNEY DISEASE, WITH LONG-TERM CURRENT USE OF INSULIN (HCC): Primary | ICD-10-CM

## 2022-11-30 DIAGNOSIS — Z23 NEED FOR INFLUENZA VACCINATION: ICD-10-CM

## 2022-11-30 DIAGNOSIS — E78.2 MIXED HYPERLIPIDEMIA: ICD-10-CM

## 2022-11-30 NOTE — PROGRESS NOTES
Name: Sara Burch      : 1945      MRN: [id number]  Encounter Provider: Gayla Jack MD  Encounter Date: 2022   Encounter department: [place] [address]  Type 2 diabetes mellitus with stage 3b chronic kidney disease, with long-term current use of insulin (Formerly [facility])  Assessment & Plan:    Lab Results   Component Value Date    HGBA1C 6 8 (H) 2022   Chronic asymptomatic improve in the hemoglobin A1c compared with before a continue current management discussed low carb diet important lose weight patient already on statin and on lisinopril    Orders:  -     CBC and differential; Future; Expected date: 2023  -     Comprehensive metabolic panel; Future; Expected date: 2023  -     Lipid panel; Future; Expected date: 2023  -     TSH, 3rd generation with Free T4 reflex; Future; Expected date: 2023  -     Hemoglobin A1C; Future; Expected date: 2023    2  Need for influenza vaccination  Comments:  Benefits versus side effect discussed the patient  Orders:  -     FLUZONE HIGH-DOSE: influenza vaccine, high-dose, preservative-free 0 7 mL    3  Essential hypertension  Assessment & Plan:  Chronic asymptomatic fair control continue lisinopril 10 mg once a day low-salt diet important lose weight discussed the patient    Orders:  -     CBC and differential; Future; Expected date: 2023  -     Comprehensive metabolic panel; Future; Expected date: 2023  -     Lipid panel; Future; Expected date: 2023  -     TSH, 3rd generation with Free T4 reflex; Future; Expected date: 2023  -     Hemoglobin A1C; Future; Expected date: 2023    4   Chronic renal impairment, stage 3a [facility])  Assessment & Plan:  Lab Results   Component Value Date    EGFR 43 2022    EGFR 44 08/15/2022    EGFR 44 2022    CREATININE 1 20 2022    CREATININE 1 18 08/15/2022    CREATININE 1 18 2022     Chronic asymptomatic GFR stable discussed with the patient keep will hydration avoid nonsteroidal anti-inflammatory drugs patient follow-up with the Nephrology periodically she is at the goal for hemoglobin A1c below 7    Orders:  -     CBC and differential; Future; Expected date: 02/21/2023  -     Comprehensive metabolic panel; Future; Expected date: 02/21/2023  -     Lipid panel; Future; Expected date: 02/21/2023  -     TSH, 3rd generation with Free T4 reflex; Future; Expected date: 02/21/2023  -     Hemoglobin A1C; Future; Expected date: 02/21/2023    5  Mixed hyperlipidemia  Assessment & Plan:  Chronic asymptomatic fair control LDL therapeutic in diabetic patient continue current dose of simvastatin 10 mg once a day low-fat diet review             Subjective      Patient here with her  follow-up with a chronic condition patient compliant with the medication tolerated well without side effect no new concern recent blood work discussed the patient    Review of Systems   Constitutional: Negative for chills and fever  HENT: Negative for ear pain and sore throat  Eyes: Negative for pain and visual disturbance  Respiratory: Negative for cough and shortness of breath  Cardiovascular: Negative for chest pain and palpitations  Gastrointestinal: Negative for abdominal pain and vomiting  Genitourinary: Negative for dysuria and hematuria  Skin: Negative for color change and rash  Neurological: Negative for seizures and syncope  All other systems reviewed and are negative        Current Outpatient Medications on File Prior to Visit   Medication Sig   • acetaminophen (TYLENOL) 325 mg tablet Take by mouth as needed    • albuterol (PROAIR HFA) 90 mcg/act inhaler as needed   • B Complex Vitamins (B COMPLEX 1 PO) Take by mouth   • bismuth subsalicylate (PEPTO BISMOL) 262 MG chewable tablet Chew 2 tablets (524 mg total) 2 (two) times a day as needed for diarrhea Has tolerated in the past    • Cholecalciferol (VITAMIN D3) 2000 units capsule half   • clotrimazole (LOTRIMIN) 1 % cream Apply topically 2 (two) times a day   • conjugated estrogens (Premarin) vaginal cream INSERT 1 APPLICATORFUL (1g) VAGINALLY TWO TIMES A WEEK   • Contour Next Test test strip Test blood sugar 3 times daily   • Diapers & Supplies MISC by Does not apply route 3 (three) times a day as needed (Incontinence)   • dicyclomine (BENTYL) 20 mg tablet Take 1 tablet (20 mg total) by mouth 2 (two) times a day   • EASY TOUCH LANCETS 32G/TWIST MISC Test blood sugars 3 times daily   • fenofibrate 160 MG tablet TAKE 1 TABLET BY MOUTH  DAILY   • Ferrous Sulfate (IRON) 325 (65 Fe) MG TABS Take by mouth   • Insulin Pen Needle (BD Pen Needle [place] U/F) 32G X 4 MM MISC Inject as directed 3 times daily   • insuln lispro (HumaLOG KwikPen) 200 units/mL CONCENTRATED U-200 injection pen Inject subcutaneously as directed per presciber Instructions per sliding scale-PATIENT IS TO TAKE 5 UNITS BID   • lisinopril (ZESTRIL) 10 mg tablet TAKE 1 TABLET BY MOUTH  DAILY   • loratadine (CLARITIN) 10 mg tablet Take by mouth   • Multiple Vitamin (DAILY VALUE MULTIVITAMIN) TABS Take by mouth   • Multiple Vitamins-Minerals (MULTI ADULT GUMMIES PO) Take by mouth   • oxybutynin (DITROPAN-XL) 5 mg 24 hr tablet TAKE 1 TABLET BY MOUTH  DAILY   • pantoprazole (PROTONIX) 40 mg tablet Take 1 tablet (40 mg total) by mouth daily   • pregabalin (LYRICA) 75 mg capsule Take 1 capsule (75 mg total) by mouth 3 (three) times a day   • Probiotic Product (Align) 4 MG CAPS Take by mouth   • simvastatin (ZOCOR) 10 mg tablet TAKE 1 TABLET BY MOUTH  DAILY AT BEDTIME   • topiramate (TOPAMAX) 25 mg tablet TAKE 1 TABLET BY MOUTH  DAILY   • Tresiba FlexTouch 100 units/mL injection pen INJECT 80 UNITS  SUBCUTANEOUSLY DAILY AT  BEDTIME       Objective     /70 (BP Location: Left arm, Patient Position: Sitting)   Pulse 82   Temp 97 7 °F (36 5 °C) (Tympanic)   Ht 5' 3 5" (1 613 m)   Wt 111 kg (245 lb)   SpO2 96% Breastfeeding No   BMI 42 72 kg/m²     Physical Exam  Vitals and nursing note reviewed  Constitutional:       General: She is not in acute distress  Appearance: She is well-developed and well-nourished  HENT:      Head: Normocephalic and atraumatic  Nose: No rhinorrhea  Mouth/Throat:      Pharynx: Oropharynx is clear  Eyes:      Conjunctiva/sclera: Conjunctivae normal    Neck:      Vascular: No JVD  Cardiovascular:      Rate and Rhythm: Normal rate and regular rhythm  Heart sounds: Normal heart sounds  No murmur heard  No friction rub  No gallop  Pulmonary:      Effort: Pulmonary effort is normal       Breath sounds: Normal breath sounds  Abdominal:      General: Bowel sounds are normal       Palpations: Abdomen is soft  Tenderness: There is no abdominal tenderness  Musculoskeletal:         General: No edema  Right lower leg: No edema  Left lower leg: No edema  Skin:     Findings: No erythema or rash  Neurological:      Mental Status: She is oriented to person, place, and time     Psychiatric:         Mood and Affect: Mood normal        Belgica Holly MD

## 2022-12-01 NOTE — ASSESSMENT & PLAN NOTE
Chronic asymptomatic fair control continue lisinopril 10 mg once a day low-salt diet important lose weight discussed the patient

## 2022-12-01 NOTE — ASSESSMENT & PLAN NOTE
Chronic asymptomatic fair control LDL therapeutic in diabetic patient continue current dose of simvastatin 10 mg once a day low-fat diet review

## 2022-12-01 NOTE — ASSESSMENT & PLAN NOTE
Lab Results   Component Value Date    HGBA1C 6 8 (H) 11/21/2022   Chronic asymptomatic improve in the hemoglobin A1c compared with before a continue current management discussed low carb diet important lose weight patient already on statin and on lisinopril

## 2022-12-01 NOTE — ASSESSMENT & PLAN NOTE
Lab Results   Component Value Date    EGFR 43 11/21/2022    EGFR 44 08/15/2022    EGFR 44 07/26/2022    CREATININE 1 20 11/21/2022    CREATININE 1 18 08/15/2022    CREATININE 1 18 07/26/2022     Chronic asymptomatic GFR stable discussed with the patient keep will hydration avoid nonsteroidal anti-inflammatory drugs patient follow-up with the Nephrology periodically she is at the goal for hemoglobin A1c below 7

## 2022-12-02 DIAGNOSIS — I10 ESSENTIAL HYPERTENSION: ICD-10-CM

## 2022-12-02 DIAGNOSIS — K58.8 OTHER IRRITABLE BOWEL SYNDROME: ICD-10-CM

## 2022-12-02 RX ORDER — LISINOPRIL 10 MG/1
10 TABLET ORAL DAILY
Qty: 90 TABLET | Refills: 0 | Status: SHIPPED | OUTPATIENT
Start: 2022-12-02

## 2022-12-02 RX ORDER — DICYCLOMINE HCL 20 MG
20 TABLET ORAL 2 TIMES DAILY
Qty: 180 TABLET | Refills: 0 | Status: SHIPPED | OUTPATIENT
Start: 2022-12-02

## 2022-12-12 ENCOUNTER — OFFICE VISIT (OUTPATIENT)
Dept: SLEEP CENTER | Facility: CLINIC | Age: 77
End: 2022-12-12

## 2022-12-12 VITALS
TEMPERATURE: 97.8 F | SYSTOLIC BLOOD PRESSURE: 132 MMHG | HEART RATE: 91 BPM | BODY MASS INDEX: 45.08 KG/M2 | WEIGHT: 245 LBS | RESPIRATION RATE: 20 BRPM | DIASTOLIC BLOOD PRESSURE: 70 MMHG | OXYGEN SATURATION: 97 % | HEIGHT: 62 IN

## 2022-12-12 DIAGNOSIS — G47.33 OBSTRUCTIVE SLEEP APNEA TREATED WITH CONTINUOUS POSITIVE AIRWAY PRESSURE (CPAP): Primary | ICD-10-CM

## 2022-12-12 DIAGNOSIS — G25.81 RESTLESS LEGS SYNDROME (RLS): ICD-10-CM

## 2022-12-12 DIAGNOSIS — Z99.89 OBSTRUCTIVE SLEEP APNEA TREATED WITH CONTINUOUS POSITIVE AIRWAY PRESSURE (CPAP): Primary | ICD-10-CM

## 2022-12-12 NOTE — PROGRESS NOTES
Progress Note - [address] 65 y o  female   PJ:[birth year], MRN: [id number]  12/12/2022        Follow Up Evaluation / Problem:  Obstructive Sleep Apnea  Restless legs syndrome  Fibromyalgia  Obesity      Thank you for the opportunity of participating in the evaluation and care of this patient in the Sleep Clinic at [facility]  HPI: Gage Song is a 65y o  year old female  The patient presents for follow up of obstructive sleep apnea  She has a long history of CHRIS with treatment using CPAP  T[facility] She had an increase in daytime sleepiness and a split night sleep study was ordered in 2019  The study did not confirm CHRIS, however, she had been using her CPAP equipment up until the night prior to the study  Sleep was very fragmented and there were long periods of wakefulness during the study, resulting in a sleep efficiency of 50%  There was no N3 or REM sleep during the study  She also suffers from restless leg syndrome  She uses pregabalin for restless legs and sciatica, as prescribed by her PCP  She also takes topiratmate, which was initially started for restless leg symptoms  She tried to wean the topiramate, however, RLS symptoms became severe as she decreased it  There were no restless leg symptoms noted on the night of the split night study in 2019  She presents for an annual review of compliance and effectiveness of treatment with the CPAP      Review of Systems      Genitourinary none   Cardiology none   Gastrointestinal none   Neurology forgetfulness   Constitutional none   Integumentary none   Psychiatry none   Musculoskeletal muscle aches   Pulmonary none   ENT none   Endocrine none   Hematological none       Current Outpatient Medications:   •  acetaminophen (TYLENOL) 325 mg tablet, Take by mouth as needed , Disp: , Rfl:   •  albuterol (PROAIR HFA) 90 mcg/act inhaler, as needed, Disp: , Rfl:   • B Complex Vitamins (B COMPLEX 1 PO), Take by mouth, Disp: , Rfl:   •  bismuth subsalicylate (PEPTO BISMOL) 262 MG chewable tablet, Chew 2 tablets (524 mg total) 2 (two) times a day as needed for diarrhea Has tolerated in the past , Disp: 30 tablet, Rfl: 0  •  Cholecalciferol (VITAMIN D3) 2000 units capsule, half, Disp: , Rfl:   •  clotrimazole (LOTRIMIN) 1 % cream, Apply topically 2 (two) times a day, Disp: 113 g, Rfl: 2  •  conjugated estrogens (Premarin) vaginal cream, INSERT 1 APPLICATORFUL (1g) VAGINALLY TWO TIMES A WEEK, Disp: 30 g, Rfl: 1  •  Contour Next Test test strip, Test blood sugar 3 times daily, Disp: 100 each, Rfl: 2  •  Diapers & Supplies MISC, by Does not apply route 3 (three) times a day as needed (Incontinence), Disp: 28 each, Rfl: 11  •  dicyclomine (BENTYL) 20 mg tablet, Take 1 tablet (20 mg total) by mouth 2 (two) times a day, Disp: 180 tablet, Rfl: 0  •  EASY TOUCH LANCETS 32G/TWIST MISC, Test blood sugars 3 times daily, Disp: 100 each, Rfl: 3  •  fenofibrate 160 MG tablet, TAKE 1 TABLET BY MOUTH  DAILY, Disp: 90 tablet, Rfl: 0  •  Ferrous Sulfate (IRON) 325 (65 Fe) MG TABS, Take by mouth, Disp: , Rfl:   •  Insulin Pen Needle (BD Pen Needle [place] U/F) 32G X 4 MM MISC, Inject as directed 3 times daily, Disp: 270 each, Rfl: 0  •  insuln lispro (HumaLOG KwikPen) 200 units/mL CONCENTRATED U-200 injection pen, Inject subcutaneously as directed per presciber Instructions per sliding scale-PATIENT IS TO TAKE 5 UNITS BID, Disp: 12 mL, Rfl: 1  •  lisinopril (ZESTRIL) 10 mg tablet, Take 1 tablet (10 mg total) by mouth daily, Disp: 90 tablet, Rfl: 0  •  loratadine (CLARITIN) 10 mg tablet, Take by mouth, Disp: , Rfl:   •  Multiple Vitamin (DAILY VALUE MULTIVITAMIN) TABS, Take by mouth, Disp: , Rfl:   •  Multiple Vitamins-Minerals (MULTI ADULT GUMMIES PO), Take by mouth, Disp: , Rfl:   •  oxybutynin (DITROPAN-XL) 5 mg 24 hr tablet, TAKE 1 TABLET BY MOUTH  DAILY, Disp: 90 tablet, Rfl: 3  •  pantoprazole (PROTONIX) 40 mg tablet, Take 1 tablet (40 mg total) by mouth daily, Disp: 90 tablet, Rfl: 0  •  pregabalin (LYRICA) 75 mg capsule, Take 1 capsule (75 mg total) by mouth 3 (three) times a day, Disp: 270 capsule, Rfl: 0  •  Probiotic Product (Align) 4 MG CAPS, Take by mouth, Disp: , Rfl:   •  simvastatin (ZOCOR) 10 mg tablet, TAKE 1 TABLET BY MOUTH  DAILY AT BEDTIME, Disp: 90 tablet, Rfl: 3  •  topiramate (TOPAMAX) 25 mg tablet, TAKE 1 TABLET BY MOUTH  DAILY, Disp: 90 tablet, Rfl: 3  •  Tresiba FlexTouch 100 units/mL injection pen, INJECT 80 UNITS  SUBCUTANEOUSLY DAILY AT  BEDTIME, Disp: 75 mL, Rfl: 3    [facility] Sleepiness Scale  Sitting and reading: Slight chance of dozing  Watching TV: Slight chance of dozing  Sitting, inactive in a public place (e g  a theatre or a meeting): Would never doze  As a passenger in a car for an hour without a break: Would never doze  Lying down to rest in the afternoon when circumstances permit: Moderate chance of dozing  Sitting and talking to someone: Would never doze  Sitting quietly after a lunch without alcohol: Would never doze  In a car, while stopped for a few minutes in traffic: Would never doze  Total score: 4              Vitals:    12/12/22 1007   BP: 132/70   Pulse: 91   Resp: 20   Temp: 97 8 °F (36 6 °C)   SpO2: 97%   Weight: 111 kg (245 lb)   Height: 5' 2" (1 575 m)       Body mass index is 44 81 kg/m²  EPWORTH SLEEPINESS SCORE  Total score: 4      Past History Since Last Sleep Center Visit:   She denies any changes to her health since her last visit  She has intentionally lost a few more pounds since last year  She continues to use the CPAP equipment nightly  She received the DreamStation 2 recall replacement  She feels that she could use a slightly higher starting pressure  She feels the current settings and mask are comfortable  She sleeps for 10-11 hours per night and mentions that she has always had a long sleep time since childhood    Despite sleeping for long periods, she does not feel rested  She does not feel EDS impacts her daily living  She takes a brief nap if feeling sleepy and naps are refreshing  She avoids driving if feeling sleepy  The review of systems and following portions of the patient's history were reviewed and updated as appropriate: allergies, current medications, past family history, past medical history, past social history, past surgical history, and problem list         OBJECTIVE  Equipment set up date:  6/30/17 - received DreamStation 2 in 2022  PAP Pressure: APAP 8-10cm  Full face mask  DME Provider: [facility]    Physical Exam:     General Appearance:   Alert, cooperative, no distress, appears stated age, morbidly obese     Head:   Normocephalic, without obvious abnormality, atraumatic     Eyes:   PERRL, conjunctiva/corneas clear          Nose:  Nares normal, septum midline, no drainage or sinus tenderness           Throat:  Lips, teeth and gums normal; tongue normal size and midline mucosa moist with small oropharyngeal opening, uvula small, tonsils not visualized, Mallampati class 3-4       Neck:  Supple, symmetrical, trachea midline, no adenopathy; Thyroid: No enlargement, tenderness or nodules; no carotid bruit or JVD     Lungs:      Clear to auscultation bilaterally, respirations unlabored     Heart:   Regular rate and rhythm, S1 and S2 normal, slight murmur noted, no rub or gallop       Extremities:  Extremities normal, atraumatic, no cyanosis and trace edema in LE bilaterally       Skin:  Skin color, texture, turgor normal, no rashes or lesions       Neurologic:  No focal deficits noted  Normal strength throughout  Ambulates with a rollator walker  ASSESSMENT / PLAN    1  Obstructive sleep apnea treated with continuous positive airway pressure (CPAP)  PAP DME Resupply/Reorder      2  Restless legs syndrome (RLS)              Counseling / Coordination of Care  Total clinic time spent today 30 minutes  Greater than 50% of total time was spent with the patient and / or family counseling and / or coordination of care  A description of the counseling / coordination of care:     Impressions, Diagnostic results, Prognosis, Instructions for management, Risks and benefits of treatment, Patient and family education, Risk factor reductions and Importance of compliance with treatment    I reviewed the patient's compliance data  she has been able to use the equipment 96 7% of all days recorded  Average usage was 4 or more hours 93 3% of all days recorded  She is using the CPAP for an average of 9 hours and 55 minutes per night  The estimated AHI is 1 1 abnormal breathing events per hour  Response to treatment has been very good  A prescription for supplies has been provided for the next year  We discussed trying to limit sleep to 7-9 hours per night, which may improve sleep quality  We also discussed increasing physical activity, which can help to improve sleep quality  We discussed that if daytime sleepiness increases or begins to impact her ADLs, we can consider a CPAP titration study  She will continue using this equipment at the settings noted above for the next 12 months  At that timeshe will then return for a routine follow-up evaluation  I have asked the patient to contact the [address] if she encounters any difficulties prior to that time  She will continue to follow up with her PCP regarding restless legs, as it is currently well-controlled with current medication regimen  The following instructions have been given to the patient today:    Patient Instructions   1  Continue use of CPAP equipment nightly  2  Continue to clean your equipment, as discussed  3  Contact the [address] with any questions or concerns prior to your next visit, as needed  4  Schedule visit for follow-up in 1 year   5   You can try decreasing total sleep time, which may improve the quality of sleep  Also increase physical activity, which can improve sleep  [facility]  Resupply [phone]  Local Branch Contact:  [phone]    Suggested PAP supply Replacement Frequency  Disposable filters    Allison Skipper every 2 weeks  Replaceable nasal mask cushions    Allison Skipper every 2 weeks  Replaceable full face cushions    Allison Skipper every 1 month  Mask    Allison Skipper every 3 months  Tubing    Allison Skipper every 3 months  Head Gear    Allison Skipper every 6 months  Water chamber    Allison Skipper every 6 months    After hours emergency:  [phone]      Nursing Support:  When: Monday through Friday 7A-5PM except holidays  Where: Our direct line is [phone]  If you are having a true emergency please call 911  In the event that the line is busy or it is after hours please leave a voice message and we will return your call  Please speak clearly, leaving your full name, birth date, best number to reach you and the reason for your call  Medication refills: We will need the name of the medication, the dosage, the ordering provider, whether you get a 30 or 90 day refill, and the pharmacy name and address  Medications will be ordered by the provider only  Nurses cannot call in prescriptions  Please allow 7 days for medication refills  Physician requested updates: If your provider requested that you call with an update after starting medication, please be ready to provide us the medication and dosage, what time you take your medication, the time you attempt to fall asleep, time you fall asleep, when you wake up, and what time you get out of bed  Sleep Study Results: We will contact you with sleep study results and/or next steps after the physician has reviewed your testing          Rue Gallon, CRNP  [facility]

## 2022-12-12 NOTE — PATIENT INSTRUCTIONS
1   Continue use of CPAP equipment nightly  2  Continue to clean your equipment, as discussed  3  Contact the Sleep [address] [place] [address] with any questions or concerns prior to your next visit, as needed  4  Schedule visit for follow-up in 1 year   5  You can try decreasing total sleep time, which may improve the quality of sleep  Also increase physical activity, which can improve sleep  [facility]  Resupply [phone]  Local Branch Contact:  [phone]    Suggested PAP supply Replacement Frequency  Disposable filters    Keyon Manifold every 2 weeks  Replaceable nasal mask cushions    Keyon Manifold every 2 weeks  Replaceable full face cushions    Keyon Manifold every 1 month  Mask    Keyon Manifold every 3 months  Tubing    Keyon Manifold every 3 months  Head Gear    Keyon Manifold every 6 months  Water chamber    Keyon Manifold every 6 months    After hours emergency:  [phone]      Nursing Support:  When: Monday through Friday 7A-5PM except holidays  Where: Our direct line is [phone]  If you are having a true emergency please call 911  In the event that the line is busy or it is after hours please leave a voice message and we will return your call  Please speak clearly, leaving your full name, birth date, best number to reach you and the reason for your call  Medication refills: We will need the name of the medication, the dosage, the ordering provider, whether you get a 30 or 90 day refill, and the pharmacy name and address  Medications will be ordered by the provider only  Nurses cannot call in prescriptions  Please allow 7 days for medication refills  Physician requested updates:  If your provider requested that you call with an update after starting medication, please be ready to provide us the medication and dosage, what time you take your medication, the time you attempt to fall asleep, time you fall asleep, when you wake up, and what time you get out of bed  Sleep Study Results: We will contact you with sleep study results and/or next steps after the physician has reviewed your testing

## 2022-12-13 ENCOUNTER — TELEPHONE (OUTPATIENT)
Dept: SLEEP CENTER | Facility: CLINIC | Age: 77
End: 2022-12-13

## 2022-12-16 LAB

## 2023-01-09 ENCOUNTER — OFFICE VISIT (OUTPATIENT)
Dept: FAMILY MEDICINE CLINIC | Facility: CLINIC | Age: 78
End: 2023-01-09

## 2023-01-09 VITALS
BODY MASS INDEX: 45.82 KG/M2 | HEIGHT: 62 IN | TEMPERATURE: 97.2 F | HEART RATE: 64 BPM | DIASTOLIC BLOOD PRESSURE: 62 MMHG | WEIGHT: 249 LBS | RESPIRATION RATE: 16 BRPM | OXYGEN SATURATION: 98 % | SYSTOLIC BLOOD PRESSURE: 122 MMHG

## 2023-01-09 DIAGNOSIS — M54.42 CHRONIC BILATERAL LOW BACK PAIN WITH BILATERAL SCIATICA: ICD-10-CM

## 2023-01-09 DIAGNOSIS — M54.41 CHRONIC BILATERAL LOW BACK PAIN WITH BILATERAL SCIATICA: ICD-10-CM

## 2023-01-09 DIAGNOSIS — R30.0 DYSURIA: Primary | ICD-10-CM

## 2023-01-09 DIAGNOSIS — Z79.4 TYPE 2 DIABETES MELLITUS WITH STAGE 3B CHRONIC KIDNEY DISEASE, WITH LONG-TERM CURRENT USE OF INSULIN (HCC): ICD-10-CM

## 2023-01-09 DIAGNOSIS — E11.22 TYPE 2 DIABETES MELLITUS WITH STAGE 3B CHRONIC KIDNEY DISEASE, WITH LONG-TERM CURRENT USE OF INSULIN (HCC): ICD-10-CM

## 2023-01-09 DIAGNOSIS — N18.32 TYPE 2 DIABETES MELLITUS WITH STAGE 3B CHRONIC KIDNEY DISEASE, WITH LONG-TERM CURRENT USE OF INSULIN (HCC): ICD-10-CM

## 2023-01-09 DIAGNOSIS — R82.90 ABNORMAL FINDING IN URINE: ICD-10-CM

## 2023-01-09 DIAGNOSIS — G89.29 CHRONIC BILATERAL LOW BACK PAIN WITH BILATERAL SCIATICA: ICD-10-CM

## 2023-01-09 LAB
BACTERIA UR QL AUTO: ABNORMAL /HPF
BILIRUB UR QL STRIP: NEGATIVE
CLARITY UR: CLEAR
COLOR UR: YELLOW
GLUCOSE UR STRIP-MCNC: NEGATIVE MG/DL
HGB UR QL STRIP.AUTO: NEGATIVE
KETONES UR STRIP-MCNC: NEGATIVE MG/DL
LEUKOCYTE ESTERASE UR QL STRIP: ABNORMAL
NITRITE UR QL STRIP: POSITIVE
NON-SQ EPI CELLS URNS QL MICRO: ABNORMAL /HPF
PH UR STRIP.AUTO: 6 [PH]
PROT UR STRIP-MCNC: ABNORMAL MG/DL
RBC #/AREA URNS AUTO: ABNORMAL /HPF
SL AMB  POCT GLUCOSE, UA: NEGATIVE
SL AMB LEUKOCYTE ESTERASE,UA: ABNORMAL
SL AMB POCT BILIRUBIN,UA: NEGATIVE
SL AMB POCT BLOOD,UA: NEGATIVE
SL AMB POCT CLARITY,UA: ABNORMAL
SL AMB POCT COLOR,UA: YELLOW
SL AMB POCT KETONES,UA: ABNORMAL
SL AMB POCT NITRITE,UA: POSITIVE
SL AMB POCT PH,UA: 5
SL AMB POCT SPECIFIC GRAVITY,UA: 1.01
SL AMB POCT URINE PROTEIN: NEGATIVE
SL AMB POCT UROBILINOGEN: NEGATIVE
SP GR UR STRIP.AUTO: 1.02 (ref 1–1.03)
UROBILINOGEN UR STRIP-ACNC: <2 MG/DL
WBC #/AREA URNS AUTO: ABNORMAL /HPF

## 2023-01-09 RX ORDER — CEPHALEXIN 500 MG/1
500 CAPSULE ORAL EVERY 12 HOURS SCHEDULED
Qty: 14 CAPSULE | Refills: 0 | Status: SHIPPED | OUTPATIENT
Start: 2023-01-09 | End: 2023-01-16

## 2023-01-09 NOTE — ASSESSMENT & PLAN NOTE
New symptoms started 3 days ago patient had dysuria frequency urination has been going on for almost 4 days diabetes patient urine dip in the office abnormal we will send the urine for UA and CS to start her on Keflex 500 twice a day for 7 days proper use and possible side effects reviewed with the patient

## 2023-01-09 NOTE — ASSESSMENT & PLAN NOTE
Abnormal urine dip symptomatic with increasing frequency and dysuria and patient history of diabetic recommend to send the urine for UA and C&S started treatment and will follow depend on the results of culture

## 2023-01-09 NOTE — PROGRESS NOTES
Name: Lo Wolf      : 1945      MRN: [id number]  Encounter Provider: Desi Nevarez MD  Encounter Date: 2023   Encounter department: [place] [address]  Dysuria  Assessment & Plan:  New symptoms started 3 days ago patient had dysuria frequency urination has been going on for almost 4 days diabetes patient urine dip in the office abnormal we will send the urine for UA and CS to start her on Keflex 500 twice a day for 7 days proper use and possible side effects reviewed with the patient    Orders:  -     cephalexin (KEFLEX) 500 mg capsule; Take 1 capsule (500 mg total) by mouth every 12 (twelve) hours for 7 days  -     Urinalysis with microscopic  -     Urine culture  -     POCT urine dip    2  Abnormal finding in urine  Assessment & Plan:  Abnormal urine dip symptomatic with increasing frequency and dysuria and patient history of diabetic recommend to send the urine for UA and C&S started treatment and will follow depend on the results of culture    Orders:  -     Urinalysis with microscopic  -     Urine culture    3  Chronic bilateral low back pain with bilateral sciatica  Assessment & Plan:  Symptomatic chronic pain she follow-up with a pain management to discuss continue current management added Tylenol for the pain recommend to lose weight and proper posture discussed with the patient    Orders:  -     Urinalysis with microscopic  -     Urine culture    4   Type 2 diabetes mellitus with stage 3b chronic kidney disease, with long-term current use of insulin (HCC)  -     Microalbumin / creatinine urine ratio           Subjective      Patient known to have history of for diabetic start having burning sensation and increased frequency of lesions in 4 to 7 days and is getting worse and the feeling of pressure in the lower abdomen no blood in the stool no flank pain no fever patient known to have history also urine incontinence she had a chronic back pain but since she had treatment symptom her back pain gets worse    Review of Systems   Constitutional: Negative for chills and fever  HENT: Negative for ear pain and sore throat  Eyes: Negative for pain and visual disturbance  Respiratory: Negative for cough and shortness of breath  Cardiovascular: Negative for chest pain and palpitations  Gastrointestinal: Negative for abdominal pain and vomiting  Genitourinary: Positive for dysuria, frequency and pelvic pain  Negative for hematuria  Musculoskeletal: Positive for back pain  Skin: Negative for color change and rash  Neurological: Negative for seizures and syncope  All other systems reviewed and are negative        Current Outpatient Medications on File Prior to Visit   Medication Sig   • acetaminophen (TYLENOL) 325 mg tablet Take by mouth as needed    • albuterol (PROAIR HFA) 90 mcg/act inhaler as needed   • B Complex Vitamins (B COMPLEX 1 PO) Take by mouth   • bismuth subsalicylate (PEPTO BISMOL) 262 MG chewable tablet Chew 2 tablets (524 mg total) 2 (two) times a day as needed for diarrhea Has tolerated in the past    • Cholecalciferol (VITAMIN D3) 2000 units capsule half   • clotrimazole (LOTRIMIN) 1 % cream Apply topically 2 (two) times a day   • conjugated estrogens (Premarin) vaginal cream INSERT 1 APPLICATORFUL (1g) VAGINALLY TWO TIMES A WEEK   • Contour Next Test test strip Test blood sugar 3 times daily   • Diapers & Supplies MISC by Does not apply route 3 (three) times a day as needed (Incontinence)   • dicyclomine (BENTYL) 20 mg tablet Take 1 tablet (20 mg total) by mouth 2 (two) times a day   • EASY TOUCH LANCETS 32G/TWIST MISC Test blood sugars 3 times daily   • fenofibrate 160 MG tablet TAKE 1 TABLET BY MOUTH  DAILY   • Ferrous Sulfate (IRON) 325 (65 Fe) MG TABS Take by mouth   • Insulin Pen Needle (BD Pen Needle [place] U/F) 32G X 4 MM MISC Inject as directed 3 times daily   • insuln lispro (HumaLOG KwikPen) 200 units/mL CONCENTRATED U-200 injection pen Inject subcutaneously as directed per presciber Instructions per sliding scale-PATIENT IS TO TAKE 5 UNITS BID   • lisinopril (ZESTRIL) 10 mg tablet Take 1 tablet (10 mg total) by mouth daily   • loratadine (CLARITIN) 10 mg tablet Take by mouth   • Multiple Vitamin (DAILY VALUE MULTIVITAMIN) TABS Take by mouth   • Multiple Vitamins-Minerals (MULTI ADULT GUMMIES PO) Take by mouth   • oxybutynin (DITROPAN-XL) 5 mg 24 hr tablet TAKE 1 TABLET BY MOUTH  DAILY   • pantoprazole (PROTONIX) 40 mg tablet TAKE 1 TABLET BY MOUTH DAILY   • pregabalin (LYRICA) 75 mg capsule Take 1 capsule (75 mg total) by mouth 3 (three) times a day   • Probiotic Product (Align) 4 MG CAPS Take by mouth   • simvastatin (ZOCOR) 10 mg tablet TAKE 1 TABLET BY MOUTH  DAILY AT BEDTIME   • topiramate (TOPAMAX) 25 mg tablet TAKE 1 TABLET BY MOUTH  DAILY   • Tresiba FlexTouch 100 units/mL injection pen INJECT 80 UNITS  SUBCUTANEOUSLY DAILY AT  BEDTIME       Objective     /62 (BP Location: Left arm, Patient Position: Sitting, Cuff Size: Large)   Pulse 64   Temp (!) 97 2 °F (36 2 °C) (Tympanic)   Resp 16   Ht 5' 2" (1 575 m)   Wt 113 kg (249 lb)   SpO2 98%   BMI 45 54 kg/m²     Physical Exam  Vitals and nursing note reviewed  Constitutional:       General: She is not in acute distress  Appearance: She is well-developed  She is not diaphoretic  HENT:      Head: Normocephalic  Right Ear: External ear normal       Left Ear: External ear normal    Eyes:      General:         Right eye: No discharge  Left eye: No discharge  Conjunctiva/sclera: Conjunctivae normal    Neck:      Vascular: No JVD  Cardiovascular:      Rate and Rhythm: Normal rate and regular rhythm  Heart sounds: Normal heart sounds  No gallop  Pulmonary:      Effort: Pulmonary effort is normal  No respiratory distress  Breath sounds: Normal breath sounds  No stridor  No wheezing or rales     Chest:      Chest wall: No tenderness  Abdominal:      General: There is no distension  Palpations: Abdomen is soft  There is no mass  Tenderness: There is no abdominal tenderness  There is no rebound  Musculoskeletal:      Cervical back: Normal range of motion and neck supple  Lumbar back: Tenderness present  No signs of trauma or bony tenderness  Lymphadenopathy:      Cervical: No cervical adenopathy  Skin:     General: Skin is warm  Findings: No erythema or rash  Neurological:      Mental Status: She is alert and oriented to person, place, and time         Sallee Goldmann, MD

## 2023-01-09 NOTE — ASSESSMENT & PLAN NOTE
Symptomatic chronic pain she follow-up with a pain management to discuss continue current management added Tylenol for the pain recommend to lose weight and proper posture discussed with the patient

## 2023-01-11 LAB — BACTERIA UR CULT: ABNORMAL

## 2023-01-12 ENCOUNTER — TELEPHONE (OUTPATIENT)
Dept: FAMILY MEDICINE CLINIC | Facility: CLINIC | Age: 78
End: 2023-01-12

## 2023-01-12 ENCOUNTER — TELEPHONE (OUTPATIENT)
Dept: ADMINISTRATIVE | Facility: OTHER | Age: 78
End: 2023-01-12

## 2023-01-12 NOTE — TELEPHONE ENCOUNTER
Upon review of the In Basket request we have found this is a duplicate request and no further action is needed  This request was completed upon initial request, the patient chart is up to date, and this message will now be closed  Any additional questions or concerns should be emailed to the Practice Liaisons via the appropriate education email address, please do not reply via In Basket      Thank you  Jameel Melendez MA

## 2023-01-12 NOTE — TELEPHONE ENCOUNTER
----- Message from Latia Marie MD sent at 1/11/2023  8:03 PM EST -----  Positive urine culture we will continue current antibiotic recheck UA and CS in 7 days

## 2023-01-12 NOTE — TELEPHONE ENCOUNTER
----- Message from [employer] sent at 1/12/2023 10:42 AM EST -----  Regarding: care gap request  01/12/23 10:42 AM    Hello, our patient attached above has had Diabetic Foot Exam completed/performed  Please assist in updating the patient chart by pulling the document from the Media Tab  The date of service is 12/30/2022       Thank you,  [id number] [place] [address]

## 2023-01-16 ENCOUNTER — CLINICAL SUPPORT (OUTPATIENT)
Dept: FAMILY MEDICINE CLINIC | Facility: CLINIC | Age: 78
End: 2023-01-16

## 2023-01-16 DIAGNOSIS — R82.90 ABNORMAL FINDING IN URINE: Primary | ICD-10-CM

## 2023-01-16 LAB
BACTERIA UR QL AUTO: ABNORMAL /HPF
BILIRUB UR QL STRIP: NEGATIVE
CLARITY UR: CLEAR
COLOR UR: YELLOW
GLUCOSE UR STRIP-MCNC: NEGATIVE MG/DL
HGB UR QL STRIP.AUTO: NEGATIVE
KETONES UR STRIP-MCNC: NEGATIVE MG/DL
LEUKOCYTE ESTERASE UR QL STRIP: NEGATIVE
NITRITE UR QL STRIP: NEGATIVE
NON-SQ EPI CELLS URNS QL MICRO: ABNORMAL /HPF
PH UR STRIP.AUTO: 5.5 [PH]
PROT UR STRIP-MCNC: ABNORMAL MG/DL
RBC #/AREA URNS AUTO: ABNORMAL /HPF
SP GR UR STRIP.AUTO: 1.02 (ref 1–1.03)
UROBILINOGEN UR STRIP-ACNC: <2 MG/DL
WBC #/AREA URNS AUTO: ABNORMAL /HPF

## 2023-01-18 LAB — BACTERIA UR CULT: NORMAL

## 2023-01-19 ENCOUNTER — TELEPHONE (OUTPATIENT)
Dept: FAMILY MEDICINE CLINIC | Facility: CLINIC | Age: 78
End: 2023-01-19

## 2023-01-19 NOTE — TELEPHONE ENCOUNTER
Patient saw her urine results and said there's still abnormal results of uti  Said she's still having back pain and wants to know if she should be on medication   Please advise

## 2023-01-20 ENCOUNTER — OFFICE VISIT (OUTPATIENT)
Dept: URGENT CARE | Facility: CLINIC | Age: 78
End: 2023-01-20

## 2023-01-20 VITALS
DIASTOLIC BLOOD PRESSURE: 66 MMHG | WEIGHT: 249 LBS | RESPIRATION RATE: 20 BRPM | HEIGHT: 62 IN | TEMPERATURE: 98.5 F | BODY MASS INDEX: 45.82 KG/M2 | HEART RATE: 71 BPM | SYSTOLIC BLOOD PRESSURE: 126 MMHG | OXYGEN SATURATION: 97 %

## 2023-01-20 DIAGNOSIS — N30.00 ACUTE CYSTITIS WITHOUT HEMATURIA: Primary | ICD-10-CM

## 2023-01-20 DIAGNOSIS — R10.30 INGUINAL PAIN, UNSPECIFIED LATERALITY: ICD-10-CM

## 2023-01-20 LAB
SL AMB  POCT GLUCOSE, UA: NEGATIVE
SL AMB LEUKOCYTE ESTERASE,UA: NEGATIVE
SL AMB POCT BILIRUBIN,UA: NEGATIVE
SL AMB POCT BLOOD,UA: NEGATIVE
SL AMB POCT CLARITY,UA: ABNORMAL
SL AMB POCT COLOR,UA: YELLOW
SL AMB POCT KETONES,UA: NEGATIVE
SL AMB POCT NITRITE,UA: POSITIVE
SL AMB POCT PH,UA: 6
SL AMB POCT SPECIFIC GRAVITY,UA: 1.03
SL AMB POCT URINE PROTEIN: NEGATIVE
SL AMB POCT UROBILINOGEN: 0.2

## 2023-01-20 RX ORDER — CEPHALEXIN 500 MG/1
500 CAPSULE ORAL EVERY 12 HOURS SCHEDULED
Qty: 14 CAPSULE | Refills: 0 | Status: SHIPPED | OUTPATIENT
Start: 2023-01-20 | End: 2023-01-27

## 2023-01-20 NOTE — PATIENT INSTRUCTIONS
Take antibiotic as directed  Recommend drinking plenty of fluids including water and cranberry juice  Recommend avoiding caffeine or alcohol  Empty bladder frequently  If you develop any high fever, severe back pain, abdominal pain, nausea, vomiting or any concerning symptoms please return proceed ER    Recommend following up with PCP in 3-5 days

## 2023-01-20 NOTE — TELEPHONE ENCOUNTER
Patient said she is having pain on her lower r side of her bladder  Cant sleep  Tylenol not helping   What to do please advise

## 2023-01-20 NOTE — PROGRESS NOTES
[facility]        NAME: Kayla Cooks is a 65 y o  female  : 1945    MRN: [id number]  DATE: 2023  TIME: 3:53 PM    Assessment and Plan   Acute cystitis without hematuria [N30 00]  1  Acute cystitis without hematuria  Urine culture    cephalexin (KEFLEX) 500 mg capsule      2  Inguinal pain, unspecified laterality  POCT urine dip            Patient Instructions     Patient Instructions   Take antibiotic as directed  Recommend drinking plenty of fluids including water and cranberry juice  Recommend avoiding caffeine or alcohol  Empty bladder frequently  If you develop any high fever, severe back pain, abdominal pain, nausea, vomiting or any concerning symptoms please return proceed ER  Recommend following up with PCP in 3-5 days        Chief Complaint     Chief Complaint   Patient presents with   • Groin Pain     Patient c/o groin pain that started four weeks ago  History of Present Illness       Urinary Tract Infection   This is a new problem  The current episode started 1 to 4 weeks ago  The problem occurs intermittently  The problem has been unchanged  The patient is experiencing no pain  There has been no fever  There is no history of pyelonephritis  Associated symptoms include frequency  Pertinent negatives include no chills, discharge, flank pain, hematuria, hesitancy, nausea, sweats, urgency or vomiting  She has tried antibiotics for the symptoms  The treatment provided significant relief  There is no history of kidney stones  patient took course of keflex 3 weeks ago, symptoms completely resolved and then returned a few days ago  Review of Systems   Review of Systems   Constitutional: Negative for chills, diaphoresis, fatigue and fever  Respiratory: Negative for cough, chest tightness and shortness of breath  Cardiovascular: Negative for chest pain  Gastrointestinal: Negative for abdominal pain, diarrhea, nausea and vomiting     Genitourinary: Positive for frequency  Negative for decreased urine volume, difficulty urinating, dysuria, flank pain, hematuria, hesitancy, pelvic pain, urgency, vaginal bleeding, vaginal discharge and vaginal pain  Groin pain     Musculoskeletal: Negative for back pain, joint swelling, myalgias, neck pain and neck stiffness  Skin: Negative for rash  Neurological: Negative for dizziness, weakness, numbness and headaches           Current Medications       Current Outpatient Medications:   •  acetaminophen (TYLENOL) 325 mg tablet, Take by mouth as needed , Disp: , Rfl:   •  albuterol (PROAIR HFA) 90 mcg/act inhaler, as needed, Disp: , Rfl:   •  B Complex Vitamins (B COMPLEX 1 PO), Take by mouth, Disp: , Rfl:   •  bismuth subsalicylate (PEPTO BISMOL) 262 MG chewable tablet, Chew 2 tablets (524 mg total) 2 (two) times a day as needed for diarrhea Has tolerated in the past , Disp: 30 tablet, Rfl: 0  •  cephalexin (KEFLEX) 500 mg capsule, Take 1 capsule (500 mg total) by mouth every 12 (twelve) hours for 7 days, Disp: 14 capsule, Rfl: 0  •  Cholecalciferol (VITAMIN D3) 2000 units capsule, half, Disp: , Rfl:   •  clotrimazole (LOTRIMIN) 1 % cream, Apply topically 2 (two) times a day, Disp: 113 g, Rfl: 2  •  conjugated estrogens (Premarin) vaginal cream, INSERT 1 APPLICATORFUL (1g) VAGINALLY TWO TIMES A WEEK, Disp: 30 g, Rfl: 1  •  Contour Next Test test strip, Test blood sugar 3 times daily, Disp: 100 each, Rfl: 2  •  Diapers & Supplies MISC, by Does not apply route 3 (three) times a day as needed (Incontinence), Disp: 28 each, Rfl: 11  •  dicyclomine (BENTYL) 20 mg tablet, Take 1 tablet (20 mg total) by mouth 2 (two) times a day, Disp: 180 tablet, Rfl: 0  •  EASY TOUCH LANCETS 32G/TWIST MISC, Test blood sugars 3 times daily, Disp: 100 each, Rfl: 3  •  fenofibrate 160 MG tablet, TAKE 1 TABLET BY MOUTH  DAILY, Disp: 90 tablet, Rfl: 0  •  Ferrous Sulfate (IRON) 325 (65 Fe) MG TABS, Take by mouth, Disp: , Rfl:   •  Insulin Pen Needle (BD Pen Needle [place] U/F) 32G X 4 MM MISC, Inject as directed 3 times daily, Disp: 270 each, Rfl: 0  •  insuln lispro (HumaLOG KwikPen) 200 units/mL CONCENTRATED U-200 injection pen, Inject subcutaneously as directed per presciber Instructions per sliding scale-PATIENT IS TO TAKE 5 UNITS BID, Disp: 12 mL, Rfl: 1  •  lisinopril (ZESTRIL) 10 mg tablet, Take 1 tablet (10 mg total) by mouth daily, Disp: 90 tablet, Rfl: 0  •  loratadine (CLARITIN) 10 mg tablet, Take by mouth, Disp: , Rfl:   •  Multiple Vitamin (DAILY VALUE MULTIVITAMIN) TABS, Take by mouth, Disp: , Rfl:   •  Multiple Vitamins-Minerals (MULTI ADULT GUMMIES PO), Take by mouth, Disp: , Rfl:   •  oxybutynin (DITROPAN-XL) 5 mg 24 hr tablet, TAKE 1 TABLET BY MOUTH  DAILY, Disp: 90 tablet, Rfl: 3  •  pantoprazole (PROTONIX) 40 mg tablet, TAKE 1 TABLET BY MOUTH DAILY, Disp: 90 tablet, Rfl: 1  •  pregabalin (LYRICA) 75 mg capsule, Take 1 capsule (75 mg total) by mouth 3 (three) times a day, Disp: 270 capsule, Rfl: 0  •  Probiotic Product (Align) 4 MG CAPS, Take by mouth, Disp: , Rfl:   •  simvastatin (ZOCOR) 10 mg tablet, TAKE 1 TABLET BY MOUTH  DAILY AT BEDTIME, Disp: 90 tablet, Rfl: 3  •  topiramate (TOPAMAX) 25 mg tablet, TAKE 1 TABLET BY MOUTH  DAILY, Disp: 90 tablet, Rfl: 1  •  Tresiba FlexTouch 100 units/mL injection pen, INJECT 80 UNITS  SUBCUTANEOUSLY DAILY AT  BEDTIME, Disp: 75 mL, Rfl: 3    Current Allergies     Allergies as of 01/20/2023 - Reviewed 01/20/2023   Allergen Reaction Noted   • Pioglitazone Other (See Comments) 07/18/2012   • Ibuprofen  10/13/2017   • Levofloxacin Other (See Comments) 05/29/2018   • Nsaids  05/29/2018            The following portions of the patient's history were reviewed and updated as appropriate: allergies, current medications, past family history, past medical history, past social history, past surgical history and problem list      Past Medical History:   Diagnosis Date   • Anemia    • Arthritis 10/30/2020   • Chronic kidney disease    • Diabetes mellitus ([facility] [address] )    • Fibromyalgia, primary    • Fracture of wrist     RIGHT   • Heart murmur 6/30/2020   • History of non-insulin dependent diabetes mellitus    • Hyperlipidemia    • Hypertension    • Hypertension    • IBS (irritable bowel syndrome)    • Irritable bowel    • Metabolic syndrome    • Obesity    • RLS (restless legs syndrome)    • Routine medical exam 6/28/2019   • Sleep apnea    • Sleep apnea     ON CPAP   • Upper respiratory tract infection 1/19/2022   • Urge incontinence    • Urinary incontinence        Past Surgical History:   Procedure Laterality Date   • CHOLECYSTECTOMY  2013   • COLONOSCOPY  2010   • CYSTOSCOPY     • HERNIA REPAIR  2008   • HYSTERECTOMY      PARTIAL (STILL HAS OVARIES)   • KNEE SURGERY Left        Family History   Problem Relation Age of Onset   • Suicidality Father    • Breast cancer Mother 58   • Melanoma Sister    • Bipolar disorder Sister    • Heart disease Brother    • Rheum arthritis Daughter    • No Known Problems Maternal Grandmother    • No Known Problems Paternal Grandmother    • No Known Problems Sister    • No Known Problems Daughter    • No Known Problems Maternal Aunt    • No Known Problems Maternal Aunt          Medications have been verified  Objective   /66   Pulse 71   Temp 98 5 °F (36 9 °C) (Temporal)   Resp 20   Ht 5' 2" (1 575 m)   Wt 113 kg (249 lb)   SpO2 97%   BMI 45 54 kg/m²   No LMP recorded  Patient is postmenopausal        Physical Exam     Physical Exam  Constitutional:       General: She is not in acute distress  Appearance: Normal appearance  She is well-developed  She is not diaphoretic  Cardiovascular:      Rate and Rhythm: Normal rate and regular rhythm  Heart sounds: Normal heart sounds  Pulmonary:      Effort: Pulmonary effort is normal       Breath sounds: Normal breath sounds  Abdominal:      General: Bowel sounds are normal  There is no distension        Palpations: Abdomen is soft  Abdomen is not rigid  There is no mass  Tenderness: There is no abdominal tenderness  There is no right CVA tenderness, left CVA tenderness, guarding or rebound  Negative signs include Carrera's sign and McBurney's sign  Skin:     General: Skin is warm and dry  Neurological:      Mental Status: She is alert and oriented to person, place, and time

## 2023-01-22 LAB — BACTERIA UR CULT: ABNORMAL

## 2023-01-26 DIAGNOSIS — G43.009 MIGRAINE WITHOUT AURA AND WITHOUT STATUS MIGRAINOSUS, NOT INTRACTABLE: ICD-10-CM

## 2023-01-27 RX ORDER — TOPIRAMATE 25 MG/1
25 TABLET ORAL DAILY
Qty: 90 TABLET | Refills: 0 | Status: SHIPPED | OUTPATIENT
Start: 2023-01-27

## 2023-02-07 ENCOUNTER — OFFICE VISIT (OUTPATIENT)
Dept: FAMILY MEDICINE CLINIC | Facility: CLINIC | Age: 78
End: 2023-02-07

## 2023-02-07 VITALS
BODY MASS INDEX: 45.08 KG/M2 | SYSTOLIC BLOOD PRESSURE: 134 MMHG | TEMPERATURE: 98.6 F | DIASTOLIC BLOOD PRESSURE: 68 MMHG | HEART RATE: 86 BPM | RESPIRATION RATE: 16 BRPM | HEIGHT: 62 IN | OXYGEN SATURATION: 99 % | WEIGHT: 245 LBS

## 2023-02-07 DIAGNOSIS — N18.32 TYPE 2 DIABETES MELLITUS WITH STAGE 3B CHRONIC KIDNEY DISEASE, WITH LONG-TERM CURRENT USE OF INSULIN (HCC): ICD-10-CM

## 2023-02-07 DIAGNOSIS — E11.22 TYPE 2 DIABETES MELLITUS WITH STAGE 3B CHRONIC KIDNEY DISEASE, WITH LONG-TERM CURRENT USE OF INSULIN (HCC): ICD-10-CM

## 2023-02-07 DIAGNOSIS — N39.0 RECURRENT UTI: Primary | ICD-10-CM

## 2023-02-07 DIAGNOSIS — G89.29 CHRONIC BILATERAL LOW BACK PAIN WITH BILATERAL SCIATICA: ICD-10-CM

## 2023-02-07 DIAGNOSIS — M54.42 CHRONIC BILATERAL LOW BACK PAIN WITH BILATERAL SCIATICA: ICD-10-CM

## 2023-02-07 DIAGNOSIS — Z79.4 TYPE 2 DIABETES MELLITUS WITH STAGE 3B CHRONIC KIDNEY DISEASE, WITH LONG-TERM CURRENT USE OF INSULIN (HCC): ICD-10-CM

## 2023-02-07 DIAGNOSIS — M54.41 CHRONIC BILATERAL LOW BACK PAIN WITH BILATERAL SCIATICA: ICD-10-CM

## 2023-02-07 LAB
BACTERIA UR QL AUTO: ABNORMAL /HPF
BILIRUB UR QL STRIP: NEGATIVE
CLARITY UR: ABNORMAL
COLOR UR: YELLOW
CREAT UR-MCNC: 140 MG/DL
GLUCOSE UR STRIP-MCNC: NEGATIVE MG/DL
HGB UR QL STRIP.AUTO: NEGATIVE
HYALINE CASTS #/AREA URNS LPF: ABNORMAL /LPF
KETONES UR STRIP-MCNC: NEGATIVE MG/DL
LEUKOCYTE ESTERASE UR QL STRIP: ABNORMAL
MICROALBUMIN UR-MCNC: 20.5 MG/L (ref 0–20)
MICROALBUMIN/CREAT 24H UR: 15 MG/G CREATININE (ref 0–30)
NITRITE UR QL STRIP: NEGATIVE
NON-SQ EPI CELLS URNS QL MICRO: ABNORMAL /HPF
PH UR STRIP.AUTO: 5.5 [PH]
PROT UR STRIP-MCNC: ABNORMAL MG/DL
RBC #/AREA URNS AUTO: ABNORMAL /HPF
SP GR UR STRIP.AUTO: 1.02 (ref 1–1.03)
UROBILINOGEN UR STRIP-ACNC: <2 MG/DL
WBC #/AREA URNS AUTO: ABNORMAL /HPF

## 2023-02-07 RX ORDER — SULFAMETHOXAZOLE AND TRIMETHOPRIM 800; 160 MG/1; MG/1
1 TABLET ORAL EVERY 12 HOURS SCHEDULED
Qty: 28 TABLET | Refills: 0 | Status: SHIPPED | OUTPATIENT
Start: 2023-02-07 | End: 2023-02-21

## 2023-02-07 NOTE — PROGRESS NOTES
Name: Dawna Ramires      : 1945      MRN: [id number]  Encounter Provider: Kasia Marquez MD  Encounter Date: 2023   Encounter department: [place] [address]  Recurrent UTI  Assessment & Plan:  New diagnosis symptomatic with dysuria and low back pain patient had a positive urine culture in   And she had again another positive urine culture in  treated with 2 course of antibiotic  Urine dip in the office abnormal we will start the patient on Bactrim twice a day for 14 days proper use and possible side effect discussed with patient we will send the urine for UA and CS for back positive with plan to refer the patient to see urology for recurrent UTI    Orders:  -     sulfamethoxazole-trimethoprim (BACTRIM DS) 800-160 mg per tablet; Take 1 tablet by mouth every 12 (twelve) hours for 14 days  -     Urinalysis with microscopic  -     Urine culture    2  Chronic bilateral low back pain with bilateral sciatica  Assessment & Plan:  Chronic patient currently on Lyrica follow-up with the pain management discussed proper possible and important lose weight    Orders:  -     Urinalysis with microscopic  -     Urine culture    3   Type 2 diabetes mellitus with stage 3b chronic kidney disease, with long-term current use of insulin (HCC)  -     Microalbumin / creatinine urine ratio           Subjective      Patient who known to have history of obesity with diabetic came to the office concerned about low back pain she described it as a burning sensation also dysuria patient known to have the urine infection and the beginning of January she tested positive urine culture UTI she was treated and again as she was at urgent cleared and she was not treated with all the course of antibiotic in  denies any blood in the urine no fever no abdomen pain and no flank pain  Also patient concerned about back pain in the lower back radiate to her right leg no numbness no muscle weakness no drop in the foot she noted to have history of chronic back pain follow-up with the pain management recent fall    Review of Systems   Constitutional: Negative for chills and fever  HENT: Negative for ear pain and sore throat  Eyes: Negative for pain and visual disturbance  Respiratory: Negative for cough and shortness of breath  Cardiovascular: Negative for chest pain and palpitations  Gastrointestinal: Negative for abdominal pain and vomiting  Genitourinary: Positive for dysuria  Negative for hematuria  Musculoskeletal: Positive for back pain  Negative for arthralgias  Skin: Negative for color change and rash  Neurological: Negative for seizures and syncope  All other systems reviewed and are negative        Current Outpatient Medications on File Prior to Visit   Medication Sig   • acetaminophen (TYLENOL) 325 mg tablet Take by mouth as needed    • albuterol (PROAIR HFA) 90 mcg/act inhaler as needed   • B Complex Vitamins (B COMPLEX 1 PO) Take by mouth   • bismuth subsalicylate (PEPTO BISMOL) 262 MG chewable tablet Chew 2 tablets (524 mg total) 2 (two) times a day as needed for diarrhea Has tolerated in the past    • Cholecalciferol (VITAMIN D3) 2000 units capsule half   • clotrimazole (LOTRIMIN) 1 % cream Apply topically 2 (two) times a day   • conjugated estrogens (Premarin) vaginal cream INSERT 1 APPLICATORFUL (1g) VAGINALLY TWO TIMES A WEEK   • Contour Next Test test strip Test blood sugar 3 times daily   • Diapers & Supplies MISC by Does not apply route 3 (three) times a day as needed (Incontinence)   • dicyclomine (BENTYL) 20 mg tablet Take 1 tablet (20 mg total) by mouth 2 (two) times a day   • EASY TOUCH LANCETS 32G/TWIST MISC Test blood sugars 3 times daily   • fenofibrate 160 MG tablet TAKE 1 TABLET BY MOUTH  DAILY   • Ferrous Sulfate (IRON) 325 (65 Fe) MG TABS Take by mouth   • Insulin Pen Needle (BD Pen Needle [place] U/F) 32G X 4 MM MISC Inject as directed 3 times daily   • insuln lispro (HumaLOG KwikPen) 200 units/mL CONCENTRATED U-200 injection pen Inject subcutaneously as directed per presciber Instructions per sliding scale-PATIENT IS TO TAKE 5 UNITS BID   • lisinopril (ZESTRIL) 10 mg tablet TAKE 1 TABLET BY MOUTH DAILY   • loratadine (CLARITIN) 10 mg tablet Take by mouth   • Multiple Vitamin (DAILY VALUE MULTIVITAMIN) TABS Take by mouth   • Multiple Vitamins-Minerals (MULTI ADULT GUMMIES PO) Take by mouth   • oxybutynin (DITROPAN-XL) 5 mg 24 hr tablet TAKE 1 TABLET BY MOUTH  DAILY   • pantoprazole (PROTONIX) 40 mg tablet TAKE 1 TABLET BY MOUTH DAILY   • pregabalin (LYRICA) 75 mg capsule Take 1 capsule (75 mg total) by mouth 3 (three) times a day   • Probiotic Product (Align) 4 MG CAPS Take by mouth   • simvastatin (ZOCOR) 10 mg tablet TAKE 1 TABLET BY MOUTH  DAILY AT BEDTIME   • topiramate (TOPAMAX) 25 mg tablet Take 1 tablet (25 mg total) by mouth daily   • Tresiba FlexTouch 100 units/mL injection pen INJECT 80 UNITS  SUBCUTANEOUSLY DAILY AT  BEDTIME       Objective     /68 (BP Location: Left arm, Patient Position: Sitting, Cuff Size: Large)   Pulse 86   Temp 98 6 °F (37 °C) (Tympanic)   Resp 16   Ht 5' 2" (1 575 m)   Wt 111 kg (245 lb)   SpO2 99%   BMI 44 81 kg/m²     Physical Exam  Vitals and nursing note reviewed  Constitutional:       General: She is not in acute distress  Appearance: She is well-developed  She is not diaphoretic  HENT:      Head: Normocephalic  Right Ear: External ear normal       Left Ear: External ear normal    Eyes:      General:         Right eye: No discharge  Left eye: No discharge  Conjunctiva/sclera: Conjunctivae normal    Neck:      Vascular: No JVD  Cardiovascular:      Rate and Rhythm: Normal rate and regular rhythm  Heart sounds: Normal heart sounds  No murmur heard  No gallop  Pulmonary:      Effort: Pulmonary effort is normal  No respiratory distress        Breath sounds: Normal breath sounds  No stridor  No wheezing or rales  Chest:      Chest wall: No tenderness  Abdominal:      General: There is no distension  Palpations: Abdomen is soft  There is no mass  Tenderness: There is no abdominal tenderness  There is no rebound  Musculoskeletal:         General: Tenderness present  Cervical back: Normal range of motion and neck supple  Lumbar back: Tenderness present  Lymphadenopathy:      Cervical: No cervical adenopathy  Skin:     General: Skin is warm  Findings: No erythema or rash  Neurological:      Mental Status: She is alert and oriented to person, place, and time         Vivienne Montoya MD

## 2023-02-07 NOTE — ASSESSMENT & PLAN NOTE
Chronic patient currently on Lyrica follow-up with the pain management discussed proper possible and important lose weight

## 2023-02-07 NOTE — ASSESSMENT & PLAN NOTE
New diagnosis symptomatic with dysuria and low back pain patient had a positive urine culture in January 9  And she had again another positive urine culture in January 16 treated with 2 course of antibiotic  Urine dip in the office abnormal we will start the patient on Bactrim twice a day for 14 days proper use and possible side effect discussed with patient we will send the urine for UA and CS for back positive with plan to refer the patient to see urology for recurrent UTI

## 2023-02-09 ENCOUNTER — TELEPHONE (OUTPATIENT)
Dept: FAMILY MEDICINE CLINIC | Facility: CLINIC | Age: 78
End: 2023-02-09

## 2023-02-09 LAB
BACTERIA UR CULT: ABNORMAL

## 2023-02-09 NOTE — TELEPHONE ENCOUNTER
----- Message from Jo-Ann Salazar, [address] sent at 2/9/2023  9:49 AM EST -----  Culture positive for UTI, medication on should treat   Will recommend f/u with urology as discussed for recurrent uti

## 2023-02-10 DIAGNOSIS — K58.8 OTHER IRRITABLE BOWEL SYNDROME: ICD-10-CM

## 2023-02-10 RX ORDER — DICYCLOMINE HCL 20 MG
20 TABLET ORAL 2 TIMES DAILY
Qty: 180 TABLET | Refills: 0 | Status: SHIPPED | OUTPATIENT
Start: 2023-02-10

## 2023-02-20 DIAGNOSIS — M54.41 CHRONIC BILATERAL LOW BACK PAIN WITH BILATERAL SCIATICA: ICD-10-CM

## 2023-02-20 DIAGNOSIS — G89.29 CHRONIC BILATERAL LOW BACK PAIN WITH BILATERAL SCIATICA: ICD-10-CM

## 2023-02-20 DIAGNOSIS — I10 ESSENTIAL HYPERTENSION: ICD-10-CM

## 2023-02-20 DIAGNOSIS — M54.42 CHRONIC BILATERAL LOW BACK PAIN WITH BILATERAL SCIATICA: ICD-10-CM

## 2023-02-23 RX ORDER — LISINOPRIL 10 MG/1
10 TABLET ORAL DAILY
Qty: 90 TABLET | Refills: 0 | Status: SHIPPED | OUTPATIENT
Start: 2023-02-23

## 2023-02-23 RX ORDER — PREGABALIN 75 MG/1
75 CAPSULE ORAL 3 TIMES DAILY
Qty: 270 CAPSULE | Refills: 0 | Status: SHIPPED | OUTPATIENT
Start: 2023-02-23

## 2023-02-27 ENCOUNTER — APPOINTMENT (OUTPATIENT)
Dept: LAB | Facility: IMAGING CENTER | Age: 78
End: 2023-02-27

## 2023-02-27 DIAGNOSIS — E11.22 TYPE 2 DIABETES MELLITUS WITH STAGE 3B CHRONIC KIDNEY DISEASE, WITH LONG-TERM CURRENT USE OF INSULIN (HCC): ICD-10-CM

## 2023-02-27 DIAGNOSIS — N18.32 TYPE 2 DIABETES MELLITUS WITH STAGE 3B CHRONIC KIDNEY DISEASE, WITH LONG-TERM CURRENT USE OF INSULIN (HCC): ICD-10-CM

## 2023-02-27 DIAGNOSIS — I10 ESSENTIAL HYPERTENSION: ICD-10-CM

## 2023-02-27 DIAGNOSIS — N18.31 CHRONIC RENAL IMPAIRMENT, STAGE 3A (HCC): ICD-10-CM

## 2023-02-27 DIAGNOSIS — Z79.4 TYPE 2 DIABETES MELLITUS WITH STAGE 3B CHRONIC KIDNEY DISEASE, WITH LONG-TERM CURRENT USE OF INSULIN (HCC): ICD-10-CM

## 2023-02-27 LAB
ALBUMIN SERPL BCP-MCNC: 3.5 G/DL (ref 3.5–5)
ALP SERPL-CCNC: 97 U/L (ref 46–116)
ALT SERPL W P-5'-P-CCNC: 29 U/L (ref 12–78)
ANION GAP SERPL CALCULATED.3IONS-SCNC: 2 MMOL/L (ref 4–13)
AST SERPL W P-5'-P-CCNC: 34 U/L (ref 5–45)
BASOPHILS # BLD AUTO: 0.03 THOUSANDS/ÂΜL (ref 0–0.1)
BASOPHILS NFR BLD AUTO: 1 % (ref 0–1)
BILIRUB SERPL-MCNC: 0.28 MG/DL (ref 0.2–1)
BUN SERPL-MCNC: 24 MG/DL (ref 5–25)
CALCIUM SERPL-MCNC: 9.4 MG/DL (ref 8.3–10.1)
CHLORIDE SERPL-SCNC: 112 MMOL/L (ref 96–108)
CHOLEST SERPL-MCNC: 145 MG/DL
CO2 SERPL-SCNC: 27 MMOL/L (ref 21–32)
CREAT SERPL-MCNC: 1.23 MG/DL (ref 0.6–1.3)
CREAT UR-MCNC: 113 MG/DL
EOSINOPHIL # BLD AUTO: 0.08 THOUSAND/ÂΜL (ref 0–0.61)
EOSINOPHIL NFR BLD AUTO: 1 % (ref 0–6)
ERYTHROCYTE [DISTWIDTH] IN BLOOD BY AUTOMATED COUNT: 13.3 % (ref 11.6–15.1)
GFR SERPL CREATININE-BSD FRML MDRD: 42 ML/MIN/1.73SQ M
GLUCOSE P FAST SERPL-MCNC: 90 MG/DL (ref 65–99)
HCT VFR BLD AUTO: 39 % (ref 34.8–46.1)
HDLC SERPL-MCNC: 47 MG/DL
HGB BLD-MCNC: 12.1 G/DL (ref 11.5–15.4)
IMM GRANULOCYTES # BLD AUTO: 0.06 THOUSAND/UL (ref 0–0.2)
IMM GRANULOCYTES NFR BLD AUTO: 1 % (ref 0–2)
LDLC SERPL CALC-MCNC: 65 MG/DL (ref 0–100)
LYMPHOCYTES # BLD AUTO: 1.37 THOUSANDS/ÂΜL (ref 0.6–4.47)
LYMPHOCYTES NFR BLD AUTO: 21 % (ref 14–44)
MCH RBC QN AUTO: 29 PG (ref 26.8–34.3)
MCHC RBC AUTO-ENTMCNC: 31 G/DL (ref 31.4–37.4)
MCV RBC AUTO: 94 FL (ref 82–98)
MICROALBUMIN UR-MCNC: 8.5 MG/L (ref 0–20)
MICROALBUMIN/CREAT 24H UR: 8 MG/G CREATININE (ref 0–30)
MONOCYTES # BLD AUTO: 0.41 THOUSAND/ÂΜL (ref 0.17–1.22)
MONOCYTES NFR BLD AUTO: 6 % (ref 4–12)
NEUTROPHILS # BLD AUTO: 4.62 THOUSANDS/ÂΜL (ref 1.85–7.62)
NEUTS SEG NFR BLD AUTO: 70 % (ref 43–75)
NONHDLC SERPL-MCNC: 98 MG/DL
NRBC BLD AUTO-RTO: 0 /100 WBCS
PLATELET # BLD AUTO: 151 THOUSANDS/UL (ref 149–390)
PMV BLD AUTO: 11.3 FL (ref 8.9–12.7)
POTASSIUM SERPL-SCNC: 4.2 MMOL/L (ref 3.5–5.3)
PROT SERPL-MCNC: 6.8 G/DL (ref 6.4–8.4)
RBC # BLD AUTO: 4.17 MILLION/UL (ref 3.81–5.12)
SODIUM SERPL-SCNC: 141 MMOL/L (ref 135–147)
TRIGL SERPL-MCNC: 165 MG/DL
TSH SERPL DL<=0.05 MIU/L-ACNC: 2.43 UIU/ML (ref 0.45–4.5)
WBC # BLD AUTO: 6.57 THOUSAND/UL (ref 4.31–10.16)

## 2023-02-28 DIAGNOSIS — K30 INDIGESTION: ICD-10-CM

## 2023-02-28 LAB
EST. AVERAGE GLUCOSE BLD GHB EST-MCNC: 180 MG/DL
HBA1C MFR BLD: 7.9 %

## 2023-02-28 RX ORDER — PANTOPRAZOLE SODIUM 40 MG/1
40 TABLET, DELAYED RELEASE ORAL DAILY
Qty: 90 TABLET | Refills: 0 | Status: SHIPPED | OUTPATIENT
Start: 2023-02-28

## 2023-03-01 ENCOUNTER — RA CDI HCC (OUTPATIENT)
Dept: OTHER | Facility: HOSPITAL | Age: 78
End: 2023-03-01

## 2023-03-08 ENCOUNTER — OFFICE VISIT (OUTPATIENT)
Dept: FAMILY MEDICINE CLINIC | Facility: CLINIC | Age: 78
End: 2023-03-08

## 2023-03-08 VITALS
DIASTOLIC BLOOD PRESSURE: 70 MMHG | HEART RATE: 67 BPM | BODY MASS INDEX: 44.81 KG/M2 | TEMPERATURE: 97.4 F | HEIGHT: 62 IN | OXYGEN SATURATION: 97 % | SYSTOLIC BLOOD PRESSURE: 110 MMHG

## 2023-03-08 DIAGNOSIS — I73.9 PERIPHERAL VASCULAR DISEASE (HCC): ICD-10-CM

## 2023-03-08 DIAGNOSIS — E66.01 MORBID OBESITY (HCC): ICD-10-CM

## 2023-03-08 DIAGNOSIS — E11.22 TYPE 2 DIABETES MELLITUS WITH STAGE 3B CHRONIC KIDNEY DISEASE, WITH LONG-TERM CURRENT USE OF INSULIN (HCC): Primary | ICD-10-CM

## 2023-03-08 DIAGNOSIS — N39.41 URGE URINARY INCONTINENCE: ICD-10-CM

## 2023-03-08 DIAGNOSIS — N18.32 TYPE 2 DIABETES MELLITUS WITH STAGE 3B CHRONIC KIDNEY DISEASE, WITH LONG-TERM CURRENT USE OF INSULIN (HCC): Primary | ICD-10-CM

## 2023-03-08 DIAGNOSIS — Z79.4 TYPE 2 DIABETES MELLITUS WITH STAGE 3B CHRONIC KIDNEY DISEASE, WITH LONG-TERM CURRENT USE OF INSULIN (HCC): Primary | ICD-10-CM

## 2023-03-08 DIAGNOSIS — Z87.440 HISTORY OF UTI: ICD-10-CM

## 2023-03-08 DIAGNOSIS — E78.1 HYPERTRIGLYCERIDEMIA, ESSENTIAL: ICD-10-CM

## 2023-03-08 DIAGNOSIS — N18.31 CHRONIC RENAL IMPAIRMENT, STAGE 3A (HCC): ICD-10-CM

## 2023-03-08 DIAGNOSIS — Z86.2 HX OF THROMBOCYTOPENIA: ICD-10-CM

## 2023-03-08 DIAGNOSIS — Z79.4 LONG TERM CURRENT USE OF INSULIN (HCC): ICD-10-CM

## 2023-03-08 LAB
BACTERIA UR QL AUTO: ABNORMAL /HPF
BILIRUB UR QL STRIP: NEGATIVE
CLARITY UR: CLEAR
COLOR UR: ABNORMAL
GLUCOSE UR STRIP-MCNC: NEGATIVE MG/DL
HGB UR QL STRIP.AUTO: NEGATIVE
KETONES UR STRIP-MCNC: NEGATIVE MG/DL
LEUKOCYTE ESTERASE UR QL STRIP: ABNORMAL
NITRITE UR QL STRIP: NEGATIVE
NON-SQ EPI CELLS URNS QL MICRO: ABNORMAL /HPF
PH UR STRIP.AUTO: 5.5 [PH]
PROT UR STRIP-MCNC: NEGATIVE MG/DL
RBC #/AREA URNS AUTO: ABNORMAL /HPF
SP GR UR STRIP.AUTO: 1.02 (ref 1–1.03)
UROBILINOGEN UR STRIP-ACNC: <2 MG/DL
WBC #/AREA URNS AUTO: ABNORMAL /HPF

## 2023-03-08 RX ORDER — LANCETS
EACH MISCELLANEOUS 3 TIMES DAILY
Qty: 100 EACH | Refills: 2 | Status: SHIPPED | OUTPATIENT
Start: 2023-03-08

## 2023-03-08 NOTE — ASSESSMENT & PLAN NOTE
Lab Results   Component Value Date    EGFR 42 02/27/2023    EGFR 43 11/21/2022    EGFR 44 08/15/2022    CREATININE 1 23 02/27/2023    CREATININE 1 20 11/21/2022    CREATININE 1 18 08/15/2022     Chronic history of stable patient not at the goal at the hemoglobin A1c recommend a goal to have hemoglobin A1c below 7 patient already on ACE inhibitor avoid nonsteroidal anti-inflammatory drugs and keep well hydration

## 2023-03-08 NOTE — ASSESSMENT & PLAN NOTE
Chronic uncontrolled BMI 44 81 discussed with patient portion control low-carb low-fat diet increase physical activity

## 2023-03-08 NOTE — ASSESSMENT & PLAN NOTE
Patient has been given 30 urine infection recommend to stop extubating for now we will send the urine for culture

## 2023-03-08 NOTE — PROGRESS NOTES
Name: Mariana Godfrey      : 1945      MRN: [id number]  Encounter Provider: Erika Foreman MD  Encounter Date: 3/8/2023   Encounter department: [place] [address]  Type 2 diabetes mellitus with stage 3b chronic kidney disease, with long-term current use of insulin ([facility])  Assessment & Plan:    Lab Results   Component Value Date    HGBA1C 7 9 (H) 2023     Chronic asymptomatic increase in the hemoglobin A1c compared with before  We will continue to infection and increase her blood sugar recommend patient to continue current management low-carb diet and potential lose weight discussed with patient    Orders:  -     CBC and differential; Future  -     Comprehensive metabolic panel; Future  -     Lipid panel; Future  -     TSH, 3rd generation with Free T4 reflex; Future  -     Hemoglobin A1C; Future    2  Peripheral vascular [facility] ([facility] )  Assessment & Plan:  Chronic asymptomatic patient already on statin and aspirin      3  Morbid obesity ([facility] )  Assessment & Plan:  Chronic uncontrolled BMI 44 81 discussed with patient portion control low-carb low-fat diet increase physical activity    Orders:  -     CBC and differential; Future  -     Comprehensive metabolic panel; Future  -     Lipid panel; Future  -     TSH, 3rd generation with Free T4 reflex; Future  -     Hemoglobin A1C; Future    4  Urge urinary incontinence  Assessment & Plan:  Patient has been given 30 urine infection recommend to stop extubating for now we will send the urine for culture    Orders:  -     Urine culture; Future  -     Urine culture    5  Hypertriglyceridemia, essential  -     CBC and differential; Future  -     Comprehensive metabolic panel; Future  -     Lipid panel; Future  -     TSH, 3rd generation with Free T4 reflex; Future  -     Hemoglobin A1C; Future    6   History of UTI  -     Urinalysis with microscopic  -     UA w Reflex to Microscopic w Reflex to Culture - Clinic Collect  -     Urine culture; Future  -     Urine culture    7  Hx of thrombocytopenia    8  Long term current use of insulin ([address] )    9  Chronic renal impairment, stage 3a [facility])  Assessment & Plan:  Lab Results   Component Value Date    EGFR 42 02/27/2023    EGFR 43 11/21/2022    EGFR 44 08/15/2022    CREATININE 1 23 02/27/2023    CREATININE 1 20 11/21/2022    CREATININE 1 18 08/15/2022     Chronic history of stable patient not at the goal at the hemoglobin A1c recommend a goal to have hemoglobin A1c below 7 patient already on ACE inhibitor avoid nonsteroidal anti-inflammatory drugs and keep well hydration        BMI Counseling: Body mass index is 44 81 kg/m²  The BMI is above normal  Nutrition recommendations include decreasing portion sizes, decreasing fast food intake and limiting drinks that contain sugar  Exercise recommendations include exercising 3-5 times per week  No pharmacotherapy was ordered  Rationale for BMI follow-up plan is due to patient being overweight or obese  Depression Screening and Follow-up Plan: Patient was screened for depression during today's encounter  They screened negative with a PHQ-2 score of 0  Urinary Incontinence Plan of Care: counseling topics discussed: use restroom every 2 hours, limit alcohol, caffeine, spicy foods, and acidic foods, limiting fluid intake 3-4 hours before bed, weight loss and improving blood sugar control  Subjective      Here follow-up with a chronic condition  Diagnosis [place] to TRAM   She did complete course of antibiotic currently asymptomatic no abdomen pain no nausea vomiting or diarrhea no flank pain no blood in the urine patient has history of urine incontinence she is off of the oxybutynin since being diagnosed with UTI  Recent blood work discussed with the patient    Review of Systems   Constitutional: Negative for chills and fever  HENT: Negative for ear pain and sore throat  Eyes: Negative for pain and visual disturbance  Respiratory: Negative for cough and shortness of breath  Cardiovascular: Negative for chest pain and palpitations  Gastrointestinal: Negative for abdominal pain, constipation, diarrhea, nausea and vomiting  Genitourinary: Negative for dysuria and hematuria  Musculoskeletal: Negative for arthralgias and back pain  Skin: Negative for color change and rash  Neurological: Negative for seizures and syncope  Hematological: Does not bruise/bleed easily  Psychiatric/Behavioral: Negative for agitation  All other systems reviewed and are negative        Current Outpatient Medications on File Prior to Visit   Medication Sig   • acetaminophen (TYLENOL) 325 mg tablet Take by mouth as needed    • albuterol (PROAIR HFA) 90 mcg/act inhaler as needed   • B Complex Vitamins (B COMPLEX 1 PO) Take by mouth   • bismuth subsalicylate (PEPTO BISMOL) 262 MG chewable tablet Chew 2 tablets (524 mg total) 2 (two) times a day as needed for diarrhea Has tolerated in the past    • Cholecalciferol (VITAMIN D3) 2000 units capsule half   • clotrimazole (LOTRIMIN) 1 % cream Apply topically 2 (two) times a day   • conjugated estrogens (Premarin) vaginal cream INSERT 1 APPLICATORFUL (1g) VAGINALLY TWO TIMES A WEEK   • Contour Next Test test strip Test blood sugar 3 times daily   • Diapers & Supplies MISC by Does not apply route 3 (three) times a day as needed (Incontinence)   • dicyclomine (BENTYL) 20 mg tablet Take 1 tablet (20 mg total) by mouth 2 (two) times a day   • fenofibrate 160 MG tablet TAKE 1 TABLET BY MOUTH  DAILY   • Ferrous Sulfate (IRON) 325 (65 Fe) MG TABS Take by mouth   • Insulin Pen Needle (BD Pen Needle [place] U/F) 32G X 4 MM MISC Inject as directed 3 times daily   • insuln lispro (HumaLOG KwikPen) 200 units/mL CONCENTRATED U-200 injection pen Inject subcutaneously as directed per presciber Instructions per sliding scale-PATIENT IS TO TAKE 5 UNITS BID   • lisinopril (ZESTRIL) 10 mg tablet Take 1 tablet (10 mg total) by mouth daily   • loratadine (CLARITIN) 10 mg tablet Take by mouth   • Multiple Vitamin (DAILY VALUE MULTIVITAMIN) TABS Take by mouth   • Multiple Vitamins-Minerals (MULTI ADULT GUMMIES PO) Take by mouth   • oxybutynin (DITROPAN-XL) 5 mg 24 hr tablet TAKE 1 TABLET BY MOUTH  DAILY   • pantoprazole (PROTONIX) 40 mg tablet Take 1 tablet (40 mg total) by mouth daily   • pregabalin (LYRICA) 75 mg capsule Take 1 capsule (75 mg total) by mouth 3 (three) times a day   • Probiotic Product (Align) 4 MG CAPS Take by mouth   • simvastatin (ZOCOR) 10 mg tablet TAKE 1 TABLET BY MOUTH  DAILY AT BEDTIME   • topiramate (TOPAMAX) 25 mg tablet Take 1 tablet (25 mg total) by mouth daily   • Tresiba FlexTouch 100 units/mL injection pen INJECT 80 UNITS  SUBCUTANEOUSLY DAILY AT  BEDTIME   • [DISCONTINUED] EASY TOUCH LANCETS 32G/TWIST MISC Test blood sugars 3 times daily       Objective     /70 (BP Location: Left arm, Patient Position: Sitting)   Pulse 67   Temp (!) 97 4 °F (36 3 °C) (Tympanic)   Ht 5' 2" (1 575 m)   SpO2 97%   BMI 44 81 kg/m²     Physical Exam  Vitals and nursing note reviewed  Constitutional:       General: She is not in acute distress  Appearance: She is well-developed  She is not diaphoretic  HENT:      Head: Normocephalic  Right Ear: External ear normal       Left Ear: External ear normal       Nose: No congestion  Mouth/Throat:      Pharynx: No oropharyngeal exudate  Eyes:      General:         Right eye: No discharge  Left eye: No discharge  Conjunctiva/sclera: Conjunctivae normal    Neck:      Vascular: No JVD  Cardiovascular:      Rate and Rhythm: Normal rate and regular rhythm  Heart sounds: Normal heart sounds  No gallop  Pulmonary:      Effort: Pulmonary effort is normal  No respiratory distress  Breath sounds: Normal breath sounds  No stridor  No wheezing or rales  Chest:      Chest wall: No tenderness     Abdominal:      General: There is no distension  Palpations: Abdomen is soft  There is no mass  Tenderness: There is no abdominal tenderness  There is no rebound  Musculoskeletal:         General: No tenderness  Cervical back: Normal range of motion and neck supple  Lymphadenopathy:      Cervical: No cervical adenopathy  Skin:     General: Skin is warm  Findings: No erythema or rash  Neurological:      Mental Status: She is alert and oriented to person, place, and time         Jefe Howard MD

## 2023-03-08 NOTE — ASSESSMENT & PLAN NOTE
Lab Results   Component Value Date    HGBA1C 7 9 (H) 02/27/2023     Chronic asymptomatic increase in the hemoglobin A1c compared with before    We will continue to infection and increase her blood sugar recommend patient to continue current management low-carb diet and potential lose weight discussed with patient

## 2023-03-10 DIAGNOSIS — N39.0 RECURRENT UTI: ICD-10-CM

## 2023-03-10 DIAGNOSIS — Z87.440 HISTORY OF UTI: ICD-10-CM

## 2023-03-10 DIAGNOSIS — N39.41 URGE URINARY INCONTINENCE: Primary | ICD-10-CM

## 2023-03-10 LAB
BACTERIA UR CULT: ABNORMAL
BACTERIA UR CULT: ABNORMAL

## 2023-03-10 NOTE — RESULT ENCOUNTER NOTE
I discussed results of urine culture with patient  She will be calling the Urology office in [place] to schedule an appointment  I have sent the referral in to Urology

## 2023-03-14 ENCOUNTER — TELEPHONE (OUTPATIENT)
Dept: FAMILY MEDICINE CLINIC | Facility: CLINIC | Age: 78
End: 2023-03-14

## 2023-03-14 DIAGNOSIS — R82.90 ABNORMAL URINE: Primary | ICD-10-CM

## 2023-03-14 RX ORDER — SULFAMETHOXAZOLE AND TRIMETHOPRIM 800; 160 MG/1; MG/1
1 TABLET ORAL EVERY 12 HOURS SCHEDULED
Qty: 20 TABLET | Refills: 0 | Status: SHIPPED | OUTPATIENT
Start: 2023-03-14 | End: 2023-03-24

## 2023-03-14 NOTE — TELEPHONE ENCOUNTER
----- Message from Michael Powell MD sent at 3/14/2023  3:06 PM EDT -----  Urine culture positive for Klebsiella   Bactrim DS one tab po BID for 10 days #20

## 2023-03-27 ENCOUNTER — OFFICE VISIT (OUTPATIENT)
Dept: UROLOGY | Facility: CLINIC | Age: 78
End: 2023-03-27

## 2023-03-27 VITALS
BODY MASS INDEX: 46.01 KG/M2 | WEIGHT: 250 LBS | DIASTOLIC BLOOD PRESSURE: 76 MMHG | HEART RATE: 76 BPM | OXYGEN SATURATION: 95 % | HEIGHT: 62 IN | SYSTOLIC BLOOD PRESSURE: 120 MMHG

## 2023-03-27 DIAGNOSIS — N39.0 RECURRENT UTI: Primary | ICD-10-CM

## 2023-03-27 DIAGNOSIS — R15.9 INCONTINENCE OF FECES, UNSPECIFIED FECAL INCONTINENCE TYPE: ICD-10-CM

## 2023-03-27 DIAGNOSIS — N39.46 MIXED STRESS AND URGE URINARY INCONTINENCE: ICD-10-CM

## 2023-03-27 LAB

## 2023-03-27 NOTE — PROGRESS NOTES
Assessment and plan:     Mixed stress and urge urinary incontinence  · PVR 25 ml  · Referral to pelvic floor physical therapy  · Resume oxybutynin  · Avoid bladder irritants  · Increase hydration with water  · Follow up 3 months for recheck    Recurrent UTI  · Begin d-mannose 2000 mg daily  · Begin cranberry 500 mg daily  · Begin vitamin C 1000 mg daily  · Begin daily probiotic 20 billion units or yogurt daily  · Continue premarin cream pea-sized amount twice a week on Wednesday and Sunday before bed   · Continue to hydrate with at least 64 ounces of water a day  · Call urology for symptoms of urinary tract infection; standing order for urine testing placed  · Send urine culture today  · If urine culture comes back positive we will treat her for infection and plan for 3-month suppression  · Improved bowel control  · Follow-up 3 months          HERMANN Simmons    History of Present Illness     Alison Ryan is a 65 y o  female patient of DR Dilma Hardy  Last seen in July 2020  She presents for recurrent UTI  She was given keflex a couple of times and then bactrim  She had no issues for a long time until this year  She does have diarrhea  She does wear a diaper due to her incontinence of bowel and bladder  Urine culture:   3/8/2023  >100,000 klebsiella pneumoniae   2/7/2023  > 100,000 E  Coli   1/20/2023 [id number] E  Coli   1/16/2023 <10,000 mixed contaminants x3   1/9/2023   >100,000 e coli    She has a hx of chronic cystitis and urge incontinence  She had prior negative cystoscopy and ct scan  She still uses premarin cream  It is ordered twice a week but she takes it infrequent, about every other week  She was previously on oxybutynin  This was recently stopped by her pcp for possible retention due to her recurrent UTI  She was taking this for her urge incontinence  She feels it was not longer helpful  She had times when her urine pours out when she stands up   She is diabetic with a1c of 7 9  Laboratory "    Lab Results   Component Value Date    BUN 24 02/27/2023    CREATININE 1 23 02/27/2023       No components found for: GFR    Lab Results   Component Value Date    GLUCOSE 125 (H) 06/04/2018    CALCIUM 9 4 02/27/2023     06/04/2018    K 4 2 02/27/2023    CO2 27 02/27/2023     (H) 02/27/2023       Lab Results   Component Value Date    WBC 6 57 02/27/2023    HGB 12 1 02/27/2023    HCT 39 0 02/27/2023    MCV 94 02/27/2023     02/27/2023       No results found for: PSA    Recent Results (from the past 1 hour(s))   POCT Measure PVR    Collection Time: 03/27/23 11:32 AM   Result Value Ref Range    POST-VOID RESIDUAL VOLUME, ML POC 25 mL       @RESULT(URINEMICROSCOPIC)@    @RESULT(URINECULTURE)@    Radiology       Review of Systems     Review of Systems   Constitutional: Negative for activity change, appetite change, chills, fatigue, fever and unexpected weight change  HENT: Negative for facial swelling  Eyes: Negative for discharge  Respiratory: Negative  Negative for cough and shortness of breath  Cardiovascular: Negative for chest pain and leg swelling  Gastrointestinal: Positive for diarrhea  Negative for abdominal distention, abdominal pain, constipation, nausea and vomiting  Endocrine: Negative  Genitourinary: Negative  Negative for decreased urine volume, difficulty urinating, dysuria, enuresis, flank pain, frequency, genital sores, hematuria and urgency  Musculoskeletal: Negative for back pain and myalgias  Skin: Negative for pallor and rash  Allergic/Immunologic: Negative  Negative for immunocompromised state  Neurological: Negative for facial asymmetry and speech difficulty  Psychiatric/Behavioral: Negative for agitation and confusion         Allergies     Allergies   Allergen Reactions   • Pioglitazone Other (See Comments)     \"Feels like she is dying\"   • Ibuprofen    • Levofloxacin Other (See Comments)     Other reaction(s): rash   • Nsaids        Physical Exam " "    Physical Exam  Vitals reviewed  Constitutional:       General: She is not in acute distress  Appearance: Normal appearance  She is obese  She is not ill-appearing, toxic-appearing or diaphoretic  HENT:      Head: Normocephalic and atraumatic  Eyes:      General: No scleral icterus  Cardiovascular:      Rate and Rhythm: Normal rate  Pulmonary:      Effort: Pulmonary effort is normal  No respiratory distress  Abdominal:      General: Abdomen is flat  There is no distension  Palpations: Abdomen is soft  Tenderness: There is no abdominal tenderness  There is no right CVA tenderness, left CVA tenderness, guarding or rebound  Musculoskeletal:         General: No swelling  Cervical back: Normal range of motion  Skin:     General: Skin is warm and dry  Coloration: Skin is not jaundiced or pale  Findings: No rash  Neurological:      General: No focal deficit present  Mental Status: She is alert and oriented to person, place, and time  Gait: Gait abnormal       Comments: Ambulates with walker   Psychiatric:         Mood and Affect: Mood normal          Behavior: Behavior normal          Thought Content:  Thought content normal          Judgment: Judgment normal          Vital Signs     Vitals:    03/27/23 1034   BP: 120/76   BP Location: Left arm   Patient Position: Sitting   Cuff Size: Large   Pulse: 76   SpO2: 95%   Weight: 113 kg (250 lb)   Height: 5' 2\" (1 575 m)       Current Medications       Current Outpatient Medications:   •  acetaminophen (TYLENOL) 325 mg tablet, Take by mouth as needed , Disp: , Rfl:   •  B Complex Vitamins (B COMPLEX 1 PO), Take by mouth, Disp: , Rfl:   •  bismuth subsalicylate (PEPTO BISMOL) 262 MG chewable tablet, Chew 2 tablets (524 mg total) 2 (two) times a day as needed for diarrhea Has tolerated in the past , Disp: 30 tablet, Rfl: 0  •  Cholecalciferol (VITAMIN D3) 2000 units capsule, half, Disp: , Rfl:   •  clotrimazole (LOTRIMIN) 1 " % cream, Apply topically 2 (two) times a day, Disp: 113 g, Rfl: 2  •  Contour Next Test test strip, Test blood sugar 3 times daily, Disp: 100 each, Rfl: 2  •  Diapers & Supplies MISC, by Does not apply route 3 (three) times a day as needed (Incontinence), Disp: 28 each, Rfl: 11  •  fenofibrate 160 MG tablet, TAKE 1 TABLET BY MOUTH  DAILY, Disp: 90 tablet, Rfl: 0  •  Ferrous Sulfate (IRON) 325 (65 Fe) MG TABS, Take by mouth, Disp: , Rfl:   •  Insulin Pen Needle (BD Pen Needle [place] U/F) 32G X 4 MM MISC, Inject as directed 3 times daily, Disp: 270 each, Rfl: 0  •  insuln lispro (HumaLOG KwikPen) 200 units/mL CONCENTRATED U-200 injection pen, Inject subcutaneously as directed per presciber Instructions per sliding scale-PATIENT IS TO TAKE 5 UNITS BID, Disp: 12 mL, Rfl: 1  •  lisinopril (ZESTRIL) 10 mg tablet, Take 1 tablet (10 mg total) by mouth daily, Disp: 90 tablet, Rfl: 0  •  Multiple Vitamin (DAILY VALUE MULTIVITAMIN) TABS, Take by mouth, Disp: , Rfl:   •  Multiple Vitamins-Minerals (MULTI ADULT GUMMIES PO), Take by mouth, Disp: , Rfl:   •  pantoprazole (PROTONIX) 40 mg tablet, Take 1 tablet (40 mg total) by mouth daily, Disp: 90 tablet, Rfl: 0  •  pregabalin (LYRICA) 75 mg capsule, Take 1 capsule (75 mg total) by mouth 3 (three) times a day, Disp: 270 capsule, Rfl: 0  •  Probiotic Product (Align) 4 MG CAPS, Take by mouth, Disp: , Rfl:   •  simvastatin (ZOCOR) 10 mg tablet, TAKE 1 TABLET BY MOUTH  DAILY AT BEDTIME, Disp: 90 tablet, Rfl: 3  •  topiramate (TOPAMAX) 25 mg tablet, Take 1 tablet (25 mg total) by mouth daily, Disp: 90 tablet, Rfl: 0  •  Tresiba FlexTouch 100 units/mL injection pen, INJECT 80 UNITS  SUBCUTANEOUSLY DAILY AT  BEDTIME, Disp: 75 mL, Rfl: 3  •  albuterol (PROAIR HFA) 90 mcg/act inhaler, as needed (Patient not taking: Reported on 3/27/2023), Disp: , Rfl:   •  conjugated estrogens (Premarin) vaginal cream, INSERT 1 APPLICATORFUL (1g) VAGINALLY TWO TIMES A WEEK (Patient not taking: Reported on 3/27/2023), Disp: 30 g, Rfl: 1  •  dicyclomine (BENTYL) 20 mg tablet, Take 1 tablet (20 mg total) by mouth 2 (two) times a day (Patient not taking: Reported on 3/27/2023), Disp: 180 tablet, Rfl: 0  •  loratadine (CLARITIN) 10 mg tablet, Take by mouth (Patient not taking: Reported on 3/27/2023), Disp: , Rfl:   •  Microlet Lancets MISC, Use 3 (three) times a day (Patient not taking: Reported on 3/27/2023), Disp: 100 each, Rfl: 2  •  oxybutynin (DITROPAN-XL) 5 mg 24 hr tablet, TAKE 1 TABLET BY MOUTH  DAILY (Patient not taking: Reported on 3/27/2023), Disp: 90 tablet, Rfl: 3    Active Problems     Patient Active Problem List   Diagnosis   • [facility]   • Chronic renal insufficiency, stage III (moderate) ([facility])   • Essential hypertension   • Mixed hyperlipidemia   • Hypomagnesemia   • Indigestion   • Irritable bowel syndrome   • Long term current use of insulin ([facility])   • Metabolic syndrome   • Nonalcoholic fatty liver disease   • Obstructive sleep apnea treated with continuous positive airway pressure (CPAP)   • Peripheral vascular disease ([facility] )   • Type 2 diabetes mellitus with kidney complication, with long-term current use of insulin ([facility])   • Urge urinary incontinence   • Vitamin D deficiency   • Osteopenia of spine   • Anemia   • Multiple and bilateral precerebral arterial occlusion   • Restless legs syndrome (RLS)   • Tinnitus   • Migraine without aura, not intractable   • Hypertriglyceridemia, essential   • Morbid obesity ([facility])   • Chronic right shoulder pain   • Vaginal bleeding   • Breast lump in upper outer quadrant   • Neck pain   • Abnormal mammogram   • Benign hypertensive renal disease   • Proteinuria   • Medicare annual wellness visit, subsequent   • Callus   • Hammer toe   • Arthritis   • Onychomycosis   • Plantar fascial fibromatosis   • Nail dystrophy   • Bilateral low back pain with bilateral sciatica   • Skin lesion   • Gait disturbance   • DNR (do not resuscitate)   • COVID-19 vaccine series completed   • COVID-19 virus infection   • Hx of thrombocytopenia   • LVH (left ventricular hypertrophy)   • Dysuria   • Abnormal finding in urine   • Recurrent UTI   • Mixed stress and urge urinary incontinence       Past Medical History     Past Medical History:   Diagnosis Date   • Anemia    • Arthritis 10/30/2020   • Chronic kidney disease    • Diabetes mellitus ([facility] )    • Fibromyalgia, primary    • Fracture of wrist     RIGHT   • Heart murmur 6/30/2020   • History of non-insulin dependent diabetes mellitus    • Hyperlipidemia    • Hypertension    • Hypertension    • IBS (irritable bowel syndrome)    • Irritable bowel    • Metabolic syndrome    • Obesity    • RLS (restless legs syndrome)    • Routine medical exam 6/28/2019   • Sleep apnea    • Sleep apnea     ON CPAP   • Upper respiratory tract infection 1/19/2022   • Urge incontinence    • Urinary incontinence        Surgical History     Past Surgical History:   Procedure Laterality Date   • CHOLECYSTECTOMY  2013   • COLONOSCOPY  2010   • CYSTOSCOPY     • HERNIA REPAIR  2008   • HYSTERECTOMY      PARTIAL (STILL HAS OVARIES)   • KNEE SURGERY Left        Family History     Family History   Problem Relation Age of Onset   • Suicidality Father    • Breast cancer Mother 58   • Melanoma Sister    • Bipolar disorder Sister    • Heart disease Brother    • Rheum arthritis Daughter    • No Known Problems Maternal Grandmother    • No Known Problems Paternal Grandmother    • No Known Problems Sister    • No Known Problems Daughter    • No Known Problems Maternal Aunt    • No Known Problems Maternal Aunt        Social History     Social History     Social History     Tobacco Use   Smoking Status Never   • Passive exposure: Never   Smokeless Tobacco Never       Past Surgical History:   Procedure Laterality Date   • CHOLECYSTECTOMY  2013   • COLONOSCOPY  2010   • CYSTOSCOPY     • HERNIA REPAIR  2008   • HYSTERECTOMY      PARTIAL (STILL HAS OVARIES)   • KNEE SURGERY Left          The following portions of the patient's history were reviewed and updated as appropriate: allergies, current medications, past family history, past medical history, past social history, past surgical history and problem list    Please note :  Voice dictation software has been used to create this document  There may be inadvertent transcription errors      [id number] [place] [id number] Vanesa Shimon

## 2023-03-27 NOTE — ASSESSMENT & PLAN NOTE
· Begin d-mannose 2000 mg daily  · Begin cranberry 500 mg daily  · Begin vitamin C 1000 mg daily  · Begin daily probiotic 20 billion units or yogurt daily  · Continue premarin cream pea-sized amount twice a week on Wednesday and Sunday before bed   · Continue to hydrate with at least 64 ounces of water a day  · Call urology for symptoms of urinary tract infection; standing order for urine testing placed  · Send urine culture today  · If urine culture comes back positive we will treat her for infection and plan for 3-month suppression  · Improved bowel control  · Follow-up 3 months

## 2023-03-27 NOTE — ASSESSMENT & PLAN NOTE
· PVR 25 ml  · Referral to pelvic floor physical therapy  · Resume oxybutynin  · Avoid bladder irritants  · Increase hydration with water  · Follow up 3 months for recheck

## 2023-03-27 NOTE — PATIENT INSTRUCTIONS
Begin d-mannose 2000 mg daily  Begin cranberry 500 mg daily  Begin vitamin C 1000 mg daily  Or you can take Cystex urinary health maintenance instead of the d-mannose, cranberry and vitamin C  This should be taken daily forever  Begin daily probiotic 20 billion units or yogurt daily  Use the premarin cream pea-sized amount twice a week on Wednesday and Sunday before bed   Continue to hydrate with at least 64 ounces of water a day  Call urology for symptoms of urinary tract infection  Follow up with your gastroenterologist for the diarrhea    Urinary Tract Infection in Older Adults   WHAT YOU NEED TO KNOW:   What is a urinary tract infection (UTI)? A UTI is caused by bacteria that get inside your urinary tract  Your urinary tract includes your kidneys, ureters, bladder, and urethra  A UTI is more common in your lower urinary tract, which includes your bladder and urethra  What increases my risk for a UTI? A UTI within the last 3 months    Menopause in women    Enlarged prostate in men    Medicines that affect urination    Urinary incontinence (you cannot control your bladder)    Sexual intercourse    Medical conditions, such as diabetes or obesity    Urinary tract problems, such as a narrowing, kidney stones, or inability to empty your bladder completely    What are the signs and symptoms of a UTI? Fever and chills    Pain or burning when you urinate    Urine that smells bad or looks cloudy, or blood in your urine    Urinating more often or waking from sleep to urinate    Sudden, strong need to urinate    Pain or pressure in your lower abdomen    Leaking urine    Confusion or agitation    Fatigue, shakiness, and weakness    How is a UTI diagnosed? Your healthcare provider will ask about your symptoms  He or she may also examine you  You may need any of the following:  A urinalysis  will give information about your urinary tract and overall health      A urine culture  may show the type of germ causing the infection  You may need this test again if you continue to have signs and symptoms after a UTI is treated  How is a UTI treated? Medicines treat the bacterial infection or decrease pain and burning when you urinate  You may also need medicines to decrease the urge to urinate often  If you have UTIs often (called recurrent UTIs), you may be given antibiotics to take regularly  You will be given directions for when and how to use antibiotics  The goal is to prevent UTIs but not cause antibiotic resistance by using antibiotics too often  How can I manage my symptoms? Drink liquids as directed  Liquids can help flush bacteria from your urinary tract  Ask how much liquid to drink each day and which liquids are best for you  You may need to drink more liquids than usual to help flush out the bacteria  Do not drink alcohol, caffeine, and citrus juices  These can irritate your bladder and increase your symptoms  Apply heat  on your abdomen for 20 to 30 minutes every 2 hours for as many days as directed  Heat helps decrease discomfort and pressure in your bladder  How can I help prevent a UTI? Urinate when you feel the urge  Do not hold your urine  Bacteria can grow if urine stays in the bladder too long  It may be helpful to urinate at least every 3 to 4 hours  Urinate after you have sex  This will help flush away bacteria that can enter your urinary tract during sex  Wear cotton underwear and clothes that are loose  Tight pants and nylon underwear can trap moisture and cause bacteria to grow  Drink cranberry juice or take cranberry supplements  These may help prevent UTIs  Your healthcare provider can recommend the right juice or supplement for you  Women should wipe front to back after urinating or having a bowel movement  This may prevent germs from getting into the urinary tract  Do not douche or use feminine deodorants  These can change the chemical balance in your vagina   You may also be given vaginal estrogen medicine  This medicine helps prevent recurrent UTIs in women who have gone through menopause or are in morelia-menopause  When should I seek immediate care? You become confused or agitated  You fall down  You are urinating very little or not at all  You are vomiting  You have a high fever with shaking chills  You have side or back pain that gets worse  When should I call my doctor? You have a fever  You are a woman and you have increased white or yellow discharge from your vagina  You do not feel better after 2 days of taking antibiotics  You have questions or concerns about your condition or care  CARE AGREEMENT:   You have the right to help plan your care  Learn about your health condition and how it may be treated  Discuss treatment options with your healthcare providers to decide what care you want to receive  You always have the right to refuse treatment  The above information is an  only  It is not intended as medical advice for individual conditions or treatments  Talk to your doctor, nurse or pharmacist before following any medical regimen to see if it is safe and effective for you  © Copyright [employer] 2022 Information is for End User's use only and may not be sold, redistributed or otherwise used for commercial purposes

## 2023-03-29 LAB
BACTERIA UR CULT: ABNORMAL
BACTERIA UR CULT: ABNORMAL

## 2023-03-30 ENCOUNTER — TELEPHONE (OUTPATIENT)
Dept: UROLOGY | Facility: CLINIC | Age: 78
End: 2023-03-30

## 2023-03-30 DIAGNOSIS — N30.00 ACUTE CYSTITIS WITHOUT HEMATURIA: Primary | ICD-10-CM

## 2023-03-30 DIAGNOSIS — G43.009 MIGRAINE WITHOUT AURA AND WITHOUT STATUS MIGRAINOSUS, NOT INTRACTABLE: ICD-10-CM

## 2023-03-30 RX ORDER — NITROFURANTOIN 25; 75 MG/1; MG/1
100 CAPSULE ORAL 2 TIMES DAILY
Qty: 14 CAPSULE | Refills: 0 | Status: SHIPPED | OUTPATIENT
Start: 2023-03-30 | End: 2023-04-06

## 2023-03-30 RX ORDER — TOPIRAMATE 25 MG/1
TABLET ORAL
Qty: 90 TABLET | Refills: 3 | Status: SHIPPED | OUTPATIENT
Start: 2023-03-30

## 2023-03-30 NOTE — TELEPHONE ENCOUNTER
Left detailed message for patient regarding results, office number provided, option 1 ask for Priyanka Durand in [facility] if she has questions

## 2023-03-30 NOTE — TELEPHONE ENCOUNTER
----- Message from [address] [place] [address] sent at 3/30/2023  8:46 AM EDT -----  Please let patient know urine culture came back positive    I will send nitrofurantoin to her pharmacy this is the only antibiotic that will treat her current infection

## 2023-03-30 NOTE — RESULT ENCOUNTER NOTE
Please let patient know urine culture came back positive    I will send nitrofurantoin to her pharmacy this is the only antibiotic that will treat her current infection

## 2023-03-31 ENCOUNTER — TELEPHONE (OUTPATIENT)
Dept: ADMINISTRATIVE | Facility: OTHER | Age: 78
End: 2023-03-31

## 2023-03-31 NOTE — TELEPHONE ENCOUNTER
----- Message from Bill Miranda sent at 3/30/2023  8:32 AM EDT -----  Regarding: care gap request  03/30/23 8:32 AM    Hello, our patient attached above has had Diabetic Foot Exam completed/performed  Please assist in updating the patient chart by pulling the document from the Media Tab  The date of service is 3/14/2023       Thank you,  [id number] [place] [phone]

## 2023-04-03 NOTE — TELEPHONE ENCOUNTER
Upon review of the In Basket request we were able to locate, review, and update the patient chart as requested for Diabetic Foot Exam     Any additional questions or concerns should be emailed to the Practice Liaisons via the appropriate education email address, please do not reply via In Basket      Thank you  Mary Blankenship

## 2023-04-23 NOTE — PROGRESS NOTES
"Daily Note     Today's date: 2023  Patient name: Marco Antonio Guzman  : 1945  MRN: [id number]  Referring provider: HERMANN Zayas  Dx:   Encounter Diagnosis     ICD-10-CM    1  Mixed stress and urge urinary incontinence  N39 46                      Subjective: Notes urinary symptoms about the same right now  Improved control on the way to the bathroom  Doing exercises as able  Objective: See treatment diary below      Assessment: Tolerated treatment well  Patient demonstrated fatigue post treatment and would benefit from continued PT Added IR/E to HEP  Demonstrated and verbalized understanding  Plan: Continue per plan of care        Precautions: DM, HTN, FMS, hysterectomy, hernia repair                                           Ther Ex             Endurance Kegel 4\"/10\"/10x  HEP 10x 4\"/10\"  10x          Quick Kegel 10x  HEP 10x 2x10                       Ball with Kegel and exhale  3-4\"/  10x  HEP 4\"  2x10          TB with ER/exhale  10x  HEP  BTB 10x          TB with Kegel  10x  HEP 10x          Long leg IR/ER   2x10          Seated endurance Kegel   4\"/10x          Ther Activity             Bladder health handout 10            Urge suppression 5  10          Gait Training                                       Modalities                                            "

## 2023-04-24 ENCOUNTER — OFFICE VISIT (OUTPATIENT)
Dept: PHYSICAL THERAPY | Age: 78
End: 2023-04-24

## 2023-04-24 DIAGNOSIS — G43.009 MIGRAINE WITHOUT AURA AND WITHOUT STATUS MIGRAINOSUS, NOT INTRACTABLE: ICD-10-CM

## 2023-04-24 DIAGNOSIS — N39.46 MIXED STRESS AND URGE URINARY INCONTINENCE: Primary | ICD-10-CM

## 2023-04-25 RX ORDER — TOPIRAMATE 25 MG/1
25 TABLET ORAL DAILY
Qty: 90 TABLET | Refills: 0 | Status: SHIPPED | OUTPATIENT
Start: 2023-04-25

## 2023-04-26 ENCOUNTER — HOSPITAL ENCOUNTER (OUTPATIENT)
Dept: MAMMOGRAPHY | Facility: HOSPITAL | Age: 78
Discharge: HOME | End: 2023-04-26

## 2023-04-26 VITALS — WEIGHT: 250 LBS | BODY MASS INDEX: 46.01 KG/M2 | HEIGHT: 62 IN

## 2023-04-26 DIAGNOSIS — I10 ESSENTIAL HYPERTENSION: ICD-10-CM

## 2023-04-26 DIAGNOSIS — Z12.31 ENCOUNTER FOR SCREENING MAMMOGRAM FOR MALIGNANT NEOPLASM OF BREAST: ICD-10-CM

## 2023-04-26 RX ORDER — LISINOPRIL 10 MG/1
TABLET ORAL
Qty: 90 TABLET | Refills: 0 | Status: SHIPPED | OUTPATIENT
Start: 2023-04-26

## 2023-05-01 ENCOUNTER — TELEPHONE (OUTPATIENT)
Dept: FAMILY MEDICINE CLINIC | Facility: CLINIC | Age: 78
End: 2023-05-01

## 2023-05-01 DIAGNOSIS — K30 INDIGESTION: ICD-10-CM

## 2023-05-01 DIAGNOSIS — Z12.31 ENCOUNTER FOR SCREENING MAMMOGRAM FOR MALIGNANT NEOPLASM OF BREAST: Primary | ICD-10-CM

## 2023-05-01 RX ORDER — PANTOPRAZOLE SODIUM 40 MG/1
TABLET, DELAYED RELEASE ORAL
Qty: 90 TABLET | Refills: 3 | Status: SHIPPED | OUTPATIENT
Start: 2023-05-01

## 2023-05-01 NOTE — TELEPHONE ENCOUNTER
----- Message from Brandon Mg MD sent at 5/1/2023 12:33 PM EDT -----  Negative mammogram repeat in one year

## 2023-05-09 NOTE — PROGRESS NOTES
"Daily Note     Today's date: 5/10/2023  Patient name: Leo Goldstein  : 1945  MRN: [id number]  Referring provider: HERMANN Buitrago  Dx:   Encounter Diagnosis     ICD-10-CM    1  Mixed stress and urge urinary incontinence  N39 46                      Subjective: Notes less fatigue and more energy  Notes urinary symptoms persist especially with voiding when first out of bed  Notes she was discouraged by a set back as she thought it was doing better  Noted increased bilateral LE soreness and pain with new exercise last visit thus has stopped  Objective: See treatment diary below      Assessment: Tolerated treatment well  Patient would benefit from continued PT Discharged long leg IR/ER from HEP due to discomfort  Encouraged use of urge suppression techniques consistently for best results  Patient admits to performing less due to discouragement  Suggested adding standing KEGEL to HEP  Plan: Continue per plan of care        Precautions: DM, HTN, FMS, hysterectomy, hernia repair        5/10                                   Ther Ex             Endurance Kegel 4\"/10\"/10x  HEP 10x 4\"/10\"  10x 4\"/10\"/10x         Quick Kegel 10x  HEP 10x 2x10 2x10                      Ball with Kegel and exhale  3-4\"/  10x  HEP 4\"  2x10 4\"/2x10         TB with ER/exhale  10x  HEP  BTB 10x 10x         TB with Kegel  10x  HEP 10x 10x         Long leg IR/ER   2x10 D/C         Seated endurance Kegel   4\"/10x 4\"/10x           Standing Kegel    4\"/10x  HEP         Ther Activity             Bladder health handout 10            Urge suppression 5  10 5         Gait Training                                       Modalities                                            "

## 2023-05-10 ENCOUNTER — OFFICE VISIT (OUTPATIENT)
Dept: PHYSICAL THERAPY | Age: 78
End: 2023-05-10

## 2023-05-10 DIAGNOSIS — N39.46 MIXED STRESS AND URGE URINARY INCONTINENCE: Primary | ICD-10-CM

## 2023-05-16 NOTE — PROGRESS NOTES
"Daily Note     Today's date: 2023  Patient name: Teo Allred  : 1945  MRN: [id number]  Referring provider: HERMANN Szymanski  Dx:   Encounter Diagnosis     ICD-10-CM    1  Mixed stress and urge urinary incontinence  N39 46                      Subjective: Notes reduced IBS symptoms and FI since PT start  Good and bad days but slowly better  Notes high stress this week with ill  and still managed reduced symptoms  Objective: See treatment diary below      Assessment: Tolerated treatment well  Patient would benefit from continued PT Added core ball isometric with good tolerance  Increased exercise tolerance with less fatigue  Plan: Continue per plan of care        Precautions: DM, HTN, FMS, hysterectomy, hernia repair       4/13 4/19 4/24 5/10 5/17                                  Ther Ex             Endurance Kegel 4\"/10\"/10x  HEP 10x 4\"/10\"  10x 4\"/10\"/10x 4\"/10\"/10x        Quick Kegel 10x  HEP 10x 2x10 2x10 2x10                     Ball with Kegel and exhale  3-4\"/  10x  HEP 4\"  2x10 4\"/2x10 2x10        TB with ER/exhale  10x  HEP  BTB 10x 10x 10x        TB with Kegel  10x  HEP 10x 10x 10x        Long leg IR/ER   2x10 D/C         Seated endurance Kegel   4\"/10x 4\"/10x   4\"/10\"/10x        Standing Kegel    4\"/10x  HEP 4\"/10x        Core ball press     5\"/10x                                  Ther Activity             Bladder health handout 10            Urge suppression 5  10 5         Gait Training                                       Modalities                                            "

## 2023-05-17 ENCOUNTER — OFFICE VISIT (OUTPATIENT)
Dept: PHYSICAL THERAPY | Age: 78
End: 2023-05-17

## 2023-05-17 DIAGNOSIS — N39.46 MIXED STRESS AND URGE URINARY INCONTINENCE: Primary | ICD-10-CM

## 2023-05-19 DIAGNOSIS — K30 INDIGESTION: ICD-10-CM

## 2023-05-19 DIAGNOSIS — M54.42 CHRONIC BILATERAL LOW BACK PAIN WITH BILATERAL SCIATICA: ICD-10-CM

## 2023-05-19 DIAGNOSIS — G89.29 CHRONIC BILATERAL LOW BACK PAIN WITH BILATERAL SCIATICA: ICD-10-CM

## 2023-05-19 DIAGNOSIS — M54.41 CHRONIC BILATERAL LOW BACK PAIN WITH BILATERAL SCIATICA: ICD-10-CM

## 2023-05-22 RX ORDER — PANTOPRAZOLE SODIUM 40 MG/1
40 TABLET, DELAYED RELEASE ORAL DAILY
Qty: 90 TABLET | Refills: 0 | Status: SHIPPED | OUTPATIENT
Start: 2023-05-22

## 2023-05-22 RX ORDER — PREGABALIN 75 MG/1
75 CAPSULE ORAL 3 TIMES DAILY
Qty: 270 CAPSULE | Refills: 0 | Status: SHIPPED | OUTPATIENT
Start: 2023-05-22

## 2023-05-22 NOTE — PROGRESS NOTES
"Daily Note/DISCHARGE     Today's date: 2023  Patient name: Jair Delgado  : 1945  MRN: [id number]  Referring provider: HERMANN Cruz  Dx:   Encounter Diagnosis     ICD-10-CM    1  Mixed stress and urge urinary incontinence  N39 46           Start Time: 1530  Stop Time: 1623  Total time in clinic (min): 53 minutes    Subjective: Notes IBS and FI symptoms are much better  Notes reduced number of bladder protective products in a day  Doing her exercises daily  Varying success with urge suppression techniques  Objective: See treatment diary below      Assessment: Tolerated treatment well  Patient would benefit from continued PT Patient independent in ongoing HEP and bladder health education techniques  Improved FI and reduced bladder protective products since PT start  At this time, patient has achieved their maximum functional benefit from skilled physical therapy services and will be discharged to their HEP  Patient is in agreement with the plan of care  As a result, patient is discharged from physical therapy  PT goals partially met   Improved FOTO score      Plan: Discharge PT to self care HEP     Precautions: DM, HTN, FMS, hysterectomy, hernia repair       4/13 4/19 4/24 5/10 5/17 5/24                                 Ther Ex             Endurance Kegel 4\"/10\"/10x  HEP 10x 4\"/10\"  10x 4\"/10\"/10x 4\"/10\"/10x 4\"/10\"       Quick Kegel 10x  HEP 10x 2x10 2x10 2x10 2x10                    Ball with Kegel and exhale  3-4\"/  10x  HEP 4\"  2x10 4\"/2x10 2x10 2x10       TB with ER/exhale  10x  HEP  BTB 10x 10x 10x 10x       TB with Kegel  10x  HEP 10x 10x 10x 10x       Long leg IR/ER   2x10 D/C         Seated endurance Kegel   4\"/10x 4\"/10x   4\"/10\"/10x 4\"/10\"  10x       Standing Kegel    4\"/10x  HEP 4\"/10x 4\"/10x       Core ball press     5\"/10x 10x                                 Ther Activity             Bladder health handout 10            Urge suppression 5  10 5         Gait Training           " Modalities

## 2023-05-24 ENCOUNTER — OFFICE VISIT (OUTPATIENT)
Dept: PHYSICAL THERAPY | Age: 78
End: 2023-05-24

## 2023-05-24 DIAGNOSIS — N39.46 MIXED STRESS AND URGE URINARY INCONTINENCE: Primary | ICD-10-CM

## 2023-06-26 DIAGNOSIS — G43.009 MIGRAINE WITHOUT AURA AND WITHOUT STATUS MIGRAINOSUS, NOT INTRACTABLE: ICD-10-CM

## 2023-06-26 RX ORDER — TOPIRAMATE 25 MG/1
TABLET ORAL
Qty: 90 TABLET | Refills: 0 | Status: SHIPPED | OUTPATIENT
Start: 2023-06-26

## 2023-07-03 ENCOUNTER — APPOINTMENT (OUTPATIENT)
Dept: LAB | Facility: IMAGING CENTER | Age: 78
End: 2023-07-03
Payer: MEDICARE

## 2023-07-03 DIAGNOSIS — E66.01 MORBID OBESITY (HCC): ICD-10-CM

## 2023-07-03 DIAGNOSIS — E78.1 HYPERTRIGLYCERIDEMIA, ESSENTIAL: ICD-10-CM

## 2023-07-03 DIAGNOSIS — E11.22 TYPE 2 DIABETES MELLITUS WITH STAGE 3B CHRONIC KIDNEY DISEASE, WITH LONG-TERM CURRENT USE OF INSULIN (HCC): ICD-10-CM

## 2023-07-03 DIAGNOSIS — Z79.4 TYPE 2 DIABETES MELLITUS WITH STAGE 3B CHRONIC KIDNEY DISEASE, WITH LONG-TERM CURRENT USE OF INSULIN (HCC): ICD-10-CM

## 2023-07-03 DIAGNOSIS — N18.32 TYPE 2 DIABETES MELLITUS WITH STAGE 3B CHRONIC KIDNEY DISEASE, WITH LONG-TERM CURRENT USE OF INSULIN (HCC): ICD-10-CM

## 2023-07-03 LAB
ALBUMIN SERPL BCP-MCNC: 3.6 G/DL (ref 3.5–5)
ALP SERPL-CCNC: 123 U/L (ref 46–116)
ALT SERPL W P-5'-P-CCNC: 28 U/L (ref 12–78)
ANION GAP SERPL CALCULATED.3IONS-SCNC: 3 MMOL/L
AST SERPL W P-5'-P-CCNC: 28 U/L (ref 5–45)
BASOPHILS # BLD AUTO: 0.02 THOUSANDS/ÂΜL (ref 0–0.1)
BASOPHILS NFR BLD AUTO: 0 % (ref 0–1)
BILIRUB SERPL-MCNC: 0.41 MG/DL (ref 0.2–1)
BUN SERPL-MCNC: 21 MG/DL (ref 5–25)
CALCIUM SERPL-MCNC: 9.3 MG/DL (ref 8.3–10.1)
CHLORIDE SERPL-SCNC: 110 MMOL/L (ref 96–108)
CHOLEST SERPL-MCNC: 163 MG/DL
CO2 SERPL-SCNC: 29 MMOL/L (ref 21–32)
CREAT SERPL-MCNC: 1.16 MG/DL (ref 0.6–1.3)
EOSINOPHIL # BLD AUTO: 0.09 THOUSAND/ÂΜL (ref 0–0.61)
EOSINOPHIL NFR BLD AUTO: 1 % (ref 0–6)
ERYTHROCYTE [DISTWIDTH] IN BLOOD BY AUTOMATED COUNT: 13.3 % (ref 11.6–15.1)
EST. AVERAGE GLUCOSE BLD GHB EST-MCNC: 166 MG/DL
GFR SERPL CREATININE-BSD FRML MDRD: 45 ML/MIN/1.73SQ M
GLUCOSE P FAST SERPL-MCNC: 107 MG/DL (ref 65–99)
HBA1C MFR BLD: 7.4 %
HCT VFR BLD AUTO: 43.6 % (ref 34.8–46.1)
HDLC SERPL-MCNC: 47 MG/DL
HGB BLD-MCNC: 13.3 G/DL (ref 11.5–15.4)
IMM GRANULOCYTES # BLD AUTO: 0.05 THOUSAND/UL (ref 0–0.2)
IMM GRANULOCYTES NFR BLD AUTO: 1 % (ref 0–2)
LDLC SERPL CALC-MCNC: 89 MG/DL (ref 0–100)
LYMPHOCYTES # BLD AUTO: 1.5 THOUSANDS/ÂΜL (ref 0.6–4.47)
LYMPHOCYTES NFR BLD AUTO: 18 % (ref 14–44)
MCH RBC QN AUTO: 28.9 PG (ref 26.8–34.3)
MCHC RBC AUTO-ENTMCNC: 30.5 G/DL (ref 31.4–37.4)
MCV RBC AUTO: 95 FL (ref 82–98)
MONOCYTES # BLD AUTO: 0.48 THOUSAND/ÂΜL (ref 0.17–1.22)
MONOCYTES NFR BLD AUTO: 6 % (ref 4–12)
NEUTROPHILS # BLD AUTO: 6.25 THOUSANDS/ÂΜL (ref 1.85–7.62)
NEUTS SEG NFR BLD AUTO: 74 % (ref 43–75)
NONHDLC SERPL-MCNC: 116 MG/DL
NRBC BLD AUTO-RTO: 0 /100 WBCS
PLATELET # BLD AUTO: 173 THOUSANDS/UL (ref 149–390)
PMV BLD AUTO: 12.2 FL (ref 8.9–12.7)
POTASSIUM SERPL-SCNC: 3.8 MMOL/L (ref 3.5–5.3)
PROT SERPL-MCNC: 7.4 G/DL (ref 6.4–8.4)
RBC # BLD AUTO: 4.61 MILLION/UL (ref 3.81–5.12)
SODIUM SERPL-SCNC: 142 MMOL/L (ref 135–147)
TRIGL SERPL-MCNC: 134 MG/DL
TSH SERPL DL<=0.05 MIU/L-ACNC: 1.18 UIU/ML (ref 0.45–4.5)
WBC # BLD AUTO: 8.39 THOUSAND/UL (ref 4.31–10.16)

## 2023-07-03 PROCEDURE — 80053 COMPREHEN METABOLIC PANEL: CPT

## 2023-07-03 PROCEDURE — 84443 ASSAY THYROID STIM HORMONE: CPT

## 2023-07-03 PROCEDURE — 36415 COLL VENOUS BLD VENIPUNCTURE: CPT

## 2023-07-03 PROCEDURE — 80061 LIPID PANEL: CPT

## 2023-07-03 PROCEDURE — 85025 COMPLETE CBC W/AUTO DIFF WBC: CPT

## 2023-07-03 PROCEDURE — 83036 HEMOGLOBIN GLYCOSYLATED A1C: CPT

## 2023-07-05 ENCOUNTER — TELEPHONE (OUTPATIENT)
Dept: UROLOGY | Facility: AMBULATORY SURGERY CENTER | Age: 78
End: 2023-07-05

## 2023-07-05 DIAGNOSIS — N30.00 ACUTE CYSTITIS WITHOUT HEMATURIA: Primary | ICD-10-CM

## 2023-07-05 RX ORDER — CEPHALEXIN 500 MG/1
500 CAPSULE ORAL EVERY 8 HOURS SCHEDULED
Qty: 21 CAPSULE | Refills: 0 | Status: SHIPPED | OUTPATIENT
Start: 2023-07-05 | End: 2023-07-12

## 2023-07-05 NOTE — TELEPHONE ENCOUNTER
Left a voicemail to inform Ezequiel Meyers that Keflex was sent to her pharmacy for a positive urine culture per direction of HERMANN Jones and I informed her to reach our clinical staff is she has any questions or concerns.

## 2023-07-07 ENCOUNTER — RA CDI HCC (OUTPATIENT)
Dept: OTHER | Facility: HOSPITAL | Age: 78
End: 2023-07-07

## 2023-07-07 NOTE — PROGRESS NOTES
[address] [facility] coding opportunities          Chart Reviewed number of suggestions sent to Provider: 3    E11.51  E11.40  E11.65    Patients Insurance     Medicare Insurance: [employer]

## 2023-07-12 ENCOUNTER — OFFICE VISIT (OUTPATIENT)
Dept: FAMILY MEDICINE CLINIC | Facility: CLINIC | Age: 78
End: 2023-07-12
Payer: MEDICARE

## 2023-07-12 VITALS
HEART RATE: 74 BPM | OXYGEN SATURATION: 96 % | HEIGHT: 62 IN | DIASTOLIC BLOOD PRESSURE: 78 MMHG | SYSTOLIC BLOOD PRESSURE: 120 MMHG | WEIGHT: 248 LBS | TEMPERATURE: 97.2 F | BODY MASS INDEX: 45.64 KG/M2

## 2023-07-12 DIAGNOSIS — N39.46 MIXED STRESS AND URGE URINARY INCONTINENCE: ICD-10-CM

## 2023-07-12 DIAGNOSIS — N93.9 VAGINAL BLEEDING: ICD-10-CM

## 2023-07-12 DIAGNOSIS — I10 ESSENTIAL HYPERTENSION: ICD-10-CM

## 2023-07-12 DIAGNOSIS — N18.32 TYPE 2 DIABETES MELLITUS WITH STAGE 3B CHRONIC KIDNEY DISEASE, WITH LONG-TERM CURRENT USE OF INSULIN (HCC): ICD-10-CM

## 2023-07-12 DIAGNOSIS — E11.22 TYPE 2 DIABETES MELLITUS WITH STAGE 3B CHRONIC KIDNEY DISEASE, WITH LONG-TERM CURRENT USE OF INSULIN (HCC): ICD-10-CM

## 2023-07-12 DIAGNOSIS — E78.2 MIXED HYPERLIPIDEMIA: ICD-10-CM

## 2023-07-12 DIAGNOSIS — Z00.00 MEDICARE ANNUAL WELLNESS VISIT, SUBSEQUENT: Primary | ICD-10-CM

## 2023-07-12 DIAGNOSIS — Z13.29 SCREENING FOR THYROID DISORDER: ICD-10-CM

## 2023-07-12 DIAGNOSIS — E66.01 MORBID OBESITY (HCC): ICD-10-CM

## 2023-07-12 DIAGNOSIS — L98.9 SKIN LESION: ICD-10-CM

## 2023-07-12 DIAGNOSIS — Z79.4 TYPE 2 DIABETES MELLITUS WITH STAGE 3B CHRONIC KIDNEY DISEASE, WITH LONG-TERM CURRENT USE OF INSULIN (HCC): ICD-10-CM

## 2023-07-12 PROCEDURE — G0444 DEPRESSION SCREEN ANNUAL: HCPCS | Performed by: FAMILY MEDICINE

## 2023-07-12 PROCEDURE — 99214 OFFICE O/P EST MOD 30 MIN: CPT | Performed by: FAMILY MEDICINE

## 2023-07-12 PROCEDURE — G0439 PPPS, SUBSEQ VISIT: HCPCS | Performed by: FAMILY MEDICINE

## 2023-07-12 RX ORDER — CLOTRIMAZOLE 1 %
CREAM (GRAM) TOPICAL 2 TIMES DAILY
Qty: 113 G | Refills: 2 | Status: SHIPPED | OUTPATIENT
Start: 2023-07-12

## 2023-07-12 RX ORDER — CONJUGATED ESTROGENS 0.62 MG/G
CREAM VAGINAL
Qty: 30 G | Refills: 1 | Status: SHIPPED | OUTPATIENT
Start: 2023-07-12

## 2023-07-12 NOTE — PROGRESS NOTES
Assessment and Plan:     Problem List Items Addressed This Visit        Endocrine    Type 2 diabetes mellitus with kidney complication, with long-term current use of insulin ([address])       Lab Results   Component Value Date    HGBA1C 7.4 (H) 07/03/2023   Chronic asymptomatic improvement in hemoglobin A1c compared with before encourage patient to continue current medication no sign or symptom of hypoglycemia discussed low-carb diet important lose weight reviewed patient already on statin and ACE inhibitor         Relevant Orders    CBC and differential    Comprehensive metabolic panel    Hemoglobin A1C       Cardiovascular and Mediastinum    Essential hypertension     Chronic asymptomatic rate controlled continue with the lisinopril 10 mg once a day low-salt diet important lose weight reviewed         Relevant Orders    CBC and differential    Comprehensive metabolic panel       Musculoskeletal and Integument    Skin lesion    Relevant Medications    clotrimazole (LOTRIMIN) 1 % cream    Other Relevant Orders    CBC and differential    Comprehensive metabolic panel       Other    Mixed hyperlipidemia     Chronic asymptomatic fair control continue current dose of simvastatin 10 mg once a day ago low-fat diet important lose weight reviewed with the patient         Relevant Orders    CBC and differential    Comprehensive metabolic panel    Lipid Panel with Direct LDL reflex    Morbid obesity (HCC)     BMI today 45.36 no carb diet low-fat diet increase physical activity discussed with the patient         Vaginal bleeding    Relevant Medications    estrogens, conjugated (Premarin) vaginal cream    Other Relevant Orders    CBC and differential    Comprehensive metabolic panel    Medicare annual wellness visit, subsequent - Primary     Advice and education were given regarding nutrition, aerobic exercises, weight-bearing exercises, cardiovascular risk reduction, fall risk reduction, and age-appropriate supplements.      The patient was counseled regarding instructions for management, risk factor reductions, prognosis, risks and benefits of treatment options, patient and family education, and importance of compliance with treatment. Relevant Orders    CBC and differential    Comprehensive metabolic panel    Mixed stress and urge urinary incontinence     Chronic patient follow-up with the urology periodically        Other Visit Diagnoses     Screening for thyroid disorder        Relevant Orders    TSH, 3rd generation with Free T4 reflex        BMI Counseling: Body mass index is 45.36 kg/m². The BMI is above normal. Nutrition recommendations include decreasing portion sizes, decreasing fast food intake and limiting drinks that contain sugar. Exercise recommendations include exercising 3-5 times per week. Rationale for BMI follow-up plan is due to patient being overweight or obese. Depression Screening and Follow-up Plan: Patient was screened for depression during today's encounter. They screened negative with a PHQ-2 score of 0. Urinary Incontinence Plan of Care: counseling topics discussed: practice Kegel (pelvic floor strengthening) exercises, weight loss and improving blood sugar control. Preventive health issues were discussed with patient, and age appropriate screening tests were ordered as noted in patient's After Visit Summary. Personalized health advice and appropriate referrals for health education or preventive services given if needed, as noted in patient's After Visit Summary.      History of Present Illness:     Patient presents for a Medicare Wellness Visit    Patient here for Medicare exam and follow-up with a chronic condition patient compliant with the medication tolerated well without side effect no new concerns recent blood work discussed with the patient     Patient Care Team:  Gaetano Garnica MD as PCP - [address]ORE[address] DPM (Podiatry)  Cale Cochran MD (Nephrology)  Wong Tello MD (Urology)  Sharmaine Gandhi MD (Gastroenterology)  Velma Silva (Neurology)  [address] as Nurse Practitioner (Urology)  [address] as Nurse Practitioner (Urology)     Review of Systems:     Review of Systems   Constitutional: Negative for chills and fever. HENT: Negative for ear pain and sore throat. Eyes: Negative for pain and visual disturbance. Respiratory: Negative for cough and shortness of breath. Cardiovascular: Negative for chest pain and palpitations. Gastrointestinal: Negative for abdominal pain, constipation, diarrhea and vomiting. Genitourinary: Negative for dysuria and hematuria. Musculoskeletal: Negative for arthralgias and back pain. Skin: Negative for color change and rash. Neurological: Negative for seizures and syncope. Hematological: Does not bruise/bleed easily. Psychiatric/Behavioral: Negative for behavioral problems. All other systems reviewed and are negative.        Problem List:     Patient Active Problem List   Diagnosis   • Carotid artery disease ([address])   • Chronic renal insufficiency, stage III (moderate) ([facility])   • Essential hypertension   • Mixed hyperlipidemia   • Hypomagnesemia   • Indigestion   • Irritable bowel syndrome   • Long term current use of insulin ([facility])   • Metabolic syndrome   • Nonalcoholic fatty liver disease   • Obstructive sleep apnea treated with continuous positive airway pressure (CPAP)   • Peripheral vascular disease ([facility])   • Type 2 diabetes mellitus with kidney complication, with long-term current use of insulin ([facility])   • Urge urinary incontinence   • Vitamin D deficiency   • Osteopenia of spine   • Anemia   • Multiple and bilateral precerebral arterial occlusion   • Restless legs syndrome (RLS)   • Tinnitus   • Migraine without aura, not intractable   • Hypertriglyceridemia, essential   • Morbid obesity (HCC)   • Chronic right shoulder pain   • Vaginal bleeding   • Breast lump in upper outer quadrant   • Neck pain • Abnormal mammogram   • Benign hypertensive renal disease   • Proteinuria   • Medicare annual wellness visit, subsequent   • Callus   • Hammer toe   • Arthritis   • Onychomycosis   • Plantar fascial fibromatosis   • Nail dystrophy   • Bilateral low back pain with bilateral sciatica   • Skin lesion   • Gait disturbance   • DNR (do not resuscitate)   • COVID-19 vaccine series completed   • COVID-19 virus infection   • Hx of thrombocytopenia   • LVH (left ventricular hypertrophy)   • Dysuria   • Abnormal finding in urine   • Recurrent UTI   • Mixed stress and urge urinary incontinence      Past Medical and Surgical History:     Past Medical History:   Diagnosis Date   • Anemia    • Arthritis 10/30/2020   • Chronic kidney disease    • Diabetes mellitus ([address])    • Fibromyalgia, primary    • Fracture of wrist     RIGHT   • Heart murmur 6/30/2020   • History of non-insulin dependent diabetes mellitus    • Hyperlipidemia    • Hypertension    • Hypertension    • IBS (irritable bowel syndrome)    • Irritable bowel    • Metabolic syndrome    • Obesity    • RLS (restless legs syndrome)    • Routine medical exam 6/28/2019   • Sleep apnea    • Sleep apnea     ON CPAP   • Upper respiratory tract infection 1/19/2022   • Urge incontinence    • Urinary incontinence      Past Surgical History:   Procedure Laterality Date   • CHOLECYSTECTOMY  2013   • COLONOSCOPY  2010   • CYSTOSCOPY     • HERNIA REPAIR  2008   • HYSTERECTOMY      PARTIAL (STILL HAS OVARIES)   • KNEE SURGERY Left       Family History:     Family History   Problem Relation Age of Onset   • Suicidality Father    • Breast cancer Mother 58   • Melanoma Sister    • Bipolar disorder Sister    • Heart disease Brother    • Rheum arthritis Daughter    • No Known Problems Maternal Grandmother    • No Known Problems Paternal Grandmother    • No Known Problems Sister    • No Known Problems Daughter    • No Known Problems Maternal Aunt    • No Known Problems Maternal Aunt Social History:     Social History     Socioeconomic History   • Marital status: /Civil Union     Spouse name: None   • Number of children: None   • Years of education: None   • Highest education level: None   Occupational History   • None   Tobacco Use   • Smoking status: Never     Passive exposure: Never   • Smokeless tobacco: Never   Vaping Use   • Vaping Use: Never used   Substance and Sexual Activity   • Alcohol use: No     Comment: no caffeine use   • Drug use: No   • Sexual activity: Yes     Partners: Male     Birth control/protection: Post-menopausal     Comment:  61 years   Other Topics Concern   • None   Social History Narrative   • None     Social Determinants of Health     Financial Resource Strain: Low Risk  (7/12/2023)    Overall Financial Resource Strain (CARDIA)    • Difficulty of Paying Living Expenses: Not hard at all   Food Insecurity: No Food Insecurity (7/7/2021)    Hunger Vital Sign    • Worried About Running Out of Food in the Last Year: Never true    • Ran Out of Food in the Last Year: Never true   Transportation Needs: No Transportation Needs (7/12/2023)    PRAPARE - Transportation    • Lack of Transportation (Medical): No    • Lack of Transportation (Non-Medical): No   Physical Activity: Insufficiently Active (7/7/2021)    Exercise Vital Sign    • Days of Exercise per Week: 2 days    • Minutes of Exercise per Session: 60 min   Stress: No Stress Concern Present (7/7/2021)    [facility]    • Feeling of Stress : Not at all   Social Connections:  Moderately Integrated (7/7/2021)    Social Connection and Isolation Panel [NHANES]    • Frequency of Communication with Friends and Family: More than three times a week    • Frequency of Social Gatherings with Friends and Family: More than three times a week    • Attends Islam Services: More than 4 times per year    • Active Member of Clubs or Organizations: No    • Attends Club or Organization Meetings: Never    • Marital Status:    Intimate Partner Violence: Not At Risk (7/7/2021)    Humiliation, Afraid, Rape, and Kick questionnaire    • Fear of Current or Ex-Partner: No    • Emotionally Abused: No    • Physically Abused: No    • Sexually Abused: No   Housing Stability: Unknown (8/11/2021)    Housing Stability Vital Sign    • Unable to Pay for Housing in the Last Year: No    • Number of Places Lived in the Last Year: Not on file    • Unstable Housing in the Last Year: No      Medications and Allergies:     Current Outpatient Medications   Medication Sig Dispense Refill   • acetaminophen (TYLENOL) 325 mg tablet Take by mouth as needed      • B Complex Vitamins (B COMPLEX 1 PO) Take by mouth     • bismuth subsalicylate (PEPTO BISMOL) 262 MG chewable tablet Chew 2 tablets (524 mg total) 2 (two) times a day as needed for diarrhea Has tolerated in the past. 30 tablet 0   • Cholecalciferol (VITAMIN D3) 2000 units capsule half     • clotrimazole (LOTRIMIN) 1 % cream Apply topically 2 (two) times a day 113 g 2   • Contour Next Test test strip Test blood sugar 3 times daily 100 each 2   • Diapers & Supplies MISC by Does not apply route 3 (three) times a day as needed (Incontinence) 28 each 11   • estrogens, conjugated (Premarin) vaginal cream INSERT 1 APPLICATORFUL (1g) VAGINALLY TWO TIMES A WEEK 30 g 1   • fenofibrate 160 MG tablet TAKE 1 TABLET BY MOUTH  DAILY 90 tablet 0   • Ferrous Sulfate (IRON) 325 (65 Fe) MG TABS Take by mouth     • Insulin Pen Needle (BD Pen Needle [place] U/F) 32G X 4 MM MISC Inject as directed 3 times daily 270 each 0   • insuln lispro (HumaLOG KwikPen) 200 units/mL CONCENTRATED U-200 injection pen Inject subcutaneously as directed per presciber Instructions per sliding scale-PATIENT IS TO TAKE 5 UNITS BID 12 mL 1   • lisinopril (ZESTRIL) 10 mg tablet TAKE 1 TABLET BY MOUTH DAILY 90 tablet 0   • loratadine (CLARITIN) 10 mg tablet Take by mouth     • Microlet Lancets MISC Use 3 (three) times a day 100 each 2   • Multiple Vitamin (DAILY VALUE MULTIVITAMIN) TABS Take by mouth     • Multiple Vitamins-Minerals (MULTI ADULT GUMMIES PO) Take by mouth     • pantoprazole (PROTONIX) 40 mg tablet Take 1 tablet (40 mg total) by mouth daily 90 tablet 0   • pregabalin (LYRICA) 75 mg capsule Take 1 capsule (75 mg total) by mouth 3 (three) times a day 270 capsule 0   • Probiotic Product (Align) 4 MG CAPS Take by mouth     • simvastatin (ZOCOR) 10 mg tablet TAKE 1 TABLET BY MOUTH  DAILY AT BEDTIME 90 tablet 3   • topiramate (TOPAMAX) 25 mg tablet TAKE 1 TABLET BY MOUTH DAILY 90 tablet 0   • Tresiba FlexTouch 100 units/mL injection pen INJECT 80 UNITS  SUBCUTANEOUSLY DAILY AT  BEDTIME 75 mL 3   • albuterol (PROAIR HFA) 90 mcg/act inhaler as needed (Patient not taking: Reported on 3/27/2023)     • dicyclomine (BENTYL) 20 mg tablet Take 1 tablet (20 mg total) by mouth 2 (two) times a day (Patient not taking: Reported on 3/27/2023) 180 tablet 0     No current facility-administered medications for this visit.      Allergies   Allergen Reactions   • Pioglitazone Other (See Comments)     "Feels like she is dying"   • Ibuprofen    • Levofloxacin Other (See Comments)     Other reaction(s): rash   • Nsaids       Immunizations:     Immunization History   Administered Date(s) Administered   • COVID-19 MODERNA VACC 0.5 ML IM 02/09/2021, 03/09/2021   • INFLUENZA 12/01/2008, 09/29/2010, 12/03/2015, 10/13/2016, 09/21/2017, 10/02/2017, 10/25/2018, 11/07/2019, 10/30/2020   • Influenza Split 10/17/2013   • Influenza, high dose seasonal 0.7 mL 10/25/2018, 11/07/2019, 10/30/2020, 11/12/2021, 11/30/2022   • Influenza, seasonal, injectable 09/22/2011, 10/25/2012, 10/02/2017   • Pneumococcal Conjugate 13-Valent 04/28/2015   • Pneumococcal Polysaccharide PPV23 01/01/2006, 05/05/2016, 09/07/2016   • Tdap 09/05/2017   • Zoster 05/23/2017, 05/31/2017, 05/01/2019, 07/02/2019   • Zoster Vaccine Recombinant 05/01/2019, 07/02/2019      Health Maintenance:         Topic Date Due   • Breast Cancer Screening: Mammogram  04/26/2024   • DXA SCAN  08/03/2024   • Hepatitis C Screening  Completed         Topic Date Due   • COVID-19 Vaccine (3 - Moderna series) 05/04/2021   • Influenza Vaccine (1) 09/01/2023      Medicare Screening Tests and Risk Assessments:     Spike Robins is here for her Subsequent Wellness visit. Health Risk Assessment:   Patient rates overall health as fair. Patient feels that their physical health rating is same. Patient is satisfied with their life. Eyesight was rated as slightly worse. Hearing was rated as same. Patient feels that their emotional and mental health rating is slightly better. Patients states they are never, rarely angry. Patient states they are sometimes unusually tired/fatigued. Pain experienced in the last 7 days has been none. Depression Screening:   PHQ-2 Score: 0      Fall Risk Screening: In the past year, patient has experienced: no history of falling in past year      Urinary Incontinence Screening:   Patient has leaked urine accidently in the last six months. Home Safety:  Patient has trouble with stairs inside or outside of their home. Patient has working smoke alarms and has working carbon monoxide detector. Home safety hazards include: none. Nutrition:   Current diet is Regular. Medications:   Patient is currently taking over-the-counter supplements. OTC medications include: see medication list. Patient is able to manage medications. Activities of Daily Living (ADLs)/Instrumental Activities of Daily Living (IADLs):   Walk and transfer into and out of bed and chair?: Yes  Dress and groom yourself?: Yes    Bathe or shower yourself?: Yes    Feed yourself?  Yes  Do your laundry/housekeeping?: Yes  Manage your money, pay your bills and track your expenses?: Yes  Make your own meals?: Yes    Do your own shopping?: Yes    Previous Hospitalizations:   Any hospitalizations or ED visits within the last 12 months?: No      Advance Care Planning:   Living will: Yes    Durable POA for healthcare: Yes    Advanced directive: Yes    Advanced directive counseling given: Yes    Five wishes given: Yes    End of Life Decisions reviewed with patient: Yes      Cognitive Screening:   Provider or family/friend/caregiver concerned regarding cognition?: No    PREVENTIVE SCREENINGS      Cardiovascular Screening:    General: Screening Not Indicated and History Lipid Disorder      Diabetes Screening:     General: Screening Not Indicated and History Diabetes      Colorectal Cancer Screening:     General: Screening Not Indicated      Breast Cancer Screening:     General: Screening Current      Cervical Cancer Screening:    General: Screening Not Indicated      Osteoporosis Screening:    General: Screening Current      Abdominal Aortic Aneurysm (AAA) Screening:        General: Screening Not Indicated      Lung Cancer Screening:     General: Screening Not Indicated      Hepatitis C Screening:    General: Screening Current    Screening, Brief Intervention, and Referral to Treatment (SBIRT)    Screening  Typical number of drinks in a day: 0  Typical number of drinks in a week: 0  Interpretation: Low risk drinking behavior.     AUDIT-C Screenin) How often did you have a drink containing alcohol in the past year? never  2) How many drinks did you have on a typical day when you were drinking in the past year? 0  3) How often did you have 6 or more drinks on one occasion in the past year? never    AUDIT-C Score: 0  Interpretation: Score 0-2 (female): Negative screen for alcohol misuse    Single Item Drug Screening:  How often have you used an illegal drug (including marijuana) or a prescription medication for non-medical reasons in the past year? never    Single Item Drug Screen Score: 0  Interpretation: Negative screen for possible drug use disorder    Brief Intervention  Alcohol & drug use screenings were reviewed. No concerns regarding substance use disorder identified. Annual Depression Screening  Time spent screening and evaluating the patient for depression during today's encounter was 5 minutes. No results found. Physical Exam:     /78 (BP Location: Right arm, Patient Position: Sitting)   Pulse 74   Temp (!) 97.2 °F (36.2 °C) (Tympanic)   Ht 5' 2" (1.575 m)   Wt 112 kg (248 lb)   SpO2 96%   BMI 45.36 kg/m²     Physical Exam  Vitals and nursing note reviewed. Constitutional:       General: She is not in acute distress. Appearance: She is well-developed and normal weight. She is not toxic-appearing or diaphoretic. HENT:      Head: Normocephalic and atraumatic. Right Ear: Tympanic membrane, ear canal and external ear normal. There is no impacted cerumen. Left Ear: Tympanic membrane, ear canal and external ear normal. There is no impacted cerumen. Nose: Nose normal. No congestion or rhinorrhea. Mouth/Throat:      Mouth: Mucous membranes are moist.      Pharynx: Oropharynx is clear. No oropharyngeal exudate or posterior oropharyngeal erythema. Eyes:      General: No scleral icterus. Right eye: No discharge. Left eye: No discharge. Conjunctiva/sclera: Conjunctivae normal.      Pupils: Pupils are equal, round, and reactive to light. Neck:      Thyroid: No thyromegaly. Cardiovascular:      Rate and Rhythm: Normal rate and regular rhythm. Pulses: Normal pulses. Heart sounds: Murmur heard. No friction rub. Pulmonary:      Effort: Pulmonary effort is normal. No respiratory distress. Breath sounds: Normal breath sounds. No wheezing or rales. Chest:      Chest wall: No tenderness. Abdominal:      General: There is no distension. Palpations: Abdomen is soft. There is no mass. Tenderness: There is no abdominal tenderness. Hernia: No hernia is present.  There is no hernia in the left inguinal area.   Genitourinary:     Labia:         Right: No rash. Left: No rash. Vagina: No foreign body. No vaginal discharge, tenderness or bleeding. Cervix: No cervical motion tenderness. Adnexa:         Right: No mass or tenderness. Left: No mass or tenderness. Musculoskeletal:         General: Normal range of motion. Cervical back: Normal range of motion. No rigidity. Lymphadenopathy:      Cervical: No cervical adenopathy. Skin:     General: Skin is warm. Neurological:      Mental Status: She is alert and oriented to person, place, and time. Motor: No abnormal muscle tone.       Gait: Gait abnormal.   Psychiatric:         Mood and Affect: Mood normal.         Behavior: Behavior normal.          Alayna Robledo MD

## 2023-07-12 NOTE — PATIENT INSTRUCTIONS
Medicare Preventive Visit Patient Instructions  Thank you for completing your Welcome to Medicare Visit or Medicare Annual Wellness Visit today. Your next wellness visit will be due in one year (7/12/2024). The screening/preventive services that you may require over the next 5-10 years are detailed below. Some tests may not apply to you based off risk factors and/or age. Screening tests ordered at today's visit but not completed yet may show as past due. Also, please note that scanned in results may not display below. Preventive Screenings:  Service Recommendations Previous Testing/Comments   Colorectal Cancer Screening  * Colonoscopy    * Fecal Occult Blood Test (FOBT)/Fecal Immunochemical Test (FIT)  * Fecal DNA/Cologuard Test  * Flexible Sigmoidoscopy Age: 43-73 years old   Colonoscopy: every 10 years (may be performed more frequently if at higher risk)  OR  FOBT/FIT: every 1 year  OR  Cologuard: every 3 years  OR  Sigmoidoscopy: every 5 years  Screening may be recommended earlier than age 39 if at higher risk for colorectal cancer. Also, an individualized decision between you and your healthcare provider will decide whether screening between the ages of 77-80 would be appropriate. Colonoscopy: 04/26/2021  FOBT/FIT: Not on file  Cologuard: Not on file  Sigmoidoscopy: Not on file          Breast Cancer Screening Age: 36 years old  Frequency: every 1-2 years  Not required if history of left and right mastectomy Mammogram: 04/26/2023    Screening Current   Cervical Cancer Screening Between the ages of 21-29, pap smear recommended once every 3 years. Between the ages of 32-69, can perform pap smear with HPV co-testing every 5 years.    Recommendations may differ for women with a history of total hysterectomy, cervical cancer, or abnormal pap smears in past. Pap Smear: Not on file    Screening Not Indicated   Hepatitis C Screening Once for adults born between 1945 and 1965  More frequently in patients at high risk for Hepatitis C Hep C Antibody: 07/05/2018    Screening Current   Diabetes Screening 1-2 times per year if you're at risk for diabetes or have pre-diabetes Fasting glucose: 107 mg/dL (7/3/2023)  A1C: 7.4 % (7/3/2023)  Screening Not Indicated  History Diabetes   Cholesterol Screening Once every 5 years if you don't have a lipid disorder. May order more often based on risk factors. Lipid panel: 07/03/2023    Screening Not Indicated  History Lipid Disorder     Other Preventive Screenings Covered by Medicare:  1. Abdominal Aortic Aneurysm (AAA) Screening: covered once if your at risk. You're considered to be at risk if you have a family history of AAA. 2. Lung Cancer Screening: covers low dose CT scan once per year if you meet all of the following conditions: (1) Age 48-67; (2) No signs or symptoms of lung cancer; (3) Current smoker or have quit smoking within the last 15 years; (4) You have a tobacco smoking history of at least 20 pack years (packs per day multiplied by number of years you smoked); (5) You get a written order from a healthcare provider. 3. Glaucoma Screening: covered annually if you're considered high risk: (1) You have diabetes OR (2) Family history of glaucoma OR (3)  aged 48 and older OR (3)  American aged 72 and older  3. Osteoporosis Screening: covered every 2 years if you meet one of the following conditions: (1) You're estrogen deficient and at risk for osteoporosis based off medical history and other findings; (2) Have a vertebral abnormality; (3) On glucocorticoid therapy for more than 3 months; (4) Have primary hyperparathyroidism; (5) On osteoporosis medications and need to assess response to drug therapy. · Last bone density test (DXA Scan): 08/03/2022.  5. HIV Screening: covered annually if you're between the age of 15-65. Also covered annually if you are younger than 13 and older than 72 with risk factors for HIV infection.  For pregnant patients, it is covered up to 3 times per pregnancy. Immunizations:  Immunization Recommendations   Influenza Vaccine Annual influenza vaccination during flu season is recommended for all persons aged >= 6 months who do not have contraindications   Pneumococcal Vaccine   * Pneumococcal conjugate vaccine = PCV13 (Prevnar 13), PCV15 (Vaxneuvance), PCV20 (Prevnar 20)  * Pneumococcal polysaccharide vaccine = PPSV23 (Pneumovax) Adults 20-63 years old: 1-3 doses may be recommended based on certain risk factors  Adults 72 years old: 1-2 doses may be recommended based off what pneumonia vaccine you previously received   Hepatitis B Vaccine 3 dose series if at intermediate or high risk (ex: diabetes, end stage renal disease, liver disease)   Tetanus (Td) Vaccine - COST NOT COVERED BY MEDICARE PART B Following completion of primary series, a booster dose should be given every 10 years to maintain immunity against tetanus. Td may also be given as tetanus wound prophylaxis. Tdap Vaccine - COST NOT COVERED BY MEDICARE PART B Recommended at least once for all adults. For pregnant patients, recommended with each pregnancy. Shingles Vaccine (Shingrix) - COST NOT COVERED BY MEDICARE PART B  2 shot series recommended in those aged 48 and above     Health Maintenance Due:      Topic Date Due   • Breast Cancer Screening: Mammogram  04/26/2024   • DXA SCAN  08/03/2024   • Hepatitis C Screening  Completed     Immunizations Due:      Topic Date Due   • COVID-19 Vaccine (3 - Moderna series) 05/04/2021   • Influenza Vaccine (1) 09/01/2023     Advance Directives   What are advance directives? Advance directives are legal documents that state your wishes and plans for medical care. These plans are made ahead of time in case you lose your ability to make decisions for yourself. Advance directives can apply to any medical decision, such as the treatments you want, and if you want to donate organs. What are the types of advance directives?   There are many types of advance directives, and each state has rules about how to use them. You may choose a combination of any of the following:  · Living will: This is a written record of the treatment you want. You can also choose which treatments you do not want, which to limit, and which to stop at a certain time. This includes surgery, medicine, IV fluid, and tube feedings. · Durable power of  for healthcare [facility]): This is a written record that states who you want to make healthcare choices for you when you are unable to make them for yourself. This person, called a proxy, is usually a family member or a friend. You may choose more than 1 proxy. · Do not resuscitate (DNR) order:  A DNR order is used in case your heart stops beating or you stop breathing. It is a request not to have certain forms of treatment, such as CPR. A DNR order may be included in other types of advance directives. · Medical directive: This covers the care that you want if you are in a coma, near death, or unable to make decisions for yourself. You can list the treatments you want for each condition. Treatment may include pain medicine, surgery, blood transfusions, dialysis, IV or tube feedings, and a ventilator (breathing machine). · Values history: This document has questions about your views, beliefs, and how you feel and think about life. This information can help others choose the care that you would choose. Why are advance directives important? An advance directive helps you control your care. Although spoken wishes may be used, it is better to have your wishes written down. Spoken wishes can be misunderstood, or not followed. Treatments may be given even if you do not want them. An advance directive may make it easier for your family to make difficult choices about your care. Urinary Incontinence   Urinary incontinence (UI)  is when you lose control of your bladder.  UI develops because your bladder cannot store or empty urine properly. The 3 most common types of UI are stress incontinence, urge incontinence, or both. Medicines:   · May be given to help strengthen your bladder control. Report any side effects of medication to your healthcare provider. Do pelvic muscle exercises often:  Your pelvic muscles help you stop urinating. Squeeze these muscles tight for 5 seconds, then relax for 5 seconds. Gradually work up to squeezing for 10 seconds. Do 3 sets of 15 repetitions a day, or as directed. This will help strengthen your pelvic muscles and improve bladder control. Train your bladder:  Go to the bathroom at set times, such as every 2 hours, even if you do not feel the urge to go. You can also try to hold your urine when you feel the urge to go. For example, hold your urine for 5 minutes when you feel the urge to go. As that becomes easier, hold your urine for 10 minutes. Self-care:   · Keep a UI record. Write down how often you leak urine and how much you leak. Make a note of what you were doing when you leaked urine. · Drink liquids as directed. You may need to limit the amount of liquid you drink to help control your urine leakage. Do not drink any liquid right before you go to bed. Limit or do not have drinks that contain caffeine or alcohol. · Prevent constipation. Eat a variety of high-fiber foods. Good examples are high-fiber cereals, beans, vegetables, and whole-grain breads. Walking is the best way to trigger your intestines to have a bowel movement. · Exercise regularly and maintain a healthy weight. Weight loss and exercise will decrease pressure on your bladder and help you control your leakage. · Use a catheter as directed  to help empty your bladder. A catheter is a tiny, plastic tube that is put into your bladder to drain your urine. · Go to behavior therapy as directed. Behavior therapy may be used to help you learn to control your urge to urinate.     Weight Management   Why it is important to manage your weight:  Being overweight increases your risk of health conditions such as heart disease, high blood pressure, type 2 diabetes, and certain types of cancer. It can also increase your risk for osteoarthritis, sleep apnea, and other respiratory problems. Aim for a slow, steady weight loss. Even a small amount of weight loss can lower your risk of health problems. How to lose weight safely:  A safe and healthy way to lose weight is to eat fewer calories and get regular exercise. You can lose up about 1 pound a week by decreasing the number of calories you eat by 500 calories each day. Healthy meal plan for weight management:  A healthy meal plan includes a variety of foods, contains fewer calories, and helps you stay healthy. A healthy meal plan includes the following:  · Eat whole-grain foods more often. A healthy meal plan should contain fiber. Fiber is the part of grains, fruits, and vegetables that is not broken down by your body. Whole-grain foods are healthy and provide extra fiber in your diet. Some examples of whole-grain foods are whole-wheat breads and pastas, oatmeal, brown rice, and bulgur. · Eat a variety of vegetables every day. Include dark, leafy greens such as spinach, kale, selam greens, and mustard greens. Eat yellow and orange vegetables such as carrots, sweet potatoes, and winter squash. · Eat a variety of fruits every day. Choose fresh or canned fruit (canned in its own juice or light syrup) instead of juice. Fruit juice has very little or no fiber. · Eat low-fat dairy foods. Drink fat-free (skim) milk or 1% milk. Eat fat-free yogurt and low-fat cottage cheese. Try low-fat cheeses such as mozzarella and other reduced-fat cheeses. · Choose meat and other protein foods that are low in fat. Choose beans or other legumes such as split peas or lentils. Choose fish, skinless poultry (chicken or turkey), or lean cuts of red meat (beef or pork).  Before you cook meat or poultry, cut off any visible fat. · Use less fat and oil. Try baking foods instead of frying them. Add less fat, such as margarine, sour cream, regular salad dressing and mayonnaise to foods. Eat fewer high-fat foods. Some examples of high-fat foods include french fries, doughnuts, ice cream, and cakes. · Eat fewer sweets. Limit foods and drinks that are high in sugar. This includes candy, cookies, regular soda, and sweetened drinks. Exercise:  Exercise at least 30 minutes per day on most days of the week. Some examples of exercise include walking, biking, dancing, and swimming. You can also fit in more physical activity by taking the stairs instead of the elevator or parking farther away from stores. Ask your healthcare provider about the best exercise plan for you. © Copyright [employer] 2018 Information is for End User's use only and may not be sold, redistributed or otherwise used for commercial purposes.  All illustrations and images included in CareNotes® are the copyrighted property of [employer] or [address] [place] [address]

## 2023-07-14 NOTE — ASSESSMENT & PLAN NOTE
Advice and education were given regarding nutrition, aerobic exercises, weight-bearing exercises, cardiovascular risk reduction, fall risk reduction, and age-appropriate supplements. The patient was counseled regarding instructions for management, risk factor reductions, prognosis, risks and benefits of treatment options, patient and family education, and importance of compliance with treatment.

## 2023-07-14 NOTE — ASSESSMENT & PLAN NOTE
Lab Results   Component Value Date    HGBA1C 7.4 (H) 07/03/2023   Chronic asymptomatic improvement in hemoglobin A1c compared with before encourage patient to continue current medication no sign or symptom of hypoglycemia discussed low-carb diet important lose weight reviewed patient already on statin and ACE inhibitor

## 2023-07-14 NOTE — ASSESSMENT & PLAN NOTE
Chronic asymptomatic fair control continue current dose of simvastatin 10 mg once a day ago low-fat diet important lose weight reviewed with the patient

## 2023-07-14 NOTE — ASSESSMENT & PLAN NOTE
Chronic asymptomatic rate controlled continue with the lisinopril 10 mg once a day low-salt diet important lose weight reviewed

## 2023-07-15 DIAGNOSIS — G43.009 MIGRAINE WITHOUT AURA AND WITHOUT STATUS MIGRAINOSUS, NOT INTRACTABLE: ICD-10-CM

## 2023-07-15 DIAGNOSIS — Z79.4 TYPE 2 DIABETES MELLITUS WITH STAGE 3B CHRONIC KIDNEY DISEASE, WITH LONG-TERM CURRENT USE OF INSULIN (HCC): ICD-10-CM

## 2023-07-15 DIAGNOSIS — E11.22 TYPE 2 DIABETES MELLITUS WITH STAGE 3B CHRONIC KIDNEY DISEASE, WITH LONG-TERM CURRENT USE OF INSULIN (HCC): ICD-10-CM

## 2023-07-15 DIAGNOSIS — N18.32 TYPE 2 DIABETES MELLITUS WITH STAGE 3B CHRONIC KIDNEY DISEASE, WITH LONG-TERM CURRENT USE OF INSULIN (HCC): ICD-10-CM

## 2023-07-17 RX ORDER — TOPIRAMATE 25 MG/1
25 TABLET ORAL DAILY
Qty: 90 TABLET | Refills: 0 | Status: SHIPPED | OUTPATIENT
Start: 2023-07-17

## 2023-07-17 RX ORDER — LANCETS
EACH MISCELLANEOUS 3 TIMES DAILY
Qty: 100 EACH | Refills: 0 | Status: SHIPPED | OUTPATIENT
Start: 2023-07-17

## 2023-07-21 ENCOUNTER — OFFICE VISIT (OUTPATIENT)
Dept: UROLOGY | Facility: CLINIC | Age: 78
End: 2023-07-21
Payer: MEDICARE

## 2023-07-21 VITALS
HEART RATE: 83 BPM | HEIGHT: 62 IN | WEIGHT: 246 LBS | OXYGEN SATURATION: 99 % | SYSTOLIC BLOOD PRESSURE: 120 MMHG | BODY MASS INDEX: 45.27 KG/M2 | DIASTOLIC BLOOD PRESSURE: 76 MMHG

## 2023-07-21 DIAGNOSIS — N39.46 MIXED STRESS AND URGE URINARY INCONTINENCE: Primary | ICD-10-CM

## 2023-07-21 DIAGNOSIS — N39.0 RECURRENT UTI: ICD-10-CM

## 2023-07-21 LAB — POST-VOID RESIDUAL VOLUME, ML POC: 18 ML

## 2023-07-21 PROCEDURE — 99213 OFFICE O/P EST LOW 20 MIN: CPT | Performed by: NURSE PRACTITIONER

## 2023-07-21 PROCEDURE — 51798 US URINE CAPACITY MEASURE: CPT | Performed by: NURSE PRACTITIONER

## 2023-07-21 RX ORDER — OXYBUTYNIN CHLORIDE 5 MG/1
5 TABLET, EXTENDED RELEASE ORAL DAILY
Qty: 90 TABLET | Refills: 3 | Status: SHIPPED | OUTPATIENT
Start: 2023-07-21

## 2023-07-21 NOTE — PATIENT INSTRUCTIONS
BLADDER HEALTH    WHAT IS CONSIDERED NORMAL? The average bladder can hold about 2 cups of urine before it needs to be emptied. The normal range of voiding urine is 6 to 8 times during a 24 hour period. As we get older, our bladder capacity can get smaller and we may need to pass urine more frequently but usually not more than every 2 hours. Urine should flow easily without discomfort in a good, steady stream until the bladder is empty. No pushing or straining is necessary to empty the bladder. An urge is a signal that you feel as the bladder stretches to fill with urine. Urges can be felt even if the bladder is not full. Urges are not commands to go to the toilet, merely a signal and can be controlled. WHAT ARE GOOD BLADDER HABITS? Take your time when emptying your bladder. Don’t strain or push to empty your bladder. Make sure you empty your bladder completely each time you pass urine. Do not rush the process. Consistently ignoring the urge to go (waiting more than 4 hours between toileting) or urinating too infrequently may be convenient but not healthy for your bladder. Avoid going to the toilet “just in case” or more often than every 2 hours. It is usually not necessary to go when you feel the first urge. Try to go only when your bladder is full. Urgency and frequency of urination can be improved by retraining the bladder and spacing your fluid intake throughout the day. Practice good toilet habits. Don’t let your bladder control your life. TIPS TO MAINTAIN GOOD BLADDER HABITS  Maintain a good fluid intake. Depending on your body size and environment, drink 6 -8 cups (8 oz each) of fluid per day unless otherwise advised by your doctor. Not enough fluid creates a foul odor and dark color of the urine. Limit the amount of caffeine (coffee, cola, chocolate or tea) and citrus foods that you consume as these foods can be associated with increased sensation of urinary urgency and frequency. Limit the amount of alcohol you drink. Alcohol increases urine production and makes it difficult for the brain to coordinate bladder control. Avoid constipation by maintaining a balanced diet of dietary fiber. Cigarette smoking is also irritating to the bladder surface and is associated with bladder cancer. In addition, the coughing associated with smoking may lead to increased incontinent episodes because of the increased pressure. HOW DIET CAN AFFECT YOUR BLADDER  Although there is no particular "diet" that can cure bladder control, there are certain dietary suggestions you can use to help control the problem. There are 2 points to consider when evaluating how your habits and diet may affect your bladder:    Foods and fluids can irritate the bladder  Some foods and beverages are thought to contribute to bladder leakage and irritability. However their effect on the bladder is not completely understood and you may want to see if eliminating one or all of these items improves your bladder control. If you are unable to give them up completely, it is recommended that you use the following items in moderation:  Acidic beverages and foods (orange juice, grapefruit juice, lemonade etc)  Alcoholic beverages  Vinegar  Coffee (regular and decaf)  Tea (regular and decaf)  Caffeinated beverages  Carbonated beverages          Drinking enough and the right kinds of fluids  Many people with bladder control issues decrease their intake of liquids in hope that they will need to urinate less frequently or have less urinary leakage. You should not restrict fluids to control your bladder. While a decrease in liquid intake does result in a decrease in the volume of urine, the smaller amount of urine may be more highly concentrated. Highly concentrated, dark yellow urine is irritating to the bladder surface and may actually cause you to go to the bathroom more frequently.       It also encourages the growth of bacteria, which may lead to infections resulting in incontinence. Substitutions for Bladder Irritants: water is always the best beverage choice. Grape and apple juice are thirst quenchers are good selections and are not as irritating to the bladder. Low acid fruits:  Pears, apricots, papaya, watermelon  For coffee drinkers: KAVA®, Postum®, Stephen®, Kaffree Meghan®  For tea drinkers:  non-citrus or herbal and sun brewed tea  BEHAVIORAL THERAPY FOR IMPROVED BLADDER CONTROL      1. Urge Control Techniques       A. Stop whatever you are doing and concentrate on ita your pelvic floor muscles. B.  Contract your pelvic floor muscles repetitively (as in your "flick" exercises). C.  Once the urgency has subsided, realize that sometimes the urgency is because you have a             full bladder and have to urinate. Other times the urgency is a false signal and you do not have a full bladder. D. If you have urgency triggered by running water, "key in the door," getting up from a sitting position, etc., contract your pelvic floor muscles prior to       initiating the activity. 2.  Daily Fluid Intake       A. Avoid drinking too little (less than 3 cups/day) or drinking too much (more than 6             cups/day). B.  Avoid caffeinated beverages. C.  If voiding frequently at night, limit your liquid intake after 7 p.m. 3.  Bowel Regularity--Constipation Makes Bladder Symptoms Worse       A. Drink enough liquids, eat plenty of high fiber food. B. Exercise       C. Avoid laxatives and enemas on a regular basis, this decreases the bowel's normal function. 4.  Voiding Schedules       A. Empty your bladder at 1½ to 2 hour intervals even if you may not have the urge to urinate             at that time. You may gradually increase the time between voidings to 30  minutes, until you can comfortably void every 3 hours. B.   If you are voiding more frequently than every 1½ to 2 hours, resist the urge to go  by using urge control techniques (described in 1.).

## 2023-07-21 NOTE — ASSESSMENT & PLAN NOTE
• Continue azo with d-mannose or cystex  • continue daily probiotic or yogurt daily  • Continue premarin cream pea-sized amount twice a week on Wednesday and Sunday before bed   • Continue to hydrate with at least 64 ounces of water a day  • Call urology for symptoms of urinary tract infection  • Standing orders for urine testing place, recommend ONLY testing when symptomatic, she voiced understanding  • Improved bowel control with PFPT  • Follow-up 1 year

## 2023-07-21 NOTE — PROGRESS NOTES
Assessment and plan:     Recurrent UTI  • Continue azo with d-mannose or cystex  • continue daily probiotic or yogurt daily  • Continue premarin cream pea-sized amount twice a week on Wednesday and Sunday before bed   • Continue to hydrate with at least 64 ounces of water a day  • Call urology for symptoms of urinary tract infection  • Standing orders for urine testing place, recommend ONLY testing when symptomatic, she voiced understanding  • Improved bowel control with PFPT  • Follow-up 1 year    Mixed stress and urge urinary incontinence  • PVR 18 ml  • continue pelvic floor physical therapy exercises  • Resume oxybutynin  • Nurse visit in 3 months for PVR  • Avoid bladder irritants  • Increase hydration with water  • Follow up 1 year      HERMANN Lopez    History of Present Illness     Patricia Cote is a 77 y.o. female patient of DR Clement Tyler. Last seen in July 2020. She presents for recurrent UTI. She was given keflex a couple of times and then bactrim. She had no issues for a long time until this year. She does have diarrhea. She does wear a diaper due to her incontinence of bowel and bladder. She went to pelvic floor physical therapy which she feels is helping her bowel issues, now has issues 1-2 times a week and previously was daily 1-2 times.     Urine culture:   7/3/2023 >100,000 ecoli   3/27/2023 [id number] Staphylococcus coagulase negative              3/8/2023  >100,000 klebsiella pneumoniae              2/7/2023  > 100,000 E. Coli              1/20/2023 [id number] E. Coli              1/16/2023 <10,000 mixed contaminants x3              1/9/2023   >100,000 e coli     She has a hx of chronic cystitis and urge incontinence. She had prior negative cystoscopy and ct scan. She still uses premarin cream. It is ordered twice a week but she takes it infrequent, about every other week. She was previously on oxybutynin.  This was recently stopped by her pcp for possible retention due to her recurrent UTI. She was taking this for her urge incontinence. She feels it was not longer helpful. She had times when her urine pours out when she stands up. She is diabetic with a1c of 7.9    Laboratory     Lab Results   Component Value Date    BUN 21 07/03/2023    CREATININE 1.16 07/03/2023       No components found for: "GFR"    Lab Results   Component Value Date    GLUCOSE 125 (H) 06/04/2018    CALCIUM 9.3 07/03/2023     06/04/2018    K 3.8 07/03/2023    CO2 29 07/03/2023     (H) 07/03/2023       Lab Results   Component Value Date    WBC 8.39 07/03/2023    HGB 13.3 07/03/2023    HCT 43.6 07/03/2023    MCV 95 07/03/2023     07/03/2023       No results found for: "PSA"    Recent Results (from the past 1 hour(s))   POCT Measure PVR    Collection Time: 07/21/23 12:59 PM   Result Value Ref Range    POST-VOID RESIDUAL VOLUME, ML POC 18 mL       @RESULT(URINEMICROSCOPIC)@    @RESULT(URINECULTURE)@    Radiology       Review of Systems     Review of Systems                Allergies     Allergies   Allergen Reactions   • Pioglitazone Other (See Comments)     "Feels like she is dying"   • Ibuprofen    • Levofloxacin Other (See Comments)     Other reaction(s): rash   • Nsaids        Physical Exam     Physical Exam  Vitals reviewed. Constitutional:       General: She is not in acute distress. Appearance: Normal appearance. She is obese. She is not ill-appearing, toxic-appearing or diaphoretic. HENT:      Head: Normocephalic and atraumatic. Eyes:      General: No scleral icterus. Cardiovascular:      Rate and Rhythm: Normal rate. Pulmonary:      Effort: Pulmonary effort is normal. No respiratory distress. Abdominal:      General: Abdomen is flat. There is no distension. Palpations: Abdomen is soft. Tenderness: There is no abdominal tenderness. Musculoskeletal:         General: No swelling. Cervical back: Normal range of motion. Skin:     General: Skin is warm and dry. Coloration: Skin is not jaundiced or pale. Findings: No rash. Neurological:      General: No focal deficit present. Mental Status: She is alert and oriented to person, place, and time. Gait: Gait normal.   Psychiatric:         Mood and Affect: Mood normal.         Behavior: Behavior normal.         Thought Content:  Thought content normal.         Judgment: Judgment normal.         Vital Signs     Vitals:    07/21/23 1238   BP: 120/76   Pulse: 83   SpO2: 99%   Weight: 112 kg (246 lb)   Height: 5' 2" (1.575 m)       Current Medications       Current Outpatient Medications:   •  acetaminophen (TYLENOL) 325 mg tablet, Take by mouth as needed , Disp: , Rfl:   •  albuterol (PROAIR HFA) 90 mcg/act inhaler, as needed, Disp: , Rfl:   •  B Complex Vitamins (B COMPLEX 1 PO), Take by mouth, Disp: , Rfl:   •  bismuth subsalicylate (PEPTO BISMOL) 262 MG chewable tablet, Chew 2 tablets (524 mg total) 2 (two) times a day as needed for diarrhea Has tolerated in the past., Disp: 30 tablet, Rfl: 0  •  Cholecalciferol (VITAMIN D3) 2000 units capsule, half, Disp: , Rfl:   •  clotrimazole (LOTRIMIN) 1 % cream, Apply topically 2 (two) times a day, Disp: 113 g, Rfl: 2  •  Contour Next Test test strip, Test blood sugar 3 times daily, Disp: 100 each, Rfl: 2  •  Diapers & Supplies MISC, by Does not apply route 3 (three) times a day as needed (Incontinence), Disp: 28 each, Rfl: 11  •  estrogens, conjugated (Premarin) vaginal cream, INSERT 1 APPLICATORFUL (1g) VAGINALLY TWO TIMES A WEEK, Disp: 30 g, Rfl: 1  •  fenofibrate 160 MG tablet, TAKE 1 TABLET BY MOUTH  DAILY, Disp: 90 tablet, Rfl: 0  •  Ferrous Sulfate (IRON) 325 (65 Fe) MG TABS, Take by mouth, Disp: , Rfl:   •  Insulin Pen Needle (BD Pen Needle [place] U/F) 32G X 4 MM MISC, Inject as directed 3 times daily, Disp: 270 each, Rfl: 0  •  insuln lispro (HumaLOG KwikPen) 200 units/mL CONCENTRATED U-200 injection pen, Inject subcutaneously as directed per presciber Instructions per sliding scale-PATIENT IS TO TAKE 5 UNITS BID, Disp: 12 mL, Rfl: 1  •  lisinopril (ZESTRIL) 10 mg tablet, TAKE 1 TABLET BY MOUTH DAILY, Disp: 90 tablet, Rfl: 0  •  loratadine (CLARITIN) 10 mg tablet, Take by mouth, Disp: , Rfl:   •  Microlet Lancets MISC, Use 3 (three) times a day, Disp: 100 each, Rfl: 0  •  Multiple Vitamin (DAILY VALUE MULTIVITAMIN) TABS, Take by mouth, Disp: , Rfl:   •  oxybutynin (DITROPAN-XL) 5 mg 24 hr tablet, Take 1 tablet (5 mg total) by mouth daily, Disp: 90 tablet, Rfl: 3  •  pantoprazole (PROTONIX) 40 mg tablet, Take 1 tablet (40 mg total) by mouth daily, Disp: 90 tablet, Rfl: 0  •  pregabalin (LYRICA) 75 mg capsule, Take 1 capsule (75 mg total) by mouth 3 (three) times a day, Disp: 270 capsule, Rfl: 0  •  Probiotic Product (Align) 4 MG CAPS, Take by mouth, Disp: , Rfl:   •  simvastatin (ZOCOR) 10 mg tablet, TAKE 1 TABLET BY MOUTH  DAILY AT BEDTIME, Disp: 90 tablet, Rfl: 3  •  topiramate (TOPAMAX) 25 mg tablet, Take 1 tablet (25 mg total) by mouth daily, Disp: 90 tablet, Rfl: 0  •  Tresiba FlexTouch 100 units/mL injection pen, INJECT 80 UNITS  SUBCUTANEOUSLY DAILY AT  BEDTIME, Disp: 75 mL, Rfl: 3  •  dicyclomine (BENTYL) 20 mg tablet, Take 1 tablet (20 mg total) by mouth 2 (two) times a day (Patient not taking: Reported on 3/27/2023), Disp: 180 tablet, Rfl: 0  •  Multiple Vitamins-Minerals (MULTI ADULT GUMMIES PO), Take by mouth (Patient not taking: Reported on 7/21/2023), Disp: , Rfl:     Active Problems     Patient Active Problem List   Diagnosis   • Carotid artery disease (HCC)   • Chronic renal insufficiency, stage III (moderate) (HCC)   • Essential hypertension   • Mixed hyperlipidemia   • Hypomagnesemia   • Indigestion   • Irritable bowel syndrome   • Long term current use of insulin (HCC)   • Metabolic syndrome   • Nonalcoholic fatty liver disease   • Obstructive sleep apnea treated with continuous positive airway pressure (CPAP)   • Peripheral vascular disease (HCC)   • Type 2 diabetes mellitus with kidney complication, with long-term current use of insulin (HCC)   • Urge urinary incontinence   • Vitamin D deficiency   • Osteopenia of spine   • Anemia   • Multiple and bilateral precerebral arterial occlusion   • Restless legs syndrome (RLS)   • Tinnitus   • Migraine without aura, not intractable   • Hypertriglyceridemia, essential   • Morbid obesity (HCC)   • Chronic right shoulder pain   • Vaginal bleeding   • Breast lump in upper outer quadrant   • Neck pain   • Abnormal mammogram   • Benign hypertensive renal disease   • Proteinuria   • Medicare annual wellness visit, subsequent   • Callus   • Hammer toe   • Arthritis   • Onychomycosis   • Plantar fascial fibromatosis   • Nail dystrophy   • Bilateral low back pain with bilateral sciatica   • Skin lesion   • Gait disturbance   • DNR (do not resuscitate)   • COVID-19 vaccine series completed   • COVID-19 virus infection   • Hx of thrombocytopenia   • LVH (left ventricular hypertrophy)   • Dysuria   • Abnormal finding in urine   • Recurrent UTI   • Mixed stress and urge urinary incontinence       Past Medical History     Past Medical History:   Diagnosis Date   • Anemia    • Arthritis 10/30/2020   • Chronic kidney disease    • Diabetes mellitus ([address])    • Fibromyalgia, primary    • Fracture of wrist     RIGHT   • Heart murmur 06/30/2020   • History of non-insulin dependent diabetes mellitus    • Hyperlipidemia    • Hypertension    • Hypertension    • IBS (irritable bowel syndrome)    • Irritable bowel    • Metabolic syndrome    • Obesity    • RLS (restless legs syndrome)    • Routine medical exam 06/28/2019   • Sleep apnea    • Sleep apnea     ON CPAP   • Upper respiratory tract infection 01/19/2022   • Urge incontinence    • Urinary incontinence    • Urinary tract infection 1/1/2023       Surgical History     Past Surgical History:   Procedure Laterality Date   • CHOLECYSTECTOMY  2013   • COLONOSCOPY  2010   • CYSTOSCOPY     • HERNIA REPAIR  2008   • HYSTERECTOMY      PARTIAL (STILL HAS OVARIES)   • KNEE SURGERY Left        Family History     Family History   Problem Relation Age of Onset   • Suicidality Father    • Breast cancer Mother 58   • Melanoma Sister    • Bipolar disorder Sister    • Heart disease Brother    • Rheum arthritis Daughter    • No Known Problems Maternal Grandmother    • No Known Problems Paternal Grandmother    • No Known Problems Sister    • No Known Problems Daughter    • No Known Problems Maternal Aunt    • No Known Problems Maternal Aunt        Social History     Social History     Social History     Tobacco Use   Smoking Status Never   • Passive exposure: Never   Smokeless Tobacco Never       Past Surgical History:   Procedure Laterality Date   • CHOLECYSTECTOMY  2013   • COLONOSCOPY  2010   • CYSTOSCOPY     • HERNIA REPAIR  2008   • HYSTERECTOMY      PARTIAL (STILL HAS OVARIES)   • KNEE SURGERY Left          The following portions of the patient's history were reviewed and updated as appropriate: allergies, current medications, past family history, past medical history, past social history, past surgical history and problem list    Please note :  Voice dictation software has been used to create this document. There may be inadvertent transcription errors.     [facility] Yuval Lackey

## 2023-07-21 NOTE — ASSESSMENT & PLAN NOTE
• PVR 18 ml  • continue pelvic floor physical therapy exercises  • Resume oxybutynin  • Nurse visit in 3 months for PVR  • Avoid bladder irritants  • Increase hydration with water  • Follow up 1 year

## 2023-07-23 DIAGNOSIS — K30 INDIGESTION: ICD-10-CM

## 2023-07-24 RX ORDER — PANTOPRAZOLE SODIUM 40 MG/1
40 TABLET, DELAYED RELEASE ORAL DAILY
Qty: 90 TABLET | Refills: 3 | Status: SHIPPED | OUTPATIENT
Start: 2023-07-24

## 2023-07-31 DIAGNOSIS — I10 ESSENTIAL HYPERTENSION: ICD-10-CM

## 2023-07-31 RX ORDER — LISINOPRIL 10 MG/1
TABLET ORAL
Qty: 90 TABLET | Refills: 1 | Status: SHIPPED | OUTPATIENT
Start: 2023-07-31 | End: 2023-08-24 | Stop reason: SDUPTHER

## 2023-08-22 DIAGNOSIS — M54.41 CHRONIC BILATERAL LOW BACK PAIN WITH BILATERAL SCIATICA: ICD-10-CM

## 2023-08-22 DIAGNOSIS — Z79.4 TYPE 2 DIABETES MELLITUS WITH STAGE 3B CHRONIC KIDNEY DISEASE, WITH LONG-TERM CURRENT USE OF INSULIN (HCC): ICD-10-CM

## 2023-08-22 DIAGNOSIS — M54.42 CHRONIC BILATERAL LOW BACK PAIN WITH BILATERAL SCIATICA: ICD-10-CM

## 2023-08-22 DIAGNOSIS — E11.22 TYPE 2 DIABETES MELLITUS WITH STAGE 3B CHRONIC KIDNEY DISEASE, WITH LONG-TERM CURRENT USE OF INSULIN (HCC): ICD-10-CM

## 2023-08-22 DIAGNOSIS — G89.29 CHRONIC BILATERAL LOW BACK PAIN WITH BILATERAL SCIATICA: ICD-10-CM

## 2023-08-22 DIAGNOSIS — N18.32 TYPE 2 DIABETES MELLITUS WITH STAGE 3B CHRONIC KIDNEY DISEASE, WITH LONG-TERM CURRENT USE OF INSULIN (HCC): ICD-10-CM

## 2023-08-23 RX ORDER — LANCETS
EACH MISCELLANEOUS 3 TIMES DAILY
Qty: 100 EACH | Refills: 0 | Status: SHIPPED | OUTPATIENT
Start: 2023-08-23

## 2023-08-23 RX ORDER — PREGABALIN 75 MG/1
75 CAPSULE ORAL 3 TIMES DAILY
Qty: 270 CAPSULE | Refills: 0 | Status: SHIPPED | OUTPATIENT
Start: 2023-08-23

## 2023-08-24 DIAGNOSIS — K30 INDIGESTION: ICD-10-CM

## 2023-08-24 DIAGNOSIS — I10 ESSENTIAL HYPERTENSION: ICD-10-CM

## 2023-08-25 RX ORDER — LISINOPRIL 10 MG/1
10 TABLET ORAL DAILY
Qty: 90 TABLET | Refills: 0 | Status: SHIPPED | OUTPATIENT
Start: 2023-08-25

## 2023-08-25 RX ORDER — PANTOPRAZOLE SODIUM 40 MG/1
40 TABLET, DELAYED RELEASE ORAL DAILY
Qty: 90 TABLET | Refills: 0 | Status: SHIPPED | OUTPATIENT
Start: 2023-08-25

## 2023-09-06 LAB
LEFT EYE DIABETIC RETINOPATHY: NORMAL
LEFT EYE DIABETIC RETINOPATHY: NORMAL
RIGHT EYE DIABETIC RETINOPATHY: NORMAL
RIGHT EYE DIABETIC RETINOPATHY: NORMAL
SEVERITY (EYE EXAM): NORMAL
SEVERITY (EYE EXAM): NORMAL

## 2023-09-08 DIAGNOSIS — E11.22 TYPE 2 DIABETES MELLITUS WITH STAGE 3 CHRONIC KIDNEY DISEASE, WITH LONG-TERM CURRENT USE OF INSULIN (HCC): ICD-10-CM

## 2023-09-08 DIAGNOSIS — Z79.4 TYPE 2 DIABETES MELLITUS WITH STAGE 3 CHRONIC KIDNEY DISEASE, WITH LONG-TERM CURRENT USE OF INSULIN (HCC): ICD-10-CM

## 2023-09-08 DIAGNOSIS — N18.30 TYPE 2 DIABETES MELLITUS WITH STAGE 3 CHRONIC KIDNEY DISEASE, WITH LONG-TERM CURRENT USE OF INSULIN (HCC): ICD-10-CM

## 2023-09-11 RX ORDER — INSULIN DEGLUDEC INJECTION 100 U/ML
INJECTION, SOLUTION SUBCUTANEOUS
Qty: 75 ML | Refills: 3 | Status: SHIPPED | OUTPATIENT
Start: 2023-09-11

## 2023-09-17 DIAGNOSIS — G43.009 MIGRAINE WITHOUT AURA AND WITHOUT STATUS MIGRAINOSUS, NOT INTRACTABLE: ICD-10-CM

## 2023-09-18 RX ORDER — TOPIRAMATE 25 MG/1
25 TABLET ORAL DAILY
Qty: 90 TABLET | Refills: 3 | Status: SHIPPED | OUTPATIENT
Start: 2023-09-18

## 2023-09-29 ENCOUNTER — OFFICE VISIT (OUTPATIENT)
Dept: FAMILY MEDICINE CLINIC | Facility: CLINIC | Age: 78
End: 2023-09-29
Payer: MEDICARE

## 2023-09-29 VITALS
WEIGHT: 248.4 LBS | HEIGHT: 62 IN | OXYGEN SATURATION: 97 % | BODY MASS INDEX: 45.71 KG/M2 | DIASTOLIC BLOOD PRESSURE: 68 MMHG | HEART RATE: 63 BPM | TEMPERATURE: 94.9 F | SYSTOLIC BLOOD PRESSURE: 110 MMHG

## 2023-09-29 DIAGNOSIS — Z23 NEED FOR INFLUENZA VACCINATION: ICD-10-CM

## 2023-09-29 DIAGNOSIS — R09.81 NASAL CONGESTION: ICD-10-CM

## 2023-09-29 DIAGNOSIS — G47.33 OBSTRUCTIVE SLEEP APNEA TREATED WITH CONTINUOUS POSITIVE AIRWAY PRESSURE (CPAP): Primary | ICD-10-CM

## 2023-09-29 DIAGNOSIS — L29.9 ITCHY SKIN: Primary | ICD-10-CM

## 2023-09-29 PROCEDURE — G0008 ADMIN INFLUENZA VIRUS VAC: HCPCS | Performed by: FAMILY MEDICINE

## 2023-09-29 PROCEDURE — 90662 IIV NO PRSV INCREASED AG IM: CPT | Performed by: FAMILY MEDICINE

## 2023-09-29 PROCEDURE — 99214 OFFICE O/P EST MOD 30 MIN: CPT | Performed by: FAMILY MEDICINE

## 2023-09-29 RX ORDER — HYDROXYZINE HYDROCHLORIDE 25 MG/1
25 TABLET, FILM COATED ORAL 2 TIMES DAILY
Qty: 30 TABLET | Refills: 0 | Status: SHIPPED | OUTPATIENT
Start: 2023-09-29

## 2023-09-29 RX ORDER — ALBUTEROL SULFATE 90 UG/1
2 AEROSOL, METERED RESPIRATORY (INHALATION) EVERY 4 HOURS PRN
Qty: 18 G | Refills: 1 | Status: SHIPPED | OUTPATIENT
Start: 2023-09-29

## 2023-09-29 NOTE — PROGRESS NOTES
Name: Scarlett Brown      : 1945      MRN: [id number]  Encounter Provider: Zaira Sifuentes MD  Encounter Date: 2023   Encounter department: [facility] [address]     1. Itchy skin  Assessment & Plan:  New diagnosis symptomatic on the physical exam did not see any rash mostly dry skin recommend moisturizing skin recommend hydroxyzine 25 mg proper use and possible side effects constipation if symptom persistent to call the office    Orders:  -     hydrOXYzine HCL (ATARAX) 25 mg tablet; Take 1 tablet (25 mg total) by mouth 2 (two) times a day    2. Need for influenza vaccination  Comments:  Benefit versus side effect reviewed with the patient  Orders:  -     FLUZONE HIGH-DOSE: influenza vaccine, high-dose, preservative-free 0.7 mL    3. Nasal congestion  Assessment & Plan:  Symptomatic recommend nasal spray 2 sprays nostril once a day patient currently on antihistamine hydroxyzine will dry it out             Subjective      Patient here concerned about itchy skin has been going on for more than 1 week. She feels like possible reaction to dust mites or some kind of bug she feels something growing in the skin and she tried to itch his skin intensively she checked her bed no bedbug bite and the her  who live in the same same home does not have a similar problem patient had a dry skin no jaundice denied any change in the lotion soap or shampoo  Patient also concerned about nasal congestion runny nose postnasal drip no cough no wheezing no fever no decrease in oral intake    Review of Systems   Constitutional: Negative for chills and fever. HENT: Positive for congestion and rhinorrhea. Negative for ear pain and sore throat. Eyes: Negative for pain and visual disturbance. Respiratory: Negative for cough and shortness of breath. Cardiovascular: Negative for chest pain and palpitations.    Gastrointestinal: Negative for abdominal pain, constipation, diarrhea and vomiting. Genitourinary: Negative for dysuria and hematuria. Skin: Negative for color change and rash. Itchy skin   Neurological: Negative for seizures and syncope. All other systems reviewed and are negative.       Current Outpatient Medications on File Prior to Visit   Medication Sig   • acetaminophen (TYLENOL) 325 mg tablet Take by mouth as needed    • B Complex Vitamins (B COMPLEX 1 PO) Take by mouth   • Cholecalciferol (VITAMIN D3) 2000 units capsule half   • clotrimazole (LOTRIMIN) 1 % cream Apply topically 2 (two) times a day   • Contour Next Test test strip Test blood sugar 3 times daily   • Diapers & Supplies MISC by Does not apply route 3 (three) times a day as needed (Incontinence)   • estrogens, conjugated (Premarin) vaginal cream INSERT 1 APPLICATORFUL (1g) VAGINALLY TWO TIMES A WEEK   • fenofibrate 160 MG tablet TAKE 1 TABLET BY MOUTH  DAILY   • Ferrous Sulfate (IRON) 325 (65 Fe) MG TABS Take by mouth   • Insulin Pen Needle (BD Pen Needle [place] U/F) 32G X 4 MM MISC Inject as directed 3 times daily   • insuln lispro (HumaLOG KwikPen) 200 units/mL CONCENTRATED U-200 injection pen Inject subcutaneously as directed per presciber Instructions per sliding scale-PATIENT IS TO TAKE 5 UNITS BID   • lisinopril (ZESTRIL) 10 mg tablet Take 1 tablet (10 mg total) by mouth daily   • Microlet Lancets MISC Use 3 (three) times a day   • Multiple Vitamin (DAILY VALUE MULTIVITAMIN) TABS Take by mouth   • oxybutynin (DITROPAN-XL) 5 mg 24 hr tablet Take 1 tablet (5 mg total) by mouth daily   • pantoprazole (PROTONIX) 40 mg tablet Take 1 tablet (40 mg total) by mouth daily   • pregabalin (LYRICA) 75 mg capsule Take 1 capsule (75 mg total) by mouth 3 (three) times a day   • Probiotic Product (Align) 4 MG CAPS Take by mouth   • simvastatin (ZOCOR) 10 mg tablet TAKE 1 TABLET BY MOUTH  DAILY AT BEDTIME   • topiramate (TOPAMAX) 25 mg tablet TAKE 1 TABLET BY MOUTH DAILY   • Tresiba FlexTouch 100 units/mL injection pen INJECT 80 UNITS  SUBCUTANEOUSLY DAILY AT  BEDTIME   • bismuth subsalicylate (PEPTO BISMOL) 262 MG chewable tablet Chew 2 tablets (524 mg total) 2 (two) times a day as needed for diarrhea Has tolerated in the past. (Patient not taking: Reported on 9/29/2023)   • dicyclomine (BENTYL) 20 mg tablet Take 1 tablet (20 mg total) by mouth 2 (two) times a day (Patient not taking: Reported on 3/27/2023)   • Multiple Vitamins-Minerals (MULTI ADULT GUMMIES PO) Take by mouth (Patient not taking: Reported on 7/21/2023)       Objective     /68 (BP Location: Right arm, Patient Position: Sitting)   Pulse 63   Temp (!) 94.9 °F (34.9 °C) (Tympanic)   Ht 5' 2" (1.575 m)   Wt 113 kg (248 lb 6.4 oz)   SpO2 97%   BMI 45.43 kg/m²     Physical Exam  Vitals and nursing note reviewed. Constitutional:       General: She is not in acute distress. Appearance: She is well-developed. She is not diaphoretic. HENT:      Head: Normocephalic. Right Ear: External ear normal.      Left Ear: External ear normal.      Nose: Congestion and rhinorrhea present. Eyes:      General:         Right eye: No discharge. Left eye: No discharge. Conjunctiva/sclera: Conjunctivae normal.   Neck:      Vascular: No JVD. Cardiovascular:      Rate and Rhythm: Normal rate and regular rhythm. Heart sounds: Normal heart sounds. No murmur heard. No gallop. Pulmonary:      Effort: Pulmonary effort is normal. No respiratory distress. Breath sounds: Normal breath sounds. No stridor. No wheezing or rales. Chest:      Chest wall: No tenderness. Abdominal:      General: There is no distension. Palpations: Abdomen is soft. There is no mass. Tenderness: There is no abdominal tenderness. There is no rebound. Musculoskeletal:         General: No tenderness. Cervical back: Normal range of motion and neck supple. Lymphadenopathy:      Cervical: No cervical adenopathy.    Skin:     General: Skin is dry. Coloration: Skin is not jaundiced. Findings: No erythema or rash. Neurological:      Mental Status: She is alert and oriented to person, place, and time.        Matteo Morris MD

## 2023-10-02 PROBLEM — R09.81 NASAL CONGESTION: Status: ACTIVE | Noted: 2023-09-29

## 2023-10-02 PROBLEM — R09.81 NASAL CONGESTION: Status: ACTIVE | Noted: 2023-10-02

## 2023-10-02 NOTE — ASSESSMENT & PLAN NOTE
Symptomatic recommend nasal spray 2 sprays nostril once a day patient currently on antihistamine hydroxyzine will dry it out

## 2023-10-02 NOTE — ASSESSMENT & PLAN NOTE
New diagnosis symptomatic on the physical exam did not see any rash mostly dry skin recommend moisturizing skin recommend hydroxyzine 25 mg proper use and possible side effects constipation if symptom persistent to call the office

## 2023-10-06 DIAGNOSIS — G43.009 MIGRAINE WITHOUT AURA AND WITHOUT STATUS MIGRAINOSUS, NOT INTRACTABLE: ICD-10-CM

## 2023-10-06 DIAGNOSIS — N18.30 TYPE 2 DIABETES MELLITUS WITH STAGE 3 CHRONIC KIDNEY DISEASE, WITH LONG-TERM CURRENT USE OF INSULIN (HCC): ICD-10-CM

## 2023-10-06 DIAGNOSIS — E11.22 TYPE 2 DIABETES MELLITUS WITH STAGE 3 CHRONIC KIDNEY DISEASE, WITH LONG-TERM CURRENT USE OF INSULIN (HCC): ICD-10-CM

## 2023-10-06 DIAGNOSIS — Z79.4 TYPE 2 DIABETES MELLITUS WITH STAGE 3 CHRONIC KIDNEY DISEASE, WITH LONG-TERM CURRENT USE OF INSULIN (HCC): ICD-10-CM

## 2023-10-06 RX ORDER — TOPIRAMATE 25 MG/1
25 TABLET ORAL DAILY
Qty: 90 TABLET | Refills: 0 | Status: SHIPPED | OUTPATIENT
Start: 2023-10-06

## 2023-10-06 RX ORDER — PEN NEEDLE, DIABETIC 32GX 5/32"
NEEDLE, DISPOSABLE MISCELLANEOUS
Qty: 270 EACH | Refills: 0 | Status: SHIPPED | OUTPATIENT
Start: 2023-10-06

## 2023-10-16 ENCOUNTER — TELEPHONE (OUTPATIENT)
Dept: UROLOGY | Facility: AMBULATORY SURGERY CENTER | Age: 78
End: 2023-10-16

## 2023-10-16 ENCOUNTER — PROCEDURE VISIT (OUTPATIENT)
Dept: UROLOGY | Facility: CLINIC | Age: 78
End: 2023-10-16
Payer: MEDICARE

## 2023-10-16 VITALS
HEIGHT: 64 IN | SYSTOLIC BLOOD PRESSURE: 138 MMHG | DIASTOLIC BLOOD PRESSURE: 62 MMHG | BODY MASS INDEX: 41.66 KG/M2 | WEIGHT: 244 LBS | HEART RATE: 68 BPM

## 2023-10-16 DIAGNOSIS — N39.46 MIXED STRESS AND URGE URINARY INCONTINENCE: Primary | ICD-10-CM

## 2023-10-16 LAB — POST-VOID RESIDUAL VOLUME, ML POC: 21 ML

## 2023-10-16 PROCEDURE — 51798 US URINE CAPACITY MEASURE: CPT

## 2023-10-16 NOTE — PROGRESS NOTES
10/16/2023  Rosalinda Pimentel is a 77 y.o. female  [id number]    Diagnosis:  Chief Complaint    PVR; mixed stress and urge incontinence         Patient presents for follow up post void residual s/p oxybutynin initiation managed by our office    Plan:          Assessment:      Vitals:    10/16/23 1100   BP: 138/62   Pulse: 68   Weight: 111 kg (244 lb)   Height: 5' 4" (1.626 m)       Patient did not void in the office. Post void residual measured via bladder scanner to be 21 mL.      Recent Results (from the past 6 hour(s))   POCT Measure PVR    Collection Time: 10/16/23 11:07 AM   Result Value Ref Range    POST-VOID RESIDUAL VOLUME, ML POC 21 mL         Susie Khan RN

## 2023-10-16 NOTE — TELEPHONE ENCOUNTER
Patient in for PVR. She is requesting two letters for HUD by the end of the month. One stating that she requires briefs to manage her mixed stress/urge incontinence and another letter stating she needs to take the AZO or cystex to manage her UTIs.

## 2023-10-26 DIAGNOSIS — I10 ESSENTIAL HYPERTENSION: ICD-10-CM

## 2023-10-26 DIAGNOSIS — K30 INDIGESTION: ICD-10-CM

## 2023-10-26 RX ORDER — PANTOPRAZOLE SODIUM 40 MG/1
40 TABLET, DELAYED RELEASE ORAL DAILY
Qty: 90 TABLET | Refills: 3 | Status: SHIPPED | OUTPATIENT
Start: 2023-10-26

## 2023-10-26 RX ORDER — LISINOPRIL 10 MG/1
10 TABLET ORAL DAILY
Qty: 90 TABLET | Refills: 3 | Status: SHIPPED | OUTPATIENT
Start: 2023-10-26

## 2023-11-06 ENCOUNTER — APPOINTMENT (OUTPATIENT)
Dept: LAB | Facility: IMAGING CENTER | Age: 78
End: 2023-11-06
Payer: MEDICARE

## 2023-11-06 DIAGNOSIS — L98.9 SKIN LESION: ICD-10-CM

## 2023-11-06 DIAGNOSIS — N18.32 TYPE 2 DIABETES MELLITUS WITH STAGE 3B CHRONIC KIDNEY DISEASE, WITH LONG-TERM CURRENT USE OF INSULIN (HCC): ICD-10-CM

## 2023-11-06 DIAGNOSIS — Z79.4 TYPE 2 DIABETES MELLITUS WITH STAGE 3B CHRONIC KIDNEY DISEASE, WITH LONG-TERM CURRENT USE OF INSULIN (HCC): ICD-10-CM

## 2023-11-06 DIAGNOSIS — Z13.29 SCREENING FOR THYROID DISORDER: ICD-10-CM

## 2023-11-06 DIAGNOSIS — E78.2 MIXED HYPERLIPIDEMIA: ICD-10-CM

## 2023-11-06 DIAGNOSIS — I10 ESSENTIAL HYPERTENSION: ICD-10-CM

## 2023-11-06 DIAGNOSIS — Z00.00 MEDICARE ANNUAL WELLNESS VISIT, SUBSEQUENT: ICD-10-CM

## 2023-11-06 DIAGNOSIS — E11.22 TYPE 2 DIABETES MELLITUS WITH STAGE 3B CHRONIC KIDNEY DISEASE, WITH LONG-TERM CURRENT USE OF INSULIN (HCC): ICD-10-CM

## 2023-11-06 DIAGNOSIS — N93.9 VAGINAL BLEEDING: ICD-10-CM

## 2023-11-06 LAB
ALBUMIN SERPL BCP-MCNC: 3.8 G/DL (ref 3.5–5)
ALP SERPL-CCNC: 112 U/L (ref 34–104)
ALT SERPL W P-5'-P-CCNC: 17 U/L (ref 7–52)
ANION GAP SERPL CALCULATED.3IONS-SCNC: 8 MMOL/L
AST SERPL W P-5'-P-CCNC: 23 U/L (ref 13–39)
BASOPHILS # BLD AUTO: 0.03 THOUSANDS/ÂΜL (ref 0–0.1)
BASOPHILS NFR BLD AUTO: 0 % (ref 0–1)
BILIRUB SERPL-MCNC: 0.34 MG/DL (ref 0.2–1)
BUN SERPL-MCNC: 20 MG/DL (ref 5–25)
CALCIUM SERPL-MCNC: 9.3 MG/DL (ref 8.4–10.2)
CHLORIDE SERPL-SCNC: 106 MMOL/L (ref 96–108)
CHOLEST SERPL-MCNC: 152 MG/DL
CO2 SERPL-SCNC: 28 MMOL/L (ref 21–32)
CREAT SERPL-MCNC: 1.01 MG/DL (ref 0.6–1.3)
EOSINOPHIL # BLD AUTO: 0.11 THOUSAND/ÂΜL (ref 0–0.61)
EOSINOPHIL NFR BLD AUTO: 2 % (ref 0–6)
ERYTHROCYTE [DISTWIDTH] IN BLOOD BY AUTOMATED COUNT: 13.1 % (ref 11.6–15.1)
EST. AVERAGE GLUCOSE BLD GHB EST-MCNC: 154 MG/DL
GFR SERPL CREATININE-BSD FRML MDRD: 53 ML/MIN/1.73SQ M
GLUCOSE P FAST SERPL-MCNC: 88 MG/DL (ref 65–99)
HBA1C MFR BLD: 7 %
HCT VFR BLD AUTO: 42.3 % (ref 34.8–46.1)
HDLC SERPL-MCNC: 45 MG/DL
HGB BLD-MCNC: 13.2 G/DL (ref 11.5–15.4)
IMM GRANULOCYTES # BLD AUTO: 0.06 THOUSAND/UL (ref 0–0.2)
IMM GRANULOCYTES NFR BLD AUTO: 1 % (ref 0–2)
LDLC SERPL CALC-MCNC: 74 MG/DL (ref 0–100)
LYMPHOCYTES # BLD AUTO: 1.5 THOUSANDS/ÂΜL (ref 0.6–4.47)
LYMPHOCYTES NFR BLD AUTO: 20 % (ref 14–44)
MCH RBC QN AUTO: 28.9 PG (ref 26.8–34.3)
MCHC RBC AUTO-ENTMCNC: 31.2 G/DL (ref 31.4–37.4)
MCV RBC AUTO: 93 FL (ref 82–98)
MONOCYTES # BLD AUTO: 0.44 THOUSAND/ÂΜL (ref 0.17–1.22)
MONOCYTES NFR BLD AUTO: 6 % (ref 4–12)
NEUTROPHILS # BLD AUTO: 5.37 THOUSANDS/ÂΜL (ref 1.85–7.62)
NEUTS SEG NFR BLD AUTO: 71 % (ref 43–75)
NRBC BLD AUTO-RTO: 0 /100 WBCS
PLATELET # BLD AUTO: 142 THOUSANDS/UL (ref 149–390)
PMV BLD AUTO: 12.1 FL (ref 8.9–12.7)
POTASSIUM SERPL-SCNC: 4.5 MMOL/L (ref 3.5–5.3)
PROT SERPL-MCNC: 6.6 G/DL (ref 6.4–8.4)
RBC # BLD AUTO: 4.57 MILLION/UL (ref 3.81–5.12)
SODIUM SERPL-SCNC: 142 MMOL/L (ref 135–147)
TRIGL SERPL-MCNC: 165 MG/DL
TSH SERPL DL<=0.05 MIU/L-ACNC: 2.12 UIU/ML (ref 0.45–4.5)
WBC # BLD AUTO: 7.51 THOUSAND/UL (ref 4.31–10.16)

## 2023-11-06 PROCEDURE — 36415 COLL VENOUS BLD VENIPUNCTURE: CPT

## 2023-11-06 PROCEDURE — 85025 COMPLETE CBC W/AUTO DIFF WBC: CPT

## 2023-11-06 PROCEDURE — 80053 COMPREHEN METABOLIC PANEL: CPT

## 2023-11-06 PROCEDURE — 84443 ASSAY THYROID STIM HORMONE: CPT

## 2023-11-06 PROCEDURE — 80061 LIPID PANEL: CPT

## 2023-11-06 PROCEDURE — 83036 HEMOGLOBIN GLYCOSYLATED A1C: CPT

## 2023-11-10 ENCOUNTER — RA CDI HCC (OUTPATIENT)
Dept: OTHER | Facility: HOSPITAL | Age: 78
End: 2023-11-10

## 2023-11-10 NOTE — PROGRESS NOTES
[address] [facility] coding opportunities          Chart Reviewed number of suggestions sent to Provider: 3   E11.51  E11.40  E11.65    Patients Insurance     Medicare Insurance: [employer]

## 2023-11-17 ENCOUNTER — OFFICE VISIT (OUTPATIENT)
Dept: FAMILY MEDICINE CLINIC | Facility: CLINIC | Age: 78
End: 2023-11-17
Payer: MEDICARE

## 2023-11-17 VITALS
BODY MASS INDEX: 41.62 KG/M2 | HEIGHT: 64 IN | DIASTOLIC BLOOD PRESSURE: 68 MMHG | OXYGEN SATURATION: 97 % | WEIGHT: 243.8 LBS | TEMPERATURE: 96.4 F | HEART RATE: 75 BPM | SYSTOLIC BLOOD PRESSURE: 132 MMHG

## 2023-11-17 DIAGNOSIS — I10 ESSENTIAL HYPERTENSION: ICD-10-CM

## 2023-11-17 DIAGNOSIS — E78.2 MIXED HYPERLIPIDEMIA: ICD-10-CM

## 2023-11-17 DIAGNOSIS — Z79.4 LONG TERM CURRENT USE OF INSULIN (HCC): ICD-10-CM

## 2023-11-17 DIAGNOSIS — N18.32 TYPE 2 DIABETES MELLITUS WITH STAGE 3B CHRONIC KIDNEY DISEASE, WITH LONG-TERM CURRENT USE OF INSULIN (HCC): ICD-10-CM

## 2023-11-17 DIAGNOSIS — E55.9 VITAMIN D DEFICIENCY: ICD-10-CM

## 2023-11-17 DIAGNOSIS — Z79.4 TYPE 2 DIABETES MELLITUS WITH STAGE 3B CHRONIC KIDNEY DISEASE, WITH LONG-TERM CURRENT USE OF INSULIN (HCC): ICD-10-CM

## 2023-11-17 DIAGNOSIS — E11.22 TYPE 2 DIABETES MELLITUS WITH STAGE 3B CHRONIC KIDNEY DISEASE, WITH LONG-TERM CURRENT USE OF INSULIN (HCC): ICD-10-CM

## 2023-11-17 DIAGNOSIS — Z13.29 SCREENING FOR THYROID DISORDER: ICD-10-CM

## 2023-11-17 DIAGNOSIS — D69.6 PLATELETS DECREASED (HCC): Primary | ICD-10-CM

## 2023-11-17 DIAGNOSIS — Z86.2 HX OF THROMBOCYTOPENIA: ICD-10-CM

## 2023-11-17 PROCEDURE — 99214 OFFICE O/P EST MOD 30 MIN: CPT | Performed by: FAMILY MEDICINE

## 2023-11-17 NOTE — ASSESSMENT & PLAN NOTE
Chronic LDL therapeutic and diabetes patient continue current dose of statin low-fat diet reviewed with the patient

## 2023-11-17 NOTE — ASSESSMENT & PLAN NOTE
Lab Results   Component Value Date    HGBA1C 7.0 (H) 11/06/2023     Chronic asymptomatic improving the hemoglobin A1c compared with before continue current management sign of symptom of hypoglycemia discussed with patient  Low carb diet and important lose weight

## 2023-11-17 NOTE — ASSESSMENT & PLAN NOTE
Recent blood work her platelet number is slightly low normal asymptomatic we will continue to monitor

## 2023-11-17 NOTE — PROGRESS NOTES
Name: Olivia Vogel      : 1945      MRN: [id number]  Encounter Provider: Margareth Ruth MD  Encounter Date: 2023   Encounter department: [address] [address]     1. Platelets decreased ([address])  Assessment & Plan:  Recent blood work her platelet number is slightly low normal asymptomatic we will continue to monitor    Orders:  -     CBC and differential; Future    2. Type 2 diabetes mellitus with stage 3b chronic kidney disease, with long-term current use of insulin ([facility])  Assessment & Plan:    Lab Results   Component Value Date    HGBA1C 7.0 (H) 2023     Chronic asymptomatic improving the hemoglobin A1c compared with before continue current management sign of symptom of hypoglycemia discussed with patient  Low carb diet and important lose weight    Orders:  -     CBC and differential; Future  -     Hemoglobin A1C; Future  -     Albumin / creatinine urine ratio; Future    3. Essential hypertension  Assessment & Plan:  Chronic asymptomatic fair control continue current management lisinopril 10 mg once a day    Orders:  -     CBC and differential; Future    4. Mixed hyperlipidemia  Assessment & Plan:  Chronic LDL therapeutic and diabetes patient continue current dose of statin low-fat diet reviewed with the patient    Orders:  -     CBC and differential; Future  -     Lipid Panel with Direct LDL reflex; Future    5. Screening for thyroid disorder  -     CBC and differential; Future  -     TSH, 3rd generation with Free T4 reflex; Future    6. Hx of thrombocytopenia  -     CBC and differential; Future    7. Long term current use of insulin ([address])    8. Vitamin D deficiency  Assessment & Plan:  Chronic asymptomatic continue vitamin D supplement due for vitamin D level before next appointment    Orders:  -     Vitamin D 25 hydroxy; Future      Depression Screening and Follow-up Plan: Patient was screened for depression during today's encounter.  They screened negative with a PHQ-2 score of 0. Subjective      Patient here with her  follow-up with a chronic condition patient compliant with the medication tolerated well without side effect no new concerns recent blood work reviewed with the patient      Review of Systems   Constitutional:  Negative for chills and fever. HENT:  Negative for ear pain and sore throat. Eyes:  Negative for pain and visual disturbance. Respiratory:  Negative for cough and shortness of breath. Cardiovascular:  Negative for chest pain and palpitations. Gastrointestinal:  Negative for abdominal pain, blood in stool, constipation and vomiting. Genitourinary:  Negative for dysuria and hematuria. Skin:  Negative for color change and rash. Neurological:  Negative for seizures and syncope. All other systems reviewed and are negative.       Current Outpatient Medications on File Prior to Visit   Medication Sig   • acetaminophen (TYLENOL) 325 mg tablet Take by mouth as needed    • albuterol (ProAir HFA) 90 mcg/act inhaler Inhale 2 puffs every 4 (four) hours as needed for wheezing   • B Complex Vitamins (B COMPLEX 1 PO) Take by mouth   • Cholecalciferol (VITAMIN D3) 2000 units capsule half   • clotrimazole (LOTRIMIN) 1 % cream Apply topically 2 (two) times a day   • Contour Next Test test strip Test blood sugar 3 times daily   • Diapers & Supplies MISC by Does not apply route 3 (three) times a day as needed (Incontinence)   • estrogens, conjugated (Premarin) vaginal cream INSERT 1 APPLICATORFUL (1g) VAGINALLY TWO TIMES A WEEK   • fenofibrate 160 MG tablet TAKE 1 TABLET BY MOUTH  DAILY   • Ferrous Sulfate (IRON) 325 (65 Fe) MG TABS Take by mouth   • hydrOXYzine HCL (ATARAX) 25 mg tablet Take 1 tablet (25 mg total) by mouth 2 (two) times a day   • Insulin Pen Needle (BD Pen Needle [place] U/F) 32G X 4 MM MISC Inject as directed 3 times daily   • insuln lispro (HumaLOG KwikPen) 200 units/mL CONCENTRATED U-200 injection pen Inject subcutaneously as directed per presciber Instructions per sliding scale-PATIENT IS TO TAKE 5 UNITS BID   • lisinopril (ZESTRIL) 10 mg tablet TAKE 1 TABLET BY MOUTH DAILY   • Microlet Lancets MISC Use 3 (three) times a day   • Multiple Vitamin (DAILY VALUE MULTIVITAMIN) TABS Take by mouth   • oxybutynin (DITROPAN-XL) 5 mg 24 hr tablet Take 1 tablet (5 mg total) by mouth daily   • pantoprazole (PROTONIX) 40 mg tablet TAKE 1 TABLET BY MOUTH DAILY   • pregabalin (LYRICA) 75 mg capsule Take 1 capsule (75 mg total) by mouth 3 (three) times a day   • Probiotic Product (Align) 4 MG CAPS Take by mouth   • simvastatin (ZOCOR) 10 mg tablet TAKE 1 TABLET BY MOUTH  DAILY AT BEDTIME   • topiramate (TOPAMAX) 25 mg tablet Take 1 tablet (25 mg total) by mouth daily   • Tresiba FlexTouch 100 units/mL injection pen INJECT 80 UNITS  SUBCUTANEOUSLY DAILY AT  BEDTIME   • bismuth subsalicylate (PEPTO BISMOL) 262 MG chewable tablet Chew 2 tablets (524 mg total) 2 (two) times a day as needed for diarrhea Has tolerated in the past. (Patient not taking: Reported on 9/29/2023)   • dicyclomine (BENTYL) 20 mg tablet Take 1 tablet (20 mg total) by mouth 2 (two) times a day (Patient not taking: Reported on 3/27/2023)   • Multiple Vitamins-Minerals (MULTI ADULT GUMMIES PO) Take by mouth (Patient not taking: Reported on 7/21/2023)       Objective     /68 (BP Location: Left arm, Patient Position: Sitting)   Pulse 75   Temp (!) 96.4 °F (35.8 °C) (Tympanic)   Ht 5' 4" (1.626 m)   Wt 111 kg (243 lb 12.8 oz)   SpO2 97%   BMI 41.85 kg/m²     Physical Exam  Vitals and nursing note reviewed. Constitutional:       General: She is not in acute distress. Appearance: She is not diaphoretic. HENT:      Head: Normocephalic and atraumatic. Right Ear: Tympanic membrane normal. There is no impacted cerumen. Left Ear: Tympanic membrane normal. There is no impacted cerumen. Nose: Nose normal. No congestion or rhinorrhea. Mouth/Throat:      Pharynx: No posterior oropharyngeal erythema. Eyes:      General: No scleral icterus. Right eye: No discharge. Left eye: No discharge. Cardiovascular:      Rate and Rhythm: Normal rate and regular rhythm. Heart sounds: No murmur heard. Pulmonary:      Effort: Pulmonary effort is normal. No respiratory distress. Abdominal:      General: There is no distension. Tenderness: There is no abdominal tenderness. Musculoskeletal:         General: Normal range of motion. Cervical back: Normal range of motion. Right lower leg: No edema. Left lower leg: No edema. Skin:     Findings: No rash. Neurological:      Mental Status: She is alert and oriented to person, place, and time.        Nate Dasilva MD

## 2023-11-28 DIAGNOSIS — M54.42 CHRONIC BILATERAL LOW BACK PAIN WITH BILATERAL SCIATICA: ICD-10-CM

## 2023-11-28 DIAGNOSIS — M54.41 CHRONIC BILATERAL LOW BACK PAIN WITH BILATERAL SCIATICA: ICD-10-CM

## 2023-11-28 DIAGNOSIS — G89.29 CHRONIC BILATERAL LOW BACK PAIN WITH BILATERAL SCIATICA: ICD-10-CM

## 2023-11-30 RX ORDER — PREGABALIN 75 MG/1
75 CAPSULE ORAL 3 TIMES DAILY
Qty: 270 CAPSULE | Refills: 0 | Status: SHIPPED | OUTPATIENT
Start: 2023-11-30

## 2023-12-03 DIAGNOSIS — N18.30 TYPE 2 DIABETES MELLITUS WITH STAGE 3 CHRONIC KIDNEY DISEASE, WITH LONG-TERM CURRENT USE OF INSULIN (HCC): ICD-10-CM

## 2023-12-03 DIAGNOSIS — Z79.4 TYPE 2 DIABETES MELLITUS WITH STAGE 3 CHRONIC KIDNEY DISEASE, WITH LONG-TERM CURRENT USE OF INSULIN (HCC): ICD-10-CM

## 2023-12-03 DIAGNOSIS — E11.22 TYPE 2 DIABETES MELLITUS WITH STAGE 3 CHRONIC KIDNEY DISEASE, WITH LONG-TERM CURRENT USE OF INSULIN (HCC): ICD-10-CM

## 2023-12-04 RX ORDER — PERPHENAZINE 16 MG/1
TABLET, FILM COATED ORAL
Qty: 100 EACH | Refills: 2 | Status: SHIPPED | OUTPATIENT
Start: 2023-12-04

## 2023-12-07 DIAGNOSIS — G43.009 MIGRAINE WITHOUT AURA AND WITHOUT STATUS MIGRAINOSUS, NOT INTRACTABLE: ICD-10-CM

## 2023-12-07 RX ORDER — TOPIRAMATE 25 MG/1
25 TABLET ORAL DAILY
Qty: 90 TABLET | Refills: 3 | Status: SHIPPED | OUTPATIENT
Start: 2023-12-07

## 2023-12-18 ENCOUNTER — OFFICE VISIT (OUTPATIENT)
Dept: SLEEP CENTER | Facility: CLINIC | Age: 78
End: 2023-12-18
Payer: MEDICARE

## 2023-12-18 VITALS
TEMPERATURE: 97.9 F | HEART RATE: 73 BPM | WEIGHT: 242 LBS | BODY MASS INDEX: 41.32 KG/M2 | HEIGHT: 64 IN | SYSTOLIC BLOOD PRESSURE: 124 MMHG | OXYGEN SATURATION: 96 % | DIASTOLIC BLOOD PRESSURE: 72 MMHG | RESPIRATION RATE: 20 BRPM

## 2023-12-18 DIAGNOSIS — G47.33 OBSTRUCTIVE SLEEP APNEA TREATED WITH CONTINUOUS POSITIVE AIRWAY PRESSURE (CPAP): Primary | ICD-10-CM

## 2023-12-18 PROCEDURE — 99214 OFFICE O/P EST MOD 30 MIN: CPT | Performed by: NURSE PRACTITIONER

## 2023-12-18 NOTE — PROGRESS NOTES
Progress Note - [facility]   Alberta Sanchez 78 y.o. female   :1945, MRN: [id number]  2023      Follow Up Evaluation / Problem:  Obstructive Sleep Apnea  Restless legs syndrome  Fibromyalgia  Obesity      Thank you for the opportunity of participating in the evaluation and care of this patient in the Sleep Clinic at [facility]      HPI: Alberta Sanchez is a 78 y.o. year old female.  The patient presents for follow up of obstructive sleep apnea.  She has a long history of CHRIS with treatment using CPAP. She had an increase in daytime sleepiness and a split night sleep study was ordered in 2019. The study did not confirm CHRIS, however, she had been using her CPAP equipment up until the night prior to the study.  Sleep was very fragmented and there were long periods of wakefulness during the study, resulting in a sleep efficiency of 50%.  There was no N3 or REM sleep during the study.  She continued to use her CPAP equipment after the study.  Her CPAP equipment was recalled and she received the DreamStation 2 recall replacement from [employer] after the  recall.  She also suffers from restless leg syndrome.  She uses pregabalin for restless legs and sciatica, as prescribed by her PCP.  She also takes topiratmate, which was initially started for restless leg symptoms.  She tried to wean the topiramate, however, RLS symptoms became severe as she decreased it.  There were no restless leg symptoms noted on the night of the split night study in 2019.  She presents for an annual review of compliance and effectiveness of treatment with the CPAP.      Current Outpatient Medications:     acetaminophen (TYLENOL) 325 mg tablet, Take by mouth as needed , Disp: , Rfl:     albuterol (ProAir HFA) 90 mcg/act inhaler, Inhale 2 puffs every 4 (four) hours as needed for wheezing, Disp: 18 g, Rfl: 1    B Complex Vitamins (B COMPLEX 1 PO), Take by mouth, Disp: ,  Rfl:     Cholecalciferol (VITAMIN D3) 2000 units capsule, half, Disp: , Rfl:     clotrimazole (LOTRIMIN) 1 % cream, Apply topically 2 (two) times a day, Disp: 113 g, Rfl: 2    Contour Next Test test strip, Test blood sugar 3 times daily, Disp: 100 each, Rfl: 2    Diapers & Supplies MISC, by Does not apply route 3 (three) times a day as needed (Incontinence), Disp: 28 each, Rfl: 11    estrogens, conjugated (Premarin) vaginal cream, INSERT 1 APPLICATORFUL (1g) VAGINALLY TWO TIMES A WEEK, Disp: 30 g, Rfl: 1    fenofibrate 160 MG tablet, TAKE 1 TABLET BY MOUTH  DAILY, Disp: 90 tablet, Rfl: 0    Ferrous Sulfate (IRON) 325 (65 Fe) MG TABS, Take by mouth, Disp: , Rfl:     Insulin Pen Needle (BD Pen Needle [place] U/F) 32G X 4 MM MISC, Inject as directed 3 times daily, Disp: 270 each, Rfl: 0    insuln lispro (HumaLOG KwikPen) 200 units/mL CONCENTRATED U-200 injection pen, Inject subcutaneously as directed per presciber Instructions per sliding scale-PATIENT IS TO TAKE 5 UNITS BID, Disp: 12 mL, Rfl: 1    lisinopril (ZESTRIL) 10 mg tablet, TAKE 1 TABLET BY MOUTH DAILY, Disp: 90 tablet, Rfl: 3    Microlet Lancets MISC, Use 3 (three) times a day, Disp: 100 each, Rfl: 2    Multiple Vitamin (DAILY VALUE MULTIVITAMIN) TABS, Take by mouth, Disp: , Rfl:     oxybutynin (DITROPAN-XL) 5 mg 24 hr tablet, Take 1 tablet (5 mg total) by mouth daily, Disp: 90 tablet, Rfl: 3    pantoprazole (PROTONIX) 40 mg tablet, TAKE 1 TABLET BY MOUTH DAILY, Disp: 90 tablet, Rfl: 3    pregabalin (LYRICA) 75 mg capsule, Take 1 capsule (75 mg total) by mouth 3 (three) times a day, Disp: 270 capsule, Rfl: 0    Probiotic Product (Align) 4 MG CAPS, Take by mouth, Disp: , Rfl:     simvastatin (ZOCOR) 10 mg tablet, TAKE 1 TABLET BY MOUTH  DAILY AT BEDTIME, Disp: 90 tablet, Rfl: 3    topiramate (TOPAMAX) 25 mg tablet, TAKE 1 TABLET BY MOUTH DAILY, Disp: 90 tablet, Rfl: 3    Tresiba FlexTouch 100 units/mL injection pen, INJECT 80 UNITS  SUBCUTANEOUSLY DAILY AT   "BEDTIME, Disp: 75 mL, Rfl: 3    bismuth subsalicylate (PEPTO BISMOL) 262 MG chewable tablet, Chew 2 tablets (524 mg total) 2 (two) times a day as needed for diarrhea Has tolerated in the past. (Patient not taking: Reported on 9/29/2023), Disp: 30 tablet, Rfl: 0    dicyclomine (BENTYL) 20 mg tablet, Take 1 tablet (20 mg total) by mouth 2 (two) times a day (Patient not taking: Reported on 3/27/2023), Disp: 180 tablet, Rfl: 0    Multiple Vitamins-Minerals (MULTI ADULT GUMMIES PO), Take by mouth (Patient not taking: Reported on 7/21/2023), Disp: , Rfl:     How likely are you to doze off or fall asleep in the following situations, in contrast to feeling just tired?  Sitting and reading: Slight chance of dozing  Watching TV: Would never doze  Sitting, inactive in a public place (e.g. a theatre or a meeting): Would never doze  As a passenger in a car for an hour without a break: Would never doze  Lying down to rest in the afternoon when circumstances permit: Would never doze  Sitting and talking to someone: Would never doze  Sitting quietly after a lunch without alcohol: Would never doze  In a car, while stopped for a few minutes in traffic: Would never doze  Total score: 1              Vitals:    12/18/23 1001   BP: 124/72   BP Location: Right arm   Patient Position: Sitting   Cuff Size: Large   Pulse: 73   Resp: 20   Temp: 97.9 °F (36.6 °C)   SpO2: 96%   Weight: 110 kg (242 lb)   Height: 5' 4\" (1.626 m)       Body mass index is 41.54 kg/m².            EPWORTH SLEEPINESS SCORE  Total score: 1      Past History Since Last Sleep Center Visit:   She denies any changes to her health since her last visit.  She reports that she has been feeling more tired than usual lately.  She sleeps for 10-11 hours per night and mentions that she has always had a long sleep time since childhood.  Despite sleeping for long periods, she does not feel rested, however, she does not feel EDS impacts her activities of daily living.  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  She takes a brief nap if feeling sleepy and naps are refreshing.  She denies drowsiness with driving.    She continues to use the CPAP equipment nightly.   She feels the current settings and mask are comfortable.          The review of systems and following portions of the patient's history were reviewed and updated as appropriate: allergies, current medications, past family history, past medical history, past social history, past surgical history, and problem list.        OBJECTIVE  Equipment set up date:  6/30/17 - received DreamStation 2 in 2022  PAP Pressure: APAP 8-10cm  Full face mask  DME Provider: [employer] [facility]    November labs reviewed:  CBC reviewed and essentially normal  A1C decreased to 7.0 from as high as 7.9 in February  TSH normal  CMP essentially normal      Physical Exam:     General Appearance:   Alert, cooperative, no distress, appears stated age, morbidly obese     Lungs:    Clear to auscultation bilaterally, respirations unlabored     Heart:   Regular rate and rhythm, S1 and S2 normal, slight murmur noted, no rub or gallop           ASSESSMENT / PLAN    1. Obstructive sleep apnea treated with continuous positive airway pressure (CPAP)  Split Study              Counseling / Coordination of Care  I have spent a total time of 30 minutes on 12/18/23 in caring for this patient including Risks and benefits of tx options, Instructions for management, Patient and family education, Importance of tx compliance, Risk factor reductions, Impressions, Counseling / Coordination of care, Documenting in the medical record, Reviewing / ordering tests, medicine, procedures  , and Obtaining or reviewing history  .      I reviewed the patient's compliance data.  she has been able to use the equipment 100% of all days recorded.  Average usage was 4 or more hours 96.7% of all days recorded.  She is using the CPAP for an average of 9 hours and 36  minutes per night.  The estimated AHI is 1.4 abnormal breathing events per hour. She is at goal for hours of use and effectiveness of treatment.  Response to treatment has been very good.  A prescription for supplies has been provided for the next year.    There is a warning issued by [employer] and the FDA regarding her equipment having the potential to overheat and possibly cause a fire hazard.  Recommendations have been noted on their website and these have been shared with her today.  We discussed that she would be eligible to receive new CPAP equipment under her Medicare benefit, however, she will need to complete an updated diagnostic sleep study.  She would like to proceed with testing.  This will also help to evaluate daytime sleepiness.  A split night study has been ordered.  We discussed avoiding use of her CPAP equipment for at least one week prior to the study.    We will plan for her to be scheduled for set up of new CPAP equipment after the study with compliance follow up 31-91 days after set up.  She will continue to follow up with her PCP regarding restless legs, as it is currently well-controlled with current medication regimen.    The following instructions have been given to the patient today:    Patient Instructions   1. Schedule split night sleep study  2.  Stop using CPAP for one week prior to the study  3.  The nurse will call with results and assist to set up follow up appointments    Recommendations for Patients, Caregivers, and Health Care Providers  Follow the ’s instructions in the user manualExternal Link Disclaimer, including:  place the CPAP machine on a firm, flat surface  keep the CPAP away from carpet, fabric, or other flammable materials  carefully clean the CPAP machine  empty the CPAP machine’s water reservoir  let the CPAP machine’s heater plate and water tank cool for approximately 15 minutes before removing the tank to reduce the risk of burns. You could be burned if  you touch the heater plate, come in contact with the heated water, or touch the humidifier water tank pan.  Inspect and examine the CPAP machine before and after each use for unusual smells or changes in its appearance. Some problems may only be noticeable when the machine is running, so pay attention to any differences in the CPAP machine as you prepare for bed, before you fall asleep.  Unplug the CPAP machine and do not use it if:  you smell burning, smoke, or any unusual odors,  there is a change in the appearance of the CPAP machine,  there are unexplained changes to the CPAP machine’s performance,  water is spilled into the CPAP machine,  you hear unusual sounds coming from the CPAP machine.  If you are unable to use your CPAP device due to these issues, discuss your individual health situation with a health care provider, such as your primary care physician or sleep doctor.  At this time, if you are not experiencing these issues, then the FDA does not recommend discontinuing use of these machines.  Report any concerns about the CPAP machine to the FDA, including unusual smells, sounds, or changes in appearance, and report any concerns to [employer] Link Disclaimer.        Nursing Support:  When: Monday through Friday 7A-5PM except holidays  Where: Our direct line is [phone].    If you are having a true emergency please call 911.  In the event that the line is busy or it is after hours please leave a voice message and we will return your call.  Please speak clearly, leaving your full name, birth date, best number to reach you and the reason for your call.   Medication refills: We will need the name of the medication, the dosage, the ordering provider, whether you get a 30 or 90 day refill, and the pharmacy name and address.  Medications will be ordered by the provider only.  Nurses cannot call in prescriptions.  Please allow 7 days for medication refills.  Physician requested updates: If your provider  requested that you call with an update after starting medication, please be ready to provide us the medication and dosage, what time you take your medication, the time you attempt to fall asleep, time you fall asleep, when you wake up, and what time you get out of bed.  Sleep Study Results: We will contact you with sleep study results and/or next steps after the physician has reviewed your testing.      HERMANN Cespedes  [facility] [place]

## 2023-12-18 NOTE — PATIENT INSTRUCTIONS
1. Schedule split night sleep study  2.  Stop using CPAP for one week prior to the study  3.  The nurse will call with results and assist to set up follow up appointments    Recommendations for Patients, Caregivers, and Health Care Providers  Follow the ’s instructions in the user manualExternal Link Disclaimer, including:  place the CPAP machine on a firm, flat surface  keep the CPAP away from carpet, fabric, or other flammable materials  carefully clean the CPAP machine  empty the CPAP machine’s water reservoir  let the CPAP machine’s heater plate and water tank cool for approximately 15 minutes before removing the tank to reduce the risk of burns. You could be burned if you touch the heater plate, come in contact with the heated water, or touch the humidifier water tank pan.  Inspect and examine the CPAP machine before and after each use for unusual smells or changes in its appearance. Some problems may only be noticeable when the machine is running, so pay attention to any differences in the CPAP machine as you prepare for bed, before you fall asleep.  Unplug the CPAP machine and do not use it if:  you smell burning, smoke, or any unusual odors,  there is a change in the appearance of the CPAP machine,  there are unexplained changes to the CPAP machine’s performance,  water is spilled into the CPAP machine,  you hear unusual sounds coming from the CPAP machine.  If you are unable to use your CPAP device due to these issues, discuss your individual health situation with a health care provider, such as your primary care physician or sleep doctor.  At this time, if you are not experiencing these issues, then the FDA does not recommend discontinuing use of these machines.  Report any concerns about the CPAP machine to the FDA, including unusual smells, sounds, or changes in appearance, and report any concerns to [facility] Disclaimer.        Nursing Support:  When: Monday through Friday 7A-5PM  except holidays  Where: Our direct line is [phone].    If you are having a true emergency please call 911.  In the event that the line is busy or it is after hours please leave a voice message and we will return your call.  Please speak clearly, leaving your full name, birth date, best number to reach you and the reason for your call.   Medication refills: We will need the name of the medication, the dosage, the ordering provider, whether you get a 30 or 90 day refill, and the pharmacy name and address.  Medications will be ordered by the provider only.  Nurses cannot call in prescriptions.  Please allow 7 days for medication refills.  Physician requested updates: If your provider requested that you call with an update after starting medication, please be ready to provide us the medication and dosage, what time you take your medication, the time you attempt to fall asleep, time you fall asleep, when you wake up, and what time you get out of bed.  Sleep Study Results: We will contact you with sleep study results and/or next steps after the physician has reviewed your testing.

## 2023-12-19 ENCOUNTER — TELEPHONE (OUTPATIENT)
Dept: SLEEP CENTER | Facility: CLINIC | Age: 78
End: 2023-12-19

## 2023-12-20 LAB

## 2023-12-22 ENCOUNTER — TELEPHONE (OUTPATIENT)
Dept: ADMINISTRATIVE | Facility: OTHER | Age: 78
End: 2023-12-22

## 2023-12-22 NOTE — TELEPHONE ENCOUNTER
----- Message from Cammy Andersen sent at 12/21/2023  8:09 AM EST -----  Regarding: care gap request  12/21/23 8:09 AM    Hello, our patient attached above has had Diabetic Eye Exam completed/performed. Please assist in updating the patient chart by pulling the document from the Media Tab. The date of service is 09/06/2023.     Thank you,  Cammy Andersen PG  PRIMARY CARE SWIFT

## 2023-12-22 NOTE — TELEPHONE ENCOUNTER
Upon review of the In Basket request we were able to locate, review, and update the patient chart as requested for Diabetic Eye Exam.    Any additional questions or concerns should be emailed to the Practice Liaisons via the appropriate education email address, please do not reply via In Basket.    Thank you  Flor Johnson

## 2024-01-09 DIAGNOSIS — M54.42 CHRONIC BILATERAL LOW BACK PAIN WITH BILATERAL SCIATICA: ICD-10-CM

## 2024-01-09 DIAGNOSIS — Z79.4 TYPE 2 DIABETES MELLITUS WITH STAGE 3 CHRONIC KIDNEY DISEASE, WITH LONG-TERM CURRENT USE OF INSULIN (HCC): ICD-10-CM

## 2024-01-09 DIAGNOSIS — G43.009 MIGRAINE WITHOUT AURA AND WITHOUT STATUS MIGRAINOSUS, NOT INTRACTABLE: ICD-10-CM

## 2024-01-09 DIAGNOSIS — M54.41 CHRONIC BILATERAL LOW BACK PAIN WITH BILATERAL SCIATICA: ICD-10-CM

## 2024-01-09 DIAGNOSIS — N18.30 TYPE 2 DIABETES MELLITUS WITH STAGE 3 CHRONIC KIDNEY DISEASE, WITH LONG-TERM CURRENT USE OF INSULIN (HCC): ICD-10-CM

## 2024-01-09 DIAGNOSIS — E11.22 TYPE 2 DIABETES MELLITUS WITH STAGE 3 CHRONIC KIDNEY DISEASE, WITH LONG-TERM CURRENT USE OF INSULIN (HCC): ICD-10-CM

## 2024-01-09 DIAGNOSIS — G89.29 CHRONIC BILATERAL LOW BACK PAIN WITH BILATERAL SCIATICA: ICD-10-CM

## 2024-01-09 RX ORDER — PREGABALIN 75 MG/1
75 CAPSULE ORAL 3 TIMES DAILY
Qty: 270 CAPSULE | Refills: 0 | Status: SHIPPED | OUTPATIENT
Start: 2024-01-09

## 2024-01-09 RX ORDER — TOPIRAMATE 25 MG/1
25 TABLET ORAL DAILY
Qty: 90 TABLET | Refills: 1 | Status: SHIPPED | OUTPATIENT
Start: 2024-01-09

## 2024-01-10 RX ORDER — PEN NEEDLE, DIABETIC 32GX 5/32"
NEEDLE, DISPOSABLE MISCELLANEOUS
Qty: 270 EACH | Refills: 0 | Status: SHIPPED | OUTPATIENT
Start: 2024-01-10

## 2024-01-11 ENCOUNTER — TELEPHONE (OUTPATIENT)
Dept: ADMINISTRATIVE | Facility: OTHER | Age: 79
End: 2024-01-11

## 2024-01-11 DIAGNOSIS — N18.30 TYPE 2 DIABETES MELLITUS WITH STAGE 3 CHRONIC KIDNEY DISEASE, WITH LONG-TERM CURRENT USE OF INSULIN (HCC): ICD-10-CM

## 2024-01-11 DIAGNOSIS — Z79.4 TYPE 2 DIABETES MELLITUS WITH STAGE 3 CHRONIC KIDNEY DISEASE, WITH LONG-TERM CURRENT USE OF INSULIN (HCC): ICD-10-CM

## 2024-01-11 DIAGNOSIS — E11.22 TYPE 2 DIABETES MELLITUS WITH STAGE 3 CHRONIC KIDNEY DISEASE, WITH LONG-TERM CURRENT USE OF INSULIN (HCC): ICD-10-CM

## 2024-01-11 NOTE — TELEPHONE ENCOUNTER
Upon review of the In Basket request we were able to locate, review, and update the patient chart as requested for Diabetic Eye Exam.    Any additional questions or concerns should be emailed to the Practice Liaisons via the appropriate education email address, please do not reply via In Basket.    Thank you  Celia Jackson

## 2024-01-11 NOTE — TELEPHONE ENCOUNTER
----- Message from Cammy Andersen sent at 1/11/2024 11:03 AM EST -----  Regarding: care gap request  01/11/24 11:03 AM    Hello, our patient attached above has had Diabetic Eye Exam completed/performed. Please assist in updating the patient chart by pulling the document from the Media Tab. The date of service is 9/6/2023.     Health Maintenance has still not been updated     Thank you,  Cammy Andersen PG  PRIMARY CARE SWIFT

## 2024-01-12 RX ORDER — PERPHENAZINE 16 MG/1
TABLET, FILM COATED ORAL
Qty: 300 EACH | Refills: 1 | Status: SHIPPED | OUTPATIENT
Start: 2024-01-12

## 2024-02-12 ENCOUNTER — APPOINTMENT (OUTPATIENT)
Dept: LAB | Facility: IMAGING CENTER | Age: 79
End: 2024-02-12
Payer: MEDICARE

## 2024-02-12 DIAGNOSIS — D69.6 PLATELETS DECREASED (HCC): ICD-10-CM

## 2024-02-12 DIAGNOSIS — Z13.29 SCREENING FOR THYROID DISORDER: ICD-10-CM

## 2024-02-12 DIAGNOSIS — E55.9 VITAMIN D DEFICIENCY: ICD-10-CM

## 2024-02-12 DIAGNOSIS — N18.32 TYPE 2 DIABETES MELLITUS WITH STAGE 3B CHRONIC KIDNEY DISEASE, WITH LONG-TERM CURRENT USE OF INSULIN (HCC): ICD-10-CM

## 2024-02-12 DIAGNOSIS — E78.2 MIXED HYPERLIPIDEMIA: ICD-10-CM

## 2024-02-12 DIAGNOSIS — E11.22 TYPE 2 DIABETES MELLITUS WITH STAGE 3B CHRONIC KIDNEY DISEASE, WITH LONG-TERM CURRENT USE OF INSULIN (HCC): ICD-10-CM

## 2024-02-12 DIAGNOSIS — I10 ESSENTIAL HYPERTENSION: ICD-10-CM

## 2024-02-12 DIAGNOSIS — Z86.2 HX OF THROMBOCYTOPENIA: ICD-10-CM

## 2024-02-12 DIAGNOSIS — Z79.4 TYPE 2 DIABETES MELLITUS WITH STAGE 3B CHRONIC KIDNEY DISEASE, WITH LONG-TERM CURRENT USE OF INSULIN (HCC): ICD-10-CM

## 2024-02-12 LAB
25(OH)D3 SERPL-MCNC: 46.9 NG/ML (ref 30–100)
BASOPHILS # BLD AUTO: 0.03 THOUSANDS/ÂΜL (ref 0–0.1)
BASOPHILS NFR BLD AUTO: 0 % (ref 0–1)
CHOLEST SERPL-MCNC: 154 MG/DL
CREAT UR-MCNC: 67.6 MG/DL
EOSINOPHIL # BLD AUTO: 0.14 THOUSAND/ÂΜL (ref 0–0.61)
EOSINOPHIL NFR BLD AUTO: 2 % (ref 0–6)
ERYTHROCYTE [DISTWIDTH] IN BLOOD BY AUTOMATED COUNT: 13.6 % (ref 11.6–15.1)
HCT VFR BLD AUTO: 42.3 % (ref 34.8–46.1)
HDLC SERPL-MCNC: 53 MG/DL
HGB BLD-MCNC: 13.2 G/DL (ref 11.5–15.4)
IMM GRANULOCYTES # BLD AUTO: 0.04 THOUSAND/UL (ref 0–0.2)
IMM GRANULOCYTES NFR BLD AUTO: 1 % (ref 0–2)
LDLC SERPL CALC-MCNC: 71 MG/DL (ref 0–100)
LYMPHOCYTES # BLD AUTO: 1.39 THOUSANDS/ÂΜL (ref 0.6–4.47)
LYMPHOCYTES NFR BLD AUTO: 18 % (ref 14–44)
MCH RBC QN AUTO: 28.8 PG (ref 26.8–34.3)
MCHC RBC AUTO-ENTMCNC: 31.2 G/DL (ref 31.4–37.4)
MCV RBC AUTO: 92 FL (ref 82–98)
MICROALBUMIN UR-MCNC: 15.7 MG/L
MICROALBUMIN/CREAT 24H UR: 23 MG/G CREATININE (ref 0–30)
MONOCYTES # BLD AUTO: 0.44 THOUSAND/ÂΜL (ref 0.17–1.22)
MONOCYTES NFR BLD AUTO: 6 % (ref 4–12)
NEUTROPHILS # BLD AUTO: 5.74 THOUSANDS/ÂΜL (ref 1.85–7.62)
NEUTS SEG NFR BLD AUTO: 73 % (ref 43–75)
NRBC BLD AUTO-RTO: 0 /100 WBCS
PLATELET # BLD AUTO: 163 THOUSANDS/UL (ref 149–390)
PMV BLD AUTO: 11.7 FL (ref 8.9–12.7)
RBC # BLD AUTO: 4.58 MILLION/UL (ref 3.81–5.12)
TRIGL SERPL-MCNC: 149 MG/DL
TSH SERPL DL<=0.05 MIU/L-ACNC: 2.42 UIU/ML (ref 0.45–4.5)
WBC # BLD AUTO: 7.78 THOUSAND/UL (ref 4.31–10.16)

## 2024-02-12 PROCEDURE — 80061 LIPID PANEL: CPT

## 2024-02-12 PROCEDURE — 82043 UR ALBUMIN QUANTITATIVE: CPT

## 2024-02-12 PROCEDURE — 83036 HEMOGLOBIN GLYCOSYLATED A1C: CPT

## 2024-02-12 PROCEDURE — 82570 ASSAY OF URINE CREATININE: CPT

## 2024-02-12 PROCEDURE — 84443 ASSAY THYROID STIM HORMONE: CPT

## 2024-02-12 PROCEDURE — 36415 COLL VENOUS BLD VENIPUNCTURE: CPT

## 2024-02-12 PROCEDURE — 82306 VITAMIN D 25 HYDROXY: CPT

## 2024-02-12 PROCEDURE — 85025 COMPLETE CBC W/AUTO DIFF WBC: CPT

## 2024-02-13 LAB
EST. AVERAGE GLUCOSE BLD GHB EST-MCNC: 169 MG/DL
HBA1C MFR BLD: 7.5 %

## 2024-02-16 ENCOUNTER — RA CDI HCC (OUTPATIENT)
Dept: OTHER | Facility: HOSPITAL | Age: 79
End: 2024-02-16

## 2024-02-16 NOTE — PROGRESS NOTES
HCC coding opportunities          Chart Reviewed number of suggestions sent to Provider: 3   E11,51  E11.40  E11.65  Patients Insurance     Medicare Insurance: Medicare

## 2024-02-21 PROBLEM — Z00.00 MEDICARE ANNUAL WELLNESS VISIT, SUBSEQUENT: Status: RESOLVED | Noted: 2020-06-30 | Resolved: 2024-02-21

## 2024-02-21 PROBLEM — N39.0 RECURRENT UTI: Status: RESOLVED | Noted: 2023-02-07 | Resolved: 2024-02-21

## 2024-02-23 ENCOUNTER — OFFICE VISIT (OUTPATIENT)
Dept: FAMILY MEDICINE CLINIC | Facility: CLINIC | Age: 79
End: 2024-02-23
Payer: MEDICARE

## 2024-02-23 VITALS
SYSTOLIC BLOOD PRESSURE: 122 MMHG | TEMPERATURE: 99.1 F | HEART RATE: 75 BPM | OXYGEN SATURATION: 96 % | BODY MASS INDEX: 42.2 KG/M2 | WEIGHT: 247.2 LBS | HEIGHT: 64 IN | DIASTOLIC BLOOD PRESSURE: 76 MMHG

## 2024-02-23 DIAGNOSIS — D69.6 PLATELETS DECREASED (HCC): ICD-10-CM

## 2024-02-23 DIAGNOSIS — E78.2 MIXED HYPERLIPIDEMIA: ICD-10-CM

## 2024-02-23 DIAGNOSIS — E66.01 MORBID OBESITY (HCC): ICD-10-CM

## 2024-02-23 DIAGNOSIS — Z79.4 LONG TERM CURRENT USE OF INSULIN (HCC): ICD-10-CM

## 2024-02-23 DIAGNOSIS — E11.22 TYPE 2 DIABETES MELLITUS WITH STAGE 3B CHRONIC KIDNEY DISEASE, WITH LONG-TERM CURRENT USE OF INSULIN (HCC): Primary | ICD-10-CM

## 2024-02-23 DIAGNOSIS — N18.31 CHRONIC RENAL IMPAIRMENT, STAGE 3A (HCC): ICD-10-CM

## 2024-02-23 DIAGNOSIS — I73.9 PERIPHERAL VASCULAR DISEASE (HCC): ICD-10-CM

## 2024-02-23 DIAGNOSIS — Z79.4 TYPE 2 DIABETES MELLITUS WITH STAGE 3B CHRONIC KIDNEY DISEASE, WITH LONG-TERM CURRENT USE OF INSULIN (HCC): Primary | ICD-10-CM

## 2024-02-23 DIAGNOSIS — N18.32 TYPE 2 DIABETES MELLITUS WITH STAGE 3B CHRONIC KIDNEY DISEASE, WITH LONG-TERM CURRENT USE OF INSULIN (HCC): Primary | ICD-10-CM

## 2024-02-23 DIAGNOSIS — Z13.29 SCREENING FOR THYROID DISORDER: ICD-10-CM

## 2024-02-23 PROBLEM — R82.90 ABNORMAL FINDING IN URINE: Status: RESOLVED | Noted: 2023-01-09 | Resolved: 2024-02-23

## 2024-02-23 PROBLEM — L98.9 SKIN LESION: Status: RESOLVED | Noted: 2021-02-08 | Resolved: 2024-02-23

## 2024-02-23 PROCEDURE — 99214 OFFICE O/P EST MOD 30 MIN: CPT | Performed by: FAMILY MEDICINE

## 2024-02-23 RX ORDER — DAPAGLIFLOZIN 10 MG/1
10 TABLET, FILM COATED ORAL DAILY
Qty: 90 TABLET | Refills: 1 | Status: SHIPPED | OUTPATIENT
Start: 2024-02-23

## 2024-02-23 NOTE — PROGRESS NOTES
Name: Alberta Sanchez      : 1945      MRN: [id number]  Encounter Provider: Dulce Michaud MD  Encounter Date: 2024   Encounter department: [facility]    Assessment & Plan     1. Type 2 diabetes mellitus with stage 3b chronic kidney disease, with long-term current use of insulin (HCC)  Assessment & Plan:    Lab Results   Component Value Date    HGBA1C 7.5 (H) 2024   Chronic asymptomatic and controlled the patient will continue Tresiba 80 plan to start Farxiga  10 mg once a day proper use and possible side effect discussed with patient recommend to drink plenty of water possible side effect that like a yeast infection in case develop to call the officeU low-carb diet and important lose weight    Orders:  -     dapagliflozin (Farxiga) 10 MG tablet; Take 1 tablet (10 mg total) by mouth daily  -     CBC and differential; Future  -     Basic metabolic panel; Future  -     Hemoglobin A1C; Future  -     Albumin / creatinine urine ratio; Future    2. Peripheral vascular disease (HCC)  Assessment & Plan:  Chronic asymptomatic patient already on statin discussed important to lose weight continue control risk factor including blood pressure and diabetes    Orders:  -     CBC and differential; Future  -     Basic metabolic panel; Future    3. Platelets decreased ([facility])  Assessment & Plan:  Improved    Orders:  -     CBC and differential; Future  -     Basic metabolic panel; Future    4. Chronic renal impairment, stage 3a ([facility])  Assessment & Plan:  Lab Results   Component Value Date    EGFR 53 2023    EGFR 45 2023    EGFR 42 2023    CREATININE 1.01 2023    CREATININE 1.16 2023    CREATININE 1.23 2023   Chronic asymptomatic patient is due for [facility] to check her GFR she is not at the goal for the hemoglobin A1c we will start the patient on Farxiga     Orders:  -     dapagliflozin (Farxiga) 10 MG tablet; Take 1 tablet (10 mg total) by mouth  daily  -     CBC and differential; Future  -     Basic metabolic panel; Future    5. Morbid obesity (HCC)  Assessment & Plan:  Chronic BMI 42.4 discussed portion control low-carb low-fat diet and increase physical activity    Orders:  -     CBC and differential; Future  -     Basic metabolic panel; Future    6. Mixed hyperlipidemia  -     CBC and differential; Future  -     Basic metabolic panel; Future  -     Lipid Panel with Direct LDL reflex; Future    7. Screening for thyroid disorder  -     TSH, 3rd generation with Free T4 reflex; Future    8. Long term current use of insulin (HCC)           Subjective      Patient has follow-up with a chronic condition compliant with the medication tolerated well without side effect she is not compliant with the diet her blood sugar increased compared to before recent blood work discussed with the patient      Review of Systems   Constitutional:  Negative for chills and fever.   HENT:  Negative for ear pain and sore throat.    Eyes:  Negative for pain and visual disturbance.   Respiratory:  Negative for cough and shortness of breath.    Cardiovascular:  Negative for chest pain and palpitations.   Gastrointestinal:  Negative for abdominal pain, constipation, diarrhea and vomiting.   Genitourinary:  Negative for dysuria and hematuria.   Musculoskeletal:  Positive for gait problem.   Skin:  Negative for color change and rash.   Neurological:  Negative for seizures and syncope.   Hematological:  Does not bruise/bleed easily.   Psychiatric/Behavioral:  Negative for behavioral problems.    All other systems reviewed and are negative.      Current Outpatient Medications on File Prior to Visit   Medication Sig   • acetaminophen (TYLENOL) 325 mg tablet Take by mouth as needed    • albuterol (ProAir HFA) 90 mcg/act inhaler Inhale 2 puffs every 4 (four) hours as needed for wheezing   • B Complex Vitamins (B COMPLEX 1 PO) Take by mouth   • Cholecalciferol (VITAMIN D3) 2000 units capsule  half   • clotrimazole (LOTRIMIN) 1 % cream Apply topically 2 (two) times a day   • Contour Next Test test strip Test blood sugar 3 times daily   • Diapers & Supplies MISC by Does not apply route 3 (three) times a day as needed (Incontinence)   • estrogens, conjugated (Premarin) vaginal cream INSERT 1 APPLICATORFUL (1g) VAGINALLY TWO TIMES A WEEK   • fenofibrate 160 MG tablet TAKE 1 TABLET BY MOUTH  DAILY   • Ferrous Sulfate (IRON) 325 (65 Fe) MG TABS Take by mouth   • Insulin Pen Needle (BD Pen Needle [place] U/F) 32G X 4 MM MISC INJECT SUBCUTANEOUSLY AS  DIRECTED 3 TIMES DAILY   • insuln lispro (HumaLOG KwikPen) 200 units/mL CONCENTRATED U-200 injection pen Inject subcutaneously as directed per presciber Instructions per sliding scale-PATIENT IS TO TAKE 5 UNITS BID   • lisinopril (ZESTRIL) 10 mg tablet TAKE 1 TABLET BY MOUTH DAILY   • Microlet Lancets MISC Use 3 (three) times a day   • Multiple Vitamin (DAILY VALUE MULTIVITAMIN) TABS Take by mouth   • oxybutynin (DITROPAN-XL) 5 mg 24 hr tablet Take 1 tablet (5 mg total) by mouth daily   • pantoprazole (PROTONIX) 40 mg tablet TAKE 1 TABLET BY MOUTH DAILY   • pregabalin (LYRICA) 75 mg capsule Take 1 capsule (75 mg total) by mouth 3 (three) times a day   • Probiotic Product (Align) 4 MG CAPS Take by mouth   • simvastatin (ZOCOR) 10 mg tablet TAKE 1 TABLET BY MOUTH  DAILY AT BEDTIME   • topiramate (TOPAMAX) 25 mg tablet Take 1 tablet (25 mg total) by mouth daily   • Tresiba FlexTouch 100 units/mL injection pen INJECT 80 UNITS  SUBCUTANEOUSLY DAILY AT  BEDTIME   • bismuth subsalicylate (PEPTO BISMOL) 262 MG chewable tablet Chew 2 tablets (524 mg total) 2 (two) times a day as needed for diarrhea Has tolerated in the past. (Patient not taking: Reported on 9/29/2023)   • dicyclomine (BENTYL) 20 mg tablet Take 1 tablet (20 mg total) by mouth 2 (two) times a day (Patient not taking: Reported on 3/27/2023)   • Multiple Vitamins-Minerals (MULTI ADULT GUMMIES PO) Take by mouth  "(Patient not taking: Reported on 7/21/2023)       Objective     /76 (BP Location: Left arm, Patient Position: Sitting)   Pulse 75   Temp 99.1 °F (37.3 °C) (Tympanic)   Ht 5' 4\" (1.626 m)   Wt 112 kg (247 lb 3.2 oz)   SpO2 96%   BMI 42.43 kg/m²     Physical Exam  Vitals and nursing note reviewed.   Constitutional:       General: She is not in acute distress.     Appearance: She is not diaphoretic.   HENT:      Head: Normocephalic and atraumatic.      Right Ear: Tympanic membrane normal. There is no impacted cerumen.      Left Ear: Tympanic membrane normal. There is no impacted cerumen.      Nose: Nose normal. No congestion or rhinorrhea.      Mouth/Throat:      Pharynx: No posterior oropharyngeal erythema.   Eyes:      General: No scleral icterus.        Right eye: No discharge.         Left eye: No discharge.   Cardiovascular:      Rate and Rhythm: Normal rate and regular rhythm.      Heart sounds: No murmur heard.  Pulmonary:      Effort: Pulmonary effort is normal. No respiratory distress.   Abdominal:      General: There is no distension.      Tenderness: There is no abdominal tenderness.   Musculoskeletal:         General: Normal range of motion.      Cervical back: Normal range of motion.   Skin:     Findings: No rash.   Neurological:      Mental Status: She is alert and oriented to person, place, and time.       Dluce Michaud MD    "

## 2024-02-23 NOTE — ASSESSMENT & PLAN NOTE
Lab Results   Component Value Date    HGBA1C 7.5 (H) 02/12/2024   Chronic asymptomatic and controlled the patient will continue Tresiba 80 plan to start Farxiga  10 mg once a day proper use and possible side effect discussed with patient recommend to drink plenty of water possible side effect that like a yeast infection in case develop to call the officeU low-carb diet and important lose weight  
Chronic BMI 42.4 discussed portion control low-carb low-fat diet and increase physical activity  
Chronic asymptomatic patient already on statin discussed important to lose weight continue control risk factor including blood pressure and diabetes  
Improved  
Lab Results   Component Value Date    EGFR 53 11/06/2023    EGFR 45 07/03/2023    EGFR 42 02/27/2023    CREATININE 1.01 11/06/2023    CREATININE 1.16 07/03/2023    CREATININE 1.23 02/27/2023   Chronic asymptomatic patient is due for BMP to check her GFR she is not at the goal for the hemoglobin A1c we will start the patient on Farxiga   
X Size Of Lesion In Cm (Optional): 0
Detail Level: Simple
Introduction Text (Please End With A Colon): The following procedure was deferred: LN2 vs shave biopsy.  Lesion examined by video conference and unable due to image if could be treated with LN2 or will require shave biopsy

## 2024-03-04 ENCOUNTER — TELEPHONE (OUTPATIENT)
Age: 79
End: 2024-03-04

## 2024-03-04 DIAGNOSIS — L98.9 SKIN LESION: ICD-10-CM

## 2024-03-04 DIAGNOSIS — N93.9 VAGINAL BLEEDING: ICD-10-CM

## 2024-03-04 DIAGNOSIS — N39.46 MIXED STRESS AND URGE URINARY INCONTINENCE: ICD-10-CM

## 2024-03-04 NOTE — TELEPHONE ENCOUNTER
Incoming called to report that she did develop a yeast infection, since starting the Farxiga .Patient is reporting that she also has an overabundance of urine. Patient is soaking through incontinence pads. Patient tried the over the counter yeast treatment but it did not resolve the issue. Please prescribe something for the patient for vaginal yeast infection. Please also let the patient know if she is to continue with the Farxiga or stop the medication.

## 2024-03-05 ENCOUNTER — TELEPHONE (OUTPATIENT)
Age: 79
End: 2024-03-05

## 2024-03-05 DIAGNOSIS — B37.31 CANDIDA VAGINITIS: Primary | ICD-10-CM

## 2024-03-05 DIAGNOSIS — L98.9 SKIN LESION: ICD-10-CM

## 2024-03-05 RX ORDER — FLUCONAZOLE 150 MG/1
150 TABLET ORAL ONCE
Qty: 1 TABLET | Refills: 0 | Status: SHIPPED | OUTPATIENT
Start: 2024-03-05 | End: 2024-03-05

## 2024-03-05 RX ORDER — CLOTRIMAZOLE 1 %
CREAM (GRAM) TOPICAL 2 TIMES DAILY
Qty: 113 G | Refills: 0 | Status: SHIPPED | OUTPATIENT
Start: 2024-03-05 | End: 2024-03-05 | Stop reason: SDUPTHER

## 2024-03-05 RX ORDER — CLOTRIMAZOLE 1 %
CREAM (GRAM) TOPICAL 2 TIMES DAILY
Qty: 113 G | Refills: 0 | Status: SHIPPED | OUTPATIENT
Start: 2024-03-05

## 2024-03-05 RX ORDER — CONJUGATED ESTROGENS 0.62 MG/G
CREAM VAGINAL
Qty: 30 G | Refills: 0 | Status: SHIPPED | OUTPATIENT
Start: 2024-03-05

## 2024-03-05 RX ORDER — OXYBUTYNIN CHLORIDE 5 MG/1
5 TABLET, EXTENDED RELEASE ORAL DAILY
Qty: 90 TABLET | Refills: 1 | Status: SHIPPED | OUTPATIENT
Start: 2024-03-05

## 2024-03-05 NOTE — TELEPHONE ENCOUNTER
Patient called in stating that the Lotrimin cream for the patients yeast infection was to be called into [id number] - [place] - [address] [place] [address] . Otherwise it will take 3 days to get to her . Please advise .

## 2024-03-06 DIAGNOSIS — E78.2 MIXED HYPERLIPIDEMIA: ICD-10-CM

## 2024-03-06 RX ORDER — SIMVASTATIN 10 MG
10 TABLET ORAL
Qty: 90 TABLET | Refills: 1 | Status: SHIPPED | OUTPATIENT
Start: 2024-03-06

## 2024-04-15 DIAGNOSIS — N93.9 VAGINAL BLEEDING: ICD-10-CM

## 2024-04-15 RX ORDER — CONJUGATED ESTROGENS 0.62 MG/G
CREAM VAGINAL
Qty: 30 G | Refills: 5 | Status: SHIPPED | OUTPATIENT
Start: 2024-04-15

## 2024-04-26 ENCOUNTER — HOSPITAL ENCOUNTER (OUTPATIENT)
Dept: MAMMOGRAPHY | Facility: HOSPITAL | Age: 79
End: 2024-04-26
Payer: MEDICARE

## 2024-04-26 VITALS — HEIGHT: 64 IN | BODY MASS INDEX: 42.2 KG/M2 | WEIGHT: 247.2 LBS

## 2024-04-26 DIAGNOSIS — Z12.31 ENCOUNTER FOR SCREENING MAMMOGRAM FOR MALIGNANT NEOPLASM OF BREAST: ICD-10-CM

## 2024-04-26 PROCEDURE — 77067 SCR MAMMO BI INCL CAD: CPT

## 2024-04-26 PROCEDURE — 77063 BREAST TOMOSYNTHESIS BI: CPT

## 2024-04-30 ENCOUNTER — TELEPHONE (OUTPATIENT)
Dept: FAMILY MEDICINE CLINIC | Facility: CLINIC | Age: 79
End: 2024-04-30

## 2024-04-30 DIAGNOSIS — Z12.31 ENCOUNTER FOR SCREENING MAMMOGRAM FOR MALIGNANT NEOPLASM OF BREAST: Primary | ICD-10-CM

## 2024-04-30 NOTE — TELEPHONE ENCOUNTER
----- Message from Dulce Michaud MD sent at 4/29/2024  4:23 PM EDT -----  Normal mammogram repeat in one year

## 2024-05-13 ENCOUNTER — APPOINTMENT (OUTPATIENT)
Dept: LAB | Facility: IMAGING CENTER | Age: 79
End: 2024-05-13
Payer: MEDICARE

## 2024-05-13 DIAGNOSIS — N18.32 TYPE 2 DIABETES MELLITUS WITH STAGE 3B CHRONIC KIDNEY DISEASE, WITH LONG-TERM CURRENT USE OF INSULIN (HCC): ICD-10-CM

## 2024-05-13 DIAGNOSIS — Z13.29 SCREENING FOR THYROID DISORDER: ICD-10-CM

## 2024-05-13 DIAGNOSIS — N18.31 CHRONIC RENAL IMPAIRMENT, STAGE 3A (HCC): ICD-10-CM

## 2024-05-13 DIAGNOSIS — I73.9 PERIPHERAL VASCULAR DISEASE (HCC): ICD-10-CM

## 2024-05-13 DIAGNOSIS — Z79.4 TYPE 2 DIABETES MELLITUS WITH STAGE 3B CHRONIC KIDNEY DISEASE, WITH LONG-TERM CURRENT USE OF INSULIN (HCC): ICD-10-CM

## 2024-05-13 DIAGNOSIS — E78.2 MIXED HYPERLIPIDEMIA: ICD-10-CM

## 2024-05-13 DIAGNOSIS — E11.22 TYPE 2 DIABETES MELLITUS WITH STAGE 3B CHRONIC KIDNEY DISEASE, WITH LONG-TERM CURRENT USE OF INSULIN (HCC): ICD-10-CM

## 2024-05-13 DIAGNOSIS — E66.01 MORBID OBESITY (HCC): ICD-10-CM

## 2024-05-13 DIAGNOSIS — D69.6 PLATELETS DECREASED (HCC): ICD-10-CM

## 2024-05-13 LAB
ANION GAP SERPL CALCULATED.3IONS-SCNC: 10 MMOL/L (ref 4–13)
BASOPHILS # BLD AUTO: 0.02 THOUSANDS/ÂΜL (ref 0–0.1)
BASOPHILS NFR BLD AUTO: 0 % (ref 0–1)
BUN SERPL-MCNC: 18 MG/DL (ref 5–25)
CALCIUM SERPL-MCNC: 9 MG/DL (ref 8.4–10.2)
CHLORIDE SERPL-SCNC: 105 MMOL/L (ref 96–108)
CHOLEST SERPL-MCNC: 159 MG/DL
CO2 SERPL-SCNC: 27 MMOL/L (ref 21–32)
CREAT SERPL-MCNC: 0.92 MG/DL (ref 0.6–1.3)
CREAT UR-MCNC: 110 MG/DL
EOSINOPHIL # BLD AUTO: 0.11 THOUSAND/ÂΜL (ref 0–0.61)
EOSINOPHIL NFR BLD AUTO: 2 % (ref 0–6)
ERYTHROCYTE [DISTWIDTH] IN BLOOD BY AUTOMATED COUNT: 13.4 % (ref 11.6–15.1)
EST. AVERAGE GLUCOSE BLD GHB EST-MCNC: 169 MG/DL
GFR SERPL CREATININE-BSD FRML MDRD: 59 ML/MIN/1.73SQ M
GLUCOSE P FAST SERPL-MCNC: 122 MG/DL (ref 65–99)
HBA1C MFR BLD: 7.5 %
HCT VFR BLD AUTO: 43 % (ref 34.8–46.1)
HDLC SERPL-MCNC: 43 MG/DL
HGB BLD-MCNC: 13.5 G/DL (ref 11.5–15.4)
IMM GRANULOCYTES # BLD AUTO: 0.06 THOUSAND/UL (ref 0–0.2)
IMM GRANULOCYTES NFR BLD AUTO: 1 % (ref 0–2)
LDLC SERPL CALC-MCNC: 77 MG/DL (ref 0–100)
LYMPHOCYTES # BLD AUTO: 1.75 THOUSANDS/ÂΜL (ref 0.6–4.47)
LYMPHOCYTES NFR BLD AUTO: 24 % (ref 14–44)
MCH RBC QN AUTO: 29.8 PG (ref 26.8–34.3)
MCHC RBC AUTO-ENTMCNC: 31.4 G/DL (ref 31.4–37.4)
MCV RBC AUTO: 95 FL (ref 82–98)
MICROALBUMIN UR-MCNC: 13.3 MG/L
MICROALBUMIN/CREAT 24H UR: 12 MG/G CREATININE (ref 0–30)
MONOCYTES # BLD AUTO: 0.44 THOUSAND/ÂΜL (ref 0.17–1.22)
MONOCYTES NFR BLD AUTO: 6 % (ref 4–12)
NEUTROPHILS # BLD AUTO: 5.05 THOUSANDS/ÂΜL (ref 1.85–7.62)
NEUTS SEG NFR BLD AUTO: 67 % (ref 43–75)
NRBC BLD AUTO-RTO: 0 /100 WBCS
PLATELET # BLD AUTO: 159 THOUSANDS/UL (ref 149–390)
PMV BLD AUTO: 11.9 FL (ref 8.9–12.7)
POTASSIUM SERPL-SCNC: 3.9 MMOL/L (ref 3.5–5.3)
RBC # BLD AUTO: 4.53 MILLION/UL (ref 3.81–5.12)
SODIUM SERPL-SCNC: 142 MMOL/L (ref 135–147)
TRIGL SERPL-MCNC: 196 MG/DL
TSH SERPL DL<=0.05 MIU/L-ACNC: 2.58 UIU/ML (ref 0.45–4.5)
WBC # BLD AUTO: 7.43 THOUSAND/UL (ref 4.31–10.16)

## 2024-05-13 PROCEDURE — 80061 LIPID PANEL: CPT

## 2024-05-13 PROCEDURE — 83036 HEMOGLOBIN GLYCOSYLATED A1C: CPT

## 2024-05-13 PROCEDURE — 80048 BASIC METABOLIC PNL TOTAL CA: CPT

## 2024-05-13 PROCEDURE — 82570 ASSAY OF URINE CREATININE: CPT

## 2024-05-13 PROCEDURE — 36415 COLL VENOUS BLD VENIPUNCTURE: CPT

## 2024-05-13 PROCEDURE — 84443 ASSAY THYROID STIM HORMONE: CPT

## 2024-05-13 PROCEDURE — 82043 UR ALBUMIN QUANTITATIVE: CPT

## 2024-05-13 PROCEDURE — 85025 COMPLETE CBC W/AUTO DIFF WBC: CPT

## 2024-05-17 ENCOUNTER — RA CDI HCC (OUTPATIENT)
Dept: OTHER | Facility: HOSPITAL | Age: 79
End: 2024-05-17

## 2024-05-24 ENCOUNTER — OFFICE VISIT (OUTPATIENT)
Dept: FAMILY MEDICINE CLINIC | Facility: CLINIC | Age: 79
End: 2024-05-24
Payer: MEDICARE

## 2024-05-24 VITALS
SYSTOLIC BLOOD PRESSURE: 110 MMHG | TEMPERATURE: 98.7 F | HEIGHT: 64 IN | HEART RATE: 70 BPM | DIASTOLIC BLOOD PRESSURE: 70 MMHG | OXYGEN SATURATION: 96 % | BODY MASS INDEX: 42.2 KG/M2 | WEIGHT: 247.2 LBS

## 2024-05-24 DIAGNOSIS — Z79.4 TYPE 2 DIABETES MELLITUS WITH STAGE 3 CHRONIC KIDNEY DISEASE, WITH LONG-TERM CURRENT USE OF INSULIN (HCC): Primary | ICD-10-CM

## 2024-05-24 DIAGNOSIS — N18.30 TYPE 2 DIABETES MELLITUS WITH STAGE 3 CHRONIC KIDNEY DISEASE, WITH LONG-TERM CURRENT USE OF INSULIN (HCC): Primary | ICD-10-CM

## 2024-05-24 DIAGNOSIS — E11.22 TYPE 2 DIABETES MELLITUS WITH STAGE 3 CHRONIC KIDNEY DISEASE, WITH LONG-TERM CURRENT USE OF INSULIN (HCC): Primary | ICD-10-CM

## 2024-05-24 DIAGNOSIS — E78.1 HYPERTRIGLYCERIDEMIA, ESSENTIAL: ICD-10-CM

## 2024-05-24 DIAGNOSIS — L29.9 ITCHY SKIN: ICD-10-CM

## 2024-05-24 DIAGNOSIS — E78.2 MIXED HYPERLIPIDEMIA: ICD-10-CM

## 2024-05-24 PROBLEM — E11.40 DIABETES MELLITUS WITH NEUROPATHY (HCC): Status: ACTIVE | Noted: 2024-03-12

## 2024-05-24 PROCEDURE — G2211 COMPLEX E/M VISIT ADD ON: HCPCS | Performed by: FAMILY MEDICINE

## 2024-05-24 PROCEDURE — 99214 OFFICE O/P EST MOD 30 MIN: CPT | Performed by: FAMILY MEDICINE

## 2024-05-24 RX ORDER — INSULIN DEGLUDEC 100 U/ML
70 INJECTION, SOLUTION SUBCUTANEOUS
Qty: 75 ML | Refills: 3 | Status: SHIPPED | OUTPATIENT
Start: 2024-05-24

## 2024-05-24 NOTE — PROGRESS NOTES
Ambulatory Visit  Name: Albreta Sanchez      : 1945      MRN: [id number]  Encounter Provider: Dulce Michaud MD  Encounter Date: 2024   Encounter department: [facility]    Assessment & Plan   1. Type 2 diabetes mellitus with stage 3 chronic kidney disease, with long-term current use of insulin (HCC)  Assessment & Plan:    Lab Results   Component Value Date    HGBA1C 7.5 (H) 2024   Chronic asymptomatic hemoglobin A1c stable compared with before patient evaluated by endocrinology previously who recommended Tresiba 80 units patient had garcia low blood sugar number between 60-70 recommended to decrease the Tresiba to 70 units and instead start using the Hum low-carb diet important lose weight discussed with patient alog (sliding scale patient could not tolerate Jardiance secondary to the yeast infection  Orders:  -     insulin degludec (Tresiba FlexTouch) 100 units/mL injection pen; Inject 70 Units under the skin daily at bedtime  2. Mixed hyperlipidemia  Assessment & Plan:  Chronic asymptomatic LDL therapeutic and diabetes patient to continue simvastatin 10 mg once a day  3. Hypertriglyceridemia, essential  Assessment & Plan:  Chronic asymptomatic continue fenofibrate 160 mg once a day low-fat diet discussed with patient  4. Itchy skin  Assessment & Plan:  Symptomatic itchy dry skin recommended to avoid hot water or moisturizer after the shower recommended to be evaluated by dermatology       History of Present Illness     Patient is here f/up with chronic condition ,complaint with med tolerated well no new concern recent blood work reviewed         Review of Systems   Constitutional:  Negative for chills and fever.   HENT:  Negative for ear pain and sore throat.    Eyes:  Negative for pain and visual disturbance.   Respiratory:  Negative for cough and shortness of breath.    Cardiovascular:  Negative for chest pain and palpitations.   Gastrointestinal:  Negative  "for abdominal pain, constipation, diarrhea and vomiting.   Genitourinary:  Negative for dysuria and hematuria.   Musculoskeletal:  Positive for gait problem.   Skin:  Negative for color change and rash.   Neurological:  Negative for seizures and syncope.   All other systems reviewed and are negative.      Objective     /70 (BP Location: Left arm, Patient Position: Sitting)   Pulse 70   Temp 98.7 °F (37.1 °C) (Tympanic)   Ht 5' 4\" (1.626 m)   Wt 112 kg (247 lb 3.2 oz)   SpO2 96%   BMI 42.43 kg/m²     Physical Exam  Vitals and nursing note reviewed.   Constitutional:       General: She is not in acute distress.     Appearance: She is well-developed. She is not diaphoretic.   HENT:      Head: Normocephalic.      Right Ear: Tympanic membrane and external ear normal.      Left Ear: Tympanic membrane and external ear normal.      Nose: No rhinorrhea.      Mouth/Throat:      Pharynx: No posterior oropharyngeal erythema.   Eyes:      General:         Right eye: No discharge.         Left eye: No discharge.      Conjunctiva/sclera: Conjunctivae normal.   Neck:      Vascular: No JVD.   Cardiovascular:      Rate and Rhythm: Normal rate and regular rhythm.      Heart sounds: Normal heart sounds. No murmur heard.     No gallop.   Pulmonary:      Effort: Pulmonary effort is normal. No respiratory distress.      Breath sounds: Normal breath sounds. No stridor. No wheezing or rales.   Chest:      Chest wall: No tenderness.   Abdominal:      General: There is no distension.      Palpations: Abdomen is soft. There is no mass.      Tenderness: There is no abdominal tenderness. There is no rebound.   Musculoskeletal:         General: No tenderness.      Cervical back: Normal range of motion and neck supple.   Lymphadenopathy:      Cervical: No cervical adenopathy.   Skin:     General: Skin is warm.      Findings: No erythema or rash.   Neurological:      Mental Status: She is alert and oriented to person, place, and time. "       Administrative Statements

## 2024-05-27 NOTE — ASSESSMENT & PLAN NOTE
Symptomatic itchy dry skin recommended to avoid hot water or moisturizer after the shower recommended to be evaluated by dermatology

## 2024-05-27 NOTE — ASSESSMENT & PLAN NOTE
Chronic asymptomatic LDL therapeutic and diabetes patient to continue simvastatin 10 mg once a day

## 2024-05-27 NOTE — ASSESSMENT & PLAN NOTE
Lab Results   Component Value Date    HGBA1C 7.5 (H) 05/13/2024   Chronic asymptomatic hemoglobin A1c stable compared with before patient evaluated by endocrinology previously who recommended Tresiba 80 units patient had garcia low blood sugar number between 60-70 recommended to decrease the Tresiba to 70 units and instead start using the Hum low-carb diet important lose weight discussed with patient alog (sliding scale patient could not tolerate Jardiance secondary to the yeast infection

## 2024-05-29 ENCOUNTER — TELEPHONE (OUTPATIENT)
Dept: ADMINISTRATIVE | Facility: OTHER | Age: 79
End: 2024-05-29

## 2024-05-29 NOTE — TELEPHONE ENCOUNTER
----- Message from Cammy MERRITT sent at 5/28/2024 12:12 PM EDT -----  Regarding: care gap request  05/28/24 12:12 PM    Hello, our patient attached above has had Diabetic Foot Exam completed/performed. Please assist in updating the patient chart by pulling the document from the Media Tab. The date of service is 3/12/2024.     Thank you,  Cammy Andersen PG  PRIMARY CARE SWIFT

## 2024-05-29 NOTE — TELEPHONE ENCOUNTER
Upon review of the In Basket request we were able to locate, review, and update the patient chart as requested for Diabetic Foot Exam.    Any additional questions or concerns should be emailed to the Practice Liaisons via the appropriate education email address, please do not reply via In Basket.    Thank you  Celia Jackson   [facility]

## 2024-06-06 ENCOUNTER — HOSPITAL ENCOUNTER (OUTPATIENT)
Dept: SLEEP CENTER | Facility: CLINIC | Age: 79
Discharge: HOME | End: 2024-06-06
Payer: MEDICARE

## 2024-06-06 DIAGNOSIS — E11.22 TYPE 2 DIABETES MELLITUS WITH STAGE 3B CHRONIC KIDNEY DISEASE, WITH LONG-TERM CURRENT USE OF INSULIN (HCC): ICD-10-CM

## 2024-06-06 DIAGNOSIS — Z79.4 TYPE 2 DIABETES MELLITUS WITH STAGE 3B CHRONIC KIDNEY DISEASE, WITH LONG-TERM CURRENT USE OF INSULIN (HCC): ICD-10-CM

## 2024-06-06 DIAGNOSIS — M54.42 CHRONIC BILATERAL LOW BACK PAIN WITH BILATERAL SCIATICA: ICD-10-CM

## 2024-06-06 DIAGNOSIS — G47.33 OBSTRUCTIVE SLEEP APNEA TREATED WITH CONTINUOUS POSITIVE AIRWAY PRESSURE (CPAP): ICD-10-CM

## 2024-06-06 DIAGNOSIS — M54.41 CHRONIC BILATERAL LOW BACK PAIN WITH BILATERAL SCIATICA: ICD-10-CM

## 2024-06-06 DIAGNOSIS — G43.009 MIGRAINE WITHOUT AURA AND WITHOUT STATUS MIGRAINOSUS, NOT INTRACTABLE: ICD-10-CM

## 2024-06-06 DIAGNOSIS — N18.32 TYPE 2 DIABETES MELLITUS WITH STAGE 3B CHRONIC KIDNEY DISEASE, WITH LONG-TERM CURRENT USE OF INSULIN (HCC): ICD-10-CM

## 2024-06-06 DIAGNOSIS — G89.29 CHRONIC BILATERAL LOW BACK PAIN WITH BILATERAL SCIATICA: ICD-10-CM

## 2024-06-06 PROCEDURE — 95810 POLYSOM 6/> YRS 4/> PARAM: CPT | Performed by: PSYCHIATRY & NEUROLOGY

## 2024-06-06 PROCEDURE — 95810 POLYSOM 6/> YRS 4/> PARAM: CPT

## 2024-06-07 RX ORDER — INSULIN LISPRO 200 [IU]/ML
INJECTION, SOLUTION SUBCUTANEOUS
Qty: 12 ML | Refills: 0 | Status: SHIPPED | OUTPATIENT
Start: 2024-06-07

## 2024-06-07 RX ORDER — TOPIRAMATE 25 MG/1
25 TABLET ORAL DAILY
Qty: 90 TABLET | Refills: 1 | Status: SHIPPED | OUTPATIENT
Start: 2024-06-07

## 2024-06-07 RX ORDER — PREGABALIN 75 MG/1
75 CAPSULE ORAL 3 TIMES DAILY
Qty: 270 CAPSULE | Refills: 0 | Status: SHIPPED | OUTPATIENT
Start: 2024-06-07

## 2024-06-07 NOTE — PROGRESS NOTES
Sleep Study Documentation    Pre-Sleep Study       Sleep testing procedure explained to patient:YES    Patient napped prior to study:NO    Caffeine:Dayshift worker after 12PM.  Caffeine use:YES- soda  12 ounces    Alcohol:Dayshift workers after 5PM: Alcohol use:NO    Typical day for patient:YES       Study Documentation    Sleep Study Indications: EDS, BMI >30    Sleep Study: Diagnostic   Snore:None  Supplemental O2: no      Minimum SaO2 86%  Baseline SaO2 91%        EKG abnormalities: no     EEG abnormalities: no    Were abnormal behaviors in sleep observed:NO    Is Total Sleep Study Recording Time < 2 hours: N/A    Is Total Sleep Study Recording Time > 2 hours but study is incomplete: N/A    Is Total Sleep Study Recording Time 6 hours or more but sleep was not obtained: NO    Patient classification: retired       Post-Sleep Study    Medication used at bedtime or during sleep study:NO    Patient reports time it took to fall asleep:30 to 60 minutes    Patient reports waking up during study:3 or more times.  Patient reports returning to sleep in 10 to 30 minutes.    Patient reports sleeping 4 to 6 hours with dreaming.    Does the Patient feel this is a typical night of sleep:typical    Patient rated sleepiness: Very sleepy or tired    PAP treatment:no.

## 2024-06-15 DIAGNOSIS — L98.9 SKIN LESION: ICD-10-CM

## 2024-06-15 DIAGNOSIS — K30 INDIGESTION: ICD-10-CM

## 2024-06-15 DIAGNOSIS — N39.46 MIXED STRESS AND URGE URINARY INCONTINENCE: ICD-10-CM

## 2024-06-15 RX ORDER — PANTOPRAZOLE SODIUM 40 MG/1
40 TABLET, DELAYED RELEASE ORAL DAILY
Qty: 90 TABLET | Refills: 0 | Status: SHIPPED | OUTPATIENT
Start: 2024-06-15

## 2024-06-16 RX ORDER — OXYBUTYNIN CHLORIDE 5 MG/1
5 TABLET, EXTENDED RELEASE ORAL DAILY
Qty: 90 TABLET | Refills: 1 | Status: SHIPPED | OUTPATIENT
Start: 2024-06-16

## 2024-06-17 RX ORDER — CLOTRIMAZOLE 1 %
CREAM (GRAM) TOPICAL 2 TIMES DAILY
Qty: 113 G | Refills: 0 | Status: SHIPPED | OUTPATIENT
Start: 2024-06-17

## 2024-06-18 DIAGNOSIS — G47.33 OBSTRUCTIVE SLEEP APNEA TREATED WITH CONTINUOUS POSITIVE AIRWAY PRESSURE (CPAP): Primary | ICD-10-CM

## 2024-06-23 DIAGNOSIS — E78.2 MIXED HYPERLIPIDEMIA: ICD-10-CM

## 2024-06-24 RX ORDER — SIMVASTATIN 10 MG
10 TABLET ORAL
Qty: 90 TABLET | Refills: 1 | Status: SHIPPED | OUTPATIENT
Start: 2024-06-24

## 2024-06-25 ENCOUNTER — TELEPHONE (OUTPATIENT)
Dept: SLEEP CENTER | Facility: CLINIC | Age: 79
End: 2024-06-25

## 2024-06-25 NOTE — TELEPHONE ENCOUNTER
Sleep study results provided to patient by Catherine MCCRARY who ordered home sleep study for patient.  Catherine advised patient to avoid use of her CPAP equipment for one week prior to the study.       Called patient to schedule home sleep study.  Left message providing phone number for central scheduling to call back to schedule.

## 2024-07-17 ENCOUNTER — OFFICE VISIT (OUTPATIENT)
Dept: FAMILY MEDICINE CLINIC | Facility: CLINIC | Age: 79
End: 2024-07-17
Payer: MEDICARE

## 2024-07-17 VITALS
HEART RATE: 73 BPM | TEMPERATURE: 99.3 F | SYSTOLIC BLOOD PRESSURE: 124 MMHG | DIASTOLIC BLOOD PRESSURE: 76 MMHG | OXYGEN SATURATION: 97 % | HEIGHT: 64 IN | WEIGHT: 246.6 LBS | BODY MASS INDEX: 42.1 KG/M2

## 2024-07-17 DIAGNOSIS — Z78.0 POST-MENOPAUSAL: ICD-10-CM

## 2024-07-17 DIAGNOSIS — Z00.00 MEDICARE ANNUAL WELLNESS VISIT, SUBSEQUENT: Primary | ICD-10-CM

## 2024-07-17 DIAGNOSIS — N39.46 MIXED STRESS AND URGE URINARY INCONTINENCE: ICD-10-CM

## 2024-07-17 DIAGNOSIS — I51.7 LVH (LEFT VENTRICULAR HYPERTROPHY): ICD-10-CM

## 2024-07-17 DIAGNOSIS — Z13.228 SCREENING FOR METABOLIC DISORDER: ICD-10-CM

## 2024-07-17 DIAGNOSIS — R53.83 OTHER FATIGUE: ICD-10-CM

## 2024-07-17 DIAGNOSIS — I73.9 PERIPHERAL VASCULAR DISEASE (HCC): ICD-10-CM

## 2024-07-17 DIAGNOSIS — R60.0 BILATERAL LOWER EXTREMITY EDEMA: ICD-10-CM

## 2024-07-17 PROCEDURE — G0439 PPPS, SUBSEQ VISIT: HCPCS | Performed by: FAMILY MEDICINE

## 2024-07-17 PROCEDURE — 99214 OFFICE O/P EST MOD 30 MIN: CPT | Performed by: FAMILY MEDICINE

## 2024-07-17 NOTE — PATIENT INSTRUCTIONS
Medicare Preventive Visit Patient Instructions  Thank you for completing your Welcome to Medicare Visit or Medicare Annual Wellness Visit today. Your next wellness visit will be due in one year (7/18/2025).  The screening/preventive services that you may require over the next 5-10 years are detailed below. Some tests may not apply to you based off risk factors and/or age. Screening tests ordered at today's visit but not completed yet may show as past due. Also, please note that scanned in results may not display below.  Preventive Screenings:  Service Recommendations Previous Testing/Comments   Colorectal Cancer Screening  * Colonoscopy    * Fecal Occult Blood Test (FOBT)/Fecal Immunochemical Test (FIT)  * Fecal DNA/Cologuard Test  * Flexible Sigmoidoscopy Age: 45-75 years old   Colonoscopy: every 10 years (may be performed more frequently if at higher risk)  OR  FOBT/FIT: every 1 year  OR  Cologuard: every 3 years  OR  Sigmoidoscopy: every 5 years  Screening may be recommended earlier than age 45 if at higher risk for colorectal cancer. Also, an individualized decision between you and your healthcare provider will decide whether screening between the ages of 76-85 would be appropriate. Colonoscopy: 04/26/2021  FOBT/FIT: Not on file  Cologuard: Not on file  Sigmoidoscopy: Not on file          Breast Cancer Screening Age: 40+ years old  Frequency: every 1-2 years  Not required if history of left and right mastectomy Mammogram: 04/26/2024    Screening Current   Cervical Cancer Screening Between the ages of 21-29, pap smear recommended once every 3 years.   Between the ages of 30-65, can perform pap smear with HPV co-testing every 5 years.   Recommendations may differ for women with a history of total hysterectomy, cervical cancer, or abnormal pap smears in past. Pap Smear: Not on file    Screening Not Indicated   Hepatitis C Screening Once for adults born between 1945 and 1965  More frequently in patients at high risk  for Hepatitis C Hep C Antibody: 07/05/2018    Screening Current   Diabetes Screening 1-2 times per year if you're at risk for diabetes or have pre-diabetes Fasting glucose: 122 mg/dL (5/13/2024)  A1C: 7.5 % (5/13/2024)  Screening Not Indicated  History Diabetes   Cholesterol Screening Once every 5 years if you don't have a lipid disorder. May order more often based on risk factors. Lipid panel: 05/13/2024    Screening Not Indicated  History Lipid Disorder     Other Preventive Screenings Covered by Medicare:  Abdominal Aortic Aneurysm (AAA) Screening: covered once if your at risk. You're considered to be at risk if you have a family history of AAA.  Lung Cancer Screening: covers low dose CT scan once per year if you meet all of the following conditions: (1) Age 55-77; (2) No signs or symptoms of lung cancer; (3) Current smoker or have quit smoking within the last 15 years; (4) You have a tobacco smoking history of at least 20 pack years (packs per day multiplied by number of years you smoked); (5) You get a written order from a healthcare provider.  Glaucoma Screening: covered annually if you're considered high risk: (1) You have diabetes OR (2) Family history of glaucoma OR (3)  aged 50 and older OR (4)  American aged 65 and older  Osteoporosis Screening: covered every 2 years if you meet one of the following conditions: (1) You're estrogen deficient and at risk for osteoporosis based off medical history and other findings; (2) Have a vertebral abnormality; (3) On glucocorticoid therapy for more than 3 months; (4) Have primary hyperparathyroidism; (5) On osteoporosis medications and need to assess response to drug therapy.   Last bone density test (DXA Scan): 08/03/2022.  HIV Screening: covered annually if you're between the age of 15-65. Also covered annually if you are younger than 15 and older than 65 with risk factors for HIV infection. For pregnant patients, it is covered up to 3 times  per pregnancy.    Immunizations:  Immunization Recommendations   Influenza Vaccine Annual influenza vaccination during flu season is recommended for all persons aged >= 6 months who do not have contraindications   Pneumococcal Vaccine   * Pneumococcal conjugate vaccine = PCV13 (Prevnar 13), PCV15 (Vaxneuvance), PCV20 (Prevnar 20)  * Pneumococcal polysaccharide vaccine = PPSV23 (Pneumovax) Adults 19-63 yo with certain risk factors or if 65+ yo  If never received any pneumonia vaccine: recommend Prevnar 20 (PCV20)  Give PCV20 if previously received 1 dose of PCV13 or PPSV23   Hepatitis B Vaccine 3 dose series if at intermediate or high risk (ex: diabetes, end stage renal disease, liver disease)   Respiratory syncytial virus (RSV) Vaccine - COVERED BY MEDICARE PART D  * RSVPreF3 (Arexvy) CDC recommends that adults 60 years of age and older may receive a single dose of RSV vaccine using shared clinical decision-making (SCDM)   Tetanus (Td) Vaccine - COST NOT COVERED BY MEDICARE PART B Following completion of primary series, a booster dose should be given every 10 years to maintain immunity against tetanus. Td may also be given as tetanus wound prophylaxis.   Tdap Vaccine - COST NOT COVERED BY MEDICARE PART B Recommended at least once for all adults. For pregnant patients, recommended with each pregnancy.   Shingles Vaccine (Shingrix) - COST NOT COVERED BY MEDICARE PART B  2 shot series recommended in those 19 years and older who have or will have weakened immune systems or those 50 years and older     Health Maintenance Due:      Topic Date Due   • DXA SCAN  08/03/2024   • Breast Cancer Screening: Mammogram  04/26/2025   • Hepatitis C Screening  Completed     Immunizations Due:      Topic Date Due   • COVID-19 Vaccine (3 - 2023-24 season) 09/01/2023   • Influenza Vaccine (1) 09/01/2024     Advance Directives   What are advance directives?  Advance directives are legal documents that state your wishes and plans for  medical care. These plans are made ahead of time in case you lose your ability to make decisions for yourself. Advance directives can apply to any medical decision, such as the treatments you want, and if you want to donate organs.   What are the types of advance directives?  There are many types of advance directives, and each state has rules about how to use them. You may choose a combination of any of the following:  Living will:  This is a written record of the treatment you want. You can also choose which treatments you do not want, which to limit, and which to stop at a certain time. This includes surgery, medicine, IV fluid, and tube feedings.   Durable power of  for healthcare (DPAHC):  This is a written record that states who you want to make healthcare choices for you when you are unable to make them for yourself. This person, called a proxy, is usually a family member or a friend. You may choose more than 1 proxy.  Do not resuscitate (DNR) order:  A DNR order is used in case your heart stops beating or you stop breathing. It is a request not to have certain forms of treatment, such as CPR. A DNR order may be included in other types of advance directives.  Medical directive:  This covers the care that you want if you are in a coma, near death, or unable to make decisions for yourself. You can list the treatments you want for each condition. Treatment may include pain medicine, surgery, blood transfusions, dialysis, IV or tube feedings, and a ventilator (breathing machine).  Values history:  This document has questions about your views, beliefs, and how you feel and think about life. This information can help others choose the care that you would choose.  Why are advance directives important?  An advance directive helps you control your care. Although spoken wishes may be used, it is better to have your wishes written down. Spoken wishes can be misunderstood, or not followed. Treatments may be given  even if you do not want them. An advance directive may make it easier for your family to make difficult choices about your care.   Urinary Incontinence   Urinary incontinence (UI)  is when you lose control of your bladder. UI develops because your bladder cannot store or empty urine properly. The 3 most common types of UI are stress incontinence, urge incontinence, or both.  Medicines:   May be given to help strengthen your bladder control. Report any side effects of medication to your healthcare provider.  Do pelvic muscle exercises often:  Your pelvic muscles help you stop urinating. Squeeze these muscles tight for 5 seconds, then relax for 5 seconds. Gradually work up to squeezing for 10 seconds. Do 3 sets of 15 repetitions a day, or as directed. This will help strengthen your pelvic muscles and improve bladder control.  Train your bladder:  Go to the bathroom at set times, such as every 2 hours, even if you do not feel the urge to go. You can also try to hold your urine when you feel the urge to go. For example, hold your urine for 5 minutes when you feel the urge to go. As that becomes easier, hold your urine for 10 minutes.   Self-care:   Keep a UI record.  Write down how often you leak urine and how much you leak. Make a note of what you were doing when you leaked urine.  Drink liquids as directed. You may need to limit the amount of liquid you drink to help control your urine leakage. Do not drink any liquid right before you go to bed. Limit or do not have drinks that contain caffeine or alcohol.   Prevent constipation.  Eat a variety of high-fiber foods. Good examples are high-fiber cereals, beans, vegetables, and whole-grain breads. Walking is the best way to trigger your intestines to have a bowel movement.  Exercise regularly and maintain a healthy weight.  Weight loss and exercise will decrease pressure on your bladder and help you control your leakage.   Use a catheter as directed  to help empty your  bladder. A catheter is a tiny, plastic tube that is put into your bladder to drain your urine.   Go to behavior therapy as directed.  Behavior therapy may be used to help you learn to control your urge to urinate.    Weight Management   Why it is important to manage your weight:  Being overweight increases your risk of health conditions such as heart disease, high blood pressure, type 2 diabetes, and certain types of cancer. It can also increase your risk for osteoarthritis, sleep apnea, and other respiratory problems. Aim for a slow, steady weight loss. Even a small amount of weight loss can lower your risk of health problems.  How to lose weight safely:  A safe and healthy way to lose weight is to eat fewer calories and get regular exercise. You can lose up about 1 pound a week by decreasing the number of calories you eat by 500 calories each day.   Healthy meal plan for weight management:  A healthy meal plan includes a variety of foods, contains fewer calories, and helps you stay healthy. A healthy meal plan includes the following:  Eat whole-grain foods more often.  A healthy meal plan should contain fiber. Fiber is the part of grains, fruits, and vegetables that is not broken down by your body. Whole-grain foods are healthy and provide extra fiber in your diet. Some examples of whole-grain foods are whole-wheat breads and pastas, oatmeal, brown rice, and bulgur.  Eat a variety of vegetables every day.  Include dark, leafy greens such as spinach, kale, selam greens, and mustard greens. Eat yellow and orange vegetables such as carrots, sweet potatoes, and winter squash.   Eat a variety of fruits every day.  Choose fresh or canned fruit (canned in its own juice or light syrup) instead of juice. Fruit juice has very little or no fiber.  Eat low-fat dairy foods.  Drink fat-free (skim) milk or 1% milk. Eat fat-free yogurt and low-fat cottage cheese. Try low-fat cheeses such as mozzarella and other reduced-fat  cheeses.  Choose meat and other protein foods that are low in fat.  Choose beans or other legumes such as split peas or lentils. Choose fish, skinless poultry (chicken or turkey), or lean cuts of red meat (beef or pork). Before you cook meat or poultry, cut off any visible fat.   Use less fat and oil.  Try baking foods instead of frying them. Add less fat, such as margarine, sour cream, regular salad dressing and mayonnaise to foods. Eat fewer high-fat foods. Some examples of high-fat foods include french fries, doughnuts, ice cream, and cakes.  Eat fewer sweets.  Limit foods and drinks that are high in sugar. This includes candy, cookies, regular soda, and sweetened drinks.  Exercise:  Exercise at least 30 minutes per day on most days of the week. Some examples of exercise include walking, biking, dancing, and swimming. You can also fit in more physical activity by taking the stairs instead of the elevator or parking farther away from stores. Ask your healthcare provider about the best exercise plan for you.      © Copyright [employer] 2018 Information is for End User's use only and may not be sold, redistributed or otherwise used for commercial purposes. All illustrations and images included in CareNotes® are the copyrighted property of [employer] or [employer]

## 2024-07-17 NOTE — PROGRESS NOTES
Ambulatory Visit  Name: Alberta Sanchez      : 1945      MRN: [id number]  Encounter Provider: Dulce Michaud MD  Encounter Date: 2024   Encounter department: [facility]    Assessment & Plan   1. Medicare annual wellness visit, subsequent  Assessment & Plan:  Advice and education were given regarding nutrition, aerobic exercises, weight-bearing exercises, cardiovascular risk reduction, fall risk reduction, and age-appropriate supplements.     The patient was counseled regarding instructions for management, risk factor reductions, prognosis, risks and benefits of treatment options, patient and family education, and importance of compliance with treatment.  Patient is due for DEXA scan screening for osteoporosis 2024 will schedule  Orders:  -     Basic metabolic panel; Future  2. Bilateral lower extremity edema  Assessment & Plan:  New diagnosis acute symptomatic swelling bilateral lower extremity recommend to do venous duplex to rule out DVT.  Patient has multiple risk factors including obesity diabetes hypertension hyperlipidemia and she had a history of left ventricular hypertrophy recommend to do echocardiogram to check on ejection fraction also patient has history of chronic kidney disease stage III plan to check BMP   in case get worse to go to the emergency room  Orders:  -     Basic metabolic panel; Future  -      VAS VENOUS DUPLEX - LOWER LIMB BILATERAL; Future; Expected date: 2024  3. Mixed stress and urge urinary incontinence  Assessment & Plan:  Chronic stable patient follow-up with the urology periodically  Orders:  -     Basic metabolic panel; Future  4. Other fatigue  Assessment & Plan:  New diagnosis symptomatic discussed with patient fatigue is vague symptoms and that can be multifactorial patient has history of diabetes GERD obesity obstructive sleep apnea she has not been sleeping well recently she been busy with her  in the  hospital and recently also the extreme heat  Plan to do workup recommend to do CBC BMP and the plan for echocardiogram  Orders:  -     Basic metabolic panel; Future  -     Echo complete w/ contrast if indicated; Future; Expected date: 07/17/2024  5. LVH (left ventricular hypertrophy)  -     Echo complete w/ contrast if indicated; Future; Expected date: 07/17/2024  6. Peripheral vascular disease (HCC)  -     Echo complete w/ contrast if indicated; Future; Expected date: 07/17/2024  7. Post-menopausal  -     DXA bone density spine hip and pelvis; Future; Expected date: 08/04/2024  8. Screening for metabolic disorder  -     Basic metabolic panel; Future      Depression Screening and Follow-up Plan: Patient was screened for depression during today's encounter. They screened negative with a PHQ-2 score of 0.    Urinary Incontinence Plan of Care: counseling topics discussed: practice Kegel (pelvic floor strengthening) exercises, keeping a bladder diary, weight loss, preventing constipation and improving blood sugar control.       Preventive health issues were discussed with patient, and age appropriate screening tests were ordered as noted in patient's After Visit Summary. Personalized health advice and appropriate referrals for health education or preventive services given if needed, as noted in patient's After Visit Summary.    History of Present Illness     Patient here for Medicare exam and she is concerned about swelling in bilateral feet that has been going on for 1 week no cough no wheezing no hematosis no dyspnea on exertion no pain in the calf area no change in the color of the skin patient likely to eat salt and she not been watching for her salt diet lately patient lately also been feeling very tired exhausted her  in the hospital and she been spending most of her day sitting in the hospital not sleeping well no muscle pain no rash       Patient Care Team:  Dulce Michaud MD as PCP - General  Herbert Tamayo  DPM (Podiatry)  Isai Hernandez MD (Nephrology)  Lucian Blackman MD (Urology)  April Flores MD (Gastroenterology)  HERMANN Cespedes (Neurology)  HERMANN Moss as Nurse Practitioner (Urology)  HERMANN Moss as Nurse Practitioner (Urology)  HERMANN Lenz as Nurse Practitioner (Urology)  HERMANN Lenz as Nurse Practitioner (Urology)    Review of Systems   Constitutional:  Positive for fatigue. Negative for chills and fever.   HENT:  Negative for ear pain and sore throat.    Eyes:  Negative for pain and visual disturbance.   Respiratory:  Negative for cough and shortness of breath.    Cardiovascular:  Positive for leg swelling. Negative for chest pain and palpitations.   Gastrointestinal:  Negative for abdominal pain, constipation, diarrhea and vomiting.   Genitourinary:  Negative for dysuria and hematuria.   Musculoskeletal:  Positive for gait problem.   Skin:  Negative for color change and rash.   Neurological:  Negative for seizures and syncope.   All other systems reviewed and are negative.    Medical History Reviewed by provider this encounter:  Tobacco  Allergies  Meds  Problems  Med Hx  Surg Hx  Fam Hx       Annual Wellness Visit Questionnaire   Alberta is here for her Subsequent Wellness visit.     Health Risk Assessment:   Patient rates overall health as fair. Patient feels that their physical health rating is same. Patient is satisfied with their life. Eyesight was rated as slightly worse. Hearing was rated as same. Patient feels that their emotional and mental health rating is slightly better. Patients states they are sometimes angry. Patient states they are sometimes unusually tired/fatigued. Pain experienced in the last 7 days has been none. Patient states that she has experienced no weight loss or gain in last 6 months.     Depression Screening:   PHQ-2 Score: 0      Fall Risk Screening:   In the past year, patient has experienced: no history of falling in past year      Urinary  Incontinence Screening:   Patient has leaked urine accidently in the last six months.     Home Safety:  Patient has trouble with stairs inside or outside of their home. Patient has working smoke alarms and has working carbon monoxide detector. Home safety hazards include: none.     Nutrition:   Current diet is Diabetic and Limited junk food.     Medications:   Patient is currently taking over-the-counter supplements. OTC medications include: see medication list. Patient is able to manage medications.     Activities of Daily Living (ADLs)/Instrumental Activities of Daily Living (IADLs):   Walk and transfer into and out of bed and chair?: Yes  Dress and groom yourself?: Yes    Bathe or shower yourself?: Yes    Feed yourself? Yes  Do your laundry/housekeeping?: Yes  Manage your money, pay your bills and track your expenses?: Yes  Make your own meals?: Yes    Do your own shopping?: Yes    Durable Medical Equipment Suppliers  [employer]    Previous Hospitalizations:   Any hospitalizations or ED visits within the last 12 months?: No      Advance Care Planning:   Living will: Yes    Durable POA for healthcare: No    Advanced directive: Yes      Cognitive Screening:   Provider or family/friend/caregiver concerned regarding cognition?: No    PREVENTIVE SCREENINGS      Cardiovascular Screening:    General: Screening Not Indicated and History Lipid Disorder      Diabetes Screening:     General: Screening Not Indicated and History Diabetes      Colorectal Cancer Screening:     General: Screening Not Indicated      Breast Cancer Screening:     General: Screening Current      Cervical Cancer Screening:    General: Screening Not Indicated      Osteoporosis Screening:    General: Screening Current      Abdominal Aortic Aneurysm (AAA) Screening:        General: Screening Not Indicated      Lung Cancer Screening:     General: Screening Not Indicated      Hepatitis C Screening:    General: Screening  Current    Screening, Brief Intervention, and Referral to Treatment (SBIRT)    Screening  Typical number of drinks in a day: 0  Typical number of drinks in a week: 0  Interpretation: Low risk drinking behavior.    AUDIT-C Screenin) How often did you have a drink containing alcohol in the past year? never  2) How many drinks did you have on a typical day when you were drinking in the past year? 0  3) How often did you have 6 or more drinks on one occasion in the past year? never    AUDIT-C Score: 0  Interpretation: Score 0-2 (female): Negative screen for alcohol misuse    Single Item Drug Screening:  How often have you used an illegal drug (including marijuana) or a prescription medication for non-medical reasons in the past year? never    Single Item Drug Screen Score: 0  Interpretation: Negative screen for possible drug use disorder    Brief Intervention  Alcohol & drug use screenings were reviewed. No concerns regarding substance use disorder identified.     Social Determinants of Health     Financial Resource Strain: Low Risk  (2023)    Overall Financial Resource Strain (CARDIA)     Difficulty of Paying Living Expenses: Not hard at all   Food Insecurity: No Food Insecurity (2024)    Hunger Vital Sign     Worried About Running Out of Food in the Last Year: Never true     Ran Out of Food in the Last Year: Never true   Transportation Needs: No Transportation Needs (2024)    PRAPARE - Transportation     Lack of Transportation (Medical): No     Lack of Transportation (Non-Medical): No   Housing Stability: Low Risk  (2024)    Housing Stability Vital Sign     Unable to Pay for Housing in the Last Year: No     Number of Times Moved in the Last Year: 0     Homeless in the Last Year: No   Utilities: Not At Risk (2024)    [facility] Utilities     Threatened with loss of utilities: No     No results found.    Objective     /76 (BP Location: Left arm, Patient Position: Sitting, Cuff Size:  "Standard)   Pulse 73   Temp 99.3 °F (37.4 °C) (Tympanic)   Ht 5' 4\" (1.626 m)   Wt 112 kg (246 lb 9.6 oz)   SpO2 97%   BMI 42.33 kg/m²     Physical Exam  Vitals and nursing note reviewed.   Constitutional:       General: She is not in acute distress.     Appearance: She is well-developed. She is not diaphoretic.   HENT:      Head: Normocephalic.      Right Ear: Tympanic membrane and external ear normal.      Left Ear: Tympanic membrane and external ear normal.      Nose: No rhinorrhea.      Mouth/Throat:      Pharynx: No posterior oropharyngeal erythema.   Eyes:      General:         Right eye: No discharge.         Left eye: No discharge.      Conjunctiva/sclera: Conjunctivae normal.   Neck:      Vascular: No JVD.   Cardiovascular:      Rate and Rhythm: Normal rate and regular rhythm.      Heart sounds: Normal heart sounds.      No gallop.   Pulmonary:      Effort: Pulmonary effort is normal. No respiratory distress.      Breath sounds: Normal breath sounds. No stridor. No wheezing or rales.   Chest:      Chest wall: No tenderness.   Abdominal:      General: There is no distension.      Palpations: Abdomen is soft. There is no mass.      Tenderness: There is no abdominal tenderness. There is no rebound.   Musculoskeletal:         General: No tenderness.      Cervical back: Normal range of motion and neck supple.      Right lower le+ Edema present.      Left lower le+ Edema present.   Lymphadenopathy:      Cervical: No cervical adenopathy.   Skin:     General: Skin is warm.      Findings: No erythema or rash.   Neurological:      Mental Status: She is alert and oriented to person, place, and time.           "

## 2024-07-18 PROBLEM — R53.83 OTHER FATIGUE: Status: ACTIVE | Noted: 2024-07-18

## 2024-07-18 PROBLEM — R60.0 BILATERAL LOWER EXTREMITY EDEMA: Status: ACTIVE | Noted: 2024-07-18

## 2024-07-18 NOTE — ASSESSMENT & PLAN NOTE
New diagnosis acute symptomatic swelling bilateral lower extremity recommend to do venous duplex to rule out DVT.  Patient has multiple risk factors including obesity diabetes hypertension hyperlipidemia and she had a history of left ventricular hypertrophy recommend to do echocardiogram to check on ejection fraction also patient has history of chronic kidney disease stage III plan to check BMP   in case get worse to go to the emergency room

## 2024-07-18 NOTE — ASSESSMENT & PLAN NOTE
New diagnosis symptomatic discussed with patient fatigue is vague symptoms and that can be multifactorial patient has history of diabetes GERD obesity obstructive sleep apnea she has not been sleeping well recently she been busy with her  in the hospital and recently also the extreme heat  Plan to do workup recommend to do CBC BMP and the plan for echocardiogram

## 2024-07-18 NOTE — ASSESSMENT & PLAN NOTE
Advice and education were given regarding nutrition, aerobic exercises, weight-bearing exercises, cardiovascular risk reduction, fall risk reduction, and age-appropriate supplements.     The patient was counseled regarding instructions for management, risk factor reductions, prognosis, risks and benefits of treatment options, patient and family education, and importance of compliance with treatment.  Patient is due for DEXA scan screening for osteoporosis August 2024 will schedule

## 2024-07-22 ENCOUNTER — OFFICE VISIT (OUTPATIENT)
Dept: UROLOGY | Facility: CLINIC | Age: 79
End: 2024-07-22
Payer: MEDICARE

## 2024-07-22 VITALS
RESPIRATION RATE: 16 BRPM | SYSTOLIC BLOOD PRESSURE: 152 MMHG | BODY MASS INDEX: 42.61 KG/M2 | WEIGHT: 249.6 LBS | OXYGEN SATURATION: 97 % | TEMPERATURE: 97.6 F | HEIGHT: 64 IN | DIASTOLIC BLOOD PRESSURE: 82 MMHG | HEART RATE: 79 BPM

## 2024-07-22 DIAGNOSIS — N39.0 RECURRENT UTI: ICD-10-CM

## 2024-07-22 DIAGNOSIS — N39.46 MIXED STRESS AND URGE URINARY INCONTINENCE: Primary | ICD-10-CM

## 2024-07-22 DIAGNOSIS — N39.41 URGE INCONTINENCE: ICD-10-CM

## 2024-07-22 PROCEDURE — 99213 OFFICE O/P EST LOW 20 MIN: CPT

## 2024-07-22 RX ORDER — SOLIFENACIN SUCCINATE 5 MG/1
5 TABLET, FILM COATED ORAL DAILY
Qty: 30 TABLET | Refills: 11 | Status: SHIPPED | OUTPATIENT
Start: 2024-07-22

## 2024-07-22 NOTE — PROGRESS NOTES
7/22/2024    No chief complaint on file.      Assessment and Plan    79 y.o. female manage by AP Team    Mixed urinary incontinence  No change in baseline incontinence with oxybutynin XL 5 mg daily. I did discuss increasing this to 10 mg but she wishes to try an alternative option.  We will try her on Vesicare 5 mg daily. She was instructed that she can increase this if necessary to a max of 10 mg daily.   Follow-up in 6 months    2. Recurrent UTIs  She has been doing very well with cystex and has not had a UTI in over 1 year  She will call with bothersome symptoms  Follow-up 1 year          Interval HPI:    She presents today reporting doing very well overall.  She has not had any issues with recurrent UTIs since last time we saw her.  In regards to her incontinence, oxybutynin has not made any difference.  She still goes through approximately 3 briefs per day.          History of Present Illness  Alberta Sanchez is a 79 y.o. female here for follow-up evaluation of mixed stress and urge incontinence as well as recurrent UTIs.    Established patient but new to me last seen by HERMANN Hope in July 2023 with history of recurrent UTIs and mixed urinary incontinence. She had prior negative cystoscopy and ct scan. She still uses premarin cream. It is ordered twice a week but she takes it infrequent, about every other week.  She had previously been on oxybutynin but this was stopped by her PCP due to concerns regarding possible retention.  This however was restarted at her last office visit in July 2023.  She had a follow-up PVR 3 months later which was normal at 21 mL.    In regards to her recurrent UTIs, she had been on Keflex a couple times and then Bactrim.  She had no issues for a long time until last year seen.  She was recommended to continue with Azo with d-mannose or Cystex as well as continue daily probiotic.  She is also encouraged to continue using her Premarin cream more frequently.  Last positive urine  culture was July 2023 which was positive for E. coli.            Review of Systems   Constitutional:  Negative for chills and fever.   HENT:  Negative for ear pain and sore throat.    Eyes:  Negative for pain and visual disturbance.   Respiratory:  Negative for cough and shortness of breath.    Cardiovascular:  Negative for chest pain and palpitations.   Gastrointestinal:  Negative for abdominal pain and vomiting.   Genitourinary:  Negative for dysuria and hematuria.        Mixed incontinence    Musculoskeletal:  Negative for arthralgias and back pain.   Skin:  Negative for color change and rash.   Neurological:  Negative for seizures and syncope.   All other systems reviewed and are negative.              Vitals  There were no vitals filed for this visit.    Physical Exam  Vitals reviewed.   Constitutional:       General: She is not in acute distress.     Appearance: Normal appearance. She is normal weight. She is not ill-appearing or toxic-appearing.   HENT:      Head: Normocephalic and atraumatic.      Nose: Nose normal.   Eyes:      General: No scleral icterus.     Conjunctiva/sclera: Conjunctivae normal.   Cardiovascular:      Rate and Rhythm: Normal rate.      Pulses: Normal pulses.   Pulmonary:      Effort: Pulmonary effort is normal. No respiratory distress.   Abdominal:      General: Abdomen is flat.      Palpations: Abdomen is soft.      Tenderness: There is no abdominal tenderness. There is no right CVA tenderness or left CVA tenderness.      Hernia: No hernia is present.   Musculoskeletal:         General: Normal range of motion.      Cervical back: Normal range of motion.   Skin:     General: Skin is warm and dry.   Neurological:      General: No focal deficit present.      Mental Status: She is alert and oriented to person, place, and time. Mental status is at baseline.      Gait: Gait abnormal (ambulates with walker).   Psychiatric:         Mood and Affect: Mood normal.         Behavior: Behavior  normal.         Thought Content: Thought content normal.         Judgment: Judgment normal.         Past History  Past Medical History:   Diagnosis Date    Abnormal finding in urine     Allergic Springtime    Runny nose, congestion    Anemia     Arthritis 10/30/2020    Chronic kidney disease     Diabetes mellitus (HCC)     Fibromyalgia, primary     Fracture of wrist     RIGHT    Heart murmur 06/30/2020    History of non-insulin dependent diabetes mellitus     Hx of thrombocytopenia 02/18/2022    Hyperlipidemia     Hypertension     Hypertension     IBS (irritable bowel syndrome)     Inflammatory bowel disease 2015    Been doctoring for years    Irritable bowel     Kidney stone     Metabolic syndrome     Obesity     RLS (restless legs syndrome)     Routine medical exam 06/28/2019    Skin lesion     Sleep apnea     Sleep apnea     ON CPAP    Upper respiratory tract infection 01/19/2022    Urge incontinence     Urinary incontinence     Urinary tract infection 1/1/2023     Social History     Socioeconomic History    Marital status: /Civil Union     Spouse name: Not on file    Number of children: Not on file    Years of education: Not on file    Highest education level: Not on file   Occupational History    Not on file   Tobacco Use    Smoking status: Never     Passive exposure: Never    Smokeless tobacco: Never   Vaping Use    Vaping status: Never Used   Substance and Sexual Activity    Alcohol use: No     Comment: no caffeine use    Drug use: No    Sexual activity: Yes     Partners: Male     Birth control/protection: None     Comment:  56 years   Other Topics Concern    Not on file   Social History Narrative    Not on file     Social Determinants of Health     Financial Resource Strain: Low Risk  (7/12/2023)    Overall Financial Resource Strain (CARDIA)     Difficulty of Paying Living Expenses: Not hard at all   Food Insecurity: No Food Insecurity (7/17/2024)    Hunger Vital Sign     Worried About Running  Out of Food in the Last Year: Never true     Ran Out of Food in the Last Year: Never true   Transportation Needs: No Transportation Needs (7/17/2024)    PRAPARE - Transportation     Lack of Transportation (Medical): No     Lack of Transportation (Non-Medical): No   Physical Activity: Insufficiently Active (7/7/2021)    Exercise Vital Sign     Days of Exercise per Week: 2 days     Minutes of Exercise per Session: 60 min   Stress: No Stress Concern Present (7/7/2021)    [place] [employer] - Occupational Stress Questionnaire     Feeling of Stress : Not at all   Social Connections: Moderately Integrated (7/7/2021)    Social Connection and Isolation Panel [NHANES]     Frequency of Communication with Friends and Family: More than three times a week     Frequency of Social Gatherings with Friends and Family: More than three times a week     Attends [employer] Services: More than 4 times per year     Active Member of Clubs or Organizations: No     Attends Club or Organization Meetings: Never     Marital Status:    Intimate Partner Violence: Not At Risk (7/7/2021)    Humiliation, Afraid, Rape, and Kick questionnaire     Fear of Current or Ex-Partner: No     Emotionally Abused: No     Physically Abused: No     Sexually Abused: No   Housing Stability: Low Risk  (7/17/2024)    Housing Stability Vital Sign     Unable to Pay for Housing in the Last Year: No     Number of Times Moved in the Last Year: 0     Homeless in the Last Year: No     Social History     Tobacco Use   Smoking Status Never    Passive exposure: Never   Smokeless Tobacco Never     Family History   Problem Relation Age of Onset    Breast cancer Mother 62        Breast cancer    Suicidality Father     Melanoma Sister     Bipolar disorder Sister     Depression Sister         Bi-polar    Bipolar disorder Sister     Completed Suicide  Sister     Rheum arthritis Daughter     No Known Problems Daughter     No Known Problems Maternal  "Grandmother     No Known Problems Maternal Grandfather     No Known Problems Paternal Grandmother     No Known Problems Paternal Grandfather     Heart disease Brother     No Known Problems Maternal Aunt     No Known Problems Maternal Aunt     BRCA2 Positive Neg Hx     BRCA1 Positive Neg Hx     BRCA2 Negative Neg Hx     BRCA1 Negative Neg Hx     BRCA 1/2 Neg Hx     Ovarian cancer Neg Hx     Endometrial cancer Neg Hx     Colon cancer Neg Hx     Breast cancer additional onset Neg Hx        The following portions of the patient's history were reviewed and updated as appropriate allergies, current medications, past medical history, past social history, past surgical history and problem list    Imaging:    Results  No results found for this or any previous visit (from the past 1 hour(s)).]  No results found for: \"PSA\"  Lab Results   Component Value Date    GLUCOSE 125 (H) 06/04/2018    CALCIUM 9.0 05/13/2024     06/04/2018    K 3.9 05/13/2024    CO2 27 05/13/2024     05/13/2024    BUN 18 05/13/2024    CREATININE 0.92 05/13/2024     Lab Results   Component Value Date    WBC 7.43 05/13/2024    HGB 13.5 05/13/2024    HCT 43.0 05/13/2024    MCV 95 05/13/2024     05/13/2024       Please Note:  Voice dictation software has been used to create this document. There may be inadvertent transcriptions errors.     HERMANN Lenz 07/22/24   "

## 2024-08-13 ENCOUNTER — HOSPITAL ENCOUNTER (OUTPATIENT)
Dept: SLEEP CENTER | Facility: CLINIC | Age: 79
Discharge: HOME | End: 2024-08-13
Payer: MEDICARE

## 2024-08-13 DIAGNOSIS — G47.33 OBSTRUCTIVE SLEEP APNEA TREATED WITH CONTINUOUS POSITIVE AIRWAY PRESSURE (CPAP): ICD-10-CM

## 2024-08-13 PROCEDURE — G0399 HOME SLEEP TEST/TYPE 3 PORTA: HCPCS

## 2024-08-14 NOTE — PROGRESS NOTES
Home Sleep Study Documentation    HOME STUDY DEVICE: Noxturnal yes                                           Amira G3 no      Pre-Sleep Home Study:    Set-up and instructions performed by: ELAINA Bangura, RST, CRT    Technician performed demonstration for Patient: yes    Return demonstration performed by Patient: yes    Written instructions provided to Patient: yes    Patient signed consent form: yes        Post-Sleep Home Study:    Additional comments by Patient: None    Home Sleep Study Failed:no:    Failure reason: N/A    Reported or Detected: N/A    Scored by: ELAINA Burnett

## 2024-08-16 PROBLEM — Z00.00 MEDICARE ANNUAL WELLNESS VISIT, SUBSEQUENT: Status: RESOLVED | Noted: 2024-07-17 | Resolved: 2024-08-16

## 2024-08-19 ENCOUNTER — HOSPITAL ENCOUNTER (OUTPATIENT)
Dept: NON INVASIVE DIAGNOSTICS | Facility: HOSPITAL | Age: 79
Discharge: HOME | End: 2024-08-19
Payer: MEDICARE

## 2024-08-19 VITALS
DIASTOLIC BLOOD PRESSURE: 82 MMHG | HEART RATE: 64 BPM | HEIGHT: 64 IN | BODY MASS INDEX: 42.51 KG/M2 | SYSTOLIC BLOOD PRESSURE: 152 MMHG | WEIGHT: 249 LBS

## 2024-08-19 DIAGNOSIS — I73.9 PERIPHERAL VASCULAR DISEASE (HCC): ICD-10-CM

## 2024-08-19 DIAGNOSIS — R60.0 BILATERAL LOWER EXTREMITY EDEMA: ICD-10-CM

## 2024-08-19 DIAGNOSIS — I51.7 LVH (LEFT VENTRICULAR HYPERTROPHY): ICD-10-CM

## 2024-08-19 DIAGNOSIS — R53.83 OTHER FATIGUE: ICD-10-CM

## 2024-08-19 LAB
AORTIC ROOT: 2.8 CM
AORTIC VALVE MEAN VELOCITY: 9.1 M/S
APICAL FOUR CHAMBER EJECTION FRACTION: 53 %
ASCENDING AORTA: 3 CM
AV AREA BY CONTINUOUS VTI: 2.4 CM2
AV AREA PEAK VELOCITY: 2.2 CM2
AV LVOT MEAN GRADIENT: 2 MMHG
AV LVOT PEAK GRADIENT: 4 MMHG
AV MEAN GRADIENT: 4 MMHG
AV PEAK GRADIENT: 8 MMHG
AV VALVE AREA: 2.37 CM2
AV VELOCITY RATIO: 0.7
BSA FOR ECHO PROCEDURE: 2.15 M2
DOP CALC AO PEAK VEL: 1.45 M/S
DOP CALC AO VTI: 32.37 CM
DOP CALC LVOT AREA: 3.14 CM2
DOP CALC LVOT CARDIAC INDEX: 2.34 L/MIN/M2
DOP CALC LVOT CARDIAC OUTPUT: 5.04 L/MIN
DOP CALC LVOT DIAMETER: 2 CM
DOP CALC LVOT PEAK VEL VTI: 24.42 CM
DOP CALC LVOT PEAK VEL: 1.02 M/S
DOP CALC LVOT STROKE INDEX: 35.3 ML/M2
DOP CALC LVOT STROKE VOLUME: 76.68
E WAVE DECELERATION TIME: 235 MS
E/A RATIO: 1.06
FRACTIONAL SHORTENING: 31 (ref 28–44)
INTERVENTRICULAR SEPTUM IN DIASTOLE (PARASTERNAL SHORT AXIS VIEW): 1.2 CM
INTERVENTRICULAR SEPTUM: 1.2 CM (ref 0.6–1.1)
LAAS-AP2: 22.2 CM2
LAAS-AP4: 19.1 CM2
LEFT ATRIUM SIZE: 3.5 CM
LEFT ATRIUM VOLUME (MOD BIPLANE): 62 ML
LEFT ATRIUM VOLUME INDEX (MOD BIPLANE): 28.8 ML/M2
LEFT INTERNAL DIMENSION IN SYSTOLE: 3.1 CM (ref 2.1–4)
LEFT VENTRICULAR INTERNAL DIMENSION IN DIASTOLE: 4.5 CM (ref 3.5–6)
LEFT VENTRICULAR POSTERIOR WALL IN END DIASTOLE: 1.1 CM
LEFT VENTRICULAR STROKE VOLUME: 56 ML
LVSV (TEICH): 56 ML
MV E'TISSUE VEL-SEP: 10 CM/S
MV PEAK A VEL: 0.96 M/S
MV PEAK E VEL: 102 CM/S
MV STENOSIS PRESSURE HALF TIME: 68 MS
MV VALVE AREA P 1/2 METHOD: 3.24
RIGHT ATRIUM AREA SYSTOLE A4C: 9 CM2
RIGHT VENTRICLE ID DIMENSION: 2.4 CM
SL CV LEFT ATRIUM LENGTH A2C: 5.8 CM
SL CV LV EF: 60
SL CV PED ECHO LEFT VENTRICLE DIASTOLIC VOLUME (MOD BIPLANE) 2D: 93 ML
SL CV PED ECHO LEFT VENTRICLE SYSTOLIC VOLUME (MOD BIPLANE) 2D: 36 ML
TR MAX PG: 29 MMHG
TR PEAK VELOCITY: 2.7 M/S
TRICUSPID ANNULAR PLANE SYSTOLIC EXCURSION: 1.9 CM
TRICUSPID VALVE PEAK REGURGITATION VELOCITY: 2.67 M/S

## 2024-08-19 PROCEDURE — 93306 TTE W/DOPPLER COMPLETE: CPT | Performed by: INTERNAL MEDICINE

## 2024-08-19 PROCEDURE — 93306 TTE W/DOPPLER COMPLETE: CPT

## 2024-08-19 PROCEDURE — 93970 EXTREMITY STUDY: CPT | Performed by: SURGERY

## 2024-08-19 PROCEDURE — 93970 EXTREMITY STUDY: CPT

## 2024-08-20 DIAGNOSIS — G47.34 NOCTURNAL HYPOXEMIA: ICD-10-CM

## 2024-08-20 DIAGNOSIS — G47.33 OBSTRUCTIVE SLEEP APNEA TREATED WITH CONTINUOUS POSITIVE AIRWAY PRESSURE (CPAP): Primary | ICD-10-CM

## 2024-08-20 DIAGNOSIS — R53.83 OTHER FATIGUE: Primary | ICD-10-CM

## 2024-08-20 PROCEDURE — 95806 SLEEP STUDY UNATT&RESP EFFT: CPT | Performed by: PSYCHIATRY & NEUROLOGY

## 2024-08-21 ENCOUNTER — RA CDI HCC (OUTPATIENT)
Dept: OTHER | Facility: HOSPITAL | Age: 79
End: 2024-08-21

## 2024-08-24 DIAGNOSIS — M54.42 CHRONIC BILATERAL LOW BACK PAIN WITH BILATERAL SCIATICA: ICD-10-CM

## 2024-08-24 DIAGNOSIS — G89.29 CHRONIC BILATERAL LOW BACK PAIN WITH BILATERAL SCIATICA: ICD-10-CM

## 2024-08-24 DIAGNOSIS — M54.41 CHRONIC BILATERAL LOW BACK PAIN WITH BILATERAL SCIATICA: ICD-10-CM

## 2024-08-26 RX ORDER — PREGABALIN 75 MG/1
75 CAPSULE ORAL 3 TIMES DAILY
Qty: 270 CAPSULE | Refills: 0 | Status: SHIPPED | OUTPATIENT
Start: 2024-08-26

## 2024-08-28 ENCOUNTER — OFFICE VISIT (OUTPATIENT)
Dept: FAMILY MEDICINE CLINIC | Facility: CLINIC | Age: 79
End: 2024-08-28
Payer: MEDICARE

## 2024-08-28 VITALS
SYSTOLIC BLOOD PRESSURE: 130 MMHG | HEIGHT: 64 IN | OXYGEN SATURATION: 97 % | BODY MASS INDEX: 42.17 KG/M2 | DIASTOLIC BLOOD PRESSURE: 70 MMHG | HEART RATE: 74 BPM | WEIGHT: 247 LBS | TEMPERATURE: 98.9 F

## 2024-08-28 DIAGNOSIS — Z79.4 TYPE 2 DIABETES MELLITUS WITH STAGE 3B CHRONIC KIDNEY DISEASE, WITH LONG-TERM CURRENT USE OF INSULIN (HCC): Primary | ICD-10-CM

## 2024-08-28 DIAGNOSIS — N18.32 TYPE 2 DIABETES MELLITUS WITH STAGE 3B CHRONIC KIDNEY DISEASE, WITH LONG-TERM CURRENT USE OF INSULIN (HCC): Primary | ICD-10-CM

## 2024-08-28 DIAGNOSIS — I51.7 LVH (LEFT VENTRICULAR HYPERTROPHY): ICD-10-CM

## 2024-08-28 DIAGNOSIS — E11.22 TYPE 2 DIABETES MELLITUS WITH STAGE 3B CHRONIC KIDNEY DISEASE, WITH LONG-TERM CURRENT USE OF INSULIN (HCC): Primary | ICD-10-CM

## 2024-08-28 DIAGNOSIS — I65.23 BILATERAL CAROTID ARTERY STENOSIS: ICD-10-CM

## 2024-08-28 DIAGNOSIS — I10 ESSENTIAL HYPERTENSION: ICD-10-CM

## 2024-08-28 LAB — SL AMB POCT HEMOGLOBIN AIC: 6.7 (ref ?–6.5)

## 2024-08-28 PROCEDURE — 99214 OFFICE O/P EST MOD 30 MIN: CPT | Performed by: FAMILY MEDICINE

## 2024-08-28 PROCEDURE — 83036 HEMOGLOBIN GLYCOSYLATED A1C: CPT | Performed by: FAMILY MEDICINE

## 2024-08-29 ENCOUNTER — APPOINTMENT (OUTPATIENT)
Dept: LAB | Facility: IMAGING CENTER | Age: 79
End: 2024-08-29
Payer: MEDICARE

## 2024-08-29 DIAGNOSIS — Z13.228 SCREENING FOR METABOLIC DISORDER: ICD-10-CM

## 2024-08-29 DIAGNOSIS — N39.46 MIXED STRESS AND URGE URINARY INCONTINENCE: ICD-10-CM

## 2024-08-29 DIAGNOSIS — R60.0 BILATERAL LOWER EXTREMITY EDEMA: ICD-10-CM

## 2024-08-29 DIAGNOSIS — Z00.00 MEDICARE ANNUAL WELLNESS VISIT, SUBSEQUENT: ICD-10-CM

## 2024-08-29 DIAGNOSIS — R53.83 OTHER FATIGUE: ICD-10-CM

## 2024-08-29 LAB
ANION GAP SERPL CALCULATED.3IONS-SCNC: 6 MMOL/L (ref 4–13)
BUN SERPL-MCNC: 22 MG/DL (ref 5–25)
CALCIUM SERPL-MCNC: 8.6 MG/DL (ref 8.4–10.2)
CHLORIDE SERPL-SCNC: 105 MMOL/L (ref 96–108)
CO2 SERPL-SCNC: 31 MMOL/L (ref 21–32)
CREAT SERPL-MCNC: 0.9 MG/DL (ref 0.6–1.3)
GFR SERPL CREATININE-BSD FRML MDRD: 60 ML/MIN/1.73SQ M
GLUCOSE P FAST SERPL-MCNC: 132 MG/DL (ref 65–99)
POTASSIUM SERPL-SCNC: 4.2 MMOL/L (ref 3.5–5.3)
SODIUM SERPL-SCNC: 142 MMOL/L (ref 135–147)

## 2024-08-29 PROCEDURE — 80048 BASIC METABOLIC PNL TOTAL CA: CPT

## 2024-08-29 PROCEDURE — 36415 COLL VENOUS BLD VENIPUNCTURE: CPT

## 2024-08-30 ENCOUNTER — TELEPHONE (OUTPATIENT)
Age: 79
End: 2024-08-30

## 2024-08-30 NOTE — TELEPHONE ENCOUNTER
Pt called in wanting to leave a message for Carmen.     Pt said It is regarding appointments carmen wanted her to make after her sleep study and would like to speak to someone  before scheduling to answer questions     Thank you

## 2024-09-01 ENCOUNTER — TELEPHONE (OUTPATIENT)
Dept: SLEEP CENTER | Facility: CLINIC | Age: 79
End: 2024-09-01

## 2024-09-01 DIAGNOSIS — K30 INDIGESTION: ICD-10-CM

## 2024-09-01 RX ORDER — PANTOPRAZOLE SODIUM 40 MG/1
40 TABLET, DELAYED RELEASE ORAL DAILY
Qty: 90 TABLET | Refills: 1 | Status: SHIPPED | OUTPATIENT
Start: 2024-09-01

## 2024-09-01 NOTE — PROGRESS NOTES
Ambulatory Visit  Name: Alberta Sanchez      : 1945      MRN: [id number]  Encounter Provider: Dulce Michaud MD  Encounter Date: 2024   Encounter department: [facility]      Assessment & Plan  Type 2 diabetes mellitus with stage 3b chronic kidney disease, with long-term current use of insulin (Formerly [facility])    Lab Results   Component Value Date    HGBA1C 6.7 (A) 2024   Chronic asymptomatic fair control continue current management low-carb diet important lose weight discussed with patient sign of symptom of hypoglycemia review    Orders:    POCT hemoglobin A1c    Bilateral carotid artery stenosis  Chronic asymptomatic patient already on statin due for carotid duplex discussed with patient and she agree we will order today    Orders:    VAS carotid complete study; Future    LVH (left ventricular hypertrophy)  Chronic asymptomatic recent echocardiogram in 2024 reviewed with the patient          Essential hypertension  Chronic asymptomatic fair control continue lisinopril 10 mg once a day low-salt diet important lose weight review              History of Present Illness     Patient here follow-up with a chronic condition compliant with the medication tolerated well without side effect recent blood work and echocardiogram reviewed with the patient      Review of Systems   Constitutional:  Negative for chills and fever.   HENT:  Negative for ear pain and sore throat.    Eyes:  Negative for pain and visual disturbance.   Respiratory:  Negative for cough and shortness of breath.    Cardiovascular:  Negative for chest pain and palpitations.   Gastrointestinal:  Negative for abdominal pain, constipation, diarrhea and vomiting.   Genitourinary:  Negative for dysuria and hematuria.   Musculoskeletal:  Positive for gait problem.   Skin:  Negative for color change and rash.   Neurological:  Negative for seizures and syncope.   All other systems reviewed and are  "negative.    Objective     /70 (BP Location: Left arm, Patient Position: Sitting)   Pulse 74   Temp 98.9 °F (37.2 °C) (Tympanic)   Ht 5' 4\" (1.626 m)   Wt 112 kg (247 lb)   SpO2 97%   BMI 42.40 kg/m²     Physical Exam  Vitals and nursing note reviewed.   Constitutional:       General: She is not in acute distress.     Appearance: She is well-developed. She is not diaphoretic.   HENT:      Head: Normocephalic.      Right Ear: Tympanic membrane and external ear normal.      Left Ear: Tympanic membrane and external ear normal.      Nose: No rhinorrhea.      Mouth/Throat:      Pharynx: No posterior oropharyngeal erythema.   Eyes:      General:         Right eye: No discharge.         Left eye: No discharge.      Conjunctiva/sclera: Conjunctivae normal.   Neck:      Vascular: No JVD.   Cardiovascular:      Rate and Rhythm: Normal rate and regular rhythm.      Heart sounds: Murmur heard.      No gallop.   Pulmonary:      Effort: Pulmonary effort is normal. No respiratory distress.      Breath sounds: Normal breath sounds. No wheezing.   Abdominal:      General: There is no distension.      Palpations: Abdomen is soft. There is no mass.      Tenderness: There is no abdominal tenderness. There is no rebound.   Musculoskeletal:         General: No tenderness.      Cervical back: Normal range of motion and neck supple.   Lymphadenopathy:      Cervical: No cervical adenopathy.   Skin:     General: Skin is warm.      Findings: No rash.   Neurological:      Mental Status: She is alert and oriented to person, place, and time.      Gait: Gait abnormal.         Dulce Michaud MD     "

## 2024-09-01 NOTE — ASSESSMENT & PLAN NOTE
Chronic asymptomatic patient already on statin due for carotid duplex discussed with patient and she agree we will order today    Orders:    VAS carotid complete study; Future

## 2024-09-01 NOTE — TELEPHONE ENCOUNTER
Sleep study resulted and shows  mild sleep apnea with baseline hypoxemia. CPAP titration study     Call to patient about her .and patient said she had a sleep study and has some appointments coming up that HERMANN Cespedes ordered.     Reviewed sleep study results and recommendations with patient.     CPAP titration study scheduled for 12/13/2024 in the [place] sleep lab.  Reviewed requirements and understands cancellation policy.     Discussed PFT test and to call central scheduling to set up, patient has number.     Follow up appointment scheduled with HERMANN Cespedes on 12/16/2024 @ 1:30 pm in the [place] office.

## 2024-09-01 NOTE — TELEPHONE ENCOUNTER
----- Message from HERMANN Cespedes sent at 8/20/2024  2:33 PM EDT -----  Mild sleep apnea was confirmed during the home sleep study.  Oxygen level was low with 155 minutes of the study spent at saturations less than or equal to 88%.  CPAP titration study is needed to evaluate possible need for supplemental oxygen.  Oxygen levels were low during the in lab study as well, even though CRHIS was not confirmed during that study.  ECHO completed yesterday was essentially normal.  A CXR has been ordered for further evaluation, as well as PFTs.  Sleep Center to assist with scheduling testing.

## 2024-09-01 NOTE — ASSESSMENT & PLAN NOTE
Chronic asymptomatic fair control continue lisinopril 10 mg once a day low-salt diet important lose weight review

## 2024-09-01 NOTE — ASSESSMENT & PLAN NOTE
Lab Results   Component Value Date    HGBA1C 6.7 (A) 08/28/2024   Chronic asymptomatic fair control continue current management low-carb diet important lose weight discussed with patient sign of symptom of hypoglycemia review    Orders:    POCT hemoglobin A1c

## 2024-09-05 ENCOUNTER — HOSPITAL ENCOUNTER (OUTPATIENT)
Dept: NON INVASIVE DIAGNOSTICS | Facility: HOSPITAL | Age: 79
Discharge: HOME | End: 2024-09-05
Payer: MEDICARE

## 2024-09-05 DIAGNOSIS — I65.23 BILATERAL CAROTID ARTERY STENOSIS: ICD-10-CM

## 2024-09-05 PROCEDURE — 93880 EXTRACRANIAL BILAT STUDY: CPT | Performed by: SURGERY

## 2024-09-05 PROCEDURE — 93880 EXTRACRANIAL BILAT STUDY: CPT

## 2024-09-19 DIAGNOSIS — N39.46 MIXED STRESS AND URGE URINARY INCONTINENCE: ICD-10-CM

## 2024-09-20 RX ORDER — SOLIFENACIN SUCCINATE 5 MG/1
5 TABLET, FILM COATED ORAL DAILY
Qty: 90 TABLET | Refills: 1 | Status: SHIPPED | OUTPATIENT
Start: 2024-09-20

## 2024-10-11 ENCOUNTER — HOSPITAL ENCOUNTER (OUTPATIENT)
Dept: BONE DENSITY | Facility: CLINIC | Age: 79
End: 2024-10-11
Payer: MEDICARE

## 2024-10-11 DIAGNOSIS — Z78.0 POST-MENOPAUSAL: ICD-10-CM

## 2024-10-11 PROCEDURE — 77080 DXA BONE DENSITY AXIAL: CPT

## 2024-10-20 DIAGNOSIS — L98.9 SKIN LESION: ICD-10-CM

## 2024-10-20 DIAGNOSIS — N18.30 TYPE 2 DIABETES MELLITUS WITH STAGE 3 CHRONIC KIDNEY DISEASE, WITH LONG-TERM CURRENT USE OF INSULIN (HCC): ICD-10-CM

## 2024-10-20 DIAGNOSIS — E11.22 TYPE 2 DIABETES MELLITUS WITH STAGE 3 CHRONIC KIDNEY DISEASE, WITH LONG-TERM CURRENT USE OF INSULIN (HCC): ICD-10-CM

## 2024-10-20 DIAGNOSIS — Z79.4 TYPE 2 DIABETES MELLITUS WITH STAGE 3 CHRONIC KIDNEY DISEASE, WITH LONG-TERM CURRENT USE OF INSULIN (HCC): ICD-10-CM

## 2024-10-21 ENCOUNTER — RA CDI HCC (OUTPATIENT)
Dept: OTHER | Facility: HOSPITAL | Age: 79
End: 2024-10-21

## 2024-10-21 ENCOUNTER — HOSPITAL ENCOUNTER (OUTPATIENT)
Dept: PULMONOLOGY | Facility: HOSPITAL | Age: 79
Discharge: HOME | End: 2024-10-21
Payer: MEDICARE

## 2024-10-21 ENCOUNTER — HOSPITAL ENCOUNTER (OUTPATIENT)
Dept: RADIOLOGY | Facility: HOSPITAL | Age: 79
Discharge: HOME | End: 2024-10-21
Payer: MEDICARE

## 2024-10-21 DIAGNOSIS — R53.83 OTHER FATIGUE: ICD-10-CM

## 2024-10-21 DIAGNOSIS — G47.34 NOCTURNAL HYPOXEMIA: ICD-10-CM

## 2024-10-21 PROCEDURE — 94729 DIFFUSING CAPACITY: CPT

## 2024-10-21 PROCEDURE — 94729 DIFFUSING CAPACITY: CPT | Performed by: INTERNAL MEDICINE

## 2024-10-21 PROCEDURE — 94726 PLETHYSMOGRAPHY LUNG VOLUMES: CPT | Performed by: INTERNAL MEDICINE

## 2024-10-21 PROCEDURE — 94726 PLETHYSMOGRAPHY LUNG VOLUMES: CPT

## 2024-10-21 PROCEDURE — 94060 EVALUATION OF WHEEZING: CPT

## 2024-10-21 PROCEDURE — 71046 X-RAY EXAM CHEST 2 VIEWS: CPT

## 2024-10-21 PROCEDURE — 94760 N-INVAS EAR/PLS OXIMETRY 1: CPT

## 2024-10-21 PROCEDURE — 94060 EVALUATION OF WHEEZING: CPT | Performed by: INTERNAL MEDICINE

## 2024-10-21 RX ORDER — ALBUTEROL SULFATE 0.83 MG/ML
2.5 SOLUTION RESPIRATORY (INHALATION) ONCE AS NEEDED
Status: COMPLETED | OUTPATIENT
Start: 2024-10-21 | End: 2024-10-21

## 2024-10-21 RX ADMIN — ALBUTEROL SULFATE 2.5 MG: 2.5 SOLUTION RESPIRATORY (INHALATION) at 09:28

## 2024-10-21 NOTE — PROGRESS NOTES
HCC coding opportunities          Chart Reviewed number of suggestions sent to Provider: 2     E11,51  E11.40    Patients Insurance     Medicare Insurance: Medicare

## 2024-10-22 RX ORDER — INSULIN DEGLUDEC 100 U/ML
70 INJECTION, SOLUTION SUBCUTANEOUS
Qty: 75 ML | Refills: 0 | Status: SHIPPED | OUTPATIENT
Start: 2024-10-22

## 2024-10-22 RX ORDER — CLOTRIMAZOLE 1 %
CREAM (GRAM) TOPICAL 2 TIMES DAILY
Qty: 113 G | Refills: 0 | Status: SHIPPED | OUTPATIENT
Start: 2024-10-22

## 2024-10-28 ENCOUNTER — OFFICE VISIT (OUTPATIENT)
Dept: FAMILY MEDICINE CLINIC | Facility: CLINIC | Age: 79
End: 2024-10-28
Payer: MEDICARE

## 2024-10-28 VITALS
TEMPERATURE: 97.3 F | OXYGEN SATURATION: 96 % | DIASTOLIC BLOOD PRESSURE: 80 MMHG | WEIGHT: 241 LBS | BODY MASS INDEX: 44.35 KG/M2 | SYSTOLIC BLOOD PRESSURE: 120 MMHG | HEIGHT: 62 IN | HEART RATE: 66 BPM

## 2024-10-28 DIAGNOSIS — Z23 ENCOUNTER FOR IMMUNIZATION: ICD-10-CM

## 2024-10-28 DIAGNOSIS — E78.2 MIXED HYPERLIPIDEMIA: Primary | ICD-10-CM

## 2024-10-28 DIAGNOSIS — K76.0 NONALCOHOLIC FATTY LIVER DISEASE: ICD-10-CM

## 2024-10-28 DIAGNOSIS — Z79.4 TYPE 2 DIABETES MELLITUS WITH STAGE 3B CHRONIC KIDNEY DISEASE, WITH LONG-TERM CURRENT USE OF INSULIN (HCC): ICD-10-CM

## 2024-10-28 DIAGNOSIS — E78.1 HYPERTRIGLYCERIDEMIA, ESSENTIAL: ICD-10-CM

## 2024-10-28 DIAGNOSIS — E11.22 TYPE 2 DIABETES MELLITUS WITH STAGE 3B CHRONIC KIDNEY DISEASE, WITH LONG-TERM CURRENT USE OF INSULIN (HCC): ICD-10-CM

## 2024-10-28 DIAGNOSIS — Z13.29 SCREENING FOR THYROID DISORDER: ICD-10-CM

## 2024-10-28 DIAGNOSIS — Z23 NEED FOR INFLUENZA VACCINATION: ICD-10-CM

## 2024-10-28 DIAGNOSIS — N18.32 TYPE 2 DIABETES MELLITUS WITH STAGE 3B CHRONIC KIDNEY DISEASE, WITH LONG-TERM CURRENT USE OF INSULIN (HCC): ICD-10-CM

## 2024-10-28 DIAGNOSIS — E55.9 VITAMIN D DEFICIENCY: ICD-10-CM

## 2024-10-28 PROCEDURE — G0008 ADMIN INFLUENZA VIRUS VAC: HCPCS

## 2024-10-28 PROCEDURE — 99214 OFFICE O/P EST MOD 30 MIN: CPT | Performed by: FAMILY MEDICINE

## 2024-10-28 PROCEDURE — 90662 IIV NO PRSV INCREASED AG IM: CPT

## 2024-10-30 NOTE — ASSESSMENT & PLAN NOTE
Lab Results   Component Value Date    HGBA1C 6.7 (A) 08/28/2024       Orders:    Hemoglobin A1C; Future    Albumin / creatinine urine ratio; Future

## 2024-10-30 NOTE — ASSESSMENT & PLAN NOTE
Chronic asymptomatic fair control continue current management including simvastatin 10 mg once a day low-fat diet important lose weight review    Orders:    CBC and differential; Future    Comprehensive metabolic panel; Future    Lipid Panel with Direct LDL reflex; Future

## 2024-10-30 NOTE — PROGRESS NOTES
Ambulatory Visit  Name: Alberta Sanchez      : 1945      MRN: [id number]  Encounter Provider: Dulce Michaud MD  Encounter Date: 10/28/2024   Encounter department: [facility]      Assessment & Plan  Mixed hyperlipidemia  Chronic asymptomatic fair control continue current management including simvastatin 10 mg once a day low-fat diet important lose weight review    Orders:    CBC and differential; Future    Comprehensive metabolic panel; Future    Lipid Panel with Direct LDL reflex; Future    Encounter for immunization    Orders:    Fluzone High-Dose 0.5 mL IM    CBC and differential; Future    Comprehensive metabolic panel; Future    Vitamin D deficiency  Chronic asymptomatic continue vitamin D supplement vitamin D rich diet discussed with the patient    Orders:    CBC and differential; Future    Comprehensive metabolic panel; Future    Nonalcoholic fatty liver disease  Chronic asymptomatic patient follow-up with the GI periodically discussed important control her blood sugar discussed important lose weight    Orders:    CBC and differential; Future    Comprehensive metabolic panel; Future    Need for influenza vaccination    Orders:    Fluzone High-Dose 0.5 mL IM    CBC and differential; Future    Comprehensive metabolic panel; Future    Hypertriglyceridemia, essential  Chronic asymptomatic slight increase in the triglyceride compared with before patient already on statin and she is on fenofibrate 160 mg continue current management discussed low-carb diet important lose weight discussed important control blood sugar    Orders:    CBC and differential; Future    Comprehensive metabolic panel; Future    Type 2 diabetes mellitus with stage 3b chronic kidney disease, with long-term current use of insulin (HCC)    Lab Results   Component Value Date    HGBA1C 6.7 (A) 2024       Orders:    Hemoglobin A1C; Future    Albumin / creatinine urine ratio; Future    Screening for  "thyroid disorder    Orders:    TSH, 3rd generation with Free T4 reflex; Future         History of Present Illness     Patient here follow-up with a chronic condition compliant with the medication tolerated well without side effect no new concern recent blood work reviewed with the patient also I reviewed with the patient result of the DEXA scan normal review the result of carotid duplex stenosis bilateral less than 50%      Review of Systems   Constitutional:  Negative for chills and fever.   HENT:  Negative for ear pain and sore throat.    Eyes:  Negative for pain and visual disturbance.   Respiratory:  Negative for cough and shortness of breath.    Cardiovascular:  Negative for chest pain and palpitations.   Gastrointestinal:  Negative for abdominal pain, constipation, diarrhea and vomiting.   Genitourinary:  Negative for dysuria and hematuria.   Skin:  Negative for color change and rash.   Neurological:  Negative for seizures and syncope.   All other systems reviewed and are negative.    Objective     /80 (BP Location: Left arm, Patient Position: Sitting)   Pulse 66   Temp (!) 97.3 °F (36.3 °C) (Tympanic)   Ht 5' 2.25\" (1.581 m)   Wt 109 kg (241 lb)   SpO2 96%   Breastfeeding No   BMI 43.73 kg/m²     Physical Exam  Vitals and nursing note reviewed.   Constitutional:       General: She is not in acute distress.     Appearance: She is well-developed. She is not diaphoretic.   HENT:      Head: Normocephalic.      Right Ear: Tympanic membrane and external ear normal.      Left Ear: Tympanic membrane and external ear normal.      Nose: No rhinorrhea.      Mouth/Throat:      Pharynx: No posterior oropharyngeal erythema.   Eyes:      General:         Right eye: No discharge.         Left eye: No discharge.      Conjunctiva/sclera: Conjunctivae normal.   Neck:      Vascular: No JVD.   Cardiovascular:      Rate and Rhythm: Normal rate and regular rhythm.      Heart sounds: Normal heart sounds.      No gallop. "   Pulmonary:      Effort: Pulmonary effort is normal. No respiratory distress.      Breath sounds: Normal breath sounds. No wheezing.   Abdominal:      General: There is no distension.      Palpations: Abdomen is soft.      Tenderness: There is no abdominal tenderness. There is no rebound.   Musculoskeletal:         General: No tenderness.      Cervical back: Normal range of motion and neck supple.   Lymphadenopathy:      Cervical: No cervical adenopathy.   Skin:     General: Skin is warm.      Findings: No rash.   Neurological:      Mental Status: She is alert and oriented to person, place, and time.      Gait: Gait abnormal.         Dulce Michaud MD

## 2024-10-30 NOTE — ASSESSMENT & PLAN NOTE
Chronic asymptomatic continue vitamin D supplement vitamin D rich diet discussed with the patient    Orders:    CBC and differential; Future    Comprehensive metabolic panel; Future

## 2024-10-30 NOTE — ASSESSMENT & PLAN NOTE
Chronic asymptomatic slight increase in the triglyceride compared with before patient already on statin and she is on fenofibrate 160 mg continue current management discussed low-carb diet important lose weight discussed important control blood sugar    Orders:    CBC and differential; Future    Comprehensive metabolic panel; Future

## 2024-10-30 NOTE — ASSESSMENT & PLAN NOTE
Chronic asymptomatic patient follow-up with the GI periodically discussed important control her blood sugar discussed important lose weight    Orders:    CBC and differential; Future    Comprehensive metabolic panel; Future

## 2024-11-27 DIAGNOSIS — E78.2 MIXED HYPERLIPIDEMIA: ICD-10-CM

## 2024-11-28 RX ORDER — SIMVASTATIN 10 MG
10 TABLET ORAL
Qty: 90 TABLET | Refills: 3 | Status: SHIPPED | OUTPATIENT
Start: 2024-11-28

## 2024-12-11 DIAGNOSIS — G43.009 MIGRAINE WITHOUT AURA AND WITHOUT STATUS MIGRAINOSUS, NOT INTRACTABLE: ICD-10-CM

## 2024-12-11 DIAGNOSIS — M54.41 CHRONIC BILATERAL LOW BACK PAIN WITH BILATERAL SCIATICA: ICD-10-CM

## 2024-12-11 DIAGNOSIS — G89.29 CHRONIC BILATERAL LOW BACK PAIN WITH BILATERAL SCIATICA: ICD-10-CM

## 2024-12-11 DIAGNOSIS — M54.42 CHRONIC BILATERAL LOW BACK PAIN WITH BILATERAL SCIATICA: ICD-10-CM

## 2024-12-12 RX ORDER — TOPIRAMATE 25 MG/1
25 TABLET, FILM COATED ORAL DAILY
Qty: 90 TABLET | Refills: 0 | Status: ON HOLD | OUTPATIENT
Start: 2024-12-12

## 2024-12-13 RX ORDER — PREGABALIN 75 MG/1
75 CAPSULE ORAL 3 TIMES DAILY
Qty: 270 CAPSULE | Refills: 0 | Status: ON HOLD | OUTPATIENT
Start: 2024-12-13

## 2024-12-18 ENCOUNTER — APPOINTMENT (OUTPATIENT)
Dept: RADIOLOGY | Facility: HOSPITAL | Age: 79
DRG: 392 | End: 2024-12-18
Payer: MEDICARE

## 2024-12-18 ENCOUNTER — HOSPITAL ENCOUNTER (INPATIENT)
Facility: HOSPITAL | Age: 79
LOS: 4 days | Discharge: NURSING, REHAB OR LONG-TERM CARE | DRG: 392 | End: 2024-12-23
Attending: EMERGENCY MEDICINE | Admitting: INTERNAL MEDICINE
Payer: MEDICARE

## 2024-12-18 ENCOUNTER — APPOINTMENT (EMERGENCY)
Dept: CT IMAGING | Facility: HOSPITAL | Age: 79
DRG: 392 | End: 2024-12-18
Payer: MEDICARE

## 2024-12-18 DIAGNOSIS — M25.561 RIGHT KNEE PAIN, UNSPECIFIED CHRONICITY: ICD-10-CM

## 2024-12-18 DIAGNOSIS — R19.7 DIARRHEA OF PRESUMED INFECTIOUS ORIGIN: Primary | ICD-10-CM

## 2024-12-18 DIAGNOSIS — R53.1 GENERALIZED WEAKNESS: ICD-10-CM

## 2024-12-18 DIAGNOSIS — R11.0 NAUSEA: ICD-10-CM

## 2024-12-18 DIAGNOSIS — R10.9 ABDOMINAL PAIN: ICD-10-CM

## 2024-12-18 PROBLEM — K52.9 GASTROENTERITIS: Status: ACTIVE | Noted: 2024-12-18

## 2024-12-18 LAB
ALBUMIN SERPL BCG-MCNC: 4 G/DL (ref 3.5–5)
ALP SERPL-CCNC: 108 U/L (ref 34–104)
ALT SERPL W P-5'-P-CCNC: 28 U/L (ref 7–52)
ANION GAP SERPL CALCULATED.3IONS-SCNC: 10 MMOL/L (ref 4–13)
AST SERPL W P-5'-P-CCNC: 39 U/L (ref 13–39)
BASOPHILS # BLD MANUAL: 0 THOUSAND/UL (ref 0–0.1)
BASOPHILS NFR MAR MANUAL: 0 % (ref 0–1)
BILIRUB SERPL-MCNC: 1.04 MG/DL (ref 0.2–1)
BUN SERPL-MCNC: 22 MG/DL (ref 5–25)
CALCIUM SERPL-MCNC: 9.4 MG/DL (ref 8.4–10.2)
CHLORIDE SERPL-SCNC: 101 MMOL/L (ref 96–108)
CO2 SERPL-SCNC: 26 MMOL/L (ref 21–32)
CREAT SERPL-MCNC: 1.11 MG/DL (ref 0.6–1.3)
EOSINOPHIL # BLD MANUAL: 0.13 THOUSAND/UL (ref 0–0.4)
EOSINOPHIL NFR BLD MANUAL: 1 % (ref 0–6)
ERYTHROCYTE [DISTWIDTH] IN BLOOD BY AUTOMATED COUNT: 13.5 % (ref 11.6–15.1)
EST. AVERAGE GLUCOSE BLD GHB EST-MCNC: 137 MG/DL
FLUAV AG UPPER RESP QL IA.RAPID: NEGATIVE
FLUBV AG UPPER RESP QL IA.RAPID: NEGATIVE
GFR SERPL CREATININE-BSD FRML MDRD: 47 ML/MIN/1.73SQ M
GLUCOSE SERPL-MCNC: 146 MG/DL (ref 65–140)
GLUCOSE SERPL-MCNC: 151 MG/DL (ref 65–140)
GLUCOSE SERPL-MCNC: 191 MG/DL (ref 65–140)
HBA1C MFR BLD: 6.4 %
HCT VFR BLD AUTO: 41.3 % (ref 34.8–46.1)
HGB BLD-MCNC: 13.2 G/DL (ref 11.5–15.4)
LACTATE SERPL-SCNC: 0.9 MMOL/L (ref 0.5–2)
LIPASE SERPL-CCNC: 6 U/L (ref 11–82)
LYMPHOCYTES # BLD AUTO: 0.67 THOUSAND/UL (ref 0.6–4.47)
LYMPHOCYTES # BLD AUTO: 5 % (ref 14–44)
MCH RBC QN AUTO: 29.4 PG (ref 26.8–34.3)
MCHC RBC AUTO-ENTMCNC: 32 G/DL (ref 31.4–37.4)
MCV RBC AUTO: 92 FL (ref 82–98)
MONOCYTES # BLD AUTO: 0.67 THOUSAND/UL (ref 0–1.22)
MONOCYTES NFR BLD: 5 % (ref 4–12)
NEUTROPHILS # BLD MANUAL: 11.86 THOUSAND/UL (ref 1.85–7.62)
NEUTS SEG NFR BLD AUTO: 89 % (ref 43–75)
PLATELET # BLD AUTO: 143 THOUSANDS/UL (ref 149–390)
PLATELET BLD QL SMEAR: ABNORMAL
PMV BLD AUTO: 11.1 FL (ref 8.9–12.7)
POTASSIUM SERPL-SCNC: 4.3 MMOL/L (ref 3.5–5.3)
PROT SERPL-MCNC: 7.6 G/DL (ref 6.4–8.4)
RBC # BLD AUTO: 4.49 MILLION/UL (ref 3.81–5.12)
RBC MORPH BLD: NORMAL
SARS-COV+SARS-COV-2 AG RESP QL IA.RAPID: NEGATIVE
SODIUM SERPL-SCNC: 137 MMOL/L (ref 135–147)
WBC # BLD AUTO: 13.33 THOUSAND/UL (ref 4.31–10.16)

## 2024-12-18 PROCEDURE — 82948 REAGENT STRIP/BLOOD GLUCOSE: CPT

## 2024-12-18 PROCEDURE — 87811 SARS-COV-2 COVID19 W/OPTIC: CPT | Performed by: EMERGENCY MEDICINE

## 2024-12-18 PROCEDURE — 99284 EMERGENCY DEPT VISIT MOD MDM: CPT

## 2024-12-18 PROCEDURE — 83605 ASSAY OF LACTIC ACID: CPT | Performed by: EMERGENCY MEDICINE

## 2024-12-18 PROCEDURE — 96365 THER/PROPH/DIAG IV INF INIT: CPT

## 2024-12-18 PROCEDURE — 83690 ASSAY OF LIPASE: CPT | Performed by: EMERGENCY MEDICINE

## 2024-12-18 PROCEDURE — 85027 COMPLETE CBC AUTOMATED: CPT | Performed by: EMERGENCY MEDICINE

## 2024-12-18 PROCEDURE — 73562 X-RAY EXAM OF KNEE 3: CPT

## 2024-12-18 PROCEDURE — 99223 1ST HOSP IP/OBS HIGH 75: CPT | Performed by: INTERNAL MEDICINE

## 2024-12-18 PROCEDURE — 36415 COLL VENOUS BLD VENIPUNCTURE: CPT | Performed by: EMERGENCY MEDICINE

## 2024-12-18 PROCEDURE — 96366 THER/PROPH/DIAG IV INF ADDON: CPT

## 2024-12-18 PROCEDURE — 83036 HEMOGLOBIN GLYCOSYLATED A1C: CPT | Performed by: INTERNAL MEDICINE

## 2024-12-18 PROCEDURE — 96375 TX/PRO/DX INJ NEW DRUG ADDON: CPT

## 2024-12-18 PROCEDURE — 80053 COMPREHEN METABOLIC PANEL: CPT | Performed by: EMERGENCY MEDICINE

## 2024-12-18 PROCEDURE — 87804 INFLUENZA ASSAY W/OPTIC: CPT | Performed by: EMERGENCY MEDICINE

## 2024-12-18 PROCEDURE — 85007 BL SMEAR W/DIFF WBC COUNT: CPT | Performed by: EMERGENCY MEDICINE

## 2024-12-18 PROCEDURE — 99285 EMERGENCY DEPT VISIT HI MDM: CPT | Performed by: EMERGENCY MEDICINE

## 2024-12-18 PROCEDURE — 74177 CT ABD & PELVIS W/CONTRAST: CPT

## 2024-12-18 RX ORDER — ACETAMINOPHEN 325 MG/1
650 TABLET ORAL EVERY 6 HOURS PRN
Status: DISCONTINUED | OUTPATIENT
Start: 2024-12-18 | End: 2024-12-23 | Stop reason: HOSPADM

## 2024-12-18 RX ORDER — SODIUM CHLORIDE, SODIUM GLUCONATE, SODIUM ACETATE, POTASSIUM CHLORIDE, MAGNESIUM CHLORIDE, SODIUM PHOSPHATE, DIBASIC, AND POTASSIUM PHOSPHATE .53; .5; .37; .037; .03; .012; .00082 G/100ML; G/100ML; G/100ML; G/100ML; G/100ML; G/100ML; G/100ML
100 INJECTION, SOLUTION INTRAVENOUS CONTINUOUS
Status: DISCONTINUED | OUTPATIENT
Start: 2024-12-18 | End: 2024-12-19

## 2024-12-18 RX ORDER — TOPIRAMATE 25 MG/1
25 TABLET, FILM COATED ORAL DAILY
Status: DISCONTINUED | OUTPATIENT
Start: 2024-12-19 | End: 2024-12-23 | Stop reason: HOSPADM

## 2024-12-18 RX ORDER — LISINOPRIL 10 MG/1
10 TABLET ORAL DAILY
Status: DISCONTINUED | OUTPATIENT
Start: 2024-12-19 | End: 2024-12-23 | Stop reason: HOSPADM

## 2024-12-18 RX ORDER — ONDANSETRON 2 MG/ML
4 INJECTION INTRAMUSCULAR; INTRAVENOUS EVERY 6 HOURS PRN
Status: DISCONTINUED | OUTPATIENT
Start: 2024-12-18 | End: 2024-12-23 | Stop reason: HOSPADM

## 2024-12-18 RX ORDER — SODIUM CHLORIDE, SODIUM GLUCONATE, SODIUM ACETATE, POTASSIUM CHLORIDE, MAGNESIUM CHLORIDE, SODIUM PHOSPHATE, DIBASIC, AND POTASSIUM PHOSPHATE .53; .5; .37; .037; .03; .012; .00082 G/100ML; G/100ML; G/100ML; G/100ML; G/100ML; G/100ML; G/100ML
1000 INJECTION, SOLUTION INTRAVENOUS ONCE
Status: COMPLETED | OUTPATIENT
Start: 2024-12-18 | End: 2024-12-18

## 2024-12-18 RX ORDER — FENOFIBRATE 145 MG/1
145 TABLET, COATED ORAL DAILY
Status: DISCONTINUED | OUTPATIENT
Start: 2024-12-19 | End: 2024-12-23 | Stop reason: HOSPADM

## 2024-12-18 RX ORDER — ACETAMINOPHEN 10 MG/ML
1000 INJECTION, SOLUTION INTRAVENOUS ONCE
Status: COMPLETED | OUTPATIENT
Start: 2024-12-18 | End: 2024-12-18

## 2024-12-18 RX ORDER — INSULIN LISPRO 100 [IU]/ML
1-5 INJECTION, SOLUTION INTRAVENOUS; SUBCUTANEOUS
Status: DISCONTINUED | OUTPATIENT
Start: 2024-12-18 | End: 2024-12-23 | Stop reason: HOSPADM

## 2024-12-18 RX ORDER — PANTOPRAZOLE SODIUM 40 MG/1
40 TABLET, DELAYED RELEASE ORAL DAILY
Status: DISCONTINUED | OUTPATIENT
Start: 2024-12-19 | End: 2024-12-23 | Stop reason: HOSPADM

## 2024-12-18 RX ORDER — INSULIN GLARGINE 100 [IU]/ML
45 INJECTION, SOLUTION SUBCUTANEOUS
Status: DISCONTINUED | OUTPATIENT
Start: 2024-12-18 | End: 2024-12-23 | Stop reason: HOSPADM

## 2024-12-18 RX ORDER — ENOXAPARIN SODIUM 100 MG/ML
40 INJECTION SUBCUTANEOUS EVERY 12 HOURS SCHEDULED
Status: DISCONTINUED | OUTPATIENT
Start: 2024-12-18 | End: 2024-12-23 | Stop reason: HOSPADM

## 2024-12-18 RX ORDER — INSULIN LISPRO 100 [IU]/ML
1-6 INJECTION, SOLUTION INTRAVENOUS; SUBCUTANEOUS
Status: DISCONTINUED | OUTPATIENT
Start: 2024-12-18 | End: 2024-12-23 | Stop reason: HOSPADM

## 2024-12-18 RX ORDER — PREGABALIN 75 MG/1
75 CAPSULE ORAL 3 TIMES DAILY
Status: DISCONTINUED | OUTPATIENT
Start: 2024-12-18 | End: 2024-12-23 | Stop reason: HOSPADM

## 2024-12-18 RX ORDER — ENOXAPARIN SODIUM 100 MG/ML
40 INJECTION SUBCUTANEOUS DAILY
Status: DISCONTINUED | OUTPATIENT
Start: 2024-12-19 | End: 2024-12-18

## 2024-12-18 RX ORDER — ALBUTEROL SULFATE 90 UG/1
2 INHALANT RESPIRATORY (INHALATION) EVERY 4 HOURS PRN
Status: DISCONTINUED | OUTPATIENT
Start: 2024-12-18 | End: 2024-12-23 | Stop reason: HOSPADM

## 2024-12-18 RX ORDER — OXYCODONE HYDROCHLORIDE 5 MG/1
5 TABLET ORAL EVERY 6 HOURS PRN
Refills: 0 | Status: DISCONTINUED | OUTPATIENT
Start: 2024-12-18 | End: 2024-12-23 | Stop reason: HOSPADM

## 2024-12-18 RX ADMIN — IOHEXOL 100 ML: 350 INJECTION, SOLUTION INTRAVENOUS at 12:08

## 2024-12-18 RX ADMIN — PREGABALIN 75 MG: 75 CAPSULE ORAL at 21:26

## 2024-12-18 RX ADMIN — OXYCODONE HYDROCHLORIDE 5 MG: 5 TABLET ORAL at 15:24

## 2024-12-18 RX ADMIN — SODIUM CHLORIDE, SODIUM GLUCONATE, SODIUM ACETATE, POTASSIUM CHLORIDE, MAGNESIUM CHLORIDE, SODIUM PHOSPHATE, DIBASIC, AND POTASSIUM PHOSPHATE 100 ML/HR: .53; .5; .37; .037; .03; .012; .00082 INJECTION, SOLUTION INTRAVENOUS at 16:42

## 2024-12-18 RX ADMIN — PREGABALIN 75 MG: 75 CAPSULE ORAL at 16:42

## 2024-12-18 RX ADMIN — INSULIN GLARGINE 45 UNITS: 100 INJECTION, SOLUTION SUBCUTANEOUS at 21:26

## 2024-12-18 RX ADMIN — ACETAMINOPHEN 1000 MG: 1000 INJECTION, SOLUTION INTRAVENOUS at 11:28

## 2024-12-18 RX ADMIN — SODIUM CHLORIDE, SODIUM GLUCONATE, SODIUM ACETATE, POTASSIUM CHLORIDE, MAGNESIUM CHLORIDE, SODIUM PHOSPHATE, DIBASIC, AND POTASSIUM PHOSPHATE 1000 ML: .53; .5; .37; .037; .03; .012; .00082 INJECTION, SOLUTION INTRAVENOUS at 11:27

## 2024-12-18 RX ADMIN — INSULIN LISPRO 1 UNITS: 100 INJECTION, SOLUTION INTRAVENOUS; SUBCUTANEOUS at 21:26

## 2024-12-18 RX ADMIN — ENOXAPARIN SODIUM 40 MG: 40 INJECTION SUBCUTANEOUS at 21:26

## 2024-12-18 NOTE — ASSESSMENT & PLAN NOTE
Lab Results   Component Value Date    EGFR 47 12/18/2024    EGFR 60 08/29/2024    EGFR 59 05/13/2024    CREATININE 1.11 12/18/2024    CREATININE 0.90 08/29/2024    CREATININE 0.92 05/13/2024   Creatinine seems to be at baseline.  Will continue routine lab monitoring.  Avoid any nephrotoxic meds

## 2024-12-18 NOTE — ASSESSMENT & PLAN NOTE
Patient with GI complaints including nausea, decreased appetite, and diarrhea  No BM noted today, if patient does have further diarrhea will order stool studies  Continue IV fluids  Will start with surgical soft diet and advance as tolerated  Monitor electrolytes  CT abdomen/pelvis with no acute findings noted  Continue supportive care  No need for antibiotics at this time

## 2024-12-18 NOTE — ED PROVIDER NOTES
Time reflects when diagnosis was documented in both MDM as applicable and the Disposition within this note       Time User Action Codes Description Comment    12/18/2024  1:41 PM Kravchuk, Marilee Add [R11.0] Nausea     12/18/2024  1:41 PM Kravchuk, Marilee Add [R19.7] Diarrhea of presumed infectious origin     12/18/2024  1:41 PM Kravchuk, Marilee Modify [R11.0] Nausea     12/18/2024  1:41 PM Kravchuk, Marilee Modify [R19.7] Diarrhea of presumed infectious origin     12/18/2024  1:41 PM Kravchuk, Marilee Add [R10.9] Abdominal pain     12/18/2024  1:41 PM Kravchuk, Marilee Add [R53.1] Generalized weakness           ED Disposition       ED Disposition   Admit    Condition   Stable    Date/Time   Wed Dec 18, 2024  1:41 PM    Comment   Case was discussed with Dr. Lee and the patient's admission status was agreed to be Admission Status: observation status to the service of Dr. Lee.               Assessment & Plan       Medical Decision Making  79-year-old female presenting with nausea, abdominal pain, diarrhea, and generalized weakness for the past 5 days.  Vital signs reviewed, afebrile and within normal limits.  Differential diagnosis includes viral gastroenteritis which we have an outbreak off locally, infectious diarrhea, colitis, diverticulitis, pancreatitis, versus another etiology of symptoms.  Patient is diffusely tender throughout the abdomen, particularly in the lower abdomen.  Abdominal exam is nonsurgical.  Labs reveal CMP with creatinine of 1.11, up from 0.9 but not reaching criteria for ARACELI, no significant electrolyte abnormality, normal lactate, CBC with leukocytosis of 13.33 with neutrophil predominance, negative antigen flu and COVID panel.  CT of abdomen pelvis with IV contrast reveals no acute intra-abdominal pathology.  Despite IV fluids, patient continues to feel overall weak and is unable to get up to ambulate.  At this time, decision made to admit patient to the hospital for further hydration, symptom  control, and PT/OT evaluation.    Problems Addressed:  Abdominal pain: acute illness or injury  Diarrhea of presumed infectious origin: acute illness or injury  Generalized weakness: acute illness or injury  Nausea: acute illness or injury    Amount and/or Complexity of Data Reviewed  External Data Reviewed: ECG and notes.  Labs: ordered. Decision-making details documented in ED Course.  Radiology: ordered. Decision-making details documented in ED Course.    Risk  Prescription drug management.  Decision regarding hospitalization.        ED Course as of 12/18/24 1820   Wed Dec 18, 2024   1339 Patient reassessed, feels a bit better but still weak. Updated patient on her lab and imaging findings.       Medications   acetaminophen (TYLENOL) tablet 650 mg (has no administration in time range)   oxyCODONE (ROXICODONE) split tablet 2.5 mg (has no administration in time range)   oxyCODONE (ROXICODONE) IR tablet 5 mg (has no administration in time range)   multi-electrolyte (ISOLYTE-S PH 7.4) bolus 1,000 mL (0 mL Intravenous Stopped 12/18/24 1418)   acetaminophen (Ofirmev) injection 1,000 mg (0 mg Intravenous Stopped 12/18/24 1143)   iohexol (OMNIPAQUE) 350 MG/ML injection (MULTI-DOSE) 100 mL (100 mL Intravenous Given 12/18/24 1208)       ED Risk Strat Scores                          SBIRT 20yo+      Flowsheet Row Most Recent Value   Initial Alcohol Screen: US AUDIT-C     1. How often do you have a drink containing alcohol? 0 Filed at: 12/18/2024 1026   2. How many drinks containing alcohol do you have on a typical day you are drinking?  0 Filed at: 12/18/2024 1026   3a. Male UNDER 65: How often do you have five or more drinks on one occasion? 0 Filed at: 12/18/2024 1026   3b. FEMALE Any Age, or MALE 65+: How often do you have 4 or more drinks on one occassion? 0 Filed at: 12/18/2024 1026   Audit-C Score 0 Filed at: 12/18/2024 1026   DEONDRE: How many times in the past year have you...    Used an illegal drug or used a  prescription medication for non-medical reasons? Never Filed at: 12/18/2024 1026                            History of Present Illness       Chief Complaint   Patient presents with    Diarrhea     Patient arrived via ems from home with c/o flu symptoms since Friday. Patient reports increased confusion, diarrhea, fevers/chills, nausea.        Past Medical History:   Diagnosis Date    Abnormal finding in urine 1/9/2023    Allergic Springtime    Runny nose, congestion    Anemia     Arthritis 10/30/2020    Chronic kidney disease     Diabetes mellitus (HCC)     Fibromyalgia, primary     Fracture of wrist     RIGHT    Gastroenteritis 12/18/2024    Heart murmur 06/30/2020    History of non-insulin dependent diabetes mellitus     Hx of thrombocytopenia 02/18/2022    Hyperlipidemia     Hypertension     Hypertension     IBS (irritable bowel syndrome)     Inflammatory bowel disease 2015    Been doctoring for years    Irritable bowel     Kidney stone     Metabolic syndrome     Obesity     RLS (restless legs syndrome)     Routine medical exam 06/28/2019    Skin lesion 2/8/2021    Sleep apnea     Sleep apnea     ON CPAP    Upper respiratory tract infection 01/19/2022    Urge incontinence     Urinary incontinence     Urinary tract infection 1/1/2023      Past Surgical History:   Procedure Laterality Date    CHOLECYSTECTOMY  2013    COLONOSCOPY  2010    CYSTOSCOPY      HERNIA REPAIR  2008    HYSTERECTOMY N/A 1974    partial; ovaries intact    KNEE SURGERY Left       Family History   Problem Relation Age of Onset    Breast cancer Mother 62        Breast cancer    Suicidality Father     Melanoma Sister     Bipolar disorder Sister     Depression Sister         Bi-polar    Bipolar disorder Sister     Completed Suicide  Sister     Rheum arthritis Daughter     No Known Problems Daughter     No Known Problems Maternal Grandmother     No Known Problems Maternal Grandfather     No Known Problems Paternal Grandmother     No Known Problems  Paternal Grandfather     Heart disease Brother     No Known Problems Maternal Aunt     No Known Problems Maternal Aunt     BRCA2 Positive Neg Hx     BRCA1 Positive Neg Hx     BRCA2 Negative Neg Hx     BRCA1 Negative Neg Hx     BRCA 1/2 Neg Hx     Ovarian cancer Neg Hx     Endometrial cancer Neg Hx     Colon cancer Neg Hx     Breast cancer additional onset Neg Hx       Social History     Tobacco Use    Smoking status: Never     Passive exposure: Never    Smokeless tobacco: Never   Vaping Use    Vaping status: Never Used   Substance Use Topics    Alcohol use: Not Currently     Comment: no caffeine use    Drug use: No      E-Cigarette/Vaping    E-Cigarette Use Never User       E-Cigarette/Vaping Substances    Nicotine No     THC No     CBD No     Flavoring No     Other No     Unknown No       I have reviewed and agree with the history as documented.     79-year-old female with past medical history of diabetes mellitus, CKD, carotid artery disease, obesity, presenting with generalized weakness, feeling hot and cold, nausea, dry heaves, abdominal pain, and watery diarrhea.  Started on Friday, 5 days ago and have progressively worsened.  Patient feels so if she is unable to ambulate at this time.  She has associated low back pain, as well as right knee pain.  She has been trying to take little sips of fluids to hydrate.  She has been getting progressively worsening generalized weakness prompting her to call 911 today.        Review of Systems   Constitutional:  Positive for chills.   Eyes:  Positive for redness.   Respiratory:  Positive for shortness of breath.    Cardiovascular:  Negative for chest pain.   Gastrointestinal:  Positive for abdominal pain, diarrhea and nausea. Negative for blood in stool and vomiting.   Musculoskeletal:  Positive for back pain and myalgias.   All other systems reviewed and are negative.          Objective       ED Triage Vitals   Temperature Pulse Blood Pressure Respirations SpO2 Patient  Position - Orthostatic VS   12/18/24 1025 12/18/24 1025 12/18/24 1025 12/18/24 1025 12/18/24 1025 12/18/24 1115   97.6 °F (36.4 °C) 74 136/67 17 96 % Sitting      Temp Source Heart Rate Source BP Location FiO2 (%) Pain Score    12/18/24 1025 12/18/24 1025 -- -- 12/18/24 1025    Oral Monitor   No Pain      Vitals      Date and Time Temp Pulse SpO2 Resp BP Pain Score FACES Pain Rating User   12/18/24 1524 -- -- -- -- -- 10 - Worst Possible Pain -- PROSPER   12/18/24 1510 -- 81 95 % 18 175/90 -- -- DII   12/18/24 1502 -- -- -- -- -- No Pain -- KO   12/18/24 1430 -- 87 96 % 18 173/73 -- -- AS   12/18/24 1300 -- 79 96 % 17 159/67 -- -- AS   12/18/24 1115 -- 76 97 % 17 172/72 -- -- AS   12/18/24 1025 97.6 °F (36.4 °C) 74 96 % 17 136/67 No Pain -- AS            Physical Exam  ED Triage Vitals   Temperature Pulse Respirations Blood Pressure SpO2   12/18/24 1025 12/18/24 1025 12/18/24 1025 12/18/24 1025 12/18/24 1025   97.6 °F (36.4 °C) 74 17 136/67 96 %      Temp Source Heart Rate Source Patient Position - Orthostatic VS BP Location FiO2 (%)   12/18/24 1025 12/18/24 1025 12/18/24 1115 -- --   Oral Monitor Sitting        Pain Score       12/18/24 1025       No Pain           Vital signs and nursing notes reviewed    CONSTITUTIONAL: female appearing stated age resting in bed, ill-appearing but nontoxic, in no acute distress  HEENT: atraumatic, normocephalic. Sclera anicteric, conjunctiva are not injected. Moist oral mucosa  CARDIOVASCULAR/CHEST: RRR, no M/R/G. 2+ radial pulses  PULMONARY: Breathing comfortably on RA. Breath sounds are equal and clear to auscultation  ABDOMEN: non-distended. BS present, normoactive.  Diffusely tender to palpation throughout the abdomen, negative Carrera's, no tenderness of McBurney's point.  MSK: moves all extremities, no deformities, no peripheral edema, no calf asymmetry  NEURO: Awake, alert, and oriented x 3. Face symmetric. Moves all extremities spontaneously. No focal neurologic  deficits  SKIN: Warm, appears well-perfused  MENTAL STATUS: Normal affect      Results Reviewed       Procedure Component Value Units Date/Time    RBC Morphology Reflex Test [[id number]] Collected: 12/18/24 1126    Lab Status: Final result Specimen: Blood from Arm, Right Updated: 12/18/24 1301    CBC and differential [[id number]]  (Abnormal) Collected: 12/18/24 1126    Lab Status: Final result Specimen: Blood from Arm, Right Updated: 12/18/24 1203     WBC 13.33 Thousand/uL      RBC 4.49 Million/uL      Hemoglobin 13.2 g/dL      Hematocrit 41.3 %      MCV 92 fL      MCH 29.4 pg      MCHC 32.0 g/dL      RDW 13.5 %      MPV 11.1 fL      Platelets 143 Thousands/uL     Narrative:      This is an appended report.  These results have been appended to a previously verified report.    Manual Differential(PHLEBS Do Not Order) [[id number]]  (Abnormal) Collected: 12/18/24 1126    Lab Status: Final result Specimen: Blood from Arm, Right Updated: 12/18/24 1203     Segmented % 89 %      Lymphocytes % 5 %      Monocytes % 5 %      Eosinophils % 1 %      Basophils % 0 %      Absolute Neutrophils 11.86 Thousand/uL      Absolute Lymphocytes 0.67 Thousand/uL      Absolute Monocytes 0.67 Thousand/uL      Absolute Eosinophils 0.13 Thousand/uL      Absolute Basophils 0.00 Thousand/uL      Total Counted --     RBC Morphology Normal     Platelet Estimate Borderline    Comprehensive metabolic panel [[id number]]  (Abnormal) Collected: 12/18/24 1126    Lab Status: Final result Specimen: Blood from Arm, Right Updated: 12/18/24 1154     Sodium 137 mmol/L      Potassium 4.3 mmol/L      Chloride 101 mmol/L      CO2 26 mmol/L      ANION GAP 10 mmol/L      BUN 22 mg/dL      Creatinine 1.11 mg/dL      Glucose 191 mg/dL      Calcium 9.4 mg/dL      AST 39 U/L      ALT 28 U/L      Alkaline Phosphatase 108 U/L      Total Protein 7.6 g/dL      Albumin 4.0 g/dL      Total Bilirubin 1.04 mg/dL      eGFR 47 ml/min/1.73sq m     Narrative:      National Kidney  D[employer] guidelines for Chronic Kidney Disease (CKD):     Stage 1 with normal or high GFR (GFR > 90 mL/min/1.73 square meters)    Stage 2 Mild CKD (GFR = 60-89 mL/min/1.73 square meters)    Stage 3A Moderate CKD (GFR = 45-59 mL/min/1.73 square meters)    Stage 3B Moderate CKD (GFR = 30-44 mL/min/1.73 square meters)    Stage 4 Severe CKD (GFR = 15-29 mL/min/1.73 square meters)    Stage 5 End Stage CKD (GFR <15 mL/min/1.73 square meters)  Note: GFR calculation is accurate only with a steady state creatinine    Lipase [[id number]]  (Abnormal) Collected: 12/18/24 1126    Lab Status: Final result Specimen: Blood from Arm, Right Updated: 12/18/24 1154     Lipase 6 u/L     Lactic acid, plasma (w/reflex if result > 2.0) [[id number]]  (Normal) Collected: 12/18/24 1126    Lab Status: Final result Specimen: Blood from Arm, Right Updated: 12/18/24 1150     LACTIC ACID 0.9 mmol/L     Narrative:      Result may be elevated if tourniquet was used during collection.    FLU/COVID Rapid Antigen (30 min. TAT) - Preferred screening test in ED [[id number]]  (Normal) Collected: 12/18/24 1028    Lab Status: Final result Specimen: Nares from Nose Updated: 12/18/24 1050     SARS COV Rapid Antigen Negative     Influenza A Rapid Antigen Negative     Influenza B Rapid Antigen Negative    Narrative:      This test has been performed using the Quidel Nina 2 FLU+SARS Antigen test under the Emergency Use Authorization (EUA). This test has been validated by the  and verified by the performing laboratory. The Nina uses lateral flow immunofluorescent sandwich assay to detect SARS-COV, Influenza A and Influenza B Antigen.     The Quidel Nina 2 SARS Antigen test does not differentiate between SARS-CoV and SARS-CoV-2.     Negative results are presumptive and may be confirmed with a molecular assay, if necessary, for patient management. Negative results do not rule out SARS-CoV-2 or influenza infection and should not be used as the  sole basis for treatment or patient management decisions. A negative test result may occur if the level of antigen in a sample is below the limit of detection of this test.     Positive results are indicative of the presence of viral antigens, but do not rule out bacterial infection or co-infection with other viruses.     All test results should be used as an adjunct to clinical observations and other information available to the provider.    FOR PEDIATRIC PATIENTS - copy/paste COVID Guidelines URL to browser: [email or web address]            CT abdomen pelvis with contrast   Final Interpretation by Denilson Elmore MD (12/18 1320)      No acute intra-abdominal finding.         Workstation performed: [id number]         XR knee 3 vw right non injury    (Results Pending)       Procedures    ED Medication and Procedure Management   Prior to Admission Medications   Prescriptions Last Dose Informant Patient Reported? Taking?   B Complex Vitamins (B COMPLEX 1 PO) 12/13/2024 Self Yes No   Sig: Take by mouth   Cholecalciferol (VITAMIN D3) 2000 units capsule 12/13/2024 Self Yes No   Sig: half   Contour Next Test test strip Unknown Self No No   Sig: Test blood sugar 3 times daily   Diapers & Supplies MISC Unknown Self No No   Sig: Use 3 (three) times a day as needed (Incontinence)   Ferrous Sulfate (IRON) 325 (65 Fe) MG TABS 12/13/2024 Self Yes No   Sig: Take by mouth   Insulin Pen Needle (BD Pen Needle [place] U/F) 32G X 4 MM MISC Unknown Self No No   Sig: INJECT SUBCUTANEOUSLY AS  DIRECTED 3 TIMES DAILY   Microlet Lancets MISC Unknown Self No No   Sig: Use 3 (three) times a day   Misc Natural Products (CYSTEX URINARY HEALTH PO) 12/13/2024 Self Yes Yes   Sig: Take 1 capsule by mouth daily   Multiple Vitamin (DAILY VALUE MULTIVITAMIN) TABS 12/13/2024 Self Yes No   Sig: Take by mouth   Probiotic Product (Align) 4 MG CAPS 12/13/2024 Self Yes No   Sig: Take by mouth    acetaminophen (TYLENOL) 325 mg tablet 12/13/2024 Self Yes No   Sig: Take by mouth as needed    albuterol (ProAir HFA) 90 mcg/act inhaler More than a month Self No No   Sig: Inhale 2 puffs every 4 (four) hours as needed for wheezing   bismuth subsalicylate (PEPTO BISMOL) 262 MG chewable tablet More than a month Self No No   Sig: Chew 2 tablets (524 mg total) 2 (two) times a day as needed for diarrhea Has tolerated in the past.   clotrimazole (LOTRIMIN) 1 % cream Past Month Self No Yes   Sig: Apply topically 2 (two) times a day   estrogens, conjugated (Premarin) vaginal cream More than a month Self No No   Sig: INSERT 1 APPLICATORFUL VAGINALLY TWICE WEEKLY   fenofibrate 160 MG tablet 12/13/2024 Self No Yes   Sig: TAKE 1 TABLET BY MOUTH  DAILY   insulin degludec (Tresiba FlexTouch) 100 units/mL injection pen  Self No No   Sig: Inject 70 Units under the skin daily at bedtime   Patient taking differently: Inject 65 Units under the skin daily at bedtime   insuln lispro (HumaLOG KwikPen) 200 units/mL CONCENTRATED U-200 injection pen Not Taking Self No No   Sig: Inject subcutaneously as directed per presciber Instructions per sliding scale-PATIENT IS TO TAKE 5 UNITS BID   Patient not taking: Reported on 12/18/2024   lisinopril (ZESTRIL) 10 mg tablet 12/13/2024 Self No No   Sig: TAKE 1 TABLET BY MOUTH DAILY   pantoprazole (PROTONIX) 40 mg tablet 12/13/2024 Self No No   Sig: TAKE 1 TABLET BY MOUTH DAILY   pregabalin (LYRICA) 75 mg capsule 12/13/2024  No Yes   Sig: Take 1 capsule (75 mg total) by mouth 3 (three) times a day   simvastatin (ZOCOR) 10 mg tablet 12/13/2024  No No   Sig: TAKE 1 TABLET BY MOUTH AT  BEDTIME   solifenacin (VESICARE) 5 mg tablet 12/13/2024 Self No No   Sig: take 1 tablet by mouth once daily   topiramate (TOPAMAX) 25 mg tablet 12/13/2024  No No   Sig: Take 1 tablet (25 mg total) by mouth daily      Facility-Administered Medications: None     Current Discharge Medication List        CONTINUE these  medications which have NOT CHANGED    Details   clotrimazole (LOTRIMIN) 1 % cream Apply topically 2 (two) times a day  Qty: 113 g, Refills: 0    Associated Diagnoses: Skin lesion      fenofibrate 160 MG tablet TAKE 1 TABLET BY MOUTH  DAILY  Qty: 90 tablet, Refills: 0    Comments: Requesting 1 year supply  Associated Diagnoses: Hypertriglyceridemia, essential      Misc Natural Products (CYSTEX URINARY HEALTH PO) Take 1 capsule by mouth daily      pregabalin (LYRICA) 75 mg capsule Take 1 capsule (75 mg total) by mouth 3 (three) times a day  Qty: 270 capsule, Refills: 0    Associated Diagnoses: Chronic bilateral low back pain with bilateral sciatica      acetaminophen (TYLENOL) 325 mg tablet Take by mouth as needed       albuterol (ProAir HFA) 90 mcg/act inhaler Inhale 2 puffs every 4 (four) hours as needed for wheezing  Qty: 18 g, Refills: 1    Comments: Substitution to a formulary equivalent within the same pharmaceutical class is authorized.  Associated Diagnoses: Obstructive sleep apnea treated with continuous positive airway pressure (CPAP)      B Complex Vitamins (B COMPLEX 1 PO) Take by mouth      bismuth subsalicylate (PEPTO BISMOL) 262 MG chewable tablet Chew 2 tablets (524 mg total) 2 (two) times a day as needed for diarrhea Has tolerated in the past.  Qty: 30 tablet, Refills: 0    Associated Diagnoses: Chronic diarrhea      Cholecalciferol (VITAMIN D3) 2000 units capsule half      Contour Next Test test strip Test blood sugar 3 times daily  Qty: 300 each, Refills: 1    Associated Diagnoses: Type 2 diabetes mellitus with stage 3 chronic kidney disease, with long-term current use of insulin (HCC)      Diapers & Supplies MISC Use 3 (three) times a day as needed (Incontinence)  Qty: 28 each, Refills: 11    Associated Diagnoses: Urge incontinence      estrogens, conjugated (Premarin) vaginal cream INSERT 1 APPLICATORFUL VAGINALLY TWICE WEEKLY  Qty: 30 g, Refills: 5    Comments: Requesting 1 year  supply  Associated Diagnoses: Vaginal bleeding      Ferrous Sulfate (IRON) 325 (65 Fe) MG TABS Take by mouth      insulin degludec (Tresiba FlexTouch) 100 units/mL injection pen Inject 70 Units under the skin daily at bedtime  Qty: 75 mL, Refills: 0    Comments: Please send a replace/new response with 90-Day Supply if appropriate to maximize member benefit. Requesting 1 year supply.  Associated Diagnoses: Type 2 diabetes mellitus with stage 3 chronic kidney disease, with long-term current use of insulin ([facility])      Insulin Pen Needle (BD Pen Needle [place] U/F) 32G X 4 MM MISC INJECT SUBCUTANEOUSLY AS  DIRECTED 3 TIMES DAILY  Qty: 270 each, Refills: 0    Comments: Please send a replace/new response with 90-Day Supply if appropriate to maximize member benefit. Requesting 1 year supply.  Associated Diagnoses: Type 2 diabetes mellitus with stage 3 chronic kidney disease, with long-term current use of insulin ([facility])      insuln lispro (HumaLOG KwikPen) 200 units/mL CONCENTRATED U-200 injection pen Inject subcutaneously as directed per presciber Instructions per sliding scale-PATIENT IS TO TAKE 5 UNITS BID  Qty: 12 mL, Refills: 0    Associated Diagnoses: Type 2 diabetes mellitus with stage 3b chronic kidney disease, with long-term current use of insulin ([facility])      lisinopril (ZESTRIL) 10 mg tablet TAKE 1 TABLET BY MOUTH DAILY  Qty: 90 tablet, Refills: 3    Comments: Please send a replace/new response with 90-Day Supply if appropriate to maximize member benefit. Requesting 1 year supply.  Associated Diagnoses: Essential hypertension      Microlet Lancets MISC Use 3 (three) times a day  Qty: 100 each, Refills: 2    Associated Diagnoses: Type 2 diabetes mellitus with stage 3b chronic kidney disease, with long-term current use of insulin ([facility])      Multiple Vitamin (DAILY VALUE MULTIVITAMIN) TABS Take by mouth      pantoprazole (PROTONIX) 40 mg tablet TAKE 1 TABLET BY MOUTH DAILY  Qty: 90 tablet, Refills: 1    Comments: Please  send a replace/new response with 90-Day Supply if appropriate to maximize member benefit. Requesting 1 year supply.  Associated Diagnoses: Indigestion      Probiotic Product (Align) 4 MG CAPS Take by mouth      simvastatin (ZOCOR) 10 mg tablet TAKE 1 TABLET BY MOUTH AT  BEDTIME  Qty: 90 tablet, Refills: 3    Comments: Please send a replace/new response with 90-Day Supply if appropriate to maximize member benefit. Requesting 1 year supply.  Associated Diagnoses: Mixed hyperlipidemia      solifenacin (VESICARE) 5 mg tablet take 1 tablet by mouth once daily  Qty: 90 tablet, Refills: 1    Associated Diagnoses: Mixed stress and urge urinary incontinence      topiramate (TOPAMAX) 25 mg tablet Take 1 tablet (25 mg total) by mouth daily  Qty: 90 tablet, Refills: 0    Comments: Please send a replace/new response with 90-Day Supply if appropriate to maximize member benefit. Requesting 1 year supply.  Associated Diagnoses: Migraine without aura and without status migrainosus, not intractable           No discharge procedures on file.  ED SEPSIS DOCUMENTATION   Time reflects when diagnosis was documented in both MDM as applicable and the Disposition within this note       Time User Action Codes Description Comment    12/18/2024  1:41 PM Marilee Painter Add [R11.0] Nausea     12/18/2024  1:41 PM Marilee Painter Add [R19.7] Diarrhea of presumed infectious origin     12/18/2024  1:41 PM Marilee Painter Modify [R11.0] Nausea     12/18/2024  1:41 PM Marilee Painter Modify [R19.7] Diarrhea of presumed infectious origin     12/18/2024  1:41 PM Marilee Painter Add [R10.9] Abdominal pain     12/18/2024  1:41 PM Marilee Painter Add [R53.1] Generalized weakness                  Marilee Painter MD  12/18/24 5292

## 2024-12-18 NOTE — H&P
"H&P - Hospitalist   Name: Alberta Sanchez 79 y.o. female I MRN: [id number]  Unit/Bed#: -01 I Date of Admission: 12/18/2024   Date of Service: 12/18/2024 I Hospital Day: 0     Assessment & Plan  Gastroenteritis  Patient with GI complaints including nausea, decreased appetite, and diarrhea  No BM noted today, if patient does have further diarrhea will order stool studies  Continue IV fluids  Will start with surgical soft diet and advance as tolerated  Monitor electrolytes  CT abdomen/pelvis with no acute findings noted  Continue supportive care  No need for antibiotics at this time  Chronic renal insufficiency, stage III (moderate) (Formerly [facility])  Lab Results   Component Value Date    EGFR 47 12/18/2024    EGFR 60 08/29/2024    EGFR 59 05/13/2024    CREATININE 1.11 12/18/2024    CREATININE 0.90 08/29/2024    CREATININE 0.92 05/13/2024   Creatinine seems to be at baseline.  Will continue routine lab monitoring.  Avoid any nephrotoxic meds  Type 2 diabetes mellitus with stage 3 chronic kidney disease, with long-term current use of insulin (Formerly [facility])  Lab Results   Component Value Date    HGBA1C 6.7 (A) 08/28/2024       No results for input(s): \"POCGLU\" in the last 72 hours.    Blood Sugar Average: Last 72 hrs:    Will continue home long-acting insulin at reduced dose of 45 units nightly.  Insulin sliding scale.  Diabetic diet  Restless legs syndrome (RLS)  Continue Topamax  Right knee pain  Acute on chronic right knee pain, patient states that she typically has chronic pain however pain slightly worse than usual  Denies any recent trauma  Check right knee x-ray  PT/OT eval      VTE Pharmacologic Prophylaxis: VTE Score: 5 Moderate Risk (Score 3-4) - Pharmacological DVT Prophylaxis Ordered: enoxaparin (Lovenox).  Code Status: Level 3 - DNAR and DNI   Discussion with family:  Updated patient regarding plan of care.     Anticipated Length of Stay: Patient will be admitted on an observation basis with an anticipated length of stay " of less than 2 midnights secondary to nausea and diarrhea.    History of Present Illness   Chief Complaint: Nausea and diarrhea    Alberta Sanchez is a 79 y.o. female with a PMH of CKD stage IIIb, hypertension, hyperlipidemia, restless leg syndrome, diabetes mellitus insulin-dependent who presents with nausea and diarrhea.  Patient states that symptoms started on Friday with nausea and decreased appetite.  Patient did not vomit.  Patient developed diarrhea on Sunday and symptoms have continued over the last couple of days.  Patient denies any recent antibiotic use.  No sick contacts.  Also noted right knee pain which has been worse than usual, as she does have chronic knee pain.  No fever or chills.  Intermittent abdominal cramping pain.  In the ER patient had CT abdomen/pelvis with no acute findings noted.    Review of Systems   Constitutional:  Positive for appetite change.   Gastrointestinal:  Positive for diarrhea and nausea. Negative for blood in stool.   Musculoskeletal:  Positive for arthralgias and gait problem.   All other systems reviewed and are negative.      Historical Information   Past Medical History:   Diagnosis Date    Abnormal finding in urine 1/9/2023    Allergic Springtime    Runny nose, congestion    Anemia     Arthritis 10/30/2020    Chronic kidney disease     Diabetes mellitus (HCC)     Fibromyalgia, primary     Fracture of wrist     RIGHT    Gastroenteritis 12/18/2024    Heart murmur 06/30/2020    History of non-insulin dependent diabetes mellitus     Hx of thrombocytopenia 02/18/2022    Hyperlipidemia     Hypertension     Hypertension     IBS (irritable bowel syndrome)     Inflammatory bowel disease 2015    Been doctoring for years    Irritable bowel     Kidney stone     Metabolic syndrome     Obesity     RLS (restless legs syndrome)     Routine medical exam 06/28/2019    Skin lesion 2/8/2021    Sleep apnea     Sleep apnea     ON CPAP    Upper respiratory tract infection 01/19/2022    Urge  incontinence     Urinary incontinence     Urinary tract infection 1/1/2023     Past Surgical History:   Procedure Laterality Date    CHOLECYSTECTOMY  2013    COLONOSCOPY  2010    CYSTOSCOPY      HERNIA REPAIR  2008    HYSTERECTOMY N/A 1974    partial; ovaries intact    KNEE SURGERY Left      Social History     Tobacco Use    Smoking status: Never     Passive exposure: Never    Smokeless tobacco: Never   Vaping Use    Vaping status: Never Used   Substance and Sexual Activity    Alcohol use: No     Comment: no caffeine use    Drug use: No    Sexual activity: Yes     Partners: Male     Birth control/protection: None     Comment:  56 years     E-Cigarette/Vaping    E-Cigarette Use Never User      E-Cigarette/Vaping Substances    Nicotine No     THC No     CBD No     Flavoring No     Other No     Unknown No      Family History   Problem Relation Age of Onset    Breast cancer Mother 62        Breast cancer    Suicidality Father     Melanoma Sister     Bipolar disorder Sister     Depression Sister         Bi-polar    Bipolar disorder Sister     Completed Suicide  Sister     Rheum arthritis Daughter     No Known Problems Daughter     No Known Problems Maternal Grandmother     No Known Problems Maternal Grandfather     No Known Problems Paternal Grandmother     No Known Problems Paternal Grandfather     Heart disease Brother     No Known Problems Maternal Aunt     No Known Problems Maternal Aunt     BRCA2 Positive Neg Hx     BRCA1 Positive Neg Hx     BRCA2 Negative Neg Hx     BRCA1 Negative Neg Hx     BRCA 1/2 Neg Hx     Ovarian cancer Neg Hx     Endometrial cancer Neg Hx     Colon cancer Neg Hx     Breast cancer additional onset Neg Hx      Social History:  Marital Status: /Civil Union   Occupation: None  Patient Pre-hospital Living Situation: Home  Patient Pre-hospital Level of Mobility: walks with walker  Patient Pre-hospital Diet Restrictions: none    Meds/Allergies   I have reviewed home medications with  patient personally.  Prior to Admission medications    Medication Sig Start Date End Date Taking? Authorizing Provider   fenofibrate 160 MG tablet TAKE 1 TABLET BY MOUTH  DAILY 10/5/22  Yes Dulce Michaud MD   acetaminophen (TYLENOL) 325 mg tablet Take by mouth as needed     Historical Provider, MD   albuterol (ProAir HFA) 90 mcg/act inhaler Inhale 2 puffs every 4 (four) hours as needed for wheezing 9/29/23   Dulce Michaud MD   B Complex Vitamins (B COMPLEX 1 PO) Take by mouth    Historical Provider, MD   bismuth subsalicylate (PEPTO BISMOL) 262 MG chewable tablet Chew 2 tablets (524 mg total) 2 (two) times a day as needed for diarrhea Has tolerated in the past. 7/16/21   April Flores MD   Cholecalciferol (VITAMIN D3) 2000 units capsule half 5/6/13   Historical Provider, MD   clotrimazole (LOTRIMIN) 1 % cream Apply topically 2 (two) times a day 10/22/24   Dulce Michaud MD   Contour Next Test test strip Test blood sugar 3 times daily 1/12/24   Dulce Michaud MD   Diapers & Supplies MISC Use 3 (three) times a day as needed (Incontinence) 7/22/24   HERMANN Lenz   estrogens, conjugated (Premarin) vaginal cream INSERT 1 APPLICATORFUL VAGINALLY TWICE WEEKLY 4/15/24   Dulce Michaud MD   Ferrous Sulfate (IRON) 325 (65 Fe) MG TABS Take by mouth    Historical Provider, MD   insulin degludec (Tresiba FlexTouch) 100 units/mL injection pen Inject 70 Units under the skin daily at bedtime 10/22/24   Dulce Michaud MD   Insulin Pen Needle (BD Pen Needle [place] U/F) 32G X 4 MM MISC INJECT SUBCUTANEOUSLY AS  DIRECTED 3 TIMES DAILY 1/10/24   Dulce Michaud MD   insuln lispro (HumaLOG KwikPen) 200 units/mL CONCENTRATED U-200 injection pen Inject subcutaneously as directed per presciber Instructions per sliding scale-PATIENT IS TO TAKE 5 UNITS BID 6/7/24   Dulce Michaud MD   lisinopril (ZESTRIL) 10 mg tablet TAKE 1 TABLET BY MOUTH DAILY 10/26/23   Dulce Michaud MD   Microlet Lancets MISC Use 3 (three) times a day 10/6/23   Dulce  "MD Jaswant   Multiple Vitamin (DAILY VALUE MULTIVITAMIN) TABS Take by mouth    Historical Provider, MD   pantoprazole (PROTONIX) 40 mg tablet TAKE 1 TABLET BY MOUTH DAILY 9/1/24   Dulce Michaud MD   pregabalin (LYRICA) 75 mg capsule Take 1 capsule (75 mg total) by mouth 3 (three) times a day 12/13/24  Yes Dulce Michaud MD   Probiotic Product (Align) 4 MG CAPS Take by mouth    Historical Provider, MD   simvastatin (ZOCOR) 10 mg tablet TAKE 1 TABLET BY MOUTH AT  BEDTIME 11/28/24   Dulce Michaud MD   solifenacin (VESICARE) 5 mg tablet take 1 tablet by mouth once daily 9/20/24   HERMANN Lenz   topiramate (TOPAMAX) 25 mg tablet Take 1 tablet (25 mg total) by mouth daily 12/12/24   Dulce Michaud MD     Allergies   Allergen Reactions    Pioglitazone Other (See Comments)     \"Feels like she is dying\"    Dapagliflozin Itching     Yeast infection     Ibuprofen     Levofloxacin Other (See Comments)     Other reaction(s): rash    Nsaids        Objective :  Temp:  [97.6 °F (36.4 °C)] 97.6 °F (36.4 °C)  HR:  [74-87] 81  BP: (136-175)/(67-90) 175/90  Resp:  [17-18] 18  SpO2:  [95 %-97 %] 95 %  O2 Device: None (Room air)    Physical Exam  Constitutional:       General: She is not in acute distress.     Appearance: She is obese.   HENT:      Head: Normocephalic and atraumatic.      Nose: Nose normal.      Mouth/Throat:      Mouth: Mucous membranes are dry.   Eyes:      Extraocular Movements: Extraocular movements intact.      Conjunctiva/sclera: Conjunctivae normal.   Cardiovascular:      Rate and Rhythm: Normal rate and regular rhythm.   Pulmonary:      Effort: Pulmonary effort is normal. No respiratory distress.   Abdominal:      Palpations: Abdomen is soft.      Tenderness: There is no abdominal tenderness.   Musculoskeletal:         General: Tenderness present. Normal range of motion.      Cervical back: Normal range of motion and neck supple.      Comments: Tenderness on palpation of right knee, no obvious erythema or " warmth to touch   Skin:     General: Skin is warm and dry.   Neurological:      General: No focal deficit present.      Mental Status: She is alert. Mental status is at baseline.      Cranial Nerves: No cranial nerve deficit.   Psychiatric:         Mood and Affect: Mood normal.         Behavior: Behavior normal.          Lines/Drains:      Lab Results: I have reviewed the following results:  Results from last 7 days   Lab Units 12/18/24  1126   WBC Thousand/uL 13.33*   HEMOGLOBIN g/dL 13.2   HEMATOCRIT % 41.3   PLATELETS Thousands/uL 143*   LYMPHO PCT % 5*   MONO PCT % 5   EOS PCT % 1     Results from last 7 days   Lab Units 12/18/24  1126   SODIUM mmol/L 137   POTASSIUM mmol/L 4.3   CHLORIDE mmol/L 101   CO2 mmol/L 26   BUN mg/dL 22   CREATININE mg/dL 1.11   ANION GAP mmol/L 10   CALCIUM mg/dL 9.4   ALBUMIN g/dL 4.0   TOTAL BILIRUBIN mg/dL 1.04*   ALK PHOS U/L 108*   ALT U/L 28   AST U/L 39   GLUCOSE RANDOM mg/dL 191*             Lab Results   Component Value Date    HGBA1C 6.7 (A) 08/28/2024    HGBA1C 7.5 (H) 05/13/2024    HGBA1C 7.5 (H) 02/12/2024     Results from last 7 days   Lab Units 12/18/24  1126   LACTIC ACID mmol/L 0.9       Imaging Results Review: I reviewed radiology reports from this admission including: CT abdomen/pelvis.  Other Study Results Review: No additional pertinent studies reviewed.    Administrative Statements       ** Please Note: This note has been constructed using a voice recognition system. **

## 2024-12-18 NOTE — PLAN OF CARE
Problem: Potential for Falls  Goal: Patient will remain free of falls  Description: INTERVENTIONS:  - Educate patient/family on patient safety including physical limitations  - Instruct patient to call for assistance with activity   - Consult OT/PT to assist with strengthening/mobility   - Keep Call bell within reach  - Keep bed low and locked with side rails adjusted as appropriate  - Keep care items and personal belongings within reach  - Initiate and maintain comfort rounds  - Make Fall Risk Sign visible to staff  - Offer Toileting every 2 Hours, in advance of need  - Initiate/Maintain bed/chair alarm  - Apply yellow socks and bracelet for high fall risk patients  - Consider moving patient to room near nurses station  Outcome: Progressing    Problem: GASTROINTESTINAL - ADULT  Goal: Minimal or absence of nausea and/or vomiting  Description: INTERVENTIONS:  - Administer IV fluids if ordered to ensure adequate hydration  - Maintain NPO status until nausea and vomiting are resolved  - Nasogastric tube if ordered  - Administer ordered antiemetic medications as needed  - Provide nonpharmacologic comfort measures as appropriate  - Advance diet as tolerated, if ordered  - Consider nutrition services referral to assist patient with adequate nutrition and appropriate food choices  Outcome: Progressing  Goal: Maintains or returns to baseline bowel function  Description: INTERVENTIONS:  - Assess bowel function  - Encourage oral fluids to ensure adequate hydration  - Administer IV fluids if ordered to ensure adequate hydration  - Administer ordered medications as needed  - Encourage mobilization and activity  - Consider nutritional services referral to assist patient with adequate nutrition and appropriate food choices  Outcome: Progressing  Goal: Maintains adequate nutritional intake  Description: INTERVENTIONS:  - Monitor percentage of each meal consumed  - Identify factors contributing to decreased intake, treat as  appropriate  - Assist with meals as needed  - Monitor I&O, weight, and lab values if indicated  - Obtain nutrition services referral as needed  Outcome: Progressing  Goal: Oral mucous membranes remain intact  Description: INTERVENTIONS  - Assess oral mucosa and hygiene practices  - Implement preventative oral hygiene regimen  - Implement oral medicated treatments as ordered  - Initiate Nutrition services referral as needed  Outcome: Progressing    Problem: MUSCULOSKELETAL - ADULT  Goal: Maintain or return mobility to safest level of function  Description: INTERVENTIONS:  - Assess patient's ability to carry out ADLs; assess patient's baseline for ADL function and identify physical deficits which impact ability to perform ADLs (bathing, care of mouth/teeth, toileting, grooming, dressing, etc.)  - Assess/evaluate cause of self-care deficits   - Assess range of motion  - Assess patient's mobility  - Assess patient's need for assistive devices and provide as appropriate  - Encourage maximum independence but intervene and supervise when necessary  - Involve family in performance of ADLs  - Assess for home care needs following discharge   - Consider OT consult to assist with ADL evaluation and planning for discharge  - Provide patient education as appropriate  Outcome: Progressing     Problem: METABOLIC, FLUID AND ELECTROLYTES - ADULT  Goal: Glucose maintained within target range  Description: INTERVENTIONS:  - Monitor Blood Glucose as ordered  - Assess for signs and symptoms of hyperglycemia and hypoglycemia  - Administer ordered medications to maintain glucose within target range  - Assess nutritional intake and initiate nutrition service referral as needed  Outcome: Progressing

## 2024-12-18 NOTE — ASSESSMENT & PLAN NOTE
"Lab Results   Component Value Date    HGBA1C 6.7 (A) 08/28/2024       No results for input(s): \"POCGLU\" in the last 72 hours.    Blood Sugar Average: Last 72 hrs:    Will continue home long-acting insulin at reduced dose of 45 units nightly.  Insulin sliding scale.  Diabetic diet  "

## 2024-12-18 NOTE — ASSESSMENT & PLAN NOTE
Acute on chronic right knee pain, patient states that she typically has chronic pain however pain slightly worse than usual  Denies any recent trauma  Check right knee x-ray  PT/OT jose m

## 2024-12-18 NOTE — Clinical Note
Case was discussed with Dr. Orosco and the patient's admission status was agreed to be Admission Status: observation status to the service of Dr. Orosco .

## 2024-12-19 ENCOUNTER — APPOINTMENT (OUTPATIENT)
Dept: RADIOLOGY | Facility: HOSPITAL | Age: 79
DRG: 392 | End: 2024-12-19
Payer: MEDICARE

## 2024-12-19 ENCOUNTER — APPOINTMENT (OUTPATIENT)
Dept: CT IMAGING | Facility: HOSPITAL | Age: 79
DRG: 392 | End: 2024-12-19
Payer: MEDICARE

## 2024-12-19 LAB
ALBUMIN SERPL BCG-MCNC: 3.4 G/DL (ref 3.5–5)
ALP SERPL-CCNC: 124 U/L (ref 34–104)
ALT SERPL W P-5'-P-CCNC: 26 U/L (ref 7–52)
ANION GAP SERPL CALCULATED.3IONS-SCNC: 8 MMOL/L (ref 4–13)
AST SERPL W P-5'-P-CCNC: 26 U/L (ref 13–39)
BILIRUB SERPL-MCNC: 0.84 MG/DL (ref 0.2–1)
BUN SERPL-MCNC: 14 MG/DL (ref 5–25)
CALCIUM ALBUM COR SERPL-MCNC: 9.4 MG/DL (ref 8.3–10.1)
CALCIUM SERPL-MCNC: 8.9 MG/DL (ref 8.4–10.2)
CHLORIDE SERPL-SCNC: 103 MMOL/L (ref 96–108)
CO2 SERPL-SCNC: 27 MMOL/L (ref 21–32)
CREAT SERPL-MCNC: 0.9 MG/DL (ref 0.6–1.3)
ERYTHROCYTE [DISTWIDTH] IN BLOOD BY AUTOMATED COUNT: 13.3 % (ref 11.6–15.1)
GFR SERPL CREATININE-BSD FRML MDRD: 60 ML/MIN/1.73SQ M
GLUCOSE P FAST SERPL-MCNC: 129 MG/DL (ref 65–99)
GLUCOSE SERPL-MCNC: 129 MG/DL (ref 65–140)
GLUCOSE SERPL-MCNC: 139 MG/DL (ref 65–140)
GLUCOSE SERPL-MCNC: 143 MG/DL (ref 65–140)
GLUCOSE SERPL-MCNC: 147 MG/DL (ref 65–140)
GLUCOSE SERPL-MCNC: 168 MG/DL (ref 65–140)
HCT VFR BLD AUTO: 38.7 % (ref 34.8–46.1)
HGB BLD-MCNC: 12.2 G/DL (ref 11.5–15.4)
MAGNESIUM SERPL-MCNC: 1.5 MG/DL (ref 1.9–2.7)
MCH RBC QN AUTO: 28.8 PG (ref 26.8–34.3)
MCHC RBC AUTO-ENTMCNC: 31.5 G/DL (ref 31.4–37.4)
MCV RBC AUTO: 92 FL (ref 82–98)
PLATELET # BLD AUTO: 137 THOUSANDS/UL (ref 149–390)
PMV BLD AUTO: 11.3 FL (ref 8.9–12.7)
POTASSIUM SERPL-SCNC: 3.2 MMOL/L (ref 3.5–5.3)
PROT SERPL-MCNC: 6.6 G/DL (ref 6.4–8.4)
RBC # BLD AUTO: 4.23 MILLION/UL (ref 3.81–5.12)
SODIUM SERPL-SCNC: 138 MMOL/L (ref 135–147)
WBC # BLD AUTO: 10.74 THOUSAND/UL (ref 4.31–10.16)

## 2024-12-19 PROCEDURE — 83735 ASSAY OF MAGNESIUM: CPT | Performed by: INTERNAL MEDICINE

## 2024-12-19 PROCEDURE — 97167 OT EVAL HIGH COMPLEX 60 MIN: CPT

## 2024-12-19 PROCEDURE — 82948 REAGENT STRIP/BLOOD GLUCOSE: CPT

## 2024-12-19 PROCEDURE — 72131 CT LUMBAR SPINE W/O DYE: CPT

## 2024-12-19 PROCEDURE — 97163 PT EVAL HIGH COMPLEX 45 MIN: CPT

## 2024-12-19 PROCEDURE — 80053 COMPREHEN METABOLIC PANEL: CPT | Performed by: INTERNAL MEDICINE

## 2024-12-19 PROCEDURE — 87505 NFCT AGENT DETECTION GI: CPT | Performed by: INTERNAL MEDICINE

## 2024-12-19 PROCEDURE — 73560 X-RAY EXAM OF KNEE 1 OR 2: CPT

## 2024-12-19 PROCEDURE — 99232 SBSQ HOSP IP/OBS MODERATE 35: CPT | Performed by: INTERNAL MEDICINE

## 2024-12-19 PROCEDURE — 85027 COMPLETE CBC AUTOMATED: CPT | Performed by: INTERNAL MEDICINE

## 2024-12-19 RX ORDER — MAGNESIUM SULFATE HEPTAHYDRATE 40 MG/ML
2 INJECTION, SOLUTION INTRAVENOUS ONCE
Status: COMPLETED | OUTPATIENT
Start: 2024-12-19 | End: 2024-12-19

## 2024-12-19 RX ORDER — POTASSIUM CHLORIDE 1500 MG/1
40 TABLET, EXTENDED RELEASE ORAL
Status: COMPLETED | OUTPATIENT
Start: 2024-12-19 | End: 2024-12-19

## 2024-12-19 RX ADMIN — PANTOPRAZOLE SODIUM 40 MG: 40 TABLET, DELAYED RELEASE ORAL at 08:28

## 2024-12-19 RX ADMIN — ENOXAPARIN SODIUM 40 MG: 40 INJECTION SUBCUTANEOUS at 08:27

## 2024-12-19 RX ADMIN — MAGNESIUM SULFATE HEPTAHYDRATE 2 G: 40 INJECTION, SOLUTION INTRAVENOUS at 08:27

## 2024-12-19 RX ADMIN — ONDANSETRON 4 MG: 2 INJECTION INTRAMUSCULAR; INTRAVENOUS at 08:31

## 2024-12-19 RX ADMIN — INSULIN LISPRO 1 UNITS: 100 INJECTION, SOLUTION INTRAVENOUS; SUBCUTANEOUS at 11:16

## 2024-12-19 RX ADMIN — OXYCODONE HYDROCHLORIDE 5 MG: 5 TABLET ORAL at 08:31

## 2024-12-19 RX ADMIN — TOPIRAMATE 25 MG: 25 TABLET, FILM COATED ORAL at 08:28

## 2024-12-19 RX ADMIN — PREGABALIN 75 MG: 75 CAPSULE ORAL at 08:28

## 2024-12-19 RX ADMIN — SODIUM CHLORIDE, SODIUM GLUCONATE, SODIUM ACETATE, POTASSIUM CHLORIDE, MAGNESIUM CHLORIDE, SODIUM PHOSPHATE, DIBASIC, AND POTASSIUM PHOSPHATE 100 ML/HR: .53; .5; .37; .037; .03; .012; .00082 INJECTION, SOLUTION INTRAVENOUS at 04:04

## 2024-12-19 RX ADMIN — POTASSIUM CHLORIDE 40 MEQ: 1500 TABLET, EXTENDED RELEASE ORAL at 11:16

## 2024-12-19 RX ADMIN — ENOXAPARIN SODIUM 40 MG: 40 INJECTION SUBCUTANEOUS at 21:41

## 2024-12-19 RX ADMIN — PREGABALIN 75 MG: 75 CAPSULE ORAL at 16:45

## 2024-12-19 RX ADMIN — OXYCODONE HYDROCHLORIDE 5 MG: 5 TABLET ORAL at 21:41

## 2024-12-19 RX ADMIN — POTASSIUM CHLORIDE 40 MEQ: 1500 TABLET, EXTENDED RELEASE ORAL at 08:28

## 2024-12-19 RX ADMIN — FENOFIBRATE 145 MG: 145 TABLET, FILM COATED ORAL at 08:28

## 2024-12-19 RX ADMIN — INSULIN GLARGINE 45 UNITS: 100 INJECTION, SOLUTION SUBCUTANEOUS at 21:42

## 2024-12-19 RX ADMIN — PREGABALIN 75 MG: 75 CAPSULE ORAL at 21:42

## 2024-12-19 NOTE — ASSESSMENT & PLAN NOTE
Acute on chronic right knee pain, patient states that she typically has chronic pain however pain slightly worse than usual  Denies any recent trauma  Knee x-ray with effusion noted and degenerative changes.  No signs of infection on physical exam.  Patient had difficulty walking with PT  We will check lumbar spine CT  Case management for discharge planning

## 2024-12-19 NOTE — ASSESSMENT & PLAN NOTE
Patient with GI complaints including nausea, decreased appetite, and diarrhea  Patient did have an episode of diarrhea overnight.  Stool studies ordered  Continue IV fluids as needed  Surgical soft diet and advance as tolerated  Monitor electrolytes  CT abdomen/pelvis with no acute findings noted  Continue supportive care  No need for antibiotics at this time

## 2024-12-19 NOTE — PHYSICAL THERAPY NOTE
PHYSICAL THERAPY EVALUATION  NAME:  Alberta Sanchez  DATE: 12/19/24    AGE:   79 y.o.  Mrn:   [id number]  ADMIT DX:  Diarrhea of presumed infectious origin [R19.7]  Nausea [R11.0]  Abdominal pain [R10.9]  Generalized weakness [R53.1]  Problem List:   Patient Active Problem List   Diagnosis    Carotid artery disease (HCC)    Chronic renal insufficiency, stage III (moderate) (HCC)    Essential hypertension    Mixed hyperlipidemia    Hypomagnesemia    Indigestion    Irritable bowel syndrome    Long term current use of insulin (HCC)    Metabolic syndrome    Nonalcoholic fatty liver disease    Obstructive sleep apnea treated with continuous positive airway pressure (CPAP)    Peripheral vascular disease (HCC)    Type 2 diabetes mellitus with stage 3 chronic kidney disease, with long-term current use of insulin (HCC)    Vitamin D deficiency    Osteopenia of spine    Anemia    Multiple and bilateral precerebral arterial occlusion    Restless legs syndrome (RLS)    Tinnitus    Migraine without aura, not intractable    Hypertriglyceridemia, essential    Morbid obesity (HCC)    Chronic right shoulder pain    Vaginal bleeding    Breast lump in upper outer quadrant    Neck pain    Abnormal mammogram    Benign hypertensive renal disease    Proteinuria    Callus    Hammer toe    Arthritis    Onychomycosis    Plantar fascial fibromatosis    Nail dystrophy    Bilateral low back pain with bilateral sciatica    Gait disturbance    DNR (do not resuscitate)    COVID-19 vaccine series completed    COVID-19 virus infection    LVH (left ventricular hypertrophy)    Dysuria    Mixed stress and urge urinary incontinence    Itchy skin    Nasal congestion    Platelets decreased (HCC)    Diabetes mellitus with neuropathy (HCC)    Bilateral lower extremity edema    Other fatigue    Nocturnal hypoxemia    Gastroenteritis    Right knee pain       Past Medical History  Past Medical History:   Diagnosis Date    Abnormal finding in urine 1/9/2023     Allergic Springtime    Runny nose, congestion    Anemia     Arthritis 10/30/2020    Chronic kidney disease     Diabetes mellitus (HCC)     Fibromyalgia, primary     Fracture of wrist     RIGHT    Gastroenteritis 12/18/2024    Heart murmur 06/30/2020    History of non-insulin dependent diabetes mellitus     Hx of thrombocytopenia 02/18/2022    Hyperlipidemia     Hypertension     Hypertension     IBS (irritable bowel syndrome)     Inflammatory bowel disease 2015    Been doctoring for years    Irritable bowel     Kidney stone     Metabolic syndrome     Obesity     RLS (restless legs syndrome)     Routine medical exam 06/28/2019    Skin lesion 2/8/2021    Sleep apnea     Sleep apnea     ON CPAP    Upper respiratory tract infection 01/19/2022    Urge incontinence     Urinary incontinence     Urinary tract infection 1/1/2023       Past Surgical History  Past Surgical History:   Procedure Laterality Date    CHOLECYSTECTOMY  2013    COLONOSCOPY  2010    CYSTOSCOPY      HERNIA REPAIR  2008    HYSTERECTOMY N/A 1974    partial; ovaries intact    KNEE SURGERY Left        Length Of Stay: 0  Performed at least 2 patient identifiers during session: Name and Epic photo       12/19/24 1428   PT Last Visit   PT Visit Date 12/19/24   Note Type   Note type Evaluation   Pain Assessment   Pain Assessment Tool 0-10   Pain Score 8   Pain Location/Orientation Location: Back   Pain Onset/Description Onset: Ongoing   Patient's Stated Pain Goal No pain   Hospital Pain Intervention(s) Medication (See MAR);Ambulation/increased activity   Restrictions/Precautions   Weight Bearing Precautions Per Order No   Other Precautions Bed Alarm;Chair Alarm;Fall Risk;Pain;Contact/isolation   Home Living   Type of Home Apartment   Home Layout One level;Performs ADLs on one level;Able to live on main level with bedroom/bathroom  (no nara)   Bathroom Shower/Tub Tub/shower unit   Bathroom Toilet Standard   Bathroom Equipment Tub transfer bench;Grab bars around  "toilet;Toilet raiser;Grab bars in shower   Bathroom Accessibility Accessible   Home Equipment Walker;Cane  (rollator and SPC use at baseline)   Prior Function   Level of [place] Independent with ADLs;Independent with functional mobility;Independent with IADLS   Lives With Spouse   Receives Help From Family   IADLs Independent with driving;Independent with meal prep;Independent with medication management   Falls in the last 6 months 0   Vocational Retired   Cognition   Overall Cognitive Status WFL   Arousal/Participation Alert   Attention Within functional limits   Orientation Level Oriented X4   Memory Within functional limits   Following Commands Follows all commands and directions without difficulty   Subjective   Subjective \"Ever since i got this virus i have been so weak\"   RLE Assessment   RLE Assessment   (very weak, 2-/5)   LLE Assessment   LLE Assessment   (very weak 2-/5)   Vision-Basic Assessment   Current Vision Wears glasses all the time   Coordination   Sensation WFL   Bed Mobility   Rolling R 2  Maximal assistance   Additional items Assist x 1;Increased time required;Bedrails   Rolling L 2  Maximal assistance   Additional items Assist x 1;Bedrails;Increased time required   Supine to Sit 2  Maximal assistance   Additional items Assist x 2;Increased time required;LE management   Sit to Supine 2  Maximal assistance   Additional items Assist x 2;Increased time required;LE management;Verbal cues   Transfers   Sit to Stand   (Attempted 3 times, each time raising bed, no succesful attempts)   Balance   Static Sitting Fair +   Dynamic Sitting Fair   Activity Tolerance   Activity Tolerance Patient limited by pain   Medical Staff Made Aware DOROTHY James and Dr. Lee made aware of the outcomes   Assessment   Prognosis Good   Problem List Decreased strength;Decreased endurance;Impaired balance;Decreased mobility;Pain   Assessment Pt is 79 y.o. female seen for PT evaluation s/p admit to [facility] on " 12/18/2024 w/ Gastroenteritis. PT consulted to assess pt's functional mobility and d/c needs. Order placed for PT eval and tx, w/ up and OOB as tolerated order. Pt agreeable to PT  session upon arrival, pt found supine in bed.  PTA, pt was independent w/ all functional mobility w/ rw and lives w/ spouse in 1 level apartment .  Pt to benefit from continued PT tx to address deficits and maximize level of functional independent mobility and consistency. Upon conclusion pt  supine in bed and with all needs in reach. Complexity: Comorbidities affecting pt's physical performance at time of assessment include: DM, htn, and CKD, and gastroenteritis . Personal factors affecting pt at time of IE include: advanced age, limited mobility, ambulating with assistive device, and limited home support. Please find objective findings from PT assessment regarding body systems outlined above with impairments and limitations including weakness, impaired balance, decreased endurance, pain, decreased activity tolerance, decreased functional mobility tolerance, and fall risk.  Pt's clinical presentation is currently unstable/unpredictable seen in pt's presentation of hypertension, abnormal WBC count, abnormal potassium levels, and pain. The patient's AM-PAC Basic Mobility Inpatient Short Form Raw Score is 7.  Based on patient presentations and impairments, pt would most appropriately benefit from Level 2 resource intensity upon discharge. A Raw score of less than or equal to 16 suggests the patient may benefit from discharge to post-acute rehabilitation services. Please also refer to the recommendation of the Physical Therapist for safe discharge planning. RN verbalized pt appropriate for PT session.   Goals   Patient Goals To walk again   LTG Expiration Date 12/29/24   Long Term Goal #1 Pt will: Perform bed mobility tasks to no more than min A to increase Indep in home environment, decrease burden of care, and improve ease of bed mobility.  Perform transfers to no more than min A to increase Indep in home environment, decrease burden of care, decrease risk for falls, and improve ease of transfers. Perform ambulation with rw for 25 ft to  increase Indep in home environment, decrease risk for falls, improve ease of transfers, and improve activity tolerance. Increase dynamic standing balance to F+ to decrease fall risk.   Increase OOB activity tolerance to 10 minutes without s/s of exertion to decrease fall risk.   PT Treatment Day 0   Plan   Treatment/Interventions Functional transfer training;LE strengthening/ROM;Elevations;Therapeutic exercise;Endurance training;Bed mobility;Gait training   PT Frequency 3-5x/wk   Discharge Recommendation   Rehab Resource Intensity Level, PT II (Moderate Resource Intensity)   Equipment Recommended Walker   AM-PAC Basic Mobility Inpatient   Turning in Flat Bed Without Bedrails 2   Lying on Back to Sitting on Edge of Flat Bed Without Bedrails 1   Moving Bed to Chair 1   Standing Up From Chair Using Arms 1   Walk in Room 1   Climb 3-5 Stairs With Railing 1   Basic Mobility Inpatient Raw Score 7   [facility] Highest Level Of Mobility   -HLM Goal 2: Bed activities/Dependent transfer   JH-HLM Achieved 3: Sit at edge of bed     Pt seen as a co-eval with OT due to the patient's co-morbidities, clinically unstable presentation, and present impairments which are a regression from the patient's baseline.       Time In: 1407  Time Out: 1440  Total Evaluation Minutes:  33    Willy Wasserman PT

## 2024-12-19 NOTE — ASSESSMENT & PLAN NOTE
Lab Results   Component Value Date    HGBA1C 6.4 (H) 12/18/2024       Recent Labs     12/18/24  1641 12/18/24  2045 12/19/24  0715 12/19/24  1103   POCGLU 146* 151* 143* 168*       Blood Sugar Average: Last 72 hrs:  (P) 152  Will continue home long-acting insulin at reduced dose of 45 units nightly.  Insulin sliding scale.  Diabetic diet

## 2024-12-19 NOTE — OCCUPATIONAL THERAPY NOTE
Occupational Therapy Evaluation     Patient Name: Alberta Sanchez  Today's Date: 12/19/2024  Problem List  Principal Problem:    Gastroenteritis  Active Problems:    Chronic renal insufficiency, stage III (moderate) (HCC)    Type 2 diabetes mellitus with stage 3 chronic kidney disease, with long-term current use of insulin (HCC)    Restless legs syndrome (RLS)    Right knee pain    Past Medical History  Past Medical History:   Diagnosis Date    Abnormal finding in urine 1/9/2023    Allergic Springtime    Runny nose, congestion    Anemia     Arthritis 10/30/2020    Chronic kidney disease     Diabetes mellitus (HCC)     Fibromyalgia, primary     Fracture of wrist     RIGHT    Gastroenteritis 12/18/2024    Heart murmur 06/30/2020    History of non-insulin dependent diabetes mellitus     Hx of thrombocytopenia 02/18/2022    Hyperlipidemia     Hypertension     Hypertension     IBS (irritable bowel syndrome)     Inflammatory bowel disease 2015    Been doctoring for years    Irritable bowel     Kidney stone     Metabolic syndrome     Obesity     RLS (restless legs syndrome)     Routine medical exam 06/28/2019    Skin lesion 2/8/2021    Sleep apnea     Sleep apnea     ON CPAP    Upper respiratory tract infection 01/19/2022    Urge incontinence     Urinary incontinence     Urinary tract infection 1/1/2023     Past Surgical History  Past Surgical History:   Procedure Laterality Date    CHOLECYSTECTOMY  2013    COLONOSCOPY  2010    CYSTOSCOPY      HERNIA REPAIR  2008    HYSTERECTOMY N/A 1974    partial; ovaries intact    KNEE SURGERY Left          12/19/24 1407   OT Last Visit   OT Visit Date 12/19/24   Note Type   Note type Evaluation   Additional Comments Nsg staff verbally cleared pt for OT evaluation.  Pt received  supine in bed c HOB semi-elevated on this date + is agreeable to OT session @ this time. @ exit, pt remains in  supine in bed c HOB semi-elevated c all lines intact, all needs met, call bell in hand + nsg  "aware of location/disposition.   Pain Assessment   Pain Assessment Tool 0-10   Pain Score 8   Pain Location/Orientation Location: Back;Orientation: Left;Orientation: Right;Location: Leg;Location: Knee   Pain Onset/Description Onset: Ongoing   Patient's Stated Pain Goal No pain   Restrictions/Precautions   Weight Bearing Precautions Per Order No   Other Precautions Chair Alarm;Bed Alarm;Contact/isolation;Fall Risk;Pain   Home Living   Type of Home Apartment   Home Layout One level;Performs ADLs on one level;Able to live on main level with bedroom/bathroom  (No JAM)   Bathroom Shower/Tub Tub/shower unit   Bathroom Toilet Standard   Bathroom Equipment Tub transfer bench;Grab bars in shower;Toilet raiser   Bathroom Accessibility Accessible   Home Equipment Walker;Cane  (Rollator vs SPC use @ baseline)   Prior Function   Level of [place] Independent with ADLs;Independent with functional mobility;Independent with IADLS   Lives With Spouse   Receives Help From Family   IADLs Independent with driving;Independent with meal prep;Independent with medication management   Falls in the last 6 months 0   Lifestyle   Autonomy Pt lives in  1 story home c spouse + @ baseline, performs ADLs  I'ly, IADLs I'ly + fxl mobility I'ly with rollator, with SPC. (+) .   Reciprocal Relationships  (also hospitalized @ [facility])   Service to Others Family relations   General   Additional General Comments CG for  who is awaiting prosthetic.   Subjective   Subjective \"my legs aren't working- they think from the virus\"   ADL   Eating Assistance 5  Supervision/Setup   Grooming Assistance 5  Supervision/Setup   UB Bathing Assistance 5  Supervision/Setup   LB Bathing Assistance 2  Maximal Assistance   UB Dressing Assistance 5  Supervision/Setup   LB Dressing Assistance 2  Maximal Assistance   Toileting Assistance  1  Total Assistance   Bed Mobility   Rolling R 2  Maximal assistance   Additional items Assist x 1;Bedrails;Verbal " cues;Increased time required   Rolling L 2  Maximal assistance   Additional items Assist x 1;LE management;Verbal cues;Bedrails;Increased time required   Supine to Sit 2  Maximal assistance   Additional items Assist x 2;Verbal cues;LE management;Increased time required   Sit to Supine 2  Maximal assistance   Additional items Assist x 2;Increased time required;LE management;Verbal cues   Transfers   Sit to Stand   (Attempted; unable.)   Balance   Static Sitting Fair +   Dynamic Sitting Fair   Activity Tolerance   Activity Tolerance Patient limited by pain   Medical Staff Made Aware Kaitlin RAO RN + Dr. Rosa recinos   Vision-Basic Assessment   Current Vision Wears glasses all the time   Psychosocial   Psychosocial (WDL) WDL   Cognition   Overall Cognitive Status WFL   Arousal/Participation Alert;Responsive;Cooperative   Attention Within functional limits   Orientation Level Oriented X4   Memory Within functional limits   Following Commands Follows all commands and directions without difficulty   Assessment   Limitation Decreased ADL status;Decreased endurance;Decreased self-care trans;Decreased high-level ADLs   Prognosis Fair   Assessment Patient is a 79 y.o. female seen for OT evaluation s/p admit to  [facility] on 12/18/2024 w/Gastroenteritis + commorbidities/PMHx (as listed in medical record) affecting patient's functional performance c ADL tasks at time of assessment. OT orders placed for evaluation and treatment to assess pt's ADLs, cognitive status + performance during functional tasks in order to formulate appropriate d/c recommendations.     Therapist performed at least two patient identifiers during session including name and wristband. Personal factors affecting patient at time of initial evaluation include: unable to perform caregiver support/tasks, limited home support, inability to perform IADLs, inability to perform ADLs, inability to ambulate household distances, inability to navigate community  distances, and decreased initiation and engagement.   Pt's clinical presentation is currently unstable/unpredictable given new onset deficits that effect pt's occupational performance and ability to safely return to OF including decrease activity tolerance, decrease standing balance, decrease sitting balance, decrease performance during ADL tasks, increased pain, decrease generalized strength, decrease activity engagement, decrease performance during functional transfers, and high fall risk combined with medical complications of hypertension , pain impacting overall mobility status, edema/swelling, and need for input for mobility technique/safety. This evaluation required an extensive review of medical and/or therapy records and additional review of physical, cognitive and psychosocial history related to functional performance. Based upon functional performance deficits and assessments, this evaluation has been identified as a  high complexity evaluation.      Patient to benefit from continued Occupational Therapy treatment while in the hospital to address aforementioned deficits and maximize level of functional independence with ADLs and functional mobility.   Plan   Treatment Interventions ADL retraining;Functional transfer training;Endurance training;Patient/family training;Equipment evaluation/education;Compensatory technique education;Energy conservation;Activityengagement   Goal Expiration Date 12/29/24   OT Treatment Day 0   OT Frequency 3-5x/wk   Discharge Recommendation   Rehab Resource Intensity Level, OT II (Moderate Resource Intensity)   AM-PAC Daily Activity Inpatient   Lower Body Dressing 2   Bathing 2   Toileting 2   Upper Body Dressing 3   Grooming 3   Eating 3   Daily Activity Raw Score 15   Daily Activity Standardized Score (Calc for Raw Score >=11) 34.69   AM-PAC Applied Cognition Inpatient   Following a Speech/Presentation 4   Understanding Ordinary Conversation 4   Taking Medications 4    Remembering Where Things Are Placed or Put Away 4   Remembering List of 4-5 Errands 4   Taking Care of Complicated Tasks 4   Applied Cognition Raw Score 24   Applied Cognition Standardized Score 62.21   End of Consult   Patient Position at End of Consult Supine   Nurse Communication Nurse aware of consult     The patient's raw score on the AM-PAC Daily Activity inpatient short form is 15, standardized score is 34.69, less than 39.4. Please refer to the recommendation of the Occupational Therapist for safe DC planning.    Resource Intensity Recommendation: Level II (Moderate Resource Intensity)        GOALS:    *ADL transfers with (I) for inc'd independence with ADLs/purposeful tasks    *UB ADL with (I) for inc'd independence with self cares    *LB ADL with (I) using AE prn for inc'd independence with self cares    *Toileting with (I) for clothing management and hygiene for return to PLOF with personal care    *Increase stand tolerance x 1  m for inc'd tolerance with standing purposeful tasks    *Participate in 10m UE therex to increase overall stamina/activity tolerance for purposeful tasks    *Bed mobility- (I) for inc'd independence to manage own comfort and initiate EOB & OOB purposeful tasks    *Patient will verbalize 3 safety awareness/ principles to prevent falls in the home setting.     *Patient will verbalize and demonstrate use of energy conservation/deep breathing techniques and work simplification skills during functional activities with no verbal cues.     *Patient will increase OOB/sitting tolerance to 2-4 hours per day to increase participation in self-care and leisure tasks with no s/s of exertion.     *Patient will engage in ongoing cognitive assessment to assist with safe discharge planning/recommendations.     *Pt will verbalize and demonstrate understanding of post-op movement precautions 100% (if applicable) in tx sessions for increased safety and functional mobility.      *Pt will  demonstrate use of long handled AE (if appropriate) during 100% of tx sessions for increased ADL safety and independence following D/C     Alley Narayanan OTR/LIZA

## 2024-12-19 NOTE — ASSESSMENT & PLAN NOTE
Lab Results   Component Value Date    EGFR 60 12/19/2024    EGFR 47 12/18/2024    EGFR 60 08/29/2024    CREATININE 0.90 12/19/2024    CREATININE 1.11 12/18/2024    CREATININE 0.90 08/29/2024   Creatinine seems to be at baseline.  Will continue routine lab monitoring.  Avoid any nephrotoxic meds

## 2024-12-19 NOTE — PLAN OF CARE
Problem: OCCUPATIONAL THERAPY ADULT  Goal: Performs self-care activities at highest level of function for planned discharge setting.  See evaluation for individualized goals.  Description: Treatment Interventions: ADL retraining, Functional transfer training, Endurance training, Patient/family training, Equipment evaluation/education, Compensatory technique education, Energy conservation, Activityengagement          See flowsheet documentation for full assessment, interventions and recommendations.   Note: Limitation: Decreased ADL status, Decreased endurance, Decreased self-care trans, Decreased high-level ADLs  Prognosis: Fair  Assessment: Patient is a 79 y.o. female seen for OT evaluation s/p admit to  [facility] on 12/18/2024 w/Gastroenteritis + commorbidities/PMHx (as listed in medical record) affecting patient's functional performance c ADL tasks at time of assessment. OT orders placed for evaluation and treatment to assess pt's ADLs, cognitive status + performance during functional tasks in order to formulate appropriate d/c recommendations.     Therapist performed at least two patient identifiers during session including name and wristband. Personal factors affecting patient at time of initial evaluation include: unable to perform caregiver support/tasks, limited home support, inability to perform IADLs, inability to perform ADLs, inability to ambulate household distances, inability to navigate community distances, and decreased initiation and engagement.   Pt's clinical presentation is currently unstable/unpredictable given new onset deficits that effect pt's occupational performance and ability to safely return to PLOF including decrease activity tolerance, decrease standing balance, decrease sitting balance, decrease performance during ADL tasks, increased pain, decrease generalized strength, decrease activity engagement, decrease performance during functional transfers, and high fall risk  combined with medical complications of hypertension , pain impacting overall mobility status, edema/swelling, and need for input for mobility technique/safety. This evaluation required an extensive review of medical and/or therapy records and additional review of physical, cognitive and psychosocial history related to functional performance. Based upon functional performance deficits and assessments, this evaluation has been identified as a  high complexity evaluation.      Patient to benefit from continued Occupational Therapy treatment while in the hospital to address aforementioned deficits and maximize level of functional independence with ADLs and functional mobility.     Rehab Resource Intensity Level, OT: II (Moderate Resource Intensity)

## 2024-12-19 NOTE — PLAN OF CARE
Problem: PAIN - ADULT  Goal: Verbalizes/displays adequate comfort level or baseline comfort level  Description: Interventions:  - Encourage patient to monitor pain and request assistance  - Assess pain using appropriate pain scale  - Administer analgesics based on type and severity of pain and evaluate response  - Implement non-pharmacological measures as appropriate and evaluate response  - Consider cultural and social influences on pain and pain management  - Notify physician/advanced practitioner if interventions unsuccessful or patient reports new pain  Outcome: Progressing     Problem: INFECTION - ADULT  Goal: Absence or prevention of progression during hospitalization  Description: INTERVENTIONS:  - Assess and monitor for signs and symptoms of infection  - Monitor lab/diagnostic results  - Monitor all insertion sites, i.e. indwelling lines, tubes, and drains  - Monitor endotracheal if appropriate and nasal secretions for changes in amount and color  - [place] appropriate cooling/warming therapies per order  - Administer medications as ordered  - Instruct and encourage patient and family to use good hand hygiene technique  - Identify and instruct in appropriate isolation precautions for identified infection/condition  Outcome: Progressing     Problem: GASTROINTESTINAL - ADULT  Goal: Minimal or absence of nausea and/or vomiting  Description: INTERVENTIONS:  - Administer IV fluids if ordered to ensure adequate hydration  - Maintain NPO status until nausea and vomiting are resolved  - Nasogastric tube if ordered  - Administer ordered antiemetic medications as needed  - Provide nonpharmacologic comfort measures as appropriate  - Advance diet as tolerated, if ordered  - Consider nutrition services referral to assist patient with adequate nutrition and appropriate food choices  Outcome: Progressing  Goal: Maintains or returns to baseline bowel function  Description: INTERVENTIONS:  - Assess bowel function  -  Encourage oral fluids to ensure adequate hydration  - Administer IV fluids if ordered to ensure adequate hydration  - Administer ordered medications as needed  - Encourage mobilization and activity  - Consider nutritional services referral to assist patient with adequate nutrition and appropriate food choices  Outcome: Progressing  Goal: Maintains adequate nutritional intake  Description: INTERVENTIONS:  - Monitor percentage of each meal consumed  - Identify factors contributing to decreased intake, treat as appropriate  - Assist with meals as needed  - Monitor I&O, weight, and lab values if indicated  - Obtain nutrition services referral as needed  Outcome: Progressing  Goal: Oral mucous membranes remain intact  Description: INTERVENTIONS  - Assess oral mucosa and hygiene practices  - Implement preventative oral hygiene regimen  - Implement oral medicated treatments as ordered  - Initiate Nutrition services referral as needed  Outcome: Progressing

## 2024-12-19 NOTE — PLAN OF CARE
Problem: PHYSICAL THERAPY ADULT  Goal: Performs mobility at highest level of function for planned discharge setting.  See evaluation for individualized goals.  Description: Treatment/Interventions: Functional transfer training, LE strengthening/ROM, Elevations, Therapeutic exercise, Endurance training, Bed mobility, Gait training  Equipment Recommended: Walker       See flowsheet documentation for full assessment, interventions and recommendations.  Outcome: Progressing  Note: Prognosis: Good  Problem List: Decreased strength, Decreased endurance, Impaired balance, Decreased mobility, Pain  Assessment: Pt is 79 y.o. female seen for PT evaluation s/p admit to [facility] on 12/18/2024 w/ Gastroenteritis. PT consulted to assess pt's functional mobility and d/c needs. Order placed for PT eval and tx, w/ up and OOB as tolerated order. Pt agreeable to PT  session upon arrival, pt found supine in bed.  PTA, pt was independent w/ all functional mobility w/ rw and lives w/ spouse in 1 level apartment .  Pt to benefit from continued PT tx to address deficits and maximize level of functional independent mobility and consistency. Upon conclusion pt  supine in bed and with all needs in reach. Complexity: Comorbidities affecting pt's physical performance at time of assessment include: DM, htn, and CKD, and gastroenteritis . Personal factors affecting pt at time of IE include: advanced age, limited mobility, ambulating with assistive device, and limited home support. Please find objective findings from PT assessment regarding body systems outlined above with impairments and limitations including weakness, impaired balance, decreased endurance, pain, decreased activity tolerance, decreased functional mobility tolerance, and fall risk.  Pt's clinical presentation is currently unstable/unpredictable seen in pt's presentation of hypertension, abnormal WBC count, abnormal potassium levels, and pain. The patient's AM-PAC Basic  Mobility Inpatient Short Form Raw Score is 7.  Based on patient presentations and impairments, pt would most appropriately benefit from Level 2 resource intensity upon discharge. A Raw score of less than or equal to 16 suggests the patient may benefit from discharge to post-acute rehabilitation services. Please also refer to the recommendation of the Physical Therapist for safe discharge planning. RN verbalized pt appropriate for PT session.        Rehab Resource Intensity Level, PT: II (Moderate Resource Intensity)    See flowsheet documentation for full assessment.

## 2024-12-19 NOTE — CASE MANAGEMENT
Case Management Assessment & Discharge Planning Note    Patient name Alberta Sanchez  Location /-01 MRN [id number]  : 1945 Date 2024       Current Admission Date: 2024  Current Admission Diagnosis:Gastroenteritis   Patient Active Problem List    Diagnosis Date Noted Date Diagnosed    Gastroenteritis 2024     Right knee pain 2024     Nocturnal hypoxemia 10/21/2024     Bilateral lower extremity edema 2024     Other fatigue 2024     Diabetes mellitus with neuropathy ([facility]) 2024     Platelets decreased ([facility]) 2023     Itchy skin 2023     Nasal congestion 2023     Dysuria 2023     LVH (left ventricular hypertrophy) 2022     COVID-19 virus infection 2022     COVID-19 vaccine series completed 2022     DNR (do not resuscitate) 2021     Gait disturbance 2021     Callus 10/30/2020     Arthritis 10/30/2020     Bilateral low back pain with bilateral sciatica 10/30/2020     Nail dystrophy 2020     Benign hypertensive renal disease 2020     Proteinuria 2020     Breast lump in upper outer quadrant 2020     Neck pain 2020     Abnormal mammogram 2020     Vaginal bleeding 2019     Morbid obesity ([facility]) 2019     Chronic right shoulder pain 2019     Migraine without aura, not intractable 2018     Hypertriglyceridemia, essential 2018     Osteopenia of spine 2018     Nonalcoholic fatty liver disease 2018     Hypomagnesemia 2017     Indigestion 2017     Long term current use of insulin ([facility]) 2017     Hammer toe 2015     Onychomycosis 11/10/2015     Chronic renal insufficiency, stage III (moderate) ([facility]) 2015     Peripheral vascular disease ([facility]) 2014     Plantar fascial fibromatosis 2014     Vitamin D deficiency 2014     Multiple and bilateral precerebral arterial occlusion 2014     Tinnitus  03/19/2014     Carotid artery disease (HCC) 03/13/2013     Essential hypertension 01/15/2013     Mixed hyperlipidemia 01/15/2013     Irritable bowel syndrome 01/15/2013     Metabolic syndrome 01/15/2013     Obstructive sleep apnea treated with continuous positive airway pressure (CPAP) 01/15/2013     Type 2 diabetes mellitus with stage 3 chronic kidney disease, with long-term current use of insulin (HCC) 01/15/2013     Anemia 01/15/2013     Restless legs syndrome (RLS) 01/15/2013     Mixed stress and urge urinary incontinence 01/15/2013       LOS (days): 0  Geometric Mean LOS (GMLOS) (days):   Days to GMLOS:     OBJECTIVE:              Current admission status: Observation  Referral Reason: Other (discharge planning)    Preferred Pharmacy:   [id number] - [place], [place] - [address] [facility]  [address] [address]  Phone: [phone] Fax: [phone]    [facility] [address]  [address] [place] [id number]  Phone: [phone] Fax: [phone]    Primary Care Provider: Dulce Michaud MD    Primary Insurance: MEDICARE  Secondary Insurance: AAR    ASSESSMENT:  Active Health Care Proxies       Freddy Sanchez Health Care Representative - Spouse   Primary Phone: [phone] (Home)  Mobile Phone: [phone]                 Advance Directives  Does patient have a Health Care POA?: No  Was patient offered paperwork?: Yes (Declined)  Does patient have Advance Directives?: Yes  Advance Directives: Living will  Primary Contact: Freddy Sanchez (Spouse)  [phone] (Home Phone)    Readmission Root Cause  30 Day Readmission: No    Patient Information  Admitted from:: Home  Mental Status: Alert  During Assessment patient was accompanied by: Not accompanied during assessment  Assessment information provided by:: Patient  Primary Caregiver: Self  Support Systems: Spouse/significant other, Children  County of Residence:  [place]  What [facility] do you live in?: [place]  Home entry access options. Select all that apply.: No steps to enter home  Type of Current Residence: Apartment  Floor Level: 1  Upon entering residence, is there a bedroom on the main floor (no further steps)?: Yes  Upon entering residence, is there a bathroom on the main floor (no further steps)?: Yes  Living Arrangements: Lives w/ Spouse/significant other  Is patient a ?: No    Activities of Daily Living Prior to Admission  Functional Status: Independent  Completes ADLs independently?: Yes  Ambulates independently?: Yes  Does patient use assisted devices?: Yes  Assisted Devices (DME) used: Walker  Does patient currently own DME?: Yes  What DME does the patient currently own?: Straight Cane, Walker, Wheelchair  Does patient have a history of Outpatient Therapy (PT/OT)?: Yes  Does the patient have a history of Short-Term Rehab?: No  Does patient have a history of HHC?: No  Does patient currently have HHC?: No    Patient Information Continued  Income Source: SSI/SSD (pension)  Does patient have prescription coverage?: Yes  Does patient receive dialysis treatments?: No  Does patient have a history of substance abuse?: No  Does patient have a history of Mental Health Diagnosis?: No    Means of Transportation  Means of Transport to Appts:: Drives Self    DISCHARGE DETAILS:    Discharge planning discussed with:: Patient  Freedom of Choice: Yes  Comments - Freedom of Choice: Pt states she is open to what is recommended     Contacts  Patient Contacts: DanielFreddy (Spouse)  [phone] (Home Phone)  Relationship to Patient:: Family    Other Referral/Resources/Interventions Provided:  Interventions: Short Term Rehab  Referral Comments: PTOT recommendations level 2     CM met with Pt to introduce self, explain role, complete CM assessment, and discuss DC planning.     Pt lives at home w/spouse in an apartment that is on the ground level. Pt reports being independent  PTA (about a week before). Pt was using a walker to ambulate. Pt also owns a cane and WC. 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 Pt was driving.     CM sent referrals to STR. CM to review therapy recommendation for STR with Pt and review choices. CM will continue to follow for DC planning needs.

## 2024-12-19 NOTE — PROGRESS NOTES
Progress Note - Hospitalist   Name: Alberta Sanchez 79 y.o. female I MRN: [id number]  Unit/Bed#: -01 I Date of Admission: 12/18/2024   Date of Service: 12/19/2024 I Hospital Day: 0    Assessment & Plan  Gastroenteritis  Patient with GI complaints including nausea, decreased appetite, and diarrhea  Patient did have an episode of diarrhea overnight.  Stool studies ordered  Continue IV fluids as needed  Surgical soft diet and advance as tolerated  Monitor electrolytes  CT abdomen/pelvis with no acute findings noted  Continue supportive care  No need for antibiotics at this time  Chronic renal insufficiency, stage III (moderate) ([facility])  Lab Results   Component Value Date    EGFR 60 12/19/2024    EGFR 47 12/18/2024    EGFR 60 08/29/2024    CREATININE 0.90 12/19/2024    CREATININE 1.11 12/18/2024    CREATININE 0.90 08/29/2024   Creatinine seems to be at baseline.  Will continue routine lab monitoring.  Avoid any nephrotoxic meds  Type 2 diabetes mellitus with stage 3 chronic kidney disease, with long-term current use of insulin ([facility])  Lab Results   Component Value Date    HGBA1C 6.4 (H) 12/18/2024       Recent Labs     12/18/24  1641 12/18/24  2045 12/19/24  0715 12/19/24  1103   POCGLU 146* 151* 143* 168*       Blood Sugar Average: Last 72 hrs:  (P) 152  Will continue home long-acting insulin at reduced dose of 45 units nightly.  Insulin sliding scale.  Diabetic diet  Restless legs syndrome (RLS)  Continue Topamax  Right knee pain  Acute on chronic right knee pain, patient states that she typically has chronic pain however pain slightly worse than usual  Denies any recent trauma  Knee x-ray with effusion noted and degenerative changes.  No signs of infection on physical exam.  Patient had difficulty walking with PT  We will check lumbar spine CT  Case management for discharge planning    VTE Pharmacologic Prophylaxis: VTE Score: 5 Moderate Risk (Score 3-4) - Pharmacological DVT Prophylaxis Ordered: enoxaparin  (Lovenox).    Mobility:   Basic Mobility Inpatient Raw Score: 7  JH-HLM Goal: 2: Bed activities/Dependent transfer  JH-HLM Achieved: 3: Sit at edge of bed  JH-HLM Goal achieved. Continue to encourage appropriate mobility.    Patient Centered Rounds: I performed bedside rounds with nursing staff today.   Discussions with Specialists or Other Care Team Provider: yes    Education and Discussions with Family / Patient: Updated  (daughter) at bedside.    Current Length of Stay: 0 day(s)  Current Patient Status: Observation   Certification Statement: The patient will continue to require additional inpatient hospital stay due to ambulatory dysfunction, gastroenteritis  Discharge Plan: Anticipate discharge in 24-48 hrs to discharge location to be determined pending rehab evaluations.    Code Status: Level 3 - DNAR and DNI    Subjective   No overnight events noted.  Patient with difficulty ambulating with physical therapy today.  Currently afebrile.  Still with intermittent diarrhea    Objective :  HR:  [81-82] 82  BP: (101-195)/(70-82) 101/74  Resp:  [17-18] 18  SpO2:  [93 %-94 %] 93 %  O2 Device: None (Room air)    Body mass index is 38.56 kg/m².     Input and Output Summary (last 24 hours):     Intake/Output Summary (Last 24 hours) at 12/19/2024 1542  Last data filed at 12/19/2024 1200  Gross per 24 hour   Intake 1726.67 ml   Output 242 ml   Net 1484.67 ml       Physical Exam  Constitutional:       General: She is not in acute distress.     Appearance: She is obese.   HENT:      Head: Normocephalic and atraumatic.      Nose: Nose normal.      Mouth/Throat:      Mouth: Mucous membranes are moist.   Eyes:      Extraocular Movements: Extraocular movements intact.      Conjunctiva/sclera: Conjunctivae normal.   Cardiovascular:      Rate and Rhythm: Normal rate and regular rhythm.   Pulmonary:      Effort: Pulmonary effort is normal. No respiratory distress.   Abdominal:      Palpations: Abdomen is soft.       Tenderness: There is no abdominal tenderness.   Musculoskeletal:         General: Normal range of motion.      Cervical back: Normal range of motion and neck supple.      Comments: Generalized weakness.  Right knee swelling noted.  No erythema.  No warmth to touch.   Skin:     General: Skin is warm and dry.   Neurological:      General: No focal deficit present.      Mental Status: She is alert. Mental status is at baseline.      Cranial Nerves: No cranial nerve deficit.   Psychiatric:         Mood and Affect: Mood normal.         Behavior: Behavior normal.         Lines/Drains:        Lab Results: I have reviewed the following results:   Results from last 7 days   Lab Units 12/19/24  0453 12/18/24  1126   WBC Thousand/uL 10.74* 13.33*   HEMOGLOBIN g/dL 12.2 13.2   HEMATOCRIT % 38.7 41.3   PLATELETS Thousands/uL 137* 143*   LYMPHO PCT %  --  5*   MONO PCT %  --  5   EOS PCT %  --  1     Results from last 7 days   Lab Units 12/19/24  0453   SODIUM mmol/L 138   POTASSIUM mmol/L 3.2*   CHLORIDE mmol/L 103   CO2 mmol/L 27   BUN mg/dL 14   CREATININE mg/dL 0.90   ANION GAP mmol/L 8   CALCIUM mg/dL 8.9   ALBUMIN g/dL 3.4*   TOTAL BILIRUBIN mg/dL 0.84   ALK PHOS U/L 124*   ALT U/L 26   AST U/L 26   GLUCOSE RANDOM mg/dL 129         Results from last 7 days   Lab Units 12/19/24  1103 12/19/24  0715 12/18/24  2045 12/18/24  1641   POC GLUCOSE mg/dl 168* 143* 151* 146*     Results from last 7 days   Lab Units 12/18/24  1647   HEMOGLOBIN A1C % 6.4*     Results from last 7 days   Lab Units 12/18/24  1126   LACTIC ACID mmol/L 0.9       Recent Cultures (last 7 days):         Imaging Results Review: I reviewed radiology reports from this admission including: xray(s).  Other Study Results Review: No additional pertinent studies reviewed.    Last 24 Hours Medication List:     Current Facility-Administered Medications:     acetaminophen (TYLENOL) tablet 650 mg, Q6H PRN    albuterol (PROVENTIL HFA,VENTOLIN HFA) inhaler 2 puff, Q4H  PRN    enoxaparin (LOVENOX) subcutaneous injection 40 mg, Q12H BRADY    fenofibrate (TRICOR) tablet 145 mg, Daily    insulin glargine (LANTUS) subcutaneous injection 45 Units 0.45 mL, HS    insulin lispro (HumALOG/ADMELOG) 100 units/mL subcutaneous injection 1-5 Units, HS    insulin lispro (HumALOG/ADMELOG) 100 units/mL subcutaneous injection 1-6 Units, TID AC    lisinopril (ZESTRIL) tablet 10 mg, Daily    ondansetron (ZOFRAN) injection 4 mg, Q6H PRN    oxyCODONE (ROXICODONE) IR tablet 5 mg, Q6H PRN    oxyCODONE (ROXICODONE) split tablet 2.5 mg, Q6H PRN    pantoprazole (PROTONIX) EC tablet 40 mg, Daily    pregabalin (LYRICA) capsule 75 mg, TID    topiramate (TOPAMAX) tablet 25 mg, Daily    Administrative Statements   Today, Patient Was Seen By: Bala Lee MD      **Please Note: This note may have been constructed using a voice recognition system.**

## 2024-12-20 ENCOUNTER — APPOINTMENT (INPATIENT)
Dept: NON INVASIVE DIAGNOSTICS | Facility: HOSPITAL | Age: 79
DRG: 392 | End: 2024-12-20
Payer: MEDICARE

## 2024-12-20 LAB
ALBUMIN SERPL BCG-MCNC: 3.1 G/DL (ref 3.5–5)
ALP SERPL-CCNC: 147 U/L (ref 34–104)
ALT SERPL W P-5'-P-CCNC: 33 U/L (ref 7–52)
ANION GAP SERPL CALCULATED.3IONS-SCNC: 7 MMOL/L (ref 4–13)
AST SERPL W P-5'-P-CCNC: 33 U/L (ref 13–39)
BASOPHILS # BLD AUTO: 0.01 THOUSANDS/ÂΜL (ref 0–0.1)
BASOPHILS NFR BLD AUTO: 0 % (ref 0–1)
BILIRUB SERPL-MCNC: 0.9 MG/DL (ref 0.2–1)
BUN SERPL-MCNC: 16 MG/DL (ref 5–25)
C COLI+JEJUNI TUF STL QL NAA+PROBE: NEGATIVE
C DIFF TOX GENS STL QL NAA+PROBE: NEGATIVE
CALCIUM ALBUM COR SERPL-MCNC: 9.4 MG/DL (ref 8.3–10.1)
CALCIUM SERPL-MCNC: 8.7 MG/DL (ref 8.4–10.2)
CHLORIDE SERPL-SCNC: 104 MMOL/L (ref 96–108)
CO2 SERPL-SCNC: 25 MMOL/L (ref 21–32)
CREAT SERPL-MCNC: 0.96 MG/DL (ref 0.6–1.3)
EC STX1+STX2 GENES STL QL NAA+PROBE: NEGATIVE
EOSINOPHIL # BLD AUTO: 0.18 THOUSAND/ÂΜL (ref 0–0.61)
EOSINOPHIL NFR BLD AUTO: 2 % (ref 0–6)
ERYTHROCYTE [DISTWIDTH] IN BLOOD BY AUTOMATED COUNT: 13.2 % (ref 11.6–15.1)
GFR SERPL CREATININE-BSD FRML MDRD: 56 ML/MIN/1.73SQ M
GLUCOSE SERPL-MCNC: 104 MG/DL (ref 65–140)
GLUCOSE SERPL-MCNC: 110 MG/DL (ref 65–140)
GLUCOSE SERPL-MCNC: 119 MG/DL (ref 65–140)
GLUCOSE SERPL-MCNC: 162 MG/DL (ref 65–140)
GLUCOSE SERPL-MCNC: 196 MG/DL (ref 65–140)
HCT VFR BLD AUTO: 35.3 % (ref 34.8–46.1)
HGB BLD-MCNC: 11.1 G/DL (ref 11.5–15.4)
IMM GRANULOCYTES # BLD AUTO: 0.06 THOUSAND/UL (ref 0–0.2)
IMM GRANULOCYTES NFR BLD AUTO: 1 % (ref 0–2)
LYMPHOCYTES # BLD AUTO: 1.27 THOUSANDS/ÂΜL (ref 0.6–4.47)
LYMPHOCYTES NFR BLD AUTO: 15 % (ref 14–44)
MAGNESIUM SERPL-MCNC: 1.7 MG/DL (ref 1.9–2.7)
MCH RBC QN AUTO: 28.7 PG (ref 26.8–34.3)
MCHC RBC AUTO-ENTMCNC: 31.4 G/DL (ref 31.4–37.4)
MCV RBC AUTO: 91 FL (ref 82–98)
MONOCYTES # BLD AUTO: 0.68 THOUSAND/ÂΜL (ref 0.17–1.22)
MONOCYTES NFR BLD AUTO: 8 % (ref 4–12)
NEUTROPHILS # BLD AUTO: 6.34 THOUSANDS/ÂΜL (ref 1.85–7.62)
NEUTS SEG NFR BLD AUTO: 74 % (ref 43–75)
NRBC BLD AUTO-RTO: 0 /100 WBCS
PLATELET # BLD AUTO: 150 THOUSANDS/UL (ref 149–390)
PMV BLD AUTO: 11.5 FL (ref 8.9–12.7)
POTASSIUM SERPL-SCNC: 3.6 MMOL/L (ref 3.5–5.3)
PROT SERPL-MCNC: 6.1 G/DL (ref 6.4–8.4)
RBC # BLD AUTO: 3.87 MILLION/UL (ref 3.81–5.12)
SALMONELLA SP SPAO STL QL NAA+PROBE: NEGATIVE
SHIGELLA SP+EIEC IPAH STL QL NAA+PROBE: NEGATIVE
SODIUM SERPL-SCNC: 136 MMOL/L (ref 135–147)
WBC # BLD AUTO: 8.54 THOUSAND/UL (ref 4.31–10.16)

## 2024-12-20 PROCEDURE — 99232 SBSQ HOSP IP/OBS MODERATE 35: CPT | Performed by: INTERNAL MEDICINE

## 2024-12-20 PROCEDURE — 97535 SELF CARE MNGMENT TRAINING: CPT

## 2024-12-20 PROCEDURE — 85025 COMPLETE CBC W/AUTO DIFF WBC: CPT | Performed by: INTERNAL MEDICINE

## 2024-12-20 PROCEDURE — 93970 EXTREMITY STUDY: CPT

## 2024-12-20 PROCEDURE — 93970 EXTREMITY STUDY: CPT | Performed by: SURGERY

## 2024-12-20 PROCEDURE — 82948 REAGENT STRIP/BLOOD GLUCOSE: CPT

## 2024-12-20 PROCEDURE — 80053 COMPREHEN METABOLIC PANEL: CPT | Performed by: INTERNAL MEDICINE

## 2024-12-20 PROCEDURE — 83735 ASSAY OF MAGNESIUM: CPT | Performed by: INTERNAL MEDICINE

## 2024-12-20 RX ORDER — MAGNESIUM SULFATE HEPTAHYDRATE 40 MG/ML
2 INJECTION, SOLUTION INTRAVENOUS ONCE
Status: COMPLETED | OUTPATIENT
Start: 2024-12-20 | End: 2024-12-20

## 2024-12-20 RX ADMIN — DICLOFENAC SODIUM 2 G: 10 GEL TOPICAL at 11:56

## 2024-12-20 RX ADMIN — FENOFIBRATE 145 MG: 145 TABLET, FILM COATED ORAL at 08:18

## 2024-12-20 RX ADMIN — INSULIN LISPRO 2 UNITS: 100 INJECTION, SOLUTION INTRAVENOUS; SUBCUTANEOUS at 11:56

## 2024-12-20 RX ADMIN — TOPIRAMATE 25 MG: 25 TABLET, FILM COATED ORAL at 08:18

## 2024-12-20 RX ADMIN — DICLOFENAC SODIUM 2 G: 10 GEL TOPICAL at 17:13

## 2024-12-20 RX ADMIN — MAGNESIUM SULFATE HEPTAHYDRATE 2 G: 40 INJECTION, SOLUTION INTRAVENOUS at 08:22

## 2024-12-20 RX ADMIN — LISINOPRIL 10 MG: 10 TABLET ORAL at 08:18

## 2024-12-20 RX ADMIN — PREGABALIN 75 MG: 75 CAPSULE ORAL at 16:22

## 2024-12-20 RX ADMIN — OXYCODONE HYDROCHLORIDE 5 MG: 5 TABLET ORAL at 21:55

## 2024-12-20 RX ADMIN — ENOXAPARIN SODIUM 40 MG: 40 INJECTION SUBCUTANEOUS at 21:55

## 2024-12-20 RX ADMIN — INSULIN GLARGINE 45 UNITS: 100 INJECTION, SOLUTION SUBCUTANEOUS at 21:55

## 2024-12-20 RX ADMIN — PREGABALIN 75 MG: 75 CAPSULE ORAL at 08:18

## 2024-12-20 RX ADMIN — PANTOPRAZOLE SODIUM 40 MG: 40 TABLET, DELAYED RELEASE ORAL at 08:18

## 2024-12-20 RX ADMIN — ENOXAPARIN SODIUM 40 MG: 40 INJECTION SUBCUTANEOUS at 08:17

## 2024-12-20 RX ADMIN — PREGABALIN 75 MG: 75 CAPSULE ORAL at 21:55

## 2024-12-20 RX ADMIN — INSULIN LISPRO 1 UNITS: 100 INJECTION, SOLUTION INTRAVENOUS; SUBCUTANEOUS at 21:57

## 2024-12-20 RX ADMIN — DICLOFENAC SODIUM 2 G: 10 GEL TOPICAL at 22:04

## 2024-12-20 NOTE — ASSESSMENT & PLAN NOTE
Lab Results   Component Value Date    EGFR 56 12/20/2024    EGFR 60 12/19/2024    EGFR 47 12/18/2024    CREATININE 0.96 12/20/2024    CREATININE 0.90 12/19/2024    CREATININE 1.11 12/18/2024   Creatinine seems to be at baseline.  Will continue routine lab monitoring.  Avoid any nephrotoxic meds

## 2024-12-20 NOTE — PROGRESS NOTES
Progress Note - Hospitalist   Name: Alberta Sanchez 79 y.o. female I MRN: [id number]  Unit/Bed#: -01 I Date of Admission: 12/18/2024   Date of Service: 12/20/2024 I Hospital Day: 1    Assessment & Plan  Gastroenteritis  Patient with GI complaints including nausea, decreased appetite, and diarrhea  Patient still with diarrhea however symptoms are improving  Stool studies pending  Continue IV fluids as needed  Surgical soft diet and advance as tolerated  Monitor electrolytes  CT abdomen/pelvis with no acute findings noted  Continue supportive care  No need for antibiotics at this time  Chronic renal insufficiency, stage III (moderate) (Formerly [facility])  Lab Results   Component Value Date    EGFR 56 12/20/2024    EGFR 60 12/19/2024    EGFR 47 12/18/2024    CREATININE 0.96 12/20/2024    CREATININE 0.90 12/19/2024    CREATININE 1.11 12/18/2024   Creatinine seems to be at baseline.  Will continue routine lab monitoring.  Avoid any nephrotoxic meds  Type 2 diabetes mellitus with stage 3 chronic kidney disease, with long-term current use of insulin (Formerly [facility])  Lab Results   Component Value Date    HGBA1C 6.4 (H) 12/18/2024       Recent Labs     12/19/24  1619 12/19/24  2050 12/20/24  0702 12/20/24  1120   POCGLU 147* 139 104 196*       Blood Sugar Average: Last 72 hrs:  (P) 149.25  Will continue home long-acting insulin at reduced dose of 45 units nightly.  Insulin sliding scale.  Diabetic diet  Restless legs syndrome (RLS)  Continue Topamax  Right knee pain  Acute on chronic right knee pain, patient states that she typically has chronic pain however pain slightly worse than usual  Denies any recent trauma  Knee x-ray with effusion noted and degenerative changes.  No signs of infection on physical exam.  Patient had difficulty walking with PT  Lumbar CT with no acute findings noted  Case management for discharge planning    VTE Pharmacologic Prophylaxis: VTE Score: 5 Moderate Risk (Score 3-4) - Pharmacological DVT Prophylaxis  Ordered: enoxaparin (Lovenox).    Mobility:   Basic Mobility Inpatient Raw Score: 7  JH-HLM Goal: 2: Bed activities/Dependent transfer  JH-HLM Achieved: 2: Bed activities/Dependent transfer  JH-HLM Goal achieved. Continue to encourage appropriate mobility.    Patient Centered Rounds: I performed bedside rounds with nursing staff today.   Discussions with Specialists or Other Care Team Provider: yes    Education and Discussions with Family / Patient:  Updated patient's  who is also admitted to the hospital.     Current Length of Stay: 1 day(s)  Current Patient Status: Inpatient   Certification Statement: The patient will continue to require additional inpatient hospital stay due to ambulatory dysfunction  Discharge Plan: Anticipate discharge in 24-48 hrs to rehab facility.    Code Status: Level 3 - DNAR and DNI    Subjective   Patient continues with difficulty ambulating.  Diarrhea seems to be improving.  Stool studies pending    Objective :  Temp:  [99.2 °F (37.3 °C)-99.6 °F (37.6 °C)] 99.2 °F (37.3 °C)  HR:  [77-82] 79  BP: (101-160)/(71-75) 160/75  Resp:  [17-18] 17  SpO2:  [91 %-93 %] 93 %  O2 Device: None (Room air)    Body mass index is 38.56 kg/m².     Input and Output Summary (last 24 hours):     Intake/Output Summary (Last 24 hours) at 12/20/2024 1256  Last data filed at 12/20/2024 1251  Gross per 24 hour   Intake 1130 ml   Output --   Net 1130 ml       Physical Exam  Constitutional:       General: She is not in acute distress.     Appearance: She is obese.   HENT:      Head: Normocephalic and atraumatic.      Nose: Nose normal.      Mouth/Throat:      Mouth: Mucous membranes are moist.   Eyes:      Extraocular Movements: Extraocular movements intact.      Conjunctiva/sclera: Conjunctivae normal.   Cardiovascular:      Rate and Rhythm: Normal rate and regular rhythm.   Pulmonary:      Effort: Pulmonary effort is normal. No respiratory distress.   Abdominal:      Palpations: Abdomen is soft.       Tenderness: There is no abdominal tenderness.   Musculoskeletal:         General: Normal range of motion.      Cervical back: Normal range of motion and neck supple.      Comments: Generalized weakness   Skin:     General: Skin is warm and dry.   Neurological:      General: No focal deficit present.      Mental Status: She is alert. Mental status is at baseline.      Cranial Nerves: No cranial nerve deficit.   Psychiatric:         Mood and Affect: Mood normal.         Behavior: Behavior normal.           Lines/Drains:        Lab Results: I have reviewed the following results:   Results from last 7 days   Lab Units 12/20/24  0436   WBC Thousand/uL 8.54   HEMOGLOBIN g/dL 11.1*   HEMATOCRIT % 35.3   PLATELETS Thousands/uL 150   SEGS PCT % 74   LYMPHO PCT % 15   MONO PCT % 8   EOS PCT % 2     Results from last 7 days   Lab Units 12/20/24  0436   SODIUM mmol/L 136   POTASSIUM mmol/L 3.6   CHLORIDE mmol/L 104   CO2 mmol/L 25   BUN mg/dL 16   CREATININE mg/dL 0.96   ANION GAP mmol/L 7   CALCIUM mg/dL 8.7   ALBUMIN g/dL 3.1*   TOTAL BILIRUBIN mg/dL 0.90   ALK PHOS U/L 147*   ALT U/L 33   AST U/L 33   GLUCOSE RANDOM mg/dL 110         Results from last 7 days   Lab Units 12/20/24  1120 12/20/24  0702 12/19/24  2050 12/19/24  1619 12/19/24  1103 12/19/24  0715 12/18/24  2045 12/18/24  1641   POC GLUCOSE mg/dl 196* 104 139 147* 168* 143* 151* 146*     Results from last 7 days   Lab Units 12/18/24  1647   HEMOGLOBIN A1C % 6.4*     Results from last 7 days   Lab Units 12/18/24  1126   LACTIC ACID mmol/L 0.9       Recent Cultures (last 7 days):         Imaging Results Review: No pertinent imaging studies reviewed.  Other Study Results Review: No additional pertinent studies reviewed.    Last 24 Hours Medication List:     Current Facility-Administered Medications:     acetaminophen (TYLENOL) tablet 650 mg, Q6H PRN    albuterol (PROVENTIL HFA,VENTOLIN HFA) inhaler 2 puff, Q4H PRN    Diclofenac Sodium (VOLTAREN) 1 % topical gel 2 g,  4x Daily    enoxaparin (LOVENOX) subcutaneous injection 40 mg, Q12H BRADY    fenofibrate (TRICOR) tablet 145 mg, Daily    insulin glargine (LANTUS) subcutaneous injection 45 Units 0.45 mL, HS    insulin lispro (HumALOG/ADMELOG) 100 units/mL subcutaneous injection 1-5 Units, HS    insulin lispro (HumALOG/ADMELOG) 100 units/mL subcutaneous injection 1-6 Units, TID AC    lisinopril (ZESTRIL) tablet 10 mg, Daily    ondansetron (ZOFRAN) injection 4 mg, Q6H PRN    oxyCODONE (ROXICODONE) IR tablet 5 mg, Q6H PRN    oxyCODONE (ROXICODONE) split tablet 2.5 mg, Q6H PRN    pantoprazole (PROTONIX) EC tablet 40 mg, Daily    pregabalin (LYRICA) capsule 75 mg, TID    topiramate (TOPAMAX) tablet 25 mg, Daily    Administrative Statements   Today, Patient Was Seen By: Bala Lee MD      **Please Note: This note may have been constructed using a voice recognition system.**

## 2024-12-20 NOTE — ASSESSMENT & PLAN NOTE
Acute on chronic right knee pain, patient states that she typically has chronic pain however pain slightly worse than usual  Denies any recent trauma  Knee x-ray with effusion noted and degenerative changes.  No signs of infection on physical exam.  Patient had difficulty walking with PT  Lumbar CT with no acute findings noted  Case management for discharge planning

## 2024-12-20 NOTE — ASSESSMENT & PLAN NOTE
Patient with GI complaints including nausea, decreased appetite, and diarrhea  Patient still with diarrhea however symptoms are improving  Stool studies pending  Continue IV fluids as needed  Surgical soft diet and advance as tolerated  Monitor electrolytes  CT abdomen/pelvis with no acute findings noted  Continue supportive care  No need for antibiotics at this time

## 2024-12-20 NOTE — OCCUPATIONAL THERAPY NOTE
12/20/24 1114   OT Last Visit   OT Visit Date 12/20/24   Note Type   Note Type Treatment   Pain Assessment   Pain Assessment Tool 0-10   Pain Score 7   Pain Location/Orientation Orientation: Bilateral;Location: Leg   Pain Onset/Description Onset: Ongoing;Descriptor: Aching;Descriptor: Stabbing   Patient's Stated Pain Goal No pain   Hospital Pain Intervention(s) Medication (See MAR);Repositioned;Ambulation/increased activity;Emotional support   Restrictions/Precautions   Weight Bearing Precautions Per Order No   Other Precautions Chair Alarm;Bed Alarm;Fall Risk;Pain;Contact/isolation   ADL   Where Assessed Supine, bed   Grooming Assistance 5  Supervision/Setup   Grooming Deficit Setup;Verbal cueing;Supervision/safety;Increased time to complete;Wash/dry hands;Wash/dry face;Teeth care;Brushing hair   UB Bathing Assistance 5  Supervision/Setup   UB Bathing Deficit Setup;Verbal cueing;Supervision/safety;Increased time to complete   LB Bathing Assistance 3  Moderate Assistance   LB Bathing Deficit Setup;Verbal cueing;Supervision/safety;Increased time to complete;Right lower leg including foot;Left lower leg including foot;Buttocks   UB Dressing Assistance 4  Minimal Assistance   UB Dressing Deficit Setup;Verbal cueing;Supervision/safety;Increased time to complete;Thread RUE;Thread LUE;Fasteners   LB Dressing Assistance 1  Total Assistance   LB Dressing Deficit Setup;Verbal cueing;Supervision/safety;Increased time to complete;Don/doff L sock;Don/doff R sock   Bed Mobility   Rolling R 3  Moderate assistance   Additional items Assist x 1;Bedrails;Increased time required;Verbal cues;LE management   Rolling L 3  Moderate assistance   Additional items Assist x 1;Bedrails;Increased time required;Verbal cues;LE management   Additional Comments Rolls side to side with Mod A x 1 multiple times for care and repositioning.   Transfers   Sit to Stand Unable to assess   Cognition   Overall Cognitive Status WFL    Arousal/Participation Alert;Responsive;Cooperative   Attention Within functional limits   Orientation Level Oriented X4   Memory Within functional limits   Following Commands Follows all commands and directions without difficulty   Comments PT agreeable to OT treatment.   Activity Tolerance   Activity Tolerance Patient limited by pain;Patient limited by fatigue   Assessment   Assessment Patient participated in Skilled OT session this date with interventions consisting of ADL re training with the use of correct body mechnaics and Energy Conservation techniques . Patient agreeable to OT treatment session, upon arrival patient was found supine in bed, alert, responsive , and in no apparent distress. Patient set up for and performs ADL seated in bed as detailed in flow sheet. Patient rolls side to side as needed for pericare and repositioning with Mod A x 1. Oral care completes with set up. Patient reports pain in B/L Les 6/10. Declined further activity following ADL. Session ended with patient supine in  bed, all needs met, and call bell within reach. In comparison to previous session, patient with improvements in endurance and self care ADL performance. Patient requiring verbal cues for correct technique, verbal cues for pacing thru activity steps, and frequent rest periods. Patient performance  demonstrated good carryover of learned techniques and strategies to facilitate safety during functional tasks. Patient continues to be functioning below baseline level, occupational performance remains limited secondary to factors listed above and increased risk for falls and injury.   From OT standpoint, recommendation at time of d/c would be Level 2 Resource Intensity upon discharge.  Patient to benefit from continued Occupational Therapy treatment while in the hospital to address deficits as defined above and maximize level of functional independence with ADLs and functional mobility in order to return to PLOF.   Plan    Treatment Interventions ADL retraining;Energy conservation   Goal Expiration Date 12/29/24   OT Treatment Day 1   OT Frequency 3-5x/wk   Discharge Recommendation   Rehab Resource Intensity Level, OT II (Moderate Resource Intensity)   Additional Comments  The patient's raw score on the AM-PAC Daily Activity Inpatient Short Form is 16. A raw score of less than 19 suggests the patient may benefit from discharge to post-acute rehabilitation services. Please refer to the recommendation of the Occupational Therapist for safe discharge planning.   AM-PAC Daily Activity Inpatient   Lower Body Dressing 2   Bathing 2   Toileting 2   Upper Body Dressing 3   Grooming 3   Eating 4   Daily Activity Raw Score 16   Daily Activity Standardized Score (Calc for Raw Score >=11) 35.96   AM-PAC Applied Cognition Inpatient   Following a Speech/Presentation 4   Understanding Ordinary Conversation 4   Taking Medications 4   Remembering Where Things Are Placed or Put Away 4   Remembering List of 4-5 Errands 4   Taking Care of Complicated Tasks 4   Applied Cognition Raw Score 24   Applied Cognition Standardized Score 62.21       Rosa M Shaw

## 2024-12-20 NOTE — PROGRESS NOTES
Pastoral Care Progress Note  CH initiated support visit to pt in setting of pt and  both being admitted. Pt received CH upright in her bed, no family present.    Pt shared that she and her  are supported by her son who lives nearby. Pt's two other children do not live close, but are emotionally supportive.    CH provided empathetic listening and support.         12/20/24 1300   Clinical Encounter Type   Visited With Patient

## 2024-12-20 NOTE — PLAN OF CARE
Problem: OCCUPATIONAL THERAPY ADULT  Goal: Performs self-care activities at highest level of function for planned discharge setting.  See evaluation for individualized goals.  Description: Treatment Interventions: ADL retraining, Functional transfer training, Endurance training, Patient/family training, Equipment evaluation/education, Compensatory technique education, Energy conservation, Activityengagement          See flowsheet documentation for full assessment, interventions and recommendations.   Outcome: Progressing  Note: Limitation: Decreased ADL status, Decreased endurance, Decreased self-care trans, Decreased high-level ADLs  Prognosis: Fair  Assessment: Patient participated in Skilled OT session this date with interventions consisting of ADL re training with the use of correct body mechnaics and Energy Conservation techniques . Patient agreeable to OT treatment session, upon arrival patient was found supine in bed, alert, responsive , and in no apparent distress. Patient set up for and performs ADL seated in bed as detailed in flow sheet. Patient rolls side to side as needed for pericare and repositioning with Mod A x 1. Oral care completes with set up. Patient reports pain in B/L Les 6/10. Declined further activity following ADL. Session ended with patient supine in  bed, all needs met, and call bell within reach. In comparison to previous session, patient with improvements in endurance and self care ADL performance. Patient requiring verbal cues for correct technique, verbal cues for pacing thru activity steps, and frequent rest periods. Patient performance  demonstrated good carryover of learned techniques and strategies to facilitate safety during functional tasks. Patient continues to be functioning below baseline level, occupational performance remains limited secondary to factors listed above and increased risk for falls and injury.   From OT standpoint, recommendation at time of d/c would be  Level 2 Resource Intensity upon discharge.  Patient to benefit from continued Occupational Therapy treatment while in the hospital to address deficits as defined above and maximize level of functional independence with ADLs and functional mobility in order to return to PLOF.     Rehab Resource Intensity Level, OT: II (Moderate Resource Intensity)

## 2024-12-20 NOTE — PLAN OF CARE
Problem: PAIN - ADULT  Goal: Verbalizes/displays adequate comfort level or baseline comfort level  Description: Interventions:  - Encourage patient to monitor pain and request assistance  - Assess pain using appropriate pain scale  - Administer analgesics based on type and severity of pain and evaluate response  - Implement non-pharmacological measures as appropriate and evaluate response  - Consider cultural and social influences on pain and pain management  - Notify physician/advanced practitioner if interventions unsuccessful or patient reports new pain  Outcome: Progressing     Problem: INFECTION - ADULT  Goal: Absence or prevention of progression during hospitalization  Description: INTERVENTIONS:  - Assess and monitor for signs and symptoms of infection  - Monitor lab/diagnostic results  - Monitor all insertion sites, i.e. indwelling lines, tubes, and drains  - Monitor endotracheal if appropriate and nasal secretions for changes in amount and color  - [place] appropriate cooling/warming therapies per order  - Administer medications as ordered  - Instruct and encourage patient and family to use good hand hygiene technique  - Identify and instruct in appropriate isolation precautions for identified infection/condition  Outcome: Progressing     Problem: GASTROINTESTINAL - ADULT  Goal: Minimal or absence of nausea and/or vomiting  Description: INTERVENTIONS:  - Administer IV fluids if ordered to ensure adequate hydration  - Maintain NPO status until nausea and vomiting are resolved  - Nasogastric tube if ordered  - Administer ordered antiemetic medications as needed  - Provide nonpharmacologic comfort measures as appropriate  - Advance diet as tolerated, if ordered  - Consider nutrition services referral to assist patient with adequate nutrition and appropriate food choices  Outcome: Progressing  Goal: Maintains or returns to baseline bowel function  Description: INTERVENTIONS:  - Assess bowel function  -  Encourage oral fluids to ensure adequate hydration  - Administer IV fluids if ordered to ensure adequate hydration  - Administer ordered medications as needed  - Encourage mobilization and activity  - Consider nutritional services referral to assist patient with adequate nutrition and appropriate food choices  Outcome: Progressing  Goal: Maintains adequate nutritional intake  Description: INTERVENTIONS:  - Monitor percentage of each meal consumed  - Identify factors contributing to decreased intake, treat as appropriate  - Assist with meals as needed  - Monitor I&O, weight, and lab values if indicated  - Obtain nutrition services referral as needed  Outcome: Progressing  Goal: Oral mucous membranes remain intact  Description: INTERVENTIONS  - Assess oral mucosa and hygiene practices  - Implement preventative oral hygiene regimen  - Implement oral medicated treatments as ordered  - Initiate Nutrition services referral as needed  Outcome: Progressing

## 2024-12-21 ENCOUNTER — TELEPHONE (OUTPATIENT)
Age: 79
End: 2024-12-21

## 2024-12-21 LAB
ANION GAP SERPL CALCULATED.3IONS-SCNC: 7 MMOL/L (ref 4–13)
BUN SERPL-MCNC: 17 MG/DL (ref 5–25)
CALCIUM SERPL-MCNC: 9.1 MG/DL (ref 8.4–10.2)
CHLORIDE SERPL-SCNC: 102 MMOL/L (ref 96–108)
CO2 SERPL-SCNC: 26 MMOL/L (ref 21–32)
CREAT SERPL-MCNC: 1 MG/DL (ref 0.6–1.3)
CRP SERPL QL: 301.2 MG/L
ERYTHROCYTE [DISTWIDTH] IN BLOOD BY AUTOMATED COUNT: 12.8 % (ref 11.6–15.1)
ERYTHROCYTE [SEDIMENTATION RATE] IN BLOOD: 96 MM/HOUR (ref 0–29)
GFR SERPL CREATININE-BSD FRML MDRD: 53 ML/MIN/1.73SQ M
GLUCOSE SERPL-MCNC: 174 MG/DL (ref 65–140)
GLUCOSE SERPL-MCNC: 231 MG/DL (ref 65–140)
GLUCOSE SERPL-MCNC: 257 MG/DL (ref 65–140)
GLUCOSE SERPL-MCNC: 95 MG/DL (ref 65–140)
GLUCOSE SERPL-MCNC: 95 MG/DL (ref 65–140)
HCT VFR BLD AUTO: 35.9 % (ref 34.8–46.1)
HGB BLD-MCNC: 11.6 G/DL (ref 11.5–15.4)
MAGNESIUM SERPL-MCNC: 1.9 MG/DL (ref 1.9–2.7)
MCH RBC QN AUTO: 29.4 PG (ref 26.8–34.3)
MCHC RBC AUTO-ENTMCNC: 32.3 G/DL (ref 31.4–37.4)
MCV RBC AUTO: 91 FL (ref 82–98)
PLATELET # BLD AUTO: 167 THOUSANDS/UL (ref 149–390)
PMV BLD AUTO: 11 FL (ref 8.9–12.7)
POTASSIUM SERPL-SCNC: 3.6 MMOL/L (ref 3.5–5.3)
PROCALCITONIN SERPL-MCNC: 0.46 NG/ML
RBC # BLD AUTO: 3.95 MILLION/UL (ref 3.81–5.12)
SODIUM SERPL-SCNC: 135 MMOL/L (ref 135–147)
TSH SERPL DL<=0.05 MIU/L-ACNC: 1.57 UIU/ML (ref 0.45–4.5)
WBC # BLD AUTO: 10.08 THOUSAND/UL (ref 4.31–10.16)

## 2024-12-21 PROCEDURE — 80048 BASIC METABOLIC PNL TOTAL CA: CPT | Performed by: INTERNAL MEDICINE

## 2024-12-21 PROCEDURE — 84145 PROCALCITONIN (PCT): CPT | Performed by: INTERNAL MEDICINE

## 2024-12-21 PROCEDURE — 86140 C-REACTIVE PROTEIN: CPT | Performed by: INTERNAL MEDICINE

## 2024-12-21 PROCEDURE — 86430 RHEUMATOID FACTOR TEST QUAL: CPT | Performed by: INTERNAL MEDICINE

## 2024-12-21 PROCEDURE — 82948 REAGENT STRIP/BLOOD GLUCOSE: CPT

## 2024-12-21 PROCEDURE — 85027 COMPLETE CBC AUTOMATED: CPT | Performed by: INTERNAL MEDICINE

## 2024-12-21 PROCEDURE — 86038 ANTINUCLEAR ANTIBODIES: CPT | Performed by: INTERNAL MEDICINE

## 2024-12-21 PROCEDURE — 84443 ASSAY THYROID STIM HORMONE: CPT | Performed by: INTERNAL MEDICINE

## 2024-12-21 PROCEDURE — 83735 ASSAY OF MAGNESIUM: CPT | Performed by: INTERNAL MEDICINE

## 2024-12-21 PROCEDURE — 86200 CCP ANTIBODY: CPT | Performed by: INTERNAL MEDICINE

## 2024-12-21 PROCEDURE — 86225 DNA ANTIBODY NATIVE: CPT | Performed by: INTERNAL MEDICINE

## 2024-12-21 PROCEDURE — 99232 SBSQ HOSP IP/OBS MODERATE 35: CPT | Performed by: INTERNAL MEDICINE

## 2024-12-21 PROCEDURE — 85652 RBC SED RATE AUTOMATED: CPT | Performed by: INTERNAL MEDICINE

## 2024-12-21 RX ORDER — PREDNISONE 20 MG/1
40 TABLET ORAL DAILY
Status: DISCONTINUED | OUTPATIENT
Start: 2024-12-21 | End: 2024-12-23 | Stop reason: HOSPADM

## 2024-12-21 RX ADMIN — DICLOFENAC SODIUM 2 G: 10 GEL TOPICAL at 09:59

## 2024-12-21 RX ADMIN — INSULIN LISPRO 1 UNITS: 100 INJECTION, SOLUTION INTRAVENOUS; SUBCUTANEOUS at 11:54

## 2024-12-21 RX ADMIN — PREGABALIN 75 MG: 75 CAPSULE ORAL at 21:53

## 2024-12-21 RX ADMIN — INSULIN LISPRO 2 UNITS: 100 INJECTION, SOLUTION INTRAVENOUS; SUBCUTANEOUS at 21:53

## 2024-12-21 RX ADMIN — INSULIN LISPRO 2 UNITS: 100 INJECTION, SOLUTION INTRAVENOUS; SUBCUTANEOUS at 15:36

## 2024-12-21 RX ADMIN — PREGABALIN 75 MG: 75 CAPSULE ORAL at 15:50

## 2024-12-21 RX ADMIN — DICLOFENAC SODIUM 2 G: 10 GEL TOPICAL at 21:53

## 2024-12-21 RX ADMIN — DICLOFENAC SODIUM 2 G: 10 GEL TOPICAL at 11:54

## 2024-12-21 RX ADMIN — TOPIRAMATE 25 MG: 25 TABLET, FILM COATED ORAL at 09:59

## 2024-12-21 RX ADMIN — ENOXAPARIN SODIUM 40 MG: 40 INJECTION SUBCUTANEOUS at 21:53

## 2024-12-21 RX ADMIN — OXYCODONE HYDROCHLORIDE 5 MG: 5 TABLET ORAL at 07:24

## 2024-12-21 RX ADMIN — ENOXAPARIN SODIUM 40 MG: 40 INJECTION SUBCUTANEOUS at 09:59

## 2024-12-21 RX ADMIN — FENOFIBRATE 145 MG: 145 TABLET, FILM COATED ORAL at 09:59

## 2024-12-21 RX ADMIN — INSULIN GLARGINE 45 UNITS: 100 INJECTION, SOLUTION SUBCUTANEOUS at 21:53

## 2024-12-21 RX ADMIN — LISINOPRIL 10 MG: 10 TABLET ORAL at 09:59

## 2024-12-21 RX ADMIN — PREDNISONE 40 MG: 20 TABLET ORAL at 09:59

## 2024-12-21 RX ADMIN — PREGABALIN 75 MG: 75 CAPSULE ORAL at 09:59

## 2024-12-21 RX ADMIN — PANTOPRAZOLE SODIUM 40 MG: 40 TABLET, DELAYED RELEASE ORAL at 09:59

## 2024-12-21 NOTE — PLAN OF CARE
Problem: Potential for Falls  Goal: Patient will remain free of falls  Description: INTERVENTIONS:  - Educate patient/family on patient safety including physical limitations  - Instruct patient to call for assistance with activity   - Consult OT/PT to assist with strengthening/mobility   - Keep Call bell within reach  - Keep bed low and locked with side rails adjusted as appropriate  - Keep care items and personal belongings within reach  - Initiate and maintain comfort rounds  - Make Fall Risk Sign visible to staff  - Offer Toileting every 2 Hours, in advance of need  - Initiate/Maintain bed alarm  - Obtain necessary fall risk management equipment:  socks  - Apply yellow socks and bracelet for high fall risk patients  - Consider moving patient to room near nurses station  Outcome: Progressing     Problem: PAIN - ADULT  Goal: Verbalizes/displays adequate comfort level or baseline comfort level  Description: Interventions:  - Encourage patient to monitor pain and request assistance  - Assess pain using appropriate pain scale  - Administer analgesics based on type and severity of pain and evaluate response  - Implement non-pharmacological measures as appropriate and evaluate response  - Consider cultural and social influences on pain and pain management  - Notify physician/advanced practitioner if interventions unsuccessful or patient reports new pain  Outcome: Progressing     Problem: INFECTION - ADULT  Goal: Absence or prevention of progression during hospitalization  Description: INTERVENTIONS:  - Assess and monitor for signs and symptoms of infection  - Monitor lab/diagnostic results  - Monitor all insertion sites, i.e. indwelling lines, tubes, and drains  - Monitor endotracheal if appropriate and nasal secretions for changes in amount and color  - [place] appropriate cooling/warming therapies per order  - Administer medications as ordered  - Instruct and encourage patient and family to use good hand  hygiene technique  - Identify and instruct in appropriate isolation precautions for identified infection/condition  Outcome: Progressing  Goal: Infection Control Precautions  Description: Strict hand hygiene/contact (r/o c-diff)  Outcome: Progressing  Goal: Maintain or return to baseline ADL function  Description: INTERVENTIONS:  -  Assess patient's ability to carry out ADLs; assess patient's baseline for ADL function and identify physical deficits which impact ability to perform ADLs (bathing, care of mouth/teeth, toileting, grooming, dressing, etc.)  - Assess/evaluate cause of self-care deficits   - Assess range of motion  - Assess patient's mobility; develop plan if impaired  - Assess patient's need for assistive devices and provide as appropriate  - Encourage maximum independence but intervene and supervise when necessary  - Involve family in performance of ADLs  - Assess for home care needs following discharge   - Consider OT consult to assist with ADL evaluation and planning for discharge  - Provide patient education as appropriate  Outcome: Progressing     Problem: DISCHARGE PLANNING  Goal: Discharge to home or other facility with appropriate resources  Description: INTERVENTIONS:  - Identify barriers to discharge w/patient and caregiver  - Arrange for needed discharge resources and transportation as appropriate  - Identify discharge learning needs (meds, wound care, etc.)  - Arrange for interpretive services to assist at discharge as needed  - Refer to Case Management Department for coordinating discharge planning if the patient needs post-hospital services based on physician/advanced practitioner order or complex needs related to functional status, cognitive ability, or social support system  Outcome: Progressing     Problem: Knowledge Deficit  Goal: Patient/family/caregiver demonstrates understanding of disease process, treatment plan, medications, and discharge instructions  Description: Complete learning  assessment and assess knowledge base.  Interventions:  - Provide teaching at level of understanding  - Provide teaching via preferred learning methods  Outcome: Progressing     Problem: GASTROINTESTINAL - ADULT  Goal: Minimal or absence of nausea and/or vomiting  Description: INTERVENTIONS:  - Administer IV fluids if ordered to ensure adequate hydration  - Maintain NPO status until nausea and vomiting are resolved  - Nasogastric tube if ordered  - Administer ordered antiemetic medications as needed  - Provide nonpharmacologic comfort measures as appropriate  - Advance diet as tolerated, if ordered  - Consider nutrition services referral to assist patient with adequate nutrition and appropriate food choices  Outcome: Progressing  Goal: Maintains or returns to baseline bowel function  Description: INTERVENTIONS:  - Assess bowel function  - Encourage oral fluids to ensure adequate hydration  - Administer IV fluids if ordered to ensure adequate hydration  - Administer ordered medications as needed  - Encourage mobilization and activity  - Consider nutritional services referral to assist patient with adequate nutrition and appropriate food choices  Outcome: Progressing  Goal: Maintains adequate nutritional intake  Description: INTERVENTIONS:  - Monitor percentage of each meal consumed  - Identify factors contributing to decreased intake, treat as appropriate  - Assist with meals as needed  - Monitor I&O, weight, and lab values if indicated  - Obtain nutrition services referral as needed  Outcome: Progressing  Goal: Oral mucous membranes remain intact  Description: INTERVENTIONS  - Assess oral mucosa and hygiene practices  - Implement preventative oral hygiene regimen  - Implement oral medicated treatments as ordered  - Initiate Nutrition services referral as needed  Outcome: Progressing     Problem: MUSCULOSKELETAL - ADULT  Goal: Maintain or return mobility to safest level of function  Description: INTERVENTIONS:  -  Assess patient's ability to carry out ADLs; assess patient's baseline for ADL function and identify physical deficits which impact ability to perform ADLs (bathing, care of mouth/teeth, toileting, grooming, dressing, etc.)  - Assess/evaluate cause of self-care deficits   - Assess range of motion  - Assess patient's mobility  - Assess patient's need for assistive devices and provide as appropriate  - Encourage maximum independence but intervene and supervise when necessary  - Involve family in performance of ADLs  - Assess for home care needs following discharge   - Consider OT consult to assist with ADL evaluation and planning for discharge  - Provide patient education as appropriate  Outcome: Progressing     Problem: METABOLIC, FLUID AND ELECTROLYTES - ADULT  Goal: Electrolytes maintained within normal limits  Description: INTERVENTIONS:  - Monitor labs and assess patient for signs and symptoms of electrolyte imbalances  - Administer electrolyte replacement as ordered  - Monitor response to electrolyte replacements, including repeat lab results as appropriate  - Instruct patient on fluid and nutrition as appropriate  Outcome: Progressing  Goal: Fluid balance maintained  Description: INTERVENTIONS:  - Monitor labs   - Monitor I/O and WT  - Instruct patient on fluid and nutrition as appropriate  - Assess for signs & symptoms of volume excess or deficit  Outcome: Progressing  Goal: Glucose maintained within target range  Description: INTERVENTIONS:  - Monitor Blood Glucose as ordered  - Assess for signs and symptoms of hyperglycemia and hypoglycemia  - Administer ordered medications to maintain glucose within target range  - Assess nutritional intake and initiate nutrition service referral as needed  Outcome: Progressing     Problem: Nutrition/Hydration-ADULT  Goal: Nutrient/Hydration intake appropriate for improving, restoring or maintaining nutritional needs  Description: Monitor and assess patient's  nutrition/hydration status for malnutrition. Collaborate with interdisciplinary team and initiate plan and interventions as ordered.  Monitor patient's weight and dietary intake as ordered or per policy. Utilize nutrition screening tool and intervene as necessary. Determine patient's food preferences and provide high-protein, high-caloric foods as appropriate.     INTERVENTIONS:  - Monitor oral intake, urinary output, labs, and treatment plans  - Assess nutrition and hydration status and recommend course of action  - Evaluate amount of meals eaten  - Assist patient with eating if necessary   - Allow adequate time for meals  - Recommend/ encourage appropriate diets, oral nutritional supplements, and vitamin/mineral supplements  - Order, calculate, and assess calorie counts as needed  - Recommend, monitor, and adjust tube feedings and TPN/PPN based on assessed needs  - Assess need for intravenous fluids  - Provide specific nutrition/hydration education as appropriate  - Include patient/family/caregiver in decisions related to nutrition  Outcome: Progressing     Problem: Prexisting or High Potential for Compromised Skin Integrity  Goal: Skin integrity is maintained or improved  Description: INTERVENTIONS:  - Identify patients at risk for skin breakdown  - Assess and monitor skin integrity  - Assess and monitor nutrition and hydration status  - Monitor labs   - Assess for incontinence   - Turn and reposition patient  - Assist with mobility/ambulation  - Relieve pressure over bony prominences  - Avoid friction and shearing  - Provide appropriate hygiene as needed including keeping skin clean and dry  - Evaluate need for skin moisturizer/barrier cream  - Collaborate with interdisciplinary team   - Patient/family teaching  - Consider wound care consult   Outcome: Progressing

## 2024-12-21 NOTE — ASSESSMENT & PLAN NOTE
Acute on chronic right knee pain, patient states that she typically has chronic pain however pain slightly worse than usual  Denies any recent trauma  Knee x-ray with effusion noted and degenerative changes.  No signs of infection on physical exam.  Patient had difficulty walking with PT  Lumbar CT with no acute findings noted  Possible inflammatory arthritis.  Will send ESR/CRP/GUERO panel.  Start prednisone 40 mg daily, monitor response  May benefit from outpatient rheumatology follow-up.

## 2024-12-21 NOTE — ASSESSMENT & PLAN NOTE
Lab Results   Component Value Date    HGBA1C 6.4 (H) 12/18/2024       Recent Labs     12/20/24  1120 12/20/24  1609 12/20/24  2107 12/21/24  0730   POCGLU 196* 119 162* 95       Blood Sugar Average: Last 72 hrs:  (P) [id number]  Will continue home long-acting insulin at reduced dose of 45 units nightly.  Insulin sliding scale.  Diabetic diet

## 2024-12-21 NOTE — TELEPHONE ENCOUNTER
Please schedule an outpatient rheumatology appointment for this patient with me at [address] for Right knee pain after 2 weeks from now with available appointment.

## 2024-12-21 NOTE — ASSESSMENT & PLAN NOTE
Patient with GI complaints including nausea, decreased appetite, and diarrhea  Patient still with diarrhea however symptoms are improving  Stool studies negative including C. difficile  Continue IV fluids as needed  Surgical soft diet and advance as tolerated  CT abdomen/pelvis with no acute findings noted  Continue supportive care  No need for antibiotics at this time

## 2024-12-21 NOTE — ASSESSMENT & PLAN NOTE
Lab Results   Component Value Date    EGFR 53 12/21/2024    EGFR 56 12/20/2024    EGFR 60 12/19/2024    CREATININE 1.00 12/21/2024    CREATININE 0.96 12/20/2024    CREATININE 0.90 12/19/2024   Creatinine seems to be at baseline.  Will continue routine lab monitoring.  Avoid any nephrotoxic meds

## 2024-12-21 NOTE — PLAN OF CARE

## 2024-12-21 NOTE — CASE MANAGEMENT
Case Management Discharge Planning Note    Patient name Alberta Sanchez  Location /-01 MRN [id number]  : 1945 Date 2024       Current Admission Date: 2024  Current Admission Diagnosis:Gastroenteritis   Patient Active Problem List    Diagnosis Date Noted Date Diagnosed    Gastroenteritis 2024     Right knee pain 2024     Nocturnal hypoxemia 10/21/2024     Bilateral lower extremity edema 2024     Other fatigue 2024     Diabetes mellitus with neuropathy ([facility]) 2024     Platelets decreased ([facility]) 2023     Itchy skin 2023     Nasal congestion 2023     Dysuria 2023     LVH (left ventricular hypertrophy) 2022     COVID-19 virus infection 2022     COVID-19 vaccine series completed 2022     DNR (do not resuscitate) 2021     Gait disturbance 2021     Callus 10/30/2020     Arthritis 10/30/2020     Bilateral low back pain with bilateral sciatica 10/30/2020     Nail dystrophy 2020     Benign hypertensive renal disease 2020     Proteinuria 2020     Breast lump in upper outer quadrant 2020     Neck pain 2020     Abnormal mammogram 2020     Vaginal bleeding 2019     Morbid obesity ([facility]) 2019     Chronic right shoulder pain 2019     Migraine without aura, not intractable 2018     Hypertriglyceridemia, essential 2018     Osteopenia of spine 2018     Nonalcoholic fatty liver disease 2018     Hypomagnesemia 2017     Indigestion 2017     Long term current use of insulin ([facility]) 2017     Hammer toe 2015     Onychomycosis 11/10/2015     Chronic renal insufficiency, stage III (moderate) ([facility]) 2015     Peripheral vascular disease ([facility]) 2014     Plantar fascial fibromatosis 2014     Vitamin D deficiency 2014     Multiple and bilateral precerebral arterial occlusion 2014     Tinnitus 2014      Carotid artery disease (HCC) 03/13/2013     Essential hypertension 01/15/2013     Mixed hyperlipidemia 01/15/2013     Irritable bowel syndrome 01/15/2013     Metabolic syndrome 01/15/2013     Obstructive sleep apnea treated with continuous positive airway pressure (CPAP) 01/15/2013     Type 2 diabetes mellitus with stage 3 chronic kidney disease, with long-term current use of insulin (HCC) 01/15/2013     Anemia 01/15/2013     Restless legs syndrome (RLS) 01/15/2013     Mixed stress and urge urinary incontinence 01/15/2013       LOS (days): 2  Geometric Mean LOS (GMLOS) (days): 2.5  Days to GMLOS:0.5     OBJECTIVE:  Risk of Unplanned Readmission Score: 10.7         Current admission status: Inpatient   Preferred Pharmacy:   [id number] - [place], [place] - [address]  [address] [address]  Phone: [phone] Fax: [phone]    [facility] - [facility] [place] [address]  [address] [address]  Phone: [phone] Fax: [phone]    Primary Care Provider: Dulce Michaud MD    Primary Insurance: MEDICARE  Secondary Insurance: AARP    DISCHARGE DETAILS:    Discharge planning discussed with:: patient  Freedom of Choice: Yes  Comments - Freedom of Choice: referrals sent via Aidin - additonal referrals sent - pt would like to go to the same snf if possible                     Requested Home Health Care         Is the patient interested in C at discharge?: No    DME Referral Provided  Referral made for DME?: No    Other Referral/Resources/Interventions Provided:  Interventions: Short Term Rehab  Referral Comments: referrals sent via aidin    Would you like to participate in our [facility] service program?  : No - Declined    Treatment Team Recommendation: Short Term Rehab (snf rehab - TBD)                                   IMM Given (Date):: 12/21/24  IMM Given to:: Patient

## 2024-12-22 LAB
CCP AB SER IA-ACNC: 1.9 (ref ?–10)
GLUCOSE SERPL-MCNC: 147 MG/DL (ref 65–140)
GLUCOSE SERPL-MCNC: 216 MG/DL (ref 65–140)
GLUCOSE SERPL-MCNC: 265 MG/DL (ref 65–140)
GLUCOSE SERPL-MCNC: 300 MG/DL (ref 65–140)

## 2024-12-22 PROCEDURE — 99232 SBSQ HOSP IP/OBS MODERATE 35: CPT | Performed by: INTERNAL MEDICINE

## 2024-12-22 PROCEDURE — 82948 REAGENT STRIP/BLOOD GLUCOSE: CPT

## 2024-12-22 RX ADMIN — LISINOPRIL 10 MG: 10 TABLET ORAL at 08:44

## 2024-12-22 RX ADMIN — DICLOFENAC SODIUM 2 G: 10 GEL TOPICAL at 08:46

## 2024-12-22 RX ADMIN — DICLOFENAC SODIUM 2 G: 10 GEL TOPICAL at 17:24

## 2024-12-22 RX ADMIN — PREGABALIN 75 MG: 75 CAPSULE ORAL at 16:22

## 2024-12-22 RX ADMIN — INSULIN LISPRO 3 UNITS: 100 INJECTION, SOLUTION INTRAVENOUS; SUBCUTANEOUS at 21:28

## 2024-12-22 RX ADMIN — ENOXAPARIN SODIUM 40 MG: 40 INJECTION SUBCUTANEOUS at 21:26

## 2024-12-22 RX ADMIN — OXYCODONE HYDROCHLORIDE 5 MG: 5 TABLET ORAL at 21:27

## 2024-12-22 RX ADMIN — DICLOFENAC SODIUM 2 G: 10 GEL TOPICAL at 12:13

## 2024-12-22 RX ADMIN — PREGABALIN 75 MG: 75 CAPSULE ORAL at 21:27

## 2024-12-22 RX ADMIN — PREDNISONE 40 MG: 20 TABLET ORAL at 08:43

## 2024-12-22 RX ADMIN — DICLOFENAC SODIUM 2 G: 10 GEL TOPICAL at 21:27

## 2024-12-22 RX ADMIN — TOPIRAMATE 25 MG: 25 TABLET, FILM COATED ORAL at 08:44

## 2024-12-22 RX ADMIN — INSULIN GLARGINE 45 UNITS: 100 INJECTION, SOLUTION SUBCUTANEOUS at 21:25

## 2024-12-22 RX ADMIN — INSULIN LISPRO 3 UNITS: 100 INJECTION, SOLUTION INTRAVENOUS; SUBCUTANEOUS at 16:51

## 2024-12-22 RX ADMIN — ENOXAPARIN SODIUM 40 MG: 40 INJECTION SUBCUTANEOUS at 08:44

## 2024-12-22 RX ADMIN — FENOFIBRATE 145 MG: 145 TABLET, FILM COATED ORAL at 08:44

## 2024-12-22 RX ADMIN — PREGABALIN 75 MG: 75 CAPSULE ORAL at 08:43

## 2024-12-22 RX ADMIN — INSULIN LISPRO 2 UNITS: 100 INJECTION, SOLUTION INTRAVENOUS; SUBCUTANEOUS at 12:13

## 2024-12-22 RX ADMIN — PANTOPRAZOLE SODIUM 40 MG: 40 TABLET, DELAYED RELEASE ORAL at 08:43

## 2024-12-22 NOTE — ASSESSMENT & PLAN NOTE
Patient with GI complaints including nausea, decreased appetite, and diarrhea  Patient still with diarrhea however symptoms are improving  Stool studies negative including C. difficile  Patient tolerating diet  CT abdomen/pelvis with no acute findings noted  Continue supportive care  No need for antibiotics at this time

## 2024-12-22 NOTE — PLAN OF CARE
Problem: Potential for Falls  Goal: Patient will remain free of falls  Description: INTERVENTIONS:  - Educate patient/family on patient safety including physical limitations  - Instruct patient to call for assistance with activity   - Consult OT/PT to assist with strengthening/mobility   - Keep Call bell within reach  - Keep bed low and locked with side rails adjusted as appropriate  - Keep care items and personal belongings within reach  - Initiate and maintain comfort rounds  - Make Fall Risk Sign visible to staff  - Offer Toileting every 2 Hours, in advance of need  - Initiate/Maintain bed alarm  - Obtain necessary fall risk management equipment:  socks  - Apply yellow socks and bracelet for high fall risk patients  - Consider moving patient to room near nurses station  Outcome: Progressing     Problem: PAIN - ADULT  Goal: Verbalizes/displays adequate comfort level or baseline comfort level  Description: Interventions:  - Encourage patient to monitor pain and request assistance  - Assess pain using appropriate pain scale  - Administer analgesics based on type and severity of pain and evaluate response  - Implement non-pharmacological measures as appropriate and evaluate response  - Consider cultural and social influences on pain and pain management  - Notify physician/advanced practitioner if interventions unsuccessful or patient reports new pain  Outcome: Progressing     Problem: INFECTION - ADULT  Goal: Absence or prevention of progression during hospitalization  Description: INTERVENTIONS:  - Assess and monitor for signs and symptoms of infection  - Monitor lab/diagnostic results  - Monitor all insertion sites, i.e. indwelling lines, tubes, and drains  - Monitor endotracheal if appropriate and nasal secretions for changes in amount and color  - [place] appropriate cooling/warming therapies per order  - Administer medications as ordered  - Instruct and encourage patient and family to use good hand  hygiene technique  - Identify and instruct in appropriate isolation precautions for identified infection/condition  Outcome: Progressing  Goal: Infection Control Precautions  Description: Strict hand hygiene/contact (r/o c-diff)  Outcome: Progressing     Problem: SAFETY ADULT  Goal: Patient will remain free of falls  Description: INTERVENTIONS:  - Educate patient/family on patient safety including physical limitations  - Instruct patient to call for assistance with activity   - Consult OT/PT to assist with strengthening/mobility   - Keep Call bell within reach  - Keep bed low and locked with side rails adjusted as appropriate  - Keep care items and personal belongings within reach  - Initiate and maintain comfort rounds  - Make Fall Risk Sign visible to staff  - Offer Toileting every 2 Hours, in advance of need  - Initiate/Maintain bed alarm  - Obtain necessary fall risk management equipment:  socks  - Apply yellow socks and bracelet for high fall risk patients  - Consider moving patient to room near nurses station  Outcome: Progressing  Goal: Maintain or return to baseline ADL function  Description: INTERVENTIONS:  -  Assess patient's ability to carry out ADLs; assess patient's baseline for ADL function and identify physical deficits which impact ability to perform ADLs (bathing, care of mouth/teeth, toileting, grooming, dressing, etc.)  - Assess/evaluate cause of self-care deficits   - Assess range of motion  - Assess patient's mobility; develop plan if impaired  - Assess patient's need for assistive devices and provide as appropriate  - Encourage maximum independence but intervene and supervise when necessary  - Involve family in performance of ADLs  - Assess for home care needs following discharge   - Consider OT consult to assist with ADL evaluation and planning for discharge  - Provide patient education as appropriate  Outcome: Progressing  Goal: Maintains/Returns to pre admission functional level      Problem: DISCHARGE PLANNING  Goal: Discharge to home or other facility with appropriate resources  Description: INTERVENTIONS:  - Identify barriers to discharge w/patient and caregiver  - Arrange for needed discharge resources and transportation as appropriate  - Identify discharge learning needs (meds, wound care, etc.)  - Arrange for interpretive services to assist at discharge as needed  - Refer to Case Management Department for coordinating discharge planning if the patient needs post-hospital services based on physician/advanced practitioner order or complex needs related to functional status, cognitive ability, or social support system  Outcome: Progressing     Problem: Knowledge Deficit  Goal: Patient/family/caregiver demonstrates understanding of disease process, treatment plan, medications, and discharge instructions  Description: Complete learning assessment and assess knowledge base.  Interventions:  - Provide teaching at level of understanding  - Provide teaching via preferred learning methods  Outcome: Progressing     Problem: GASTROINTESTINAL - ADULT  Goal: Minimal or absence of nausea and/or vomiting  Description: INTERVENTIONS:  - Administer IV fluids if ordered to ensure adequate hydration  - Maintain NPO status until nausea and vomiting are resolved  - Nasogastric tube if ordered  - Administer ordered antiemetic medications as needed  - Provide nonpharmacologic comfort measures as appropriate  - Advance diet as tolerated, if ordered  - Consider nutrition services referral to assist patient with adequate nutrition and appropriate food choices  Outcome: Progressing  Goal: Maintains or returns to baseline bowel function  Description: INTERVENTIONS:  - Assess bowel function  - Encourage oral fluids to ensure adequate hydration  - Administer IV fluids if ordered to ensure adequate hydration  - Administer ordered medications as needed  - Encourage mobilization and activity  - Consider nutritional  services referral to assist patient with adequate nutrition and appropriate food choices  Outcome: Progressing  Goal: Maintains adequate nutritional intake  Description: INTERVENTIONS:  - Monitor percentage of each meal consumed  - Identify factors contributing to decreased intake, treat as appropriate  - Assist with meals as needed  - Monitor I&O, weight, and lab values if indicated  - Obtain nutrition services referral as needed  Outcome: Progressing  Goal: Oral mucous membranes remain intact  Description: INTERVENTIONS  - Assess oral mucosa and hygiene practices  - Implement preventative oral hygiene regimen  - Implement oral medicated treatments as ordered  - Initiate Nutrition services referral as needed  Outcome: Progressing     Problem: MUSCULOSKELETAL - ADULT  Goal: Maintain or return mobility to safest level of function  Description: INTERVENTIONS:  - Assess patient's ability to carry out ADLs; assess patient's baseline for ADL function and identify physical deficits which impact ability to perform ADLs (bathing, care of mouth/teeth, toileting, grooming, dressing, etc.)  - Assess/evaluate cause of self-care deficits   - Assess range of motion  - Assess patient's mobility  - Assess patient's need for assistive devices and provide as appropriate  - Encourage maximum independence but intervene and supervise when necessary  - Involve family in performance of ADLs  - Assess for home care needs following discharge   - Consider OT consult to assist with ADL evaluation and planning for discharge  - Provide patient education as appropriate  Outcome: Progressing     Problem: METABOLIC, FLUID AND ELECTROLYTES - ADULT  Goal: Electrolytes maintained within normal limits  Description: INTERVENTIONS:  - Monitor labs and assess patient for signs and symptoms of electrolyte imbalances  - Administer electrolyte replacement as ordered  - Monitor response to electrolyte replacements, including repeat lab results as  appropriate  - Instruct patient on fluid and nutrition as appropriate  Outcome: Progressing  Goal: Fluid balance maintained  Description: INTERVENTIONS:  - Monitor labs   - Monitor I/O and WT  - Instruct patient on fluid and nutrition as appropriate  - Assess for signs & symptoms of volume excess or deficit  Outcome: Progressing  Goal: Glucose maintained within target range  Description: INTERVENTIONS:  - Monitor Blood Glucose as ordered  - Assess for signs and symptoms of hyperglycemia and hypoglycemia  - Administer ordered medications to maintain glucose within target range  - Assess nutritional intake and initiate nutrition service referral as needed  Outcome: Progressing     Problem: Nutrition/Hydration-ADULT  Goal: Nutrient/Hydration intake appropriate for improving, restoring or maintaining nutritional needs  Description: Monitor and assess patient's nutrition/hydration status for malnutrition. Collaborate with interdisciplinary team and initiate plan and interventions as ordered.  Monitor patient's weight and dietary intake as ordered or per policy. Utilize nutrition screening tool and intervene as necessary. Determine patient's food preferences and provide high-protein, high-caloric foods as appropriate.     INTERVENTIONS:  - Monitor oral intake, urinary output, labs, and treatment plans  - Assess nutrition and hydration status and recommend course of action  - Evaluate amount of meals eaten  - Assist patient with eating if necessary   - Allow adequate time for meals  - Recommend/ encourage appropriate diets, oral nutritional supplements, and vitamin/mineral supplements  - Order, calculate, and assess calorie counts as needed  - Recommend, monitor, and adjust tube feedings and TPN/PPN based on assessed needs  - Assess need for intravenous fluids  - Provide specific nutrition/hydration education as appropriate  - Include patient/family/caregiver in decisions related to nutrition  Outcome: Progressing      Problem: Prexisting or High Potential for Compromised Skin Integrity  Goal: Skin integrity is maintained or improved  Description: INTERVENTIONS:  - Identify patients at risk for skin breakdown  - Assess and monitor skin integrity  - Assess and monitor nutrition and hydration status  - Monitor labs   - Assess for incontinence   - Turn and reposition patient  - Assist with mobility/ambulation  - Relieve pressure over bony prominences  - Avoid friction and shearing  - Provide appropriate hygiene as needed including keeping skin clean and dry  - Evaluate need for skin moisturizer/barrier cream  - Collaborate with interdisciplinary team   - Patient/family teaching  - Consider wound care consult   Outcome: Progressing

## 2024-12-22 NOTE — ASSESSMENT & PLAN NOTE
Acute on chronic right knee pain, patient states that she typically has chronic pain however pain slightly worse than usual  Denies any recent trauma  Knee x-ray with effusion noted and degenerative changes.  No signs of infection on physical exam.  Patient had difficulty walking with PT  Lumbar CT with no acute findings noted  Possible inflammatory arthritis.  ESR/CRP elevated, follow-up inflammatory workup with rheumatology as outpatient  Patient started on prednisone 40 mg daily x 5 days.  Symptoms are improving  Will need outpatient follow-up with rheumatology

## 2024-12-22 NOTE — PROGRESS NOTES
Progress Note - Hospitalist   Name: Alberta Sanchez 79 y.o. female I MRN: [id number]  Unit/Bed#: -[facility] I Date of Admission: 12/18/2024   Date of Service: 12/22/2024 I Hospital Day: 3    Assessment & Plan  Gastroenteritis  Patient with GI complaints including nausea, decreased appetite, and diarrhea  Patient still with diarrhea however symptoms are improving  Stool studies negative including C. difficile  Patient tolerating diet  CT abdomen/pelvis with no acute findings noted  Continue supportive care  No need for antibiotics at this time  Chronic renal insufficiency, stage III (moderate) ([facility])  Lab Results   Component Value Date    EGFR 53 12/21/2024    EGFR 56 12/20/2024    EGFR 60 12/19/2024    CREATININE 1.00 12/21/2024    CREATININE 0.96 12/20/2024    CREATININE 0.90 12/19/2024   Creatinine seems to be at baseline.  Will continue routine lab monitoring.  Avoid any nephrotoxic meds  Type 2 diabetes mellitus with stage 3 chronic kidney disease, with long-term current use of insulin ([facility])  Lab Results   Component Value Date    HGBA1C 6.4 (H) 12/18/2024       Recent Labs     12/21/24  1117 12/21/24  1535 12/21/24  2110 12/22/24  0736   POCGLU 174* 231* 257* 147*       Blood Sugar Average: Last 72 hrs:  (P) [id number]  Will continue home long-acting insulin at reduced dose of 45 units nightly.  Insulin sliding scale.  Diabetic diet  Restless legs syndrome (RLS)  Continue Topamax  Right knee pain  Acute on chronic right knee pain, patient states that she typically has chronic pain however pain slightly worse than usual  Denies any recent trauma  Knee x-ray with effusion noted and degenerative changes.  No signs of infection on physical exam.  Patient had difficulty walking with PT  Lumbar CT with no acute findings noted  Possible inflammatory arthritis.  ESR/CRP elevated, follow-up inflammatory workup with rheumatology as outpatient  Patient started on prednisone 40 mg daily x 5 days.  Symptoms are  improving  Will need outpatient follow-up with rheumatology    VTE Pharmacologic Prophylaxis: VTE Score: 5 Moderate Risk (Score 3-4) - Pharmacological DVT Prophylaxis Ordered: enoxaparin (Lovenox).    Mobility:   Basic Mobility Inpatient Raw Score: 7  JH-HLM Goal: 2: Bed activities/Dependent transfer  JH-HLM Achieved: 2: Bed activities/Dependent transfer  JH-HLM Goal achieved. Continue to encourage appropriate mobility.    Patient Centered Rounds: I performed bedside rounds with nursing staff today.   Discussions with Specialists or Other Care Team Provider: yes    Education and Discussions with Family / Patient:  Updated patient's  who is also admitted to the hospital.     Current Length of Stay: 3 day(s)  Current Patient Status: Inpatient   Certification Statement: The patient will continue to require additional inpatient hospital stay due to pending placement  Discharge Plan: Anticipate discharge in 24-48 hrs to rehab facility.    Code Status: Level 3 - DNAR and DNI    Subjective   No overnight events noted.  Patient states that her joint pain is improving.  Currently afebrile.  Tolerating diet    Objective :  Temp:  [98.3 °F (36.8 °C)-98.4 °F (36.9 °C)] 98.4 °F (36.9 °C)  HR:  [60-69] 60  BP: (139-158)/(66-84) 139/66  Resp:  [16-18] 16  SpO2:  [91 %-95 %] 95 %  O2 Device: None (Room air)    Body mass index is 38.56 kg/m².     Input and Output Summary (last 24 hours):     Intake/Output Summary (Last 24 hours) at 12/22/2024 1022  Last data filed at 12/22/2024 0501  Gross per 24 hour   Intake 720 ml   Output 306 ml   Net 414 ml       Physical Exam  Constitutional:       General: She is not in acute distress.     Appearance: She is obese.   HENT:      Head: Normocephalic and atraumatic.      Nose: Nose normal.      Mouth/Throat:      Mouth: Mucous membranes are moist.   Eyes:      Extraocular Movements: Extraocular movements intact.      Conjunctiva/sclera: Conjunctivae normal.   Cardiovascular:      Rate and  Rhythm: Normal rate and regular rhythm.   Pulmonary:      Effort: Pulmonary effort is normal. No respiratory distress.   Abdominal:      Palpations: Abdomen is soft.      Tenderness: There is no abdominal tenderness.   Musculoskeletal:         General: Normal range of motion.      Cervical back: Normal range of motion and neck supple.      Comments: Generalized weakness   Skin:     General: Skin is warm and dry.   Neurological:      General: No focal deficit present.      Mental Status: She is alert. Mental status is at baseline.      Cranial Nerves: No cranial nerve deficit.   Psychiatric:         Mood and Affect: Mood normal.         Behavior: Behavior normal.         Lines/Drains:        Lab Results: I have reviewed the following results:   Results from last 7 days   Lab Units 12/21/24  0502 12/20/24  0436   WBC Thousand/uL 10.08 8.54   HEMOGLOBIN g/dL 11.6 11.1*   HEMATOCRIT % 35.9 35.3   PLATELETS Thousands/uL 167 150   SEGS PCT %  --  74   LYMPHO PCT %  --  15   MONO PCT %  --  8   EOS PCT %  --  2     Results from last 7 days   Lab Units 12/21/24  0502 12/20/24  0436   SODIUM mmol/L 135 136   POTASSIUM mmol/L 3.6 3.6   CHLORIDE mmol/L 102 104   CO2 mmol/L 26 25   BUN mg/dL 17 16   CREATININE mg/dL 1.00 0.96   ANION GAP mmol/L 7 7   CALCIUM mg/dL 9.1 8.7   ALBUMIN g/dL  --  3.1*   TOTAL BILIRUBIN mg/dL  --  0.90   ALK PHOS U/L  --  147*   ALT U/L  --  33   AST U/L  --  33   GLUCOSE RANDOM mg/dL 95 110         Results from last 7 days   Lab Units 12/22/24  0736 12/21/24  2110 12/21/24  1535 12/21/24  1117 12/21/24  0730 12/20/24  2107 12/20/24  1609 12/20/24  1120 12/20/24  0702 12/19/24  2050 12/19/24  1619 12/19/24  1103   POC GLUCOSE mg/dl 147* 257* 231* 174* 95 162* 119 196* 104 139 147* 168*     Results from last 7 days   Lab Units 12/18/24  1647   HEMOGLOBIN A1C % 6.4*     Results from last 7 days   Lab Units 12/21/24  0502 12/18/24  1126   LACTIC ACID mmol/L  --  0.9   PROCALCITONIN ng/ml 0.46*  --         Recent Cultures (last 7 days):   Results from last 7 days   Lab Units 12/19/24  1601   C DIFF TOXIN B BY PCR  Negative       Imaging Results Review: No pertinent imaging studies reviewed.  Other Study Results Review: No additional pertinent studies reviewed.    Last 24 Hours Medication List:     Current Facility-Administered Medications:     acetaminophen (TYLENOL) tablet 650 mg, Q6H PRN    albuterol (PROVENTIL HFA,VENTOLIN HFA) inhaler 2 puff, Q4H PRN    Diclofenac Sodium (VOLTAREN) 1 % topical gel 2 g, 4x Daily    enoxaparin (LOVENOX) subcutaneous injection 40 mg, Q12H BRADY    fenofibrate (TRICOR) tablet 145 mg, Daily    insulin glargine (LANTUS) subcutaneous injection 45 Units 0.45 mL, HS    insulin lispro (HumALOG/ADMELOG) 100 units/mL subcutaneous injection 1-5 Units, HS    insulin lispro (HumALOG/ADMELOG) 100 units/mL subcutaneous injection 1-6 Units, TID AC    lisinopril (ZESTRIL) tablet 10 mg, Daily    ondansetron (ZOFRAN) injection 4 mg, Q6H PRN    oxyCODONE (ROXICODONE) IR tablet 5 mg, Q6H PRN    oxyCODONE (ROXICODONE) split tablet 2.5 mg, Q6H PRN    pantoprazole (PROTONIX) EC tablet 40 mg, Daily    predniSONE tablet 40 mg, Daily    pregabalin (LYRICA) capsule 75 mg, TID    topiramate (TOPAMAX) tablet 25 mg, Daily    Administrative Statements   Today, Patient Was Seen By: Bala Lee MD      **Please Note: This note may have been constructed using a voice recognition system.**

## 2024-12-22 NOTE — CASE MANAGEMENT
Case Management Discharge Planning Note    Patient name Alberta Sanchez  Location /-01 MRN [id number]  : 1945 Date 2024       Current Admission Date: 2024  Current Admission Diagnosis:Gastroenteritis   Patient Active Problem List    Diagnosis Date Noted Date Diagnosed    Gastroenteritis 2024     Right knee pain 2024     Nocturnal hypoxemia 10/21/2024     Bilateral lower extremity edema 2024     Other fatigue 2024     Diabetes mellitus with neuropathy ([facility]) 2024     Platelets decreased ([facility]) 2023     Itchy skin 2023     Nasal congestion 2023     Dysuria 2023     LVH (left ventricular hypertrophy) 2022     COVID-19 virus infection 2022     COVID-19 vaccine series completed 2022     DNR (do not resuscitate) 2021     Gait disturbance 2021     Callus 10/30/2020     Arthritis 10/30/2020     Bilateral low back pain with bilateral sciatica 10/30/2020     Nail dystrophy 2020     Benign hypertensive renal disease 2020     Proteinuria 2020     Breast lump in upper outer quadrant 2020     Neck pain 2020     Abnormal mammogram 2020     Vaginal bleeding 2019     Morbid obesity ([facility]) 2019     Chronic right shoulder pain 2019     Migraine without aura, not intractable 2018     Hypertriglyceridemia, essential 2018     Osteopenia of spine 2018     Nonalcoholic fatty liver disease 2018     Hypomagnesemia 2017     Indigestion 2017     Long term current use of insulin ([facility]) 2017     Hammer toe 2015     Onychomycosis 11/10/2015     Chronic renal insufficiency, stage III (moderate) ([facility]) 2015     Peripheral vascular disease ([facility]) 2014     Plantar fascial fibromatosis 2014     Vitamin D deficiency 2014     Multiple and bilateral precerebral arterial occlusion 2014     Tinnitus 2014      Carotid artery disease (HCC) 03/13/2013     Essential hypertension 01/15/2013     Mixed hyperlipidemia 01/15/2013     Irritable bowel syndrome 01/15/2013     Metabolic syndrome 01/15/2013     Obstructive sleep apnea treated with continuous positive airway pressure (CPAP) 01/15/2013     Type 2 diabetes mellitus with stage 3 chronic kidney disease, with long-term current use of insulin (HCC) 01/15/2013     Anemia 01/15/2013     Restless legs syndrome (RLS) 01/15/2013     Mixed stress and urge urinary incontinence 01/15/2013       LOS (days): 3  Geometric Mean LOS (GMLOS) (days): 2.5  Days to GMLOS:-0.3     OBJECTIVE:  Risk of Unplanned Readmission Score: 10.84         Current admission status: Inpatient   Preferred Pharmacy:   [id number] - [place], [place] - [address]  [address] [address]  Phone: [phone] Fax: [phone]    [facility] - [place], [place] - [address]  [address] [address]  Phone: [phone] Fax: [phone]    Primary Care Provider: Dulce Michaud MD    Primary Insurance: MEDICARE  Secondary Insurance: AARP    DISCHARGE DETAILS:    Discharge planning discussed with:: leona called the pt at 8:50 am & 12:26pm  Freedom of Choice: Yes  Comments - Freedom of Choice: cm reviewed the list of accepting snf rehab facilties with her- her choice is [facility] - her spouse was also accepted there                               Other Referral/Resources/Interventions Provided:  Interventions: Short Term Rehab  Referral Comments: pt accepted to [facility] no covid needed- report needs to be called- 12:30pm BLS - rn, pt,provider & snf aware of the tranaport time - rx needed for narcs & controlled meds    Would you like to participate in our [facility] service program?  : No - Declined    Treatment Team Recommendation: Short Term Rehab ([facility] [place]-  12:30pm [address])     Transport at Discharge : [facility]     Number/Name of Dispatcher: [phone]  Transported by (Company and Unit #): [facility]  ETA of Transport (Date): 12/23/24  ETA of Transport (Time): 1230                            Accepting Facility Name, City & State : [facility]  [address]  [place]  [place] [address]  Receiving Facility/Agency Phone Number: [phone]

## 2024-12-22 NOTE — ASSESSMENT & PLAN NOTE
Lab Results   Component Value Date    HGBA1C 6.4 (H) 12/18/2024       Recent Labs     12/21/24  1117 12/21/24  1535 12/21/24  2110 12/22/24  0736   POCGLU 174* 231* 257* 147*       Blood Sugar Average: Last 72 hrs:  (P) 160.4463089942494277  Will continue home long-acting insulin at reduced dose of 45 units nightly.  Insulin sliding scale.  Diabetic diet

## 2024-12-22 NOTE — PLAN OF CARE
Problem: Potential for Falls  Goal: Patient will remain free of falls  Description: INTERVENTIONS:  - Educate patient/family on patient safety including physical limitations  - Instruct patient to call for assistance with activity   - Consult OT/PT to assist with strengthening/mobility   - Keep Call bell within reach  - Keep bed low and locked with side rails adjusted as appropriate  - Keep care items and personal belongings within reach  - Initiate and maintain comfort rounds  - Make Fall Risk Sign visible to staff  - Offer Toileting every 2 Hours, in advance of need  - Initiate/Maintain bed alarm  - Obtain necessary fall risk management equipment: nonslip socks  - Apply yellow socks and bracelet for high fall risk patients  - Consider moving patient to room near nurses station  Outcome: Progressing     Problem: PAIN - ADULT  Goal: Verbalizes/displays adequate comfort level or baseline comfort level  Description: Interventions:  - Encourage patient to monitor pain and request assistance  - Assess pain using appropriate pain scale  - Administer analgesics based on type and severity of pain and evaluate response  - Implement non-pharmacological measures as appropriate and evaluate response  - Consider cultural and social influences on pain and pain management  - Notify physician/advanced practitioner if interventions unsuccessful or patient reports new pain  Outcome: Progressing     Problem: INFECTION - ADULT  Goal: Absence or prevention of progression during hospitalization  Description: INTERVENTIONS:  - Assess and monitor for signs and symptoms of infection  - Monitor lab/diagnostic results  - Monitor all insertion sites, i.e. indwelling lines, tubes, and drains  - Monitor endotracheal if appropriate and nasal secretions for changes in amount and color  - [place] appropriate cooling/warming therapies per order  - Administer medications as ordered  - Instruct and encourage patient and family to use good hand  hygiene technique  - Identify and instruct in appropriate isolation precautions for identified infection/condition  Outcome: Progressing  Goal: Infection Control Precautions  Description: Strict hand hygiene/contact (r/o c-diff)  Outcome: Progressing     Problem: SAFETY ADULT  Goal: Patient will remain free of falls  Description: INTERVENTIONS:  - Educate patient/family on patient safety including physical limitations  - Instruct patient to call for assistance with activity   - Consult OT/PT to assist with strengthening/mobility   - Keep Call bell within reach  - Keep bed low and locked with side rails adjusted as appropriate  - Keep care items and personal belongings within reach  - Initiate and maintain comfort rounds  - Make Fall Risk Sign visible to staff  - Offer Toileting every 2 Hours, in advance of need  - Initiate/Maintain bed alarm  - Obtain necessary fall risk management equipment: nonslip socks  - Apply yellow socks and bracelet for high fall risk patients  - Consider moving patient to room near nurses station  Outcome: Progressing  Goal: Maintain or return to baseline ADL function  Description: INTERVENTIONS:  -  Assess patient's ability to carry out ADLs; assess patient's baseline for ADL function and identify physical deficits which impact ability to perform ADLs (bathing, care of mouth/teeth, toileting, grooming, dressing, etc.)  - Assess/evaluate cause of self-care deficits   - Assess range of motion  - Assess patient's mobility; develop plan if impaired  - Assess patient's need for assistive devices and provide as appropriate  - Encourage maximum independence but intervene and supervise when necessary  - Involve family in performance of ADLs  - Assess for home care needs following discharge   - Consider OT consult to assist with ADL evaluation and planning for discharge  - Provide patient education as appropriate  Outcome: Progressing  Goal: Maintains/Returns to pre admission functional  level  Description: INTERVENTIONS:  - Perform AM-PAC 6 Click Basic Mobility/ Daily Activity assessment daily.  - Set and communicate daily mobility goal to care team and patient/family/caregiver.   - Collaborate with rehabilitation services on mobility goals if consulted  - Perform Range of Motion 3 times a day.  - Reposition patient every 2 hours.  - Dangle patient 3 times a day  - Stand patient 3 times a day  - Ambulate patient 3 times a day  - Out of bed to chair 3 times a day   - Out of bed for meals 3 times a day  - Out of bed for toileting  - Record patient progress and toleration of activity level   Outcome: Progressing     Problem: DISCHARGE PLANNING  Goal: Discharge to home or other facility with appropriate resources  Description: INTERVENTIONS:  - Identify barriers to discharge w/patient and caregiver  - Arrange for needed discharge resources and transportation as appropriate  - Identify discharge learning needs (meds, wound care, etc.)  - Arrange for interpretive services to assist at discharge as needed  - Refer to Case Management Department for coordinating discharge planning if the patient needs post-hospital services based on physician/advanced practitioner order or complex needs related to functional status, cognitive ability, or social support system  Outcome: Progressing     Problem: Knowledge Deficit  Goal: Patient/family/caregiver demonstrates understanding of disease process, treatment plan, medications, and discharge instructions  Description: Complete learning assessment and assess knowledge base.  Interventions:  - Provide teaching at level of understanding  - Provide teaching via preferred learning methods  Outcome: Progressing     Problem: GASTROINTESTINAL - ADULT  Goal: Minimal or absence of nausea and/or vomiting  Description: INTERVENTIONS:  - Administer IV fluids if ordered to ensure adequate hydration  - Maintain NPO status until nausea and vomiting are resolved  - Nasogastric tube if  ordered  - Administer ordered antiemetic medications as needed  - Provide nonpharmacologic comfort measures as appropriate  - Advance diet as tolerated, if ordered  - Consider nutrition services referral to assist patient with adequate nutrition and appropriate food choices  Outcome: Progressing  Goal: Maintains or returns to baseline bowel function  Description: INTERVENTIONS:  - Assess bowel function  - Encourage oral fluids to ensure adequate hydration  - Administer IV fluids if ordered to ensure adequate hydration  - Administer ordered medications as needed  - Encourage mobilization and activity  - Consider nutritional services referral to assist patient with adequate nutrition and appropriate food choices  Outcome: Progressing  Goal: Maintains adequate nutritional intake  Description: INTERVENTIONS:  - Monitor percentage of each meal consumed  - Identify factors contributing to decreased intake, treat as appropriate  - Assist with meals as needed  - Monitor I&O, weight, and lab values if indicated  - Obtain nutrition services referral as needed  Outcome: Progressing  Goal: Oral mucous membranes remain intact  Description: INTERVENTIONS  - Assess oral mucosa and hygiene practices  - Implement preventative oral hygiene regimen  - Implement oral medicated treatments as ordered  - Initiate Nutrition services referral as needed  Outcome: Progressing     Problem: MUSCULOSKELETAL - ADULT  Goal: Maintain or return mobility to safest level of function  Description: INTERVENTIONS:  - Assess patient's ability to carry out ADLs; assess patient's baseline for ADL function and identify physical deficits which impact ability to perform ADLs (bathing, care of mouth/teeth, toileting, grooming, dressing, etc.)  - Assess/evaluate cause of self-care deficits   - Assess range of motion  - Assess patient's mobility  - Assess patient's need for assistive devices and provide as appropriate  - Encourage maximum independence but  intervene and supervise when necessary  - Involve family in performance of ADLs  - Assess for home care needs following discharge   - Consider OT consult to assist with ADL evaluation and planning for discharge  - Provide patient education as appropriate  Outcome: Progressing     Problem: METABOLIC, FLUID AND ELECTROLYTES - ADULT  Goal: Electrolytes maintained within normal limits  Description: INTERVENTIONS:  - Monitor labs and assess patient for signs and symptoms of electrolyte imbalances  - Administer electrolyte replacement as ordered  - Monitor response to electrolyte replacements, including repeat lab results as appropriate  - Instruct patient on fluid and nutrition as appropriate  Outcome: Progressing  Goal: Fluid balance maintained  Description: INTERVENTIONS:  - Monitor labs   - Monitor I/O and WT  - Instruct patient on fluid and nutrition as appropriate  - Assess for signs & symptoms of volume excess or deficit  Outcome: Progressing  Goal: Glucose maintained within target range  Description: INTERVENTIONS:  - Monitor Blood Glucose as ordered  - Assess for signs and symptoms of hyperglycemia and hypoglycemia  - Administer ordered medications to maintain glucose within target range  - Assess nutritional intake and initiate nutrition service referral as needed  Outcome: Progressing     Problem: Nutrition/Hydration-ADULT  Goal: Nutrient/Hydration intake appropriate for improving, restoring or maintaining nutritional needs  Description: Monitor and assess patient's nutrition/hydration status for malnutrition. Collaborate with interdisciplinary team and initiate plan and interventions as ordered.  Monitor patient's weight and dietary intake as ordered or per policy. Utilize nutrition screening tool and intervene as necessary. Determine patient's food preferences and provide high-protein, high-caloric foods as appropriate.     INTERVENTIONS:  - Monitor oral intake, urinary output, labs, and treatment plans  -  Assess nutrition and hydration status and recommend course of action  - Evaluate amount of meals eaten  - Assist patient with eating if necessary   - Allow adequate time for meals  - Recommend/ encourage appropriate diets, oral nutritional supplements, and vitamin/mineral supplements  - Order, calculate, and assess calorie counts as needed  - Recommend, monitor, and adjust tube feedings and TPN/PPN based on assessed needs  - Assess need for intravenous fluids  - Provide specific nutrition/hydration education as appropriate  - Include patient/family/caregiver in decisions related to nutrition  Outcome: Progressing     Problem: Prexisting or High Potential for Compromised Skin Integrity  Goal: Skin integrity is maintained or improved  Description: INTERVENTIONS:  - Identify patients at risk for skin breakdown  - Assess and monitor skin integrity  - Assess and monitor nutrition and hydration status  - Monitor labs   - Assess for incontinence   - Turn and reposition patient  - Assist with mobility/ambulation  - Relieve pressure over bony prominences  - Avoid friction and shearing  - Provide appropriate hygiene as needed including keeping skin clean and dry  - Evaluate need for skin moisturizer/barrier cream  - Collaborate with interdisciplinary team   - Patient/family teaching  - Consider wound care consult   Outcome: Progressing

## 2024-12-23 ENCOUNTER — TRANSITIONAL CARE MANAGEMENT (OUTPATIENT)
Dept: FAMILY MEDICINE CLINIC | Facility: CLINIC | Age: 79
End: 2024-12-23

## 2024-12-23 VITALS
BODY MASS INDEX: 38.6 KG/M2 | WEIGHT: 231.7 LBS | DIASTOLIC BLOOD PRESSURE: 79 MMHG | RESPIRATION RATE: 17 BRPM | HEART RATE: 64 BPM | HEIGHT: 65 IN | SYSTOLIC BLOOD PRESSURE: 160 MMHG | OXYGEN SATURATION: 95 % | TEMPERATURE: 98.2 F

## 2024-12-23 LAB
GLUCOSE SERPL-MCNC: 131 MG/DL (ref 65–140)
GLUCOSE SERPL-MCNC: 171 MG/DL (ref 65–140)
RHEUMATOID FACT SER QL LA: NEGATIVE

## 2024-12-23 PROCEDURE — 82948 REAGENT STRIP/BLOOD GLUCOSE: CPT

## 2024-12-23 PROCEDURE — 99239 HOSP IP/OBS DSCHRG MGMT >30: CPT | Performed by: INTERNAL MEDICINE

## 2024-12-23 RX ORDER — PREDNISONE 20 MG/1
40 TABLET ORAL DAILY
Start: 2024-12-23 | End: 2024-12-26

## 2024-12-23 RX ADMIN — PREGABALIN 75 MG: 75 CAPSULE ORAL at 08:46

## 2024-12-23 RX ADMIN — PANTOPRAZOLE SODIUM 40 MG: 40 TABLET, DELAYED RELEASE ORAL at 08:46

## 2024-12-23 RX ADMIN — INSULIN LISPRO 1 UNITS: 100 INJECTION, SOLUTION INTRAVENOUS; SUBCUTANEOUS at 11:23

## 2024-12-23 RX ADMIN — ENOXAPARIN SODIUM 40 MG: 40 INJECTION SUBCUTANEOUS at 08:46

## 2024-12-23 RX ADMIN — TOPIRAMATE 25 MG: 25 TABLET, FILM COATED ORAL at 08:46

## 2024-12-23 RX ADMIN — PREDNISONE 40 MG: 20 TABLET ORAL at 08:46

## 2024-12-23 RX ADMIN — FENOFIBRATE 145 MG: 145 TABLET, FILM COATED ORAL at 08:45

## 2024-12-23 RX ADMIN — DICLOFENAC SODIUM 2 G: 10 GEL TOPICAL at 08:46

## 2024-12-23 RX ADMIN — LISINOPRIL 10 MG: 10 TABLET ORAL at 08:45

## 2024-12-23 RX ADMIN — DICLOFENAC SODIUM 2 G: 10 GEL TOPICAL at 11:23

## 2024-12-23 NOTE — ASSESSMENT & PLAN NOTE
Lab Results   Component Value Date    HGBA1C 6.4 (H) 12/18/2024       Recent Labs     12/22/24  1150 12/22/24  1632 12/22/24  2109 12/23/24  0707   POCGLU 216* 265* 300* 131       Blood Sugar Average: Last 72 hrs:  (P) [id number]  Will continue home long-acting insulin at reduced dose of 45 units nightly.  Insulin sliding scale.  Diabetic diet

## 2024-12-23 NOTE — ASSESSMENT & PLAN NOTE
Lab Results   Component Value Date    EGFR 53 12/21/2024    EGFR 56 12/20/2024    EGFR 60 12/19/2024    CREATININE 1.00 12/21/2024    CREATININE 0.96 12/20/2024    CREATININE 0.90 12/19/2024   Creatinine seems to be at baseline  Will continue routine lab monitoring  Avoid any nephrotoxic meds

## 2024-12-23 NOTE — ASSESSMENT & PLAN NOTE
Patient with GI complaints including nausea, decreased appetite, and diarrhea  Patient still with diarrhea however symptoms are improving  Stool studies negative including C. difficile  Patient tolerating diet  CT abdomen/pelvis with no acute findings noted  Continue supportive care  No need for antibiotics at this time  Patient is medically stable for discharge to short-term rehab

## 2024-12-23 NOTE — ASSESSMENT & PLAN NOTE
Lab Results   Component Value Date    HGBA1C 6.4 (H) 12/18/2024       Recent Labs     12/22/24  1150 12/22/24  1632 12/22/24  2109 12/23/24  0707   POCGLU 216* 265* 300* 131       Blood Sugar Average: Last 72 hrs:  (P) [id number]  Will continue home long-acting insulin at reduced dose of 45 units nightly.  Insulin sliding scale.  Diabetic diet

## 2024-12-23 NOTE — PLAN OF CARE
Problem: Potential for Falls  Goal: Patient will remain free of falls  Description: INTERVENTIONS:  - Educate patient/family on patient safety including physical limitations  - Instruct patient to call for assistance with activity   - Consult OT/PT to assist with strengthening/mobility   - Keep Call bell within reach  - Keep bed low and locked with side rails adjusted as appropriate  - Keep care items and personal belongings within reach  - Initiate and maintain comfort rounds  - Make Fall Risk Sign visible to staff  - Offer Toileting every 4 Hours, in advance of need  - Initiate/Maintain bed alarm  - Obtain necessary fall risk management equipment: yellow socks  - Apply yellow socks and bracelet for high fall risk patients  - Consider moving patient to room near nurses station  Outcome: Progressing     Problem: PAIN - ADULT  Goal: Verbalizes/displays adequate comfort level or baseline comfort level  Description: Interventions:  - Encourage patient to monitor pain and request assistance  - Assess pain using appropriate pain scale  - Administer analgesics based on type and severity of pain and evaluate response  - Implement non-pharmacological measures as appropriate and evaluate response  - Consider cultural and social influences on pain and pain management  - Notify physician/advanced practitioner if interventions unsuccessful or patient reports new pain  Outcome: Progressing     Problem: INFECTION - ADULT  Goal: Absence or prevention of progression during hospitalization  Description: INTERVENTIONS:  - Assess and monitor for signs and symptoms of infection  - Monitor lab/diagnostic results  - Monitor all insertion sites, i.e. indwelling lines, tubes, and drains  - Monitor endotracheal if appropriate and nasal secretions for changes in amount and color  - [place] appropriate cooling/warming therapies per order  - Administer medications as ordered  - Instruct and encourage patient and family to use good hand  hygiene technique  - Identify and instruct in appropriate isolation precautions for identified infection/condition  Outcome: Progressing  Goal: Infection Control Precautions  Description: Strict hand hygiene/contact (r/o c-diff)  Outcome: Progressing     Problem: SAFETY ADULT  Goal: Patient will remain free of falls  Description: INTERVENTIONS:  - Educate patient/family on patient safety including physical limitations  - Instruct patient to call for assistance with activity   - Consult OT/PT to assist with strengthening/mobility   - Keep Call bell within reach  - Keep bed low and locked with side rails adjusted as appropriate  - Keep care items and personal belongings within reach  - Initiate and maintain comfort rounds  - Make Fall Risk Sign visible to staff  - Offer Toileting every 4 Hours, in advance of need  - Initiate/Maintain bed alarm  - Obtain necessary fall risk management equipment: yellow socks  - Apply yellow socks and bracelet for high fall risk patients  - Consider moving patient to room near nurses station  Outcome: Progressing  Goal: Maintain or return to baseline ADL function  Description: INTERVENTIONS:  -  Assess patient's ability to carry out ADLs; assess patient's baseline for ADL function and identify physical deficits which impact ability to perform ADLs (bathing, care of mouth/teeth, toileting, grooming, dressing, etc.)  - Assess/evaluate cause of self-care deficits   - Assess range of motion  - Assess patient's mobility; develop plan if impaired  - Assess patient's need for assistive devices and provide as appropriate  - Encourage maximum independence but intervene and supervise when necessary  - Involve family in performance of ADLs  - Assess for home care needs following discharge   - Consider OT consult to assist with ADL evaluation and planning for discharge  - Provide patient education as appropriate  Outcome: Progressing  Goal: Maintains/Returns to pre admission functional  level  Description: INTERVENTIONS:  - Perform AM-PAC 6 Click Basic Mobility/ Daily Activity assessment daily.  - Set and communicate daily mobility goal to care team and patient/family/caregiver.   - Collaborate with rehabilitation services on mobility goals if consulted  - Perform Range of Motion 3 times a day.  - Reposition patient every 2 hours.  - Dangle patient 3 times a day  - Stand patient 3 times a day  - Ambulate patient 3 times a day  - Out of bed to chair 3 times a day   - Out of bed for meals 3 times a day  - Out of bed for toileting  - Record patient progress and toleration of activity level   Outcome: Progressing     Problem: DISCHARGE PLANNING  Goal: Discharge to home or other facility with appropriate resources  Description: INTERVENTIONS:  - Identify barriers to discharge w/patient and caregiver  - Arrange for needed discharge resources and transportation as appropriate  - Identify discharge learning needs (meds, wound care, etc.)  - Arrange for interpretive services to assist at discharge as needed  - Refer to Case Management Department for coordinating discharge planning if the patient needs post-hospital services based on physician/advanced practitioner order or complex needs related to functional status, cognitive ability, or social support system  Outcome: Progressing     Problem: Knowledge Deficit  Goal: Patient/family/caregiver demonstrates understanding of disease process, treatment plan, medications, and discharge instructions  Description: Complete learning assessment and assess knowledge base.  Interventions:  - Provide teaching at level of understanding  - Provide teaching via preferred learning methods  Outcome: Progressing     Problem: GASTROINTESTINAL - ADULT  Goal: Minimal or absence of nausea and/or vomiting  Description: INTERVENTIONS:  - Administer IV fluids if ordered to ensure adequate hydration  - Maintain NPO status until nausea and vomiting are resolved  - Nasogastric tube if  ordered  - Administer ordered antiemetic medications as needed  - Provide nonpharmacologic comfort measures as appropriate  - Advance diet as tolerated, if ordered  - Consider nutrition services referral to assist patient with adequate nutrition and appropriate food choices  Outcome: Progressing  Goal: Maintains or returns to baseline bowel function  Description: INTERVENTIONS:  - Assess bowel function  - Encourage oral fluids to ensure adequate hydration  - Administer IV fluids if ordered to ensure adequate hydration  - Administer ordered medications as needed  - Encourage mobilization and activity  - Consider nutritional services referral to assist patient with adequate nutrition and appropriate food choices  Outcome: Progressing  Goal: Maintains adequate nutritional intake  Description: INTERVENTIONS:  - Monitor percentage of each meal consumed  - Identify factors contributing to decreased intake, treat as appropriate  - Assist with meals as needed  - Monitor I&O, weight, and lab values if indicated  - Obtain nutrition services referral as needed  Outcome: Progressing  Goal: Oral mucous membranes remain intact  Description: INTERVENTIONS  - Assess oral mucosa and hygiene practices  - Implement preventative oral hygiene regimen  - Implement oral medicated treatments as ordered  - Initiate Nutrition services referral as needed  Outcome: Progressing     Problem: MUSCULOSKELETAL - ADULT  Goal: Maintain or return mobility to safest level of function  Description: INTERVENTIONS:  - Assess patient's ability to carry out ADLs; assess patient's baseline for ADL function and identify physical deficits which impact ability to perform ADLs (bathing, care of mouth/teeth, toileting, grooming, dressing, etc.)  - Assess/evaluate cause of self-care deficits   - Assess range of motion  - Assess patient's mobility  - Assess patient's need for assistive devices and provide as appropriate  - Encourage maximum independence but  intervene and supervise when necessary  - Involve family in performance of ADLs  - Assess for home care needs following discharge   - Consider OT consult to assist with ADL evaluation and planning for discharge  - Provide patient education as appropriate  Outcome: Progressing     Problem: METABOLIC, FLUID AND ELECTROLYTES - ADULT  Goal: Electrolytes maintained within normal limits  Description: INTERVENTIONS:  - Monitor labs and assess patient for signs and symptoms of electrolyte imbalances  - Administer electrolyte replacement as ordered  - Monitor response to electrolyte replacements, including repeat lab results as appropriate  - Instruct patient on fluid and nutrition as appropriate  Outcome: Progressing  Goal: Fluid balance maintained  Description: INTERVENTIONS:  - Monitor labs   - Monitor I/O and WT  - Instruct patient on fluid and nutrition as appropriate  - Assess for signs & symptoms of volume excess or deficit  Outcome: Progressing  Goal: Glucose maintained within target range  Description: INTERVENTIONS:  - Monitor Blood Glucose as ordered  - Assess for signs and symptoms of hyperglycemia and hypoglycemia  - Administer ordered medications to maintain glucose within target range  - Assess nutritional intake and initiate nutrition service referral as needed  Outcome: Progressing     Problem: Nutrition/Hydration-ADULT  Goal: Nutrient/Hydration intake appropriate for improving, restoring or maintaining nutritional needs  Description: Monitor and assess patient's nutrition/hydration status for malnutrition. Collaborate with interdisciplinary team and initiate plan and interventions as ordered.  Monitor patient's weight and dietary intake as ordered or per policy. Utilize nutrition screening tool and intervene as necessary. Determine patient's food preferences and provide high-protein, high-caloric foods as appropriate.     INTERVENTIONS:  - Monitor oral intake, urinary output, labs, and treatment plans  -  Assess nutrition and hydration status and recommend course of action  - Evaluate amount of meals eaten  - Assist patient with eating if necessary   - Allow adequate time for meals  - Recommend/ encourage appropriate diets, oral nutritional supplements, and vitamin/mineral supplements  - Order, calculate, and assess calorie counts as needed  - Recommend, monitor, and adjust tube feedings and TPN/PPN based on assessed needs  - Assess need for intravenous fluids  - Provide specific nutrition/hydration education as appropriate  - Include patient/family/caregiver in decisions related to nutrition  Outcome: Progressing     Problem: Prexisting or High Potential for Compromised Skin Integrity  Goal: Skin integrity is maintained or improved  Description: INTERVENTIONS:  - Identify patients at risk for skin breakdown  - Assess and monitor skin integrity  - Assess and monitor nutrition and hydration status  - Monitor labs   - Assess for incontinence   - Turn and reposition patient  - Assist with mobility/ambulation  - Relieve pressure over bony prominences  - Avoid friction and shearing  - Provide appropriate hygiene as needed including keeping skin clean and dry  - Evaluate need for skin moisturizer/barrier cream  - Collaborate with interdisciplinary team   - Patient/family teaching  - Consider wound care consult   Outcome: Progressing

## 2024-12-23 NOTE — PLAN OF CARE
Problem: Potential for Falls  Goal: Patient will remain free of falls  Description: INTERVENTIONS:  - Educate patient/family on patient safety including physical limitations  - Instruct patient to call for assistance with activity   - Consult OT/PT to assist with strengthening/mobility   - Keep Call bell within reach  - Keep bed low and locked with side rails adjusted as appropriate  - Keep care items and personal belongings within reach  - Initiate and maintain comfort rounds  - Make Fall Risk Sign visible to staff  - Offer Toileting every 2 Hours, in advance of need  - Initiate/Maintain bed alarm  - Obtain necessary fall risk management equipment: nonskid socks  - Apply yellow socks and bracelet for high fall risk patients  - Consider moving patient to room near nurses station  Outcome: Progressing     Problem: PAIN - ADULT  Goal: Verbalizes/displays adequate comfort level or baseline comfort level  Description: Interventions:  - Encourage patient to monitor pain and request assistance  - Assess pain using appropriate pain scale  - Administer analgesics based on type and severity of pain and evaluate response  - Implement non-pharmacological measures as appropriate and evaluate response  - Consider cultural and social influences on pain and pain management  - Notify physician/advanced practitioner if interventions unsuccessful or patient reports new pain  Outcome: Progressing     Problem: INFECTION - ADULT  Goal: Absence or prevention of progression during hospitalization  Description: INTERVENTIONS:  - Assess and monitor for signs and symptoms of infection  - Monitor lab/diagnostic results  - Monitor all insertion sites, i.e. indwelling lines, tubes, and drains  - Monitor endotracheal if appropriate and nasal secretions for changes in amount and color  - Dalton appropriate cooling/warming therapies per order  - Administer medications as ordered  - Instruct and encourage patient and family to use good hand  hygiene technique  - Identify and instruct in appropriate isolation precautions for identified infection/condition  Outcome: Progressing  Goal: Infection Control Precautions  Description: Strict hand hygiene/contact (r/o c-diff)  Outcome: Progressing     Problem: SAFETY ADULT  Goal: Patient will remain free of falls  Description: INTERVENTIONS:  - Educate patient/family on patient safety including physical limitations  - Instruct patient to call for assistance with activity   - Consult OT/PT to assist with strengthening/mobility   - Keep Call bell within reach  - Keep bed low and locked with side rails adjusted as appropriate  - Keep care items and personal belongings within reach  - Initiate and maintain comfort rounds  - Make Fall Risk Sign visible to staff  - Offer Toileting every 2 Hours, in advance of need  - Initiate/Maintain bed alarm  - Obtain necessary fall risk management equipment: nonskid socks  - Apply yellow socks and bracelet for high fall risk patients  - Consider moving patient to room near nurses station  Outcome: Progressing  Goal: Maintain or return to baseline ADL function  Description: INTERVENTIONS:  -  Assess patient's ability to carry out ADLs; assess patient's baseline for ADL function and identify physical deficits which impact ability to perform ADLs (bathing, care of mouth/teeth, toileting, grooming, dressing, etc.)  - Assess/evaluate cause of self-care deficits   - Assess range of motion  - Assess patient's mobility; develop plan if impaired  - Assess patient's need for assistive devices and provide as appropriate  - Encourage maximum independence but intervene and supervise when necessary  - Involve family in performance of ADLs  - Assess for home care needs following discharge   - Consider OT consult to assist with ADL evaluation and planning for discharge  - Provide patient education as appropriate  Outcome: Progressing  Goal: Maintains/Returns to pre admission functional  level  Description: INTERVENTIONS:  - Perform AM-PAC 6 Click Basic Mobility/ Daily Activity assessment daily.  - Set and communicate daily mobility goal to care team and patient/family/caregiver.   - Collaborate with rehabilitation services on mobility goals if consulted  - Perform Range of Motion 3 times a day.  - Reposition patient every 2 hours.  - Dangle patient 3 times a day  - Stand patient 3 times a day  - Ambulate patient 3 times a day  - Out of bed to chair 3 times a day   - Out of bed for meals 3 times a day  - Out of bed for toileting  - Record patient progress and toleration of activity level   Outcome: Progressing     Problem: DISCHARGE PLANNING  Goal: Discharge to home or other facility with appropriate resources  Description: INTERVENTIONS:  - Identify barriers to discharge w/patient and caregiver  - Arrange for needed discharge resources and transportation as appropriate  - Identify discharge learning needs (meds, wound care, etc.)  - Arrange for interpretive services to assist at discharge as needed  - Refer to Case Management Department for coordinating discharge planning if the patient needs post-hospital services based on physician/advanced practitioner order or complex needs related to functional status, cognitive ability, or social support system  Outcome: Progressing     Problem: Knowledge Deficit  Goal: Patient/family/caregiver demonstrates understanding of disease process, treatment plan, medications, and discharge instructions  Description: Complete learning assessment and assess knowledge base.  Interventions:  - Provide teaching at level of understanding  - Provide teaching via preferred learning methods  Outcome: Progressing     Problem: GASTROINTESTINAL - ADULT  Goal: Minimal or absence of nausea and/or vomiting  Description: INTERVENTIONS:  - Administer IV fluids if ordered to ensure adequate hydration  - Maintain NPO status until nausea and vomiting are resolved  - Nasogastric tube if  ordered  - Administer ordered antiemetic medications as needed  - Provide nonpharmacologic comfort measures as appropriate  - Advance diet as tolerated, if ordered  - Consider nutrition services referral to assist patient with adequate nutrition and appropriate food choices  Outcome: Progressing  Goal: Maintains or returns to baseline bowel function  Description: INTERVENTIONS:  - Assess bowel function  - Encourage oral fluids to ensure adequate hydration  - Administer IV fluids if ordered to ensure adequate hydration  - Administer ordered medications as needed  - Encourage mobilization and activity  - Consider nutritional services referral to assist patient with adequate nutrition and appropriate food choices  Outcome: Progressing  Goal: Maintains adequate nutritional intake  Description: INTERVENTIONS:  - Monitor percentage of each meal consumed  - Identify factors contributing to decreased intake, treat as appropriate  - Assist with meals as needed  - Monitor I&O, weight, and lab values if indicated  - Obtain nutrition services referral as needed  Outcome: Progressing  Goal: Oral mucous membranes remain intact  Description: INTERVENTIONS  - Assess oral mucosa and hygiene practices  - Implement preventative oral hygiene regimen  - Implement oral medicated treatments as ordered  - Initiate Nutrition services referral as needed  Outcome: Progressing     Problem: MUSCULOSKELETAL - ADULT  Goal: Maintain or return mobility to safest level of function  Description: INTERVENTIONS:  - Assess patient's ability to carry out ADLs; assess patient's baseline for ADL function and identify physical deficits which impact ability to perform ADLs (bathing, care of mouth/teeth, toileting, grooming, dressing, etc.)  - Assess/evaluate cause of self-care deficits   - Assess range of motion  - Assess patient's mobility  - Assess patient's need for assistive devices and provide as appropriate  - Encourage maximum independence but  intervene and supervise when necessary  - Involve family in performance of ADLs  - Assess for home care needs following discharge   - Consider OT consult to assist with ADL evaluation and planning for discharge  - Provide patient education as appropriate  Outcome: Progressing     Problem: METABOLIC, FLUID AND ELECTROLYTES - ADULT  Goal: Electrolytes maintained within normal limits  Description: INTERVENTIONS:  - Monitor labs and assess patient for signs and symptoms of electrolyte imbalances  - Administer electrolyte replacement as ordered  - Monitor response to electrolyte replacements, including repeat lab results as appropriate  - Instruct patient on fluid and nutrition as appropriate  Outcome: Progressing  Goal: Fluid balance maintained  Description: INTERVENTIONS:  - Monitor labs   - Monitor I/O and WT  - Instruct patient on fluid and nutrition as appropriate  - Assess for signs & symptoms of volume excess or deficit  Outcome: Progressing  Goal: Glucose maintained within target range  Description: INTERVENTIONS:  - Monitor Blood Glucose as ordered  - Assess for signs and symptoms of hyperglycemia and hypoglycemia  - Administer ordered medications to maintain glucose within target range  - Assess nutritional intake and initiate nutrition service referral as needed  Outcome: Progressing     Problem: Nutrition/Hydration-ADULT  Goal: Nutrient/Hydration intake appropriate for improving, restoring or maintaining nutritional needs  Description: Monitor and assess patient's nutrition/hydration status for malnutrition. Collaborate with interdisciplinary team and initiate plan and interventions as ordered.  Monitor patient's weight and dietary intake as ordered or per policy. Utilize nutrition screening tool and intervene as necessary. Determine patient's food preferences and provide high-protein, high-caloric foods as appropriate.     INTERVENTIONS:  - Monitor oral intake, urinary output, labs, and treatment plans  -  Assess nutrition and hydration status and recommend course of action  - Evaluate amount of meals eaten  - Assist patient with eating if necessary   - Allow adequate time for meals  - Recommend/ encourage appropriate diets, oral nutritional supplements, and vitamin/mineral supplements  - Order, calculate, and assess calorie counts as needed  - Recommend, monitor, and adjust tube feedings and TPN/PPN based on assessed needs  - Assess need for intravenous fluids  - Provide specific nutrition/hydration education as appropriate  - Include patient/family/caregiver in decisions related to nutrition  Outcome: Progressing     Problem: Prexisting or High Potential for Compromised Skin Integrity  Goal: Skin integrity is maintained or improved  Description: INTERVENTIONS:  - Identify patients at risk for skin breakdown  - Assess and monitor skin integrity  - Assess and monitor nutrition and hydration status  - Monitor labs   - Assess for incontinence   - Turn and reposition patient  - Assist with mobility/ambulation  - Relieve pressure over bony prominences  - Avoid friction and shearing  - Provide appropriate hygiene as needed including keeping skin clean and dry  - Evaluate need for skin moisturizer/barrier cream  - Collaborate with interdisciplinary team   - Patient/family teaching  - Consider wound care consult   Outcome: Progressing

## 2024-12-23 NOTE — CASE MANAGEMENT
Case Management Discharge Planning Note    Patient name Alberta Sanchez  Location /-01 MRN [id number]  : 1945 Date 2024       Current Admission Date: 2024  Current Admission Diagnosis:Gastroenteritis   Patient Active Problem List    Diagnosis Date Noted Date Diagnosed    Gastroenteritis 2024     Right knee pain 2024     Nocturnal hypoxemia 10/21/2024     Bilateral lower extremity edema 2024     Other fatigue 2024     Diabetes mellitus with neuropathy ([facility]) 2024     Platelets decreased ([facility]) 2023     Itchy skin 2023     Nasal congestion 2023     Dysuria 2023     LVH (left ventricular hypertrophy) 2022     COVID-19 virus infection 2022     COVID-19 vaccine series completed 2022     DNR (do not resuscitate) 2021     Gait disturbance 2021     Callus 10/30/2020     Arthritis 10/30/2020     Bilateral low back pain with bilateral sciatica 10/30/2020     Nail dystrophy 2020     Benign hypertensive renal disease 2020     Proteinuria 2020     Breast lump in upper outer quadrant 2020     Neck pain 2020     Abnormal mammogram 2020     Vaginal bleeding 2019     Morbid obesity ([facility]) 2019     Chronic right shoulder pain 2019     Migraine without aura, not intractable 2018     Hypertriglyceridemia, essential 2018     Osteopenia of spine 2018     Nonalcoholic fatty liver disease 2018     Hypomagnesemia 2017     Indigestion 2017     Long term current use of insulin ([facility]) 2017     Hammer toe 2015     Onychomycosis 11/10/2015     Chronic renal insufficiency, stage III (moderate) ([facility]) 2015     Peripheral vascular disease ([facility]) 2014     Plantar fascial fibromatosis 2014     Vitamin D deficiency 2014     Multiple and bilateral precerebral arterial occlusion 2014     Tinnitus 2014      Carotid artery disease (HCC) 03/13/2013     Essential hypertension 01/15/2013     Mixed hyperlipidemia 01/15/2013     Irritable bowel syndrome 01/15/2013     Metabolic syndrome 01/15/2013     Obstructive sleep apnea treated with continuous positive airway pressure (CPAP) 01/15/2013     Type 2 diabetes mellitus with stage 3 chronic kidney disease, with long-term current use of insulin (HCC) 01/15/2013     Anemia 01/15/2013     Restless legs syndrome (RLS) 01/15/2013     Mixed stress and urge urinary incontinence 01/15/2013       LOS (days): 4  Geometric Mean LOS (GMLOS) (days): 2.5  Days to GMLOS:-1.1     OBJECTIVE:  Risk of Unplanned Readmission Score: 10.98         Current admission status: Inpatient   Preferred Pharmacy:   [id number] - [place], [place] - [address]  [address] [address]  Phone: [phone] Fax: [phone]    [facility] [address]  [address] [place] [address]  Phone: [phone] Fax: [phone]    Primary Care Provider: Dulce Michaud MD    Primary Insurance: MEDICARE  Secondary Insurance: [facility]    DISCHARGE DETAILS:    Pt stable for discharge today per Dr. Escobar. Pt will discharge to [facility] at [facility] via [facility]. Anat at [facility] aware of ETA and in agreement. Pt nurse aware and will call report. No COVID test needed per [facility].       Treatment Team Recommendation: Short Term Rehab ([facility] [place]- 12:30pm [facility])  Transport at Discharge : \[facility]\"" [facility]  Number/Name of Dispatcher: [phone]  Transported by (Company and Unit #): [place]  ETA of Transport (Date): 12/23/24  ETA of Transport (Time): 1230     Accepting Facility Name, City & State : [facility]  [address]  [place]  [place] [address]  Receiving Facility/Agency Phone Number: [phone]

## 2024-12-23 NOTE — DISCHARGE SUMMARY
Discharge Summary - Hospitalist   Name: Alberta Sanchez 79 y.o. female I MRN: [id number]  Unit/Bed#: -01 I Date of Admission: 12/18/2024   Date of Service: 12/23/2024 I Hospital Day: 4     Assessment & Plan  Gastroenteritis  Patient with GI complaints including nausea, decreased appetite, and diarrhea  Patient still with diarrhea however symptoms are improving  Stool studies negative including C. difficile  Patient tolerating diet  CT abdomen/pelvis with no acute findings noted  Continue supportive care  No need for antibiotics at this time  Patient is medically stable for discharge to short-term rehab  Right knee pain  Acute on chronic right knee pain, patient states that she typically has chronic pain however pain slightly worse than usual  Denies any recent trauma  Knee x-ray with effusion noted and degenerative changes.  No signs of infection on physical exam.  Patient had difficulty walking with PT  Lumbar CT with no acute findings noted  Possible inflammatory arthritis.  ESR/CRP elevated, follow-up inflammatory workup with rheumatology as outpatient  Patient started on prednisone 40 mg daily x 5 days.  Symptoms are improving  Will need outpatient follow-up with rheumatology  Chronic renal insufficiency, stage III (moderate) ([facility])  Lab Results   Component Value Date    EGFR 53 12/21/2024    EGFR 56 12/20/2024    EGFR 60 12/19/2024    CREATININE 1.00 12/21/2024    CREATININE 0.96 12/20/2024    CREATININE 0.90 12/19/2024   Creatinine seems to be at baseline  Will continue routine lab monitoring  Avoid any nephrotoxic meds  Type 2 diabetes mellitus with stage 3 chronic kidney disease, with long-term current use of insulin ([facility])  Lab Results   Component Value Date    HGBA1C 6.4 (H) 12/18/2024       Recent Labs     12/22/24  1150 12/22/24  1632 12/22/24  2109 12/23/24  0707   POCGLU 216* 265* 300* 131       Blood Sugar Average: Last 72 hrs:  (P) [id number]  Will continue home long-acting insulin at  reduced dose of 45 units nightly.  Insulin sliding scale.  Diabetic diet  Restless legs syndrome (RLS)  Continue Topamax     Medical Problems       Resolved Problems  Date Reviewed: 10/28/2024   None       Discharging Physician / Practitioner: Colin Escobar DO  PCP: Dulce Michaud MD  Admission Date:   Admission Orders (From admission, onward)       Ordered        12/19/24 1742  INPATIENT ADMISSION  Once            12/18/24 1341  Place in Observation  Once                          Discharge Date: 12/23/24    Consultations During Hospital Stay:  PT/OT, case management    Procedures Performed:   Not applicable    Significant Findings / Test Results:   Not applicable    Incidental Findings:   Not applicable    Test Results Pending at Discharge (will require follow up):   Not applicable     Outpatient Tests Requested:  Not applicable    Complications: Not applicable    Reason for Admission: Nausea/diarrhea    Hospital Course:   Alberta Sanchez is a 79 y.o. female with a PMH of CKD stage IIIb, hypertension, hyperlipidemia, restless leg syndrome, diabetes mellitus insulin-dependent who presents with nausea and diarrhea.  Patient states that symptoms started on Friday with nausea and decreased appetite.  Patient did not vomit.  Patient developed diarrhea on Sunday and symptoms have continued over the last couple of days.  Patient denies any recent antibiotic use.  No sick contacts.  Also noted right knee pain which has been worse than usual, as she does have chronic knee pain.  No fever or chills.  Intermittent abdominal cramping pain.  In the ER patient had CT abdomen/pelvis with no acute findings noted.    The patient remained hemodynamically stable and saturating well on room air throughout her hospital course.  Symptomatology likely secondary to viral gastroenteritis.  Symptoms have completely resolved.  PT/OT recommended short-term rehab.  Case management has arranged for the patient and her  who is also  "currently admitted to the hospital to be discharged to the same rehab facility.  She was strongly encouraged to resume all PTA medications and to follow-up with her PCP within 7 days 14 days.  She will complete a 5-day course of prednisone in the setting of osteoarthritis.  This serves as a brief discharge summary.  Please see full medical record for more details.    Condition at Discharge: stable    Discharge Day Visit / Exam:   Subjective:  Patient seen and examined at bedside. No acute events overnight. Denies chest pain, SOB, diaphoresis, nausea/vomiting/diarrhea, fevers/chills.     Vitals: Blood Pressure: 162/81 (12/23/24 0702)  Pulse: 59 (12/23/24 0702)  Temperature: 98.2 °F (36.8 °C) (12/22/24 2250)  Temp Source: Oral (12/22/24 2250)  Respirations: 19 (12/23/24 0702)  Height: 5' 5\" (165.1 cm) (12/22/24 1131)  Weight - Scale: 105 kg (231 lb 11.3 oz) (12/22/24 1131)  SpO2: 94 % (12/23/24 0702)    Physical Exam  Vitals and nursing note reviewed.   Constitutional:       General: She is not in acute distress.     Appearance: She is well-developed.   HENT:      Head: Normocephalic and atraumatic.   Eyes:      Conjunctiva/sclera: Conjunctivae normal.   Cardiovascular:      Rate and Rhythm: Normal rate and regular rhythm.      Heart sounds: No murmur heard.  Pulmonary:      Effort: Pulmonary effort is normal. No respiratory distress.      Breath sounds: Normal breath sounds.   Abdominal:      Palpations: Abdomen is soft.      Tenderness: There is no abdominal tenderness.   Musculoskeletal:         General: No swelling.      Cervical back: Neck supple.   Skin:     General: Skin is warm and dry.      Capillary Refill: Capillary refill takes less than 2 seconds.   Neurological:      Mental Status: She is alert.   Psychiatric:         Mood and Affect: Mood normal.       Discussion with Family: Updated  () at bedside.    Discharge instructions/Information to patient and family:   See after visit " summary for information provided to patient and family.      Provisions for Follow-Up Care:  See after visit summary for information related to follow-up care and any pertinent home health orders.      Mobility at time of Discharge:   Basic Mobility Inpatient Raw Score: 7  JH-HLM Goal: 2: Bed activities/Dependent transfer  JH-HLM Achieved: 2: Bed activities/Dependent transfer  HLM Goal achieved. Continue to encourage appropriate mobility.     Disposition:   Other: Short-term rehab    Planned Readmission: Not applicable    Discharge Medications:  See after visit summary for reconciled discharge medications provided to patient and/or family.      Administrative Statements   Discharge Statement:  I have spent a total time of 65 minutes in caring for this patient on the day of the visit/encounter. >30 minutes of time was spent on: Diagnostic results, Prognosis, Risks and benefits of tx options, Instructions for management, Patient and family education, Importance of tx compliance, Risk factor reductions, Impressions, Counseling / Coordination of care, Documenting in the medical record, Reviewing / ordering tests, medicine, procedures  , and Communicating with other healthcare professionals .    **Please Note: This note may have been constructed using a voice recognition system**

## 2024-12-23 NOTE — PROGRESS NOTES
Progress Note - Hospitalist   Name: Alberta Sanchez 79 y.o. female I MRN: [id number]  Unit/Bed#: -01 I Date of Admission: 12/18/2024   Date of Service: 12/23/2024 I Hospital Day: 4     Assessment & Plan  Gastroenteritis  Patient with GI complaints including nausea, decreased appetite, and diarrhea  Patient still with diarrhea however symptoms are improving  Stool studies negative including C. difficile  Patient tolerating diet  CT abdomen/pelvis with no acute findings noted  Continue supportive care  No need for antibiotics at this time  Patient is medically stable for discharge to short-term rehab  Right knee pain  Acute on chronic right knee pain, patient states that she typically has chronic pain however pain slightly worse than usual  Denies any recent trauma  Knee x-ray with effusion noted and degenerative changes.  No signs of infection on physical exam.  Patient had difficulty walking with PT  Lumbar CT with no acute findings noted  Possible inflammatory arthritis.  ESR/CRP elevated, follow-up inflammatory workup with rheumatology as outpatient  Patient started on prednisone 40 mg daily x 5 days.  Symptoms are improving  Will need outpatient follow-up with rheumatology  Chronic renal insufficiency, stage III (moderate) ([facility])  Lab Results   Component Value Date    EGFR 53 12/21/2024    EGFR 56 12/20/2024    EGFR 60 12/19/2024    CREATININE 1.00 12/21/2024    CREATININE 0.96 12/20/2024    CREATININE 0.90 12/19/2024   Creatinine seems to be at baseline  Will continue routine lab monitoring  Avoid any nephrotoxic meds  Type 2 diabetes mellitus with stage 3 chronic kidney disease, with long-term current use of insulin ([facility])  Lab Results   Component Value Date    HGBA1C 6.4 (H) 12/18/2024       Recent Labs     12/22/24  1150 12/22/24  1632 12/22/24  2109 12/23/24  0707   POCGLU 216* 265* 300* 131       Blood Sugar Average: Last 72 hrs:  (P) [id number]  Will continue home long-acting insulin at  reduced dose of 45 units nightly.  Insulin sliding scale.  Diabetic diet  Restless legs syndrome (RLS)  Continue Topamax    VTE Pharmacologic Prophylaxis: VTE Score: 5 High Risk (Score >/= 5) - Pharmacological DVT Prophylaxis Ordered: enoxaparin (Lovenox). Sequential Compression Devices Ordered.    Mobility:   Basic Mobility Inpatient Raw Score: 7  JH-HLM Goal: 2: Bed activities/Dependent transfer  JH-HLM Achieved: 2: Bed activities/Dependent transfer  JH-HLM Goal achieved. Continue to encourage appropriate mobility.    Patient Centered Rounds: I performed bedside rounds with nursing staff today.     Discussions with Specialists or Other Care Team Provider: Case management    Education and Discussions with Family / Patient: Updated  () at bedside.    Current Length of Stay: 4 day(s)  Current Patient Status: Inpatient   Certification Statement: The patient will continue to require additional inpatient hospital stay due to ambulatory dysfunction  Discharge Plan: Anticipate discharge later today or tomorrow to rehab facility.    Code Status: Level 3 - DNAR and DNI    Subjective   Patient seen and examined at bedside. No acute events overnight. Denies chest pain, SOB, diaphoresis, nausea/vomiting/diarrhea, fevers/chills.     Objective :  Temp:  [98.2 °F (36.8 °C)-98.4 °F (36.9 °C)] 98.2 °F (36.8 °C)  HR:  [59-68] 59  BP: (139-175)/(66-83) 162/81  Resp:  [16-19] 19  SpO2:  [93 %-96 %] 94 %  O2 Device: None (Room air)    Body mass index is 38.56 kg/m².     Input and Output Summary (last 24 hours):     Intake/Output Summary (Last 24 hours) at 12/23/2024 0821  Last data filed at 12/23/2024 0556  Gross per 24 hour   Intake 740 ml   Output 402 ml   Net 338 ml       Physical Exam  Vitals and nursing note reviewed.   Constitutional:       General: She is not in acute distress.     Appearance: She is well-developed.   HENT:      Head: Normocephalic and atraumatic.   Eyes:      Conjunctiva/sclera:  Conjunctivae normal.   Cardiovascular:      Rate and Rhythm: Normal rate and regular rhythm.      Heart sounds: No murmur heard.  Pulmonary:      Effort: Pulmonary effort is normal. No respiratory distress.      Breath sounds: Normal breath sounds.   Abdominal:      Palpations: Abdomen is soft.      Tenderness: There is no abdominal tenderness.   Musculoskeletal:         General: No swelling.      Cervical back: Neck supple.   Skin:     General: Skin is warm and dry.      Capillary Refill: Capillary refill takes less than 2 seconds.   Neurological:      Mental Status: She is alert.   Psychiatric:         Mood and Affect: Mood normal.         Lab Results: I have reviewed the following results:   Results from last 7 days   Lab Units 12/21/24  0502 12/20/24  0436   WBC Thousand/uL 10.08 8.54   HEMOGLOBIN g/dL 11.6 11.1*   HEMATOCRIT % 35.9 35.3   PLATELETS Thousands/uL 167 150   SEGS PCT %  --  74   LYMPHO PCT %  --  15   MONO PCT %  --  8   EOS PCT %  --  2     Results from last 7 days   Lab Units 12/21/24  0502 12/20/24  0436   SODIUM mmol/L 135 136   POTASSIUM mmol/L 3.6 3.6   CHLORIDE mmol/L 102 104   CO2 mmol/L 26 25   BUN mg/dL 17 16   CREATININE mg/dL 1.00 0.96   ANION GAP mmol/L 7 7   CALCIUM mg/dL 9.1 8.7   ALBUMIN g/dL  --  3.1*   TOTAL BILIRUBIN mg/dL  --  0.90   ALK PHOS U/L  --  147*   ALT U/L  --  33   AST U/L  --  33   GLUCOSE RANDOM mg/dL 95 110         Results from last 7 days   Lab Units 12/23/24  0707 12/22/24  2109 12/22/24  1632 12/22/24  1150 12/22/24  0736 12/21/24  2110 12/21/24  1535 12/21/24  1117 12/21/24  0730 12/20/24  2107 12/20/24  1609 12/20/24  1120   POC GLUCOSE mg/dl 131 300* 265* 216* 147* 257* 231* 174* 95 162* 119 196*     Results from last 7 days   Lab Units 12/18/24  1647   HEMOGLOBIN A1C % 6.4*     Results from last 7 days   Lab Units 12/21/24  0502 12/18/24  1126   LACTIC ACID mmol/L  --  0.9   PROCALCITONIN ng/ml 0.46*  --        Recent Cultures (last 7 days):   Results from  last 7 days   Lab Units 12/19/24  1601   C DIFF TOXIN B BY PCR  Negative       Imaging Results Review: No pertinent imaging studies reviewed.  Other Study Results Review: No additional pertinent studies reviewed.    Last 24 Hours Medication List:     Current Facility-Administered Medications:     acetaminophen (TYLENOL) tablet 650 mg, Q6H PRN    albuterol (PROVENTIL HFA,VENTOLIN HFA) inhaler 2 puff, Q4H PRN    Diclofenac Sodium (VOLTAREN) 1 % topical gel 2 g, 4x Daily    enoxaparin (LOVENOX) subcutaneous injection 40 mg, Q12H BRADY    fenofibrate (TRICOR) tablet 145 mg, Daily    insulin glargine (LANTUS) subcutaneous injection 45 Units 0.45 mL, HS    insulin lispro (HumALOG/ADMELOG) 100 units/mL subcutaneous injection 1-5 Units, HS    insulin lispro (HumALOG/ADMELOG) 100 units/mL subcutaneous injection 1-6 Units, TID AC    lisinopril (ZESTRIL) tablet 10 mg, Daily    ondansetron (ZOFRAN) injection 4 mg, Q6H PRN    oxyCODONE (ROXICODONE) IR tablet 5 mg, Q6H PRN    oxyCODONE (ROXICODONE) split tablet 2.5 mg, Q6H PRN    pantoprazole (PROTONIX) EC tablet 40 mg, Daily    predniSONE tablet 40 mg, Daily    pregabalin (LYRICA) capsule 75 mg, TID    topiramate (TOPAMAX) tablet 25 mg, Daily    Administrative Statements   Today, Patient Was Seen By: Colin Escobar, DO  I have spent a total time of 35 minutes in caring for this patient on the day of the visit/encounter including Diagnostic results, Prognosis, Risks and benefits of tx options, Instructions for management, Patient and family education, Importance of tx compliance, Risk factor reductions, Impressions, Counseling / Coordination of care, Documenting in the medical record, Reviewing / ordering tests, medicine, procedures  , Obtaining or reviewing history  , and Communicating with other healthcare professionals .    **Please Note: This note may have been constructed using a voice recognition system.**

## 2024-12-27 LAB
DSDNA IGG SERPL IA-ACNC: <0.9 IU/ML (ref ?–15)
NUCLEAR IGG SER IA-RTO: 0.1 RATIO (ref ?–1)

## 2024-12-29 DIAGNOSIS — I10 ESSENTIAL HYPERTENSION: ICD-10-CM

## 2024-12-30 RX ORDER — LISINOPRIL 10 MG/1
10 TABLET ORAL DAILY
Qty: 90 TABLET | Refills: 1 | Status: SHIPPED | OUTPATIENT
Start: 2024-12-30

## 2025-01-03 DIAGNOSIS — E11.22 TYPE 2 DIABETES MELLITUS WITH STAGE 3 CHRONIC KIDNEY DISEASE, WITH LONG-TERM CURRENT USE OF INSULIN (HCC): ICD-10-CM

## 2025-01-03 DIAGNOSIS — N18.30 TYPE 2 DIABETES MELLITUS WITH STAGE 3 CHRONIC KIDNEY DISEASE, WITH LONG-TERM CURRENT USE OF INSULIN (HCC): ICD-10-CM

## 2025-01-03 DIAGNOSIS — Z79.4 TYPE 2 DIABETES MELLITUS WITH STAGE 3 CHRONIC KIDNEY DISEASE, WITH LONG-TERM CURRENT USE OF INSULIN (HCC): ICD-10-CM

## 2025-01-03 RX ORDER — INSULIN DEGLUDEC 100 U/ML
INJECTION, SOLUTION SUBCUTANEOUS
Qty: 75 ML | Refills: 1 | Status: SHIPPED | OUTPATIENT
Start: 2025-01-03

## 2025-01-13 ENCOUNTER — TRANSCRIBE ORDERS (OUTPATIENT)
Age: 80
End: 2025-01-13

## 2025-01-13 ENCOUNTER — OFFICE VISIT (OUTPATIENT)
Age: 80
End: 2025-01-13

## 2025-01-13 VITALS
BODY MASS INDEX: 38.49 KG/M2 | HEIGHT: 65 IN | HEART RATE: 63 BPM | DIASTOLIC BLOOD PRESSURE: 80 MMHG | SYSTOLIC BLOOD PRESSURE: 140 MMHG | TEMPERATURE: 98.7 F | OXYGEN SATURATION: 98 % | WEIGHT: 231 LBS

## 2025-01-13 DIAGNOSIS — M25.50 PAIN IN JOINT INVOLVING MULTIPLE SITES: Primary | ICD-10-CM

## 2025-01-13 DIAGNOSIS — M19.90 INFLAMMATORY ARTHRITIS: ICD-10-CM

## 2025-01-13 RX ORDER — PREDNISONE 5 MG/1
TABLET ORAL
Qty: 70 TABLET | Refills: 0 | Status: SHIPPED | OUTPATIENT
Start: 2025-01-13 | End: 2025-02-10

## 2025-01-13 RX ORDER — PREDNISONE 5 MG/1
TABLET ORAL
Qty: 210 TABLET | Refills: 0 | Status: CANCELLED | OUTPATIENT
Start: 2025-01-13 | End: 2025-04-13

## 2025-01-13 NOTE — PATIENT INSTRUCTIONS
Please do blood work as ordered now and before coming to your next visit  Please take prednisone as ordered and report to us if you notice joint pains after you complete the taper course  Please do X-rays as ordered

## 2025-01-13 NOTE — PROGRESS NOTES
Name: Alberta Sanchez      : 1945      MRN: [id number]  Encounter Provider: Ralph Alfaro MD  Encounter Date: 2025   Encounter department: [facility] [address]  :  Assessment & Plan  Pain in joint involving multiple sites  Alberta Sanchez is a 79 y.o. female with past medical history of Diabetes mellitus (on Insulin), CKD3 presents as a consult from hospital for diffuse arthralgias in her bilateral knees, hands, ankles, elbows and shoulders. Patient has chronic joint pains since Aug 2024 that worsen with rest and improve with activity, with no significant stiffness or swelling. Her arthralgias further intensified after recent diarrheal illness which is noro virus per patient (I did not see any positive stool studies in the system). Her inflammatory markers were high in the hospital with ESR 96 and . Patient reported improvement of arthralgias with 3 days of steroid courses but started to recur again. Her RF and CCP were negative.    Her presentation appears to be suspicious of reactive arthritis, especially with a recent GI illness, usually seen with enteric bacteria (not sure if she had a viral illness or an enteric bacterial infection, along with high inflammatory markers and good response to steroids. I plan on repeating inflammatory markers now and start a short steroid course, and repeat the inflammatory makers after the treatment. There is possibility that reactive arthritis can go in to remission without any DMARD use. If DMARD use is deemed necessary in the future, I would probably consider Sulfasalazine for reactive arthritis. Counseled patient to monitor finger sticks for glucose levels while being on steroids. I would avoid NSAIDs because of CKD3 history. Would keep a diagnosis of seronegative rheumatoid arthritis low on the differential for now.      Orders:    C-reactive protein; Standing    Sedimentation rate, automated; Standing    predniSONE 5 mg tablet;  Take 4 tablets (20 mg total) by mouth daily for 7 days, THEN 3 tablets (15 mg total) daily for 7 days, THEN 2 tablets (10 mg total) daily for 7 days, THEN 1 tablet (5 mg total) daily for 7 days.    XR hand 3+ vw left; Future    XR hand 3+ vw right; Future    XR foot 3+ vw left; Future    XR foot 3+ vw right; Future    Inflammatory arthritis             History of Present Illness   HPI  Alberta Sanchez is a 79 y.o. female with past medical history of Diabetes mellitus, CKD3 presents as a consult from hospital for joint pains. Patient reports, in December, she had abd pain and diarrhea and was told she had norovirus and since then her arthralgias worsened. She started having joint pains from August 2024 but worsened after the GI illness in Dec 2024. Her inflammatory markers were very high ESR 96 and . Patient got steroids for 3 doses and noticed improvement of pain but lately noticing pain in her joints again (in her ankles, knees, elbows, hands and shoulders)    Her joint pains worsen with rest and improve with activity, does not complain of much stiffness and denies swelling of her joints. Denies any skin rashes.    She denies any abd pain or diarrhea today    History obtained from: patient    Review of Systems  Rest of ROS are negative except as noted in HPI    Medical History Reviewed by provider this encounter:     .  Past Medical History   Past Medical History:   Diagnosis Date    Abnormal finding in urine 1/9/2023    Allergic Springtime    Runny nose, congestion    Anemia     Arthritis 10/30/2020    Chronic kidney disease     Diabetes mellitus (HCC)     Fibromyalgia, primary     Fracture of wrist     RIGHT    Gastroenteritis 12/18/2024    Heart murmur 06/30/2020    History of non-insulin dependent diabetes mellitus     Hx of thrombocytopenia 02/18/2022    Hyperlipidemia     Hypertension     Hypertension     IBS (irritable bowel syndrome)     Inflammatory bowel disease 2015    Been doctoring for years     Irritable bowel     Kidney stone     Metabolic syndrome     Obesity     RLS (restless legs syndrome)     Routine medical exam 06/28/2019    Skin lesion 2/8/2021    Sleep apnea     Sleep apnea     ON CPAP    Upper respiratory tract infection 01/19/2022    Urge incontinence     Urinary incontinence     Urinary tract infection 1/1/2023     Past Surgical History:   Procedure Laterality Date    CHOLECYSTECTOMY  2013    COLONOSCOPY  2010    CYSTOSCOPY      HERNIA REPAIR  2008    HYSTERECTOMY N/A 1974    partial; ovaries intact    KNEE SURGERY Left      Family History   Problem Relation Age of Onset    Breast cancer Mother 62        Breast cancer    Suicidality Father     Melanoma Sister     Bipolar disorder Sister     Depression Sister         Bi-polar    Bipolar disorder Sister     Completed Suicide  Sister     Rheum arthritis Daughter     No Known Problems Daughter     No Known Problems Maternal Grandmother     No Known Problems Maternal Grandfather     No Known Problems Paternal Grandmother     No Known Problems Paternal Grandfather     Heart disease Brother     No Known Problems Maternal Aunt     No Known Problems Maternal Aunt     BRCA2 Positive Neg Hx     BRCA1 Positive Neg Hx     BRCA2 Negative Neg Hx     BRCA1 Negative Neg Hx     BRCA 1/2 Neg Hx     Ovarian cancer Neg Hx     Endometrial cancer Neg Hx     Colon cancer Neg Hx     Breast cancer additional onset Neg Hx       reports that she has never smoked. She has never been exposed to tobacco smoke. She has never used smokeless tobacco. She reports that she does not currently use alcohol. She reports that she does not use drugs.  Current Outpatient Medications on File Prior to Visit   Medication Sig Dispense Refill    acetaminophen (TYLENOL) 325 mg tablet Take by mouth as needed       albuterol (ProAir HFA) 90 mcg/act inhaler Inhale 2 puffs every 4 (four) hours as needed for wheezing 18 g 1    B Complex Vitamins (B COMPLEX 1 PO) Take by mouth      Cholecalciferol  (VITAMIN D3) 2000 units capsule half      clotrimazole (LOTRIMIN) 1 % cream Apply topically 2 (two) times a day 113 g 0    Contour Next Test test strip Test blood sugar 3 times daily 300 each 1    Diapers & Supplies MISC Use 3 (three) times a day as needed (Incontinence) 28 each 11    Diclofenac Sodium (VOLTAREN) 1 % Apply 2 g topically 4 (four) times a day      estrogens, conjugated (Premarin) vaginal cream INSERT 1 APPLICATORFUL VAGINALLY TWICE WEEKLY 30 g 5    fenofibrate 160 MG tablet TAKE 1 TABLET BY MOUTH  DAILY 90 tablet 0    Ferrous Sulfate (IRON) 325 (65 Fe) MG TABS Take by mouth      insulin degludec (Tresiba FlexTouch) 100 units/mL injection pen INJECT 70 UNITS SUBCUTANEOUSLY  DAILY AT BEDTIME 75 mL 1    Insulin Pen Needle (BD Pen Needle [place] U/F) 32G X 4 MM MISC INJECT SUBCUTANEOUSLY AS  DIRECTED 3 TIMES DAILY 270 each 0    lisinopril (ZESTRIL) 10 mg tablet TAKE 1 TABLET BY MOUTH DAILY 90 tablet 1    Microlet Lancets MISC Use 3 (three) times a day 100 each 2    Misc Natural Products (CYSTEX URINARY HEALTH PO) Take 1 capsule by mouth daily      Multiple Vitamin (DAILY VALUE MULTIVITAMIN) TABS Take by mouth      pantoprazole (PROTONIX) 40 mg tablet TAKE 1 TABLET BY MOUTH DAILY 90 tablet 1    pregabalin (LYRICA) 75 mg capsule Take 1 capsule (75 mg total) by mouth 3 (three) times a day 270 capsule 0    Probiotic Product (Align) 4 MG CAPS Take by mouth      simvastatin (ZOCOR) 10 mg tablet TAKE 1 TABLET BY MOUTH AT  BEDTIME 90 tablet 3    solifenacin (VESICARE) 5 mg tablet take 1 tablet by mouth once daily 90 tablet 1    topiramate (TOPAMAX) 25 mg tablet Take 1 tablet (25 mg total) by mouth daily 90 tablet 0    bismuth subsalicylate (PEPTO BISMOL) 262 MG chewable tablet Chew 2 tablets (524 mg total) 2 (two) times a day as needed for diarrhea Has tolerated in the past. 30 tablet 0     No current facility-administered medications on file prior to visit.     Allergies   Allergen Reactions    Pioglitazone Other  "(See Comments)     \"Feels like she is dying\"    Dapagliflozin Itching     Yeast infection     Ibuprofen     Levofloxacin Other (See Comments)     Other reaction(s): rash    Nsaids       Current Outpatient Medications on File Prior to Visit   Medication Sig Dispense Refill    acetaminophen (TYLENOL) 325 mg tablet Take by mouth as needed       albuterol (ProAir HFA) 90 mcg/act inhaler Inhale 2 puffs every 4 (four) hours as needed for wheezing 18 g 1    B Complex Vitamins (B COMPLEX 1 PO) Take by mouth      Cholecalciferol (VITAMIN D3) 2000 units capsule half      clotrimazole (LOTRIMIN) 1 % cream Apply topically 2 (two) times a day 113 g 0    Contour Next Test test strip Test blood sugar 3 times daily 300 each 1    Diapers & Supplies MISC Use 3 (three) times a day as needed (Incontinence) 28 each 11    Diclofenac Sodium (VOLTAREN) 1 % Apply 2 g topically 4 (four) times a day      estrogens, conjugated (Premarin) vaginal cream INSERT 1 APPLICATORFUL VAGINALLY TWICE WEEKLY 30 g 5    fenofibrate 160 MG tablet TAKE 1 TABLET BY MOUTH  DAILY 90 tablet 0    Ferrous Sulfate (IRON) 325 (65 Fe) MG TABS Take by mouth      insulin degludec (Tresiba FlexTouch) 100 units/mL injection pen INJECT 70 UNITS SUBCUTANEOUSLY  DAILY AT BEDTIME 75 mL 1    Insulin Pen Needle (BD Pen Needle [place] U/F) 32G X 4 MM MISC INJECT SUBCUTANEOUSLY AS  DIRECTED 3 TIMES DAILY 270 each 0    lisinopril (ZESTRIL) 10 mg tablet TAKE 1 TABLET BY MOUTH DAILY 90 tablet 1    Microlet Lancets MISC Use 3 (three) times a day 100 each 2    Misc Natural Products (CYSTEX URINARY HEALTH PO) Take 1 capsule by mouth daily      Multiple Vitamin (DAILY VALUE MULTIVITAMIN) TABS Take by mouth      pantoprazole (PROTONIX) 40 mg tablet TAKE 1 TABLET BY MOUTH DAILY 90 tablet 1    pregabalin (LYRICA) 75 mg capsule Take 1 capsule (75 mg total) by mouth 3 (three) times a day 270 capsule 0    Probiotic Product (Align) 4 MG CAPS Take by mouth      simvastatin (ZOCOR) 10 mg tablet " "TAKE 1 TABLET BY MOUTH AT  BEDTIME 90 tablet 3    solifenacin (VESICARE) 5 mg tablet take 1 tablet by mouth once daily 90 tablet 1    topiramate (TOPAMAX) 25 mg tablet Take 1 tablet (25 mg total) by mouth daily 90 tablet 0    bismuth subsalicylate (PEPTO BISMOL) 262 MG chewable tablet Chew 2 tablets (524 mg total) 2 (two) times a day as needed for diarrhea Has tolerated in the past. 30 tablet 0     No current facility-administered medications on file prior to visit.      Social History     Tobacco Use    Smoking status: Never     Passive exposure: Never    Smokeless tobacco: Never   Vaping Use    Vaping status: Never Used   Substance and Sexual Activity    Alcohol use: Not Currently     Comment: no caffeine use    Drug use: No    Sexual activity: Yes     Partners: Male     Birth control/protection: None     Comment:  56 years        Objective   /80 (BP Location: Left arm, Patient Position: Sitting, Cuff Size: Standard)   Pulse 63   Temp 98.7 °F (37.1 °C)   Ht 5' 5\" (1.651 m)   Wt 105 kg (231 lb)   SpO2 98%   BMI 38.44 kg/m²      Physical Exam  Constitutional:       Appearance: Normal appearance.   HENT:      Head: Normocephalic and atraumatic.   Musculoskeletal:      Comments: No swelling, tenderness, erythema or deformities of bilateral wrists. MCPs. PIPs and DIPs  No swelling, tenderness of bilateral shoulders, hips, knees, elbows, ankles and feet  ROM of all the joints is normal     Skin:     Findings: No rash.   Neurological:      Mental Status: She is alert and oriented to person, place, and time.           "

## 2025-01-17 ENCOUNTER — HOSPITAL ENCOUNTER (OUTPATIENT)
Dept: RADIOLOGY | Facility: IMAGING CENTER | Age: 80
End: 2025-01-17
Payer: MEDICARE

## 2025-01-17 ENCOUNTER — APPOINTMENT (OUTPATIENT)
Dept: LAB | Facility: IMAGING CENTER | Age: 80
End: 2025-01-17
Payer: MEDICARE

## 2025-01-17 DIAGNOSIS — M25.50 PAIN IN JOINT INVOLVING MULTIPLE SITES: ICD-10-CM

## 2025-01-17 PROBLEM — K52.9 GASTROENTERITIS: Status: RESOLVED | Noted: 2024-12-18 | Resolved: 2025-01-17

## 2025-01-17 LAB
CRP SERPL QL: 12.4 MG/L
ERYTHROCYTE [SEDIMENTATION RATE] IN BLOOD: 50 MM/HOUR (ref 0–29)

## 2025-01-17 PROCEDURE — 36415 COLL VENOUS BLD VENIPUNCTURE: CPT

## 2025-01-17 PROCEDURE — 85652 RBC SED RATE AUTOMATED: CPT

## 2025-01-17 PROCEDURE — 86140 C-REACTIVE PROTEIN: CPT

## 2025-01-17 PROCEDURE — 73630 X-RAY EXAM OF FOOT: CPT

## 2025-01-17 PROCEDURE — 73130 X-RAY EXAM OF HAND: CPT

## 2025-01-27 DIAGNOSIS — K30 INDIGESTION: ICD-10-CM

## 2025-01-27 DIAGNOSIS — L98.9 SKIN LESION: ICD-10-CM

## 2025-01-28 RX ORDER — PANTOPRAZOLE SODIUM 40 MG/1
40 TABLET, DELAYED RELEASE ORAL DAILY
Qty: 90 TABLET | Refills: 1 | Status: SHIPPED | OUTPATIENT
Start: 2025-01-28

## 2025-01-28 RX ORDER — CLOTRIMAZOLE 1 %
1 CREAM (GRAM) TOPICAL 2 TIMES DAILY
Qty: 105 G | Refills: 0 | Status: SHIPPED | OUTPATIENT
Start: 2025-01-28

## 2025-02-17 ENCOUNTER — RA CDI HCC (OUTPATIENT)
Dept: OTHER | Facility: HOSPITAL | Age: 80
End: 2025-02-17

## 2025-02-19 ENCOUNTER — TELEPHONE (OUTPATIENT)
Age: 80
End: 2025-02-19

## 2025-02-19 NOTE — TELEPHONE ENCOUNTER
Marisel dickens nurse with [facility] contacted the office this afternoon in regards to patient. Sees patient weekly. Wanted to report increased pain/weakness/heaviness in lower extremities. Does have chronic pain. But edema around ankle/calf +2, no redness or warmth. Using the rollator to ambulate. Wanted to note these changes to pcp at this time, any additional questions she can be reached back at [phone]

## 2025-02-21 ENCOUNTER — OFFICE VISIT (OUTPATIENT)
Dept: UROLOGY | Facility: CLINIC | Age: 80
End: 2025-02-21
Payer: MEDICARE

## 2025-02-21 VITALS
HEART RATE: 70 BPM | RESPIRATION RATE: 18 BRPM | BODY MASS INDEX: 40.82 KG/M2 | OXYGEN SATURATION: 99 % | SYSTOLIC BLOOD PRESSURE: 130 MMHG | WEIGHT: 245 LBS | DIASTOLIC BLOOD PRESSURE: 70 MMHG | TEMPERATURE: 98.7 F | HEIGHT: 65 IN

## 2025-02-21 DIAGNOSIS — N39.46 MIXED STRESS AND URGE URINARY INCONTINENCE: Primary | ICD-10-CM

## 2025-02-21 PROCEDURE — 99213 OFFICE O/P EST LOW 20 MIN: CPT

## 2025-02-21 NOTE — ASSESSMENT & PLAN NOTE
She is doing much better since switching to Vesicare 5 mg daily.  Finally has this helped with her urinary incontinence and frequency but also improved her diarrhea.  She still uses about 3-4 briefs per day but the wetness is reduced and frequency has also reduced.  She is happy with the response she has seen.  I recommend continue Vesicare 5 mg daily.  We did again discuss options to increase this in the future if necessary.  Recommend staying at 5 mg for now.  Follow-up in 6 months to reassess.

## 2025-02-21 NOTE — PROGRESS NOTES
Name: Alberta Sanchez      : 1945      MRN: [id number]  Encounter Provider: HERMANN Lenz  Encounter Date: 2025   Encounter department: [facility] [place]    :  Assessment & Plan  Mixed stress and urge urinary incontinence  She is doing much better since switching to Vesicare 5 mg daily.  Finally has this helped with her urinary incontinence and frequency but also improved her diarrhea.  She still uses about 3-4 briefs per day but the wetness is reduced and frequency has also reduced.  She is happy with the response she has seen.  I recommend continue Vesicare 5 mg daily.  We did again discuss options to increase this in the future if necessary.  Recommend staying at 5 mg for now.  Follow-up in 6 months to reassess.             Interval HPI:    She presents today reporting doing much better from a urologic standpoint since switching to Vesicare 5 mg daily.  Not only has this helped improved her urinary frequency and incontinence but also help with her diarrhea.  She is very happy with her response.  She still uses about 3-4 briefs per day but reports reduced frequency and wetness of the bruise.  Unfortunately she did have an acute hospitalization in December for the neurovirus and ended up at rehab for short period.  She is otherwise recovering well from this.          History of Present Illness     Established patient last seen by me in 2024 with history of mixed urinary incontinence and recurrent UTIs. She had prior negative cystoscopy and ct scan. She still uses premarin cream. It is ordered twice a week but she takes it infrequent, about every other week.  She had previously been on oxybutynin but this was stopped by her PCP due to concerns regarding possible retention.  This was however restarted in 2023.  When I last saw her she reported no change in baseline incontinence and was still going through approximately 3 briefs per day.  We discussed trying  alternative and she was started on Vesicare 5 mg daily.      As for her recurrent UTIs, she was doing very well with Cystex and had not had a UTI in over a year.  No recent urine cultures. She is also encouraged to continue using her Premarin cream more frequently. Last positive urine culture was July 2023 which was positive for E. coli.       Objective   There were no vitals taken for this visit.    Review of Systems   Constitutional:  Negative for chills and fever.   HENT:  Negative for ear pain and sore throat.    Eyes:  Negative for pain and visual disturbance.   Respiratory:  Negative for cough and shortness of breath.    Cardiovascular:  Negative for chest pain and palpitations.   Gastrointestinal:  Negative for abdominal pain and vomiting.   Genitourinary:  Positive for frequency and urgency. Negative for dysuria and hematuria.   Musculoskeletal:  Negative for arthralgias and back pain.   Skin:  Negative for color change and rash.   Neurological:  Negative for seizures and syncope.   All other systems reviewed and are negative.      Physical Exam  Vitals and nursing note reviewed.   Constitutional:       General: She is not in acute distress.     Appearance: She is well-developed.   HENT:      Head: Normocephalic and atraumatic.   Eyes:      Conjunctiva/sclera: Conjunctivae normal.   Cardiovascular:      Rate and Rhythm: Normal rate and regular rhythm.      Heart sounds: No murmur heard.  Pulmonary:      Effort: Pulmonary effort is normal. No respiratory distress.      Breath sounds: Normal breath sounds.   Abdominal:      Palpations: Abdomen is soft.      Tenderness: There is no abdominal tenderness.   Musculoskeletal:         General: No swelling.      Cervical back: Neck supple.   Skin:     General: Skin is warm and dry.      Capillary Refill: Capillary refill takes less than 2 seconds.   Neurological:      Mental Status: She is alert.   Psychiatric:         Mood and Affect: Mood normal.            Imaging:          Please Note:  Voice dictation software has been used to create this document. There may be inadvertent transcriptions errors.     HERMANN Lenz 02/21/25

## 2025-02-24 NOTE — TELEPHONE ENCOUNTER
Spoke to patient did not want to come into office just wanted it documented for the next visit on 3/18/2025

## 2025-03-11 ENCOUNTER — APPOINTMENT (OUTPATIENT)
Dept: LAB | Facility: IMAGING CENTER | Age: 80
End: 2025-03-11
Payer: MEDICARE

## 2025-03-11 DIAGNOSIS — E55.9 VITAMIN D DEFICIENCY: ICD-10-CM

## 2025-03-11 DIAGNOSIS — E11.22 TYPE 2 DIABETES MELLITUS WITH STAGE 3B CHRONIC KIDNEY DISEASE, WITH LONG-TERM CURRENT USE OF INSULIN (HCC): ICD-10-CM

## 2025-03-11 DIAGNOSIS — Z79.4 TYPE 2 DIABETES MELLITUS WITH STAGE 3B CHRONIC KIDNEY DISEASE, WITH LONG-TERM CURRENT USE OF INSULIN (HCC): ICD-10-CM

## 2025-03-11 DIAGNOSIS — Z23 ENCOUNTER FOR IMMUNIZATION: ICD-10-CM

## 2025-03-11 DIAGNOSIS — M25.50 PAIN IN JOINT INVOLVING MULTIPLE SITES: ICD-10-CM

## 2025-03-11 DIAGNOSIS — K76.0 NONALCOHOLIC FATTY LIVER DISEASE: ICD-10-CM

## 2025-03-11 DIAGNOSIS — E78.2 MIXED HYPERLIPIDEMIA: ICD-10-CM

## 2025-03-11 DIAGNOSIS — N18.32 TYPE 2 DIABETES MELLITUS WITH STAGE 3B CHRONIC KIDNEY DISEASE, WITH LONG-TERM CURRENT USE OF INSULIN (HCC): ICD-10-CM

## 2025-03-11 DIAGNOSIS — E78.1 HYPERTRIGLYCERIDEMIA, ESSENTIAL: ICD-10-CM

## 2025-03-11 DIAGNOSIS — Z23 NEED FOR INFLUENZA VACCINATION: ICD-10-CM

## 2025-03-11 DIAGNOSIS — Z13.29 SCREENING FOR THYROID DISORDER: ICD-10-CM

## 2025-03-11 LAB
ALBUMIN SERPL BCG-MCNC: 3.8 G/DL (ref 3.5–5)
ALP SERPL-CCNC: 91 U/L (ref 34–104)
ALT SERPL W P-5'-P-CCNC: 10 U/L (ref 7–52)
ANION GAP SERPL CALCULATED.3IONS-SCNC: 6 MMOL/L (ref 4–13)
AST SERPL W P-5'-P-CCNC: 18 U/L (ref 13–39)
BASOPHILS # BLD AUTO: 0.02 THOUSANDS/ÂΜL (ref 0–0.1)
BASOPHILS NFR BLD AUTO: 0 % (ref 0–1)
BILIRUB SERPL-MCNC: 0.51 MG/DL (ref 0.2–1)
BUN SERPL-MCNC: 16 MG/DL (ref 5–25)
CALCIUM SERPL-MCNC: 9 MG/DL (ref 8.4–10.2)
CHLORIDE SERPL-SCNC: 107 MMOL/L (ref 96–108)
CHOLEST SERPL-MCNC: 117 MG/DL (ref ?–200)
CO2 SERPL-SCNC: 29 MMOL/L (ref 21–32)
CREAT SERPL-MCNC: 0.87 MG/DL (ref 0.6–1.3)
CREAT UR-MCNC: 136.8 MG/DL
CRP SERPL QL: 37 MG/L
EOSINOPHIL # BLD AUTO: 0.09 THOUSAND/ÂΜL (ref 0–0.61)
EOSINOPHIL NFR BLD AUTO: 1 % (ref 0–6)
ERYTHROCYTE [DISTWIDTH] IN BLOOD BY AUTOMATED COUNT: 14.9 % (ref 11.6–15.1)
ERYTHROCYTE [SEDIMENTATION RATE] IN BLOOD: 70 MM/HOUR (ref 0–29)
EST. AVERAGE GLUCOSE BLD GHB EST-MCNC: 146 MG/DL
GFR SERPL CREATININE-BSD FRML MDRD: 63 ML/MIN/1.73SQ M
GLUCOSE P FAST SERPL-MCNC: 67 MG/DL (ref 65–99)
HBA1C MFR BLD: 6.7 %
HCT VFR BLD AUTO: 37.7 % (ref 34.8–46.1)
HDLC SERPL-MCNC: 51 MG/DL
HGB BLD-MCNC: 11.7 G/DL (ref 11.5–15.4)
IMM GRANULOCYTES # BLD AUTO: 0.06 THOUSAND/UL (ref 0–0.2)
IMM GRANULOCYTES NFR BLD AUTO: 1 % (ref 0–2)
LDLC SERPL CALC-MCNC: 44 MG/DL (ref 0–100)
LYMPHOCYTES # BLD AUTO: 1.09 THOUSANDS/ÂΜL (ref 0.6–4.47)
LYMPHOCYTES NFR BLD AUTO: 16 % (ref 14–44)
MCH RBC QN AUTO: 29.1 PG (ref 26.8–34.3)
MCHC RBC AUTO-ENTMCNC: 31 G/DL (ref 31.4–37.4)
MCV RBC AUTO: 94 FL (ref 82–98)
MICROALBUMIN UR-MCNC: 28 MG/L
MICROALBUMIN/CREAT 24H UR: 20 MG/G CREATININE (ref 0–30)
MONOCYTES # BLD AUTO: 0.43 THOUSAND/ÂΜL (ref 0.17–1.22)
MONOCYTES NFR BLD AUTO: 6 % (ref 4–12)
NEUTROPHILS # BLD AUTO: 5.29 THOUSANDS/ÂΜL (ref 1.85–7.62)
NEUTS SEG NFR BLD AUTO: 76 % (ref 43–75)
NRBC BLD AUTO-RTO: 0 /100 WBCS
PLATELET # BLD AUTO: 159 THOUSANDS/UL (ref 149–390)
PMV BLD AUTO: 11.4 FL (ref 8.9–12.7)
POTASSIUM SERPL-SCNC: 3.7 MMOL/L (ref 3.5–5.3)
PROT SERPL-MCNC: 6.7 G/DL (ref 6.4–8.4)
RBC # BLD AUTO: 4.02 MILLION/UL (ref 3.81–5.12)
SODIUM SERPL-SCNC: 142 MMOL/L (ref 135–147)
TRIGL SERPL-MCNC: 110 MG/DL (ref ?–150)
TSH SERPL DL<=0.05 MIU/L-ACNC: 1.21 UIU/ML (ref 0.45–4.5)
WBC # BLD AUTO: 6.98 THOUSAND/UL (ref 4.31–10.16)

## 2025-03-11 PROCEDURE — 85652 RBC SED RATE AUTOMATED: CPT

## 2025-03-11 PROCEDURE — 36415 COLL VENOUS BLD VENIPUNCTURE: CPT

## 2025-03-11 PROCEDURE — 85025 COMPLETE CBC W/AUTO DIFF WBC: CPT

## 2025-03-11 PROCEDURE — 80053 COMPREHEN METABOLIC PANEL: CPT

## 2025-03-11 PROCEDURE — 86140 C-REACTIVE PROTEIN: CPT

## 2025-03-11 PROCEDURE — 80061 LIPID PANEL: CPT

## 2025-03-11 PROCEDURE — 82043 UR ALBUMIN QUANTITATIVE: CPT

## 2025-03-11 PROCEDURE — 84443 ASSAY THYROID STIM HORMONE: CPT

## 2025-03-11 PROCEDURE — 82570 ASSAY OF URINE CREATININE: CPT

## 2025-03-11 PROCEDURE — 83036 HEMOGLOBIN GLYCOSYLATED A1C: CPT

## 2025-03-13 ENCOUNTER — TELEPHONE (OUTPATIENT)
Dept: SLEEP CENTER | Facility: CLINIC | Age: 80
End: 2025-03-13

## 2025-03-16 DIAGNOSIS — G43.009 MIGRAINE WITHOUT AURA AND WITHOUT STATUS MIGRAINOSUS, NOT INTRACTABLE: ICD-10-CM

## 2025-03-16 RX ORDER — TOPIRAMATE 25 MG/1
25 TABLET, FILM COATED ORAL DAILY
Qty: 90 TABLET | Refills: 3 | Status: SHIPPED | OUTPATIENT
Start: 2025-03-16

## 2025-03-17 ENCOUNTER — OFFICE VISIT (OUTPATIENT)
Age: 80
End: 2025-03-17
Payer: MEDICARE

## 2025-03-17 VITALS
WEIGHT: 242 LBS | BODY MASS INDEX: 40.32 KG/M2 | TEMPERATURE: 97.4 F | HEIGHT: 65 IN | HEART RATE: 79 BPM | OXYGEN SATURATION: 95 % | SYSTOLIC BLOOD PRESSURE: 160 MMHG | DIASTOLIC BLOOD PRESSURE: 80 MMHG

## 2025-03-17 DIAGNOSIS — N18.31 STAGE 3A CHRONIC KIDNEY DISEASE (HCC): ICD-10-CM

## 2025-03-17 DIAGNOSIS — Z79.899 HIGH RISK MEDICATION USE: ICD-10-CM

## 2025-03-17 DIAGNOSIS — M19.90 INFLAMMATORY ARTHRITIS: Primary | ICD-10-CM

## 2025-03-17 PROCEDURE — 99214 OFFICE O/P EST MOD 30 MIN: CPT | Performed by: INTERNAL MEDICINE

## 2025-03-17 RX ORDER — HYDROXYCHLOROQUINE SULFATE 200 MG/1
200 TABLET, FILM COATED ORAL 2 TIMES DAILY WITH MEALS
Qty: 60 TABLET | Refills: 5 | Status: SHIPPED | OUTPATIENT
Start: 2025-03-17 | End: 2025-09-13

## 2025-03-17 RX ORDER — PREDNISONE 5 MG/1
TABLET ORAL
Qty: 70 TABLET | Refills: 0 | Status: SHIPPED | OUTPATIENT
Start: 2025-03-17 | End: 2025-04-14

## 2025-03-17 NOTE — PROGRESS NOTES
Name: lAberta Sanchez      : 1945      MRN: [id number]  Encounter Provider: Ralph Alfaro MD  Encounter Date: 3/17/2025   Encounter department: [facility] [address]  :  Assessment & Plan  Inflammatory arthritis  Alberta Sanchez is a 79 y.o. female with past medical history of Diabetes mellitus (on Insulin), CKD3 presents as a follow-up for reactive arthritis from prior diarrheal illness (norovirus per patient). She still has persistent pain in her bilateral hands, knees and ankles, which initially improved after steroid course from the last visit, and her inflammatory markers were also elevated since being off steroids - ESR 70 (before 50) and CRP 37 (before 12). Her GUERO, RF and CCP were negative. She has possible chondrocalcinosis of right wrist on X-ray and also has moderate OA and meniscal chondorocalcinosis on right knee X-ray     Overall, her arthralgias are multifactorial in etiology, from underlying osteoarthritis and inflammatory arthritis secondary to pseudogout and/or reactive arthritis. Would start her on hydroxychloroquine 200 mg BID for inflammatory arthritis. Patient understands that she has to get annual eye screenings while being on hydroxychloroquine. Would also prescribe a short course of steroids for acute pain. Will obtain US MSK for definitive evidence of inflammation in the joints. Will complete the work-up of pseudogout. I would avoid NSAIDs because of CKD3 history.     Orders:    US MSK limited; Future    hydroxychloroquine (PLAQUENIL) 200 mg tablet; Take 1 tablet (200 mg total) by mouth 2 (two) times a day with meals    predniSONE 5 mg tablet; Take 4 tablets (20 mg total) by mouth daily for 7 days, THEN 3 tablets (15 mg total) daily for 7 days, THEN 2 tablets (10 mg total) daily for 7 days, THEN 1 tablet (5 mg total) daily for 7 days.    Sedimentation rate, automated; Future    C-reactive protein; Future    Iron Panel (Includes Ferritin, Iron Sat%, Iron, and  TIBC); Future    PTH, intact; Future    Magnesium; Future    Phosphorus; Future    Stage 3a chronic kidney disease (HCC)  Lab Results   Component Value Date    EGFR 63 03/11/2025    EGFR 53 12/21/2024    EGFR 56 12/20/2024    CREATININE 0.87 03/11/2025    CREATININE 1.00 12/21/2024    CREATININE 0.96 12/20/2024       Orders:    Iron Panel (Includes Ferritin, Iron Sat%, Iron, and TIBC); Future      Follow-up: 4 months    History of Present Illness   HPI  Alberta Sanchez is a 79 y.o. female who presents as a follow-up for reactive arthritis. Patient responds she responded well to 4 week steroid course and her symptoms came back after that and was unable to move. Her recent inflammatory markers were elevated ESR 70 (before 50) and CRP 37 (before 12)    Permanent history: First saw me in Jan 2025 with clinical presentation suggesting inflammatory arthritis likely reactive arthritis from prior recent diarrheal illness (norovirus per patient). She had negative GUERO, RF and CCP in the past      History obtained from: patient    Review of Systems  Rest of ROS are normal except as noted in HPI    Medical History Reviewed by provider this encounter:     .  Past Medical History   Past Medical History:   Diagnosis Date    Abnormal finding in urine 1/9/2023    Allergic Springtime    Runny nose, congestion    Anemia     Arthritis 10/30/2020    Chronic kidney disease     Diabetes mellitus (HCC)     Fibromyalgia, primary     Fracture of wrist     RIGHT    Gastroenteritis 12/18/2024    Heart murmur 06/30/2020    History of non-insulin dependent diabetes mellitus     Hx of thrombocytopenia 02/18/2022    Hyperlipidemia     Hypertension     Hypertension     IBS (irritable bowel syndrome)     Inflammatory bowel disease 2015    Been doctoring for years    Irritable bowel     Kidney stone     Metabolic syndrome     Obesity     RLS (restless legs syndrome)     Routine medical exam 06/28/2019    Skin lesion 2/8/2021    Sleep apnea     Sleep  apnea     ON CPAP    Upper respiratory tract infection 01/19/2022    Urge incontinence     Urinary incontinence     Urinary tract infection 1/1/2023     Past Surgical History:   Procedure Laterality Date    CHOLECYSTECTOMY  2013    COLONOSCOPY  2010    CYSTOSCOPY      HERNIA REPAIR  2008    HYSTERECTOMY N/A 1974    partial; ovaries intact    KNEE SURGERY Left      Family History   Problem Relation Age of Onset    Breast cancer Mother 62        Breast cancer    Suicidality Father     Melanoma Sister     Bipolar disorder Sister     Depression Sister         Bi-polar    Bipolar disorder Sister     Completed Suicide  Sister     Rheum arthritis Daughter     No Known Problems Daughter     No Known Problems Maternal Grandmother     No Known Problems Maternal Grandfather     No Known Problems Paternal Grandmother     No Known Problems Paternal Grandfather     Heart disease Brother     No Known Problems Maternal Aunt     No Known Problems Maternal Aunt     BRCA2 Positive Neg Hx     BRCA1 Positive Neg Hx     BRCA2 Negative Neg Hx     BRCA1 Negative Neg Hx     BRCA 1/2 Neg Hx     Ovarian cancer Neg Hx     Endometrial cancer Neg Hx     Colon cancer Neg Hx     Breast cancer additional onset Neg Hx       reports that she has never smoked. She has never been exposed to tobacco smoke. She has never used smokeless tobacco. She reports that she does not currently use alcohol. She reports that she does not use drugs.  Current Outpatient Medications   Medication Instructions    acetaminophen (TYLENOL) 325 mg tablet As needed    albuterol (ProAir HFA) 90 mcg/act inhaler 2 puffs, Inhalation, Every 4 hours PRN    B Complex Vitamins (B COMPLEX 1 PO) Take by mouth    bismuth subsalicylate (PEPTO BISMOL) 524 mg, Oral, 2 times daily PRN, Has tolerated in the past.    Cholecalciferol (VITAMIN D3) 2000 units capsule half    clotrimazole (LOTRIMIN) 1 % cream 1 Application, Topical, 2 times daily    Contour Next Test test strip Test blood sugar 3  "times daily    Diapers & Supplies MISC Does not apply, 3 times daily PRN    Diclofenac Sodium (VOLTAREN) 2 g, Topical, 4 times daily    estrogens, conjugated (Premarin) vaginal cream INSERT 1 APPLICATORFUL VAGINALLY TWICE WEEKLY    fenofibrate 160 MG tablet TAKE 1 TABLET BY MOUTH  DAILY    Ferrous Sulfate (IRON) 325 (65 Fe) MG TABS Take by mouth    hydroxychloroquine (PLAQUENIL) 200 mg, Oral, 2 times daily with meals    insulin degludec (Tresiba FlexTouch) 100 units/mL injection pen INJECT 70 UNITS SUBCUTANEOUSLY  DAILY AT BEDTIME    Insulin Pen Needle (BD Pen Needle [place] U/F) 32G X 4 MM MISC INJECT SUBCUTANEOUSLY AS  DIRECTED 3 TIMES DAILY    lisinopril (ZESTRIL) 10 mg, Oral, Daily    Microlet Lancets MISC Does not apply, 3 times daily    Misc Natural Products (CYSTEX URINARY HEALTH PO) 1 capsule, Daily    Multiple Vitamin (DAILY VALUE MULTIVITAMIN) TABS Take by mouth    pantoprazole (PROTONIX) 40 mg, Oral, Daily    predniSONE 5 mg tablet Take 4 tablets (20 mg total) by mouth daily for 7 days, THEN 3 tablets (15 mg total) daily for 7 days, THEN 2 tablets (10 mg total) daily for 7 days, THEN 1 tablet (5 mg total) daily for 7 days.    pregabalin (LYRICA) 75 mg, Oral, 3 times daily    Probiotic Product (Align) 4 MG CAPS Take by mouth    simvastatin (ZOCOR) 10 mg, Oral, Daily at bedtime    solifenacin (VESICARE) 5 mg, Oral, Daily    topiramate (TOPAMAX) 25 mg, Oral, Daily     Allergies   Allergen Reactions    Pioglitazone Other (See Comments)     \"Feels like she is dying\"    Dapagliflozin Itching     Yeast infection     Ibuprofen     Levofloxacin Other (See Comments)     Other reaction(s): rash    Nsaids       Current Outpatient Medications on File Prior to Visit   Medication Sig Dispense Refill    acetaminophen (TYLENOL) 325 mg tablet Take by mouth as needed       albuterol (ProAir HFA) 90 mcg/act inhaler Inhale 2 puffs every 4 (four) hours as needed for wheezing 18 g 1    B Complex Vitamins (B COMPLEX 1 PO) Take by " mouth      bismuth subsalicylate (PEPTO BISMOL) 262 MG chewable tablet Chew 2 tablets (524 mg total) 2 (two) times a day as needed for diarrhea Has tolerated in the past. 30 tablet 0    Cholecalciferol (VITAMIN D3) 2000 units capsule half      clotrimazole (LOTRIMIN) 1 % cream APPLY TOPICALLY TWICE DAILY 105 g 0    Contour Next Test test strip Test blood sugar 3 times daily 300 each 1    Diapers & Supplies MISC Use 3 (three) times a day as needed (Incontinence) 28 each 11    Diclofenac Sodium (VOLTAREN) 1 % Apply 2 g topically 4 (four) times a day      estrogens, conjugated (Premarin) vaginal cream INSERT 1 APPLICATORFUL VAGINALLY TWICE WEEKLY 30 g 5    fenofibrate 160 MG tablet TAKE 1 TABLET BY MOUTH  DAILY 90 tablet 0    Ferrous Sulfate (IRON) 325 (65 Fe) MG TABS Take by mouth      insulin degludec (Tresiba FlexTouch) 100 units/mL injection pen INJECT 70 UNITS SUBCUTANEOUSLY  DAILY AT BEDTIME 75 mL 1    Insulin Pen Needle (BD Pen Needle [place] U/F) 32G X 4 MM MISC INJECT SUBCUTANEOUSLY AS  DIRECTED 3 TIMES DAILY 270 each 0    lisinopril (ZESTRIL) 10 mg tablet TAKE 1 TABLET BY MOUTH DAILY 90 tablet 1    Microlet Lancets MISC Use 3 (three) times a day 100 each 2    Misc Natural Products (CYSTEX URINARY HEALTH PO) Take 1 capsule by mouth daily      Multiple Vitamin (DAILY VALUE MULTIVITAMIN) TABS Take by mouth      pantoprazole (PROTONIX) 40 mg tablet TAKE 1 TABLET BY MOUTH DAILY 90 tablet 1    pregabalin (LYRICA) 75 mg capsule Take 1 capsule (75 mg total) by mouth 3 (three) times a day 270 capsule 0    Probiotic Product (Align) 4 MG CAPS Take by mouth      simvastatin (ZOCOR) 10 mg tablet TAKE 1 TABLET BY MOUTH AT  BEDTIME 90 tablet 3    solifenacin (VESICARE) 5 mg tablet take 1 tablet by mouth once daily 90 tablet 1    topiramate (TOPAMAX) 25 mg tablet TAKE 1 TABLET BY MOUTH DAILY 90 tablet 3     No current facility-administered medications on file prior to visit.      Social History     Tobacco Use    Smoking  "status: Never     Passive exposure: Never    Smokeless tobacco: Never   Vaping Use    Vaping status: Never Used   Substance and Sexual Activity    Alcohol use: Not Currently     Comment: no caffeine use    Drug use: No    Sexual activity: Yes     Partners: Male     Birth control/protection: None     Comment:  56 years        Objective   /80 (BP Location: Right arm, Patient Position: Sitting, Cuff Size: Adult) Comment: 156/82 on Right Arm Patient states her sugar was 77  Pulse 79   Temp (!) 97.4 °F (36.3 °C) (Tympanic)   Ht 5' 5\" (1.651 m)   Wt 110 kg (242 lb)   SpO2 95%   BMI 40.27 kg/m²      Physical Exam  Constitutional:       Appearance: Normal appearance.   HENT:      Head: Normocephalic and atraumatic.   Musculoskeletal:      Comments: No swelling, tenderness, erythema or deformities of bilateral wrists. MCPs. PIPs and DIPs  No swelling, tenderness of bilateral shoulders, hips, knees, elbows, ankles and feet  ROM of all the joints is normal      Skin:     Findings: No rash.   Neurological:      Mental Status: She is alert and oriented to person, place, and time.           "

## 2025-03-17 NOTE — PATIENT INSTRUCTIONS
Please schedule your ultrasound of hands and wrists by calling Central scheduling: [phone]  Please take hydroxychloroquine 200 mg twice a day and see your eye doctor annually

## 2025-03-18 ENCOUNTER — OFFICE VISIT (OUTPATIENT)
Dept: FAMILY MEDICINE CLINIC | Facility: CLINIC | Age: 80
End: 2025-03-18
Payer: MEDICARE

## 2025-03-18 VITALS
BODY MASS INDEX: 40.82 KG/M2 | HEIGHT: 65 IN | WEIGHT: 245 LBS | HEART RATE: 64 BPM | SYSTOLIC BLOOD PRESSURE: 140 MMHG | DIASTOLIC BLOOD PRESSURE: 80 MMHG | TEMPERATURE: 95.5 F | OXYGEN SATURATION: 97 %

## 2025-03-18 DIAGNOSIS — Z71.85 IMMUNIZATION COUNSELING: ICD-10-CM

## 2025-03-18 DIAGNOSIS — E11.22 TYPE 2 DIABETES MELLITUS WITH STAGE 3B CHRONIC KIDNEY DISEASE, WITH LONG-TERM CURRENT USE OF INSULIN (HCC): Primary | ICD-10-CM

## 2025-03-18 DIAGNOSIS — E66.01 MORBID OBESITY (HCC): ICD-10-CM

## 2025-03-18 DIAGNOSIS — Z79.4 TYPE 2 DIABETES MELLITUS WITH STAGE 3B CHRONIC KIDNEY DISEASE, WITH LONG-TERM CURRENT USE OF INSULIN (HCC): Primary | ICD-10-CM

## 2025-03-18 DIAGNOSIS — N18.32 CHRONIC KIDNEY DISEASE, STAGE 3B (HCC): ICD-10-CM

## 2025-03-18 DIAGNOSIS — D69.6 PLATELETS DECREASED (HCC): ICD-10-CM

## 2025-03-18 DIAGNOSIS — N18.32 TYPE 2 DIABETES MELLITUS WITH STAGE 3B CHRONIC KIDNEY DISEASE, WITH LONG-TERM CURRENT USE OF INSULIN (HCC): Primary | ICD-10-CM

## 2025-03-18 DIAGNOSIS — Z99.89 DEPENDENCE ON CANE: ICD-10-CM

## 2025-03-18 DIAGNOSIS — Z79.4 LONG TERM CURRENT USE OF INSULIN (HCC): ICD-10-CM

## 2025-03-18 DIAGNOSIS — I10 UNCONTROLLED HYPERTENSION: ICD-10-CM

## 2025-03-18 PROBLEM — R30.0 DYSURIA: Status: RESOLVED | Noted: 2023-01-09 | Resolved: 2025-03-18

## 2025-03-18 PROCEDURE — G2211 COMPLEX E/M VISIT ADD ON: HCPCS | Performed by: FAMILY MEDICINE

## 2025-03-18 PROCEDURE — 99214 OFFICE O/P EST MOD 30 MIN: CPT | Performed by: FAMILY MEDICINE

## 2025-03-18 NOTE — PROGRESS NOTES
Name: Alberta Sanchez      : 1945      MRN: [id number]  Encounter Provider: Dulce Michaud MD  Encounter Date: 3/18/2025   Encounter department: [facility]  :  Assessment & Plan  Type 2 diabetes mellitus with stage 3b chronic kidney disease, with long-term current use of insulin ([facility])    Lab Results   Component Value Date    HGBA1C 6.7 (H) 2025   Chronic asymptomatic slight increase in the hemoglobin A1c compared with before secondary to patient taking the prednisone by rheumatology discussed with patient that start using the NovoLog (sliding scale low-carb diet important lose weight reviewed patient already on statin       Morbid obesity ([facility])  BMI Counseling: Body mass index is 40.77 kg/m². The BMI             Uncontrolled hypertension  Blood pressure today uncontrolled improved compared with before recommend patient to continue current management low-salt diet discussed with patient important to lose weight recommend to monitor blood pressure at home for next 2 weeks and reevaluate if persistent to be abnormal to adjust medication       Dependence on cane  Fall precaution and home safety environment discussed with the patient       Chronic kidney disease, stage 3b ([facility])  Lab Results   Component Value Date    EGFR 63 2025    EGFR 53 2024    EGFR 56 2024    CREATININE 0.87 2025    CREATININE 1.00 2024    CREATININE 0.96 2024     Chronic improving the GFR compared with before hemoglobin A1c below 7 discussed important control blood pressure low-salt diet discussed avoid NSAID keep well hydration       Long term current use of insulin (HCC)  Sign / symptom of hypoglycemia discussed with patient       Platelets decreased (HCC)  Improvement in the platelet number compared with before patient is symptomatic       Immunization counseling  Benefit versus side effect of RSV vaccine discussed with patient recommended to get in the  "pharmacy secondary to coverage              History of Present Illness   Patient is here f/up with chronic condition ,complaint with med tolerated well no new concern recent blood work reviewed       Review of Systems   Constitutional:  Negative for chills and fever.   HENT:  Negative for ear pain and sore throat.    Eyes:  Negative for pain and visual disturbance.   Respiratory:  Negative for cough and shortness of breath.    Cardiovascular:  Negative for chest pain and palpitations.   Gastrointestinal:  Negative for abdominal pain, constipation, diarrhea and vomiting.   Genitourinary:  Negative for dysuria and hematuria.   Musculoskeletal:  Positive for gait problem.   Skin:  Negative for color change and rash.   Neurological:  Negative for seizures and syncope.   All other systems reviewed and are negative.      Objective   /80 (BP Location: Left arm, Patient Position: Sitting)   Pulse 64   Temp (!) 95.5 °F (35.3 °C) (Tympanic)   Ht 5' 5\" (1.651 m)   Wt 111 kg (245 lb)   SpO2 97%   BMI 40.77 kg/m²      Physical Exam  Vitals and nursing note reviewed.   Constitutional:       General: She is not in acute distress.     Appearance: She is well-developed. She is not diaphoretic.   HENT:      Head: Normocephalic.      Right Ear: Tympanic membrane and external ear normal.      Left Ear: Tympanic membrane and external ear normal.      Nose: No rhinorrhea.      Mouth/Throat:      Pharynx: No posterior oropharyngeal erythema.   Eyes:      General:         Right eye: No discharge.         Left eye: No discharge.      Conjunctiva/sclera: Conjunctivae normal.   Neck:      Vascular: No JVD.   Cardiovascular:      Rate and Rhythm: Normal rate and regular rhythm.      Heart sounds: Murmur heard.      No gallop.   Pulmonary:      Effort: Pulmonary effort is normal. No respiratory distress.      Breath sounds: Normal breath sounds. No wheezing.   Abdominal:      General: There is no distension.      Palpations: Abdomen " is soft.      Tenderness: There is no abdominal tenderness. There is no rebound.   Musculoskeletal:         General: No tenderness.      Cervical back: Normal range of motion and neck supple.   Lymphadenopathy:      Cervical: No cervical adenopathy.   Skin:     General: Skin is warm.      Findings: No rash.   Neurological:      Mental Status: She is alert and oriented to person, place, and time.      Gait: Gait abnormal.

## 2025-03-18 NOTE — ASSESSMENT & PLAN NOTE
Blood pressure today uncontrolled improved compared with before recommend patient to continue current management low-salt diet discussed with patient important to lose weight recommend to monitor blood pressure at home for next 2 weeks and reevaluate if persistent to be abnormal to adjust medication

## 2025-03-18 NOTE — ASSESSMENT & PLAN NOTE
Lab Results   Component Value Date    EGFR 63 03/11/2025    EGFR 53 12/21/2024    EGFR 56 12/20/2024    CREATININE 0.87 03/11/2025    CREATININE 1.00 12/21/2024    CREATININE 0.96 12/20/2024     Chronic improving the GFR compared with before hemoglobin A1c below 7 discussed important control blood pressure low-salt diet discussed avoid NSAID keep well hydration

## 2025-03-18 NOTE — ASSESSMENT & PLAN NOTE
Lab Results   Component Value Date    HGBA1C 6.7 (H) 03/11/2025   Chronic asymptomatic slight increase in the hemoglobin A1c compared with before secondary to patient taking the prednisone by rheumatology discussed with patient that start using the NovoLog (sliding scale low-carb diet important lose weight reviewed patient already on statin

## 2025-03-19 PROBLEM — Z79.899 HIGH RISK MEDICATION USE: Status: ACTIVE | Noted: 2025-03-19

## 2025-03-27 ENCOUNTER — TELEPHONE (OUTPATIENT)
Age: 80
End: 2025-03-27

## 2025-04-01 ENCOUNTER — TELEPHONE (OUTPATIENT)
Age: 80
End: 2025-04-01

## 2025-04-01 NOTE — TELEPHONE ENCOUNTER
Recently dx with Rheumatoid Arthritis has schedule US for tomorrow.   Reporting   New onset of pain on top and bottom of L foot. RA?  Bilat edema - asymptomatic     Please advise.

## 2025-04-02 NOTE — TELEPHONE ENCOUNTER
Called and spoke with patient to advise that new concern will need to be discussed in office with provider. Patient has appointment on 4/9/25 and will discuss than

## 2025-04-09 DIAGNOSIS — N39.46 MIXED STRESS AND URGE URINARY INCONTINENCE: ICD-10-CM

## 2025-04-09 RX ORDER — SOLIFENACIN SUCCINATE 5 MG/1
5 TABLET, FILM COATED ORAL DAILY
Qty: 90 TABLET | Refills: 1 | Status: SHIPPED | OUTPATIENT
Start: 2025-04-09

## 2025-04-11 ENCOUNTER — HOSPITAL ENCOUNTER (OUTPATIENT)
Dept: ULTRASOUND IMAGING | Facility: HOSPITAL | Age: 80
End: 2025-04-11
Payer: MEDICARE

## 2025-04-11 DIAGNOSIS — M19.90 INFLAMMATORY ARTHRITIS: ICD-10-CM

## 2025-04-11 PROCEDURE — 76882 US LMTD JT/FCL EVL NVASC XTR: CPT

## 2025-04-15 ENCOUNTER — TELEPHONE (OUTPATIENT)
Dept: FAMILY MEDICINE CLINIC | Facility: CLINIC | Age: 80
End: 2025-04-15

## 2025-04-15 NOTE — TELEPHONE ENCOUNTER
Type of form: home health certification via fax.    Incoming fax received from [facility] home health [facility]     Forms have been placed in Dulce Michaud MD folder to be completed for clinical staff.     Routing to clinical pool.

## 2025-04-16 ENCOUNTER — TELEPHONE (OUTPATIENT)
Age: 80
End: 2025-04-16

## 2025-04-16 NOTE — TELEPHONE ENCOUNTER
Advised and instructed pt as per Dr. Diaz 04/16/25 notation. Patient had no further questions and/or concerns at this time.

## 2025-04-16 NOTE — TELEPHONE ENCOUNTER
"Dr. Alfaro    Hydroxychloroquine - Pt states started medication, developed itchy skin and \"woozy feeling\", worsened as she continued. Stopped the medication. S/S went away. Please advise if you want her to start an alternative medication.    US Hands - completed.    Prednisone - Started after US Hands. Pt states feel better as starting Prednisone.      "

## 2025-04-18 DIAGNOSIS — M54.41 CHRONIC BILATERAL LOW BACK PAIN WITH BILATERAL SCIATICA: ICD-10-CM

## 2025-04-18 DIAGNOSIS — N18.30 TYPE 2 DIABETES MELLITUS WITH STAGE 3 CHRONIC KIDNEY DISEASE, WITH LONG-TERM CURRENT USE OF INSULIN (HCC): ICD-10-CM

## 2025-04-18 DIAGNOSIS — E11.22 TYPE 2 DIABETES MELLITUS WITH STAGE 3B CHRONIC KIDNEY DISEASE, WITH LONG-TERM CURRENT USE OF INSULIN (HCC): ICD-10-CM

## 2025-04-18 DIAGNOSIS — Z79.4 TYPE 2 DIABETES MELLITUS WITH STAGE 3 CHRONIC KIDNEY DISEASE, WITH LONG-TERM CURRENT USE OF INSULIN (HCC): ICD-10-CM

## 2025-04-18 DIAGNOSIS — G89.29 CHRONIC BILATERAL LOW BACK PAIN WITH BILATERAL SCIATICA: ICD-10-CM

## 2025-04-18 DIAGNOSIS — G47.33 OBSTRUCTIVE SLEEP APNEA TREATED WITH CONTINUOUS POSITIVE AIRWAY PRESSURE (CPAP): ICD-10-CM

## 2025-04-18 DIAGNOSIS — Z79.4 TYPE 2 DIABETES MELLITUS WITH STAGE 3B CHRONIC KIDNEY DISEASE, WITH LONG-TERM CURRENT USE OF INSULIN (HCC): ICD-10-CM

## 2025-04-18 DIAGNOSIS — E11.22 TYPE 2 DIABETES MELLITUS WITH STAGE 3 CHRONIC KIDNEY DISEASE, WITH LONG-TERM CURRENT USE OF INSULIN (HCC): ICD-10-CM

## 2025-04-18 DIAGNOSIS — N18.32 TYPE 2 DIABETES MELLITUS WITH STAGE 3B CHRONIC KIDNEY DISEASE, WITH LONG-TERM CURRENT USE OF INSULIN (HCC): ICD-10-CM

## 2025-04-18 DIAGNOSIS — L98.9 SKIN LESION: ICD-10-CM

## 2025-04-18 DIAGNOSIS — M54.42 CHRONIC BILATERAL LOW BACK PAIN WITH BILATERAL SCIATICA: ICD-10-CM

## 2025-04-19 RX ORDER — ALBUTEROL SULFATE 90 UG/1
2 INHALANT RESPIRATORY (INHALATION) EVERY 4 HOURS PRN
Qty: 18 G | Refills: 0 | Status: SHIPPED | OUTPATIENT
Start: 2025-04-19

## 2025-04-19 RX ORDER — PEN NEEDLE, DIABETIC 32GX 5/32"
NEEDLE, DISPOSABLE MISCELLANEOUS
Qty: 270 EACH | Refills: 0 | Status: SHIPPED | OUTPATIENT
Start: 2025-04-19

## 2025-04-19 RX ORDER — LANCETS
EACH MISCELLANEOUS 3 TIMES DAILY
Qty: 100 EACH | Refills: 0 | Status: SHIPPED | OUTPATIENT
Start: 2025-04-19

## 2025-04-21 ENCOUNTER — OFFICE VISIT (OUTPATIENT)
Dept: FAMILY MEDICINE CLINIC | Facility: CLINIC | Age: 80
End: 2025-04-21
Payer: MEDICARE

## 2025-04-21 VITALS
SYSTOLIC BLOOD PRESSURE: 120 MMHG | HEIGHT: 64 IN | WEIGHT: 238 LBS | HEART RATE: 59 BPM | OXYGEN SATURATION: 98 % | DIASTOLIC BLOOD PRESSURE: 60 MMHG | BODY MASS INDEX: 40.63 KG/M2 | TEMPERATURE: 96.2 F

## 2025-04-21 DIAGNOSIS — Z99.89 DEPENDENCE ON CANE: ICD-10-CM

## 2025-04-21 DIAGNOSIS — G47.33 OBSTRUCTIVE SLEEP APNEA TREATED WITH CONTINUOUS POSITIVE AIRWAY PRESSURE (CPAP): ICD-10-CM

## 2025-04-21 DIAGNOSIS — Z79.4 TYPE 2 DIABETES MELLITUS WITH DIABETIC NEUROPATHY, WITH LONG-TERM CURRENT USE OF INSULIN (HCC): ICD-10-CM

## 2025-04-21 DIAGNOSIS — Z13.29 SCREENING FOR THYROID DISORDER: ICD-10-CM

## 2025-04-21 DIAGNOSIS — E11.40 TYPE 2 DIABETES MELLITUS WITH DIABETIC NEUROPATHY, WITH LONG-TERM CURRENT USE OF INSULIN (HCC): ICD-10-CM

## 2025-04-21 DIAGNOSIS — I10 PRIMARY HYPERTENSION: Primary | ICD-10-CM

## 2025-04-21 DIAGNOSIS — E78.2 MIXED HYPERLIPIDEMIA: ICD-10-CM

## 2025-04-21 PROCEDURE — 99214 OFFICE O/P EST MOD 30 MIN: CPT | Performed by: FAMILY MEDICINE

## 2025-04-21 PROCEDURE — G2211 COMPLEX E/M VISIT ADD ON: HCPCS | Performed by: FAMILY MEDICINE

## 2025-04-21 RX ORDER — PREGABALIN 75 MG/1
75 CAPSULE ORAL 3 TIMES DAILY
Qty: 270 CAPSULE | Refills: 0 | Status: SHIPPED | OUTPATIENT
Start: 2025-04-21

## 2025-04-21 RX ORDER — CLOTRIMAZOLE 1 %
1 CREAM (GRAM) TOPICAL 2 TIMES DAILY
Qty: 105 G | Refills: 0 | Status: SHIPPED | OUTPATIENT
Start: 2025-04-21

## 2025-04-22 ENCOUNTER — TELEPHONE (OUTPATIENT)
Dept: FAMILY MEDICINE CLINIC | Facility: CLINIC | Age: 80
End: 2025-04-22

## 2025-04-22 NOTE — ASSESSMENT & PLAN NOTE
Chronic patient on CPAP machine discussed proper sleep position important lose weight  Orders:  •  CBC and differential; Future  •  Comprehensive metabolic panel; Future

## 2025-04-22 NOTE — TELEPHONE ENCOUNTER
Type of form: Medicare Part B detailed written order via fax from [employer] [employer].    Contour test strips and microlet lancets      Forms have been placed in Dulce Michaud MD folder to be completed for clinical staff.     Routing to clinical pool.

## 2025-04-22 NOTE — ASSESSMENT & PLAN NOTE
Chronic asymptomatic fair control continue simvastatin 10 mg once a day  Orders:  •  Lipid Panel with Direct LDL reflex; Future

## 2025-04-22 NOTE — ASSESSMENT & PLAN NOTE
Lab Results   Component Value Date    HGBA1C 6.7 (H) 03/11/2025   Chronic asymptomatic hemoglobin A1c stable below 7 patient currently on Lyrica tolerated well continue current management  Orders:  •  CBC and differential; Future  •  Comprehensive metabolic panel; Future  •  Hemoglobin A1C; Future

## 2025-04-22 NOTE — PROGRESS NOTES
Name: Alberta Sanchez      : 1945      MRN: [id number]  Encounter Provider: Dulce Michaud MD  Encounter Date: 2025   Encounter department: [facility]  :  Assessment & Plan  Primary hypertension  Chronic improvement of blood pressure compared with before encourage patient to continue current medication low-salt diet important lose weight review       Obstructive sleep apnea treated with continuous positive airway pressure (CPAP)  Chronic patient on CPAP machine discussed proper sleep position important lose weight  Orders:  •  CBC and differential; Future  •  Comprehensive metabolic panel; Future    Type 2 diabetes mellitus with diabetic neuropathy, with long-term current use of insulin (HCC)    Lab Results   Component Value Date    HGBA1C 6.7 (H) 2025   Chronic asymptomatic hemoglobin A1c stable below 7 patient currently on Lyrica tolerated well continue current management  Orders:  •  CBC and differential; Future  •  Comprehensive metabolic panel; Future  •  Hemoglobin A1C; Future    Mixed hyperlipidemia  Chronic asymptomatic fair control continue simvastatin 10 mg once a day  Orders:  •  Lipid Panel with Direct LDL reflex; Future    Screening for thyroid disorder    Orders:  •  TSH, 3rd generation with Free T4 reflex; Future    Dependence on cane  Secondary to chronic back pain using walker fall precaution review              History of Present Illness   Patient here follow-up with a chronic condition with her  last time her blood pressure was elevated patient been compliant with the medication been watching for the salt patient asymptomatic no chest pain short of breath patient no new concern recent blood work review      Review of Systems   Constitutional:  Negative for chills and fever.   HENT:  Negative for ear pain and sore throat.    Eyes:  Negative for pain and visual disturbance.   Respiratory:  Negative for cough and shortness of breath.   "  Cardiovascular:  Negative for chest pain and palpitations.   Gastrointestinal:  Negative for abdominal pain, constipation and vomiting.   Genitourinary:  Negative for dysuria and hematuria.   Musculoskeletal:  Positive for gait problem.   Skin:  Negative for color change and rash.   Neurological:  Negative for seizures and syncope.   All other systems reviewed and are negative.      Objective   /60 (BP Location: Left arm, Patient Position: Sitting)   Pulse 59   Temp (!) 96.2 °F (35.7 °C) (Tympanic)   Ht 5' 4\" (1.626 m)   Wt 108 kg (238 lb)   SpO2 98%   Breastfeeding No   BMI 40.85 kg/m²      Physical Exam  Vitals and nursing note reviewed.   Constitutional:       General: She is not in acute distress.     Appearance: She is well-developed. She is not diaphoretic.   HENT:      Head: Normocephalic.      Right Ear: Tympanic membrane and external ear normal.      Left Ear: Tympanic membrane and external ear normal.      Nose: No rhinorrhea.      Mouth/Throat:      Pharynx: No posterior oropharyngeal erythema.   Eyes:      General:         Right eye: No discharge.         Left eye: No discharge.      Conjunctiva/sclera: Conjunctivae normal.   Neck:      Vascular: No JVD.   Cardiovascular:      Rate and Rhythm: Normal rate and regular rhythm.      Heart sounds: Normal heart sounds.      No gallop.   Pulmonary:      Effort: Pulmonary effort is normal. No respiratory distress.      Breath sounds: Normal breath sounds. No wheezing.   Abdominal:      General: There is no distension.      Palpations: Abdomen is soft.      Tenderness: There is no abdominal tenderness. There is no rebound.   Musculoskeletal:         General: No tenderness.      Cervical back: Normal range of motion and neck supple.   Lymphadenopathy:      Cervical: No cervical adenopathy.   Skin:     General: Skin is warm.      Findings: No rash.   Neurological:      Mental Status: She is alert and oriented to person, place, and time.      Gait: " Gait abnormal.

## 2025-04-22 NOTE — ASSESSMENT & PLAN NOTE
Chronic improvement of blood pressure compared with before encourage patient to continue current medication low-salt diet important lose weight review

## 2025-04-23 ENCOUNTER — TELEPHONE (OUTPATIENT)
Dept: FAMILY MEDICINE CLINIC | Facility: CLINIC | Age: 80
End: 2025-04-23

## 2025-04-23 NOTE — TELEPHONE ENCOUNTER
Type of form: patient care order via [facility] health.      Forms have been placed in Dulce Michaud MD folder to be completed for clinical staff.     Routing to clinical pool.

## 2025-05-02 ENCOUNTER — HOSPITAL ENCOUNTER (OUTPATIENT)
Dept: MAMMOGRAPHY | Facility: HOSPITAL | Age: 80
End: 2025-05-02
Payer: MEDICARE

## 2025-05-02 DIAGNOSIS — Z12.31 ENCOUNTER FOR SCREENING MAMMOGRAM FOR MALIGNANT NEOPLASM OF BREAST: ICD-10-CM

## 2025-05-02 PROCEDURE — 77063 BREAST TOMOSYNTHESIS BI: CPT

## 2025-05-02 PROCEDURE — 77067 SCR MAMMO BI INCL CAD: CPT

## 2025-05-05 ENCOUNTER — RESULTS FOLLOW-UP (OUTPATIENT)
Dept: FAMILY MEDICINE CLINIC | Facility: CLINIC | Age: 80
End: 2025-05-05

## 2025-05-05 DIAGNOSIS — Z12.31 ENCOUNTER FOR SCREENING MAMMOGRAM FOR BREAST CANCER: Primary | ICD-10-CM

## 2025-05-05 NOTE — TELEPHONE ENCOUNTER
----- Message from Dulce Michaud MD sent at 5/5/2025 12:25 PM EDT -----  No mammographic evidence of malignancy  Repeat in one year

## 2025-05-10 ENCOUNTER — APPOINTMENT (EMERGENCY)
Dept: CT IMAGING | Facility: HOSPITAL | Age: 80
End: 2025-05-10
Payer: MEDICARE

## 2025-05-10 ENCOUNTER — APPOINTMENT (EMERGENCY)
Dept: RADIOLOGY | Facility: HOSPITAL | Age: 80
End: 2025-05-10
Payer: MEDICARE

## 2025-05-10 ENCOUNTER — HOSPITAL ENCOUNTER (EMERGENCY)
Facility: HOSPITAL | Age: 80
Discharge: HOME | End: 2025-05-10
Attending: EMERGENCY MEDICINE | Admitting: EMERGENCY MEDICINE
Payer: MEDICARE

## 2025-05-10 VITALS
OXYGEN SATURATION: 97 % | HEART RATE: 76 BPM | RESPIRATION RATE: 18 BRPM | DIASTOLIC BLOOD PRESSURE: 72 MMHG | SYSTOLIC BLOOD PRESSURE: 165 MMHG | TEMPERATURE: 97.3 F

## 2025-05-10 DIAGNOSIS — T14.8XXA ABRASION: ICD-10-CM

## 2025-05-10 DIAGNOSIS — E04.1 THYROID NODULE: Primary | ICD-10-CM

## 2025-05-10 DIAGNOSIS — M79.603 ARM PAIN: ICD-10-CM
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
## 2025-05-10 DIAGNOSIS — S52.509A RADIUS AND ULNA DISTAL FRACTURE: ICD-10-CM

## 2025-05-10 DIAGNOSIS — S52.609A RADIUS AND ULNA DISTAL FRACTURE: ICD-10-CM

## 2025-05-10 LAB
ANION GAP SERPL CALCULATED.3IONS-SCNC: 8 MMOL/L (ref 4–13)
BASOPHILS # BLD AUTO: 0.01 THOUSANDS/ÂΜL (ref 0–0.1)
BASOPHILS NFR BLD AUTO: 0 % (ref 0–1)
BUN SERPL-MCNC: 29 MG/DL (ref 5–25)
CALCIUM SERPL-MCNC: 8.9 MG/DL (ref 8.4–10.2)
CHLORIDE SERPL-SCNC: 110 MMOL/L (ref 96–108)
CO2 SERPL-SCNC: 25 MMOL/L (ref 21–32)
CREAT SERPL-MCNC: 1.03 MG/DL (ref 0.6–1.3)
EOSINOPHIL # BLD AUTO: 0.07 THOUSAND/ÂΜL (ref 0–0.61)
EOSINOPHIL NFR BLD AUTO: 1 % (ref 0–6)
ERYTHROCYTE [DISTWIDTH] IN BLOOD BY AUTOMATED COUNT: 13.7 % (ref 11.6–15.1)
GFR SERPL CREATININE-BSD FRML MDRD: 51 ML/MIN/1.73SQ M
GLUCOSE SERPL-MCNC: 141 MG/DL (ref 65–140)
HCT VFR BLD AUTO: 39.8 % (ref 34.8–46.1)
HGB BLD-MCNC: 12 G/DL (ref 11.5–15.4)
IMM GRANULOCYTES # BLD AUTO: 0.05 THOUSAND/UL (ref 0–0.2)
IMM GRANULOCYTES NFR BLD AUTO: 1 % (ref 0–2)
LYMPHOCYTES # BLD AUTO: 1.16 THOUSANDS/ÂΜL (ref 0.6–4.47)
LYMPHOCYTES NFR BLD AUTO: 17 % (ref 14–44)
MCH RBC QN AUTO: 28.8 PG (ref 26.8–34.3)
MCHC RBC AUTO-ENTMCNC: 30.2 G/DL (ref 31.4–37.4)
MCV RBC AUTO: 95 FL (ref 82–98)
MONOCYTES # BLD AUTO: 0.37 THOUSAND/ÂΜL (ref 0.17–1.22)
MONOCYTES NFR BLD AUTO: 5 % (ref 4–12)
NEUTROPHILS # BLD AUTO: 5.25 THOUSANDS/ÂΜL (ref 1.85–7.62)
NEUTS SEG NFR BLD AUTO: 76 % (ref 43–75)
NRBC BLD AUTO-RTO: 0 /100 WBCS
PLATELET # BLD AUTO: 100 THOUSANDS/UL (ref 149–390)
PMV BLD AUTO: 11.3 FL (ref 8.9–12.7)
POTASSIUM SERPL-SCNC: 3.9 MMOL/L (ref 3.5–5.3)
RBC # BLD AUTO: 4.17 MILLION/UL (ref 3.81–5.12)
SODIUM SERPL-SCNC: 143 MMOL/L (ref 135–147)
WBC # BLD AUTO: 6.91 THOUSAND/UL (ref 4.31–10.16)

## 2025-05-10 PROCEDURE — 73090 X-RAY EXAM OF FOREARM: CPT

## 2025-05-10 PROCEDURE — 80048 BASIC METABOLIC PNL TOTAL CA: CPT | Performed by: EMERGENCY MEDICINE

## 2025-05-10 PROCEDURE — 74177 CT ABD & PELVIS W/CONTRAST: CPT

## 2025-05-10 PROCEDURE — 73060 X-RAY EXAM OF HUMERUS: CPT

## 2025-05-10 PROCEDURE — 99284 EMERGENCY DEPT VISIT MOD MDM: CPT

## 2025-05-10 PROCEDURE — 99285 EMERGENCY DEPT VISIT HI MDM: CPT | Performed by: EMERGENCY MEDICINE

## 2025-05-10 PROCEDURE — 85025 COMPLETE CBC W/AUTO DIFF WBC: CPT | Performed by: EMERGENCY MEDICINE

## 2025-05-10 PROCEDURE — 71260 CT THORAX DX C+: CPT

## 2025-05-10 PROCEDURE — 72125 CT NECK SPINE W/O DYE: CPT

## 2025-05-10 PROCEDURE — 36415 COLL VENOUS BLD VENIPUNCTURE: CPT | Performed by: EMERGENCY MEDICINE

## 2025-05-10 PROCEDURE — 70450 CT HEAD/BRAIN W/O DYE: CPT

## 2025-05-10 RX ORDER — OXYCODONE AND ACETAMINOPHEN 5; 325 MG/1; MG/1
1 TABLET ORAL ONCE
Refills: 0 | Status: COMPLETED | OUTPATIENT
Start: 2025-05-10 | End: 2025-05-10

## 2025-05-10 RX ORDER — ACETAMINOPHEN 325 MG/1
650 TABLET ORAL ONCE
Status: COMPLETED | OUTPATIENT
Start: 2025-05-10 | End: 2025-05-10

## 2025-05-10 RX ADMIN — OXYCODONE HYDROCHLORIDE AND ACETAMINOPHEN 1 TABLET: 5; 325 TABLET ORAL at 22:41

## 2025-05-10 RX ADMIN — ACETAMINOPHEN 650 MG: 325 TABLET ORAL at 18:12

## 2025-05-10 RX ADMIN — IOHEXOL 100 ML: 350 INJECTION, SOLUTION INTRAVENOUS at 18:04

## 2025-05-11 NOTE — ED PROVIDER NOTES
Time reflects when diagnosis was documented in both MDM as applicable and the Disposition within this note       Time User Action Codes Description Comment    5/10/2025  7:18 PM Romel Paredes [E04.1] Thyroid nodule     5/10/2025  7:18 PM Romel Paredes [W19.XXXA] Fall, initial encounter     5/10/2025  7:18 PM Romel Paredes [T14.8XXA] Abrasion     5/10/2025  7:18 PM Romel Paredes [M79.603] Arm pain     5/10/2025 10:30 PM Romel Paredes [S52.509A,  S52.609A] Radius and ulna distal fracture           ED Disposition       ED Disposition   Discharge    Condition   Stable    Date/Time   Sat May 10, 2025  7:18 PM    Comment   Alberta Sanchez discharge to home/self care.                   Assessment & Plan       Medical Decision Making  79-year-old female presents after a fall.  Patient appears to have a chronic fracture of the left radius and ulna.  I was able to note initially that there was an abnormality and I was concerned that it was chronic.  Significant delays lasting many hours and getting the plain film x-ray.  Patient splinted bilaterally.  Neurovascularly intact.  CTs of the head, neck and torso reassuring.  No signs of internal bleeding.  Patient monitored during her stay and continued to do well.  Discussed risk of fall with dose of Percocet.  Patient was aware but was requesting pain medication to help her sleep.  Discussed warning signs and symptoms with the patient as well as when to return to the emergency department versus follow up with [facility]. Patient states understanding and agreement with the plan.  Patient care delayed due to critical capacity in the emergency department.      This note was completed using dictation software.       Amount and/or Complexity of Data Reviewed  Independent Historian: caregiver     Details: Son    External Data Reviewed: notes.  Labs: ordered.  Radiology: ordered.    Risk  OTC drugs.  Prescription drug management.             Medications    acetaminophen (TYLENOL) tablet 650 mg (650 mg Oral Given 5/10/25 1812)   iohexol (OMNIPAQUE) 350 MG/ML injection (MULTI-DOSE) 100 mL (100 mL Intravenous Given 5/10/25 1804)   oxyCODONE-acetaminophen (PERCOCET) 5-325 mg per tablet 1 tablet (1 tablet Oral Given 5/10/25 2241)       ED Risk Strat Scores                    No data recorded        SBIRT 22yo+      Flowsheet Row Most Recent Value   Initial Alcohol Screen: US AUDIT-C     1. How often do you have a drink containing alcohol? 0 Filed at: 05/10/2025 1508   2. How many drinks containing alcohol do you have on a typical day you are drinking?  0 Filed at: 05/10/2025 1508   3a. Male UNDER 65: How often do you have five or more drinks on one occasion? 0 Filed at: 05/10/2025 1508   3b. FEMALE Any Age, or MALE 65+: How often do you have 4 or more drinks on one occassion? 0 Filed at: 05/10/2025 1508   Audit-C Score 0 Filed at: 05/10/2025 1508   DEONDRE: How many times in the past year have you...    Used an illegal drug or used a prescription medication for non-medical reasons? Never Filed at: 05/10/2025 1508                            History of Present Illness       Chief Complaint   Patient presents with    Fall     Patients walker got stuck in a door elena while walking out of gas station causing patient to fall face first down on pavement. Patient has small laceration to forehead. No blood thinners or loc.  Patient complaining of bilateral hand pain possibly due to hear reaching out to stop her fall.  Patient also complaining of new onset lower back pain       Past Medical History:   Diagnosis Date    Abnormal finding in urine 1/9/2023    Allergic Springtime    Runny nose, congestion    Anemia     Arthritis 10/30/2020    Chronic kidney disease     Diabetes mellitus (HCC)     Fibromyalgia, primary     Fracture of wrist     RIGHT    Gastroenteritis 12/18/2024    Heart murmur 06/30/2020    History of non-insulin dependent diabetes mellitus     Hx of thrombocytopenia  02/18/2022    Hyperlipidemia     Hypertension     Hypertension     IBS (irritable bowel syndrome)     Inflammatory bowel disease 2015    Been doctoring for years    Irritable bowel     Kidney stone     Metabolic syndrome     Obesity     RLS (restless legs syndrome)     Routine medical exam 06/28/2019    Skin lesion 2/8/2021    Sleep apnea     Sleep apnea     ON CPAP    Upper respiratory tract infection 01/19/2022    Urge incontinence     Urinary incontinence     Urinary tract infection 1/1/2023      Past Surgical History:   Procedure Laterality Date    CHOLECYSTECTOMY  2013    COLONOSCOPY  2010    CYSTOSCOPY      HERNIA REPAIR  2008    HYSTERECTOMY N/A 1974    partial; ovaries intact    KNEE SURGERY Left       Family History   Problem Relation Age of Onset    Breast cancer Mother 62        Breast cancer    Suicidality Father     Melanoma Sister     Bipolar disorder Sister     Depression Sister         Bi-polar    Bipolar disorder Sister     Completed Suicide  Sister     Rheum arthritis Daughter     No Known Problems Daughter     No Known Problems Maternal Grandmother     No Known Problems Maternal Grandfather     No Known Problems Paternal Grandmother     No Known Problems Paternal Grandfather     Heart disease Brother     No Known Problems Maternal Aunt     No Known Problems Maternal Aunt     BRCA2 Positive Neg Hx     BRCA1 Positive Neg Hx     BRCA2 Negative Neg Hx     BRCA1 Negative Neg Hx     BRCA 1/2 Neg Hx     Ovarian cancer Neg Hx     Endometrial cancer Neg Hx     Colon cancer Neg Hx     Breast cancer additional onset Neg Hx       Social History     Tobacco Use    Smoking status: Never     Passive exposure: Never    Smokeless tobacco: Never   Vaping Use    Vaping status: Never Used   Substance Use Topics    Alcohol use: Not Currently     Comment: no caffeine use    Drug use: No      E-Cigarette/Vaping    E-Cigarette Use Never User       E-Cigarette/Vaping Substances    Nicotine No     THC No     CBD No      Flavoring No     Other No     Unknown No       I have reviewed and agree with the history as documented.     Patient states that she was walking today with her cane instead of her normal walker.  The cane caught an edge which caused her to lose control falling forward.  She attempted to break her fall using her hands, and as a result has pain in bilateral arms, specifically around the wrists and mid humerus.  Patient also reports pain in multiple areas of her back as well as an abrasion onto her head.  She is not on thinners and did not lose consciousness.  She is acting at her baseline.  No nausea or vomiting.        Review of Systems   Constitutional:  Negative for activity change, chills, fatigue and fever.   HENT:  Negative for congestion.    Eyes:  Negative for visual disturbance.   Respiratory:  Negative for cough, chest tightness and shortness of breath.    Cardiovascular:  Negative for chest pain.   Gastrointestinal:  Negative for abdominal pain, diarrhea and vomiting.   Genitourinary:  Negative for dysuria.   Skin:  Negative for rash.   Neurological:  Negative for numbness.           Objective       ED Triage Vitals [05/10/25 1508]   Temperature Pulse Blood Pressure Respirations SpO2 Patient Position - Orthostatic VS   97.8 °F (36.6 °C) 81 (!) 177/75 18 97 % Sitting      Temp Source Heart Rate Source BP Location FiO2 (%) Pain Score    Temporal Monitor Left arm -- 4      Vitals      Date and Time Temp Pulse SpO2 Resp BP Pain Score FACES Pain Rating User   05/10/25 2241 -- -- -- -- -- 8 -- EM   05/10/25 2225 -- -- -- -- -- 8 -- EM   05/10/25 2215 97.3 °F (36.3 °C) 76 97 % 18 -- -- -- EM   05/10/25 2209 -- 76 96 % 17 165/72 -- -- EM   05/10/25 2200 97 °F (36.1 °C) 77 96 % 18 165/72 -- -- EM   05/10/25 2130 -- 79 96 % 18 165/74 -- -- EM   05/10/25 2109 -- 81 97 % 18 154/70 -- -- EM   05/10/25 2009 -- 80 97 % 18 175/75 -- -- EM   05/10/25 2000 -- 80 97 % 18 175/75 -- -- EM   05/10/25 1930 -- 82 96 % 18 169/74 --  -- EM   05/10/25 1909 -- 83 95 % 18 159/72 -- -- EM   05/10/25 1830 -- 79 97 % 18 151/80 -- -- EM   05/10/25 1812 -- -- -- -- -- 8 -- EM   05/10/25 1809 -- 88 97 % 20 154/73 -- -- EM   05/10/25 1730 -- 78 96 % 18 156/67 -- -- EM   05/10/25 1709 -- 78 96 % 20 158/72 -- -- EM   05/10/25 1630 -- 75 97 % 18 160/67 -- -- EM   05/10/25 1609 -- 81 97 % 18 137/65 -- -- EM   05/10/25 1530 -- 82 95 % 18 141/66 -- -- EM   05/10/25 1509 -- -- -- -- -- 5 -- EM   05/10/25 1508 97.8 °F (36.6 °C) 81 97 % 18 177/75 4 -- EM            Physical Exam  Constitutional:       General: She is not in acute distress.     Appearance: She is well-developed. She is not ill-appearing, toxic-appearing or diaphoretic.   HENT:      Head: Normocephalic and atraumatic.      Right Ear: External ear normal.      Left Ear: External ear normal.      Nose: Nose normal.      Mouth/Throat:      Mouth: Mucous membranes are moist.      Pharynx: Oropharynx is clear.   Eyes:      Conjunctiva/sclera: Conjunctivae normal.      Pupils: Pupils are equal, round, and reactive to light.   Cardiovascular:      Rate and Rhythm: Normal rate and regular rhythm.      Heart sounds: Normal heart sounds.   Pulmonary:      Effort: Pulmonary effort is normal. No respiratory distress.      Breath sounds: Normal breath sounds.   Abdominal:      General: Bowel sounds are normal. There is no distension.      Palpations: Abdomen is soft.      Tenderness: There is no abdominal tenderness. There is no right CVA tenderness, left CVA tenderness, guarding or rebound.   Musculoskeletal:         General: Tenderness (over by lateral wrists and humerio, and over multiple areas of the back) present. Normal range of motion.      Cervical back: Normal range of motion and neck supple.   Skin:     General: Skin is warm and dry.      Capillary Refill: Capillary refill takes less than 2 seconds.   Neurological:      Mental Status: She is alert and oriented to person, place, and time.   Psychiatric:          Behavior: Behavior normal.         Results Reviewed       Procedure Component Value Units Date/Time    Basic metabolic panel [[id number]]  (Abnormal) Collected: 05/10/25 1652    Lab Status: Final result Specimen: Blood from Arm, Right Updated: 05/10/25 1710     Sodium 143 mmol/L      Potassium 3.9 mmol/L      Chloride 110 mmol/L      CO2 25 mmol/L      ANION GAP 8 mmol/L      BUN 29 mg/dL      Creatinine 1.03 mg/dL      Glucose 141 mg/dL      Calcium 8.9 mg/dL      eGFR 51 ml/min/1.73sq m     Narrative:      National Kidney Disease Foundation guidelines for Chronic Kidney Disease (CKD):     Stage 1 with normal or high GFR (GFR > 90 mL/min/1.73 square meters)    Stage 2 Mild CKD (GFR = 60-89 mL/min/1.73 square meters)    Stage 3A Moderate CKD (GFR = 45-59 mL/min/1.73 square meters)    Stage 3B Moderate CKD (GFR = 30-44 mL/min/1.73 square meters)    Stage 4 Severe CKD (GFR = 15-29 mL/min/1.73 square meters)    Stage 5 End Stage CKD (GFR <15 mL/min/1.73 square meters)  Note: GFR calculation is accurate only with a steady state creatinine    CBC and differential [[id number]]  (Abnormal) Collected: 05/10/25 1652    Lab Status: Final result Specimen: Blood from Arm, Right Updated: 05/10/25 1700     WBC 6.91 Thousand/uL      RBC 4.17 Million/uL      Hemoglobin 12.0 g/dL      Hematocrit 39.8 %      MCV 95 fL      MCH 28.8 pg      MCHC 30.2 g/dL      RDW 13.7 %      MPV 11.3 fL      Platelets 100 Thousands/uL      nRBC 0 /100 WBCs      Segmented % 76 %      Immature Grans % 1 %      Lymphocytes % 17 %      Monocytes % 5 %      Eosinophils Relative 1 %      Basophils Relative 0 %      Absolute Neutrophils 5.25 Thousands/µL      Absolute Immature Grans 0.05 Thousand/uL      Absolute Lymphocytes 1.16 Thousands/µL      Absolute Monocytes 0.37 Thousand/µL      Eosinophils Absolute 0.07 Thousand/µL      Basophils Absolute 0.01 Thousands/µL             CT chest abdomen pelvis w contrast   Final Interpretation by Denilson Devi  MD Ramírez (05/10 1943)      No CT findings of trauma in the chest, abdomen or pelvis.               Workstation performed: [id number]         CT spine cervical without contrast   Final Interpretation by Nito Mcdowell MD (05/10 1913)      No acute cervical spine fracture or traumatic malalignment.      Incidental thyroid nodule (s) for which nonemergent thyroid ultrasound is recommended.      Additional chronic/incidental findings as detailed above.      The study was marked in EPIC for immediate notification.                  Workstation performed: [id number]         CT head without contrast   Final Interpretation by Nito Mcdowell MD (05/10 1909)      No acute intracranial abnormality.      Mild chronic microangiopathy.                  Workstation performed: [id number]         XR humerus RIGHT   Final Interpretation by Teofilo Vargas MD (05/10 2029)      No acute osseous abnormality.         Computerized Assisted Algorithm (CAA) may have been used to analyze all applicable images.         Workstation performed: [id number]         XR humerus LEFT   Final Interpretation by Teofilo Vargas MD (05/10 2030)      No acute osseous abnormality.         Computerized Assisted Algorithm (CAA) may have been used to analyze all applicable images.         Workstation performed: [id number]         XR forearm 2 views LEFT   Final Interpretation by Teofilo Vargas MD (05/10 2214)      Suspected chronic fracture at the radial and ulnar styloid.         Computerized Assisted Algorithm (CAA) may have been used to analyze all applicable images.            Workstation performed: [id number]             Procedures    ED Medication and Procedure Management   Prior to Admission Medications   Prescriptions Last Dose Informant Patient Reported? Taking?   B Complex Vitamins (B COMPLEX 1 PO)  Self Yes No   Sig: Take by mouth   Cholecalciferol (VITAMIN D3) 2000 units capsule  Self Yes No   Sig: half   Contour Next Test test strip   Self No No   Sig: Test blood sugar 3 times daily   Diapers & Supplies MISC  Self No No   Sig: Use 3 (three) times a day as needed (Incontinence)   Diclofenac Sodium (VOLTAREN) 1 %  Self No No   Sig: Apply 2 g topically 4 (four) times a day   Ferrous Sulfate (IRON) 325 (65 Fe) MG TABS  Self Yes No   Sig: Take by mouth   Insulin Pen Needle (BD Pen Needle [place] U/F) 32G X 4 MM MISC  Self No No   Sig: INJECT SUBCUTANEOUSLY AS  DIRECTED 3 TIMES DAILY   Microlet Lancets MISC  Self No No   Sig: Use 3 (three) times a day   Misc Natural Products (CYSTEX URINARY HEALTH PO)  Self Yes No   Sig: Take 1 capsule by mouth daily   Multiple Vitamin (DAILY VALUE MULTIVITAMIN) TABS  Self Yes No   Sig: Take by mouth   Probiotic Product (Align) 4 MG CAPS  Self Yes No   Sig: Take by mouth   acetaminophen (TYLENOL) 325 mg tablet  Self Yes No   Sig: Take by mouth as needed    albuterol (ProAir HFA) 90 mcg/act inhaler  Self No No   Sig: Inhale 2 puffs every 4 (four) hours as needed for wheezing   bismuth subsalicylate (PEPTO BISMOL) 262 MG chewable tablet  Self No No   Sig: Chew 2 tablets (524 mg total) 2 (two) times a day as needed for diarrhea Has tolerated in the past.   clotrimazole (LOTRIMIN) 1 % cream   No No   Sig: Apply 1 g (1 Application total) topically 2 (two) times a day   estrogens, conjugated (Premarin) vaginal cream  Self No No   Sig: INSERT 1 APPLICATORFUL VAGINALLY TWICE WEEKLY   fenofibrate 160 MG tablet  Self No No   Sig: TAKE 1 TABLET BY MOUTH  DAILY   insulin degludec (Tresiba FlexTouch) 100 units/mL injection pen  Self No No   Sig: INJECT 70 UNITS SUBCUTANEOUSLY  DAILY AT BEDTIME   lisinopril (ZESTRIL) 10 mg tablet  Self No No   Sig: TAKE 1 TABLET BY MOUTH DAILY   pantoprazole (PROTONIX) 40 mg tablet  Self No No   Sig: TAKE 1 TABLET BY MOUTH DAILY   pregabalin (LYRICA) 75 mg capsule   No No   Sig: Take 1 capsule (75 mg total) by mouth 3 (three) times a day   simvastatin (ZOCOR) 10 mg tablet  Self No No   Sig: TAKE 1  TABLET BY MOUTH AT  BEDTIME   solifenacin (VESICARE) 5 mg tablet  Self No No   Sig: take 1 tablet by mouth once daily   topiramate (TOPAMAX) 25 mg tablet  Self No No   Sig: TAKE 1 TABLET BY MOUTH DAILY      Facility-Administered Medications: None     Discharge Medication List as of 5/10/2025 10:30 PM        CONTINUE these medications which have NOT CHANGED    Details   acetaminophen (TYLENOL) 325 mg tablet Take by mouth as needed , Historical Med      albuterol (ProAir HFA) 90 mcg/act inhaler Inhale 2 puffs every 4 (four) hours as needed for wheezing, Starting Sat 4/19/2025, Normal      B Complex Vitamins (B COMPLEX 1 PO) Take by mouth, Historical Med      bismuth subsalicylate (PEPTO BISMOL) 262 MG chewable tablet Chew 2 tablets (524 mg total) 2 (two) times a day as needed for diarrhea Has tolerated in the past., Starting Fri 7/16/2021, Normal      Cholecalciferol (VITAMIN D3) 2000 units capsule half, Historical Med      clotrimazole (LOTRIMIN) 1 % cream Apply 1 g (1 Application total) topically 2 (two) times a day, Starting Mon 4/21/2025, Normal      Contour Next Test test strip Test blood sugar 3 times daily, Normal      Diapers & Supplies MISC Use 3 (three) times a day as needed (Incontinence), Starting Mon 7/22/2024, Print      Diclofenac Sodium (VOLTAREN) 1 % Apply 2 g topically 4 (four) times a day, Starting Mon 12/23/2024, No Print      estrogens, conjugated (Premarin) vaginal cream INSERT 1 APPLICATORFUL VAGINALLY TWICE WEEKLY, Normal      fenofibrate 160 MG tablet TAKE 1 TABLET BY MOUTH  DAILY, Normal      Ferrous Sulfate (IRON) 325 (65 Fe) MG TABS Take by mouth, Historical Med      insulin degludec (Tresiba FlexTouch) 100 units/mL injection pen INJECT 70 UNITS SUBCUTANEOUSLY  DAILY AT BEDTIME, Normal      Insulin Pen Needle (BD Pen Needle [place] U/F) 32G X 4 MM MISC INJECT SUBCUTANEOUSLY AS  DIRECTED 3 TIMES DAILY, Normal      lisinopril (ZESTRIL) 10 mg tablet TAKE 1 TABLET BY MOUTH DAILY, Starting Mon  12/30/2024, Normal      Microlet Lancets MISC Use 3 (three) times a day, Starting Sat 4/19/2025, Normal      Misc Natural Products (CYSTEX URINARY HEALTH PO) Take 1 capsule by mouth daily, Historical Med      Multiple Vitamin (DAILY VALUE MULTIVITAMIN) TABS Take by mouth, Historical Med      pantoprazole (PROTONIX) 40 mg tablet TAKE 1 TABLET BY MOUTH DAILY, Starting Tue 1/28/2025, Normal      pregabalin (LYRICA) 75 mg capsule Take 1 capsule (75 mg total) by mouth 3 (three) times a day, Starting Mon 4/21/2025, Normal      Probiotic Product (Align) 4 MG CAPS Take by mouth, Historical Med      simvastatin (ZOCOR) 10 mg tablet TAKE 1 TABLET BY MOUTH AT  BEDTIME, Starting Thu 11/28/2024, Normal      solifenacin (VESICARE) 5 mg tablet take 1 tablet by mouth once daily, Starting Wed 4/9/2025, Normal      topiramate (TOPAMAX) 25 mg tablet TAKE 1 TABLET BY MOUTH DAILY, Starting Sun 3/16/2025, Normal             ED SEPSIS DOCUMENTATION   Time reflects when diagnosis was documented in both MDM as applicable and the Disposition within this note       Time User Action Codes Description Comment    5/10/2025  7:18 PM Romel Paredes [E04.1] Thyroid nodule     5/10/2025  7:18 PM Romel Paredes [W19.XXXA] Fall, initial encounter     5/10/2025  7:18 PM Romel Paredes [T14.8XXA] Abrasion     5/10/2025  7:18 PM Romel Paredes [M79.603] Arm pain     5/10/2025 10:30 PM Romel Paredes [S52.509A,  S52.609A] Radius and ulna distal fracture                  Romel Paredes MD  05/11/25 2155       Romel Paredes MD  05/11/25 2156

## 2025-05-14 ENCOUNTER — OFFICE VISIT (OUTPATIENT)
Dept: OBGYN CLINIC | Facility: CLINIC | Age: 80
End: 2025-05-14
Attending: EMERGENCY MEDICINE
Payer: MEDICARE

## 2025-05-14 DIAGNOSIS — S52.602A CLOSED FRACTURE OF DISTAL ENDS OF LEFT RADIUS AND ULNA, INITIAL ENCOUNTER: Primary | ICD-10-CM

## 2025-05-14 DIAGNOSIS — M25.531 PAIN IN RIGHT WRIST: ICD-10-CM

## 2025-05-14 DIAGNOSIS — S52.502A CLOSED FRACTURE OF DISTAL ENDS OF LEFT RADIUS AND ULNA, INITIAL ENCOUNTER: Primary | ICD-10-CM

## 2025-05-14 PROCEDURE — 99203 OFFICE O/P NEW LOW 30 MIN: CPT | Performed by: SURGERY

## 2025-05-14 NOTE — PROGRESS NOTES
ORTHOPAEDIC HAND, WRIST, AND ELBOW OFFICE  VISIT       ASSESSMENT/PLAN:      79 y.o. year old female who presents with    Assessment & Plan  Closed fracture of distal ends of left radius and ulna, initial encounter  XR were reviewed and discussed with the patient  Discussed Left wrist fracture is stable and will heal well  Can continue use of bilateral cock up splints. May remove to bathe  May remove R splint early if doing well.  Encouraged finger motion  NSAIDs as needed       Pain in right wrist  Cont with splint  May remove for hygiene  NSAIDs as needed for discomfort  May wean splint as tolerated             The patient verbalized understanding of exam findings and treatment plan. We engaged in the shared decision-making process and treatment options were discussed at length with the patient. Surgical and conservative management discussed today along with risks and benefits.        Follow Up:  Return in about 3 weeks (around 6/4/2025) for Recheck.    To Do Next Visit:  Re-evaluation of current issue          ____________________________________________________________________________________________________________________________________________      CHIEF COMPLAINT:  Chief Complaint   Patient presents with    Left Wrist - Pain    Right Wrist - Pain       SUBJECTIVE:  Alberta Sanchez is a 79 y.o. year old  female who presents for evaluation of bilateral wrist pain      Patient suffered a fall trying to get out of a gas station when her quad cane caught the mat at the door and she fell forward. She states that she is unsure what happened, whether the cane caught or her foot did, but she feel forward landing on her outstretched hand. She was take to the ED where her arms were XR  She was placed in bilateral cock up splints. She was told her Right wrist was just sprained but the left had a possible break in the wrist  She states her Right wrist is getting better but her left wrist is still painful  She states she  did have mild pain in the left wrist before the fall  She denies numbness into her hands    I have personally reviewed all the relevant PMH, PSH, SH, FH, Medications and allergies      PAST MEDICAL HISTORY:  Past Medical History:   Diagnosis Date    Abnormal finding in urine 1/9/2023    Allergic Springtime    Runny nose, congestion    Anemia     Arthritis 10/30/2020    Chronic kidney disease     Diabetes mellitus (HCC)     Fibromyalgia, primary     Fracture of wrist     RIGHT    Gastroenteritis 12/18/2024    Heart murmur 06/30/2020    History of non-insulin dependent diabetes mellitus     Hx of thrombocytopenia 02/18/2022    Hyperlipidemia     Hypertension     Hypertension     IBS (irritable bowel syndrome)     Inflammatory bowel disease 2015    Been doctoring for years    Irritable bowel     Kidney stone     Metabolic syndrome     Obesity     RLS (restless legs syndrome)     Routine medical exam 06/28/2019    Skin lesion 2/8/2021    Sleep apnea     Sleep apnea     ON CPAP    Upper respiratory tract infection 01/19/2022    Urge incontinence     Urinary incontinence     Urinary tract infection 1/1/2023       PAST SURGICAL HISTORY:  Past Surgical History:   Procedure Laterality Date    CHOLECYSTECTOMY  2013    COLONOSCOPY  2010    CYSTOSCOPY      HERNIA REPAIR  2008    HYSTERECTOMY N/A 1974    partial; ovaries intact    KNEE SURGERY Left        FAMILY HISTORY:  Family History   Problem Relation Age of Onset    Breast cancer Mother 62        Breast cancer    Suicidality Father     Melanoma Sister     Bipolar disorder Sister     Depression Sister         Bi-polar    Bipolar disorder Sister     Completed Suicide  Sister     Rheum arthritis Daughter     No Known Problems Daughter     No Known Problems Maternal Grandmother     No Known Problems Maternal Grandfather     No Known Problems Paternal Grandmother     No Known Problems Paternal Grandfather     Heart disease Brother     No Known Problems Maternal Aunt     No Known  Problems Maternal Aunt     BRCA2 Positive Neg Hx     BRCA1 Positive Neg Hx     BRCA2 Negative Neg Hx     BRCA1 Negative Neg Hx     BRCA 1/2 Neg Hx     Ovarian cancer Neg Hx     Endometrial cancer Neg Hx     Colon cancer Neg Hx     Breast cancer additional onset Neg Hx        SOCIAL HISTORY:  Social History[1]    MEDICATIONS:  Current Medications[2]    ALLERGIES:  Allergies[3]        REVIEW OF SYSTEMS:  Review of Systems   Constitutional:  Negative for chills and fever.   HENT:  Negative for ear pain and sore throat.    Eyes:  Negative for pain and visual disturbance.   Respiratory:  Negative for cough and shortness of breath.    Cardiovascular:  Negative for chest pain and palpitations.   Gastrointestinal:  Negative for abdominal pain and vomiting.   Genitourinary:  Negative for dysuria and hematuria.   Musculoskeletal:  Negative for arthralgias and back pain.   Skin:  Negative for color change and rash.   Neurological:  Negative for seizures and syncope.   All other systems reviewed and are negative.      VITALS:  There were no vitals filed for this visit.    LABS:  HgA1c:   Lab Results   Component Value Date    HGBA1C 6.7 (H) 03/11/2025     BMP:   Lab Results   Component Value Date    GLUCOSE 125 (H) 06/04/2018    CALCIUM 8.9 05/10/2025     06/04/2018    K 3.9 05/10/2025    CO2 25 05/10/2025     (H) 05/10/2025    BUN 29 (H) 05/10/2025    CREATININE 1.03 05/10/2025       _____________________________________________________  PHYSICAL EXAMINATION:  General: well developed and well nourished, alert, oriented times 3, and appears comfortable  Psychiatric: Normal  HEENT: Normocephalic, Atraumatic Trachea Midline, No torticollis  Pulmonary: No audible wheezing or respiratory distress   Abdomen/GI: Non tender, non distended   Cardiovascular: No pitting edema, 2+ radial pulse   Skin: No masses, erythema, lacerations, fluctation, ulcerations  Neurovascular: Sensation Intact to the Median, Ulnar, Radial Nerve,  Motor Intact to the Median, Ulnar, Radial Nerve, and Pulses Intact  Musculoskeletal: Normal, except as noted in detailed exam and in HPI.      MUSCULOSKELETAL EXAMINATION:  Right hand:  SILT  Composite fist    TTP at radial wrist      Left hand:  SILT  Composite fist    TTP at radial wrist    ___________________________________________________  STUDIES REVIEWED:      I have personally reviewed AP lateral and oblique radiographs of Left wrist   which demonstrate   Left distal radius fracture           PROCEDURES PERFORMED:  Procedures  No Procedures performed today    _____________________________________________________      Scribe Attestation      I,:  Mauro Jasen am acting as a scribe while in the presence of the attending physician.:       I,:  Wagner Lindo MD personally performed the services described in this documentation    as scribed in my presence.:                [1]   Social History  Tobacco Use    Smoking status: Never     Passive exposure: Never    Smokeless tobacco: Never   Vaping Use    Vaping status: Never Used   Substance Use Topics    Alcohol use: Not Currently     Comment: no caffeine use    Drug use: No   [2]   Current Outpatient Medications:     acetaminophen (TYLENOL) 325 mg tablet, Take by mouth as needed, Disp: , Rfl:     albuterol (ProAir HFA) 90 mcg/act inhaler, Inhale 2 puffs every 4 (four) hours as needed for wheezing, Disp: 18 g, Rfl: 0    B Complex Vitamins (B COMPLEX 1 PO), Take by mouth, Disp: , Rfl:     bismuth subsalicylate (PEPTO BISMOL) 262 MG chewable tablet, Chew 2 tablets (524 mg total) 2 (two) times a day as needed for diarrhea Has tolerated in the past., Disp: 30 tablet, Rfl: 0    Cholecalciferol (VITAMIN D3) 2000 units capsule, , Disp: , Rfl:     clotrimazole (LOTRIMIN) 1 % cream, Apply 1 g (1 Application total) topically 2 (two) times a day, Disp: 105 g, Rfl: 0    Contour Next Test test strip, Test blood sugar 3 times daily, Disp: 300 each, Rfl: 0    Diapers & Supplies MISC,  "Use 3 (three) times a day as needed (Incontinence), Disp: 28 each, Rfl: 11    Diclofenac Sodium (VOLTAREN) 1 %, Apply 2 g topically 4 (four) times a day, Disp: , Rfl:     estrogens, conjugated (Premarin) vaginal cream, INSERT 1 APPLICATORFUL VAGINALLY TWICE WEEKLY, Disp: 30 g, Rfl: 5    fenofibrate 160 MG tablet, TAKE 1 TABLET BY MOUTH  DAILY, Disp: 90 tablet, Rfl: 0    Ferrous Sulfate (IRON) 325 (65 Fe) MG TABS, Take by mouth, Disp: , Rfl:     insulin degludec (Tresiba FlexTouch) 100 units/mL injection pen, INJECT 70 UNITS SUBCUTANEOUSLY  DAILY AT BEDTIME, Disp: 75 mL, Rfl: 1    Insulin Pen Needle (BD Pen Needle [place] U/F) 32G X 4 MM MISC, INJECT SUBCUTANEOUSLY AS  DIRECTED 3 TIMES DAILY, Disp: 270 each, Rfl: 0    lisinopril (ZESTRIL) 10 mg tablet, TAKE 1 TABLET BY MOUTH DAILY, Disp: 90 tablet, Rfl: 1    Microlet Lancets MISC, Use 3 (three) times a day, Disp: 100 each, Rfl: 0    Misc Natural Products (Vena SolutionsX URINARY HEALTH PO), Take 1 capsule by mouth in the morning., Disp: , Rfl:     Multiple Vitamin (DAILY VALUE MULTIVITAMIN) TABS, Take by mouth, Disp: , Rfl:     pantoprazole (PROTONIX) 40 mg tablet, TAKE 1 TABLET BY MOUTH DAILY, Disp: 90 tablet, Rfl: 1    pregabalin (LYRICA) 75 mg capsule, Take 1 capsule (75 mg total) by mouth 3 (three) times a day, Disp: 270 capsule, Rfl: 0    Probiotic Product (Align) 4 MG CAPS, Take by mouth, Disp: , Rfl:     simvastatin (ZOCOR) 10 mg tablet, TAKE 1 TABLET BY MOUTH AT  BEDTIME, Disp: 90 tablet, Rfl: 3    solifenacin (VESICARE) 5 mg tablet, take 1 tablet by mouth once daily, Disp: 90 tablet, Rfl: 1    topiramate (TOPAMAX) 25 mg tablet, TAKE 1 TABLET BY MOUTH DAILY, Disp: 90 tablet, Rfl: 3  [3]   Allergies  Allergen Reactions    Pioglitazone Other (See Comments)     \"Feels like she is dying\"    Dapagliflozin Itching     Yeast infection     Ibuprofen     Levofloxacin Other (See Comments)     Other reaction(s): rash    Nsaids      "

## 2025-05-30 ENCOUNTER — HOSPITAL ENCOUNTER (INPATIENT)
Facility: HOSPITAL | Age: 80
LOS: 3 days | Discharge: NURSING, REHAB OR LONG-TERM CARE | End: 2025-06-03
Attending: EMERGENCY MEDICINE | Admitting: INTERNAL MEDICINE
Payer: MEDICARE

## 2025-05-30 ENCOUNTER — APPOINTMENT (EMERGENCY)
Dept: CT IMAGING | Facility: HOSPITAL | Age: 80
End: 2025-05-30
Payer: MEDICARE

## 2025-05-30 DIAGNOSIS — K92.1 BLACK STOOLS: ICD-10-CM

## 2025-05-30 DIAGNOSIS — K92.2 GI BLEEDING: ICD-10-CM

## 2025-05-30 DIAGNOSIS — R10.84 GENERALIZED ABDOMINAL PAIN: ICD-10-CM

## 2025-05-30 DIAGNOSIS — M25.531 RIGHT WRIST PAIN: ICD-10-CM

## 2025-05-30 DIAGNOSIS — R19.7 DIARRHEA, UNSPECIFIED TYPE: Primary | ICD-10-CM

## 2025-05-30 PROBLEM — I10 ESSENTIAL HYPERTENSION: Status: ACTIVE | Noted: 2025-05-30

## 2025-05-30 PROBLEM — E78.5 HYPERLIPIDEMIA: Status: ACTIVE | Noted: 2025-05-30

## 2025-05-30 PROBLEM — E11.43 CONTROLLED DIABETES MELLITUS WITH DIABETIC AUTONOMIC NEUROPATHY, WITH LONG-TERM CURRENT USE OF INSULIN (HCC): Status: ACTIVE | Noted: 2025-05-30

## 2025-05-30 PROBLEM — R93.5 ABNORMAL CT OF THE ABDOMEN: Status: ACTIVE | Noted: 2025-05-30

## 2025-05-30 PROBLEM — E87.6 HYPOKALEMIA: Status: ACTIVE | Noted: 2025-05-30

## 2025-05-30 PROBLEM — Z79.4 CONTROLLED DIABETES MELLITUS WITH DIABETIC AUTONOMIC NEUROPATHY, WITH LONG-TERM CURRENT USE OF INSULIN (HCC): Status: ACTIVE | Noted: 2025-05-30

## 2025-05-30 PROBLEM — N18.30 CKD (CHRONIC KIDNEY DISEASE) STAGE 3, GFR 30-59 ML/MIN (HCC): Status: ACTIVE | Noted: 2025-05-30

## 2025-05-30 LAB
ALBUMIN SERPL BCG-MCNC: 4.1 G/DL (ref 3.5–5)
ALP SERPL-CCNC: 84 U/L (ref 34–104)
ALT SERPL W P-5'-P-CCNC: 11 U/L (ref 7–52)
ANION GAP SERPL CALCULATED.3IONS-SCNC: 11 MMOL/L (ref 4–13)
APTT PPP: 36 SECONDS (ref 23–34)
AST SERPL W P-5'-P-CCNC: 17 U/L (ref 13–39)
BACTERIA UR QL AUTO: ABNORMAL /HPF
BASOPHILS # BLD AUTO: 0.02 THOUSANDS/ÂΜL (ref 0–0.1)
BASOPHILS NFR BLD AUTO: 0 % (ref 0–1)
BILIRUB SERPL-MCNC: 0.58 MG/DL (ref 0.2–1)
BILIRUB UR QL STRIP: NEGATIVE
BUN SERPL-MCNC: 12 MG/DL (ref 5–25)
CALCIUM SERPL-MCNC: 9.6 MG/DL (ref 8.4–10.2)
CHLORIDE SERPL-SCNC: 105 MMOL/L (ref 96–108)
CLARITY UR: CLEAR
CO2 SERPL-SCNC: 25 MMOL/L (ref 21–32)
COLOR UR: YELLOW
CREAT SERPL-MCNC: 0.8 MG/DL (ref 0.6–1.3)
EOSINOPHIL # BLD AUTO: 0.03 THOUSAND/ÂΜL (ref 0–0.61)
EOSINOPHIL NFR BLD AUTO: 0 % (ref 0–6)
ERYTHROCYTE [DISTWIDTH] IN BLOOD BY AUTOMATED COUNT: 13.7 % (ref 11.6–15.1)
GFR SERPL CREATININE-BSD FRML MDRD: 69 ML/MIN/1.73SQ M
GLUCOSE SERPL-MCNC: 136 MG/DL (ref 65–140)
GLUCOSE SERPL-MCNC: 94 MG/DL (ref 65–140)
GLUCOSE SERPL-MCNC: 99 MG/DL (ref 65–140)
GLUCOSE UR STRIP-MCNC: NEGATIVE MG/DL
HCT VFR BLD AUTO: 40 % (ref 34.8–46.1)
HEMOCCULT STL QL IA: POSITIVE
HGB BLD-MCNC: 13 G/DL (ref 11.5–15.4)
HGB UR QL STRIP.AUTO: ABNORMAL
IMM GRANULOCYTES # BLD AUTO: 0.04 THOUSAND/UL (ref 0–0.2)
IMM GRANULOCYTES NFR BLD AUTO: 0 % (ref 0–2)
INR PPP: 1.09 (ref 0.85–1.19)
KETONES UR STRIP-MCNC: ABNORMAL MG/DL
LEUKOCYTE ESTERASE UR QL STRIP: NEGATIVE
LIPASE SERPL-CCNC: 8 U/L (ref 11–82)
LYMPHOCYTES # BLD AUTO: 0.93 THOUSANDS/ÂΜL (ref 0.6–4.47)
LYMPHOCYTES NFR BLD AUTO: 10 % (ref 14–44)
MCH RBC QN AUTO: 29.3 PG (ref 26.8–34.3)
MCHC RBC AUTO-ENTMCNC: 32.5 G/DL (ref 31.4–37.4)
MCV RBC AUTO: 90 FL (ref 82–98)
MONOCYTES # BLD AUTO: 0.43 THOUSAND/ÂΜL (ref 0.17–1.22)
MONOCYTES NFR BLD AUTO: 5 % (ref 4–12)
NEUTROPHILS # BLD AUTO: 7.65 THOUSANDS/ÂΜL (ref 1.85–7.62)
NEUTS SEG NFR BLD AUTO: 85 % (ref 43–75)
NITRITE UR QL STRIP: POSITIVE
NON-SQ EPI CELLS URNS QL MICRO: ABNORMAL /HPF
NRBC BLD AUTO-RTO: 0 /100 WBCS
PH UR STRIP.AUTO: 6 [PH]
PLATELET # BLD AUTO: 156 THOUSANDS/UL (ref 149–390)
PMV BLD AUTO: 11 FL (ref 8.9–12.7)
POTASSIUM SERPL-SCNC: 3.4 MMOL/L (ref 3.5–5.3)
PROT SERPL-MCNC: 6.8 G/DL (ref 6.4–8.4)
PROT UR STRIP-MCNC: NEGATIVE MG/DL
PROTHROMBIN TIME: 14.6 SECONDS (ref 12.3–15)
RBC # BLD AUTO: 4.43 MILLION/UL (ref 3.81–5.12)
RBC #/AREA URNS AUTO: ABNORMAL /HPF
SODIUM SERPL-SCNC: 141 MMOL/L (ref 135–147)
SP GR UR STRIP.AUTO: 1.01
UROBILINOGEN UR QL STRIP.AUTO: 0.2 E.U./DL
WBC # BLD AUTO: 9.1 THOUSAND/UL (ref 4.31–10.16)
WBC #/AREA URNS AUTO: ABNORMAL /HPF

## 2025-05-30 PROCEDURE — 87505 NFCT AGENT DETECTION GI: CPT | Performed by: INTERNAL MEDICINE

## 2025-05-30 PROCEDURE — 99285 EMERGENCY DEPT VISIT HI MDM: CPT | Performed by: EMERGENCY MEDICINE

## 2025-05-30 PROCEDURE — 81001 URINALYSIS AUTO W/SCOPE: CPT | Performed by: EMERGENCY MEDICINE

## 2025-05-30 PROCEDURE — 74177 CT ABD & PELVIS W/CONTRAST: CPT

## 2025-05-30 PROCEDURE — 99284 EMERGENCY DEPT VISIT MOD MDM: CPT

## 2025-05-30 PROCEDURE — 85025 COMPLETE CBC W/AUTO DIFF WBC: CPT | Performed by: EMERGENCY MEDICINE

## 2025-05-30 PROCEDURE — 83690 ASSAY OF LIPASE: CPT | Performed by: EMERGENCY MEDICINE

## 2025-05-30 PROCEDURE — 96361 HYDRATE IV INFUSION ADD-ON: CPT

## 2025-05-30 PROCEDURE — 85610 PROTHROMBIN TIME: CPT | Performed by: EMERGENCY MEDICINE

## 2025-05-30 PROCEDURE — 99223 1ST HOSP IP/OBS HIGH 75: CPT | Performed by: INTERNAL MEDICINE

## 2025-05-30 PROCEDURE — 82948 REAGENT STRIP/BLOOD GLUCOSE: CPT

## 2025-05-30 PROCEDURE — 96374 THER/PROPH/DIAG INJ IV PUSH: CPT

## 2025-05-30 PROCEDURE — 96360 HYDRATION IV INFUSION INIT: CPT

## 2025-05-30 PROCEDURE — 85730 THROMBOPLASTIN TIME PARTIAL: CPT | Performed by: EMERGENCY MEDICINE

## 2025-05-30 PROCEDURE — 80053 COMPREHEN METABOLIC PANEL: CPT | Performed by: EMERGENCY MEDICINE

## 2025-05-30 PROCEDURE — G0328 FECAL BLOOD SCRN IMMUNOASSAY: HCPCS | Performed by: EMERGENCY MEDICINE

## 2025-05-30 PROCEDURE — 36415 COLL VENOUS BLD VENIPUNCTURE: CPT | Performed by: EMERGENCY MEDICINE

## 2025-05-30 RX ORDER — TOPIRAMATE 25 MG/1
25 TABLET, FILM COATED ORAL DAILY
Status: DISCONTINUED | OUTPATIENT
Start: 2025-05-31 | End: 2025-06-03 | Stop reason: HOSPADM

## 2025-05-30 RX ORDER — FENOFIBRATE 145 MG/1
145 TABLET, FILM COATED ORAL DAILY
Status: DISCONTINUED | OUTPATIENT
Start: 2025-05-31 | End: 2025-06-03 | Stop reason: HOSPADM

## 2025-05-30 RX ORDER — ACETAMINOPHEN 325 MG/1
650 TABLET ORAL EVERY 6 HOURS PRN
Status: DISCONTINUED | OUTPATIENT
Start: 2025-05-30 | End: 2025-06-03 | Stop reason: HOSPADM

## 2025-05-30 RX ORDER — HYDRALAZINE HYDROCHLORIDE 20 MG/ML
5 INJECTION INTRAMUSCULAR; INTRAVENOUS EVERY 6 HOURS PRN
Status: DISCONTINUED | OUTPATIENT
Start: 2025-05-30 | End: 2025-06-03 | Stop reason: HOSPADM

## 2025-05-30 RX ORDER — PRAVASTATIN SODIUM 20 MG
20 TABLET ORAL
Status: DISCONTINUED | OUTPATIENT
Start: 2025-05-30 | End: 2025-06-03 | Stop reason: HOSPADM

## 2025-05-30 RX ORDER — METRONIDAZOLE 500 MG/100ML
500 INJECTION, SOLUTION INTRAVENOUS EVERY 12 HOURS
Status: DISCONTINUED | OUTPATIENT
Start: 2025-05-30 | End: 2025-06-01

## 2025-05-30 RX ORDER — LISINOPRIL 10 MG/1
10 TABLET ORAL DAILY
Status: DISCONTINUED | OUTPATIENT
Start: 2025-05-31 | End: 2025-06-03 | Stop reason: HOSPADM

## 2025-05-30 RX ORDER — HYDROMORPHONE HCL IN WATER/PF 6 MG/30 ML
0.2 PATIENT CONTROLLED ANALGESIA SYRINGE INTRAVENOUS EVERY 6 HOURS PRN
Status: DISCONTINUED | OUTPATIENT
Start: 2025-05-30 | End: 2025-06-03 | Stop reason: HOSPADM

## 2025-05-30 RX ORDER — INSULIN GLARGINE 100 [IU]/ML
70 INJECTION, SOLUTION SUBCUTANEOUS
Status: DISCONTINUED | OUTPATIENT
Start: 2025-05-30 | End: 2025-06-03 | Stop reason: HOSPADM

## 2025-05-30 RX ORDER — ONDANSETRON 2 MG/ML
4 INJECTION INTRAMUSCULAR; INTRAVENOUS EVERY 4 HOURS PRN
Status: DISCONTINUED | OUTPATIENT
Start: 2025-05-30 | End: 2025-06-03 | Stop reason: HOSPADM

## 2025-05-30 RX ORDER — ALBUTEROL SULFATE 90 UG/1
2 INHALANT RESPIRATORY (INHALATION) EVERY 4 HOURS PRN
Status: DISCONTINUED | OUTPATIENT
Start: 2025-05-30 | End: 2025-06-03 | Stop reason: HOSPADM

## 2025-05-30 RX ORDER — INSULIN LISPRO 100 [IU]/ML
1-6 INJECTION, SOLUTION INTRAVENOUS; SUBCUTANEOUS
Status: DISCONTINUED | OUTPATIENT
Start: 2025-05-30 | End: 2025-06-03 | Stop reason: HOSPADM

## 2025-05-30 RX ORDER — HYDROMORPHONE HCL IN WATER/PF 6 MG/30 ML
0.2 PATIENT CONTROLLED ANALGESIA SYRINGE INTRAVENOUS
Status: DISCONTINUED | OUTPATIENT
Start: 2025-05-30 | End: 2025-06-03 | Stop reason: HOSPADM

## 2025-05-30 RX ORDER — POTASSIUM CHLORIDE 1500 MG/1
40 TABLET, EXTENDED RELEASE ORAL ONCE
Status: COMPLETED | OUTPATIENT
Start: 2025-05-30 | End: 2025-05-30

## 2025-05-30 RX ORDER — HYDROMORPHONE HCL/PF 1 MG/ML
0.5 SYRINGE (ML) INJECTION EVERY 6 HOURS PRN
Status: DISCONTINUED | OUTPATIENT
Start: 2025-05-30 | End: 2025-06-03 | Stop reason: HOSPADM

## 2025-05-30 RX ORDER — PANTOPRAZOLE SODIUM 40 MG/1
40 TABLET, DELAYED RELEASE ORAL DAILY
Status: DISCONTINUED | OUTPATIENT
Start: 2025-05-31 | End: 2025-06-03 | Stop reason: HOSPADM

## 2025-05-30 RX ORDER — PREGABALIN 75 MG/1
75 CAPSULE ORAL 3 TIMES DAILY
Status: DISCONTINUED | OUTPATIENT
Start: 2025-05-30 | End: 2025-06-03 | Stop reason: HOSPADM

## 2025-05-30 RX ORDER — NYSTATIN 100000 [USP'U]/G
POWDER TOPICAL 2 TIMES DAILY
Status: DISCONTINUED | OUTPATIENT
Start: 2025-05-30 | End: 2025-06-03 | Stop reason: HOSPADM

## 2025-05-30 RX ORDER — OXYBUTYNIN CHLORIDE 5 MG/1
5 TABLET, EXTENDED RELEASE ORAL DAILY
Status: DISCONTINUED | OUTPATIENT
Start: 2025-05-31 | End: 2025-06-03 | Stop reason: HOSPADM

## 2025-05-30 RX ADMIN — SODIUM CHLORIDE 1000 ML: 0.9 INJECTION, SOLUTION INTRAVENOUS at 12:35

## 2025-05-30 RX ADMIN — MORPHINE SULFATE 2 MG: 2 INJECTION, SOLUTION INTRAMUSCULAR; INTRAVENOUS at 12:35

## 2025-05-30 RX ADMIN — HYDRALAZINE HYDROCHLORIDE 5 MG: 20 INJECTION INTRAMUSCULAR; INTRAVENOUS at 18:30

## 2025-05-30 RX ADMIN — CEFTRIAXONE SODIUM 1000 MG: 10 INJECTION, POWDER, FOR SOLUTION INTRAVENOUS at 19:50

## 2025-05-30 RX ADMIN — ACETAMINOPHEN 650 MG: 325 TABLET ORAL at 18:31

## 2025-05-30 RX ADMIN — POTASSIUM CHLORIDE 40 MEQ: 1500 TABLET, EXTENDED RELEASE ORAL at 18:32

## 2025-05-30 RX ADMIN — PREGABALIN 75 MG: 75 CAPSULE ORAL at 20:57

## 2025-05-30 RX ADMIN — IOHEXOL 100 ML: 350 INJECTION, SOLUTION INTRAVENOUS at 13:25

## 2025-05-30 RX ADMIN — NYSTATIN: 100000 POWDER TOPICAL at 21:03

## 2025-05-30 RX ADMIN — METRONIDAZOLE 500 MG: 500 INJECTION, SOLUTION INTRAVENOUS at 18:34

## 2025-05-30 RX ADMIN — PRAVASTATIN SODIUM 20 MG: 20 TABLET ORAL at 18:31

## 2025-05-30 NOTE — ASSESSMENT & PLAN NOTE
Lab Results   Component Value Date    HGBA1C 6.7 (H) 03/11/2025     Continue basal insulin with additional SSI coverage per Accu-Cheks  Carbohydrate restriction once back on solid diet  Hypoglycemia protocol  On Lyrica for diabetic neuropathy

## 2025-05-30 NOTE — ASSESSMENT & PLAN NOTE
Ongoing issue for approximately 3-4 days without provoking factor  Chronic oral iron use noted, however, only acknowledges mild darkness of stools on onset of use years ago - denies current use of bismuth containing compounds  Suspect possible viral etiology, however, in the setting of stool discoloration, will await gastroenterology input -> remains on clear liquids currently with plan for NPO after midnight, in case scoping is warranted  Hemoglobin stable -> continue to monitor  Avoiding pharmacologic VTE prophylaxis  Consider colitis ?? ->  IV Rocephin/Flagyl for now -> await stool cultures and C. difficile PCR  Monitor/replete electrolytes from insensible losses from loose stools

## 2025-05-30 NOTE — ASSESSMENT & PLAN NOTE
Continue Zestril - additional PRN IV Hydralazine on board for BP spikes  Low sodium restriction, once back on solid diet

## 2025-05-30 NOTE — ASSESSMENT & PLAN NOTE
"CT report noting: \"Mild bladder wall thickening with trace pericystic inflammatory change. Tiny foci of intraluminal gas. Correlation with urinalysis advised for cystitis.\"  Urinalysis notable for positive nitrites and moderate bacteriuria but negative for pyuria -> already on IV Rocephin for possible colitis (see above)  "

## 2025-05-30 NOTE — ASSESSMENT & PLAN NOTE
Baseline creatinine of approximately 0.8-1.0 -> currently stable at baseline  Monitor renal function and urine output  Limit/avoid nephrotoxins and hypotension as possible

## 2025-05-30 NOTE — H&P
"H&P - Hospitalist   Name: Alberta Sanchez 79 y.o. female I MRN: [id number]  Unit/Bed#: -01 I Date of Admission: 5/30/2025   Date of Service: 5/30/2025 I Hospital Day: 0     Assessment & Plan  Black stools with diarrhea  Ongoing issue for approximately 3-4 days without provoking factor  Chronic oral iron use noted, however, only acknowledges mild darkness of stools on onset of use years ago - denies current use of bismuth containing compounds  Suspect possible viral etiology, however, in the setting of stool discoloration, will await gastroenterology input -> remains on clear liquids currently with plan for NPO after midnight, in case scoping is warranted  Hemoglobin stable -> continue to monitor  Avoiding pharmacologic VTE prophylaxis  Consider colitis ?? ->  IV Rocephin/Flagyl for now -> await stool cultures and C. difficile PCR  Monitor/replete electrolytes from insensible losses from loose stools  Hypokalemia  Monitor/replete serum potassium, and magnesium, if necessary  Abnormal CT of the abdomen  CT report noting: \"Mild bladder wall thickening with trace pericystic inflammatory change. Tiny foci of intraluminal gas. Correlation with urinalysis advised for cystitis.\"  Urinalysis notable for positive nitrites and moderate bacteriuria but negative for pyuria -> already on IV Rocephin for possible colitis (see above)  Essential hypertension  Continue Zestril - additional PRN IV Hydralazine on board for BP spikes  Low sodium restriction, once back on solid diet  Insulin-dependent diabetes mellitus with neuropathy (HCC)  Lab Results   Component Value Date    HGBA1C 6.7 (H) 03/11/2025     Continue basal insulin with additional SSI coverage per Accu-Cheks  Carbohydrate restriction once back on solid diet  Hypoglycemia protocol  On Lyrica for diabetic neuropathy  CKD (chronic kidney disease) stage 3 (HCC)  Baseline creatinine of approximately 0.8-1.0 -> currently stable at baseline  Monitor renal function and " "urine output  Limit/avoid nephrotoxins and hypotension as possible  Hyperlipidemia  Continue statin/fibrate  Morbid obesity (HCC)  BMI of 37.97  Lifestyle/diet modifications      VTE Pharmacologic Prophylaxis: VTE Score: 5 High Risk (Score >/= 5) - Pharmacological DVT Prophylaxis Contraindicated. Sequential Compression Devices Ordered.    Code Status: Level 3 - DNAR and DNI    Discussion with: Patient, along with son, at bedside today    Anticipated Length of Stay:  Patient will be admitted on an Observation basis with an anticipated length of stay of less than 2 midnights.   Justification for Hospital Stay: Nausea with diarrhea/black stools requiring supportive care, and gastroenterology evaluation.    Total Time for Visit (including Counseling & Coordination of Care):  75 minutes. More than 50% of total time spent on counseling and coordination of care, on one or more of the following: performing physical exam; counseling and coordination of care; obtaining or reviewing history; documenting in the medical record; reviewing/ordering tests, medications or procedures; communicating with other healthcare professionals and discussing with patient's family/caregivers.      History of Present Illness    Chief Complaint:  Nausea with diarrhea and black stools    Alberta Sanchez is a 79 y.o. female who presents with complaints of nausea with dry heaving and diarrhea over the last 3 to 4 days with endorsement of \"black stools\".  Denies pain associated with defecation, however, did report to the ED complaining of generalized abdominal discomfort.  Denies any recent travel or exposure to new/undercooked foods.  Denies any recent sick contacts or recent antibiotic use.  Of note, she was recently hospitalized for a fall and sustained a left wrist fracture and right wrist sprain as a sequela, currently wearing splints.  She also endorses use of oral iron supplementation for many years and states she only had dark stools with " this in the very beginning.  Denies any antiplatelet agents or anticoagulant use.  Son is present at bedside.  Otherwise, she states she is in her usual state of health.  Denies fevers but acknowledges some chills, which she states to a degree is chronic.  Overall, she remains pleasant, and hopeful spirits.    Review of Systems:  A thorough 12 point review systems was conducted.  Pertinent positives and negatives are mentioned in the history of present illness.      Past Medical & Surgical History    Past Medical History[1]    Past Surgical History[2]      Medications:   Prior to Admission medications    Medication Sig Start Date End Date Taking? Authorizing Provider   acetaminophen (TYLENOL) 325 mg tablet Take by mouth as needed    Historical Provider, MD   albuterol (ProAir HFA) 90 mcg/act inhaler Inhale 2 puffs every 4 (four) hours as needed for wheezing 4/19/25   Dulce Michaud MD   B Complex Vitamins (B COMPLEX 1 PO) Take by mouth    Historical Provider, MD   bismuth subsalicylate (PEPTO BISMOL) 262 MG chewable tablet Chew 2 tablets (524 mg total) 2 (two) times a day as needed for diarrhea Has tolerated in the past. 7/16/21   April Flores MD   Cholecalciferol (VITAMIN D3) 2000 units capsule  5/6/13   Historical Provider, MD   clotrimazole (LOTRIMIN) 1 % cream Apply 1 g (1 Application total) topically 2 (two) times a day 4/21/25   Dulce Michaud MD   Contour Next Test test strip Test blood sugar 3 times daily 4/19/25   Dulce Michaud MD   Diapers & Supplies MISC Use 3 (three) times a day as needed (Incontinence) 7/22/24   HERMANN Lenz   Diclofenac Sodium (VOLTAREN) 1 % Apply 2 g topically 4 (four) times a day 12/23/24   Colin Escobar DO   estrogens, conjugated (Premarin) vaginal cream INSERT 1 APPLICATORFUL VAGINALLY TWICE WEEKLY 4/15/24   Dulce Michaud MD   fenofibrate 160 MG tablet TAKE 1 TABLET BY MOUTH  DAILY 10/5/22   Dulce Michaud MD   insulin degludec (Tresiba FlexTouch) 100 units/mL injection  "pen INJECT 70 UNITS SUBCUTANEOUSLY  DAILY AT BEDTIME 1/3/25   Dulce Michaud MD   Insulin Pen Needle (BD Pen Needle [place] U/F) 32G X 4 MM MISC INJECT SUBCUTANEOUSLY AS  DIRECTED 3 TIMES DAILY 4/19/25   Dulce Michaud MD   lisinopril (ZESTRIL) 10 mg tablet TAKE 1 TABLET BY MOUTH DAILY 12/30/24   Dulce Michaud MD   Microlet Lancets MISC Use 3 (three) times a day 4/19/25   Dulce Michaud MD   Misc Natural Products (CYSTEX URINARY HEALTH PO) Take 1 capsule by mouth in the morning.    Historical Provider, MD   Multiple Vitamin (DAILY VALUE MULTIVITAMIN) TABS Take by mouth    Historical Provider, MD   pantoprazole (PROTONIX) 40 mg tablet TAKE 1 TABLET BY MOUTH DAILY 1/28/25   Dulce Michaud MD   pregabalin (LYRICA) 75 mg capsule Take 1 capsule (75 mg total) by mouth 3 (three) times a day 4/21/25   Dulce Michaud MD   Probiotic Product (Align) 4 MG CAPS Take by mouth    Historical Provider, MD   simvastatin (ZOCOR) 10 mg tablet TAKE 1 TABLET BY MOUTH AT  BEDTIME 11/28/24   Dulce Michaud MD   solifenacin (VESICARE) 5 mg tablet take 1 tablet by mouth once daily 4/9/25   HERMANN Lenz   topiramate (TOPAMAX) 25 mg tablet TAKE 1 TABLET BY MOUTH DAILY 3/16/25   Dulce Michaud MD   Ferrous Sulfate (IRON) 325 (65 Fe) MG TABS Take by mouth  5/30/25  Historical Provider, MD       Allergies: Allergies[3]      Social & Family History    Social History     Substance and Sexual Activity   Alcohol Use Not Currently    Comment: no caffeine use     Tobacco Use History[4]  Social History     Substance and Sexual Activity   Drug Use No       Family History[5]      Objective     Vitals:   Blood Pressure: (!) 191/81 (05/30/25 1724)  Pulse: 65 (05/30/25 1724)  Temperature: 98.5 °F (36.9 °C) (05/30/25 1724)  Temp Source: Temporal (05/30/25 1704)  Respirations: 20 (05/30/25 1704)  Height: 5' 5\" (165.1 cm) (05/30/25 1718)  Weight - Scale: 104 kg (228 lb 2.8 oz) (05/30/25 1718)  SpO2: 95 % (05/30/25 1724)      Physical Exam:    GENERAL Mildly " weak/fatigued   HEAD   Normocephalic - atraumatic   EYES Nonicteric   MOUTH   Mucosa moist   NECK   Supple - full range of motion   CARDIAC   Regular rate/rhythm - S1/S2 positive   PULMONARY Fairly clear to auscultation   ABDOMEN Nondistended with generalized tenderness prominent in the lower quadrants   MUSCULOSKELETAL   Motor strength/range of motion mildly deconditioned - bilateral wrists in splints   NEUROLOGIC   Alert/oriented at baseline   PSYCHIATRIC   Mood/affect stable         Labs & Recent Cultures:  Results from last 7 days   Lab Units 05/30/25  1235   WBC Thousand/uL 9.10   HEMOGLOBIN g/dL 13.0   HEMATOCRIT % 40.0   PLATELETS Thousands/uL 156   SEGS PCT % 85*   LYMPHO PCT % 10*   MONO PCT % 5   EOS PCT % 0     Results from last 7 days   Lab Units 05/30/25  1235   SODIUM mmol/L 141   POTASSIUM mmol/L 3.4*   CHLORIDE mmol/L 105   CO2 mmol/L 25   BUN mg/dL 12   CREATININE mg/dL 0.80   ANION GAP mmol/L 11   CALCIUM mg/dL 9.6   ALBUMIN g/dL 4.1   TOTAL BILIRUBIN mg/dL 0.58   ALK PHOS U/L 84   ALT U/L 11   AST U/L 17   GLUCOSE RANDOM mg/dL 136     Results from last 7 days   Lab Units 05/30/25  1235   INR  1.09     Results from last 7 days   Lab Units 05/30/25  1815   POC GLUCOSE mg/dl 99                         Lines/Drains:  Invasive Devices       Peripheral Intravenous Line  Duration             Peripheral IV 05/30/25 Distal;Left;Upper;Ventral (anterior) Arm <1 day                      Imaging:     CT abdomen pelvis with contrast  Result Date: 5/30/2025  Narrative: CT ABDOMEN AND PELVIS WITH IV CONTRAST INDICATION: Diarrhea and Abd PAin. . COMPARISON: CT chest abdomen pelvis 5/10/2025 TECHNIQUE: CT examination of the abdomen and pelvis was performed. Multiplanar 2D reformatted images were created from the source data. This examination, like all CT scans performed in the [facility], was performed utilizing techniques to minimize radiation dose exposure, including the use of iterative  reconstruction and automated exposure control. Radiation dose length product (DLP) for this visit: 1307.19 mGy-cm. IV Contrast: 100 mL of iohexol (OMNIPAQUE) Enteric Contrast: Not administered. FINDINGS: ABDOMEN LOWER CHEST: Bibasilar hypoventilatory change. LIVER/BILIARY TREE: Unremarkable. GALLBLADDER: Cholecystectomy SPLEEN: Unremarkable. PANCREAS: Unremarkable. ADRENAL GLANDS: Subcentimeter gross fat-containing left adrenal lesion consistent with a benign myelolipoma, no specific follow-up recommended. KIDNEYS/URETERS: No hydronephrosis or urinary tract calculi. Subcentimeter hypoattenuating renal lesion(s), too small to characterize but statistically likely benign, which do not warrant follow-up (Radiology June 2019). STOMACH AND BOWEL: Colonic diverticulosis without findings of acute diverticulitis. Stomach and small bowel unremarkable. APPENDIX: No findings to suggest appendicitis. ABDOMINOPELVIC CAVITY: No ascites. No pneumoperitoneum. No lymphadenopathy. VESSELS: Unremarkable for patient's age. PELVIS REPRODUCTIVE ORGANS: Post hysterectomy. URINARY BLADDER: Mild wall thickening with trace pericystic inflammatory change. Tiny focus of intraluminal gas ABDOMINAL WALL/INGUINAL REGIONS: Unremarkable. BONES: No acute fracture or suspicious osseous lesion.     Impression: Mild bladder wall thickening with trace pericystic inflammatory change. Tiny foci of intraluminal gas. Correlation with urinalysis advised for cystitis. Colonic diverticulosis without evidence of active inflammation. Otherwise stomach and bowel unremarkable. The study was marked in EPIC for immediate notification. Workstation performed: [id number]               MAGDIEL FARAH MD   Hospitalist - [facility] Internal Medicine        ** Please Note:  Documentation is constructed using a voice recognition dictation system.  An occasional wrong word/phrase or “sound-a-like” substitution may have been picked up by dictation device due to the inherent  "limitations of voice recognition software.  Read the chart carefully and recognize, using reasonable context, where substitutions may have occurred.**          [1]   Past Medical History:  Diagnosis Date    Abnormal finding in urine 1/9/2023    Allergic Springtime    Runny nose, congestion    Anemia     Arthritis 10/30/2020    Chronic kidney disease     Diabetes mellitus (HCC)     Fibromyalgia, primary     Fracture of wrist     RIGHT    Gastroenteritis 12/18/2024    Heart murmur 06/30/2020    History of non-insulin dependent diabetes mellitus     Hx of thrombocytopenia 02/18/2022    Hyperlipidemia     Hypertension     Hypertension     IBS (irritable bowel syndrome)     Inflammatory bowel disease 2015    Been doctoring for years    Irritable bowel     Kidney stone     Metabolic syndrome     Obesity     RLS (restless legs syndrome)     Routine medical exam 06/28/2019    Skin lesion 2/8/2021    Sleep apnea     Sleep apnea     ON CPAP    Upper respiratory tract infection 01/19/2022    Urge incontinence     Urinary incontinence     Urinary tract infection 1/1/2023   [2]   Past Surgical History:  Procedure Laterality Date    CHOLECYSTECTOMY  2013    COLONOSCOPY  2010    CYSTOSCOPY      HERNIA REPAIR  2008    HYSTERECTOMY N/A 1974    partial; ovaries intact    KNEE SURGERY Left    [3]   Allergies  Allergen Reactions    Pioglitazone Other (See Comments)     \"Feels like she is dying\"    Dapagliflozin Itching     Yeast infection     Ibuprofen     Levofloxacin Other (See Comments)     Other reaction(s): rash    Nsaids    [4]   Social History  Tobacco Use   Smoking Status Never    Passive exposure: Never   Smokeless Tobacco Never   [5]   Family History  Problem Relation Name Age of Onset    Breast cancer Mother Nora brody Live 62        Breast cancer    Suicidality Father      Melanoma Sister anthony     Bipolar disorder Sister anthony     Depression Sister rupali         Bi-polar    Bipolar disorder Sister rupali     Completed " Suicide  Sister rupali     Rheum arthritis Daughter kai     No Known Problems Daughter payal     No Known Problems Maternal Grandmother      No Known Problems Maternal Grandfather      No Known Problems Paternal Grandmother      No Known Problems Paternal Grandfather      Heart disease Brother Warren     No Known Problems Maternal Aunt christiana     No Known Problems Maternal Aunt mary     BRCA2 Positive Neg Hx      BRCA1 Positive Neg Hx      BRCA2 Negative Neg Hx      BRCA1 Negative Neg Hx      BRCA 1/2 Neg Hx      Ovarian cancer Neg Hx      Endometrial cancer Neg Hx      Colon cancer Neg Hx      Breast cancer additional onset Neg Hx

## 2025-05-30 NOTE — ED PROVIDER NOTES
Time reflects when diagnosis was documented in both MDM as applicable and the Disposition within this note       Time User Action Codes Description Comment    5/30/2025  4:15 PM Kris Callaway [R19.7] Diarrhea, unspecified type     5/30/2025  4:15 PM Kris Callaway [K92.2] GI bleeding     5/30/2025  4:15 PM Kris Callaway [R10.84] Generalized abdominal pain           ED Disposition       ED Disposition   Admit    Condition   Stable    Date/Time   Fri May 30, 2025  4:15 PM    Comment   Case was discussed with Dr. Last and the patient's admission status was agreed to be Admission Status: observation status to the service of Dr. Last.               Assessment & Plan       Medical Decision Making  79-year-old female presents emergency department complaining of generalized abdominal pain and diarrhea which is black in color that has been ongoing for the past few days.  Patient notes that the pain is getting worse prompting her to call the ambulance to come to the hospital.  The patient's significant other who lives with her currently is in rehab.  Patient denies fever but admits to chills.  Patient is having nausea but no vomiting today.  Patient is tender in all 4 quadrants on my evaluation and differential diagnosis based on my evaluation is colitis, viral gastroenteritis, or intra-abdominal pathology such as perforation mass or pancreatitis.  Patient's lab studies appear to be nonacute however Hemoccult is positive at this time.  Patient had some relief with morphine but states she is still having pain.  Patient was given IV fluids.  The patient notes that she currently lives by herself as her significant other is in rehab and feels that she cannot care for herself at this point.  Due to the patient having GI bleeding, the patient will be monitored with IV fluids pain control and repeat H&H's.  Patient is not on blood thinners.  Patient will be observed in the hospital under the [facility]  service.    Amount and/or Complexity of Data Reviewed  Labs: ordered. Decision-making details documented in ED Course.  Radiology: ordered.    Risk  Prescription drug management.  Decision regarding hospitalization.        ED Course as of 05/30/25 1658   Fri May 30, 2025   1441 Patient notes some improvement after morphine and some IV fluids.  Patient still has some generalized lower abdominal discomfort.  CT scan shows bladder wall thickening but otherwise no other definitive acute abnormalities.  Patient will have a urinalysis to evaluate for possibility of infectious process.   1441 Rectal exam done at bedside.  No masses appreciated.   1614 Ketones, UA(!): 15 (1+)       Medications   sodium chloride 0.9 % bolus 1,000 mL (0 mL Intravenous Stopped 5/30/25 1424)   morphine injection 2 mg (2 mg Intravenous Given 5/30/25 1235)   iohexol (OMNIPAQUE) 350 MG/ML injection (MULTI-DOSE) 100 mL (100 mL Intravenous Given 5/30/25 1325)       ED Risk Strat Scores                    No data recorded        SBIRT 20yo+      Flowsheet Row Most Recent Value   Initial Alcohol Screen: US AUDIT-C     1. How often do you have a drink containing alcohol? 0 Filed at: 05/30/2025 1205   2. How many drinks containing alcohol do you have on a typical day you are drinking?  0 Filed at: 05/30/2025 1205   3b. FEMALE Any Age, or MALE 65+: How often do you have 4 or more drinks on one occassion? 0 Filed at: 05/30/2025 1205   Audit-C Score 0 Filed at: 05/30/2025 1205   DEONDRE: How many times in the past year have you...    Used an illegal drug or used a prescription medication for non-medical reasons? Never Filed at: 05/30/2025 1205                            History of Present Illness       Chief Complaint   Patient presents with    Nausea    Vomiting    Diarrhea     According to the patient, she has had nause vomiting and diarrhea for the last 3 days.  Patient reports abdominal pains in the lower quadrants.       Past Medical History[1]   Past  Surgical History[2]   Family History[3]   Social History[4]   E-Cigarette/Vaping    E-Cigarette Use Never User       E-Cigarette/Vaping Substances    Nicotine No     THC No     CBD No     Flavoring No     Other No     Unknown No       I have reviewed and agree with the history as documented.     Old female presents the emergency department secondary to lower quadrant abdominal pain as well as diarrhea for 3 to 4 days.  The patient denies fever but admits to chills.  Patient has generalized abdominal pain and lives by herself so she called the ambulance to come into the ER.  The patient has a visiting nurse who also checks on her once a week.  Patient states her stools are dark or black but she is not on chronic iron tablets.  Patient is afebrile at this time        Review of Systems   Constitutional:  Positive for activity change. Negative for chills and fever.   HENT:  Negative for ear pain and sore throat.    Eyes:  Negative for pain and visual disturbance.   Respiratory:  Negative for cough and shortness of breath.    Cardiovascular:  Negative for chest pain and palpitations.   Gastrointestinal:  Positive for abdominal pain, diarrhea and nausea. Negative for vomiting.   Genitourinary:  Negative for dysuria and hematuria.   Musculoskeletal:  Negative for arthralgias and back pain.   Skin:  Negative for color change and rash.   Neurological:  Negative for seizures and syncope.   All other systems reviewed and are negative.          Objective       ED Triage Vitals   Temperature Pulse Blood Pressure Respirations SpO2 Patient Position - Orthostatic VS   05/30/25 1203 05/30/25 1203 05/30/25 1203 05/30/25 1203 05/30/25 1203 05/30/25 1203   98.6 °F (37 °C) 71 (!) 221/98 20 96 % Lying      Temp Source Heart Rate Source BP Location FiO2 (%) Pain Score    05/30/25 1203 05/30/25 1300 05/30/25 1203 -- 05/30/25 1203    Temporal Monitor Right arm  8      Vitals      Date and Time Temp Pulse SpO2 Resp BP Pain Score FACES Pain  Rating User   05/30/25 1300 -- 67 95 % 19 195/81 -- -- [facility]   05/30/25 1243 -- -- 96 % -- -- -- -- [facility]   05/30/25 1235 -- -- -- -- -- 8 -- [facility]   05/30/25 1203 98.6 °F (37 °C) 71 96 % 20 221/98 8 -- [facility] [place]            Physical Exam  Vitals and nursing note reviewed.   Constitutional:       General: She is not in acute distress.     Appearance: Normal appearance. She is well-developed.   HENT:      Head: Normocephalic and atraumatic.      Right Ear: External ear normal.      Left Ear: External ear normal.      Nose: Nose normal.      Mouth/Throat:      Mouth: Mucous membranes are moist.     Eyes:      Conjunctiva/sclera: Conjunctivae normal.       Cardiovascular:      Rate and Rhythm: Normal rate and regular rhythm.      Pulses: Normal pulses.      Heart sounds: Normal heart sounds. No murmur heard.  Pulmonary:      Effort: Pulmonary effort is normal. No respiratory distress.      Breath sounds: Normal breath sounds.   Abdominal:      General: Bowel sounds are normal.      Palpations: Abdomen is soft.      Tenderness: There is abdominal tenderness. There is no guarding.     Musculoskeletal:         General: No swelling or deformity.      Cervical back: Neck supple.     Skin:     General: Skin is warm and dry.      Capillary Refill: Capillary refill takes less than 2 seconds.      Coloration: Skin is pale.     Neurological:      General: No focal deficit present.      Mental Status: She is alert and oriented to person, place, and time. Mental status is at baseline.         Results Reviewed       Procedure Component Value Units Date/Time    Urine Microscopic [[id number]]  (Abnormal) Collected: 05/30/25 1539    Lab Status: Final result Specimen: Urine, Clean Catch Updated: 05/30/25 1550     RBC, UA 1-2 /hpf      WBC, UA 2-4 /hpf      Epithelial Cells Occasional /hpf      Bacteria, UA Moderate /hpf     UA w Reflex to Microscopic w Reflex to Culture [[id number]]  (Abnormal) Collected: 05/30/25 1539    Lab Status: Final result  Specimen: Urine, Clean Catch Updated: 05/30/25 1544     Color, UA Yellow     Clarity, UA Clear     Specific Gravity, UA 1.010     pH, UA 6.0     Leukocytes, UA Negative     Nitrite, UA Positive     Protein, UA Negative mg/dl      Glucose, UA Negative mg/dl      Ketones, UA 15 (1+) mg/dl      Urobilinogen, UA 0.2 E.U./dl      Bilirubin, UA Negative     Occult Blood, UA Trace-Intact    iFOBT (FIT) [[id number]]  (Abnormal) Collected: 05/30/25 1457    Lab Status: Final result Specimen: Stool from Per Rectum Updated: 05/30/25 1517     OCCULT BLD, FECAL IMMUNOLOGICAL Positive    Narrative:        Performed by Fecal Immunochemical Test.    Comprehensive metabolic panel [[id number]]  (Abnormal) Collected: 05/30/25 1235    Lab Status: Final result Specimen: Blood from Arm, Left Updated: 05/30/25 1257     Sodium 141 mmol/L      Potassium 3.4 mmol/L      Chloride 105 mmol/L      CO2 25 mmol/L      ANION GAP 11 mmol/L      BUN 12 mg/dL      Creatinine 0.80 mg/dL      Glucose 136 mg/dL      Calcium 9.6 mg/dL      AST 17 U/L      ALT 11 U/L      Alkaline Phosphatase 84 U/L      Total Protein 6.8 g/dL      Albumin 4.1 g/dL      Total Bilirubin 0.58 mg/dL      eGFR 69 ml/min/1.73sq m     Narrative:      [employer] guidelines for Chronic Kidney Disease (CKD):     Stage 1 with normal or high GFR (GFR > 90 mL/min/1.73 square meters)    Stage 2 Mild CKD (GFR = 60-89 mL/min/1.73 square meters)    Stage 3A Moderate CKD (GFR = 45-59 mL/min/1.73 square meters)    Stage 3B Moderate CKD (GFR = 30-44 mL/min/1.73 square meters)    Stage 4 Severe CKD (GFR = 15-29 mL/min/1.73 square meters)    Stage 5 End Stage CKD (GFR <15 mL/min/1.73 square meters)  Note: GFR calculation is accurate only with a steady state creatinine    Lipase [[id number]]  (Abnormal) Collected: 05/30/25 1235    Lab Status: Final result Specimen: Blood from Arm, Left Updated: 05/30/25 1257     Lipase 8 u/L     Protime-INR [[id number]]  (Normal) Collected:  05/30/25 1235    Lab Status: Final result Specimen: Blood from Arm, Left Updated: 05/30/25 1257     Protime 14.6 seconds      INR 1.09    Narrative:      INR Therapeutic Range    Indication                                             INR Range      Atrial Fibrillation                                               2.0-3.0  Hypercoagulable State                                    2.0.2.3  Left Ventricular Asist Device                            2.0-3.0  Mechanical Heart Valve                                  -    Aortic(with afib, MI, embolism, HF, LA enlargement,    and/or coagulopathy)                                     2.0-3.0 (2.5-3.5)     Mitral                                                             2.5-3.5  Prosthetic/Bioprosthetic Heart Valve               2.0-3.0  Venous thromboembolism (VTE: VT, PE        2.0-3.0    APTT [[id number]]  (Abnormal) Collected: 05/30/25 1235    Lab Status: Final result Specimen: Blood from Arm, Left Updated: 05/30/25 1257     PTT 36 seconds     CBC and differential [[id number]]  (Abnormal) Collected: 05/30/25 1235    Lab Status: Final result Specimen: Blood from Arm, Left Updated: 05/30/25 1249     WBC 9.10 Thousand/uL      RBC 4.43 Million/uL      Hemoglobin 13.0 g/dL      Hematocrit 40.0 %      MCV 90 fL      MCH 29.3 pg      MCHC 32.5 g/dL      RDW 13.7 %      MPV 11.0 fL      Platelets 156 Thousands/uL      nRBC 0 /100 WBCs      Segmented % 85 %      Immature Grans % 0 %      Lymphocytes % 10 %      Monocytes % 5 %      Eosinophils Relative 0 %      Basophils Relative 0 %      Absolute Neutrophils 7.65 Thousands/µL      Absolute Immature Grans 0.04 Thousand/uL      Absolute Lymphocytes 0.93 Thousands/µL      Absolute Monocytes 0.43 Thousand/µL      Eosinophils Absolute 0.03 Thousand/µL      Basophils Absolute 0.02 Thousands/µL             CT abdomen pelvis with contrast   Final Interpretation by Johan Bauer MD (05/30 8755)      Mild bladder wall thickening with  trace pericystic inflammatory change. Tiny foci of intraluminal gas.   Correlation with urinalysis advised for cystitis.      Colonic diverticulosis without evidence of active inflammation.   Otherwise stomach and bowel unremarkable.      The study was marked in EPIC for immediate notification.         Workstation performed: [id number]             Procedures    ED Medication and Procedure Management   Prior to Admission Medications   Prescriptions Last Dose Informant Patient Reported? Taking?   B Complex Vitamins (B COMPLEX 1 PO)  Self Yes No   Sig: Take by mouth   Cholecalciferol (VITAMIN D3) 2000 units capsule  Self Yes No   Contour Next Test test strip  Self No No   Sig: Test blood sugar 3 times daily   Diapers & Supplies MISC  Self No No   Sig: Use 3 (three) times a day as needed (Incontinence)   Diclofenac Sodium (VOLTAREN) 1 %  Self No No   Sig: Apply 2 g topically 4 (four) times a day   Ferrous Sulfate (IRON) 325 (65 Fe) MG TABS  Self Yes No   Sig: Take by mouth   Insulin Pen Needle (BD Pen Needle [place] U/F) 32G X 4 MM MISC  Self No No   Sig: INJECT SUBCUTANEOUSLY AS  DIRECTED 3 TIMES DAILY   Microlet Lancets MISC  Self No No   Sig: Use 3 (three) times a day   Misc Natural Products (CYSTEX URINARY HEALTH PO)  Self Yes No   Sig: Take 1 capsule by mouth in the morning.   Multiple Vitamin (DAILY VALUE MULTIVITAMIN) TABS  Self Yes No   Sig: Take by mouth   Probiotic Product (Align) 4 MG CAPS  Self Yes No   Sig: Take by mouth   acetaminophen (TYLENOL) 325 mg tablet  Self Yes No   Sig: Take by mouth as needed   albuterol (ProAir HFA) 90 mcg/act inhaler  Self No No   Sig: Inhale 2 puffs every 4 (four) hours as needed for wheezing   bismuth subsalicylate (PEPTO BISMOL) 262 MG chewable tablet  Self No No   Sig: Chew 2 tablets (524 mg total) 2 (two) times a day as needed for diarrhea Has tolerated in the past.   clotrimazole (LOTRIMIN) 1 % cream  Self No No   Sig: Apply 1 g (1 Application total) topically 2 (two) times a  day   estrogens, conjugated (Premarin) vaginal cream  Self No No   Sig: INSERT 1 APPLICATORFUL VAGINALLY TWICE WEEKLY   fenofibrate 160 MG tablet  Self No No   Sig: TAKE 1 TABLET BY MOUTH  DAILY   insulin degludec (Tresiba FlexTouch) 100 units/mL injection pen  Self No No   Sig: INJECT 70 UNITS SUBCUTANEOUSLY  DAILY AT BEDTIME   lisinopril (ZESTRIL) 10 mg tablet  Self No No   Sig: TAKE 1 TABLET BY MOUTH DAILY   pantoprazole (PROTONIX) 40 mg tablet  Self No No   Sig: TAKE 1 TABLET BY MOUTH DAILY   pregabalin (LYRICA) 75 mg capsule  Self No No   Sig: Take 1 capsule (75 mg total) by mouth 3 (three) times a day   simvastatin (ZOCOR) 10 mg tablet  Self No No   Sig: TAKE 1 TABLET BY MOUTH AT  BEDTIME   solifenacin (VESICARE) 5 mg tablet  Self No No   Sig: take 1 tablet by mouth once daily   topiramate (TOPAMAX) 25 mg tablet  Self No No   Sig: TAKE 1 TABLET BY MOUTH DAILY      Facility-Administered Medications: None     Patient's Medications   Discharge Prescriptions    No medications on file     No discharge procedures on file.  ED SEPSIS DOCUMENTATION   Time reflects when diagnosis was documented in both MDM as applicable and the Disposition within this note       Time User Action Codes Description Comment    5/30/2025  4:15 PM Kris Callaway [R19.7] Diarrhea, unspecified type     5/30/2025  4:15 PM Kris Callaway [K92.2] GI bleeding     5/30/2025  4:15 PM Kris Callaway [R10.84] Generalized abdominal pain                      [1]   Past Medical History:  Diagnosis Date    Abnormal finding in urine 1/9/2023    Allergic Springtime    Runny nose, congestion    Anemia     Arthritis 10/30/2020    Chronic kidney disease     Diabetes mellitus (HCC)     Fibromyalgia, primary     Fracture of wrist     RIGHT    Gastroenteritis 12/18/2024    Heart murmur 06/30/2020    History of non-insulin dependent diabetes mellitus     Hx of thrombocytopenia 02/18/2022    Hyperlipidemia     Hypertension     Hypertension      IBS (irritable bowel syndrome)     Inflammatory bowel disease 2015    Been doctoring for years    Irritable bowel     Kidney stone     Metabolic syndrome     Obesity     RLS (restless legs syndrome)     Routine medical exam 06/28/2019    Skin lesion 2/8/2021    Sleep apnea     Sleep apnea     ON CPAP    Upper respiratory tract infection 01/19/2022    Urge incontinence     Urinary incontinence     Urinary tract infection 1/1/2023   [2]   Past Surgical History:  Procedure Laterality Date    CHOLECYSTECTOMY  2013    COLONOSCOPY  2010    CYSTOSCOPY      HERNIA REPAIR  2008    HYSTERECTOMY N/A 1974    partial; ovaries intact    KNEE SURGERY Left    [3]   Family History  Problem Relation Name Age of Onset    Breast cancer Mother Nora m Live 62        Breast cancer    Suicidality Father      Melanoma Sister anthony     Bipolar disorder Sister anthony     Depression Sister rupali         Bi-polar    Bipolar disorder Sister rupali     Completed Suicide  Sister rupali     Rheum arthritis Daughter denean     No Known Problems Daughter payal     No Known Problems Maternal Grandmother      No Known Problems Maternal Grandfather      No Known Problems Paternal Grandmother      No Known Problems Paternal Grandfather      Heart disease Brother Warren     No Known Problems Maternal Aunt christiana     No Known Problems Maternal Aunt mary     BRCA2 Positive Neg Hx      BRCA1 Positive Neg Hx      BRCA2 Negative Neg Hx      BRCA1 Negative Neg Hx      BRCA 1/2 Neg Hx      Ovarian cancer Neg Hx      Endometrial cancer Neg Hx      Colon cancer Neg Hx      Breast cancer additional onset Neg Hx     [4]   Social History  Tobacco Use    Smoking status: Never     Passive exposure: Never    Smokeless tobacco: Never   Vaping Use    Vaping status: Never Used   Substance Use Topics    Alcohol use: Not Currently     Comment: no caffeine use    Drug use: No        Kris Callaway Jr., DO  05/30/25 1604

## 2025-05-31 PROBLEM — E87.8 ELECTROLYTE ABNORMALITY: Status: ACTIVE | Noted: 2025-05-30

## 2025-05-31 PROBLEM — E66.01 MORBID OBESITY (HCC): Status: RESOLVED | Noted: 2019-04-28 | Resolved: 2025-05-31

## 2025-05-31 PROBLEM — D69.6 THROMBOCYTOPENIA (HCC): Status: ACTIVE | Noted: 2025-05-31

## 2025-05-31 LAB
ALBUMIN SERPL BCG-MCNC: 3.7 G/DL (ref 3.5–5)
ALP SERPL-CCNC: 90 U/L (ref 34–104)
ALT SERPL W P-5'-P-CCNC: 10 U/L (ref 7–52)
ANION GAP SERPL CALCULATED.3IONS-SCNC: 10 MMOL/L (ref 4–13)
AST SERPL W P-5'-P-CCNC: 15 U/L (ref 13–39)
BASOPHILS # BLD AUTO: 0.01 THOUSANDS/ÂΜL (ref 0–0.1)
BASOPHILS NFR BLD AUTO: 0 % (ref 0–1)
BILIRUB SERPL-MCNC: 0.55 MG/DL (ref 0.2–1)
BUN SERPL-MCNC: 11 MG/DL (ref 5–25)
CALCIUM SERPL-MCNC: 9.1 MG/DL (ref 8.4–10.2)
CHLORIDE SERPL-SCNC: 106 MMOL/L (ref 96–108)
CO2 SERPL-SCNC: 24 MMOL/L (ref 21–32)
CREAT SERPL-MCNC: 0.75 MG/DL (ref 0.6–1.3)
EOSINOPHIL # BLD AUTO: 0.06 THOUSAND/ÂΜL (ref 0–0.61)
EOSINOPHIL NFR BLD AUTO: 1 % (ref 0–6)
ERYTHROCYTE [DISTWIDTH] IN BLOOD BY AUTOMATED COUNT: 13.7 % (ref 11.6–15.1)
GFR SERPL CREATININE-BSD FRML MDRD: 75 ML/MIN/1.73SQ M
GLUCOSE SERPL-MCNC: 110 MG/DL (ref 65–140)
GLUCOSE SERPL-MCNC: 124 MG/DL (ref 65–140)
GLUCOSE SERPL-MCNC: 127 MG/DL (ref 65–140)
GLUCOSE SERPL-MCNC: 166 MG/DL (ref 65–140)
GLUCOSE SERPL-MCNC: 96 MG/DL (ref 65–140)
HCT VFR BLD AUTO: 38.9 % (ref 34.8–46.1)
HGB BLD-MCNC: 12.4 G/DL (ref 11.5–15.4)
IMM GRANULOCYTES # BLD AUTO: 0.03 THOUSAND/UL (ref 0–0.2)
IMM GRANULOCYTES NFR BLD AUTO: 0 % (ref 0–2)
LYMPHOCYTES # BLD AUTO: 1.08 THOUSANDS/ÂΜL (ref 0.6–4.47)
LYMPHOCYTES NFR BLD AUTO: 13 % (ref 14–44)
MAGNESIUM SERPL-MCNC: 1.3 MG/DL (ref 1.9–2.7)
MCH RBC QN AUTO: 28.9 PG (ref 26.8–34.3)
MCHC RBC AUTO-ENTMCNC: 31.9 G/DL (ref 31.4–37.4)
MCV RBC AUTO: 91 FL (ref 82–98)
MONOCYTES # BLD AUTO: 0.56 THOUSAND/ÂΜL (ref 0.17–1.22)
MONOCYTES NFR BLD AUTO: 7 % (ref 4–12)
NEUTROPHILS # BLD AUTO: 6.72 THOUSANDS/ÂΜL (ref 1.85–7.62)
NEUTS SEG NFR BLD AUTO: 79 % (ref 43–75)
NRBC BLD AUTO-RTO: 0 /100 WBCS
PHOSPHATE SERPL-MCNC: 3.7 MG/DL (ref 2.3–4.1)
PLATELET # BLD AUTO: 145 THOUSANDS/UL (ref 149–390)
PMV BLD AUTO: 11.3 FL (ref 8.9–12.7)
POTASSIUM SERPL-SCNC: 3.3 MMOL/L (ref 3.5–5.3)
PROT SERPL-MCNC: 6.4 G/DL (ref 6.4–8.4)
RBC # BLD AUTO: 4.29 MILLION/UL (ref 3.81–5.12)
SODIUM SERPL-SCNC: 140 MMOL/L (ref 135–147)
WBC # BLD AUTO: 8.46 THOUSAND/UL (ref 4.31–10.16)

## 2025-05-31 PROCEDURE — 99222 1ST HOSP IP/OBS MODERATE 55: CPT | Performed by: INTERNAL MEDICINE

## 2025-05-31 PROCEDURE — 97163 PT EVAL HIGH COMPLEX 45 MIN: CPT

## 2025-05-31 PROCEDURE — 99232 SBSQ HOSP IP/OBS MODERATE 35: CPT | Performed by: INTERNAL MEDICINE

## 2025-05-31 PROCEDURE — NC001 PR NO CHARGE: Performed by: INTERNAL MEDICINE

## 2025-05-31 PROCEDURE — 85025 COMPLETE CBC W/AUTO DIFF WBC: CPT | Performed by: INTERNAL MEDICINE

## 2025-05-31 PROCEDURE — 83735 ASSAY OF MAGNESIUM: CPT | Performed by: INTERNAL MEDICINE

## 2025-05-31 PROCEDURE — 82948 REAGENT STRIP/BLOOD GLUCOSE: CPT

## 2025-05-31 PROCEDURE — 84100 ASSAY OF PHOSPHORUS: CPT | Performed by: INTERNAL MEDICINE

## 2025-05-31 PROCEDURE — 80053 COMPREHEN METABOLIC PANEL: CPT | Performed by: INTERNAL MEDICINE

## 2025-05-31 RX ORDER — HYDRALAZINE HYDROCHLORIDE 20 MG/ML
10 INJECTION INTRAMUSCULAR; INTRAVENOUS ONCE
Status: COMPLETED | OUTPATIENT
Start: 2025-05-31 | End: 2025-05-31

## 2025-05-31 RX ORDER — MAGNESIUM SULFATE HEPTAHYDRATE 40 MG/ML
4 INJECTION, SOLUTION INTRAVENOUS ONCE
Status: COMPLETED | OUTPATIENT
Start: 2025-05-31 | End: 2025-05-31

## 2025-05-31 RX ORDER — POTASSIUM CHLORIDE 1500 MG/1
40 TABLET, EXTENDED RELEASE ORAL EVERY 4 HOURS
Status: COMPLETED | OUTPATIENT
Start: 2025-05-31 | End: 2025-05-31

## 2025-05-31 RX ADMIN — POTASSIUM CHLORIDE 40 MEQ: 1500 TABLET, EXTENDED RELEASE ORAL at 09:57

## 2025-05-31 RX ADMIN — ONDANSETRON 4 MG: 2 INJECTION INTRAMUSCULAR; INTRAVENOUS at 09:56

## 2025-05-31 RX ADMIN — FENOFIBRATE 145 MG: 145 TABLET, FILM COATED ORAL at 09:55

## 2025-05-31 RX ADMIN — LISINOPRIL 10 MG: 10 TABLET ORAL at 09:55

## 2025-05-31 RX ADMIN — ACETAMINOPHEN 650 MG: 325 TABLET ORAL at 09:56

## 2025-05-31 RX ADMIN — CEFTRIAXONE SODIUM 1000 MG: 10 INJECTION, POWDER, FOR SOLUTION INTRAVENOUS at 18:27

## 2025-05-31 RX ADMIN — NYSTATIN: 100000 POWDER TOPICAL at 17:33

## 2025-05-31 RX ADMIN — METRONIDAZOLE 500 MG: 500 INJECTION, SOLUTION INTRAVENOUS at 17:32

## 2025-05-31 RX ADMIN — DEXTRAN 70, GLYCERIN, HYPROMELLOSE 1 DROP: 1; 2; 3 SOLUTION/ DROPS OPHTHALMIC at 17:33

## 2025-05-31 RX ADMIN — TOPIRAMATE 25 MG: 25 TABLET, FILM COATED ORAL at 09:56

## 2025-05-31 RX ADMIN — ONDANSETRON 4 MG: 2 INJECTION INTRAMUSCULAR; INTRAVENOUS at 03:24

## 2025-05-31 RX ADMIN — PANTOPRAZOLE SODIUM 40 MG: 40 TABLET, DELAYED RELEASE ORAL at 09:55

## 2025-05-31 RX ADMIN — NYSTATIN: 100000 POWDER TOPICAL at 10:14

## 2025-05-31 RX ADMIN — OXYBUTYNIN CHLORIDE 5 MG: 5 TABLET, EXTENDED RELEASE ORAL at 09:55

## 2025-05-31 RX ADMIN — PREGABALIN 75 MG: 75 CAPSULE ORAL at 09:56

## 2025-05-31 RX ADMIN — MAGNESIUM SULFATE HEPTAHYDRATE 4 G: 40 INJECTION, SOLUTION INTRAVENOUS at 14:08

## 2025-05-31 RX ADMIN — B-COMPLEX W/ C & FOLIC ACID TAB 1 TABLET: TAB at 09:55

## 2025-05-31 RX ADMIN — PRAVASTATIN SODIUM 20 MG: 20 TABLET ORAL at 17:31

## 2025-05-31 RX ADMIN — METRONIDAZOLE 500 MG: 500 INJECTION, SOLUTION INTRAVENOUS at 06:13

## 2025-05-31 RX ADMIN — POTASSIUM CHLORIDE 40 MEQ: 1500 TABLET, EXTENDED RELEASE ORAL at 14:08

## 2025-05-31 RX ADMIN — ACETAMINOPHEN 650 MG: 325 TABLET ORAL at 19:54

## 2025-05-31 RX ADMIN — HYDRALAZINE HYDROCHLORIDE 10 MG: 20 INJECTION INTRAMUSCULAR; INTRAVENOUS at 09:57

## 2025-05-31 RX ADMIN — PREGABALIN 75 MG: 75 CAPSULE ORAL at 21:26

## 2025-05-31 RX ADMIN — INSULIN GLARGINE 70 UNITS: 100 INJECTION, SOLUTION SUBCUTANEOUS at 21:32

## 2025-05-31 RX ADMIN — PREGABALIN 75 MG: 75 CAPSULE ORAL at 17:31

## 2025-05-31 RX ADMIN — INSULIN LISPRO 1 UNITS: 100 INJECTION, SOLUTION INTRAVENOUS; SUBCUTANEOUS at 21:28

## 2025-05-31 NOTE — PLAN OF CARE
Problem: PHYSICAL THERAPY ADULT  Goal: Performs mobility at highest level of function for planned discharge setting.  See evaluation for individualized goals.  Description: Treatment/Interventions: Functional transfer training, LE strengthening/ROM, Elevations, Therapeutic exercise, Endurance training, Gait training  Equipment Recommended: Walker (platform)       See flowsheet documentation for full assessment, interventions and recommendations.  Outcome: Progressing  Note: Prognosis: Good  Problem List: Decreased strength, Decreased endurance, Impaired balance, Decreased mobility, Pain, Orthopedic restrictions  Assessment: Pt is 79 y.o. female seen for PT evaluation s/p admit to [facility] on 5/30/2025 w/ Black stools. PT consulted to assess pt's functional mobility and d/c needs. Order placed for PT eval and tx, w/  turn pt  order. Pt agreeable to PT  session upon arrival, pt found supine in bed.  PTA, pt was independent w/ all functional mobility w/ rollator and lives w/ spouse in 1 level apartment .  Pt to benefit from continued PT tx to address deficits and maximize level of functional independent mobility and consistency. Upon conclusion pt  seated in recliner, with all needs in reach, and chair alarm intact. Complexity: Comorbidities affecting pt's physical performance at time of assessment include:  diarrhea, gi bleeding, nausea, vomiting, htn,  . Personal factors affecting pt at time of IE include: advanced age, limited mobility, ambulating with assistive device, steps to enter home, and inability to navigate without external assistance. Please find objective findings from PT assessment regarding body systems outlined above with impairments and limitations including weakness, impaired balance, pain, decreased activity tolerance, decreased functional mobility tolerance, fall risk, and orthopedic restrictions.  Pt's clinical presentation is currently unstable/unpredictable seen in pt's presentation  of hypertension, abnormal potassium levels, pain, and decline in mobility status. The patient's AM-PAC Basic Mobility Inpatient Short Form Raw Score is 15 .  Based on patient presentations and impairments, pt would most appropriately benefit from Level 2 resource intensity upon discharge. A Raw score of less than or equal to 16 suggests the patient may benefit from discharge to post-acute rehabilitation services. Please also refer to the recommendation of the Physical Therapist for safe discharge planning. RN verbalized pt appropriate for PT session.        Rehab Resource Intensity Level, PT: II (Moderate Resource Intensity)    See flowsheet documentation for full assessment.

## 2025-05-31 NOTE — PLAN OF CARE
Problem: INFECTION - ADULT  Goal: Absence or prevention of progression during hospitalization  Description: INTERVENTIONS:  - Assess and monitor for signs and symptoms of infection  - Monitor lab/diagnostic results  - Monitor all insertion sites, i.e. indwelling lines, tubes, and drains  - Monitor endotracheal if appropriate and nasal secretions for changes in amount and color  - Magnet appropriate cooling/warming therapies per order  - Administer medications as ordered  - Instruct and encourage patient and family to use good hand hygiene technique  - Identify and instruct in appropriate isolation precautions for identified infection/condition  Outcome: Progressing  Goal: Absence of fever/infection during neutropenic period  Description: INTERVENTIONS:  - Monitor WBC  - Perform strict hand hygiene  - Limit to healthy visitors only  - No plants, dried, fresh or silk flowers with perez in patient room  Outcome: Progressing

## 2025-05-31 NOTE — PROGRESS NOTES
"Progress Note - Hospitalist   Name: Alberta Sanchez 79 y.o. female I MRN: [id number]  Unit/Bed#: -01 I Date of Admission: 5/30/2025   Date of Service: 5/31/2025 I Hospital Day: 0    Assessment & Plan  Black stools with diarrhea  5/30: Ongoing issue for approximately 3-4 days without provoking factor  Chronic oral iron use noted, however, only acknowledges mild darkness of stools on onset of use years ago - denies current use of bismuth containing compounds  Suspect possible viral etiology, however, in the setting of stool discoloration, will await gastroenterology input -> remains on clear liquids currently with plan for NPO after midnight, in case scoping is warranted  Hemoglobin stable -> continue to monitor  Avoiding pharmacologic VTE prophylaxis  Consider colitis ?? ->  IV Rocephin/Flagyl for now -> await stool cultures and C. difficile PCR  Monitor/replete electrolytes from insensible losses from loose stools    5/31: Symptomatically improved and denies diarrhea this morning, thus far.  Await C. difficile PCR and stool culture, and if negative, can initiate as needed antidiarrheals.  Appreciate gastroenterology input, with no plans for scoping at this time.  Will slowly advance diet, currently upgraded to full liquids with toast/crackers.  Supportive care otherwise.  Multiple electrolyte abnormalities  Monitor/replete serum potassium and magnesium, if necessary  Likely secondary to insensible losses from recent diarrhea  Abnormal CT of the abdomen  CT report noting: \"Mild bladder wall thickening with trace pericystic inflammatory change. Tiny foci of intraluminal gas. Correlation with urinalysis advised for cystitis.\"  Urinalysis notable for positive nitrites and moderate bacteriuria but negative for pyuria -> already on IV Rocephin for possible colitis (see above)  Essential hypertension  Continue Zestril - additional PRN IV Hydralazine on board for BP spikes  Low sodium restriction, once back on solid " diet  Insulin-dependent diabetes mellitus with neuropathy ([facility])  Lab Results   Component Value Date    HGBA1C 6.7 (H) 03/11/2025     Continue basal insulin with additional SSI coverage per Accu-Cheks  Carbohydrate restriction once back on solid diet  Hypoglycemia protocol  On Lyrica for diabetic neuropathy  CKD (chronic kidney disease) stage 3 ([facility])  Baseline creatinine of approximately 0.8-1.0 -> currently stable at baseline  Monitor renal function and urine output  Limit/avoid nephrotoxins and hypotension as possible  Hyperlipidemia  Continue statin/fibrate  Thrombocytopenia ([facility])  Monitor platelet count - monitor for bleeding      VTE Pharmacologic Prophylaxis: VTE Score: 5 High Risk (Score >/= 5) - Pharmacological DVT Prophylaxis Contraindicated. Sequential Compression Devices Ordered.    AM-PAC Basic Mobility:  Basic Mobility Inpatient Raw Score: 13  -HLM Goal: 4: Move to chair/commode  JH-HLM Achieved: 5: Stand (1 or more minutes)  -HLM Goal achieved. Continue to encourage appropriate mobility.    Patient Centered Rounds:  I have performed bedside rounds and discussed plan of care with nursing today.  Discussions with Specialists or Other Care Team Provider:  see above assessments if applicable    Education and Discussions with Family / Patient:  Patient at bedside    Time Spent for Care:  35 minutes. More than 50% of total time spent on counseling and coordination of care, on one or more of the following: performing physical exam; counseling and coordination of care, obtaining or reviewing history, documenting in the medical record, reviewing/ordering tests/medications/procedures, and communicating with other healthcare professionals.    Current Length of Stay: 0 day(s)  Current Patient Status: Observation   Certification Statement:  Patient will continue to require additional hospital stay due to assessments as noted above.    Code Status: Level 3 - DNAR and DNI      Subjective     Encountered earlier  today.  Remains mildly weak/fatigued but reports no episodes of diarrhea this morning, thus far.      Objective     Vitals:   Temp (24hrs), Av.1 °F (36.7 °C), Min:97.7 °F (36.5 °C), Max:98.5 °F (36.9 °C)    Temp:  [97.7 °F (36.5 °C)-98.5 °F (36.9 °C)] 98 °F (36.7 °C)  HR:  [65-80] 80  Resp:  [18-20] 18  BP: (161-199)/(69-98) 168/88  SpO2:  [92 %-98 %] 93 %  Body mass index is 37.97 kg/m².     Input and Output Summary (last 24 hours):       Intake/Output Summary (Last 24 hours) at 2025 1320  Last data filed at 2025 0706  Gross per 24 hour   Intake 1000 ml   Output 150 ml   Net 850 ml       Physical Exam:     GENERAL Waxing/waning weakness/fatigue   HEAD   Normocephalic - atraumatic   EYES Nonicteric   MOUTH   Mucosa moist   NECK   Supple - full range of motion   CARDIAC Rate controlled   PULMONARY   Clear breath sounds bilaterally - nonlabored respirations   ABDOMEN Improve generalized abdominal tenderness without distention   MUSCULOSKELETAL   Motor strength/range of motion mildly deconditioned - bilateral wrists in splints   NEUROLOGIC   Alert/oriented at baseline   PSYCHIATRIC   Mood/affect stable         Labs & Recent Cultures:  Results from last 7 days   Lab Units 25  0437   WBC Thousand/uL 8.46   HEMOGLOBIN g/dL 12.4   HEMATOCRIT % 38.9   PLATELETS Thousands/uL 145*   SEGS PCT % 79*   LYMPHO PCT % 13*   MONO PCT % 7   EOS PCT % 1     Results from last 7 days   Lab Units 25  0437   POTASSIUM mmol/L 3.3*   CHLORIDE mmol/L 106   CO2 mmol/L 24   BUN mg/dL 11   CREATININE mg/dL 0.75   CALCIUM mg/dL 9.1   ALK PHOS U/L 90   ALT U/L 10   AST U/L 15     Results from last 7 days   Lab Units 25  1235   INR  1.09     Results from last 7 days   Lab Units 25  1052 25  0719 25  2135 25  1815   POC GLUCOSE mg/dl 96 124 94 99                         Lines/Drains/Telemetry:  Invasive Devices       Peripheral Intravenous Line  Duration             Peripheral IV 25  Right;Ventral (anterior) Forearm <1 day                    Last 24 Hours Medication List:   Current Facility-Administered Medications   Medication Dose Route Frequency Provider Last Rate    acetaminophen  650 mg Oral Q6H PRN Lilian Last MD      albuterol  2 puff Inhalation Q4H PRN Lilian Last MD      Artificial Tears  1 drop Both Eyes Q4H PRN Lilian Last MD      cefTRIAXone  1,000 mg Intravenous Q24H Lilian Last MD 1,000 mg (05/30/25 1950)    fenofibrate  145 mg Oral Daily Lilian Last MD      hydrALAZINE  5 mg Intravenous Q6H PRN Lilian Last MD      HYDROmorphone  0.5 mg Intravenous Q6H PRN Lilian Last MD      HYDROmorphone  0.2 mg Intravenous Q6H PRN Lilian Last MD      HYDROmorphone  0.2 mg Intravenous Q3H PRN Lilian Last MD      insulin glargine  70 Units Subcutaneous HS Lilian Last MD      insulin lispro  1-6 Units Subcutaneous 4x Daily (AC & HS) Lilian Last MD      lisinopril  10 mg Oral Daily Lilian Last MD      magnesium sulfate  4 g Intravenous Once Lilian Last MD      metroNIDAZOLE  500 mg Intravenous Q12H Lilian Last  mg (05/31/25 0613)    multivitamin stress formula  1 tablet Oral Daily Lilian Last MD      nystatin   Topical BID Lilian Last MD      ondansetron  4 mg Intravenous Q4H PRN Lilian Last MD      oxybutynin  5 mg Oral Daily Lilian Last MD      pantoprazole  40 mg Oral Daily Lilian Last MD      potassium chloride  40 mEq Oral Q4H Lilian Last MD      pravastatin  20 mg Oral Daily With Dinner Lilian Last MD      pregabalin  75 mg Oral TID Lilian Last MD      topiramate  25 mg Oral Daily MD LILIAN Jimenes MD   Hospitalist - [facility]        ** Please Note:  Documentation is constructed using a voice recognition dictation system.  An occasional wrong word/phrase or “sound-a-like” substitution may have been picked up by dictation device due to the inherent limitations of voice recognition software.  Read  the chart carefully and recognize, using reasonable context, where substitutions may have occurred.**

## 2025-05-31 NOTE — ASSESSMENT & PLAN NOTE
5/30: Ongoing issue for approximately 3-4 days without provoking factor  Chronic oral iron use noted, however, only acknowledges mild darkness of stools on onset of use years ago - denies current use of bismuth containing compounds  Suspect possible viral etiology, however, in the setting of stool discoloration, will await gastroenterology input -> remains on clear liquids currently with plan for NPO after midnight, in case scoping is warranted  Hemoglobin stable -> continue to monitor  Avoiding pharmacologic VTE prophylaxis  Consider colitis ?? ->  IV Rocephin/Flagyl for now -> await stool cultures and C. difficile PCR  Monitor/replete electrolytes from insensible losses from loose stools    5/31: Symptomatically improved and denies diarrhea this morning, thus far.  Await C. difficile PCR and stool culture, and if negative, can initiate as needed antidiarrheals.  Appreciate gastroenterology input, with no plans for scoping at this time.  Will slowly advance diet, currently upgraded to full liquids with toast/crackers.  Supportive care otherwise.

## 2025-05-31 NOTE — PLAN OF CARE
Problem: PAIN - ADULT  Goal: Verbalizes/displays adequate comfort level or baseline comfort level  Description: Interventions:  - Encourage patient to monitor pain and request assistance  - Assess pain using appropriate pain scale  - Administer analgesics as ordered based on type and severity of pain and evaluate response  - Implement non-pharmacological measures as appropriate and evaluate response  - Consider cultural and social influences on pain and pain management  - Notify physician/advanced practitioner if interventions unsuccessful or patient reports new pain  - Educate patient/family on pain management process including their role and importance of  reporting pain   - Provide non-pharmacologic/complimentary pain relief interventions  Outcome: Progressing     Problem: INFECTION - ADULT  Goal: Absence or prevention of progression during hospitalization  Description: INTERVENTIONS:  - Assess and monitor for signs and symptoms of infection  - Monitor lab/diagnostic results  - Monitor all insertion sites, i.e. indwelling lines, tubes, and drains  - Monitor endotracheal if appropriate and nasal secretions for changes in amount and color  - [place] appropriate cooling/warming therapies per order  - Administer medications as ordered  - Instruct and encourage patient and family to use good hand hygiene technique  - Identify and instruct in appropriate isolation precautions for identified infection/condition  Outcome: Progressing  Goal: Absence of fever/infection during neutropenic period  Description: INTERVENTIONS:  - Monitor WBC  - Perform strict hand hygiene  - Limit to healthy visitors only  - No plants, dried, fresh or silk flowers with perez in patient room  Outcome: Progressing

## 2025-05-31 NOTE — ASSESSMENT & PLAN NOTE
79-year-old female with previous chronic diarrhea, prior workup in 2021 overall unremarkable aside from lactose intolerance, CKD, type 2 diabetes, presenting for 5 days of generalized abdominal pain and diarrhea.  Presentation is most consistent with infectious gastroenteritis, very low suspicion for significant GI bleeding.  Is also possible that UTI may be contributing to abdominal pain.  No significant colonic thickening to suggest severe colitis on imaging.  No evidence of diverticulitis.    - Continue IV hydration until able to tolerate adequate p.o. intake.  Resume clear liquid diet and advance as tolerated.  Antiemetics.  Stool studies to rule out C. difficile and enteric panel; if these were negative and she continues to have significant diarrhea, antidiarrheals are appropriate  No plans for endoscopic evaluation.  At this time no GI indication for antibiotics.  If these are felt to be necessary for UTI, defer to medicine team regarding continuation

## 2025-05-31 NOTE — ASSESSMENT & PLAN NOTE
Monitor/replete serum potassium and magnesium, if necessary  Likely secondary to insensible losses from recent diarrhea

## 2025-05-31 NOTE — CONSULTS
Consultation - Gastroenterology   Name: Alberta Sanchez 79 y.o. female I MRN: [id number]  Unit/Bed#: -01 I Date of Admission: 5/30/2025   Date of Service: 5/31/2025 I Hospital Day: 0   Consults  Physician Requesting Evaluation: Lilian Last MD   Reason for Evaluation / Principal Problem: diarrhea    Assessment & Plan  Black stools with diarrhea  79-year-old female with previous chronic diarrhea, prior workup in 2021 overall unremarkable aside from lactose intolerance, CKD, type 2 diabetes, presenting for 5 days of generalized abdominal pain and diarrhea.  Presentation is most consistent with infectious gastroenteritis, very low suspicion for significant GI bleeding.  Is also possible that UTI may be contributing to abdominal pain.  No significant colonic thickening to suggest severe colitis on imaging.  No evidence of diverticulitis.    - Continue IV hydration until able to tolerate adequate p.o. intake.  Resume clear liquid diet and advance as tolerated.  Antiemetics.  Stool studies to rule out C. difficile and enteric panel; if these were negative and she continues to have significant diarrhea, antidiarrheals are appropriate  No plans for endoscopic evaluation.  At this time no GI indication for antibiotics.  If these are felt to be necessary for UTI, defer to medicine team regarding continuation  Hypokalemia    Abnormal CT of the abdomen        History of Present Illness   HPI:  Alberta Sanchez is a 79 y.o. female who presents for 5 days of generalized abdominal pain and diarrhea.  This came on suddenly in the absence of sick contacts, abnormal food intake.  She denies new medications.    She previously had a workup for chronic diarrhea with bidirectional endoscopy in 2021 which was overall unremarkable.  It was felt that this was secondary to lactose intolerance and since removal she had significant improvement.    This was a new event of acute onset of diarrhea.  This was associate with abdominal pain  in no specific location but mildly worse in the mid abdomen.  Stool was described as dark and loose.  No toney hematochezia.    Since admission she was started on antibiotics.  She has had some improvement in her symptoms just since yesterday.    Review of Systems  Medical History Review: I have reviewed the patient's PMH, PSH, Social History, Family History, Meds, and Allergies     Objective :  Temp:  [97.7 °F (36.5 °C)-98.6 °F (37 °C)] 98 °F (36.7 °C)  HR:  [65-80] 76  BP: (161-221)/(69-98) 170/81  Resp:  [18-20] 18  SpO2:  [92 %-98 %] 94 %  O2 Device: None (Room air)    Physical Exam  Constitutional:       Appearance: Normal appearance.     Cardiovascular:      Rate and Rhythm: Normal rate.   Pulmonary:      Effort: Pulmonary effort is normal.   Abdominal:      General: Abdomen is flat. There is no distension.      Palpations: Abdomen is soft.      Tenderness: There is abdominal tenderness.     Neurological:      General: No focal deficit present.      Mental Status: She is alert.     Psychiatric:         Mood and Affect: Mood normal.           Lab Results: I have reviewed the following results:

## 2025-05-31 NOTE — PHYSICAL THERAPY NOTE
PHYSICAL THERAPY EVALUATION  NAME:  Alberta Sanchez  DATE: 05/31/25    AGE:   79 y.o.  Mrn:   [id number]  ADMIT DX:  Diarrhea [R19.7]  GI bleeding [K92.2]  Nausea [R11.0]  Vomiting [R11.10]  Generalized abdominal pain [R10.84]  Diarrhea, unspecified type [R19.7]  Problem List: Problem List[1]    Past Medical History  Past Medical History[2]    Past Surgical History  Past Surgical History[3]    Length Of Stay: 0  Performed at least 2 patient identifiers during session: Name and Epic photo       05/31/25 0931   PT Last Visit   PT Visit Date 05/31/25   Note Type   Note type Evaluation   Pain Assessment   Pain Assessment Tool 0-10   Pain Score 7   Pain Location/Orientation Orientation: Bilateral  (wrists)   Pain Onset/Description Onset: Gradual;Onset: Ongoing   Patient's Stated Pain Goal No pain   Hospital Pain Intervention(s) Medication (See MAR)   Restrictions/Precautions   Weight Bearing Precautions Per Order Yes   RUE Weight Bearing Per Order WBAT   LUE Weight Bearing Per Order NWB   Other Precautions Contact/isolation;Bed Alarm;Chair Alarm;Fall Risk;Pain   Home Living   Type of Home Apartment   Home Layout One level;Ramped entrance;Able to live on main level with bedroom/bathroom;Performs ADLs on one level   Bathroom Shower/Tub Tub/shower unit   Bathroom Toilet Raised   Bathroom Equipment Grab bars in shower;Shower chair   Bathroom Accessibility Accessible   Home Equipment Walker  (rollator at baseline)   Prior Function   Level of [place] Independent with ADLs;Independent with functional mobility;Independent with IADLS   Lives With Spouse   Receives Help From Family   IADLs Independent with driving;Independent with medication management;Independent with meal prep   Falls in the last 6 months 1 to 4  (1)   Vocational Retired   Cognition   Overall Cognitive Status WFL   Arousal/Participation Alert   Orientation Level Oriented X4   Memory Within functional limits   Following Commands Follows all commands and  "directions without difficulty   Subjective   Subjective \"it has been so hard at home\"   RLE Assessment   RLE Assessment   (4-/5)   LLE Assessment   LLE Assessment   (4-/5)   Vision-Basic Assessment   Current Vision No visual deficits   Coordination   Sensation WFL   Bed Mobility   Supine to Sit 3  Moderate assistance   Additional items Assist x 1;Increased time required;LE management;Bedrails   Sit to Supine   (dnt, pt in recliner to end session)   Additional Comments pt declined dizziness with transitional movements, educated pt on hand palcement   Transfers   Sit to Stand 4  Minimal assistance   Additional items Assist x 1;Increased time required;Verbal cues;Bedrails;HOB elevated   Stand to Sit 4  Minimal assistance   Additional items Assist x 1;Increased time required;Armrests   Additional Comments platoform walker used, pt receptive to education on proper use of walker   Ambulation/Elevation   Gait pattern Improper Weight shift;Decreased foot clearance;Antalgic;Forward Flexion   Gait Assistance 4  Minimal assist   Additional items Assist x 1;Verbal cues   Assistive Device Platform walker   Distance 5 ft   Balance   Static Sitting Fair +   Dynamic Sitting Fair   Static Standing Fair   Dynamic Standing Fair -   Ambulatory Fair -   Activity Tolerance   Activity Tolerance Patient limited by pain;Patient limited by fatigue   Medical Staff Made Aware cm made aware   Nurse Made Aware lisa rodgers   Assessment   Prognosis Good   Problem List Decreased strength;Decreased endurance;Impaired balance;Decreased mobility;Pain;Orthopedic restrictions   Assessment Pt is 79 y.o. female seen for PT evaluation s/p admit to [facility]30/2025 w/ Black stools. PT consulted to assess pt's functional mobility and d/c needs. Order placed for PT eval and tx, w/  turn pt  order. Pt agreeable to PT  session upon arrival, pt found supine in bed.  PTA, pt was independent w/ all functional mobility w/ rollator and lives w/ spouse in " 1 level apartment .  Pt to benefit from continued PT tx to address deficits and maximize level of functional independent mobility and consistency. Upon conclusion pt  seated in recliner, with all needs in reach, and chair alarm intact. Complexity: Comorbidities affecting pt's physical performance at time of assessment include:  diarrhea, gi bleeding, nausea, vomiting, htn,  . Personal factors affecting pt at time of IE include: advanced age, limited mobility, ambulating with assistive device, steps to enter home, and inability to navigate without external assistance. Please find objective findings from PT assessment regarding body systems outlined above with impairments and limitations including weakness, impaired balance, pain, decreased activity tolerance, decreased functional mobility tolerance, fall risk, and orthopedic restrictions.  Pt's clinical presentation is currently unstable/unpredictable seen in pt's presentation of hypertension, abnormal potassium levels, pain, and decline in mobility status. The patient's AM-PAC Basic Mobility Inpatient Short Form Raw Score is 15 .  Based on patient presentations and impairments, pt would most appropriately benefit from Level 2 resource intensity upon discharge. A Raw score of less than or equal to 16 suggests the patient may benefit from discharge to post-acute rehabilitation services. Please also refer to the recommendation of the Physical Therapist for safe discharge planning. RN verbalized pt appropriate for PT session.   Goals   Patient Goals to get rehab   LTG Expiration Date 06/10/25   Long Term Goal #1 Pt will: Perform bed mobility tasks to modified I to increase Indep in home environment and improve ease of bed mobility. Perform transfers to modified I to increase Indep in home environment, decrease risk for falls, and improve ease of transfers. Perform ambulation with platform walker for 25 ft to  increase Indep in home environment, decrease risk for falls,  improve activity tolerance, and improve gait quality. Increase dynamic standing balance to F+ to decrease fall risk.   Increase OOB activity tolerance to 10 minutes without s/s of exertion to decrease fall risk.   PT Treatment Day 0   Plan   Treatment/Interventions Functional transfer training;LE strengthening/ROM;Elevations;Therapeutic exercise;Endurance training;Gait training   PT Frequency 3-5x/wk   Discharge Recommendation   Rehab Resource Intensity Level, PT II (Moderate Resource Intensity)   Equipment Recommended Walker  (platform)   AM-PAC Basic Mobility Inpatient   Turning in Flat Bed Without Bedrails 3   Lying on Back to Sitting on Edge of Flat Bed Without Bedrails 3   Moving Bed to Chair 3   Standing Up From Chair Using Arms 3   Walk in Room 2   Climb 3-5 Stairs With Railing 1   Basic Mobility Inpatient Raw Score 15   Basic Mobility Standardized Score 36.97   [facility] Highest Level Of Mobility   -HL Goal 4: Move to chair/commode   -HLM Achieved 5: Stand (1 or more minutes)       Time In: 0916  Time Out: 0934  Total Evaluation Minutes: 18    Willy Wasserman, PT         [1]   Patient Active Problem List  Diagnosis    Carotid artery disease (HCC)    Primary hypertension    Mixed hyperlipidemia    Hypomagnesemia    Indigestion    Irritable bowel syndrome    Long term current use of insulin (HCC)    Metabolic syndrome    Nonalcoholic fatty liver disease    Obstructive sleep apnea treated with continuous positive airway pressure (CPAP)    Peripheral vascular disease (HCC)    Type 2 diabetes mellitus with stage 3 chronic kidney disease, with long-term current use of insulin (HCC)    Vitamin D deficiency    Osteopenia of spine    Anemia    Multiple and bilateral precerebral arterial occlusion    Restless legs syndrome (RLS)    Tinnitus    Migraine without aura, not intractable    Hypertriglyceridemia, essential    Chronic right shoulder pain    Vaginal bleeding    Breast lump in upper outer quadrant    Neck  pain    Abnormal mammogram    Benign hypertensive renal disease    Proteinuria    Corns and callosity    Hammer toe    Arthritis    Onychomycosis    Plantar fascial fibromatosis    Nail dystrophy    Bilateral low back pain with bilateral sciatica    Gait disturbance    DNR (do not resuscitate)    COVID-19 vaccine series completed    COVID-19 virus infection    LVH (left ventricular hypertrophy)    Mixed stress and urge urinary incontinence    Itchy skin    Nasal congestion    Platelets decreased (HCC)    Diabetes mellitus with neuropathy (HCC)    Bilateral lower extremity edema    Other fatigue    Nocturnal hypoxemia    Right knee pain    Dependence on cane    Chronic kidney disease, stage 3b (HCC)    High risk medication use    Black stools with diarrhea    Hypokalemia    Abnormal CT of the abdomen    Essential hypertension    Insulin-dependent diabetes mellitus with neuropathy (HCC)    CKD (chronic kidney disease) stage 3 (HCC)    Hyperlipidemia   [2]   Past Medical History:  Diagnosis Date    Abnormal finding in urine 1/9/2023    Allergic Spring[place]    Runny nose, congestion    Anemia     Arthritis 10/30/2020    Chronic kidney disease     Diabetes mellitus (HCC)     Fibromyalgia, primary     Fracture of wrist     RIGHT    Gastroenteritis 12/18/2024    Heart murmur 06/30/2020    History of non-insulin dependent diabetes mellitus     Hx of thrombocytopenia 02/18/2022    Hyperlipidemia     Hypertension     Hypertension     IBS (irritable bowel syndrome)     Inflammatory bowel disease 2015    Been doctoring for years    Irritable bowel     Kidney stone     Metabolic syndrome     Obesity     RLS (restless legs syndrome)     Routine medical exam 06/28/2019    Skin lesion 2/8/2021    Sleep apnea     Sleep apnea     ON CPAP    Upper respiratory tract infection 01/19/2022    Urge incontinence     Urinary incontinence     Urinary tract infection 1/1/2023   [3]   Past Surgical History:  Procedure Laterality Date     CHOLECYSTECTOMY  2013    COLONOSCOPY  2010    CYSTOSCOPY      HERNIA REPAIR  2008    HYSTERECTOMY N/A 1974    partial; ovaries intact    KNEE SURGERY Left

## 2025-05-31 NOTE — CASE MANAGEMENT
Case Management Assessment & Discharge Planning Note    Patient name Alberta Sanchez  Location /-01 MRN [id number]  : 1945 Date 2025       Current Admission Date: 2025  Current Admission Diagnosis:Black stools with diarrhea   Patient Active Problem List    Diagnosis Date Noted    Thrombocytopenia (HCC) 2025    Black stools with diarrhea 2025    Multiple electrolyte abnormalities 2025    Abnormal CT of the abdomen 2025    Essential hypertension 2025    Insulin-dependent diabetes mellitus with neuropathy (HCC) 2025    CKD (chronic kidney disease) stage 3 ([facility]) 2025    Hyperlipidemia 2025    High risk medication use 2025    Dependence on cane 2025    Right knee pain 2024    Nocturnal hypoxemia 10/21/2024    Bilateral lower extremity edema 2024    Other fatigue 2024    Diabetes mellitus with neuropathy ([facility]) 2024    Platelets decreased ([facility]) 2023    Itchy skin 2023    Nasal congestion 2023    LVH (left ventricular hypertrophy) 2022    COVID-19 virus infection 2022    COVID-19 vaccine series completed 2022    DNR (do not resuscitate) 2021    Gait disturbance 2021    Corns and callosity 10/30/2020    Arthritis 10/30/2020    Bilateral low back pain with bilateral sciatica 10/30/2020    Nail dystrophy 2020    Benign hypertensive renal disease 2020    Proteinuria 2020    Breast lump in upper outer quadrant 2020    Neck pain 2020    Abnormal mammogram 2020    Vaginal bleeding 2019    Chronic right shoulder pain 2019    Migraine without aura, not intractable 2018    Hypertriglyceridemia, essential 2018    Osteopenia of spine 2018    Nonalcoholic fatty liver disease 2018    Hypomagnesemia 2017    Indigestion 2017    Long term current use of insulin (HCC) 2017    Hammer toe  12/16/2015    Onychomycosis 11/10/2015    Chronic kidney disease, stage 3b (HCC) 08/24/2015    Peripheral vascular disease (HCC) 11/13/2014    Plantar fascial fibromatosis 06/11/2014    Vitamin D deficiency 04/21/2014    Multiple and bilateral precerebral arterial occlusion 04/21/2014    Tinnitus 03/19/2014    Carotid artery disease (HCC) 03/13/2013    Primary hypertension 01/15/2013    Mixed hyperlipidemia 01/15/2013    Irritable bowel syndrome 01/15/2013    Metabolic syndrome 01/15/2013    Obstructive sleep apnea treated with continuous positive airway pressure (CPAP) 01/15/2013    Type 2 diabetes mellitus with stage 3 chronic kidney disease, with long-term current use of insulin (HCC) 01/15/2013    Anemia 01/15/2013    Restless legs syndrome (RLS) 01/15/2013    Mixed stress and urge urinary incontinence 01/15/2013      LOS (days): 0  Geometric Mean LOS (GMLOS) (days):   Days to GMLOS:     OBJECTIVE:     Current admission status: Inpatient       Preferred Pharmacy:   [id number] [place], [place] - [address] [address]  Phone: [phone] Fax: [phone]    [facility] [address]  [address] [address]  Phone: [phone] Fax: [phone]    Primary Care Provider: Dulce Michaud MD    Primary Insurance: MEDICARE  Secondary Insurance: AAR    ASSESSMENT:  Active Health Care Proxies       Freddy Sanchez Health Care Representative - Spouse   Primary Phone: [phone] (Home)  Mobile Phone: [phone]                 Readmission Root Cause  30 Day Readmission: No    Patient Information  Admitted from:: Home  Mental Status: Alert  During Assessment patient was accompanied by: Son  Assessment information provided by:: Patient  Primary Caregiver: Self  County of Residence: [place]  What city do you live in?: [place]  Home entry access options. Select all that apply.: No steps to enter  home  Type of Current Residence: Apartment  Floor Level: 1  Upon entering residence, is there a bedroom on the main floor (no further steps)?: Yes  Upon entering residence, is there a bathroom on the main floor (no further steps)?: Yes  Living Arrangements: Lives w/ Spouse/significant other  Is patient a ?: No    Activities of Daily Living Prior to Admission  Functional Status: Independent  Completes ADLs independently?: Yes  Ambulates independently?: Yes  Does patient use assisted devices?: Yes  Assisted Devices (DME) used: Rollator, Straight Cane, Walker  Does patient currently own DME?: Yes  What DME does the patient currently own?: Straight Cane, Walker, Rollator  Does patient have a history of Outpatient Therapy (PT/OT)?: No  Does the patient have a history of Short-Term Rehab?: Yes ([facility])  Does patient have a history of HHC?: No  Does patient currently have HHC?: No    Patient Information Continued  Income Source: Pension/penitentiary  Does patient have prescription coverage?: Yes  Can the patient afford their medications and any related supplies (such as glucometers or test strips)?: Yes  Does patient receive dialysis treatments?: No  Does patient have a history of substance abuse?: No    Means of Transportation  Means of Transport to Appts:: Drives Self    DISCHARGE DETAILS:    Discharge planning discussed with:: Pt and diego Haji at the bedside  Freedom of Choice: Yes  Comments - Freedom of Choice: PT is recommending STR.  Pt wants to go to [facility] as she was there in the past and her spouse is there    Contacts  Patient Contacts: Freddy Sanchez (Spouse)  [phone] (Home Phone), Diego Haji ( [phone])  Relationship to Patient:: Family    Requested Home Health Care         Is the patient interested in HHC at discharge?: No    DME Referral Provided  Referral made for DME?: No      Treatment Team Recommendation: Short Term Rehab  Discharge Destination Plan::  Short Term Rehab  Pt states she was IPA and was able to drive.  PT is recommending STR.  Pt would like a referral made to [facility] as her spouse is there and she was there in the past.  Will need transport to accepting facility.

## 2025-06-01 LAB
ALBUMIN SERPL BCG-MCNC: 3.6 G/DL (ref 3.5–5)
ALP SERPL-CCNC: 81 U/L (ref 34–104)
ALT SERPL W P-5'-P-CCNC: 9 U/L (ref 7–52)
ANION GAP SERPL CALCULATED.3IONS-SCNC: 8 MMOL/L (ref 4–13)
AST SERPL W P-5'-P-CCNC: 15 U/L (ref 13–39)
BASOPHILS # BLD AUTO: 0.03 THOUSANDS/ÂΜL (ref 0–0.1)
BASOPHILS NFR BLD AUTO: 0 % (ref 0–1)
BILIRUB SERPL-MCNC: 0.4 MG/DL (ref 0.2–1)
BUN SERPL-MCNC: 15 MG/DL (ref 5–25)
C COLI+JEJUNI TUF STL QL NAA+PROBE: NEGATIVE
C DIFF TOX GENS STL QL NAA+PROBE: NEGATIVE
CALCIUM SERPL-MCNC: 9.1 MG/DL (ref 8.4–10.2)
CHLORIDE SERPL-SCNC: 107 MMOL/L (ref 96–108)
CO2 SERPL-SCNC: 25 MMOL/L (ref 21–32)
CREAT SERPL-MCNC: 0.9 MG/DL (ref 0.6–1.3)
EC STX1+STX2 GENES STL QL NAA+PROBE: NEGATIVE
EOSINOPHIL # BLD AUTO: 0.05 THOUSAND/ÂΜL (ref 0–0.61)
EOSINOPHIL NFR BLD AUTO: 1 % (ref 0–6)
ERYTHROCYTE [DISTWIDTH] IN BLOOD BY AUTOMATED COUNT: 14 % (ref 11.6–15.1)
GFR SERPL CREATININE-BSD FRML MDRD: 60 ML/MIN/1.73SQ M
GLUCOSE SERPL-MCNC: 108 MG/DL (ref 65–140)
GLUCOSE SERPL-MCNC: 122 MG/DL (ref 65–140)
GLUCOSE SERPL-MCNC: 184 MG/DL (ref 65–140)
GLUCOSE SERPL-MCNC: 210 MG/DL (ref 65–140)
GLUCOSE SERPL-MCNC: 211 MG/DL (ref 65–140)
HCT VFR BLD AUTO: 37.7 % (ref 34.8–46.1)
HGB BLD-MCNC: 11.9 G/DL (ref 11.5–15.4)
IMM GRANULOCYTES # BLD AUTO: 0.05 THOUSAND/UL (ref 0–0.2)
IMM GRANULOCYTES NFR BLD AUTO: 1 % (ref 0–2)
LYMPHOCYTES # BLD AUTO: 0.99 THOUSANDS/ÂΜL (ref 0.6–4.47)
LYMPHOCYTES NFR BLD AUTO: 12 % (ref 14–44)
MAGNESIUM SERPL-MCNC: 2 MG/DL (ref 1.9–2.7)
MCH RBC QN AUTO: 28.8 PG (ref 26.8–34.3)
MCHC RBC AUTO-ENTMCNC: 31.6 G/DL (ref 31.4–37.4)
MCV RBC AUTO: 91 FL (ref 82–98)
MONOCYTES # BLD AUTO: 0.6 THOUSAND/ÂΜL (ref 0.17–1.22)
MONOCYTES NFR BLD AUTO: 7 % (ref 4–12)
NEUTROPHILS # BLD AUTO: 6.5 THOUSANDS/ÂΜL (ref 1.85–7.62)
NEUTS SEG NFR BLD AUTO: 79 % (ref 43–75)
NRBC BLD AUTO-RTO: 0 /100 WBCS
PLATELET # BLD AUTO: 142 THOUSANDS/UL (ref 149–390)
PMV BLD AUTO: 10.8 FL (ref 8.9–12.7)
POTASSIUM SERPL-SCNC: 3.5 MMOL/L (ref 3.5–5.3)
PROT SERPL-MCNC: 6.2 G/DL (ref 6.4–8.4)
RBC # BLD AUTO: 4.13 MILLION/UL (ref 3.81–5.12)
SALMONELLA SP SPAO STL QL NAA+PROBE: NEGATIVE
SHIGELLA SP+EIEC IPAH STL QL NAA+PROBE: NEGATIVE
SODIUM SERPL-SCNC: 140 MMOL/L (ref 135–147)
WBC # BLD AUTO: 8.22 THOUSAND/UL (ref 4.31–10.16)

## 2025-06-01 PROCEDURE — 82948 REAGENT STRIP/BLOOD GLUCOSE: CPT

## 2025-06-01 PROCEDURE — 99232 SBSQ HOSP IP/OBS MODERATE 35: CPT | Performed by: INTERNAL MEDICINE

## 2025-06-01 PROCEDURE — 83735 ASSAY OF MAGNESIUM: CPT | Performed by: INTERNAL MEDICINE

## 2025-06-01 PROCEDURE — 85025 COMPLETE CBC W/AUTO DIFF WBC: CPT | Performed by: INTERNAL MEDICINE

## 2025-06-01 PROCEDURE — 80053 COMPREHEN METABOLIC PANEL: CPT | Performed by: INTERNAL MEDICINE

## 2025-06-01 RX ORDER — LOPERAMIDE HCL 1 MG/7.5ML
2 SUSPENSION ORAL 4 TIMES DAILY PRN
Status: DISCONTINUED | OUTPATIENT
Start: 2025-06-01 | End: 2025-06-03 | Stop reason: HOSPADM

## 2025-06-01 RX ORDER — HYDRALAZINE HYDROCHLORIDE 20 MG/ML
10 INJECTION INTRAMUSCULAR; INTRAVENOUS ONCE
Status: DISCONTINUED | OUTPATIENT
Start: 2025-06-01 | End: 2025-06-01

## 2025-06-01 RX ORDER — POTASSIUM CHLORIDE 1500 MG/1
40 TABLET, EXTENDED RELEASE ORAL EVERY 4 HOURS
Status: COMPLETED | OUTPATIENT
Start: 2025-06-01 | End: 2025-06-01

## 2025-06-01 RX ADMIN — INSULIN LISPRO 1 UNITS: 100 INJECTION, SOLUTION INTRAVENOUS; SUBCUTANEOUS at 16:30

## 2025-06-01 RX ADMIN — PREGABALIN 75 MG: 75 CAPSULE ORAL at 08:00

## 2025-06-01 RX ADMIN — B-COMPLEX W/ C & FOLIC ACID TAB 1 TABLET: TAB at 08:00

## 2025-06-01 RX ADMIN — FENOFIBRATE 145 MG: 145 TABLET, FILM COATED ORAL at 08:00

## 2025-06-01 RX ADMIN — POTASSIUM CHLORIDE 40 MEQ: 1500 TABLET, EXTENDED RELEASE ORAL at 12:48

## 2025-06-01 RX ADMIN — LOPERAMIDE HCL 2 MG: 1 SOLUTION ORAL at 12:48

## 2025-06-01 RX ADMIN — NYSTATIN: 100000 POWDER TOPICAL at 08:01

## 2025-06-01 RX ADMIN — OXYBUTYNIN CHLORIDE 5 MG: 5 TABLET, EXTENDED RELEASE ORAL at 08:00

## 2025-06-01 RX ADMIN — PREGABALIN 75 MG: 75 CAPSULE ORAL at 22:01

## 2025-06-01 RX ADMIN — POTASSIUM CHLORIDE 40 MEQ: 1500 TABLET, EXTENDED RELEASE ORAL at 10:05

## 2025-06-01 RX ADMIN — PREGABALIN 75 MG: 75 CAPSULE ORAL at 16:30

## 2025-06-01 RX ADMIN — CEFTRIAXONE SODIUM 1000 MG: 10 INJECTION, POWDER, FOR SOLUTION INTRAVENOUS at 17:46

## 2025-06-01 RX ADMIN — ACETAMINOPHEN 650 MG: 325 TABLET ORAL at 04:43

## 2025-06-01 RX ADMIN — DEXTRAN 70, GLYCERIN, HYPROMELLOSE 1 DROP: 1; 2; 3 SOLUTION/ DROPS OPHTHALMIC at 08:01

## 2025-06-01 RX ADMIN — HYDROMORPHONE HYDROCHLORIDE 0.2 MG: 0.2 INJECTION, SOLUTION INTRAMUSCULAR; INTRAVENOUS; SUBCUTANEOUS at 08:16

## 2025-06-01 RX ADMIN — LISINOPRIL 10 MG: 10 TABLET ORAL at 08:01

## 2025-06-01 RX ADMIN — METRONIDAZOLE 500 MG: 500 INJECTION, SOLUTION INTRAVENOUS at 04:45

## 2025-06-01 RX ADMIN — INSULIN LISPRO 2 UNITS: 100 INJECTION, SOLUTION INTRAVENOUS; SUBCUTANEOUS at 12:48

## 2025-06-01 RX ADMIN — NYSTATIN: 100000 POWDER TOPICAL at 17:47

## 2025-06-01 RX ADMIN — INSULIN LISPRO 2 UNITS: 100 INJECTION, SOLUTION INTRAVENOUS; SUBCUTANEOUS at 22:02

## 2025-06-01 RX ADMIN — PRAVASTATIN SODIUM 20 MG: 20 TABLET ORAL at 16:30

## 2025-06-01 RX ADMIN — PANTOPRAZOLE SODIUM 40 MG: 40 TABLET, DELAYED RELEASE ORAL at 08:00

## 2025-06-01 RX ADMIN — INSULIN GLARGINE 70 UNITS: 100 INJECTION, SOLUTION SUBCUTANEOUS at 22:01

## 2025-06-01 RX ADMIN — HYDROMORPHONE HYDROCHLORIDE 0.5 MG: 1 INJECTION, SOLUTION INTRAMUSCULAR; INTRAVENOUS; SUBCUTANEOUS at 22:02

## 2025-06-01 RX ADMIN — ACETAMINOPHEN 650 MG: 325 TABLET ORAL at 13:38

## 2025-06-01 RX ADMIN — TOPIRAMATE 25 MG: 25 TABLET, FILM COATED ORAL at 08:00

## 2025-06-01 NOTE — ASSESSMENT & PLAN NOTE
"CT report noting: \"Mild bladder wall thickening with trace pericystic inflammatory change. Tiny foci of intraluminal gas. Correlation with urinalysis advised for cystitis.\"  Urinalysis notable for positive nitrites and moderate bacteriuria but negative for pyuria, and patient asymptomatic from a urinary perspective -> already on IV Rocephin for possible colitis, like to be discontinued today (3 full doses to be received)  "

## 2025-06-01 NOTE — PLAN OF CARE
Problem: PAIN - ADULT  Goal: Verbalizes/displays adequate comfort level or baseline comfort level  Description: Interventions:  - Encourage patient to monitor pain and request assistance  - Assess pain using appropriate pain scale  - Administer analgesics as ordered based on type and severity of pain and evaluate response  - Implement non-pharmacological measures as appropriate and evaluate response  - Consider cultural and social influences on pain and pain management  - Notify physician/advanced practitioner if interventions unsuccessful or patient reports new pain  - Educate patient/family on pain management process including their role and importance of  reporting pain   - Provide non-pharmacologic/complimentary pain relief interventions  Outcome: Progressing     Problem: INFECTION - ADULT  Goal: Absence or prevention of progression during hospitalization  Description: INTERVENTIONS:  - Assess and monitor for signs and symptoms of infection  - Monitor lab/diagnostic results  - Monitor all insertion sites, i.e. indwelling lines, tubes, and drains  - Monitor endotracheal if appropriate and nasal secretions for changes in amount and color  - [place] appropriate cooling/warming therapies per order  - Administer medications as ordered  - Instruct and encourage patient and family to use good hand hygiene technique  - Identify and instruct in appropriate isolation precautions for identified infection/condition  Outcome: Progressing  Goal: Absence of fever/infection during neutropenic period  Description: INTERVENTIONS:  - Monitor WBC  - Perform strict hand hygiene  - Limit to healthy visitors only  - No plants, dried, fresh or silk flowers with perez in patient room  Outcome: Progressing

## 2025-06-01 NOTE — ASSESSMENT & PLAN NOTE
5/30: Ongoing issue for approximately 3-4 days without provoking factor  Chronic oral iron use noted, however, only acknowledges mild darkness of stools on onset of use years ago - denies current use of bismuth containing compounds  Suspect possible viral etiology, however, in the setting of stool discoloration, will await gastroenterology input -> remains on clear liquids currently with plan for NPO after midnight, in case scoping is warranted  Hemoglobin stable -> continue to monitor  Avoiding pharmacologic VTE prophylaxis  Consider colitis ?? ->  IV Rocephin/Flagyl for now -> await stool cultures and C. difficile PCR  Monitor/replete electrolytes from insensible losses from loose stools    5/31: Symptomatically improved and denies diarrhea this morning, thus far.  Await C. difficile PCR and stool culture, and if negative, can initiate as needed antidiarrheals.  Appreciate gastroenterology input, with no plans for scoping at this time.  Will slowly advance diet, currently upgraded to full liquids with toast/crackers.  Supportive care otherwise.    6/1: Continues to report improvement of frequency of diarrhea.  Stool culture and C. difficile PCR testing negative, hence, will initiate PRN Imodium.  Discontinue further antibiotics.  Reports less black discoloration with ongoing bowel movements.  Will advance diet to a surgical soft consistency.  Awaiting skilled rehab facility placement.

## 2025-06-01 NOTE — ASSESSMENT & PLAN NOTE
79-year-old female with previous chronic diarrhea, prior workup in 2021 overall unremarkable aside from lactose intolerance, CKD, type 2 diabetes, presenting for 5 days of generalized abdominal pain and diarrhea.  Presentation is most consistent with infectious gastroenteritis, very low suspicion for significant GI bleeding. No significant colonic thickening to suggest severe colitis on imaging.  No evidence of diverticulitis.    - Continue IV hydration until able to tolerate adequate p.o. intake.  Resume clear liquid diet and advance as tolerated.  Antiemetics as needed.  Stool studies to rule out C. difficile and enteric panel; if these were negative and she continues to have significant diarrhea, antidiarrheals are appropriate  No plans for endoscopic evaluation.      GI will sign off, please call with questions

## 2025-06-01 NOTE — PROGRESS NOTES
"Progress Note - Hospitalist   Name: Alberta Sanchez 79 y.o. female I MRN: [id number]  Unit/Bed#: -01 I Date of Admission: 5/30/2025   Date of Service: 6/1/2025 I Hospital Day: 1    Assessment & Plan  Black stools with diarrhea  5/30: Ongoing issue for approximately 3-4 days without provoking factor  Chronic oral iron use noted, however, only acknowledges mild darkness of stools on onset of use years ago - denies current use of bismuth containing compounds  Suspect possible viral etiology, however, in the setting of stool discoloration, will await gastroenterology input -> remains on clear liquids currently with plan for NPO after midnight, in case scoping is warranted  Hemoglobin stable -> continue to monitor  Avoiding pharmacologic VTE prophylaxis  Consider colitis ?? ->  IV Rocephin/Flagyl for now -> await stool cultures and C. difficile PCR  Monitor/replete electrolytes from insensible losses from loose stools    5/31: Symptomatically improved and denies diarrhea this morning, thus far.  Await C. difficile PCR and stool culture, and if negative, can initiate as needed antidiarrheals.  Appreciate gastroenterology input, with no plans for scoping at this time.  Will slowly advance diet, currently upgraded to full liquids with toast/crackers.  Supportive care otherwise.    6/1: Continues to report improvement of frequency of diarrhea.  Stool culture and C. difficile PCR testing negative, hence, will initiate PRN Imodium.  Discontinue further antibiotics.  Reports less black discoloration with ongoing bowel movements.  Will advance diet to a surgical soft consistency.  Awaiting skilled rehab facility placement.  Multiple electrolyte abnormalities  Monitor/replete serum potassium and magnesium, if necessary  Likely secondary to insensible losses from recent diarrhea  Abnormal CT of the abdomen  CT report noting: \"Mild bladder wall thickening with trace pericystic inflammatory change. Tiny foci of intraluminal " "gas. Correlation with urinalysis advised for cystitis.\"  Urinalysis notable for positive nitrites and moderate bacteriuria but negative for pyuria, and patient asymptomatic from a urinary perspective -> already on IV Rocephin for possible colitis, like to be discontinued today (3 full doses to be received)  Essential hypertension  Continue Zestril - additional PRN IV Hydralazine on board for BP spikes  Low sodium restriction, once back on solid diet  Insulin-dependent diabetes mellitus with neuropathy ([facility])  Lab Results   Component Value Date    HGBA1C 6.7 (H) 03/11/2025     Continue basal insulin with additional SSI coverage per Accu-Cheks  Carbohydrate restriction once back on solid diet  Hypoglycemia protocol  On Lyrica for diabetic neuropathy  CKD (chronic kidney disease) stage 3 ([facility])  Baseline creatinine of approximately 0.8-1.0 -> currently stable at baseline  Monitor renal function and urine output  Limit/avoid nephrotoxins and hypotension as possible  Hyperlipidemia  Continue statin/fibrate  Thrombocytopenia ([facility])  Monitor platelet count - monitor for bleeding      VTE Pharmacologic Prophylaxis: VTE Score: 5 High Risk (Score >/= 5) - Pharmacological DVT Prophylaxis Contraindicated. Sequential Compression Devices Ordered.    AM-PAC Basic Mobility:  Basic Mobility Inpatient Raw Score: 17  JH-HLM Goal: 5: Stand one or more mins  JH-HLM Achieved: 5: Stand (1 or more minutes)  JH-HLM Goal achieved. Continue to encourage appropriate mobility.    Patient Centered Rounds:  I have performed bedside rounds and discussed plan of care with nursing today.  Discussions with Specialists or Other Care Team Provider:  see above assessments if applicable    Education and Discussions with Family / Patient:  Patient at bedside, who will self-update contacts, as necessary    Time Spent for Care:  35 minutes. More than 50% of total time spent on counseling and coordination of care, on one or more of the following: performing " physical exam; counseling and coordination of care, obtaining or reviewing history, documenting in the medical record, reviewing/ordering tests/medications/procedures, and communicating with other healthcare professionals.    Current Length of Stay: 1 day(s)  Current Patient Status: Inpatient   Certification Statement:  Patient will continue to require additional hospital stay due to assessments as noted above.    Code Status: Level 3 - DNAR and DNI      Subjective     Encountered this morning.  Denies new complaints this time.  Reports improvement in frequency of diarrhea stating that stool is less black.  Would like diet further upgraded, as possible.      Objective     Vitals:   Temp (24hrs), Av.8 °F (36.6 °C), Min:97.5 °F (36.4 °C), Max:98.1 °F (36.7 °C)    Temp:  [97.5 °F (36.4 °C)-98.1 °F (36.7 °C)] 97.6 °F (36.4 °C)  HR:  [75-82] 80  Resp:  [15-19] 18  BP: (129-168)/(66-84) 158/78  SpO2:  [92 %-96 %] 94 %  Body mass index is 37.97 kg/m².     Input and Output Summary (last 24 hours):       Intake/Output Summary (Last 24 hours) at 2025 1525  Last data filed at 2025 1234  Gross per 24 hour   Intake 240 ml   Output 300 ml   Net -60 ml       Physical Exam:     GENERAL Waxing/waning but improved weakness/fatigue   HEAD   Normocephalic - atraumatic   EYES Nonicteric   MOUTH   Mucosa moist   NECK   Supple - full range of motion   CARDIAC Rate controlled   PULMONARY Fairly clear to auscultation without labored respirations   ABDOMEN Improving abdominal tenderness - nondistended   MUSCULOSKELETAL   Motor strength/range of motion mildly deconditioned - bilateral wrists in splints   NEUROLOGIC   Alert/oriented at baseline   PSYCHIATRIC   Mood/affect stable         Labs & Recent Cultures:  Results from last 7 days   Lab Units 25  0433   WBC Thousand/uL 8.22   HEMOGLOBIN g/dL 11.9   HEMATOCRIT % 37.7   PLATELETS Thousands/uL 142*   SEGS PCT % 79*   LYMPHO PCT % 12*   MONO PCT % 7   EOS PCT % 1     Results  from last 7 days   Lab Units 06/01/25  0433   POTASSIUM mmol/L 3.5   CHLORIDE mmol/L 107   CO2 mmol/L 25   BUN mg/dL 15   CREATININE mg/dL 0.90   CALCIUM mg/dL 9.1   ALK PHOS U/L 81   ALT U/L 9   AST U/L 15     Results from last 7 days   Lab Units 05/30/25  1235   INR  1.09     Results from last 7 days   Lab Units 06/01/25  1126 06/01/25  0730 05/31/25  2044 05/31/25  1630 05/31/25  1052 05/31/25  0719 05/30/25  2135 05/30/25  1815   POC GLUCOSE mg/dl 211* 122 166* 127 96 124 94 99                 Results from last 7 days   Lab Units 05/30/25 1955   C DIFF TOXIN B BY PCR  Negative         Lines/Drains/Telemetry:  Invasive Devices       Peripheral Intravenous Line  Duration             Peripheral IV 05/31/25 Right;Ventral (anterior) Forearm 1 day                    Last 24 Hours Medication List:   Current Facility-Administered Medications   Medication Dose Route Frequency Provider Last Rate    acetaminophen  650 mg Oral Q6H PRN Lilian Last MD      albuterol  2 puff Inhalation Q4H PRN Lilian Last MD      Artificial Tears  1 drop Both Eyes Q4H PRN Lilian Last MD      cefTRIAXone  1,000 mg Intravenous Q24H Lilian Last MD 1,000 mg (05/31/25 1827)    fenofibrate  145 mg Oral Daily Lilian Last MD      hydrALAZINE  5 mg Intravenous Q6H PRN Lilian Last MD      HYDROmorphone  0.5 mg Intravenous Q6H PRN Lilian Last MD      HYDROmorphone  0.2 mg Intravenous Q6H PRN Lilian Last MD      HYDROmorphone  0.2 mg Intravenous Q3H PRN Lilian Last MD      insulin glargine  70 Units Subcutaneous HS Lilian aLst MD      insulin lispro  1-6 Units Subcutaneous 4x Daily (AC & HS) Lilian Last MD      lisinopril  10 mg Oral Daily Lilian Last MD      loperamide  2 mg Oral 4x Daily PRN Lilian Last MD      multivitamin stress formula  1 tablet Oral Daily Lilian Last MD      nystatin   Topical BID Lilian Last MD      ondansetron  4 mg Intravenous Q4H PRN Lilian Last MD      oxybutynin  5 mg Oral Daily Lilian Last,  MD      pantoprazole  40 mg Oral Daily Lilian Last MD      pravastatin  20 mg Oral Daily With Dinner Lilian Last MD      pregabalin  75 mg Oral TID Lilian Last MD      topiramate  25 mg Oral Daily MD LILIAN Jimenes MD   Hospitalist - [facility] Internal Medicine        ** Please Note:  Documentation is constructed using a voice recognition dictation system.  An occasional wrong word/phrase or “sound-a-like” substitution may have been picked up by dictation device due to the inherent limitations of voice recognition software.  Read the chart carefully and recognize, using reasonable context, where substitutions may have occurred.**

## 2025-06-01 NOTE — PROGRESS NOTES
Progress Note - Gastroenterology   Name: Alberta Sanchez 79 y.o. female I MRN: [id number]  Unit/Bed#: -01 I Date of Admission: 5/30/2025   Date of Service: 6/1/2025 I Hospital Day: 1    Assessment & Plan  Black stools with diarrhea  79-year-old female with previous chronic diarrhea, prior workup in 2021 overall unremarkable aside from lactose intolerance, CKD, type 2 diabetes, presenting for 5 days of generalized abdominal pain and diarrhea.  Presentation is most consistent with infectious gastroenteritis, very low suspicion for significant GI bleeding. No significant colonic thickening to suggest severe colitis on imaging.  No evidence of diverticulitis.    - Continue IV hydration until able to tolerate adequate p.o. intake.  Resume clear liquid diet and advance as tolerated.  Antiemetics as needed.  Stool studies to rule out C. difficile and enteric panel; if these were negative and she continues to have significant diarrhea, antidiarrheals are appropriate  No plans for endoscopic evaluation.      GI will sign off, please call with questions  Multiple electrolyte abnormalities  Now corrected with adequate repletion  Abnormal CT of the abdomen    Thrombocytopenia (HCC)          Subjective   Feeling improved today with reduced abdominal cramping.  She has no nausea.  She is tolerating a soft diet.  Diarrhea appears to be slowing down.    Objective :  Temp:  [97.5 °F (36.4 °C)-98.1 °F (36.7 °C)] 98.1 °F (36.7 °C)  HR:  [75-85] 75  BP: (145-182)/(70-98) 168/84  Resp:  [15-18] 18  SpO2:  [91 %-95 %] 94 %  O2 Device: None (Room air)    Physical Exam  Constitutional:       Appearance: Normal appearance.     Cardiovascular:      Rate and Rhythm: Normal rate.   Pulmonary:      Effort: Pulmonary effort is normal.   Abdominal:      General: Abdomen is flat. There is no distension.      Palpations: Abdomen is soft.      Tenderness: There is no abdominal tenderness.     Neurological:      General: No focal deficit  present.      Mental Status: She is alert.     Psychiatric:         Mood and Affect: Mood normal.           Lab Results: I have reviewed the following results:

## 2025-06-01 NOTE — PLAN OF CARE
Problem: INFECTION - ADULT  Goal: Absence or prevention of progression during hospitalization  Description: INTERVENTIONS:  - Assess and monitor for signs and symptoms of infection  - Monitor lab/diagnostic results  - Monitor all insertion sites, i.e. indwelling lines, tubes, and drains  - Monitor endotracheal if appropriate and nasal secretions for changes in amount and color  - [place] appropriate cooling/warming therapies per order  - Administer medications as ordered  - Instruct and encourage patient and family to use good hand hygiene technique  - Identify and instruct in appropriate isolation precautions for identified infection/condition  Outcome: Progressing  Goal: Absence of fever/infection during neutropenic period  Description: INTERVENTIONS:  - Monitor WBC  - Perform strict hand hygiene  - Limit to healthy visitors only  - No plants, dried, fresh or silk flowers with perez in patient room  Outcome: Progressing

## 2025-06-02 ENCOUNTER — APPOINTMENT (INPATIENT)
Dept: RADIOLOGY | Facility: HOSPITAL | Age: 80
End: 2025-06-02
Payer: MEDICARE

## 2025-06-02 PROBLEM — M25.531 RIGHT WRIST PAIN: Status: ACTIVE | Noted: 2025-06-02

## 2025-06-02 LAB
ALBUMIN SERPL BCG-MCNC: 3.6 G/DL (ref 3.5–5)
ALP SERPL-CCNC: 86 U/L (ref 34–104)
ALT SERPL W P-5'-P-CCNC: 8 U/L (ref 7–52)
ANION GAP SERPL CALCULATED.3IONS-SCNC: 7 MMOL/L (ref 4–13)
AST SERPL W P-5'-P-CCNC: 12 U/L (ref 13–39)
BASOPHILS # BLD AUTO: 0.01 THOUSANDS/ÂΜL (ref 0–0.1)
BASOPHILS NFR BLD AUTO: 0 % (ref 0–1)
BILIRUB SERPL-MCNC: 0.45 MG/DL (ref 0.2–1)
BUN SERPL-MCNC: 11 MG/DL (ref 5–25)
CALCIUM SERPL-MCNC: 9.4 MG/DL (ref 8.4–10.2)
CHLORIDE SERPL-SCNC: 105 MMOL/L (ref 96–108)
CO2 SERPL-SCNC: 25 MMOL/L (ref 21–32)
CREAT SERPL-MCNC: 0.81 MG/DL (ref 0.6–1.3)
EOSINOPHIL # BLD AUTO: 0.03 THOUSAND/ÂΜL (ref 0–0.61)
EOSINOPHIL NFR BLD AUTO: 0 % (ref 0–6)
ERYTHROCYTE [DISTWIDTH] IN BLOOD BY AUTOMATED COUNT: 13.9 % (ref 11.6–15.1)
GFR SERPL CREATININE-BSD FRML MDRD: 68 ML/MIN/1.73SQ M
GLUCOSE SERPL-MCNC: 116 MG/DL (ref 65–140)
GLUCOSE SERPL-MCNC: 140 MG/DL (ref 65–140)
GLUCOSE SERPL-MCNC: 155 MG/DL (ref 65–140)
GLUCOSE SERPL-MCNC: 160 MG/DL (ref 65–140)
GLUCOSE SERPL-MCNC: 172 MG/DL (ref 65–140)
HCT VFR BLD AUTO: 38.9 % (ref 34.8–46.1)
HGB BLD-MCNC: 12.2 G/DL (ref 11.5–15.4)
IMM GRANULOCYTES # BLD AUTO: 0.05 THOUSAND/UL (ref 0–0.2)
IMM GRANULOCYTES NFR BLD AUTO: 0 % (ref 0–2)
LYMPHOCYTES # BLD AUTO: 0.94 THOUSANDS/ÂΜL (ref 0.6–4.47)
LYMPHOCYTES NFR BLD AUTO: 8 % (ref 14–44)
MAGNESIUM SERPL-MCNC: 1.5 MG/DL (ref 1.9–2.7)
MCH RBC QN AUTO: 28.6 PG (ref 26.8–34.3)
MCHC RBC AUTO-ENTMCNC: 31.4 G/DL (ref 31.4–37.4)
MCV RBC AUTO: 91 FL (ref 82–98)
MONOCYTES # BLD AUTO: 0.74 THOUSAND/ÂΜL (ref 0.17–1.22)
MONOCYTES NFR BLD AUTO: 7 % (ref 4–12)
NEUTROPHILS # BLD AUTO: 9.47 THOUSANDS/ÂΜL (ref 1.85–7.62)
NEUTS SEG NFR BLD AUTO: 85 % (ref 43–75)
NRBC BLD AUTO-RTO: 0 /100 WBCS
PLATELET # BLD AUTO: 162 THOUSANDS/UL (ref 149–390)
PMV BLD AUTO: 10.6 FL (ref 8.9–12.7)
POTASSIUM SERPL-SCNC: 4.4 MMOL/L (ref 3.5–5.3)
PROT SERPL-MCNC: 6.5 G/DL (ref 6.4–8.4)
RBC # BLD AUTO: 4.27 MILLION/UL (ref 3.81–5.12)
SODIUM SERPL-SCNC: 137 MMOL/L (ref 135–147)
WBC # BLD AUTO: 11.24 THOUSAND/UL (ref 4.31–10.16)

## 2025-06-02 PROCEDURE — 80053 COMPREHEN METABOLIC PANEL: CPT | Performed by: INTERNAL MEDICINE

## 2025-06-02 PROCEDURE — 73100 X-RAY EXAM OF WRIST: CPT

## 2025-06-02 PROCEDURE — 85025 COMPLETE CBC W/AUTO DIFF WBC: CPT | Performed by: INTERNAL MEDICINE

## 2025-06-02 PROCEDURE — 82948 REAGENT STRIP/BLOOD GLUCOSE: CPT

## 2025-06-02 PROCEDURE — 99232 SBSQ HOSP IP/OBS MODERATE 35: CPT | Performed by: HOSPITALIST

## 2025-06-02 PROCEDURE — 83735 ASSAY OF MAGNESIUM: CPT | Performed by: INTERNAL MEDICINE

## 2025-06-02 RX ORDER — MAGNESIUM SULFATE HEPTAHYDRATE 40 MG/ML
4 INJECTION, SOLUTION INTRAVENOUS ONCE
Status: COMPLETED | OUTPATIENT
Start: 2025-06-02 | End: 2025-06-02

## 2025-06-02 RX ORDER — HEPARIN SODIUM 5000 [USP'U]/ML
5000 INJECTION, SOLUTION INTRAVENOUS; SUBCUTANEOUS EVERY 8 HOURS SCHEDULED
Status: DISCONTINUED | OUTPATIENT
Start: 2025-06-02 | End: 2025-06-03 | Stop reason: HOSPADM

## 2025-06-02 RX ADMIN — HYDROMORPHONE HYDROCHLORIDE 0.5 MG: 1 INJECTION, SOLUTION INTRAMUSCULAR; INTRAVENOUS; SUBCUTANEOUS at 21:37

## 2025-06-02 RX ADMIN — B-COMPLEX W/ C & FOLIC ACID TAB 1 TABLET: TAB at 10:24

## 2025-06-02 RX ADMIN — MAGNESIUM SULFATE HEPTAHYDRATE 4 G: 40 INJECTION, SOLUTION INTRAVENOUS at 12:48

## 2025-06-02 RX ADMIN — PREGABALIN 75 MG: 75 CAPSULE ORAL at 17:43

## 2025-06-02 RX ADMIN — HEPARIN SODIUM 5000 UNITS: 5000 INJECTION INTRAVENOUS; SUBCUTANEOUS at 17:37

## 2025-06-02 RX ADMIN — FENOFIBRATE 145 MG: 145 TABLET, FILM COATED ORAL at 10:24

## 2025-06-02 RX ADMIN — HYDROMORPHONE HYDROCHLORIDE 0.2 MG: 0.2 INJECTION, SOLUTION INTRAMUSCULAR; INTRAVENOUS; SUBCUTANEOUS at 10:22

## 2025-06-02 RX ADMIN — INSULIN LISPRO 1 UNITS: 100 INJECTION, SOLUTION INTRAVENOUS; SUBCUTANEOUS at 21:25

## 2025-06-02 RX ADMIN — OXYBUTYNIN CHLORIDE 5 MG: 5 TABLET, EXTENDED RELEASE ORAL at 10:25

## 2025-06-02 RX ADMIN — TOPIRAMATE 25 MG: 25 TABLET, FILM COATED ORAL at 10:25

## 2025-06-02 RX ADMIN — PREGABALIN 75 MG: 75 CAPSULE ORAL at 10:25

## 2025-06-02 RX ADMIN — NYSTATIN: 100000 POWDER TOPICAL at 17:29

## 2025-06-02 RX ADMIN — PANTOPRAZOLE SODIUM 40 MG: 40 TABLET, DELAYED RELEASE ORAL at 10:24

## 2025-06-02 RX ADMIN — ACETAMINOPHEN 650 MG: 325 TABLET ORAL at 01:54

## 2025-06-02 RX ADMIN — LISINOPRIL 10 MG: 10 TABLET ORAL at 10:25

## 2025-06-02 RX ADMIN — PREGABALIN 75 MG: 75 CAPSULE ORAL at 21:25

## 2025-06-02 RX ADMIN — INSULIN LISPRO 1 UNITS: 100 INJECTION, SOLUTION INTRAVENOUS; SUBCUTANEOUS at 12:06

## 2025-06-02 RX ADMIN — ACETAMINOPHEN 650 MG: 325 TABLET ORAL at 10:23

## 2025-06-02 RX ADMIN — ONDANSETRON 4 MG: 2 INJECTION INTRAMUSCULAR; INTRAVENOUS at 17:37

## 2025-06-02 RX ADMIN — PRAVASTATIN SODIUM 20 MG: 20 TABLET ORAL at 17:43

## 2025-06-02 RX ADMIN — ACETAMINOPHEN 650 MG: 325 TABLET ORAL at 17:41

## 2025-06-02 RX ADMIN — HYDROMORPHONE HYDROCHLORIDE 0.2 MG: 0.2 INJECTION, SOLUTION INTRAMUSCULAR; INTRAVENOUS; SUBCUTANEOUS at 01:57

## 2025-06-02 RX ADMIN — NYSTATIN: 100000 POWDER TOPICAL at 10:26

## 2025-06-02 RX ADMIN — HYDROMORPHONE HYDROCHLORIDE 0.5 MG: 1 INJECTION, SOLUTION INTRAMUSCULAR; INTRAVENOUS; SUBCUTANEOUS at 04:59

## 2025-06-02 RX ADMIN — INSULIN GLARGINE 70 UNITS: 100 INJECTION, SOLUTION SUBCUTANEOUS at 21:25

## 2025-06-02 NOTE — CASE MANAGEMENT
Case Management Discharge Planning Note    Patient name Alberta Sanchez  Location /-01 MRN [id number]  : 1945 Date 2025       Current Admission Date: 2025  Current Admission Diagnosis:Black stools with diarrhea   Patient Active Problem List    Diagnosis Date Noted    Right wrist pain 2025    Thrombocytopenia (HCC) 2025    Black stools with diarrhea 2025    Multiple electrolyte abnormalities 2025    Abnormal CT of the abdomen 2025    Essential hypertension 2025    Insulin-dependent diabetes mellitus with neuropathy (HCC) 2025    CKD (chronic kidney disease) stage 3 ([facility]) 2025    Hyperlipidemia 2025    High risk medication use 2025    Dependence on cane 2025    Right knee pain 2024    Nocturnal hypoxemia 10/21/2024    Bilateral lower extremity edema 2024    Other fatigue 2024    Diabetes mellitus with neuropathy (HCC) 2024    Platelets decreased ([facility]) 2023    Itchy skin 2023    Nasal congestion 2023    LVH (left ventricular hypertrophy) 2022    COVID-19 virus infection 2022    COVID-19 vaccine series completed 2022    DNR (do not resuscitate) 2021    Gait disturbance 2021    Corns and callosity 10/30/2020    Arthritis 10/30/2020    Bilateral low back pain with bilateral sciatica 10/30/2020    Nail dystrophy 2020    Benign hypertensive renal disease 2020    Proteinuria 2020    Breast lump in upper outer quadrant 2020    Neck pain 2020    Abnormal mammogram 2020    Vaginal bleeding 2019    Chronic right shoulder pain 2019    Migraine without aura, not intractable 2018    Hypertriglyceridemia, essential 2018    Osteopenia of spine 2018    Nonalcoholic fatty liver disease 2018    Hypomagnesemia 2017    Indigestion 2017    Long term current use of insulin (HCC) 2017     Hammer toe 12/16/2015    Onychomycosis 11/10/2015    Chronic kidney disease, stage 3b (HCC) 08/24/2015    Peripheral vascular disease (HCC) 11/13/2014    Plantar fascial fibromatosis 06/11/2014    Vitamin D deficiency 04/21/2014    Multiple and bilateral precerebral arterial occlusion 04/21/2014    Tinnitus 03/19/2014    Carotid artery disease (HCC) 03/13/2013    Primary hypertension 01/15/2013    Mixed hyperlipidemia 01/15/2013    Irritable bowel syndrome 01/15/2013    Metabolic syndrome 01/15/2013    Obstructive sleep apnea treated with continuous positive airway pressure (CPAP) 01/15/2013    Type 2 diabetes mellitus with stage 3 chronic kidney disease, with long-term current use of insulin (HCC) 01/15/2013    Anemia 01/15/2013    Restless legs syndrome (RLS) 01/15/2013    Mixed stress and urge urinary incontinence 01/15/2013      LOS (days): 2  Geometric Mean LOS (GMLOS) (days): 2.5  Days to GMLOS:0.5     OBJECTIVE:  Risk of Unplanned Readmission Score: 16.79         Current admission status: Inpatient   Preferred Pharmacy:   [id number] [place], [place] - [address] [address]  Phone: [phone] Fax: [phone]    [facility] - [place] [id number]  [address] [address]  Phone: [phone] Fax: [phone]    Primary Care Provider: Dulce Michaud MD    Primary Insurance: MEDICARE  Secondary Insurance: [facility]    DISCHARGE DETAILS:  Pt wanted to go to [facility].  No response from the facility.  TC to admissions at [facility], left a VM with Mariah for a return call or answer in [facility].

## 2025-06-02 NOTE — ASSESSMENT & PLAN NOTE
Hypokalemia-has resolved with repletion  Hypomagnesemia-had resolved with repletion will replete IV again and recheck  Secondary to the diarrhea as outlined above

## 2025-06-02 NOTE — PROGRESS NOTES
"Progress Note - Hospitalist   Name: Alberta Sanchez 79 y.o. female I MRN: [id number]  Unit/Bed#: -01 I Date of Admission: 5/30/2025   Date of Service: 6/2/2025 I Hospital Day: 2    Assessment & Plan  Black stools with diarrhea  Diarrhea has resolved  Stool cultures are negative  Stool for C. difficile testing is negative  In retrospect symptoms are probably a combination of having a viral gastroenteritis in the setting of using iron supplemental therapy  Appreciate GI input  Patient is medically stable for discharge, case management is working on placement  Multiple electrolyte abnormalities  Hypokalemia-has resolved with repletion  Hypomagnesemia-had resolved with repletion will replete IV again and recheck  Secondary to the diarrhea as outlined above  Abnormal CT of the abdomen  CT report noting: \"Mild bladder wall thickening with trace pericystic inflammatory change. Tiny foci of intraluminal gas. Correlation with urinalysis advised for cystitis.\"  Urinalysis notable for positive nitrites and moderate bacteriuria but negative for pyuria, and patient asymptomatic from a urinary perspective  Status post 3 days worth of IV Rocephin  Essential hypertension  Some periods of accelerated hypertension noted  We will increase lisinopril from 10 mg p.o. daily to 20 mg p.o. daily  Continue as needed IV hydralazine otherwise  Insulin-dependent diabetes mellitus with neuropathy (HCC)  Lab Results   Component Value Date    HGBA1C 6.7 (H) 03/11/2025     Target blood sugar for the hospital is 140-180  Continue Lantus, Accu-Cheks AC and at bedtime with sliding scale coverage  Diabetic neuropathy-continue pregabalin  CKD (chronic kidney disease) stage 3 (HCC)  Baseline creatinine of approximately 0.8-1.0 -> currently stable at baseline  Monitor renal function and urine output  Limit/avoid nephrotoxins and hypotension as possible  Hyperlipidemia  Patient with a mixed dyslipidemia   continue statin/fibrate  Thrombocytopenia " ([facility])  Platelet count is stable at 162  Right wrist pain  Patient reports a mechanical fall prior to Mother's Day  Patient has been having waxing and waning pain since in the right wrist however it has worsened over the past 24 hours  We will check a right wrist x-ray  Pending findings we will consider the possibility of an orthopedic evaluation    VTE Pharmacologic Prophylaxis: VTE Score: 5 High Risk (Score >/= 5) - Pharmacological DVT Prophylaxis Ordered: heparin. Sequential Compression Devices Ordered.    Mobility:   Basic Mobility Inpatient Raw Score: 17  -HL Goal: 5: Stand one or more mins  JH-HLM Achieved: 4: Move to chair/commode  JH-HLM Goal NOT achieved. Continue with multidisciplinary rounding and encourage appropriate mobility to improve upon JH-HLM goals.    Patient Centered Rounds: I performed bedside rounds with nursing staff today.   Discussions with Specialists or Other Care Team Provider: Case management, nursing    Education and Discussions with Family / Patient: Patient declined call to .     Current Length of Stay: 2 day(s)  Current Patient Status: Inpatient   Certification Statement: The patient will continue to require additional inpatient hospital stay due to need for IV magnesium, a right wrist x-ray, and the need for placed  Discharge Plan: Anticipate discharge in 24-48 hrs to rehab facility.    Code Status: Level 3 - DNAR and DNI    Subjective   Patient seen, complains of some right wrist pain    Objective :  Temp:  [97.6 °F (36.4 °C)-99.1 °F (37.3 °C)] 99.1 °F (37.3 °C)  HR:  [80-96] 96  BP: (153-178)/(75-99) 153/99  Resp:  [18] 18  SpO2:  [93 %-96 %] 94 %  O2 Device: None (Room air)    Body mass index is 37.97 kg/m².     Input and Output Summary (last 24 hours):     Intake/Output Summary (Last 24 hours) at 6/2/2025 1209  Last data filed at 6/2/2025 0841  Gross per 24 hour   Intake 100 ml   Output 450 ml   Net -350 ml       Physical Exam  Constitutional:       General: She  is not in acute distress.     Appearance: Normal appearance. She is normal weight. She is not ill-appearing.   HENT:      Head: Normocephalic and atraumatic.      Nose: Nose normal.      Mouth/Throat:      Mouth: Mucous membranes are moist.     Eyes:      Extraocular Movements: Extraocular movements intact.      Pupils: Pupils are equal, round, and reactive to light.       Cardiovascular:      Rate and Rhythm: Normal rate and regular rhythm.      Pulses: Normal pulses.      Heart sounds: Normal heart sounds. No murmur heard.     No friction rub. No gallop.   Pulmonary:      Effort: Pulmonary effort is normal. No respiratory distress.      Breath sounds: Normal breath sounds. No wheezing, rhonchi or rales.   Abdominal:      General: There is no distension.      Palpations: Abdomen is soft. There is no mass.      Tenderness: There is no abdominal tenderness.      Hernia: No hernia is present.     Musculoskeletal:         General: No swelling or tenderness. Normal range of motion.      Cervical back: Normal range of motion and neck supple. No rigidity.      Right lower leg: No edema.      Left lower leg: No edema.     Skin:     General: Skin is warm.      Capillary Refill: Capillary refill takes less than 2 seconds.      Findings: No erythema or rash.     Neurological:      General: No focal deficit present.      Mental Status: She is alert and oriented to person, place, and time. Mental status is at baseline.      Cranial Nerves: No cranial nerve deficit.      Motor: No weakness.     Psychiatric:         Mood and Affect: Mood normal.         Behavior: Behavior normal.           Lines/Drains:              Lab Results: I have reviewed the following results:   Results from last 7 days   Lab Units 06/02/25  0455   WBC Thousand/uL 11.24*   HEMOGLOBIN g/dL 12.2   HEMATOCRIT % 38.9   PLATELETS Thousands/uL 162   SEGS PCT % 85*   LYMPHO PCT % 8*   MONO PCT % 7   EOS PCT % 0     Results from last 7 days   Lab Units  06/02/25  0455   SODIUM mmol/L 137   POTASSIUM mmol/L 4.4   CHLORIDE mmol/L 105   CO2 mmol/L 25   BUN mg/dL 11   CREATININE mg/dL 0.81   ANION GAP mmol/L 7   CALCIUM mg/dL 9.4   ALBUMIN g/dL 3.6   TOTAL BILIRUBIN mg/dL 0.45   ALK PHOS U/L 86   ALT U/L 8   AST U/L 12*   GLUCOSE RANDOM mg/dL 172*     Results from last 7 days   Lab Units 05/30/25  1235   INR  1.09     Results from last 7 days   Lab Units 06/02/25  1052 06/02/25  0733 06/01/25  2056 06/01/25  1557 06/01/25  1126 06/01/25  0730 05/31/25  2044 05/31/25  1630 05/31/25  1052 05/31/25  0719 05/30/25  2135 05/30/25  1815   POC GLUCOSE mg/dl 160* 140 210* 184* 211* 122 166* 127 96 124 94 99               Recent Cultures (last 7 days):   Results from last 7 days   Lab Units 05/30/25  1955   C DIFF TOXIN B BY PCR  Negative       Imaging Results Review: No pertinent imaging studies reviewed.  Other Study Results Review: No additional pertinent studies reviewed.    Last 24 Hours Medication List:     Current Facility-Administered Medications:     acetaminophen (TYLENOL) tablet 650 mg, Q6H PRN    albuterol (PROVENTIL HFA,VENTOLIN HFA) inhaler 2 puff, Q4H PRN    Artificial Tears Op Soln 1 drop, Q4H PRN    fenofibrate (TRICOR) tablet 145 mg, Daily    hydrALAZINE (APRESOLINE) injection 5 mg, Q6H PRN    HYDROmorphone (DILAUDID) injection 0.5 mg, Q6H PRN    HYDROmorphone HCl (DILAUDID) injection 0.2 mg, Q6H PRN    HYDROmorphone HCl (DILAUDID) injection 0.2 mg, Q3H PRN    insulin glargine (LANTUS) subcutaneous injection 70 Units 0.7 mL, HS    insulin lispro (HumALOG/ADMELOG) 100 units/mL subcutaneous injection 1-6 Units, 4x Daily (AC & HS) **AND** Fingerstick Glucose (POCT), 4x Daily AC and at bedtime    lisinopril (ZESTRIL) tablet 10 mg, Daily    loperamide (IMODIUM) oral liquid 2 mg, 4x Daily PRN    magnesium sulfate 4 g/100 mL IVPB (premix) 4 g, Once    multivitamin stress formula tablet 1 tablet, Daily    nystatin (MYCOSTATIN) powder, BID    ondansetron (ZOFRAN)  injection 4 mg, Q4H PRN    oxybutynin (DITROPAN-XL) 24 hr tablet 5 mg, Daily    pantoprazole (PROTONIX) EC tablet 40 mg, Daily    pravastatin (PRAVACHOL) tablet 20 mg, Daily With Dinner    pregabalin (LYRICA) capsule 75 mg, TID    topiramate (TOPAMAX) tablet 25 mg, Daily    Administrative Statements   Today, Patient Was Seen By: Tyesha Kasper MD      **Please Note: This note may have been constructed using a voice recognition system.**

## 2025-06-02 NOTE — ASSESSMENT & PLAN NOTE
Some periods of accelerated hypertension noted  We will increase lisinopril from 10 mg p.o. daily to 20 mg p.o. daily  Continue as needed IV hydralazine otherwise

## 2025-06-02 NOTE — ASSESSMENT & PLAN NOTE
Diarrhea has resolved  Stool cultures are negative  Stool for C. difficile testing is negative  In retrospect symptoms are probably a combination of having a viral gastroenteritis in the setting of using iron supplemental therapy  Appreciate GI input  Patient is medically stable for discharge, case management is working on placement

## 2025-06-02 NOTE — PLAN OF CARE
Problem: Potential for Falls  Goal: Patient will remain free of falls  Description: INTERVENTIONS:  - Educate patient/family on patient safety including physical limitations  - Instruct patient to call for assistance with activity   - Consider consulting OT/PT to assist with strengthening/mobility based on AM PAC & JH-HLM score  - Consult OT/PT to assist with strengthening/mobility   - Keep Call bell within reach  - Keep bed low and locked with side rails adjusted as appropriate  - Keep care items and personal belongings within reach  - Initiate and maintain comfort rounds  - Make Fall Risk Sign visible to staff  - Offer Toileting every 2 Hours, in advance of need  - Initiate/Maintain bed alarm  - Obtain necessary fall risk management equipment: socks  - Apply yellow socks and bracelet for high fall risk patients  - Consider moving patient to room near nurses station  Outcome: Progressing     Problem: PAIN - ADULT  Goal: Verbalizes/displays adequate comfort level or baseline comfort level  Description: Interventions:  - Encourage patient to monitor pain and request assistance  - Assess pain using appropriate pain scale  - Administer analgesics as ordered based on type and severity of pain and evaluate response  - Implement non-pharmacological measures as appropriate and evaluate response  - Consider cultural and social influences on pain and pain management  - Notify physician/advanced practitioner if interventions unsuccessful or patient reports new pain  - Educate patient/family on pain management process including their role and importance of  reporting pain   - Provide non-pharmacologic/complimentary pain relief interventions  Outcome: Progressing     Problem: INFECTION - ADULT  Goal: Absence of fever/infection during neutropenic period  Description: INTERVENTIONS:  - Monitor WBC  - Perform strict hand hygiene  - Limit to healthy visitors only  - No plants, dried, fresh or silk flowers with perez in patient  room  Outcome: Progressing     Problem: SAFETY ADULT  Goal: Maintain or return to baseline ADL function  Description: INTERVENTIONS:  -  Assess patient's ability to carry out ADLs; assess patient's baseline for ADL function and identify physical deficits which impact ability to perform ADLs (bathing, care of mouth/teeth, toileting, grooming, dressing, etc.)  - Assess/evaluate cause of self-care deficits   - Assess range of motion  - Assess patient's mobility; develop plan if impaired  - Assess patient's need for assistive devices and provide as appropriate  - Encourage maximum independence but intervene and supervise when necessary  - Involve family in performance of ADLs  - Assess for home care needs following discharge   - Consider OT consult to assist with ADL evaluation and planning for discharge  - Provide patient education as appropriate  - Monitor functional capacity and physical performance, use of AM PAC & JH-HLM   - Monitor gait, balance and fatigue with ambulation    Outcome: Progressing     Problem: Knowledge Deficit  Goal: Patient/family/caregiver demonstrates understanding of disease process, treatment plan, medications, and discharge instructions  Description: Complete learning assessment and assess knowledge base.  Interventions:  - Provide teaching at level of understanding  - Provide teaching via preferred learning methods  Outcome: Progressing     Problem: Nutrition/Hydration-ADULT  Goal: Nutrient/Hydration intake appropriate for improving, restoring or maintaining nutritional needs  Description: Monitor and assess patient's nutrition/hydration status for malnutrition. Collaborate with interdisciplinary team and initiate plan and interventions as ordered.  Monitor patient's weight and dietary intake as ordered or per policy. Utilize nutrition screening tool and intervene as necessary. Determine patient's food preferences and provide high-protein, high-caloric foods as appropriate.      INTERVENTIONS:  - Monitor oral intake, urinary output, labs, and treatment plans  - Assess nutrition and hydration status and recommend course of action  - Evaluate amount of meals eaten  - Assist patient with eating if necessary   - Allow adequate time for meals  - Recommend/ encourage appropriate diets, oral nutritional supplements, and vitamin/mineral supplements  - Order, calculate, and assess calorie counts as needed  - Recommend, monitor, and adjust tube feedings and TPN/PPN based on assessed needs  - Assess need for intravenous fluids  - Provide specific nutrition/hydration education as appropriate  - Include patient/family/caregiver in decisions related to nutrition  Outcome: Progressing

## 2025-06-02 NOTE — PLAN OF CARE
Problem: INFECTION - ADULT  Goal: Absence or prevention of progression during hospitalization  Description: INTERVENTIONS:  - Assess and monitor for signs and symptoms of infection  - Monitor lab/diagnostic results  - Monitor all insertion sites, i.e. indwelling lines, tubes, and drains  - Monitor endotracheal if appropriate and nasal secretions for changes in amount and color  - [place] appropriate cooling/warming therapies per order  - Administer medications as ordered  - Instruct and encourage patient and family to use good hand hygiene technique  - Identify and instruct in appropriate isolation precautions for identified infection/condition  Outcome: Progressing  Goal: Absence of fever/infection during neutropenic period  Description: INTERVENTIONS:  - Monitor WBC  - Perform strict hand hygiene  - Limit to healthy visitors only  - No plants, dried, fresh or silk flowers with perez in patient room  Outcome: Progressing

## 2025-06-02 NOTE — ASSESSMENT & PLAN NOTE
Patient reports a mechanical fall prior to Mother's Day  Patient has been having waxing and waning pain since in the right wrist however it has worsened over the past 24 hours  We will check a right wrist x-ray  Pending findings we will consider the possibility of an orthopedic evaluation

## 2025-06-02 NOTE — ASSESSMENT & PLAN NOTE
Lab Results   Component Value Date    HGBA1C 6.7 (H) 03/11/2025     Target blood sugar for the hospital is 140-180  Continue Lantus, Accu-Cheks AC and at bedtime with sliding scale coverage  Diabetic neuropathy-continue pregabalin

## 2025-06-02 NOTE — ASSESSMENT & PLAN NOTE
"CT report noting: \"Mild bladder wall thickening with trace pericystic inflammatory change. Tiny foci of intraluminal gas. Correlation with urinalysis advised for cystitis.\"  Urinalysis notable for positive nitrites and moderate bacteriuria but negative for pyuria, and patient asymptomatic from a urinary perspective  Status post 3 days worth of IV Rocephin  "

## 2025-06-03 VITALS
HEIGHT: 65 IN | TEMPERATURE: 98.3 F | RESPIRATION RATE: 18 BRPM | OXYGEN SATURATION: 93 % | BODY MASS INDEX: 38.02 KG/M2 | HEART RATE: 87 BPM | DIASTOLIC BLOOD PRESSURE: 74 MMHG | WEIGHT: 228.18 LBS | SYSTOLIC BLOOD PRESSURE: 141 MMHG

## 2025-06-03 LAB
ALBUMIN SERPL BCG-MCNC: 3.6 G/DL (ref 3.5–5)
ALP SERPL-CCNC: 76 U/L (ref 34–104)
ALT SERPL W P-5'-P-CCNC: 7 U/L (ref 7–52)
ANION GAP SERPL CALCULATED.3IONS-SCNC: 6 MMOL/L (ref 4–13)
AST SERPL W P-5'-P-CCNC: 11 U/L (ref 13–39)
BASOPHILS # BLD AUTO: 0.01 THOUSANDS/ÂΜL (ref 0–0.1)
BASOPHILS NFR BLD AUTO: 0 % (ref 0–1)
BILIRUB SERPL-MCNC: 0.44 MG/DL (ref 0.2–1)
BUN SERPL-MCNC: 10 MG/DL (ref 5–25)
CALCIUM SERPL-MCNC: 9.4 MG/DL (ref 8.4–10.2)
CHLORIDE SERPL-SCNC: 104 MMOL/L (ref 96–108)
CO2 SERPL-SCNC: 27 MMOL/L (ref 21–32)
CREAT SERPL-MCNC: 0.85 MG/DL (ref 0.6–1.3)
EOSINOPHIL # BLD AUTO: 0.07 THOUSAND/ÂΜL (ref 0–0.61)
EOSINOPHIL NFR BLD AUTO: 1 % (ref 0–6)
ERYTHROCYTE [DISTWIDTH] IN BLOOD BY AUTOMATED COUNT: 13.9 % (ref 11.6–15.1)
GFR SERPL CREATININE-BSD FRML MDRD: 64 ML/MIN/1.73SQ M
GLUCOSE SERPL-MCNC: 174 MG/DL (ref 65–140)
GLUCOSE SERPL-MCNC: 52 MG/DL (ref 65–140)
GLUCOSE SERPL-MCNC: 60 MG/DL (ref 65–140)
HCT VFR BLD AUTO: 40.3 % (ref 34.8–46.1)
HGB BLD-MCNC: 12.7 G/DL (ref 11.5–15.4)
IMM GRANULOCYTES # BLD AUTO: 0.07 THOUSAND/UL (ref 0–0.2)
IMM GRANULOCYTES NFR BLD AUTO: 1 % (ref 0–2)
LYMPHOCYTES # BLD AUTO: 1.21 THOUSANDS/ÂΜL (ref 0.6–4.47)
LYMPHOCYTES NFR BLD AUTO: 13 % (ref 14–44)
MAGNESIUM SERPL-MCNC: 2.1 MG/DL (ref 1.9–2.7)
MCH RBC QN AUTO: 28.9 PG (ref 26.8–34.3)
MCHC RBC AUTO-ENTMCNC: 31.5 G/DL (ref 31.4–37.4)
MCV RBC AUTO: 92 FL (ref 82–98)
MONOCYTES # BLD AUTO: 0.7 THOUSAND/ÂΜL (ref 0.17–1.22)
MONOCYTES NFR BLD AUTO: 7 % (ref 4–12)
NEUTROPHILS # BLD AUTO: 7.35 THOUSANDS/ÂΜL (ref 1.85–7.62)
NEUTS SEG NFR BLD AUTO: 78 % (ref 43–75)
NRBC BLD AUTO-RTO: 0 /100 WBCS
PLATELET # BLD AUTO: 165 THOUSANDS/UL (ref 149–390)
PMV BLD AUTO: 11.4 FL (ref 8.9–12.7)
POTASSIUM SERPL-SCNC: 3.8 MMOL/L (ref 3.5–5.3)
PROT SERPL-MCNC: 6.4 G/DL (ref 6.4–8.4)
RBC # BLD AUTO: 4.39 MILLION/UL (ref 3.81–5.12)
SODIUM SERPL-SCNC: 137 MMOL/L (ref 135–147)
WBC # BLD AUTO: 9.41 THOUSAND/UL (ref 4.31–10.16)

## 2025-06-03 PROCEDURE — 83735 ASSAY OF MAGNESIUM: CPT | Performed by: INTERNAL MEDICINE

## 2025-06-03 PROCEDURE — 82948 REAGENT STRIP/BLOOD GLUCOSE: CPT

## 2025-06-03 PROCEDURE — 85025 COMPLETE CBC W/AUTO DIFF WBC: CPT | Performed by: INTERNAL MEDICINE

## 2025-06-03 PROCEDURE — 80053 COMPREHEN METABOLIC PANEL: CPT | Performed by: INTERNAL MEDICINE

## 2025-06-03 PROCEDURE — 99239 HOSP IP/OBS DSCHRG MGMT >30: CPT | Performed by: HOSPITALIST

## 2025-06-03 PROCEDURE — 99222 1ST HOSP IP/OBS MODERATE 55: CPT

## 2025-06-03 RX ADMIN — FENOFIBRATE 145 MG: 145 TABLET, FILM COATED ORAL at 08:20

## 2025-06-03 RX ADMIN — PANTOPRAZOLE SODIUM 40 MG: 40 TABLET, DELAYED RELEASE ORAL at 08:20

## 2025-06-03 RX ADMIN — TOPIRAMATE 25 MG: 25 TABLET, FILM COATED ORAL at 08:19

## 2025-06-03 RX ADMIN — LISINOPRIL 10 MG: 10 TABLET ORAL at 08:19

## 2025-06-03 RX ADMIN — HYDROMORPHONE HYDROCHLORIDE 0.5 MG: 1 INJECTION, SOLUTION INTRAMUSCULAR; INTRAVENOUS; SUBCUTANEOUS at 06:15

## 2025-06-03 RX ADMIN — HEPARIN SODIUM 5000 UNITS: 5000 INJECTION INTRAVENOUS; SUBCUTANEOUS at 05:16

## 2025-06-03 RX ADMIN — PREGABALIN 75 MG: 75 CAPSULE ORAL at 15:28

## 2025-06-03 RX ADMIN — B-COMPLEX W/ C & FOLIC ACID TAB 1 TABLET: TAB at 08:20

## 2025-06-03 RX ADMIN — HYDROMORPHONE HYDROCHLORIDE 0.2 MG: 0.2 INJECTION, SOLUTION INTRAMUSCULAR; INTRAVENOUS; SUBCUTANEOUS at 09:48

## 2025-06-03 RX ADMIN — NYSTATIN 1 APPLICATION: 100000 POWDER TOPICAL at 08:20

## 2025-06-03 RX ADMIN — HYDROMORPHONE HYDROCHLORIDE 0.5 MG: 1 INJECTION, SOLUTION INTRAMUSCULAR; INTRAVENOUS; SUBCUTANEOUS at 12:15

## 2025-06-03 RX ADMIN — INSULIN LISPRO 1 UNITS: 100 INJECTION, SOLUTION INTRAVENOUS; SUBCUTANEOUS at 12:03

## 2025-06-03 RX ADMIN — OXYBUTYNIN CHLORIDE 5 MG: 5 TABLET, EXTENDED RELEASE ORAL at 08:19

## 2025-06-03 RX ADMIN — PREGABALIN 75 MG: 75 CAPSULE ORAL at 08:20

## 2025-06-03 NOTE — ASSESSMENT & PLAN NOTE
Lab Results   Component Value Date    HGBA1C 6.7 (H) 03/11/2025       Recent Labs     06/02/25  1052 06/02/25  1655 06/02/25  2105 06/03/25  0707   POCGLU 160* 116 155* 52*       Blood Sugar Average: Last 72 hrs:  (P) [id number]

## 2025-06-03 NOTE — PROGRESS NOTES
Attempted to call report to [facility].  No answer.  Message left on voicemail for report.

## 2025-06-03 NOTE — PLAN OF CARE
Problem: INFECTION - ADULT  Goal: Absence or prevention of progression during hospitalization  Description: INTERVENTIONS:  - Assess and monitor for signs and symptoms of infection  - Monitor lab/diagnostic results  - Monitor all insertion sites, i.e. indwelling lines, tubes, and drains  - Monitor endotracheal if appropriate and nasal secretions for changes in amount and color  - [place] appropriate cooling/warming therapies per order  - Administer medications as ordered  - Instruct and encourage patient and family to use good hand hygiene technique  - Identify and instruct in appropriate isolation precautions for identified infection/condition  Outcome: Progressing        Problem: PAIN - ADULT  Goal: Verbalizes/displays adequate comfort level or baseline comfort level  Description: Interventions:  - Encourage patient to monitor pain and request assistance  - Assess pain using appropriate pain scale  - Administer analgesics as ordered based on type and severity of pain and evaluate response  - Implement non-pharmacological measures as appropriate and evaluate response  - Consider cultural and social influences on pain and pain management  - Notify physician/advanced practitioner if interventions unsuccessful or patient reports new pain  - Educate patient/family on pain management process including their role and importance of  reporting pain   - Provide non-pharmacologic/complimentary pain relief interventions  Outcome: Progressing      [Initial Consultation] : an initial consultation [Patient] : patient [Foster Parents/Guardian] : /guardian [TextBox_50] : bedwetting  [TextBox_8] : Dr. Mary Cordon

## 2025-06-03 NOTE — DISCHARGE SUMMARY
"Discharge Summary - Hospitalist   Name: Alberta Sanchez 79 y.o. female I MRN: [id number]  Unit/Bed#: -01 I Date of Admission: 5/30/2025   Date of Service: 6/3/2025 I Hospital Day: 3     Assessment & Plan  Black stools with diarrhea  Diarrhea has resolved  Stool cultures are negative  Stool for C. difficile testing is negative  In retrospect symptoms are probably a combination of having a viral gastroenteritis in the setting of using iron supplemental therapy  Appreciate GI input  Patient is medically stable for discharge  Notified by case management that the patient has been accepted to the [facility] rehab center in [facility] for discharge today  Outpatient follow-up with PCP  No changes to any of her preadmission medications, and/or to the preadmission doses.  Multiple electrolyte abnormalities  Hypokalemia-has resolved with repletion  Hypomagnesemia-resolved with repletion  Secondary to the diarrhea as outlined above  Abnormal CT of the abdomen  CT report noting: \"Mild bladder wall thickening with trace pericystic inflammatory change. Tiny foci of intraluminal gas. Correlation with urinalysis advised for cystitis.\"  Urinalysis notable for positive nitrites and moderate bacteriuria but negative for pyuria, and patient asymptomatic from a urinary perspective  Status post 3 days worth of IV Rocephin  No further testing, treatment, and/or workup as needed  Essential hypertension  Some periods of accelerated hypertension noted  Blood pressure has stabilized out at time of discharge  DC home on preadmit meds at preadmit dose  Insulin-dependent diabetes mellitus with neuropathy (HCC)  Lab Results   Component Value Date    HGBA1C 6.7 (H) 03/11/2025     DC to rehab on preadmission medications at the preadmission doses without change  CKD (chronic kidney disease) stage 3 (HCC)  Baseline creatinine of approximately 0.8-1.0 -> currently stable at baseline  Patient will need continued periodic outpatient " BMP monitoring by her PCP  Hyperlipidemia  Patient with a mixed dyslipidemia   continue statin/fibrate in the postdischarge setting as the patient was taking prior to arrival  Thrombocytopenia (HCC)  Platelet count is stable  Will need periodic outpatient CBC monitoring  Right wrist pain  Patient reports a mechanical fall prior to Mother's Day  Right wrist x-ray revealed a distal radius fracture  Status post an orthopedic surgical evaluation  No indication at this time for any type of surgical intervention  Splint and brace has been applied  Orthopedic surgery will be following up in the near future in the outpatient setting  Okay for discharge     Medical Problems       Resolved Problems  Date Reviewed: 4/21/2025          Resolved    Morbid obesity (HCC) 5/31/2025     Resolved by  Juanita Cardoso DO        Discharging Physician / Practitioner: Tyesha Kasper MD  PCP: Dulce Michaud MD  Admission Date:   Admission Orders (From admission, onward)       Ordered        05/31/25 1412  INPATIENT ADMISSION  Once            05/30/25 1616  Place in Observation  Once                          Discharge Date: 06/03/25    Next Steps for Physician/AP Assuming Care:  Please ensure routine posthospital discharge blood work is ordered and monitored within 7 to 10 days  Please ensure that the patient follows up routinely with orthopedic surgery    Test Results Pending at Discharge (will require follow up):  None    Medication Changes for Discharge & Rationale:   No medication changes were made from this admission  See after visit summary for reconciled discharge medications provided to patient and/or family.     Consultations During Hospital Stay:  Orthopedic surgery  Gastroenterology    Procedures Performed:   None    Significant Findings / Test Results:   CT abdomen pelvis-Mild bladder wall thickening with trace pericystic inflammatory change. Tiny foci of intraluminal gas.   Correlation with urinalysis advised for  cystitis. Colonic diverticulosis without evidence of active inflammation.   Otherwise stomach and bowel unremarkable.   X-ray right wrist-distal radius fracture    Incidental Findings:   None    Hospital Course:   Alberta Sanchez is a 79 y.o. female patient who originally presented to the hospital on 5/30/2025 due to nausea, diarrhea, and dark stools.  Please refer to the initial history and physical examination completed by Dr. Marli Last for the initial presenting features and complaints.  In brief, the patient was admitted to [facility] after she came in with these complaints.  A GI consultation was obtained.  Patient was treated conservatively.  There was no drop noted in hemoglobin and/or hematocrit.  It was felt that her dark stools were possibly secondary to chronic iron use.  Nausea and diarrhea were attributed to viral gastroenteritis.  Patient had also reported a fall prior to Mother's Day.  She complained of right wrist pain.  An x-ray performed revealed a radial fracture.  She was seen also by orthopedic surgery, no intervention was needed from their standpoint other than conservative management.  Both GI and orthopedic surgery will be following up in the near future in the outpatient setting.  Patient was seen by PT and OT they recommended rehab, she was discharged to rehab on 6/3/2025.  She additionally completed a 3-day course of antibiotics for suspected UTI.  No changes were made to any of her preadmission medications, and her to the preadmission doses.  Patient will be following up in the outpatient setting with her PCP, GI, and orthopedic surgery.  Please refer to the assessment/plan portion of this discharge summary as outlined above for additional details regarding his stay.      Please see above list of diagnoses and related plan for additional information.     Discharge Day Visit / Exam:   Subjective: Patient seen, doing well, feels better, and is excited about going to rehab since she will be  "meeting her  who is at the same rehab facility  Vitals: Blood Pressure: 150/57 (06/03/25 0705)  Pulse: 77 (06/03/25 0705)  Temperature: 99.3 °F (37.4 °C) (06/03/25 0705)  Temp Source: Oral (06/02/25 1454)  Respirations: 18 (06/02/25 2148)  Height: 5' 5\" (165.1 cm) (05/30/25 1718)  Weight - Scale: 104 kg (228 lb 2.8 oz) (05/30/25 1718)  SpO2: 91 % (06/03/25 0705)  Physical Exam  Constitutional:       General: She is not in acute distress.     Appearance: Normal appearance. She is normal weight. She is not ill-appearing.   HENT:      Head: Normocephalic and atraumatic.      Nose: Nose normal.      Mouth/Throat:      Mouth: Mucous membranes are moist.     Eyes:      Extraocular Movements: Extraocular movements intact.      Pupils: Pupils are equal, round, and reactive to light.       Cardiovascular:      Rate and Rhythm: Normal rate and regular rhythm.      Pulses: Normal pulses.      Heart sounds: Normal heart sounds. No murmur heard.     No friction rub. No gallop.   Pulmonary:      Effort: Pulmonary effort is normal. No respiratory distress.      Breath sounds: Normal breath sounds. No wheezing, rhonchi or rales.   Abdominal:      General: There is no distension.      Palpations: Abdomen is soft. There is no mass.      Tenderness: There is no abdominal tenderness.      Hernia: No hernia is present.     Musculoskeletal:         General: No swelling or tenderness. Normal range of motion.      Cervical back: Normal range of motion and neck supple. No rigidity.      Right lower leg: No edema.      Left lower leg: No edema.      Comments: Right wrist splint is in place     Skin:     General: Skin is warm.      Capillary Refill: Capillary refill takes less than 2 seconds.      Findings: No erythema or rash.     Neurological:      General: No focal deficit present.      Mental Status: She is alert and oriented to person, place, and time. Mental status is at baseline.      Cranial Nerves: No cranial nerve deficit.    "   Motor: No weakness.     Psychiatric:         Mood and Affect: Mood normal.         Behavior: Behavior normal.          Discussion with Family: Patient declined call to .     Discharge instructions/Information to patient and family:   See after visit summary for information provided to patient and family.      Provisions for Follow-Up Care:  See after visit summary for information related to follow-up care and any pertinent home health orders.      Mobility at time of Discharge:   Basic Mobility Inpatient Raw Score: 17  JH-HLM Goal: 5: Stand one or more mins  JH-HLM Achieved: 5: Stand (1 or more minutes)  HLM Goal NOT achieved. Continue to encourage mobility in post discharge setting.     Disposition:   Other Skilled Nursing Facility at [facility]    Planned Readmission: None    Administrative Statements   Discharge Statement:  I have spent a total time of 40 minutes in caring for this patient on the day of the visit/encounter. >30 minutes of time was spent on: Diagnostic results, Prognosis, Risks and benefits of tx options, Instructions for management, Patient and family education, Importance of tx compliance, Risk factor reductions, Impressions, Counseling / Coordination of care, Documenting in the medical record, Reviewing / ordering tests, medicine, procedures  , and Communicating with other healthcare professionals .    **Please Note: This note may have been constructed using a voice recognition system**

## 2025-06-03 NOTE — CASE MANAGEMENT
Case Management Discharge Planning Note    Patient name Alberta Sanchez  Location /-01 MRN [id number]  : 1945 Date 6/3/2025       Current Admission Date: 2025  Current Admission Diagnosis:Black stools with diarrhea   Patient Active Problem List    Diagnosis Date Noted    Right wrist pain 2025    Thrombocytopenia (HCC) 2025    Black stools with diarrhea 2025    Multiple electrolyte abnormalities 2025    Abnormal CT of the abdomen 2025    Essential hypertension 2025    Insulin-dependent diabetes mellitus with neuropathy (HCC) 2025    CKD (chronic kidney disease) stage 3 ([facility]) 2025    Hyperlipidemia 2025    High risk medication use 2025    Dependence on cane 2025    Right knee pain 2024    Nocturnal hypoxemia 10/21/2024    Bilateral lower extremity edema 2024    Other fatigue 2024    Diabetes mellitus with neuropathy (HCC) 2024    Platelets decreased ([facility]) 2023    Itchy skin 2023    Nasal congestion 2023    LVH (left ventricular hypertrophy) 2022    COVID-19 virus infection 2022    COVID-19 vaccine series completed 2022    DNR (do not resuscitate) 2021    Gait disturbance 2021    Corns and callosity 10/30/2020    Arthritis 10/30/2020    Bilateral low back pain with bilateral sciatica 10/30/2020    Nail dystrophy 2020    Benign hypertensive renal disease 2020    Proteinuria 2020    Breast lump in upper outer quadrant 2020    Neck pain 2020    Abnormal mammogram 2020    Vaginal bleeding 2019    Chronic right shoulder pain 2019    Migraine without aura, not intractable 2018    Hypertriglyceridemia, essential 2018    Osteopenia of spine 2018    Nonalcoholic fatty liver disease 2018    Hypomagnesemia 2017    Indigestion 2017    Long term current use of insulin (HCC) 2017     Hammer toe 12/16/2015    Onychomycosis 11/10/2015    Chronic kidney disease, stage 3b (HCC) 08/24/2015    Peripheral vascular disease (HCC) 11/13/2014    Plantar fascial fibromatosis 06/11/2014    Vitamin D deficiency 04/21/2014    Multiple and bilateral precerebral arterial occlusion 04/21/2014    Tinnitus 03/19/2014    Carotid artery disease (HCC) 03/13/2013    Primary hypertension 01/15/2013    Mixed hyperlipidemia 01/15/2013    Irritable bowel syndrome 01/15/2013    Metabolic syndrome 01/15/2013    Obstructive sleep apnea treated with continuous positive airway pressure (CPAP) 01/15/2013    Type 2 diabetes mellitus with stage 3 chronic kidney disease, with long-term current use of insulin (HCC) 01/15/2013    Anemia 01/15/2013    Restless legs syndrome (RLS) 01/15/2013    Mixed stress and urge urinary incontinence 01/15/2013      LOS (days): 3  Geometric Mean LOS (GMLOS) (days): 2.5  Days to GMLOS:-0.4     OBJECTIVE:  Risk of Unplanned Readmission Score: 16.42         Current admission status: Inpatient   Preferred Pharmacy:   [id number] [place], [place] - [address]  [address] [address]  Phone: [phone] Fax: [phone]    [facility] - [facility] [address]  [address]  [address] [address]  Phone: [phone] Fax: [phone]    Primary Care Provider: Dulce Michaud MD    Primary Insurance: MEDICARE  Secondary Insurance: [facility]    DISCHARGE DETAILS:  Spoke to Anat in admissions at [facility] who states they will accept the pt today.  She provided number for RN report.  No COVID swab needed.  Discussed with the pt there will be a cost for transport  pt is in agreement with same.    Sent for transport via [facility].  Pt will be transported at 4pm today.  Reviewed the IMM with the pt who is in agreement with same.                                                                                                       Accepting Facility Name, City & State : [facility]  Receiving Facility/Agency Phone Number: [phone]  Facility/Agency Fax Number: [phone]

## 2025-06-03 NOTE — CONSULTS
Consultation - Orthopedics   Name: Alberta Sanchez 79 y.o. female I MRN: [id number]  Unit/Bed#: -01 I Date of Admission: 5/30/2025   Date of Service: 6/3/2025 I Hospital Day: 3   Inpatient consult to Orthopedic Surgery  Consult performed by: Colin Pololck PA-C  Consult ordered by: Tyesha Kasper MD        Physician Requesting Evaluation: Tyesha Kasper MD   Reason for Evaluation / Principal Problem: right wrist pain    Assessment & Plan  Black stools with diarrhea    Multiple electrolyte abnormalities    Abnormal CT of the abdomen    Essential hypertension    Insulin-dependent diabetes mellitus with neuropathy (HCC)  Lab Results   Component Value Date    HGBA1C 6.7 (H) 03/11/2025       Recent Labs     06/02/25  1052 06/02/25  1655 06/02/25  2105 06/03/25  0707   POCGLU 160* 116 155* 52*       Blood Sugar Average: Last 72 hrs:  (P) 143.9277203599632296    CKD (chronic kidney disease) stage 3 (HCC)  Lab Results   Component Value Date    EGFR 64 06/03/2025    EGFR 68 06/02/2025    EGFR 60 06/01/2025    CREATININE 0.85 06/03/2025    CREATININE 0.81 06/02/2025    CREATININE 0.90 06/01/2025     Hyperlipidemia    Thrombocytopenia (HCC)    Right wrist pain    79 year old female S/P fall the day before Mother's Day with right distal radius fracture  Non weight bearing right upper extremity  Continue removable wrist brace at all times except for hygiene  Ice and elevate right upper extremity above heart level  Encourage pressure dressing with Ace wrap to right hand and wrist  Monitor for numbness or tingling in fingers, pain that is out of control despite oral pain meds, or if fingers turn pale and cold  Analgesics for pain per primary team  No acute orthopedic intervention indicated  Follow up with Dr Lindo outpatient within 2 weeks        Ok for discharge from Orthopedics service perspective.  Orthopedics service signing off.    History of Present Illness   HPI: Alberta Sanchez is a 79 y.o. year  old right hand dominant female status post fall onto bilateral wrists the day before Mother's Day. Patient was seen at the [place] ED and was found to have a fracture of the left radial and ulnar styloid and a right wrist sprain.  Patient was discharged with nonweightbearing in the left upper extremity in a removable wrist brace.  Patient has been lifting with the right upper extremity in the past month but states she did not make a full recovery.  Patient was admitted to the [facility] service on 5/30 for GI problems.  Patient states that her right wrist pain worsened about 2 days ago while she was admitted to the hospital with no specific injury or inciting event.  Patient complains of pain which localized over the radial aspect of the right wrist.  Patient also endorses swelling somewhat in the right hand and wrist.  Patient denies any redness.  Patient complains of stiffness in the wrist and fingers, secondary to swelling and pain.    Review of Systems significant for findings described in the HPI.  Historical Information   Past Medical History[1]  Past Surgical History[2]  Social History[3]  E-Cigarette/Vaping    E-Cigarette Use Never User      E-Cigarette/Vaping Substances    Nicotine No     THC No     CBD No     Flavoring No     Other No     Unknown No      Family History[4]    Objective :  Temp:  [97.9 °F (36.6 °C)-99.3 °F (37.4 °C)] 99.3 °F (37.4 °C)  HR:  [77-96] 77  BP: ()/(57-99) 150/57  Resp:  [16-18] 18  SpO2:  [89 %-95 %] 91 %  O2 Device: None (Room air)  Physical ExamOrtho Exam   Musculoskeletal: Right upper extremity  Skin intact.  There is generalized soft tissue swelling about the dorsal aspect of the right hand and wrist.  No obvious erythema noted.  No deformity noted  Slightly limited range of motion in the wrist and all 5 digits secondary to swelling  Tender to palpation over radial styloid and ulnar styloid  Sensation intact to median, radial, and ulnar distributions  Motor intact to  "ain/pin/m/r/u  Finger tips warm and well perfused, 2+ radial pulse  Musculature is soft and compressible, no pain with passive stretch    Physical Exam   Constitutional: Appears well-developed and well-nourished. No distress.   HENT:   Head: Normocephalic.   Eyes: Conjunctivae are normal. Right eye exhibits no discharge. Left eye exhibits no discharge. No scleral icterus.   Cardiovascular: Normal rate.    Pulmonary/Chest: Effort normal.   Neurological: Alert and oriented to person, place, and time.   Skin: Skin is warm and dry. No rash noted. The patient is not diaphoretic. No erythema. No pallor.   Psychiatric: Normal mood and affect. Behavior is normal. Judgment and thought content normal.       Lab Results: I have reviewed the following results:   Recent Labs     06/01/25  0433 06/02/25  0455 06/03/25  0527   WBC 8.22 11.24* 9.41   HGB 11.9 12.2 12.7   HCT 37.7 38.9 40.3   * 162 165   BUN 15 11 10   CREATININE 0.90 0.81 0.85     Blood Culture: No results found for: \"BLOODCX\"  Wound Culture: No results found for: \"WOUNDCULT\"    Imaging Results Review: I reviewed radiology reports from this admission including: Xray right wrist.  FINDINGS:     Nondisplaced, nonangulated, intra-articular distal radial meta-epiphyseal fracture.     Alignment, joint spaces, radial length and inclination are preserved.     No lytic or blastic lesions.     Chondrocalcinosis. Soft tissue swelling.     The study was marked in EPIC for immediate notification.        IMPRESSION:  Distal radius fracture.  Other Study Results Review: No additional pertinent studies reviewed.         [1]   Past Medical History:  Diagnosis Date    Abnormal finding in urine 1/9/2023    Allergic Springtime    Runny nose, congestion    Anemia     Arthritis 10/30/2020    Chronic kidney disease     Diabetes mellitus (HCC)     Fibromyalgia, primary     Fracture of wrist     RIGHT    Gastroenteritis 12/18/2024    Heart murmur 06/30/2020    History of " non-insulin dependent diabetes mellitus     Hx of thrombocytopenia 02/18/2022    Hyperlipidemia     Hypertension     Hypertension     IBS (irritable bowel syndrome)     Inflammatory bowel disease 2015    Been doctoring for years    Irritable bowel     Kidney stone     Metabolic syndrome     Obesity     RLS (restless legs syndrome)     Routine medical exam 06/28/2019    Skin lesion 2/8/2021    Sleep apnea     Sleep apnea     ON CPAP    Upper respiratory tract infection 01/19/2022    Urge incontinence     Urinary incontinence     Urinary tract infection 1/1/2023   [2]   Past Surgical History:  Procedure Laterality Date    CHOLECYSTECTOMY  2013    COLONOSCOPY  2010    CYSTOSCOPY      HERNIA REPAIR  2008    HYSTERECTOMY N/A 1974    partial; ovaries intact    KNEE SURGERY Left    [3]   Social History  Tobacco Use    Smoking status: Never     Passive exposure: Never    Smokeless tobacco: Never   Vaping Use    Vaping status: Never Used   Substance and Sexual Activity    Alcohol use: Not Currently     Comment: no caffeine use    Drug use: No    Sexual activity: Yes     Partners: Male     Birth control/protection: None     Comment:  56 years   [4]   Family History  Problem Relation Name Age of Onset    Breast cancer Mother Nora m Live 62        Breast cancer    Suicidality Father      Melanoma Sister anthony     Bipolar disorder Sister anthony     Depression Sister rupali         Bi-polar    Bipolar disorder Sister rupali     Completed Suicide  Sister rupali     Rheum arthritis Daughter denmoniquen     No Known Problems Daughter payal     No Known Problems Maternal Grandmother      No Known Problems Maternal Grandfather      No Known Problems Paternal Grandmother      No Known Problems Paternal Grandfather      Heart disease Brother Warren     No Known Problems Maternal Aunt christiana     No Known Problems Maternal Aunt mary     BRCA2 Positive Neg Hx      BRCA1 Positive Neg Hx      BRCA2 Negative Neg Hx      BRCA1  Negative Neg Hx      BRCA 1/2 Neg Hx      Ovarian cancer Neg Hx      Endometrial cancer Neg Hx      Colon cancer Neg Hx      Breast cancer additional onset Neg Hx

## 2025-06-03 NOTE — ASSESSMENT & PLAN NOTE
Lab Results   Component Value Date    EGFR 64 06/03/2025    EGFR 68 06/02/2025    EGFR 60 06/01/2025    CREATININE 0.85 06/03/2025    CREATININE 0.81 06/02/2025    CREATININE 0.90 06/01/2025

## 2025-06-03 NOTE — ASSESSMENT & PLAN NOTE
Hypokalemia-has resolved with repletion  Hypomagnesemia-resolved with repletion  Secondary to the diarrhea as outlined above

## 2025-06-03 NOTE — ASSESSMENT & PLAN NOTE
Baseline creatinine of approximately 0.8-1.0 -> currently stable at baseline  Patient will need continued periodic outpatient BMP monitoring by her PCP

## 2025-06-03 NOTE — DISCHARGE INSTR - AVS FIRST PAGE
Discharge Instructions - Orthopedics      Weight Bearing Status:                                           Nonweightbearing right upper extremity  Continue removable wrist brace for bilateral wrists at all times except for hygiene    DVT prophylaxis:  Not indicated    Pain:  Continue analgesics as directed    Dressing Instructions:   Please use Ace wrap for gentle compression of right hand and wrist    Appt Instructions:   Please follow-up with hand specialist Dr Lindo within 2 weeks  If you do not have your appointment, please call the clinic at [phone]    Contact the office sooner if you experience any increased numbness/tingling in the extremities.          Dear Alberta Sanchez,     It was our pleasure to care for you here at [facility].  It is our hope that we were always able to exceed the expected standards for your care during your stay.  You were hospitalized due to diarrhea, dark stools and a right wrist fracture.  You were cared for on the medical/surgical floor by Tyesha Kasper MD with the [facility] who covers for your primary care physician (PCP), Dulce Michaud MD, while you were hospitalized.  If you have any questions or concerns related to this hospitalization, you may contact us at .  For follow up as well as any medication refills, we recommend that you follow up with your primary care physician.  A registered nurse will reach out to you by phone within a few days after your discharge to answer any additional questions that you may have after going home.  However, at this time we provide for you here, the most important instructions / recommendations at discharge:     Notable Medication Adjustments -   Okay to resume all preadmission medications at the preadmission doses without change  Testing Required after Discharge -   To be further determined in the near future in the outpatient setting by your primary care  provider, and/or by orthopedic surgery  Important follow up information -   Please follow-up with the providers as outlined in this discharge packet  Other Instructions -   Please participate to the best of your ability with physical and Occupational Therapy at the rehab facility  Please review this entire after visit summary as additional general instructions including medication list, appointments, activity, diet, any pertinent wound care, and other additional recommendations from your care team that may be provided for you.      Sincerely,     Tyesha Kasper MD

## 2025-06-03 NOTE — ASSESSMENT & PLAN NOTE
"CT report noting: \"Mild bladder wall thickening with trace pericystic inflammatory change. Tiny foci of intraluminal gas. Correlation with urinalysis advised for cystitis.\"  Urinalysis notable for positive nitrites and moderate bacteriuria but negative for pyuria, and patient asymptomatic from a urinary perspective  Status post 3 days worth of IV Rocephin  No further testing, treatment, and/or workup as needed  "

## 2025-06-03 NOTE — ASSESSMENT & PLAN NOTE
Diarrhea has resolved  Stool cultures are negative  Stool for C. difficile testing is negative  In retrospect symptoms are probably a combination of having a viral gastroenteritis in the setting of using iron supplemental therapy  Appreciate GI input  Patient is medically stable for discharge  Notified by case management that the patient has been accepted to the [facility] rehab center in [facility] for discharge today  Outpatient follow-up with PCP  No changes to any of her preadmission medications, and/or to the preadmission doses.

## 2025-06-03 NOTE — ASSESSMENT & PLAN NOTE
Lab Results   Component Value Date    HGBA1C 6.7 (H) 03/11/2025     DC to rehab on preadmission medications at the preadmission doses without change

## 2025-06-03 NOTE — ASSESSMENT & PLAN NOTE
Patient reports a mechanical fall prior to Mother's Day  Right wrist x-ray revealed a distal radius fracture  Status post an orthopedic surgical evaluation  No indication at this time for any type of surgical intervention  Splint and brace has been applied  Orthopedic surgery will be following up in the near future in the outpatient setting  Okay for discharge

## 2025-06-03 NOTE — PROGRESS NOTES
Patient:    MRN:  [id number]    Karla Request ID:  [id number]    Level of care reserved:  Skilled Nursing Facility    Partner Reserved:  [facility], [place] [address] [phone]    Clinical needs requested:    Geography searched:  20 miles around [id number]    Start of Service:    Request sent:  11:01am EDT on 6/3/2025 by Janice Aj    Partner reserved:  12:00pm EDT on 6/3/2025 by Janice Aj    Choice list shared:  11:56am EDT on 6/3/2025 by Janice Aj

## 2025-06-03 NOTE — ASSESSMENT & PLAN NOTE
79 year old female S/P fall the day before Mother's Day with right distal radius fracture  Non weight bearing right upper extremity  Continue removable wrist brace at all times except for hygiene  Ice and elevate right upper extremity above heart level  Encourage pressure dressing with Ace wrap to right hand and wrist  Monitor for numbness or tingling in fingers, pain that is out of control despite oral pain meds, or if fingers turn pale and cold  Analgesics for pain per primary team  No acute orthopedic intervention indicated  Follow up with Dr Lindo outpatient within 2 weeks

## 2025-06-03 NOTE — ASSESSMENT & PLAN NOTE
Patient with a mixed dyslipidemia   continue statin/fibrate in the postdischarge setting as the patient was taking prior to arrival

## 2025-06-03 NOTE — ASSESSMENT & PLAN NOTE
Some periods of accelerated hypertension noted  Blood pressure has stabilized out at time of discharge  DC home on preadmit meds at preadmit dose

## 2025-06-03 NOTE — PLAN OF CARE
Problem: Potential for Falls  Goal: Patient will remain free of falls  Description: INTERVENTIONS:  - Educate patient/family on patient safety including physical limitations  - Instruct patient to call for assistance with activity   - Consider consulting OT/PT to assist with strengthening/mobility based on AM PAC & JH-HLM score  - Consult OT/PT to assist with strengthening/mobility   - Keep Call bell within reach  - Keep bed low and locked with side rails adjusted as appropriate  - Keep care items and personal belongings within reach  - Initiate and maintain comfort rounds  - Make Fall Risk Sign visible to staff  - Offer Toileting every 2 Hours, in advance of need  - Initiate/Maintain chair alarm  - Obtain necessary fall risk management equipment: nonslip socks   - Apply yellow socks and bracelet for high fall risk patients  - Consider moving patient to room near nurses station  Outcome: Progressing     Problem: Prexisting or High Potential for Compromised Skin Integrity  Goal: Skin integrity is maintained or improved  Description: INTERVENTIONS:  - Identify patients at risk for skin breakdown  - Assess and monitor skin integrity including under and around medical devices   - Assess and monitor nutrition and hydration status  - Monitor labs  - Assess for incontinence   - Turn and reposition patient  - Assist with mobility/ambulation  - Relieve pressure over marielena prominences   - Avoid friction and shearing  - Provide appropriate hygiene as needed including keeping skin clean and dry  - Evaluate need for skin moisturizer/barrier cream  - Collaborate with interdisciplinary team  - Patient/family teaching  - Consider wound care consult    Assess:  - Review Moises scale daily  - Clean and moisturize skin every shift   - Inspect skin when repositioning, toileting, and assisting with ADLS  - Assess under medical devices such as masimo  every shift  - Assess extremities for adequate circulation and sensation     Bed  Management:  - Have minimal linens on bed & keep smooth, unwrinkled  - Change linens as needed when moist or perspiring  - Avoid sitting or lying in one position for more than 2 hours while in bed?Keep HOB at 30 degrees   - Toileting:  - Offer bedside commode  - Assess for incontinence every shift   - Use incontinent care products after each incontinent episode such as barrier creams     Activity:  - Mobilize patient 3 times a day  - Encourage activity and walks on unit  - Encourage or provide ROM exercises   - Turn and reposition patient every 2 Hours  - Use appropriate equipment to lift or move patient in bed  - Instruct/ Assist with weight shifting every hour  when out of bed in chair  - Consider limitation of chair time to 2 hour intervals    Skin Care:  - Avoid use of baby powder, tape, friction and shearing, hot water or constrictive clothing  - Relieve pressure over bony prominences using pillows   - Do not massage red bony areas    Next Steps:  - Teach patient strategies to minimize risks such as weight shifting   - Consider consults to  interdisciplinary teams such as n/a  Outcome: Progressing     Problem: PAIN - ADULT  Goal: Verbalizes/displays adequate comfort level or baseline comfort level  Description: Interventions:  - Encourage patient to monitor pain and request assistance  - Assess pain using appropriate pain scale  - Administer analgesics as ordered based on type and severity of pain and evaluate response  - Implement non-pharmacological measures as appropriate and evaluate response  - Consider cultural and social influences on pain and pain management  - Notify physician/advanced practitioner if interventions unsuccessful or patient reports new pain  - Educate patient/family on pain management process including their role and importance of  reporting pain   - Provide non-pharmacologic/complimentary pain relief interventions  Outcome: Progressing     Problem: INFECTION - ADULT  Goal: Absence or  prevention of progression during hospitalization  Description: INTERVENTIONS:  - Assess and monitor for signs and symptoms of infection  - Monitor lab/diagnostic results  - Monitor all insertion sites, i.e. indwelling lines, tubes, and drains  - Monitor endotracheal if appropriate and nasal secretions for changes in amount and color  - Prospect appropriate cooling/warming therapies per order  - Administer medications as ordered  - Instruct and encourage patient and family to use good hand hygiene technique  - Identify and instruct in appropriate isolation precautions for identified infection/condition  Outcome: Progressing  Goal: Absence of fever/infection during neutropenic period  Description: INTERVENTIONS:  - Monitor WBC  - Perform strict hand hygiene  - Limit to healthy visitors only  - No plants, dried, fresh or silk flowers with perez in patient room  Outcome: Progressing     Problem: SAFETY ADULT  Goal: Patient will remain free of falls  Description: INTERVENTIONS:  - Educate patient/family on patient safety including physical limitations  - Instruct patient to call for assistance with activity   - Consider consulting OT/PT to assist with strengthening/mobility based on AM PAC & JH-HLM score  - Consult OT/PT to assist with strengthening/mobility   - Keep Call bell within reach  - Keep bed low and locked with side rails adjusted as appropriate  - Keep care items and personal belongings within reach  - Initiate and maintain comfort rounds  - Make Fall Risk Sign visible to staff  - Offer Toileting every 2 Hours, in advance of need  - Initiate/Maintain chair alarm  - Obtain necessary fall risk management equipment: nonslip socks   - Apply yellow socks and bracelet for high fall risk patients  - Consider moving patient to room near nurses station  Outcome: Progressing  Goal: Maintain or return to baseline ADL function  Description: INTERVENTIONS:  -  Assess patient's ability to carry out ADLs; assess patient's  baseline for ADL function and identify physical deficits which impact ability to perform ADLs (bathing, care of mouth/teeth, toileting, grooming, dressing, etc.)  - Assess/evaluate cause of self-care deficits   - Assess range of motion  - Assess patient's mobility; develop plan if impaired  - Assess patient's need for assistive devices and provide as appropriate  - Encourage maximum independence but intervene and supervise when necessary  - Involve family in performance of ADLs  - Assess for home care needs following discharge   - Consider OT consult to assist with ADL evaluation and planning for discharge  - Provide patient education as appropriate  - Monitor functional capacity and physical performance, use of AM PAC & JH-HLM   - Monitor gait, balance and fatigue with ambulation    Outcome: Progressing  Goal: Maintains/Returns to pre admission functional level  Description: INTERVENTIONS:  - Perform AM-PAC 6 Click Basic Mobility/ Daily Activity assessment daily.  - Set and communicate daily mobility goal to care team and patient/family/caregiver.   - Collaborate with rehabilitation services on mobility goals if consulted  - Perform Range of Motion 3 times a day.  - Reposition patient every 2 hours.  - Dangle patient 3 times a day  - Stand patient 3 times a day  - Ambulate patient 3 times a day  - Out of bed to chair 3 times a day   - Out of bed for meals 3 times a day  - Out of bed for toileting  - Record patient progress and toleration of activity level   Outcome: Progressing     Problem: DISCHARGE PLANNING  Goal: Discharge to home or other facility with appropriate resources  Description: INTERVENTIONS:  - Identify barriers to discharge w/patient and caregiver  - Arrange for needed discharge resources and transportation as appropriate  - Identify discharge learning needs (meds, wound care, etc.)  - Arrange for interpretive services to assist at discharge as needed  - Refer to Case Management Department for  coordinating discharge planning if the patient needs post-hospital services based on physician/advanced practitioner order or complex needs related to functional status, cognitive ability, or social support system  Outcome: Progressing     Problem: Knowledge Deficit  Goal: Patient/family/caregiver demonstrates understanding of disease process, treatment plan, medications, and discharge instructions  Description: Complete learning assessment and assess knowledge base.  Interventions:  - Provide teaching at level of understanding  - Provide teaching via preferred learning methods  Outcome: Progressing     Problem: Nutrition/Hydration-ADULT  Goal: Nutrient/Hydration intake appropriate for improving, restoring or maintaining nutritional needs  Description: Monitor and assess patient's nutrition/hydration status for malnutrition. Collaborate with interdisciplinary team and initiate plan and interventions as ordered.  Monitor patient's weight and dietary intake as ordered or per policy. Utilize nutrition screening tool and intervene as necessary. Determine patient's food preferences and provide high-protein, high-caloric foods as appropriate.     INTERVENTIONS:  - Monitor oral intake, urinary output, labs, and treatment plans  - Assess nutrition and hydration status and recommend course of action  - Evaluate amount of meals eaten  - Assist patient with eating if necessary   - Allow adequate time for meals  - Recommend/ encourage appropriate diets, oral nutritional supplements, and vitamin/mineral supplements  - Order, calculate, and assess calorie counts as needed  - Recommend, monitor, and adjust tube feedings and TPN/PPN based on assessed needs  - Assess need for intravenous fluids  - Provide specific nutrition/hydration education as appropriate  - Include patient/family/caregiver in decisions related to nutrition  Outcome: Progressing

## 2025-06-04 ENCOUNTER — TRANSITIONAL CARE MANAGEMENT (OUTPATIENT)
Dept: FAMILY MEDICINE CLINIC | Facility: CLINIC | Age: 80
End: 2025-06-04

## 2025-06-05 ENCOUNTER — PATIENT OUTREACH (OUTPATIENT)
Dept: CASE MANAGEMENT | Facility: OTHER | Age: 80
End: 2025-06-05

## 2025-06-05 NOTE — PROGRESS NOTES
Outpatient Care Management [facility]. Discharged 6/3/25 to [facility]. Email sent to facility to inform them I will be following the patient during their skilled stay.  This Admin Coordinator will continue to monitor via chart review.

## 2025-06-11 ENCOUNTER — TELEPHONE (OUTPATIENT)
Dept: FAMILY MEDICINE CLINIC | Facility: CLINIC | Age: 80
End: 2025-06-11

## 2025-06-11 ENCOUNTER — TELEPHONE (OUTPATIENT)
Age: 80
End: 2025-06-11

## 2025-06-11 NOTE — TELEPHONE ENCOUNTER
Type of form: patient care order via fax.    Forms have been placed in Dulce Michaud MD folder to be completed for clinical staff.     Routing to clinical pool.

## 2025-06-13 ENCOUNTER — PATIENT OUTREACH (OUTPATIENT)
Dept: CASE MANAGEMENT | Facility: OTHER | Age: 80
End: 2025-06-13

## 2025-06-13 NOTE — PROGRESS NOTES
Call placed to [facility] and spoke with [facility] who confirmed the patient is currently admitted to their facility for STR no LCD at this time. This Admin Coordinator will continue to monitor via chart review.

## 2025-06-13 NOTE — TELEPHONE ENCOUNTER
Type of form: verbal order via fax.      Forms have been placed in Dulce Michaud MD folder to be completed for clinical staff.     Routing to clinical pool.

## 2025-06-17 ENCOUNTER — OFFICE VISIT (OUTPATIENT)
Dept: OBGYN CLINIC | Facility: CLINIC | Age: 80
End: 2025-06-17
Attending: HOSPITALIST
Payer: MEDICARE

## 2025-06-17 DIAGNOSIS — S62.102D CLOSED FRACTURE OF LEFT WRIST WITH ROUTINE HEALING, SUBSEQUENT ENCOUNTER: Primary | ICD-10-CM

## 2025-06-17 DIAGNOSIS — M25.531 RIGHT WRIST PAIN: ICD-10-CM

## 2025-06-17 DIAGNOSIS — S62.101A CLOSED FRACTURE OF RIGHT WRIST, INITIAL ENCOUNTER: ICD-10-CM

## 2025-06-17 PROBLEM — S62.102A CLOSED FRACTURE OF LEFT WRIST: Status: ACTIVE | Noted: 2025-06-17

## 2025-06-17 PROCEDURE — 99213 OFFICE O/P EST LOW 20 MIN: CPT | Performed by: SURGERY

## 2025-06-17 NOTE — PROGRESS NOTES
Name: Alberta Sanchez      : 1945      MRN: [id number]  Encounter Provider: Wagner Lindo MD  Encounter Date: 2025   Encounter department: [facility] [place]      Assessment & Plan  Closed fracture of left wrist with routine healing, subsequent encounter  Can now wean out of brace altogether and be more aggressive with active and passive range of motion exercises.  She has therapy at rehab center and orders were written for her.  WBAT LUE.  OTC pain medications as needed.  Follow-up in 4 weeks for motion check.       Closed fracture of right wrist, initial encounter  Can wean out of Velcro style wrist brace over the next week and start range of motion exercises at that time.  WBAT RUE.  OTC pain medications as needed.  Follow-up 4 weeks for motion check.            Chief Complaint   Patient presents with    Left Wrist - Pain    Right Wrist - Pain       History of Present Illness   Patient is a 79 y.o. female here for.  Today she states overall she is doing well.  She still has pain in the right greater than left wrist, and has been wearing the Velcro style wrist braces at all times.  She does remove them for hygiene.  She states she has been working on finger range of motion with therapy at her rehab center.  She has not done any wrist range of motion yet.  No numbness and tingling into the fingers.  No other complaints offered today.  Patient is Right Handed.      Review of Systems A 10 point ROS was performed; negative except as noted above.   Past Surgical History[1]   Past Medical History[2]   Allergies[3]   Objective   Current Outpatient Medications   Medication Instructions    acetaminophen (TYLENOL) 325 mg tablet As needed    albuterol (ProAir HFA) 90 mcg/act inhaler 2 puffs, Inhalation, Every 4 hours PRN    B Complex Vitamins (B COMPLEX 1 PO) Take by mouth    bismuth subsalicylate (PEPTO BISMOL) 524 mg, Oral, 2 times daily PRN, Has tolerated in the past.     Cholecalciferol (VITAMIN D3) 2000 units capsule     clotrimazole (LOTRIMIN) 1 % cream 1 Application, Topical, 2 times daily    Contour Next Test test strip Test blood sugar 3 times daily    Diapers & Supplies MISC Does not apply, 3 times daily PRN    Diclofenac Sodium (VOLTAREN) 2 g, Topical, 4 times daily    estrogens, conjugated (Premarin) vaginal cream INSERT 1 APPLICATORFUL VAGINALLY TWICE WEEKLY    fenofibrate 160 MG tablet TAKE 1 TABLET BY MOUTH  DAILY    insulin degludec (Tresiba FlexTouch) 100 units/mL injection pen INJECT 70 UNITS SUBCUTANEOUSLY  DAILY AT BEDTIME    Insulin Pen Needle (BD Pen Needle [place] U/F) 32G X 4 MM MISC INJECT SUBCUTANEOUSLY AS  DIRECTED 3 TIMES DAILY    lisinopril (ZESTRIL) 10 mg, Oral, Daily    Microlet Lancets MISC Does not apply, 3 times daily    Misc Natural Products (CYSTEX URINARY HEALTH PO) 1 capsule, Daily    Multiple Vitamin (DAILY VALUE MULTIVITAMIN) TABS Take by mouth    pantoprazole (PROTONIX) 40 mg, Oral, Daily    pregabalin (LYRICA) 75 mg, Oral, 3 times daily    Probiotic Product (Align) 4 MG CAPS Take by mouth    simvastatin (ZOCOR) 10 mg, Oral, Daily at bedtime    solifenacin (VESICARE) 5 mg, Oral, Daily    topiramate (TOPAMAX) 25 mg, Oral, Daily        Imaging  No new imaging today     Physical Exam    General appearance:  NAD   Cardiac:  Regular rate  Lungs:  Unlabored breathing  Abdomen:  Non-distended    Orthopedic Exam:    Bilateral wrists     Inspection: Soft tissue swelling right wrist, minimal swelling left wrist.  No ecchymosis or open wounds bilaterally.    Palpation: Tenderness to palpation right distal radius.  Nontender to palpation left distal radius/ulna    Range-of-motion: Wrist range of motion deferred, good finger motion.    Strength:  Deferred    Sensation:  ILT m/r/u nerve distributions.    Special Tests:  Palpable radial pulse  UE warm and well perfused.        Herbert Helm PA-C          [1]   Past Surgical History:  Procedure Laterality Date  "   CHOLECYSTECTOMY  2013    COLONOSCOPY  2010    CYSTOSCOPY      HERNIA REPAIR  2008    HYSTERECTOMY N/A 1974    partial; ovaries intact    KNEE SURGERY Left    [2]   Past Medical History:  Diagnosis Date    Abnormal finding in urine 1/9/2023    Allergic Springtime    Runny nose, congestion    Anemia     Arthritis 10/30/2020    Chronic kidney disease     Diabetes mellitus (HCC)     Fibromyalgia, primary     Fracture of wrist     RIGHT    Gastroenteritis 12/18/2024    Heart murmur 06/30/2020    History of non-insulin dependent diabetes mellitus     Hx of thrombocytopenia 02/18/2022    Hyperlipidemia     Hypertension     Hypertension     IBS (irritable bowel syndrome)     Inflammatory bowel disease 2015    Been doctoring for years    Irritable bowel     Kidney stone     Metabolic syndrome     Obesity     RLS (restless legs syndrome)     Routine medical exam 06/28/2019    Skin lesion 2/8/2021    Sleep apnea     Sleep apnea     ON CPAP    Upper respiratory tract infection 01/19/2022    Urge incontinence     Urinary incontinence     Urinary tract infection 1/1/2023   [3]   Allergies  Allergen Reactions    Pioglitazone Other (See Comments)     \"Feels like she is dying\"    Dapagliflozin Itching     Yeast infection     Ibuprofen     Levofloxacin Other (See Comments)     Other reaction(s): rash    Nsaids      "

## 2025-06-17 NOTE — ASSESSMENT & PLAN NOTE
Can now wean out of brace altogether and be more aggressive with active and passive range of motion exercises.  She has therapy at rehab center and orders were written for her.  WBAT LUE.  OTC pain medications as needed.  Follow-up in 4 weeks for motion check.

## 2025-06-20 ENCOUNTER — PATIENT OUTREACH (OUTPATIENT)
Dept: CASE MANAGEMENT | Facility: OTHER | Age: 80
End: 2025-06-20

## 2025-06-20 DIAGNOSIS — I10 ESSENTIAL HYPERTENSION: ICD-10-CM

## 2025-06-20 RX ORDER — LISINOPRIL 10 MG/1
10 TABLET ORAL DAILY
Qty: 90 TABLET | Refills: 1 | Status: SHIPPED | OUTPATIENT
Start: 2025-06-20

## 2025-06-20 NOTE — PROGRESS NOTES
Call placed to [facility] and spoke with [facility] who confirmed the patient is currently admitted to their facility for STR no LCD at this time.     I have removed myself from the care team, updated the Care Coordination note, and closed the Care Transitions program.

## 2025-06-20 NOTE — TELEPHONE ENCOUNTER
Type of form: patient care order via fax.     Cert date: 5/16/26 to 7/14/25    Forms have been placed in Dulce Michaud MD folder to be completed for clinical staff.     Routing to clinical pool.

## 2025-06-24 DIAGNOSIS — K30 INDIGESTION: ICD-10-CM

## 2025-06-26 DIAGNOSIS — E11.22 TYPE 2 DIABETES MELLITUS WITH STAGE 3 CHRONIC KIDNEY DISEASE, WITH LONG-TERM CURRENT USE OF INSULIN (HCC): ICD-10-CM

## 2025-06-26 DIAGNOSIS — Z79.4 TYPE 2 DIABETES MELLITUS WITH STAGE 3 CHRONIC KIDNEY DISEASE, WITH LONG-TERM CURRENT USE OF INSULIN (HCC): ICD-10-CM

## 2025-06-26 DIAGNOSIS — N18.30 TYPE 2 DIABETES MELLITUS WITH STAGE 3 CHRONIC KIDNEY DISEASE, WITH LONG-TERM CURRENT USE OF INSULIN (HCC): ICD-10-CM

## 2025-06-26 RX ORDER — PANTOPRAZOLE SODIUM 40 MG/1
40 TABLET, DELAYED RELEASE ORAL DAILY
Qty: 90 TABLET | Refills: 1 | Status: SHIPPED | OUTPATIENT
Start: 2025-06-26

## 2025-06-27 RX ORDER — INSULIN DEGLUDEC 100 U/ML
INJECTION, SOLUTION SUBCUTANEOUS
Qty: 75 ML | Refills: 1 | Status: SHIPPED | OUTPATIENT
Start: 2025-06-27

## 2025-07-15 ENCOUNTER — OFFICE VISIT (OUTPATIENT)
Dept: OBGYN CLINIC | Facility: CLINIC | Age: 80
End: 2025-07-15
Payer: MEDICARE

## 2025-07-15 DIAGNOSIS — S62.101D CLOSED FRACTURE OF RIGHT WRIST WITH ROUTINE HEALING, SUBSEQUENT ENCOUNTER: ICD-10-CM

## 2025-07-15 DIAGNOSIS — S62.102D CLOSED FRACTURE OF LEFT WRIST WITH ROUTINE HEALING, SUBSEQUENT ENCOUNTER: Primary | ICD-10-CM

## 2025-07-15 PROBLEM — S62.101A CLOSED FRACTURE OF RIGHT WRIST: Status: ACTIVE | Noted: 2025-07-15

## 2025-07-15 PROCEDURE — 99213 OFFICE O/P EST LOW 20 MIN: CPT | Performed by: SURGERY

## 2025-07-16 ENCOUNTER — RA CDI HCC (OUTPATIENT)
Dept: OTHER | Facility: HOSPITAL | Age: 80
End: 2025-07-16

## 2025-07-31 ENCOUNTER — TELEPHONE (OUTPATIENT)
Dept: FAMILY MEDICINE CLINIC | Facility: CLINIC | Age: 80
End: 2025-07-31

## 2025-08-07 ENCOUNTER — TELEPHONE (OUTPATIENT)
Dept: GERIATRICS | Facility: OTHER | Age: 80
End: 2025-08-07

## 2025-08-07 DIAGNOSIS — M54.41 CHRONIC BILATERAL LOW BACK PAIN WITH BILATERAL SCIATICA: ICD-10-CM

## 2025-08-07 DIAGNOSIS — G89.29 CHRONIC BILATERAL LOW BACK PAIN WITH BILATERAL SCIATICA: ICD-10-CM

## 2025-08-07 DIAGNOSIS — M54.42 CHRONIC BILATERAL LOW BACK PAIN WITH BILATERAL SCIATICA: ICD-10-CM

## 2025-08-07 RX ORDER — OXYCODONE AND ACETAMINOPHEN 5; 325 MG/1; MG/1
1 TABLET ORAL EVERY 8 HOURS PRN
Qty: 90 TABLET | Refills: 0 | Status: SHIPPED | OUTPATIENT
Start: 2025-08-07

## 2025-08-07 RX ORDER — PREGABALIN 75 MG/1
75 CAPSULE ORAL 3 TIMES DAILY
Qty: 90 CAPSULE | Refills: 0 | Status: SHIPPED | OUTPATIENT
Start: 2025-08-07

## 2025-08-08 ENCOUNTER — NURSING HOME VISIT (OUTPATIENT)
Dept: GERIATRICS | Facility: OTHER | Age: 80
End: 2025-08-08
Payer: MEDICARE

## 2025-08-10 PROBLEM — Z91.89 AT RISK FOR DELIRIUM: Status: ACTIVE | Noted: 2025-08-10

## 2025-08-10 PROBLEM — G89.29 OTHER CHRONIC PAIN: Status: ACTIVE | Noted: 2025-08-10

## 2025-08-10 PROBLEM — R26.2 AMBULATORY DYSFUNCTION: Status: ACTIVE | Noted: 2025-08-10

## 2025-08-10 PROBLEM — Z92.29 COVID-19 VACCINE SERIES COMPLETED: Status: RESOLVED | Noted: 2022-01-19 | Resolved: 2025-08-10

## 2025-08-10 PROBLEM — K92.1 BLACK STOOLS: Status: RESOLVED | Noted: 2025-05-30 | Resolved: 2025-08-10

## 2025-08-10 PROBLEM — R54 FRAILTY SYNDROME IN GERIATRIC PATIENT: Status: ACTIVE | Noted: 2025-08-10

## 2025-08-10 PROBLEM — Z91.89 AT RISK FOR CONSTIPATION: Status: ACTIVE | Noted: 2025-08-10

## 2025-08-10 PROBLEM — R53.83 OTHER FATIGUE: Status: RESOLVED | Noted: 2024-07-18 | Resolved: 2025-08-10

## 2025-08-10 PROBLEM — N93.9 VAGINAL BLEEDING: Status: RESOLVED | Noted: 2019-12-30 | Resolved: 2025-08-10

## 2025-08-10 PROBLEM — Z71.89 ADVANCE CARE PLANNING: Status: ACTIVE | Noted: 2025-08-10

## 2025-08-10 PROBLEM — U07.1 COVID-19 VIRUS INFECTION: Status: RESOLVED | Noted: 2022-01-21 | Resolved: 2025-08-10

## 2025-08-10 PROBLEM — K21.9 GERD (GASTROESOPHAGEAL REFLUX DISEASE): Status: ACTIVE | Noted: 2025-08-10

## 2025-08-20 ENCOUNTER — NURSING HOME VISIT (OUTPATIENT)
Dept: GERIATRICS | Facility: OTHER | Age: 80
End: 2025-08-20
Payer: MEDICARE

## 2025-08-20 DIAGNOSIS — J30.9 ALLERGIC RHINITIS, UNSPECIFIED SEASONALITY, UNSPECIFIED TRIGGER: ICD-10-CM

## 2025-08-20 DIAGNOSIS — Z79.4 TYPE 2 DIABETES MELLITUS WITH STAGE 3A CHRONIC KIDNEY DISEASE, WITH LONG-TERM CURRENT USE OF INSULIN (HCC): ICD-10-CM

## 2025-08-20 DIAGNOSIS — N18.31 TYPE 2 DIABETES MELLITUS WITH STAGE 3A CHRONIC KIDNEY DISEASE, WITH LONG-TERM CURRENT USE OF INSULIN (HCC): ICD-10-CM

## 2025-08-20 DIAGNOSIS — D69.6 THROMBOCYTOPENIA (HCC): ICD-10-CM

## 2025-08-20 DIAGNOSIS — E11.22 TYPE 2 DIABETES MELLITUS WITH STAGE 3A CHRONIC KIDNEY DISEASE, WITH LONG-TERM CURRENT USE OF INSULIN (HCC): ICD-10-CM

## 2025-08-20 DIAGNOSIS — H61.21 IMPACTED CERUMEN OF RIGHT EAR: Primary | ICD-10-CM

## 2025-08-20 DIAGNOSIS — I10 ESSENTIAL HYPERTENSION: ICD-10-CM

## 2025-08-20 PROCEDURE — 99308 SBSQ NF CARE LOW MDM 20: CPT | Performed by: STUDENT IN AN ORGANIZED HEALTH CARE EDUCATION/TRAINING PROGRAM

## 2025-08-22 PROBLEM — J30.9 ALLERGIC RHINITIS: Status: ACTIVE | Noted: 2025-08-22

## 2025-08-22 PROBLEM — H61.21 IMPACTED CERUMEN OF RIGHT EAR: Status: ACTIVE | Noted: 2025-08-22
